# Patient Record
Sex: MALE | Race: WHITE | Employment: OTHER | URBAN - METROPOLITAN AREA
[De-identification: names, ages, dates, MRNs, and addresses within clinical notes are randomized per-mention and may not be internally consistent; named-entity substitution may affect disease eponyms.]

---

## 2021-11-07 ENCOUNTER — APPOINTMENT (OUTPATIENT)
Dept: GENERAL RADIOLOGY | Age: 66
End: 2021-11-07
Attending: EMERGENCY MEDICINE
Payer: MEDICARE

## 2021-11-07 ENCOUNTER — HOSPITAL ENCOUNTER (EMERGENCY)
Age: 66
Discharge: HOME | End: 2021-11-07
Attending: EMERGENCY MEDICINE
Payer: MEDICARE

## 2021-11-07 VITALS
RESPIRATION RATE: 24 BRPM | WEIGHT: 214 LBS | DIASTOLIC BLOOD PRESSURE: 87 MMHG | OXYGEN SATURATION: 98 % | HEIGHT: 73 IN | BODY MASS INDEX: 28.36 KG/M2 | SYSTOLIC BLOOD PRESSURE: 161 MMHG | TEMPERATURE: 98.9 F | HEART RATE: 91 BPM

## 2021-11-07 DIAGNOSIS — S76.012A HIP STRAIN, LEFT, INITIAL ENCOUNTER: Primary | ICD-10-CM

## 2021-11-07 DIAGNOSIS — I95.1 ORTHOSTATIC SYNCOPE: ICD-10-CM

## 2021-11-07 LAB
ALBUMIN SERPL-MCNC: 3.1 G/DL (ref 3.2–4.6)
ALBUMIN/GLOB SERPL: 0.6 {RATIO} (ref 1.2–3.5)
ALP SERPL-CCNC: 105 U/L (ref 50–136)
ALT SERPL-CCNC: 21 U/L (ref 12–65)
ANION GAP SERPL CALC-SCNC: 6 MMOL/L (ref 7–16)
AST SERPL-CCNC: 50 U/L (ref 15–37)
ATRIAL RATE: 106 BPM
BILIRUB SERPL-MCNC: 1.1 MG/DL (ref 0.2–1.1)
BUN SERPL-MCNC: 16 MG/DL (ref 8–23)
CALCIUM SERPL-MCNC: 9 MG/DL (ref 8.3–10.4)
CALCULATED P AXIS, ECG09: -110 DEGREES
CALCULATED R AXIS, ECG10: 86 DEGREES
CALCULATED T AXIS, ECG11: 72 DEGREES
CHLORIDE SERPL-SCNC: 110 MMOL/L (ref 98–107)
CO2 SERPL-SCNC: 21 MMOL/L (ref 21–32)
CREAT SERPL-MCNC: 1.01 MG/DL (ref 0.8–1.5)
DIAGNOSIS, 93000: NORMAL
ERYTHROCYTE [DISTWIDTH] IN BLOOD BY AUTOMATED COUNT: 15.4 % (ref 11.9–14.6)
GLOBULIN SER CALC-MCNC: 5 G/DL (ref 2.3–3.5)
GLUCOSE SERPL-MCNC: 92 MG/DL (ref 65–100)
HCT VFR BLD AUTO: 44.7 % (ref 41.1–50.3)
HGB BLD-MCNC: 13.6 G/DL (ref 13.6–17.2)
MCH RBC QN AUTO: 26.3 PG (ref 26.1–32.9)
MCHC RBC AUTO-ENTMCNC: 30.4 G/DL (ref 31.4–35)
MCV RBC AUTO: 86.3 FL (ref 79.6–97.8)
NRBC # BLD: 0 K/UL (ref 0–0.2)
P-R INTERVAL, ECG05: 160 MS
PLATELET # BLD AUTO: 191 K/UL (ref 150–450)
PMV BLD AUTO: 10.4 FL (ref 9.4–12.3)
POTASSIUM SERPL-SCNC: 5.3 MMOL/L (ref 3.5–5.1)
PROT SERPL-MCNC: 8.1 G/DL (ref 6.3–8.2)
Q-T INTERVAL, ECG07: 342 MS
QRS DURATION, ECG06: 94 MS
QTC CALCULATION (BEZET), ECG08: 454 MS
RBC # BLD AUTO: 5.18 M/UL (ref 4.23–5.6)
SODIUM SERPL-SCNC: 137 MMOL/L (ref 138–145)
VENTRICULAR RATE, ECG03: 106 BPM
WBC # BLD AUTO: 6.8 K/UL (ref 4.3–11.1)

## 2021-11-07 PROCEDURE — 99285 EMERGENCY DEPT VISIT HI MDM: CPT

## 2021-11-07 PROCEDURE — 80053 COMPREHEN METABOLIC PANEL: CPT

## 2021-11-07 PROCEDURE — 73502 X-RAY EXAM HIP UNI 2-3 VIEWS: CPT

## 2021-11-07 PROCEDURE — 74011250636 HC RX REV CODE- 250/636: Performed by: EMERGENCY MEDICINE

## 2021-11-07 PROCEDURE — 73552 X-RAY EXAM OF FEMUR 2/>: CPT

## 2021-11-07 PROCEDURE — 93005 ELECTROCARDIOGRAM TRACING: CPT | Performed by: EMERGENCY MEDICINE

## 2021-11-07 PROCEDURE — 85027 COMPLETE CBC AUTOMATED: CPT

## 2021-11-07 PROCEDURE — 96374 THER/PROPH/DIAG INJ IV PUSH: CPT

## 2021-11-07 RX ORDER — HYDROMORPHONE HYDROCHLORIDE 1 MG/ML
1 INJECTION, SOLUTION INTRAMUSCULAR; INTRAVENOUS; SUBCUTANEOUS
Status: DISCONTINUED | OUTPATIENT
Start: 2021-11-07 | End: 2021-11-07 | Stop reason: HOSPADM

## 2021-11-07 RX ORDER — ONDANSETRON 2 MG/ML
4 INJECTION INTRAMUSCULAR; INTRAVENOUS
Status: DISCONTINUED | OUTPATIENT
Start: 2021-11-07 | End: 2021-11-07 | Stop reason: HOSPADM

## 2021-11-07 RX ADMIN — HYDROMORPHONE HYDROCHLORIDE 1 MG: 1 INJECTION, SOLUTION INTRAMUSCULAR; INTRAVENOUS; SUBCUTANEOUS at 13:07

## 2021-11-07 NOTE — ED PROVIDER NOTES
The history is provided by the patient. Hip Injury   This is a new problem. The current episode started less than 1 hour ago. The problem occurs constantly. The problem has been gradually improving. Pain location: left hip. The pain is moderate. Associated symptoms include numbness ( chronic leg numbness) and back pain (chronic and unchanged). Pertinent negatives include no tingling. Treatments tried: 100 mcg of fentanyl given by EMS prior to arrival. The treatment provided moderate relief. There has been a history of trauma. Syncope   This is a new problem. The current episode started less than 1 hour ago. Episode frequency: 1. The problem has been resolved. Length of episode of loss of consciousness: unknown duration. The problem is associated with standing up. Associated symptoms include palpitations and back pain (chronic and unchanged). Pertinent negatives include no chest pain, no confusion, no fever, no nausea, no vomiting, no congestion, no headaches, no focal weakness and no weakness. Associated symptoms comments: Left hip pain from the fall as a result. He has tried nothing for the symptoms. Past medical history comments: Atrial fibrillation on Eliquis, bovine valve replacement via TAVR, coronary artery disease with stenting, chronic back pain with nerve damage. Sean Loco No past medical history on file. No past surgical history on file. No family history on file.     Social History     Socioeconomic History    Marital status: Not on file     Spouse name: Not on file    Number of children: Not on file    Years of education: Not on file    Highest education level: Not on file   Occupational History    Not on file   Tobacco Use    Smoking status: Not on file    Smokeless tobacco: Not on file   Substance and Sexual Activity    Alcohol use: Not on file    Drug use: Not on file    Sexual activity: Not on file   Other Topics Concern    Not on file   Social History Narrative    Not on file Social Determinants of Health     Financial Resource Strain:     Difficulty of Paying Living Expenses: Not on file   Food Insecurity:     Worried About Running Out of Food in the Last Year: Not on file    Codey of Food in the Last Year: Not on file   Transportation Needs:     Lack of Transportation (Medical): Not on file    Lack of Transportation (Non-Medical): Not on file   Physical Activity:     Days of Exercise per Week: Not on file    Minutes of Exercise per Session: Not on file   Stress:     Feeling of Stress : Not on file   Social Connections:     Frequency of Communication with Friends and Family: Not on file    Frequency of Social Gatherings with Friends and Family: Not on file    Attends [employer] Services: Not on file    Active Member of [address] [place] [address] [employer] or Organizations: Not on file    Attends Club or Organization Meetings: Not on file    Marital Status: Not on file   Intimate Partner Violence:     Fear of Current or Ex-Partner: Not on file    Emotionally Abused: Not on file    Physically Abused: Not on file    Sexually Abused: Not on file   Housing Stability:     Unable to Pay for Housing in the Last Year: Not on file    Number of [place] in the Last Year: Not on file    Unstable Housing in the Last Year: Not on file         ALLERGIES: Tegretol [carbamazepine]    Review of Systems   Constitutional: Negative for fever. HENT: Negative for congestion and rhinorrhea. Respiratory: Negative for cough and shortness of breath. Cardiovascular: Positive for palpitations and syncope. Negative for chest pain and leg swelling. Gastrointestinal: Negative for nausea and vomiting. Musculoskeletal: Positive for back pain (chronic and unchanged). Neurological: Positive for numbness ( chronic leg numbness). Negative for tingling, focal weakness, speech difficulty, weakness and headaches. Psychiatric/Behavioral: Negative for confusion.    All other systems reviewed and are negative. Vitals:    11/07/21 1147   BP: (!) 154/86   Pulse: (!) 121   Resp: 22   SpO2: 97%   Weight: 97.1 kg (214 lb)   Height: 6' 1\" (1.854 m)            Physical Exam  Vitals and nursing note reviewed. Constitutional:       Appearance: He is well-developed. HENT:      Head: Normocephalic and atraumatic. Right Ear: External ear normal.      Left Ear: External ear normal.      Nose: Nose normal.      Mouth/Throat:      Mouth: Mucous membranes are moist.      Pharynx: No oropharyngeal exudate. Eyes:      Conjunctiva/sclera: Conjunctivae normal.      Pupils: Pupils are equal, round, and reactive to light. Neck:      Comments: Cervical spine nontender, no neck pain with range of motion  Cardiovascular:      Rate and Rhythm: Normal rate and regular rhythm. Heart sounds: Normal heart sounds. Pulmonary:      Effort: Pulmonary effort is normal.      Breath sounds: Normal breath sounds. Abdominal:      General: Bowel sounds are normal. There is no distension. Palpations: Abdomen is soft. Tenderness: There is no abdominal tenderness. There is no guarding or rebound. Musculoskeletal:         General: Tenderness ( Left lateral hip and proximal femur) present. No swelling or deformity. Cervical back: Neck supple. No tenderness. Right lower leg: No edema. Left lower leg: No edema. Comments: Pelvis stable and nontender, no thoracic or lumbar midline tenderness   Lymphadenopathy:      Cervical: No cervical adenopathy. Skin:     General: Skin is warm and dry. Neurological:      Mental Status: He is alert and oriented to person, place, and time. Cranial Nerves: No cranial nerve deficit. Sensory: Sensory deficit ( Chronic bilateral symmetric and unchanged from baseline per patient) present. Motor: No weakness.           MDM  Number of Diagnoses or Management Options  Diagnosis management comments: Likely lost consciousness from standing up too quickly from orthostatic hypotension. Will not check orthostatics given concern for left hip fracture from the fall was a result of this episode. On the monitor patient appears to be in A. fib with RVR but provides a history of same. I will review records to see if he is chronically in A. fib. As needed pain medication and nausea medication ordered. Patient comfortable at this time with his 100 mcg of fentanyl. X-ray of the pelvis left hip and femur ordered. No chest pain or trouble breathing to suggest MI PE or aortic dissection.        Amount and/or Complexity of Data Reviewed  Clinical lab tests: ordered and reviewed (Results for orders placed or performed during the hospital encounter of 11/07/21  -CBC W/O DIFF:        Result                      Value             Ref Range           WBC                         6.8               4.3 - 11.1 K*       RBC                         5.18              4.23 - 5.6 M*       HGB                         13.6              13.6 - 17.2 *       HCT                         44.7              41.1 - 50.3 %       MCV                         86.3              79.6 - 97.8 *       MCH                         26.3              26.1 - 32.9 *       MCHC                        30.4 (L)          31.4 - 35.0 *       RDW                         15.4 (H)          11.9 - 14.6 %       PLATELET                    191               150 - 450 K/*       MPV                         10.4              9.4 - 12.3 FL       ABSOLUTE NRBC               0.00              0.0 - 0.2 K/*  -METABOLIC PANEL, COMPREHENSIVE:        Result                      Value             Ref Range           Sodium                      137 (L)           138 - 145 mm*       Potassium                   5.3 (H)           3.5 - 5.1 mm*       Chloride                    110 (H)           98 - 107 mmo*       CO2                         21                21 - 32 mmol*       Anion gap                   6 (L)             7 - 16 mmol/L Glucose                     92                65 - 100 mg/*       BUN                         16                8 - 23 MG/DL        Creatinine                  1.01              0.8 - 1.5 MG*       GFR est AA                  >60               >60 ml/min/1*       GFR est non-AA              >60               >60 ml/min/1*       Calcium                     9.0               8.3 - 10.4 M*       Bilirubin, total            1.1               0.2 - 1.1 MG*       ALT (SGPT)                  21                12 - 65 U/L         AST (SGOT)                  50 (H)            15 - 37 U/L         Alk.  phosphatase            105               50 - 136 U/L        Protein, total              8.1               6.3 - 8.2 g/*       Albumin                     3.1 (L)           3.2 - 4.6 g/*       Globulin                    5.0 (H)           2.3 - 3.5 g/*       A-G Ratio                   0.6 (L)           1.2 - 3.5      -EKG, 12 LEAD, INITIAL:        Result                      Value             Ref Range           Ventricular Rate            106               BPM                 Atrial Rate                 106               BPM                 P-R Interval                160               ms                  QRS Duration                94                ms                  Q-T Interval                342               ms                  QTC Calculation (Bezet)     454               ms                  Calculated P Axis           -110              degrees             Calculated R Axis           86                degrees             Calculated T Axis           72                degrees             Diagnosis                                                     !! AGE AND GENDER SPECIFIC ECG ANALYSIS !!   Unusual P axis, possible ectopic atrial tachycardia with Premature    supraventricular complexes with occasional Premature   ventricular complexes   Nonspecific ST abnormality   Abnormal ECG   No previous ECGs available     )  Tests in the radiology section of CPT®: ordered and reviewed (XR HIP LT W OR WO PELV 2-3 VWS    Result Date: 11/7/2021  Pelvis and left hip radiographs, 3 views Left femur radiographs, 2 views INDICATION: Hip pain, injury COMPARISON: None. FINDINGS: Left total hip arthroplasty with femoral component appearing incompletely seated within the acetabular cup but otherwise normally aligned. No periprosthetic fracture or paralleling lucency. Left total hip arthroplasty. Femoral component appears incompletely seated within the acetabular cup. No fracture present. Osteoarthritic changes of the knee joint. XR FEMUR LT 2 V    Result Date: 11/7/2021  Pelvis and left hip radiographs, 3 views Left femur radiographs, 2 views INDICATION: Hip pain, injury COMPARISON: None. FINDINGS: Left total hip arthroplasty with femoral component appearing incompletely seated within the acetabular cup but otherwise normally aligned. No periprosthetic fracture or paralleling lucency. Left total hip arthroplasty. Femoral component appears incompletely seated within the acetabular cup. No fracture present. Osteoarthritic changes of the knee joint.    )  Tests in the medicine section of CPT®: ordered and reviewed  Discuss the patient with other providers: yes (Ortho reviewed the case and the images and thinks outpatient follow-up is appropriate.   They are not convinced there is an abnormality.)  Independent visualization of images, tracings, or specimens: yes (EKG interpretation in absence of cardiology  EKG shows atrial flutter rate of 106, premature atrial complexes, PVC, no acute ST-T changes  Abnormal EKG  Interpreted by ED physician Dr. Gucci Manuel)    Risk of Complications, Morbidity, and/or Mortality  Presenting problems: high  Diagnostic procedures: low  Management options: low    Patient Progress  Patient progress: stable         Procedures

## 2021-11-07 NOTE — ED NOTES
I have reviewed discharge instructions with the patient. The patient verbalized understanding. Patient left ED via Discharge Method: wheelchair to Home with niece. Opportunity for questions and clarification provided. Patient given 0 scripts. To continue your aftercare when you leave the hospital, you may receive an automated call from our care team to check in on how you are doing. This is a free service and part of our promise to provide the best care and service to meet your aftercare needs.  If you have questions, or wish to unsubscribe from this service please call [phone]. Thank you for Choosing our [facility] Emergency Department.

## 2021-11-07 NOTE — DISCHARGE INSTRUCTIONS
Increase fluids and advance diet as tolerated. Go slowly and be cautious when going from a seated to a standing position. Touch toe weightbearing left side. Tylenol for pain. Ice to the sore area for 15 to 20 minutes every 4 hours while awake for 3 to 5 days and change to heat for a few days. Follow-up with [facility] when called with appointment time. Call them Tuesday if you have not heard from them. Return if any new, worsening or concerning symptoms.

## 2021-11-07 NOTE — ED TRIAGE NOTES
Patient arrives via EMS from home. Reported to EMS he was getting up with his cane, suddenly felt dizzy, and fell to the floor from standing. Patient states he does not remember the fall. Reports pain to left mid thigh after the fall. 100mcg of Fentanyl IM given en route. VS: /99, HR 90, RR 20, . Takes Eliquis.

## 2021-11-09 ENCOUNTER — APPOINTMENT (OUTPATIENT)
Dept: GENERAL RADIOLOGY | Age: 66
End: 2021-11-09
Attending: EMERGENCY MEDICINE
Payer: MEDICARE

## 2021-11-09 ENCOUNTER — HOSPITAL ENCOUNTER (EMERGENCY)
Age: 66
Discharge: HOME | End: 2021-11-09
Attending: EMERGENCY MEDICINE
Payer: MEDICARE

## 2021-11-09 VITALS
OXYGEN SATURATION: 97 % | SYSTOLIC BLOOD PRESSURE: 124 MMHG | BODY MASS INDEX: 28.36 KG/M2 | RESPIRATION RATE: 18 BRPM | HEART RATE: 84 BPM | TEMPERATURE: 97.8 F | HEIGHT: 73 IN | DIASTOLIC BLOOD PRESSURE: 70 MMHG | WEIGHT: 214 LBS

## 2021-11-09 DIAGNOSIS — I48.11 LONGSTANDING PERSISTENT ATRIAL FIBRILLATION (HCC): ICD-10-CM

## 2021-11-09 DIAGNOSIS — S72.102A CLOSED FRACTURE OF TROCHANTER OF LEFT FEMUR, INITIAL ENCOUNTER (HCC): Primary | ICD-10-CM

## 2021-11-09 LAB
ALBUMIN SERPL-MCNC: 3.5 G/DL (ref 3.2–4.6)
ALBUMIN/GLOB SERPL: 0.7 {RATIO} (ref 1.2–3.5)
ALP SERPL-CCNC: 111 U/L (ref 50–136)
ALT SERPL-CCNC: 21 U/L (ref 12–65)
ANION GAP SERPL CALC-SCNC: 7 MMOL/L (ref 7–16)
AST SERPL-CCNC: 19 U/L (ref 15–37)
ATRIAL RATE: 227 BPM
BASOPHILS # BLD: 0.1 K/UL (ref 0–0.2)
BASOPHILS NFR BLD: 1 % (ref 0–2)
BILIRUB SERPL-MCNC: 1.1 MG/DL (ref 0.2–1.1)
BUN SERPL-MCNC: 18 MG/DL (ref 8–23)
CALCIUM SERPL-MCNC: 9.5 MG/DL (ref 8.3–10.4)
CALCULATED P AXIS, ECG09: 88 DEGREES
CALCULATED R AXIS, ECG10: 85 DEGREES
CALCULATED T AXIS, ECG11: 69 DEGREES
CHLORIDE SERPL-SCNC: 104 MMOL/L (ref 98–107)
CO2 SERPL-SCNC: 25 MMOL/L (ref 21–32)
CREAT SERPL-MCNC: 1.12 MG/DL (ref 0.8–1.5)
DIAGNOSIS, 93000: NORMAL
DIFFERENTIAL METHOD BLD: ABNORMAL
EOSINOPHIL # BLD: 0.1 K/UL (ref 0–0.8)
EOSINOPHIL NFR BLD: 1 % (ref 0.5–7.8)
ERYTHROCYTE [DISTWIDTH] IN BLOOD BY AUTOMATED COUNT: 15.3 % (ref 11.9–14.6)
GLOBULIN SER CALC-MCNC: 4.9 G/DL (ref 2.3–3.5)
GLUCOSE SERPL-MCNC: 105 MG/DL (ref 65–100)
HCT VFR BLD AUTO: 39.2 % (ref 41.1–50.3)
HGB BLD-MCNC: 12.7 G/DL (ref 13.6–17.2)
IMM GRANULOCYTES # BLD AUTO: 0 K/UL (ref 0–0.5)
IMM GRANULOCYTES NFR BLD AUTO: 0 % (ref 0–5)
LYMPHOCYTES # BLD: 1.6 K/UL (ref 0.5–4.6)
LYMPHOCYTES NFR BLD: 17 % (ref 13–44)
MAGNESIUM SERPL-MCNC: 2.1 MG/DL (ref 1.8–2.4)
MCH RBC QN AUTO: 26.2 PG (ref 26.1–32.9)
MCHC RBC AUTO-ENTMCNC: 32.4 G/DL (ref 31.4–35)
MCV RBC AUTO: 80.8 FL (ref 79.6–97.8)
MONOCYTES # BLD: 1 K/UL (ref 0.1–1.3)
MONOCYTES NFR BLD: 11 % (ref 4–12)
NEUTS SEG # BLD: 6.8 K/UL (ref 1.7–8.2)
NEUTS SEG NFR BLD: 71 % (ref 43–78)
NRBC # BLD: 0 K/UL (ref 0–0.2)
PLATELET # BLD AUTO: 228 K/UL (ref 150–450)
PMV BLD AUTO: 9.5 FL (ref 9.4–12.3)
POTASSIUM SERPL-SCNC: 3.7 MMOL/L (ref 3.5–5.1)
PROT SERPL-MCNC: 8.4 G/DL (ref 6.3–8.2)
Q-T INTERVAL, ECG07: 284 MS
QRS DURATION, ECG06: 88 MS
QTC CALCULATION (BEZET), ECG08: 451 MS
RBC # BLD AUTO: 4.85 M/UL (ref 4.23–5.6)
SODIUM SERPL-SCNC: 136 MMOL/L (ref 136–145)
VENTRICULAR RATE, ECG03: 152 BPM
WBC # BLD AUTO: 9.6 K/UL (ref 4.3–11.1)

## 2021-11-09 PROCEDURE — 83735 ASSAY OF MAGNESIUM: CPT

## 2021-11-09 PROCEDURE — 85025 COMPLETE CBC W/AUTO DIFF WBC: CPT

## 2021-11-09 PROCEDURE — 80053 COMPREHEN METABOLIC PANEL: CPT

## 2021-11-09 PROCEDURE — 71045 X-RAY EXAM CHEST 1 VIEW: CPT

## 2021-11-09 PROCEDURE — 99284 EMERGENCY DEPT VISIT MOD MDM: CPT

## 2021-11-09 PROCEDURE — 73501 X-RAY EXAM HIP UNI 1 VIEW: CPT

## 2021-11-09 PROCEDURE — 96372 THER/PROPH/DIAG INJ SC/IM: CPT

## 2021-11-09 PROCEDURE — 74011250637 HC RX REV CODE- 250/637: Performed by: EMERGENCY MEDICINE

## 2021-11-09 PROCEDURE — 93005 ELECTROCARDIOGRAM TRACING: CPT | Performed by: EMERGENCY MEDICINE

## 2021-11-09 PROCEDURE — 74011250636 HC RX REV CODE- 250/636: Performed by: EMERGENCY MEDICINE

## 2021-11-09 PROCEDURE — 73502 X-RAY EXAM HIP UNI 2-3 VIEWS: CPT

## 2021-11-09 RX ORDER — POTASSIUM CHLORIDE 20 MEQ/1
20 TABLET, EXTENDED RELEASE ORAL 2 TIMES DAILY
Status: ON HOLD | COMMUNITY
End: 2022-04-09 | Stop reason: SDUPTHER

## 2021-11-09 RX ORDER — MORPHINE SULFATE 10 MG/ML
10 INJECTION, SOLUTION INTRAMUSCULAR; INTRAVENOUS
Status: COMPLETED | OUTPATIENT
Start: 2021-11-09 | End: 2021-11-09

## 2021-11-09 RX ORDER — BACLOFEN 10 MG/1
10 TABLET ORAL 3 TIMES DAILY
COMMUNITY
End: 2022-05-19

## 2021-11-09 RX ORDER — FUROSEMIDE 40 MG/1
40 TABLET ORAL 2 TIMES DAILY
COMMUNITY
End: 2021-12-19

## 2021-11-09 RX ORDER — LISINOPRIL 10 MG/1
20 TABLET ORAL DAILY
COMMUNITY
End: 2021-12-07

## 2021-11-09 RX ORDER — GUAIFENESIN 100 MG/5ML
81 LIQUID (ML) ORAL DAILY
COMMUNITY

## 2021-11-09 RX ORDER — ONDANSETRON 4 MG/1
4 TABLET, ORALLY DISINTEGRATING ORAL
Status: COMPLETED | OUTPATIENT
Start: 2021-11-09 | End: 2021-11-09

## 2021-11-09 RX ORDER — IPRATROPIUM BROMIDE AND ALBUTEROL SULFATE 2.5; .5 MG/3ML; MG/3ML
3 SOLUTION RESPIRATORY (INHALATION)
COMMUNITY
End: 2022-05-05 | Stop reason: SDUPTHER

## 2021-11-09 RX ORDER — MORPHINE SULFATE 15 MG/1
15 TABLET ORAL
Qty: 19 TABLET | Refills: 0 | Status: SHIPPED | OUTPATIENT
Start: 2021-11-09 | End: 2021-11-14

## 2021-11-09 RX ORDER — DILTIAZEM HYDROCHLORIDE 30 MG/1
60 TABLET, FILM COATED ORAL
Status: COMPLETED | OUTPATIENT
Start: 2021-11-09 | End: 2021-11-09

## 2021-11-09 RX ORDER — ALBUTEROL SULFATE 0.83 MG/ML
2.5 SOLUTION RESPIRATORY (INHALATION)
COMMUNITY

## 2021-11-09 RX ORDER — FLUTICASONE PROPIONATE AND SALMETEROL 250; 50 UG/1; UG/1
1 POWDER RESPIRATORY (INHALATION) EVERY 12 HOURS
COMMUNITY
End: 2022-01-03

## 2021-11-09 RX ORDER — ATORVASTATIN CALCIUM 40 MG/1
40 TABLET, FILM COATED ORAL DAILY
COMMUNITY

## 2021-11-09 RX ORDER — DILTIAZEM HYDROCHLORIDE 120 MG/1
120 CAPSULE, EXTENDED RELEASE ORAL DAILY
COMMUNITY
End: 2021-12-07

## 2021-11-09 RX ORDER — ALBUTEROL SULFATE 90 UG/1
2 AEROSOL, METERED RESPIRATORY (INHALATION)
COMMUNITY
End: 2022-05-05 | Stop reason: SDUPTHER

## 2021-11-09 RX ADMIN — DILTIAZEM HYDROCHLORIDE 60 MG: 30 TABLET, FILM COATED ORAL at 10:26

## 2021-11-09 RX ADMIN — MORPHINE SULFATE 10 MG: 10 INJECTION INTRAVENOUS at 08:48

## 2021-11-09 RX ADMIN — ONDANSETRON 4 MG: 4 TABLET, ORALLY DISINTEGRATING ORAL at 08:48

## 2021-11-09 NOTE — ED PROVIDER NOTES
80-year-old male returns to the ER due to persistent right hip pain. Patient was seen several days ago. At that time his work-up revealed no acute changes on his x-ray and only chronic pain and neurologic changes. Patient's pain was improved with an initial dose of fentanyl and he declined further pain medication and was going to attempt to manage his pain with the neurostimulator and other treatments that he has been chronically using. Unfortunately his pain numbers progress over last 24 hours. Patient has a history of chronic A. fib and this has been poorly controlled for some time. Patient states that due to his pain his heart rate was close to 200 last night. On arrival today his heart rate is in the 150s and 160s    Patient is followed primarily through the [place] system. Patient states that he had his first appointment with the local [place] clinic here in [place] yesterday. That time they said that he had had several medication changes including a switch of his anticoagulants. Patient is scheduled to see [place] cardiology December 10. The history is provided by the patient. Hip Pain   This is a new problem. The current episode started more than 2 days ago. The problem occurs constantly. The problem has not changed since onset. The pain is present in the right hip and right upper leg. The quality of the pain is described as aching. The pain is moderate. Associated symptoms include numbness, limited range of motion, tingling and back pain. Pertinent negatives include no neck pain. The symptoms are aggravated by contact and movement. Treatments tried: Nerve stimulator. There has been a history of trauma (Patient had a syncopal episode and fall several days ago. Was seen in the ER after the episode). No past medical history on file. No past surgical history on file. No family history on file.     Social History     Socioeconomic History    Marital status: LEGALLY      Spouse name: Not on file    Number of children: Not on file    Years of education: Not on file    Highest education level: Not on file   Occupational History    Not on file   Tobacco Use    Smoking status: Not on file    Smokeless tobacco: Not on file   Substance and Sexual Activity    Alcohol use: Not on file    Drug use: Not on file    Sexual activity: Not on file   Other Topics Concern    Not on file   Social History Narrative    Not on file     Social Determinants of Health     Financial Resource Strain:     Difficulty of Paying Living Expenses: Not on file   Food Insecurity:     Worried About Running Out of Food in the Last Year: Not on file    Codey of Food in the Last Year: Not on file   Transportation Needs:     Lack of Transportation (Medical): Not on file    Lack of Transportation (Non-Medical): Not on file   Physical Activity:     Days of Exercise per Week: Not on file    Minutes of Exercise per Session: Not on file   Stress:     Feeling of Stress : Not on file   Social Connections:     Frequency of Communication with Friends and Family: Not on file    Frequency of Social Gatherings with Friends and Family: Not on file    Attends [employer] Services: Not on file    Active Member of [address] [place] [address] [email or web address] or Organizations: Not on file    Attends Club or Organization Meetings: Not on file    Marital Status: Not on file   Intimate Partner Violence:     Fear of Current or Ex-Partner: Not on file    Emotionally Abused: Not on file    Physically Abused: Not on file    Sexually Abused: Not on file   Housing Stability:     Unable to Pay for Housing in the Last Year: Not on file    Number of [place] in the Last Year: Not on file    Unstable Housing in the Last Year: Not on file         ALLERGIES: Tegretol [carbamazepine]    Review of Systems   Constitutional: Negative for activity change, chills, diaphoresis and fever. HENT: Negative for dental problem, hearing loss, nosebleeds, rhinorrhea and sore throat. Eyes: Negative for pain, discharge, redness and visual disturbance. Respiratory: Negative for cough, chest tightness and shortness of breath. Cardiovascular: Negative for chest pain, palpitations and leg swelling. Gastrointestinal: Negative for abdominal pain, constipation, diarrhea, nausea and vomiting. Endocrine: Negative for cold intolerance, heat intolerance, polydipsia and polyuria. Genitourinary: Negative for dysuria and flank pain. Musculoskeletal: Positive for arthralgias, back pain, gait problem and myalgias. Negative for joint swelling and neck pain. Skin: Negative for pallor and rash. Allergic/Immunologic: Negative for environmental allergies and food allergies. Neurological: Positive for tingling, weakness and numbness. Negative for dizziness, tremors, light-headedness and headaches. Hematological: Negative for adenopathy. Does not bruise/bleed easily. Psychiatric/Behavioral: Negative for confusion and dysphoric mood. The patient is not nervous/anxious and is not hyperactive. All other systems reviewed and are negative. Vitals:    11/09/21 0742   BP: (!) 135/91   Pulse: (!) 158   Resp: 18   Temp: 97.8 °F (36.6 °C)   SpO2: 97%   Weight: 97.1 kg (214 lb)   Height: 6' 1\" (1.854 m)            Physical Exam  Vitals and nursing note reviewed. Constitutional:       General: He is in acute distress. Appearance: Normal appearance. He is well-developed and normal weight. HENT:      Head: Normocephalic and atraumatic. Right Ear: External ear normal.      Left Ear: External ear normal.      Mouth/Throat:      Mouth: Mucous membranes are moist.      Pharynx: Oropharynx is clear. No oropharyngeal exudate. Eyes:      General: No scleral icterus. Extraocular Movements: Extraocular movements intact. Conjunctiva/sclera: Conjunctivae normal.      Pupils: Pupils are equal, round, and reactive to light. Neck:      Thyroid: No thyromegaly. Vascular: No JVD. Cardiovascular:      Rate and Rhythm: Tachycardia present. Rhythm irregular. Pulses: Normal pulses. Heart sounds: Normal heart sounds. No murmur heard. No friction rub. No gallop. Pulmonary:      Effort: Pulmonary effort is normal. No respiratory distress. Breath sounds: Normal breath sounds. No wheezing. Abdominal:      General: Bowel sounds are normal. There is no distension. Palpations: Abdomen is soft. Tenderness: There is no abdominal tenderness. Musculoskeletal:         General: No deformity. Cervical back: Normal range of motion and neck supple. Right hip: Tenderness present. No deformity or crepitus. Decreased range of motion. Left hip: Normal. No tenderness. Skin:     General: Skin is warm and dry. Capillary Refill: Capillary refill takes less than 2 seconds. Findings: No rash. Neurological:      Mental Status: He is alert and oriented to person, place, and time. GCS: GCS eye subscore is 4. GCS verbal subscore is 5. GCS motor subscore is 6. Cranial Nerves: No cranial nerve deficit. Sensory: Sensory deficit present. Motor: Weakness present. No abnormal muscle tone. Coordination: Coordination normal.      Comments: Decreased sensation noted in both lower extremities. Patient reports this is a chronic issue and unchanged from his baseline    Weakness in the right lower extremity. Unchanged from baseline   Psychiatric:         Mood and Affect: Mood and affect normal.         Speech: Speech normal.         Behavior: Behavior normal.         Thought Content:  Thought content normal.         Cognition and Memory: Cognition and memory normal.         Judgment: Judgment normal.          MDM  Number of Diagnoses or Management Options  Closed fracture of trochanter of left femur, initial encounter [facility]): new and requires workup  Longstanding persistent atrial fibrillation ([facility] [address]): established and worsening  Diagnosis management comments: 69-year-old male here with persistent pain in his right hip after a fall 2 days ago    Has a history of chronic A. fib. He reports that it has been difficult to control and his [address] [facility] doctors made changes to his medications yesterday including changes in to his anticoagulation therapy. Patient denies any overt chest pain or shortness of breath    Will attempt to medicate that hip pain. Monitor heart rate once pain is under better control    1:06 PM  Patient's pain and heart rate both controlled with medication. I did give the patient 1 extra dose of p.o. Cardizem     Reviewed radiographic findings with on-call orthopedist, Susy Stewart. Patient is cleared for discharge  He will return to [facility]  We have confirmed that the patient can have a short course of pain medication there without rule infringement       Amount and/or Complexity of Data Reviewed  Clinical lab tests: ordered and reviewed  Tests in the radiology section of CPT®: ordered and reviewed  Tests in the medicine section of CPT®: ordered and reviewed  Decide to obtain previous medical records or to obtain history from someone other than the patient: yes  Review and summarize past medical records: yes  Discuss the patient with other providers: yes  Independent visualization of images, tracings, or specimens: yes    Risk of Complications, Morbidity, and/or Mortality  Presenting problems: moderate  Diagnostic procedures: moderate  Management options: moderate  General comments: Elements of this note have been dictated via voice recognition software. Text and phrases may be limited by the accuracy of the software. The chart has been reviewed, but errors may still be present.       Patient Progress  Patient progress: improved         EKG    Date/Time: 11/9/2021 8:35 AM  Performed by: Corazon Lin MD  Authorized by: Corazon Lin MD     ECG reviewed by ED Physician in the absence of a cardiologist: yes    Previous ECG: Previous ECG:  Compared to current    Comparison ECG info:  Heart rate elevated at 152 bpm    Similarity:  Changes noted  Interpretation:     Interpretation: abnormal    Rate:     ECG rate:  152    ECG rate assessment: tachycardic    Rhythm:     Rhythm: atrial fibrillation    Ectopy:     Ectopy: none    Conduction:     Conduction: normal    ST segments:     ST segments:  Normal  T waves:     T waves: normal    Comments:      JOHANA. fib with RVR  No acute ischemic changes  Compared to November 7, 2021

## 2021-11-09 NOTE — DISCHARGE INSTRUCTIONS
Weightbearing as tolerated  Avoid significant flexing and bending of the hip joint  Call the orthopedic doctors for follow-up visit  Take medications as directed    Do not drink alcohol or drive while taking the prescription pain medications  Call your doctor/follow up doctor to set up appointment for recheck visit    Return to ER for any worsening symptoms or new problems which may arise

## 2021-11-09 NOTE — ED NOTES
I have reviewed discharge instructions with the patient. The patient verbalized understanding. Patient left ED via Discharge Method: wheelchair to Home with self. Opportunity for questions and clarification provided. Patient given 1 scripts. To continue your aftercare when you leave the hospital, you may receive an automated call from our care team to check in on how you are doing. This is a free service and part of our promise to provide the best care and service to meet your aftercare needs.  If you have questions, or wish to unsubscribe from this service please call [phone]. Thank you for Choosing our [facility] Emergency Department.

## 2021-11-09 NOTE — ED TRIAGE NOTES
Patient arrived into triage in personal wheel chair. Wearing mask. Patient reports he was recently seen in [facility] ED for left hip and left thigh pain. Returns back to ED stating pain has not improved and wants to be reevaluated.

## 2021-11-15 ENCOUNTER — HOSPITAL ENCOUNTER (EMERGENCY)
Age: 66
Discharge: HOME | End: 2021-11-15
Attending: EMERGENCY MEDICINE
Payer: MEDICARE

## 2021-11-15 ENCOUNTER — APPOINTMENT (OUTPATIENT)
Dept: GENERAL RADIOLOGY | Age: 66
End: 2021-11-15
Attending: EMERGENCY MEDICINE
Payer: MEDICARE

## 2021-11-15 VITALS
BODY MASS INDEX: 28.36 KG/M2 | RESPIRATION RATE: 18 BRPM | WEIGHT: 214 LBS | HEIGHT: 73 IN | OXYGEN SATURATION: 98 % | TEMPERATURE: 98.3 F | SYSTOLIC BLOOD PRESSURE: 128 MMHG | DIASTOLIC BLOOD PRESSURE: 78 MMHG | HEART RATE: 99 BPM

## 2021-11-15 DIAGNOSIS — M25.552 LEFT HIP PAIN: Primary | ICD-10-CM

## 2021-11-15 PROCEDURE — 74011250637 HC RX REV CODE- 250/637: Performed by: EMERGENCY MEDICINE

## 2021-11-15 PROCEDURE — 99283 EMERGENCY DEPT VISIT LOW MDM: CPT

## 2021-11-15 RX ORDER — MORPHINE SULFATE 15 MG/1
15 TABLET ORAL
Qty: 6 TABLET | Refills: 0 | Status: SHIPPED | OUTPATIENT
Start: 2021-11-15 | End: 2021-11-18

## 2021-11-15 RX ORDER — OXYCODONE AND ACETAMINOPHEN 10; 325 MG/1; MG/1
1 TABLET ORAL ONCE
Status: COMPLETED | OUTPATIENT
Start: 2021-11-15 | End: 2021-11-15

## 2021-11-15 RX ADMIN — OXYCODONE HYDROCHLORIDE AND ACETAMINOPHEN 1 TABLET: 10; 325 TABLET ORAL at 03:37

## 2021-11-15 NOTE — ED TRIAGE NOTES
Arrives with face mask in place. Arrives via personal wheelchair with reports left leg pain. Seen here 11/9, left femur fracture. Reports prescribed pain meds however staying at [facility] and they will not administer medications.

## 2021-11-15 NOTE — DISCHARGE INSTRUCTIONS
Use the medications that were prescribed to you. We will have case management reach out to you the [facility] at which you are staying today to determine what went wrong this weekend.

## 2021-11-15 NOTE — ED NOTES
I have reviewed discharge instructions with the patient. The patient verbalized understanding. Patient left ED via Discharge Method: wheelchair to Home with self. Opportunity for questions and clarification provided. Patient given 1 scripts. To continue your aftercare when you leave the hospital, you may receive an automated call from our care team to check in on how you are doing. This is a free service and part of our promise to provide the best care and service to meet your aftercare needs.  If you have questions, or wish to unsubscribe from this service please call [phone]. Thank you for Choosing our [address] [place] [address] Emergency Department.

## 2021-11-15 NOTE — ED PROVIDER NOTES
79-year-old male presenting for persistent left hip pain. Has a history of hip arthroplasty presented on the seventh and the ninth of this month with persistent left hip pain. X-rays performed both times but only the second visit revealed that he had a nondisplaced intertrochanteric fracture. Discharged with pain control. Tells me he is staying at a [facility] and they lock up the patient's pain medications and will only provide them in a scheduled manner but often times he cannot find the person that is in charge of that so he misses multiple doses. He is here really just for pain control. The history is provided by the patient. Leg Pain   This is a recurrent problem. No past medical history on file. No past surgical history on file. No family history on file. Social History     Socioeconomic History    Marital status: LEGALLY      Spouse name: Not on file    Number of children: Not on file    Years of education: Not on file    Highest education level: Not on file   Occupational History    Not on file   Tobacco Use    Smoking status: Not on file    Smokeless tobacco: Not on file   Substance and Sexual Activity    Alcohol use: Not on file    Drug use: Not on file    Sexual activity: Not on file   Other Topics Concern    Not on file   Social History Narrative    Not on file     Social Determinants of Health     Financial Resource Strain:     Difficulty of Paying Living Expenses: Not on file   Food Insecurity:     Worried About Running Out of Food in the Last Year: Not on file    Codey of Food in the Last Year: Not on file   Transportation Needs:     Lack of Transportation (Medical): Not on file    Lack of Transportation (Non-Medical):  Not on file   Physical Activity:     Days of Exercise per Week: Not on file    Minutes of Exercise per Session: Not on file   Stress:     Feeling of Stress : Not on file   Social Connections:     Frequency of Communication with Friends and Family: Not on file    Frequency of Social Gatherings with Friends and Family: Not on file    Attends Restorationism Services: Not on file    Active Member of Clubs or Organizations: Not on file    Attends Club or Organization Meetings: Not on file    Marital Status: Not on file   Intimate Partner Violence:     Fear of Current or Ex-Partner: Not on file    Emotionally Abused: Not on file    Physically Abused: Not on file    Sexually Abused: Not on file   Housing Stability:     Unable to Pay for Housing in the Last Year: Not on file    Number of [place] in the Last Year: Not on file    Unstable Housing in the Last Year: Not on file         ALLERGIES: Tegretol [carbamazepine] and Ativan [lorazepam]    Review of Systems   Musculoskeletal: Positive for arthralgias and gait problem. All other systems reviewed and are negative. Vitals:    11/15/21 0238   BP: 131/74   Pulse: (!) 122   Resp: 20   Temp: 98.4 °F (36.9 °C)   SpO2: 97%   Weight: 97.1 kg (214 lb)   Height: 6' 1\" (1.854 m)            Physical Exam  Vitals and nursing note reviewed. Constitutional:       Appearance: He is well-developed. HENT:      Head: Normocephalic and atraumatic. Eyes:      Conjunctiva/sclera: Conjunctivae normal.      Pupils: Pupils are equal, round, and reactive to light. Cardiovascular:      Rate and Rhythm: Tachycardia present. Rhythm irregular. Heart sounds: Normal heart sounds. Pulmonary:      Effort: Pulmonary effort is normal.      Breath sounds: Normal breath sounds. Abdominal:      General: Bowel sounds are normal.      Palpations: Abdomen is soft. Musculoskeletal:         General: Tenderness present. No deformity. Normal range of motion. Cervical back: Normal range of motion and neck supple. Skin:     General: Skin is warm and dry. Neurological:      Mental Status: He is alert and oriented to person, place, and time.       Cranial Nerves: No cranial nerve deficit. Psychiatric:         Behavior: Behavior normal.          MDM  Number of Diagnoses or Management Options  Left hip pain  Diagnosis management comments: 78-year-old male presenting for persistent left hip pain. Reviewed the patient's record and the x-rays which shows a stable nondisplaced intertrochanteric hip fracture. Looking at the prior note I do not see where the issue was discussed with orthopedics so I just to verify that there was nothing to be done. This is just a pain control issue. Provided the patient's medications here in the department and we will have case management reach out to [facility] today to determine what the miscommunication was because it seems that it was a weekend issue. The patient but his pain medications on Friday nothing Saturday and Sunday but now that Monday is here he likely will get a resumption of pain medications. Amount and/or Complexity of Data Reviewed  Decide to obtain previous medical records or to obtain history from someone other than the patient: yes  Review and summarize past medical records: yes    Risk of Complications, Morbidity, and/or Mortality  Presenting problems: moderate  Diagnostic procedures: moderate  Management options: moderate  General comments: I personally reviewed the patient's vital signs, laboratory tests, and/or radiological findings. I discussed these findings with the patient and their significance. I answered all questions and gave the patient clear return precautions.   The patient was discharged from the emergency department in stable condition        Patient Progress  Patient progress: improved         Procedures

## 2021-11-29 ENCOUNTER — HOSPITAL ENCOUNTER (EMERGENCY)
Age: 66
Discharge: HOME | End: 2021-11-29
Attending: EMERGENCY MEDICINE
Payer: MEDICARE

## 2021-11-29 ENCOUNTER — APPOINTMENT (OUTPATIENT)
Dept: GENERAL RADIOLOGY | Age: 66
End: 2021-11-29
Attending: EMERGENCY MEDICINE
Payer: MEDICARE

## 2021-11-29 VITALS
BODY MASS INDEX: 28.36 KG/M2 | WEIGHT: 214 LBS | SYSTOLIC BLOOD PRESSURE: 122 MMHG | HEIGHT: 73 IN | TEMPERATURE: 98.2 F | RESPIRATION RATE: 26 BRPM | OXYGEN SATURATION: 97 % | DIASTOLIC BLOOD PRESSURE: 58 MMHG | HEART RATE: 65 BPM

## 2021-11-29 DIAGNOSIS — J44.1 COPD EXACERBATION (HCC): Primary | ICD-10-CM

## 2021-11-29 LAB
ALBUMIN SERPL-MCNC: 3.6 G/DL (ref 3.2–4.6)
ALBUMIN/GLOB SERPL: 0.8 {RATIO} (ref 1.2–3.5)
ALP SERPL-CCNC: 132 U/L (ref 50–136)
ALT SERPL-CCNC: 28 U/L (ref 12–65)
ANION GAP SERPL CALC-SCNC: 7 MMOL/L (ref 7–16)
AST SERPL-CCNC: 22 U/L (ref 15–37)
ATRIAL RATE: 182 BPM
BASOPHILS # BLD: 0 K/UL (ref 0–0.2)
BASOPHILS NFR BLD: 0 % (ref 0–2)
BILIRUB SERPL-MCNC: 0.4 MG/DL (ref 0.2–1.1)
BNP SERPL-MCNC: 2503 PG/ML (ref 5–125)
BUN SERPL-MCNC: 22 MG/DL (ref 8–23)
CALCIUM SERPL-MCNC: 9.2 MG/DL (ref 8.3–10.4)
CALCULATED R AXIS, ECG10: 86 DEGREES
CALCULATED T AXIS, ECG11: 72 DEGREES
CHLORIDE SERPL-SCNC: 107 MMOL/L (ref 98–107)
CO2 SERPL-SCNC: 25 MMOL/L (ref 21–32)
CREAT SERPL-MCNC: 1.23 MG/DL (ref 0.8–1.5)
DIAGNOSIS, 93000: NORMAL
DIFFERENTIAL METHOD BLD: ABNORMAL
EOSINOPHIL # BLD: 0.2 K/UL (ref 0–0.8)
EOSINOPHIL NFR BLD: 2 % (ref 0.5–7.8)
ERYTHROCYTE [DISTWIDTH] IN BLOOD BY AUTOMATED COUNT: 15.2 % (ref 11.9–14.6)
GLOBULIN SER CALC-MCNC: 4.3 G/DL (ref 2.3–3.5)
GLUCOSE SERPL-MCNC: 90 MG/DL (ref 65–100)
HCT VFR BLD AUTO: 40.1 % (ref 41.1–50.3)
HGB BLD-MCNC: 12.6 G/DL (ref 13.6–17.2)
IMM GRANULOCYTES # BLD AUTO: 0 K/UL (ref 0–0.5)
IMM GRANULOCYTES NFR BLD AUTO: 0 % (ref 0–5)
LYMPHOCYTES # BLD: 2.9 K/UL (ref 0.5–4.6)
LYMPHOCYTES NFR BLD: 35 % (ref 13–44)
MAGNESIUM SERPL-MCNC: 2.1 MG/DL (ref 1.8–2.4)
MCH RBC QN AUTO: 25.3 PG (ref 26.1–32.9)
MCHC RBC AUTO-ENTMCNC: 31.4 G/DL (ref 31.4–35)
MCV RBC AUTO: 80.5 FL (ref 79.6–97.8)
MONOCYTES # BLD: 0.5 K/UL (ref 0.1–1.3)
MONOCYTES NFR BLD: 6 % (ref 4–12)
NEUTS SEG # BLD: 4.6 K/UL (ref 1.7–8.2)
NEUTS SEG NFR BLD: 56 % (ref 43–78)
NRBC # BLD: 0 K/UL (ref 0–0.2)
PLATELET # BLD AUTO: 218 K/UL (ref 150–450)
PMV BLD AUTO: 9.3 FL (ref 9.4–12.3)
POTASSIUM SERPL-SCNC: 4 MMOL/L (ref 3.5–5.1)
PROT SERPL-MCNC: 7.9 G/DL (ref 6.3–8.2)
Q-T INTERVAL, ECG07: 404 MS
QRS DURATION, ECG06: 88 MS
QTC CALCULATION (BEZET), ECG08: 480 MS
RBC # BLD AUTO: 4.98 M/UL (ref 4.23–5.6)
SODIUM SERPL-SCNC: 139 MMOL/L (ref 136–145)
TROPONIN-HIGH SENSITIVITY: 7.5 PG/ML (ref 0–14)
TROPONIN-HIGH SENSITIVITY: 9.7 PG/ML (ref 0–14)
VENTRICULAR RATE, ECG03: 85 BPM
WBC # BLD AUTO: 8.2 K/UL (ref 4.3–11.1)

## 2021-11-29 PROCEDURE — 74011000250 HC RX REV CODE- 250: Performed by: EMERGENCY MEDICINE

## 2021-11-29 PROCEDURE — 71045 X-RAY EXAM CHEST 1 VIEW: CPT

## 2021-11-29 PROCEDURE — 93005 ELECTROCARDIOGRAM TRACING: CPT | Performed by: EMERGENCY MEDICINE

## 2021-11-29 PROCEDURE — 85025 COMPLETE CBC W/AUTO DIFF WBC: CPT

## 2021-11-29 PROCEDURE — 96374 THER/PROPH/DIAG INJ IV PUSH: CPT

## 2021-11-29 PROCEDURE — 83880 ASSAY OF NATRIURETIC PEPTIDE: CPT

## 2021-11-29 PROCEDURE — 74011250636 HC RX REV CODE- 250/636: Performed by: EMERGENCY MEDICINE

## 2021-11-29 PROCEDURE — 83735 ASSAY OF MAGNESIUM: CPT

## 2021-11-29 PROCEDURE — 94640 AIRWAY INHALATION TREATMENT: CPT

## 2021-11-29 PROCEDURE — 84484 ASSAY OF TROPONIN QUANT: CPT

## 2021-11-29 PROCEDURE — 80053 COMPREHEN METABOLIC PANEL: CPT

## 2021-11-29 PROCEDURE — 74011636637 HC RX REV CODE- 636/637: Performed by: EMERGENCY MEDICINE

## 2021-11-29 PROCEDURE — 94664 DEMO&/EVAL PT USE INHALER: CPT

## 2021-11-29 PROCEDURE — 99285 EMERGENCY DEPT VISIT HI MDM: CPT

## 2021-11-29 PROCEDURE — 96375 TX/PRO/DX INJ NEW DRUG ADDON: CPT

## 2021-11-29 RX ORDER — PREDNISONE 20 MG/1
40 TABLET ORAL DAILY
Qty: 8 TABLET | Refills: 0 | Status: SHIPPED | OUTPATIENT
Start: 2021-11-29 | End: 2021-12-07

## 2021-11-29 RX ORDER — SODIUM CHLORIDE 0.9 % (FLUSH) 0.9 %
5-10 SYRINGE (ML) INJECTION EVERY 8 HOURS
Status: DISCONTINUED | OUTPATIENT
Start: 2021-11-29 | End: 2021-11-29 | Stop reason: HOSPADM

## 2021-11-29 RX ORDER — ALBUTEROL SULFATE 0.83 MG/ML
5 SOLUTION RESPIRATORY (INHALATION)
Status: COMPLETED | OUTPATIENT
Start: 2021-11-29 | End: 2021-11-29

## 2021-11-29 RX ORDER — SODIUM CHLORIDE 0.9 % (FLUSH) 0.9 %
5-10 SYRINGE (ML) INJECTION AS NEEDED
Status: DISCONTINUED | OUTPATIENT
Start: 2021-11-29 | End: 2021-11-29 | Stop reason: HOSPADM

## 2021-11-29 RX ORDER — FUROSEMIDE 10 MG/ML
60 INJECTION INTRAMUSCULAR; INTRAVENOUS
Status: COMPLETED | OUTPATIENT
Start: 2021-11-29 | End: 2021-11-29

## 2021-11-29 RX ORDER — IPRATROPIUM BROMIDE AND ALBUTEROL SULFATE 2.5; .5 MG/3ML; MG/3ML
3 SOLUTION RESPIRATORY (INHALATION)
Status: DISCONTINUED | OUTPATIENT
Start: 2021-11-29 | End: 2021-11-29

## 2021-11-29 RX ORDER — IPRATROPIUM BROMIDE AND ALBUTEROL SULFATE 2.5; .5 MG/3ML; MG/3ML
3 SOLUTION RESPIRATORY (INHALATION)
Status: COMPLETED | OUTPATIENT
Start: 2021-11-29 | End: 2021-11-29

## 2021-11-29 RX ORDER — MORPHINE SULFATE 4 MG/ML
4 INJECTION INTRAVENOUS
Status: COMPLETED | OUTPATIENT
Start: 2021-11-29 | End: 2021-11-29

## 2021-11-29 RX ORDER — ONDANSETRON 2 MG/ML
4 INJECTION INTRAMUSCULAR; INTRAVENOUS
Status: COMPLETED | OUTPATIENT
Start: 2021-11-29 | End: 2021-11-29

## 2021-11-29 RX ADMIN — PREDNISONE 60 MG: 10 TABLET ORAL at 04:32

## 2021-11-29 RX ADMIN — IPRATROPIUM BROMIDE AND ALBUTEROL SULFATE 3 ML: .5; 3 SOLUTION RESPIRATORY (INHALATION) at 01:20

## 2021-11-29 RX ADMIN — MORPHINE SULFATE 4 MG: 4 INJECTION INTRAVENOUS at 01:10

## 2021-11-29 RX ADMIN — FUROSEMIDE 60 MG: 100 INJECTION, SOLUTION INTRAMUSCULAR; INTRAVENOUS at 01:11

## 2021-11-29 RX ADMIN — ONDANSETRON 4 MG: 2 INJECTION INTRAMUSCULAR; INTRAVENOUS at 01:09

## 2021-11-29 RX ADMIN — ALBUTEROL SULFATE 5 MG: 2.5 SOLUTION RESPIRATORY (INHALATION) at 03:44

## 2021-11-29 NOTE — ED TRIAGE NOTES
EMS called to [facility]. Woke with SOB/resp distress. Timmy Shuck while trying to get into wheelchair to reach his home nebulizer. Hx of L femur fracture- awaiting clearance for surgery. 100% on RA. Denies LOC, head and neck pain. Does have increase pain in L Hip with spasms. HR 80, 140/80, duo neb given during transport with some improvement. Able to speak in short sentences.

## 2021-11-29 NOTE — ED NOTES
I have reviewed discharge instructions with the patient. The patient verbalized understanding. Patient left ED via Discharge Method: wheelchair to [facility] with [facility] transport. Opportunity for questions and clarification provided. Patient given 1 scripts. To continue your aftercare when you leave the hospital, you may receive an automated call from our care team to check in on how you are doing. This is a free service and part of our promise to provide the best care and service to meet your aftercare needs.  If you have questions, or wish to unsubscribe from this service please call [phone]. Thank you for Choosing our [facility] Emergency Department.

## 2021-11-29 NOTE — ED PROVIDER NOTES
Patient is a 63-year-old male with a history of CHF, paroxysmal atrial fibrillation and COPD who presents with shortness of breath. He states he woke up tonight from sleep feeling short of breath. When he went sleep earlier he felt fine. He tried to get into his wheelchair to use his nebulizer but fell. He has a problem with his left hip and is currently awaiting surgery and is supposed to not bear any weight. EMS was called, was given a DuoNeb during transport with some improvement. He states he still has some significant dyspnea though he does feel slightly better. His legs were getting swollen, states he typically takes Lasix 40 mg twice a day but took an extra dose last night for his swelling. He has had some mild intermittent chest discomfort lasting only for a few seconds. He has a history of paroxysmal atrial fibrillation, states that it commonly feels this way. No past medical history on file. No past surgical history on file. No family history on file. Social History     Socioeconomic History    Marital status: LEGALLY      Spouse name: Not on file    Number of children: Not on file    Years of education: Not on file    Highest education level: Not on file   Occupational History    Not on file   Tobacco Use    Smoking status: Not on file    Smokeless tobacco: Not on file   Substance and Sexual Activity    Alcohol use: Not on file    Drug use: Not on file    Sexual activity: Not on file   Other Topics Concern    Not on file   Social History Narrative    Not on file     Social Determinants of Health     Financial Resource Strain:     Difficulty of Paying Living Expenses: Not on file   Food Insecurity:     Worried About Running Out of Food in the Last Year: Not on file    Codey of Food in the Last Year: Not on file   Transportation Needs:     Lack of Transportation (Medical): Not on file    Lack of Transportation (Non-Medical):  Not on file   Physical Activity:     Days of Exercise per Week: Not on file    Minutes of Exercise per Session: Not on file   Stress:     Feeling of Stress : Not on file   Social Connections:     Frequency of Communication with Friends and Family: Not on file    Frequency of Social Gatherings with Friends and Family: Not on file    Attends Restoration Services: Not on file    Active Member of [address] [place] [address] or Organizations: Not on file    Attends Club or Organization Meetings: Not on file    Marital Status: Not on file   Intimate Partner Violence:     Fear of Current or Ex-Partner: Not on file    Emotionally Abused: Not on file    Physically Abused: Not on file    Sexually Abused: Not on file   Housing Stability:     Unable to Pay for Housing in the Last Year: Not on file    Number of [place] in the Last Year: Not on file    Unstable Housing in the Last Year: Not on file         ALLERGIES: Tegretol [carbamazepine] and Ativan [lorazepam]    Review of Systems   Constitutional: Negative for chills and fever. Gastrointestinal: Negative for nausea and vomiting. All other systems reviewed and are negative. Vitals:    11/29/21 0047   Pulse: 80   Resp: 26   Temp: 98.2 °F (36.8 °C)   SpO2: 98%   Weight: 97.1 kg (214 lb)   Height: 6' 1\" (1.854 m)            Physical Exam  Vitals and nursing note reviewed. Constitutional:       General: He is in acute distress. Appearance: He is well-developed and normal weight. HENT:      Head: Normocephalic and atraumatic. Eyes:      Conjunctiva/sclera: Conjunctivae normal.      Pupils: Pupils are equal, round, and reactive to light. Cardiovascular:      Rate and Rhythm: Normal rate and regular rhythm. Pulmonary:      Effort: Pulmonary effort is normal. No respiratory distress. Breath sounds: Wheezing present. Abdominal:      General: There is no distension. Palpations: Abdomen is soft. Tenderness: There is no abdominal tenderness.    Musculoskeletal: General: Normal range of motion. Cervical back: Normal range of motion and neck supple. Right lower leg: Edema present. Left lower leg: Edema present. Comments: Trace bilateral lower extremity edema   Skin:     General: Skin is warm and dry. Neurological:      Mental Status: He is alert and oriented to person, place, and time. MDM  Number of Diagnoses or Management Options  COPD exacerbation ([address]): new and does not require workup  Diagnosis management comments: 3:37 AM patient appears more comfortable, states his breathing has improved.   Still has a moderate amount of wheezing posteriorly, will repeat his albuterol neb       Amount and/or Complexity of Data Reviewed  Clinical lab tests: ordered and reviewed  Tests in the radiology section of CPT®: ordered and reviewed  Tests in the medicine section of CPT®: ordered and reviewed    Risk of Complications, Morbidity, and/or Mortality  Presenting problems: moderate  Diagnostic procedures: moderate  Management options: moderate    Patient Progress  Patient progress: improved         Procedures

## 2021-11-29 NOTE — DISCHARGE INSTRUCTIONS
Follow up with your doctor, call the office to make an appointment. Return to the ER if your symptoms worsen.

## 2021-12-03 ENCOUNTER — HOSPITAL ENCOUNTER (INPATIENT)
Age: 66
LOS: 4 days | Discharge: HOME HEALTH | DRG: 871 | End: 2021-12-07
Attending: EMERGENCY MEDICINE | Admitting: FAMILY MEDICINE
Payer: MEDICARE

## 2021-12-03 ENCOUNTER — APPOINTMENT (OUTPATIENT)
Dept: GENERAL RADIOLOGY | Age: 66
DRG: 871 | End: 2021-12-03
Attending: EMERGENCY MEDICINE
Payer: MEDICARE

## 2021-12-03 DIAGNOSIS — J44.0 COPD WITH ACUTE LOWER RESPIRATORY INFECTION (HCC): ICD-10-CM

## 2021-12-03 DIAGNOSIS — Z95.2 HISTORY OF TRANSCATHETER AORTIC VALVE REPLACEMENT (TAVR): ICD-10-CM

## 2021-12-03 DIAGNOSIS — I50.22 HEART FAILURE WITH MID-RANGE EJECTION FRACTION (HCC): ICD-10-CM

## 2021-12-03 DIAGNOSIS — J84.10 PULMONARY FIBROSIS (HCC): Chronic | ICD-10-CM

## 2021-12-03 DIAGNOSIS — I48.91 ATRIAL FIBRILLATION WITH RVR (HCC): ICD-10-CM

## 2021-12-03 DIAGNOSIS — I48.91 ATRIAL FIBRILLATION WITH RAPID VENTRICULAR RESPONSE (HCC): ICD-10-CM

## 2021-12-03 DIAGNOSIS — R65.21: ICD-10-CM

## 2021-12-03 DIAGNOSIS — Z87.09 HISTORY OF PULMONARY FIBROSIS: ICD-10-CM

## 2021-12-03 DIAGNOSIS — I48.91 ATRIAL FIBRILLATION, UNSPECIFIED TYPE (HCC): ICD-10-CM

## 2021-12-03 DIAGNOSIS — Z86.79 HISTORY OF CONGESTIVE HEART FAILURE: ICD-10-CM

## 2021-12-03 DIAGNOSIS — A40.3: ICD-10-CM

## 2021-12-03 DIAGNOSIS — J18.9 PNEUMONIA OF LEFT LUNG DUE TO INFECTIOUS ORGANISM, UNSPECIFIED PART OF LUNG: Primary | ICD-10-CM

## 2021-12-03 DIAGNOSIS — I25.10 CAD IN NATIVE ARTERY: ICD-10-CM

## 2021-12-03 DIAGNOSIS — J41.0 SIMPLE CHRONIC BRONCHITIS (HCC): ICD-10-CM

## 2021-12-03 PROBLEM — J44.9 COPD (CHRONIC OBSTRUCTIVE PULMONARY DISEASE) (HCC): Status: ACTIVE | Noted: 2021-12-03

## 2021-12-03 PROBLEM — S89.90XA LEG INJURY: Status: ACTIVE | Noted: 2021-12-03

## 2021-12-03 PROBLEM — R06.02 SHORTNESS OF BREATH AT REST: Status: ACTIVE | Noted: 2021-12-03

## 2021-12-03 PROBLEM — A41.9 SEPSIS (HCC): Status: ACTIVE | Noted: 2021-12-03

## 2021-12-03 LAB
ALBUMIN SERPL-MCNC: 2.5 G/DL (ref 3.2–4.6)
ALBUMIN/GLOB SERPL: 0.5 {RATIO} (ref 1.2–3.5)
ALP SERPL-CCNC: 122 U/L (ref 50–136)
ALT SERPL-CCNC: 31 U/L (ref 12–65)
ANION GAP SERPL CALC-SCNC: 9 MMOL/L (ref 7–16)
AST SERPL-CCNC: 46 U/L (ref 15–37)
B PERT DNA SPEC QL NAA+PROBE: NOT DETECTED
BASOPHILS # BLD: 0 K/UL (ref 0–0.2)
BASOPHILS NFR BLD: 0 % (ref 0–2)
BILIRUB SERPL-MCNC: 0.9 MG/DL (ref 0.2–1.1)
BNP SERPL-MCNC: 3938 PG/ML (ref 5–125)
BORDETELLA PARAPERTUSSIS PCR, BORPAR: NOT DETECTED
BUN SERPL-MCNC: 25 MG/DL (ref 8–23)
C PNEUM DNA SPEC QL NAA+PROBE: NOT DETECTED
CALCIUM SERPL-MCNC: 8.5 MG/DL (ref 8.3–10.4)
CHLORIDE SERPL-SCNC: 105 MMOL/L (ref 98–107)
CO2 SERPL-SCNC: 21 MMOL/L (ref 21–32)
CREAT SERPL-MCNC: 1.19 MG/DL (ref 0.8–1.5)
DIFFERENTIAL METHOD BLD: ABNORMAL
EOSINOPHIL # BLD: 0 K/UL (ref 0–0.8)
EOSINOPHIL NFR BLD: 0 % (ref 0.5–7.8)
ERYTHROCYTE [DISTWIDTH] IN BLOOD BY AUTOMATED COUNT: 15.9 % (ref 11.9–14.6)
FLUAV SUBTYP SPEC NAA+PROBE: NOT DETECTED
FLUBV RNA SPEC QL NAA+PROBE: NOT DETECTED
GLOBULIN SER CALC-MCNC: 4.6 G/DL (ref 2.3–3.5)
GLUCOSE SERPL-MCNC: 116 MG/DL (ref 65–100)
HADV DNA SPEC QL NAA+PROBE: NOT DETECTED
HCOV 229E RNA SPEC QL NAA+PROBE: NOT DETECTED
HCOV HKU1 RNA SPEC QL NAA+PROBE: NOT DETECTED
HCOV NL63 RNA SPEC QL NAA+PROBE: NOT DETECTED
HCOV OC43 RNA SPEC QL NAA+PROBE: NOT DETECTED
HCT VFR BLD AUTO: 34.5 % (ref 41.1–50.3)
HGB BLD-MCNC: 11.2 G/DL (ref 13.6–17.2)
HMPV RNA SPEC QL NAA+PROBE: NOT DETECTED
HPIV1 RNA SPEC QL NAA+PROBE: NOT DETECTED
HPIV2 RNA SPEC QL NAA+PROBE: NOT DETECTED
HPIV3 RNA SPEC QL NAA+PROBE: NOT DETECTED
HPIV4 RNA SPEC QL NAA+PROBE: NOT DETECTED
IMM GRANULOCYTES # BLD AUTO: 0.1 K/UL (ref 0–0.5)
IMM GRANULOCYTES NFR BLD AUTO: 1 % (ref 0–5)
LACTATE SERPL-SCNC: 1.3 MMOL/L (ref 0.4–2)
LYMPHOCYTES # BLD: 1.1 K/UL (ref 0.5–4.6)
LYMPHOCYTES NFR BLD: 8 % (ref 13–44)
M PNEUMO DNA SPEC QL NAA+PROBE: NOT DETECTED
MCH RBC QN AUTO: 26.6 PG (ref 26.1–32.9)
MCHC RBC AUTO-ENTMCNC: 32.5 G/DL (ref 31.4–35)
MCV RBC AUTO: 81.9 FL (ref 79.6–97.8)
MONOCYTES # BLD: 1 K/UL (ref 0.1–1.3)
MONOCYTES NFR BLD: 7 % (ref 4–12)
NEUTS SEG # BLD: 12.5 K/UL (ref 1.7–8.2)
NEUTS SEG NFR BLD: 84 % (ref 43–78)
NRBC # BLD: 0 K/UL (ref 0–0.2)
PLATELET # BLD AUTO: 172 K/UL (ref 150–450)
PMV BLD AUTO: 9.7 FL (ref 9.4–12.3)
POTASSIUM SERPL-SCNC: 4.8 MMOL/L (ref 3.5–5.1)
PROCALCITONIN SERPL-MCNC: 0.18 NG/ML (ref 0–0.49)
PROT SERPL-MCNC: 7.1 G/DL (ref 6.3–8.2)
RBC # BLD AUTO: 4.21 M/UL (ref 4.23–5.6)
RSV RNA SPEC QL NAA+PROBE: NOT DETECTED
RV+EV RNA SPEC QL NAA+PROBE: NOT DETECTED
SARS-COV-2 PCR, COVPCR: NOT DETECTED
SODIUM SERPL-SCNC: 135 MMOL/L (ref 138–145)
WBC # BLD AUTO: 14.8 K/UL (ref 4.3–11.1)

## 2021-12-03 PROCEDURE — 96367 TX/PROPH/DG ADDL SEQ IV INF: CPT

## 2021-12-03 PROCEDURE — 74011000258 HC RX REV CODE- 258: Performed by: FAMILY MEDICINE

## 2021-12-03 PROCEDURE — 74011000258 HC RX REV CODE- 258: Performed by: EMERGENCY MEDICINE

## 2021-12-03 PROCEDURE — 83880 ASSAY OF NATRIURETIC PEPTIDE: CPT

## 2021-12-03 PROCEDURE — 93005 ELECTROCARDIOGRAM TRACING: CPT | Performed by: EMERGENCY MEDICINE

## 2021-12-03 PROCEDURE — 96366 THER/PROPH/DIAG IV INF ADDON: CPT

## 2021-12-03 PROCEDURE — 74011000250 HC RX REV CODE- 250: Performed by: FAMILY MEDICINE

## 2021-12-03 PROCEDURE — 74011250637 HC RX REV CODE- 250/637: Performed by: EMERGENCY MEDICINE

## 2021-12-03 PROCEDURE — 74011250636 HC RX REV CODE- 250/636: Performed by: EMERGENCY MEDICINE

## 2021-12-03 PROCEDURE — 74011000250 HC RX REV CODE- 250: Performed by: EMERGENCY MEDICINE

## 2021-12-03 PROCEDURE — 65270000029 HC RM PRIVATE

## 2021-12-03 PROCEDURE — 85025 COMPLETE CBC W/AUTO DIFF WBC: CPT

## 2021-12-03 PROCEDURE — 84145 PROCALCITONIN (PCT): CPT

## 2021-12-03 PROCEDURE — 87040 BLOOD CULTURE FOR BACTERIA: CPT

## 2021-12-03 PROCEDURE — 96376 TX/PRO/DX INJ SAME DRUG ADON: CPT

## 2021-12-03 PROCEDURE — 96365 THER/PROPH/DIAG IV INF INIT: CPT

## 2021-12-03 PROCEDURE — 0202U NFCT DS 22 TRGT SARS-COV-2: CPT

## 2021-12-03 PROCEDURE — 99285 EMERGENCY DEPT VISIT HI MDM: CPT

## 2021-12-03 PROCEDURE — 80053 COMPREHEN METABOLIC PANEL: CPT

## 2021-12-03 PROCEDURE — 74011250636 HC RX REV CODE- 250/636: Performed by: FAMILY MEDICINE

## 2021-12-03 PROCEDURE — 83605 ASSAY OF LACTIC ACID: CPT

## 2021-12-03 PROCEDURE — 71045 X-RAY EXAM CHEST 1 VIEW: CPT

## 2021-12-03 RX ORDER — NOREPINEPHRINE BITARTRATE/D5W 4MG/250ML
.5-3 PLASTIC BAG, INJECTION (ML) INTRAVENOUS
Status: DISCONTINUED | OUTPATIENT
Start: 2021-12-03 | End: 2021-12-04

## 2021-12-03 RX ORDER — VANCOMYCIN 2 GRAM/500 ML IN 0.9 % SODIUM CHLORIDE INTRAVENOUS
2000 ONCE
Status: COMPLETED | OUTPATIENT
Start: 2021-12-03 | End: 2021-12-04

## 2021-12-03 RX ORDER — DILTIAZEM HYDROCHLORIDE 5 MG/ML
10 INJECTION INTRAVENOUS ONCE
Status: COMPLETED | OUTPATIENT
Start: 2021-12-03 | End: 2021-12-03

## 2021-12-03 RX ORDER — SODIUM CHLORIDE 9 MG/ML
75 INJECTION, SOLUTION INTRAVENOUS CONTINUOUS
Status: DISCONTINUED | OUTPATIENT
Start: 2021-12-03 | End: 2021-12-05

## 2021-12-03 RX ORDER — EPINEPHRINE 1 MG/ML
0.5 INJECTION, SOLUTION, CONCENTRATE INTRAVENOUS ONCE
Status: COMPLETED | OUTPATIENT
Start: 2021-12-03 | End: 2021-12-03

## 2021-12-03 RX ORDER — ACETAMINOPHEN 500 MG
1000 TABLET ORAL
Status: COMPLETED | OUTPATIENT
Start: 2021-12-03 | End: 2021-12-03

## 2021-12-03 RX ADMIN — EPINEPHRINE 0.5 MG: 1 INJECTION, SOLUTION, CONCENTRATE INTRAVENOUS at 23:45

## 2021-12-03 RX ADMIN — SODIUM CHLORIDE 5 MG/HR: 900 INJECTION, SOLUTION INTRAVENOUS at 16:46

## 2021-12-03 RX ADMIN — SODIUM CHLORIDE 250 ML/HR: 900 INJECTION, SOLUTION INTRAVENOUS at 18:09

## 2021-12-03 RX ADMIN — CEFEPIME HYDROCHLORIDE 2 G: 2 INJECTION, POWDER, FOR SOLUTION INTRAVENOUS at 18:14

## 2021-12-03 RX ADMIN — ACETAMINOPHEN 1000 MG: 500 TABLET ORAL at 16:53

## 2021-12-03 RX ADMIN — SODIUM CHLORIDE 500 ML: 900 INJECTION, SOLUTION INTRAVENOUS at 19:13

## 2021-12-03 RX ADMIN — DILTIAZEM HYDROCHLORIDE 10 MG: 5 INJECTION INTRAVENOUS at 16:45

## 2021-12-03 RX ADMIN — VANCOMYCIN HYDROCHLORIDE 2000 MG: 10 INJECTION, POWDER, LYOPHILIZED, FOR SOLUTION INTRAVENOUS at 21:59

## 2021-12-03 NOTE — Clinical Note
Status[de-identified] INPATIENT [101]   Type of Bed: Remote Telemetry [29]   Cardiac Monitoring Required?: Yes   Inpatient Hospitalization Certified Necessary for the Following Reasons: 3.  Patient receiving treatment that can only be provided in an inpatient setting (further clarification in H&P documentation)   Admitting Diagnosis: Atrial fibrillation with rapid ventricular response [facility]) [[id number]]   Admitting Diagnosis: CAP (community acquired pneumonia) [[id number]]   Admitting Physician: Daisy Mckeon   Attending Physician: Daisy Mckeon   Estimated Length of Stay: 3-4 Midnights   Discharge Plan[de-identified] Home with Office Follow-up

## 2021-12-03 NOTE — ED TRIAGE NOTES
Patient arrived via [facility]. Patient was found on [facility]> cardiologist said that he needed to come in because his heart rate was 160.

## 2021-12-03 NOTE — ED PROVIDER NOTES
Presents with complaint of chest pain and shortness of breath. He was sent from [facility], Dr. Karma Laughlin. Patient states he has been in A. fib with RVR for several days intermittently. He reports chest pain and shortness of breath associated with it. He states these are the symptoms he gets with A. fib. He does not feel like his shortness of breath is what is causing his A. fib he feels like his A. fib is causing his shortness of breath. He did not know that he had a fever. He is brought in by EMS after he took a bus to "[employer]. EMS picked him up there. He has been on steroids from recent visit. The history is provided by the patient. Chest Pain (Angina)   This is a new problem. The current episode started more than 2 days ago. The problem has been gradually worsening. The problem occurs daily. The pain is associated with normal activity. The pain is present in the substernal region. The pain is moderate. Associated symptoms include irregular heartbeat, palpitations and shortness of breath. Pertinent negatives include no fever, no nausea and no vomiting. He has tried nothing for the symptoms. No past medical history on file. No past surgical history on file. No family history on file.     Social History     Socioeconomic History    Marital status: LEGALLY      Spouse name: Not on file    Number of children: Not on file    Years of education: Not on file    Highest education level: Not on file   Occupational History    Not on file   Tobacco Use    Smoking status: Current Every Day Smoker    Smokeless tobacco: Never Used   Substance and Sexual Activity    Alcohol use: Not on file    Drug use: Not on file    Sexual activity: Not on file   Other Topics Concern    Not on file   Social History Narrative    Not on file     Social Determinants of Health     Financial Resource Strain:     Difficulty of Paying Living Expenses: Not on file   Food Insecurity:     Worried About [facility] in the Last Year: Not on file    Codey of Food in the Last Year: Not on file   Transportation Needs:     Lack of Transportation (Medical): Not on file    Lack of Transportation (Non-Medical): Not on file   Physical Activity:     Days of Exercise per Week: Not on file    Minutes of Exercise per Session: Not on file   Stress:     Feeling of Stress : Not on file   Social Connections:     Frequency of Communication with Friends and Family: Not on file    Frequency of Social Gatherings with Friends and Family: Not on file    Attends Oriental orthodox Services: Not on file    Active Member of [address] [place] [address] or Organizations: Not on file    Attends Club or Organization Meetings: Not on file    Marital Status: Not on file   Intimate Partner Violence:     Fear of Current or Ex-Partner: Not on file    Emotionally Abused: Not on file    Physically Abused: Not on file    Sexually Abused: Not on file   Housing Stability:     Unable to Pay for Housing in the Last Year: Not on file    Number of [place] in the Last Year: Not on file    Unstable Housing in the Last Year: Not on file         ALLERGIES: Tegretol [carbamazepine] and Ativan [lorazepam]    Review of Systems   Constitutional: Positive for chills. Negative for fever. Respiratory: Positive for shortness of breath. Cardiovascular: Positive for chest pain and palpitations. Gastrointestinal: Negative for nausea and vomiting. All other systems reviewed and are negative. Vitals:    12/03/21 1636 12/03/21 1654 12/03/21 1711   BP: 112/74 99/82    Pulse: (!) 164 (!) 128    Resp: 23 28    Temp: (!) 101.7 °F (38.7 °C)     SpO2: 93% 93% 93%   Weight: 97.1 kg (214 lb)     Height: 6' 1\" (1.854 m)              Physical Exam  Vitals and nursing note reviewed. Constitutional:       General: He is in acute distress. Appearance: Normal appearance. He is well-developed. He is ill-appearing. He is not diaphoretic.    HENT: Head: Normocephalic and atraumatic. Eyes:      General:         Right eye: No discharge. Left eye: No discharge. Conjunctiva/sclera: Conjunctivae normal.   Cardiovascular:      Rate and Rhythm: Tachycardia present. Rhythm irregular. Pulmonary:      Effort: Respiratory distress present. Breath sounds: Rales (pulm fibrosis) present. Abdominal:      General: There is no distension. Palpations: Abdomen is soft. Tenderness: There is no abdominal tenderness. Musculoskeletal:         General: Normal range of motion. Cervical back: Normal range of motion and neck supple. Right lower leg: No edema. Left lower leg: No edema. Skin:     General: Skin is warm and dry. Capillary Refill: Capillary refill takes less than 2 seconds. Neurological:      General: No focal deficit present. Mental Status: He is alert and oriented to person, place, and time. Cranial Nerves: No cranial nerve deficit. Psychiatric:         Mood and Affect: Mood normal.         Behavior: Behavior normal.          MDM  Number of Diagnoses or Management Options  Atrial fibrillation with RVR (HCC)  Pneumonia of left lung due to infectious organism, unspecified part of lung  Diagnosis management comments: Patient with focal pneumonia on chest x-ray. He has had treatment for COPD with steroids recently he is negative for Covid. He is not significantly wheezing but has severe fibrosis and was treated with broad-spectrum antibiotics for that reason. His A. fib with RVR improved with Cardizem drip. This did drop his blood pressure somewhat and he was given fluids. He has a history of CHF so fluids were gentle. 7:56 PM  Rate controlled but still in afib. Given 2nd bolus due to hypotension and cardizem turned off. D/w hospitalist.  Pt still doing well. Became diaphoretic after tylenol but doing better. His cardiologist documented CHF but no echo available in chart.   Hospitalist called at 1905.          Amount and/or Complexity of Data Reviewed  Clinical lab tests: ordered and reviewed  Tests in the radiology section of CPT®: ordered and reviewed  Review and summarize past medical records: yes  Discuss the patient with other providers: yes  Independent visualization of images, tracings, or specimens: yes (============================================  ED EKG Interpretation  EKG was interpreted in the absence of a cardiologist.    EKG rhythm: atrial fibrillation  Rate: 160  ST Segments: Nonspecific ST segments - NO STEMI      Kathi Gilliland MD; 12/3/2021 @7:56 PM================  )    Risk of Complications, Morbidity, and/or Mortality  Presenting problems: high  Diagnostic procedures: minimal  Management options: high    Patient Progress  Patient progress: improved         Critical Care  Performed by: Brandy Isbell MD  Authorized by: Brandy Isbell MD     Critical care provider statement:     Critical care time (minutes):  45    Critical care time was exclusive of:  Separately billable procedures and treating other patients    Critical care was necessary to treat or prevent imminent or life-threatening deterioration of the following conditions:  Cardiac failure    Critical care was time spent personally by me on the following activities:  Review of old charts, re-evaluation of patient's condition, examination of patient, evaluation of patient's response to treatment, discussions with consultants, ordering and review of laboratory studies, ordering and review of radiographic studies and ordering and performing treatments and interventions    I assumed direction of critical care for this patient from another provider in my specialty: no

## 2021-12-04 ENCOUNTER — APPOINTMENT (OUTPATIENT)
Dept: GENERAL RADIOLOGY | Age: 66
DRG: 871 | End: 2021-12-04
Payer: MEDICARE

## 2021-12-04 ENCOUNTER — APPOINTMENT (OUTPATIENT)
Dept: NON INVASIVE DIAGNOSTICS | Age: 66
DRG: 871 | End: 2021-12-04
Attending: PHYSICIAN ASSISTANT
Payer: MEDICARE

## 2021-12-04 PROBLEM — A40.3 SEPTIC SHOCK WITH ACUTE ORGAN DYSFUNCTION DUE TO PNEUMOCOCCUS (HCC): Status: ACTIVE | Noted: 2021-12-03

## 2021-12-04 PROBLEM — J44.0 COPD WITH ACUTE LOWER RESPIRATORY INFECTION (HCC): Status: ACTIVE | Noted: 2021-12-04

## 2021-12-04 PROBLEM — S89.90XA LEG INJURY: Chronic | Status: ACTIVE | Noted: 2021-12-03

## 2021-12-04 PROBLEM — J44.9 COPD (CHRONIC OBSTRUCTIVE PULMONARY DISEASE) (HCC): Chronic | Status: ACTIVE | Noted: 2021-12-03

## 2021-12-04 PROBLEM — J84.10 PULMONARY FIBROSIS (HCC): Chronic | Status: ACTIVE | Noted: 2021-12-03

## 2021-12-04 PROBLEM — R65.21 SEPTIC SHOCK WITH ACUTE ORGAN DYSFUNCTION DUE TO PNEUMOCOCCUS (HCC): Status: ACTIVE | Noted: 2021-12-03

## 2021-12-04 PROBLEM — Z86.79 HISTORY OF CONGESTIVE HEART FAILURE: Chronic | Status: ACTIVE | Noted: 2021-12-03

## 2021-12-04 LAB
ANION GAP SERPL CALC-SCNC: 4 MMOL/L (ref 7–16)
ATRIAL RATE: 224 BPM
ATRIAL RATE: 231 BPM
BUN SERPL-MCNC: 28 MG/DL (ref 8–23)
CALCIUM SERPL-MCNC: 8.7 MG/DL (ref 8.3–10.4)
CALCULATED R AXIS, ECG10: 75 DEGREES
CALCULATED R AXIS, ECG10: 76 DEGREES
CALCULATED T AXIS, ECG11: 30 DEGREES
CALCULATED T AXIS, ECG11: 74 DEGREES
CHLORIDE SERPL-SCNC: 109 MMOL/L (ref 98–107)
CO2 SERPL-SCNC: 25 MMOL/L (ref 21–32)
CREAT SERPL-MCNC: 1.26 MG/DL (ref 0.8–1.5)
DIAGNOSIS, 93000: NORMAL
DIAGNOSIS, 93000: NORMAL
ECHO AO ASC DIAM: 3.45 CM
ECHO AO ROOT DIAM: 2.31 CM
ECHO AV AREA PEAK VELOCITY: 1.1 CM2
ECHO AV AREA VTI: 1.15 CM2
ECHO AV AREA/BSA PEAK VELOCITY: 0.5 CM2/M2
ECHO AV AREA/BSA VTI: 0.5 CM2/M2
ECHO AV MEAN GRADIENT: 18.8 MMHG
ECHO AV PEAK GRADIENT: 36.1 MMHG
ECHO AV PEAK VELOCITY: 274.35 CM/S
ECHO AV VTI: 57.5 CM
ECHO LA MAJOR AXIS: 4.88 CM
ECHO LA MINOR AXIS: 2.21 CM
ECHO LV E' LATERAL VELOCITY: 5.89 CM/S
ECHO LV E' SEPTAL VELOCITY: 5.73 CM/S
ECHO LV EDV A2C: 155.02 ML
ECHO LV EDV A4C: 100.65 ML
ECHO LV EDV BP: 127.98 ML (ref 67–155)
ECHO LV EDV INDEX A4C: 45.5 ML/M2
ECHO LV EDV INDEX BP: 57.9 ML/M2
ECHO LV EDV NDEX A2C: 70.1 ML/M2
ECHO LV EJECTION FRACTION A2C: 43 PERCENT
ECHO LV EJECTION FRACTION A4C: 41 PERCENT
ECHO LV EJECTION FRACTION BIPLANE: 42.5 PERCENT (ref 55–100)
ECHO LV ESV A2C: 88.74 ML
ECHO LV ESV A4C: 59.49 ML
ECHO LV ESV BP: 73.56 ML (ref 22–58)
ECHO LV ESV INDEX A2C: 40.2 ML/M2
ECHO LV ESV INDEX A4C: 26.9 ML/M2
ECHO LV ESV INDEX BP: 33.3 ML/M2
ECHO LV INTERNAL DIMENSION DIASTOLIC: 5.4 CM (ref 4.2–5.9)
ECHO LV INTERNAL DIMENSION SYSTOLIC: 3.82 CM
ECHO LV IVSD: 0.96 CM (ref 0.6–1)
ECHO LV MASS 2D: 210.9 G (ref 88–224)
ECHO LV MASS INDEX 2D: 95.4 G/M2 (ref 49–115)
ECHO LV POSTERIOR WALL DIASTOLIC: 1.07 CM (ref 0.6–1)
ECHO LVOT DIAM: 1.94 CM
ECHO LVOT PEAK GRADIENT: 4.24 MMHG
ECHO LVOT PEAK VELOCITY: 103 CM/S
ECHO LVOT SV: 58.3 ML
ECHO LVOT VTI: 19.82 CM
ECHO MV AREA PHT: 1.7 CM2
ECHO MV AREA VTI: 0.7 CM2
ECHO MV E DECELERATION TIME (DT): 402.13 MS
ECHO MV MAX VELOCITY: 289 CM/S
ECHO MV MEAN GRADIENT: 14.8 MMHG
ECHO MV PEAK GRADIENT: 33.4 MMHG
ECHO MV PRESSURE HALF TIME (PHT): 127 MS
ECHO MV VTI: 79.9 CM
ECHO RV INTERNAL DIMENSION: 3.25 CM
ECHO RV TAPSE: 1.92 CM (ref 1.5–2)
ECHO TV REGURGITANT MAX VELOCITY: 241.62 CM/S
ECHO TV REGURGITANT PEAK GRADIENT: 23.35 MMHG
ERYTHROCYTE [DISTWIDTH] IN BLOOD BY AUTOMATED COUNT: 16 % (ref 11.9–14.6)
GLUCOSE SERPL-MCNC: 137 MG/DL (ref 65–100)
HCT VFR BLD AUTO: 33 % (ref 41.1–50.3)
HGB BLD-MCNC: 10.5 G/DL (ref 13.6–17.2)
LVOT MG: 2.4 MMHG
MCH RBC QN AUTO: 26.6 PG (ref 26.1–32.9)
MCHC RBC AUTO-ENTMCNC: 31.8 G/DL (ref 31.4–35)
MCV RBC AUTO: 83.5 FL (ref 79.6–97.8)
NRBC # BLD: 0 K/UL (ref 0–0.2)
PLATELET # BLD AUTO: 165 K/UL (ref 150–450)
PMV BLD AUTO: 10 FL (ref 9.4–12.3)
POTASSIUM SERPL-SCNC: 4.2 MMOL/L (ref 3.5–5.1)
Q-T INTERVAL, ECG07: 226 MS
Q-T INTERVAL, ECG07: 370 MS
QRS DURATION, ECG06: 111 MS
QRS DURATION, ECG06: 93 MS
QTC CALCULATION (BEZET), ECG08: 369 MS
QTC CALCULATION (BEZET), ECG08: 419 MS
RBC # BLD AUTO: 3.95 M/UL (ref 4.23–5.6)
SODIUM SERPL-SCNC: 138 MMOL/L (ref 136–145)
VENTRICULAR RATE, ECG03: 160 BPM
VENTRICULAR RATE, ECG03: 77 BPM
WBC # BLD AUTO: 17.7 K/UL (ref 4.3–11.1)

## 2021-12-04 PROCEDURE — 71045 X-RAY EXAM CHEST 1 VIEW: CPT

## 2021-12-04 PROCEDURE — 99223 1ST HOSP IP/OBS HIGH 75: CPT | Performed by: INTERNAL MEDICINE

## 2021-12-04 PROCEDURE — 74011250637 HC RX REV CODE- 250/637: Performed by: FAMILY MEDICINE

## 2021-12-04 PROCEDURE — 87186 SC STD MICRODIL/AGAR DIL: CPT

## 2021-12-04 PROCEDURE — 87086 URINE CULTURE/COLONY COUNT: CPT

## 2021-12-04 PROCEDURE — 2709999900 HC NON-CHARGEABLE SUPPLY

## 2021-12-04 PROCEDURE — 94640 AIRWAY INHALATION TREATMENT: CPT

## 2021-12-04 PROCEDURE — 74011000250 HC RX REV CODE- 250: Performed by: INTERNAL MEDICINE

## 2021-12-04 PROCEDURE — 75810000455 HC PLCMT CENT VENOUS CATH LVL 2 5182

## 2021-12-04 PROCEDURE — 77010033678 HC OXYGEN DAILY

## 2021-12-04 PROCEDURE — 74011250636 HC RX REV CODE- 250/636: Performed by: INTERNAL MEDICINE

## 2021-12-04 PROCEDURE — 74011250636 HC RX REV CODE- 250/636: Performed by: EMERGENCY MEDICINE

## 2021-12-04 PROCEDURE — 87088 URINE BACTERIA CULTURE: CPT

## 2021-12-04 PROCEDURE — 94760 N-INVAS EAR/PLS OXIMETRY 1: CPT

## 2021-12-04 PROCEDURE — 65660000000 HC RM CCU STEPDOWN

## 2021-12-04 PROCEDURE — 80048 BASIC METABOLIC PNL TOTAL CA: CPT

## 2021-12-04 PROCEDURE — 86580 TB INTRADERMAL TEST: CPT | Performed by: INTERNAL MEDICINE

## 2021-12-04 PROCEDURE — 74011000258 HC RX REV CODE- 258: Performed by: FAMILY MEDICINE

## 2021-12-04 PROCEDURE — 74011250636 HC RX REV CODE- 250/636

## 2021-12-04 PROCEDURE — 74011000250 HC RX REV CODE- 250: Performed by: FAMILY MEDICINE

## 2021-12-04 PROCEDURE — 85027 COMPLETE CBC AUTOMATED: CPT

## 2021-12-04 PROCEDURE — 74011250636 HC RX REV CODE- 250/636: Performed by: FAMILY MEDICINE

## 2021-12-04 PROCEDURE — 74011250637 HC RX REV CODE- 250/637: Performed by: INTERNAL MEDICINE

## 2021-12-04 PROCEDURE — 02HV33Z INSERTION OF INFUSION DEVICE INTO SUPERIOR VENA CAVA, PERCUTANEOUS APPROACH: ICD-10-PCS

## 2021-12-04 PROCEDURE — 74011000258 HC RX REV CODE- 258: Performed by: INTERNAL MEDICINE

## 2021-12-04 PROCEDURE — 93306 TTE W/DOPPLER COMPLETE: CPT

## 2021-12-04 RX ORDER — BUDESONIDE AND FORMOTEROL FUMARATE DIHYDRATE 160; 4.5 UG/1; UG/1
2 AEROSOL RESPIRATORY (INHALATION)
Status: DISCONTINUED | OUTPATIENT
Start: 2021-12-04 | End: 2021-12-07 | Stop reason: HOSPADM

## 2021-12-04 RX ORDER — DOXYCYCLINE 100 MG/1
100 CAPSULE ORAL EVERY 12 HOURS
Status: DISCONTINUED | OUTPATIENT
Start: 2021-12-04 | End: 2021-12-07 | Stop reason: HOSPADM

## 2021-12-04 RX ORDER — GUAIFENESIN 100 MG/5ML
81 LIQUID (ML) ORAL DAILY
Status: DISCONTINUED | OUTPATIENT
Start: 2021-12-04 | End: 2021-12-07 | Stop reason: HOSPADM

## 2021-12-04 RX ORDER — DABIGATRAN ETEXILATE 75 MG/1
75 CAPSULE ORAL EVERY 12 HOURS
Status: ON HOLD | COMMUNITY
End: 2021-12-07 | Stop reason: SDUPTHER

## 2021-12-04 RX ORDER — PHENTOLAMINE MESYLATE 5 MG/1
5 INJECTION INTRAMUSCULAR; INTRAVENOUS ONCE
Status: COMPLETED | OUTPATIENT
Start: 2021-12-04 | End: 2021-12-04

## 2021-12-04 RX ORDER — BACLOFEN 10 MG/1
10 TABLET ORAL 3 TIMES DAILY
Status: DISCONTINUED | OUTPATIENT
Start: 2021-12-04 | End: 2021-12-04

## 2021-12-04 RX ORDER — DABIGATRAN ETEXILATE 75 MG/1
75 CAPSULE ORAL EVERY 12 HOURS
Status: DISCONTINUED | OUTPATIENT
Start: 2021-12-04 | End: 2021-12-04

## 2021-12-04 RX ORDER — ACETAMINOPHEN 325 MG/1
650 TABLET ORAL
Status: DISCONTINUED | OUTPATIENT
Start: 2021-12-04 | End: 2021-12-07 | Stop reason: HOSPADM

## 2021-12-04 RX ORDER — BACLOFEN 10 MG/1
10 TABLET ORAL
Status: DISCONTINUED | OUTPATIENT
Start: 2021-12-04 | End: 2021-12-07 | Stop reason: HOSPADM

## 2021-12-04 RX ORDER — FUROSEMIDE 40 MG/1
40 TABLET ORAL 2 TIMES DAILY
Status: DISCONTINUED | OUTPATIENT
Start: 2021-12-04 | End: 2021-12-07 | Stop reason: HOSPADM

## 2021-12-04 RX ORDER — ONDANSETRON 8 MG/1
4 TABLET, ORALLY DISINTEGRATING ORAL
Status: DISCONTINUED | OUTPATIENT
Start: 2021-12-04 | End: 2021-12-07 | Stop reason: HOSPADM

## 2021-12-04 RX ORDER — MORPHINE SULFATE 2 MG/ML
2 INJECTION, SOLUTION INTRAMUSCULAR; INTRAVENOUS
Status: DISCONTINUED | OUTPATIENT
Start: 2021-12-04 | End: 2021-12-05

## 2021-12-04 RX ORDER — ONDANSETRON 2 MG/ML
4 INJECTION INTRAMUSCULAR; INTRAVENOUS
Status: DISCONTINUED | OUTPATIENT
Start: 2021-12-04 | End: 2021-12-07 | Stop reason: HOSPADM

## 2021-12-04 RX ORDER — POLYETHYLENE GLYCOL 3350 17 G/17G
17 POWDER, FOR SOLUTION ORAL DAILY PRN
Status: DISCONTINUED | OUTPATIENT
Start: 2021-12-04 | End: 2021-12-07 | Stop reason: HOSPADM

## 2021-12-04 RX ORDER — IPRATROPIUM BROMIDE AND ALBUTEROL SULFATE 2.5; .5 MG/3ML; MG/3ML
3 SOLUTION RESPIRATORY (INHALATION)
Status: DISCONTINUED | OUTPATIENT
Start: 2021-12-04 | End: 2021-12-07 | Stop reason: HOSPADM

## 2021-12-04 RX ORDER — LISINOPRIL 20 MG/1
20 TABLET ORAL DAILY
Status: DISCONTINUED | OUTPATIENT
Start: 2021-12-04 | End: 2021-12-05

## 2021-12-04 RX ORDER — NOREPINEPHRINE BITARTRATE/D5W 4MG/250ML
.5-3 PLASTIC BAG, INJECTION (ML) INTRAVENOUS
Status: DISCONTINUED | OUTPATIENT
Start: 2021-12-04 | End: 2021-12-05

## 2021-12-04 RX ORDER — ALBUTEROL SULFATE 0.83 MG/ML
2.5 SOLUTION RESPIRATORY (INHALATION)
Status: DISCONTINUED | OUTPATIENT
Start: 2021-12-04 | End: 2021-12-07 | Stop reason: HOSPADM

## 2021-12-04 RX ORDER — ACETAMINOPHEN 650 MG/1
650 SUPPOSITORY RECTAL
Status: DISCONTINUED | OUTPATIENT
Start: 2021-12-04 | End: 2021-12-07 | Stop reason: HOSPADM

## 2021-12-04 RX ORDER — DABIGATRAN ETEXILATE 150 MG/1
150 CAPSULE ORAL EVERY 12 HOURS
Status: DISCONTINUED | OUTPATIENT
Start: 2021-12-04 | End: 2021-12-07 | Stop reason: HOSPADM

## 2021-12-04 RX ORDER — ATORVASTATIN CALCIUM 40 MG/1
40 TABLET, FILM COATED ORAL
Status: DISCONTINUED | OUTPATIENT
Start: 2021-12-04 | End: 2021-12-07 | Stop reason: HOSPADM

## 2021-12-04 RX ORDER — SODIUM CHLORIDE 0.9 % (FLUSH) 0.9 %
5-40 SYRINGE (ML) INJECTION AS NEEDED
Status: DISCONTINUED | OUTPATIENT
Start: 2021-12-04 | End: 2021-12-07 | Stop reason: HOSPADM

## 2021-12-04 RX ORDER — POTASSIUM CHLORIDE 750 MG/1
20 TABLET, EXTENDED RELEASE ORAL 2 TIMES DAILY
Status: DISCONTINUED | OUTPATIENT
Start: 2021-12-04 | End: 2021-12-07 | Stop reason: HOSPADM

## 2021-12-04 RX ORDER — DILTIAZEM HYDROCHLORIDE 120 MG/1
120 CAPSULE, COATED, EXTENDED RELEASE ORAL DAILY
Status: DISCONTINUED | OUTPATIENT
Start: 2021-12-04 | End: 2021-12-05

## 2021-12-04 RX ORDER — SODIUM CHLORIDE 0.9 % (FLUSH) 0.9 %
5-40 SYRINGE (ML) INJECTION EVERY 8 HOURS
Status: DISCONTINUED | OUTPATIENT
Start: 2021-12-04 | End: 2021-12-07 | Stop reason: HOSPADM

## 2021-12-04 RX ADMIN — IPRATROPIUM BROMIDE AND ALBUTEROL SULFATE 3 ML: .5; 3 SOLUTION RESPIRATORY (INHALATION) at 08:43

## 2021-12-04 RX ADMIN — PHENTOLAMINE MESYLATE 5 MG: 5 INJECTION, POWDER, FOR SOLUTION INTRAMUSCULAR; INTRAVENOUS at 00:35

## 2021-12-04 RX ADMIN — DOXYCYCLINE HYCLATE 100 MG: 100 CAPSULE ORAL at 14:35

## 2021-12-04 RX ADMIN — IPRATROPIUM BROMIDE AND ALBUTEROL SULFATE 3 ML: .5; 3 SOLUTION RESPIRATORY (INHALATION) at 12:56

## 2021-12-04 RX ADMIN — BACLOFEN 10 MG: 10 TABLET ORAL at 08:31

## 2021-12-04 RX ADMIN — METHYLPREDNISOLONE SODIUM SUCCINATE 40 MG: 40 INJECTION, POWDER, FOR SOLUTION INTRAMUSCULAR; INTRAVENOUS at 22:47

## 2021-12-04 RX ADMIN — IPRATROPIUM BROMIDE AND ALBUTEROL SULFATE 3 ML: .5; 3 SOLUTION RESPIRATORY (INHALATION) at 04:00

## 2021-12-04 RX ADMIN — SODIUM CHLORIDE 75 ML/HR: 900 INJECTION, SOLUTION INTRAVENOUS at 21:33

## 2021-12-04 RX ADMIN — ASPIRIN 81 MG: 81 TABLET, CHEWABLE ORAL at 08:31

## 2021-12-04 RX ADMIN — CEFTRIAXONE 1 G: 1 INJECTION, POWDER, FOR SOLUTION INTRAMUSCULAR; INTRAVENOUS at 14:35

## 2021-12-04 RX ADMIN — MORPHINE SULFATE 2 MG: 2 INJECTION, SOLUTION INTRAMUSCULAR; INTRAVENOUS at 07:07

## 2021-12-04 RX ADMIN — BUDESONIDE AND FORMOTEROL FUMARATE DIHYDRATE 2 PUFF: 160; 4.5 AEROSOL RESPIRATORY (INHALATION) at 09:45

## 2021-12-04 RX ADMIN — ATORVASTATIN CALCIUM 40 MG: 40 TABLET, FILM COATED ORAL at 22:47

## 2021-12-04 RX ADMIN — TUBERCULIN PURIFIED PROTEIN DERIVATIVE 5 UNITS: 5 INJECTION, SOLUTION INTRADERMAL at 14:34

## 2021-12-04 RX ADMIN — SODIUM CHLORIDE 250 ML/HR: 900 INJECTION, SOLUTION INTRAVENOUS at 06:24

## 2021-12-04 RX ADMIN — MORPHINE SULFATE 2 MG: 2 INJECTION, SOLUTION INTRAMUSCULAR; INTRAVENOUS at 20:44

## 2021-12-04 RX ADMIN — Medication 10 ML: at 22:47

## 2021-12-04 RX ADMIN — POTASSIUM CHLORIDE 20 MEQ: 10 TABLET, EXTENDED RELEASE ORAL at 09:37

## 2021-12-04 RX ADMIN — IPRATROPIUM BROMIDE AND ALBUTEROL SULFATE 3 ML: .5; 3 SOLUTION RESPIRATORY (INHALATION) at 20:15

## 2021-12-04 RX ADMIN — IPRATROPIUM BROMIDE AND ALBUTEROL SULFATE 3 ML: .5; 3 SOLUTION RESPIRATORY (INHALATION) at 15:10

## 2021-12-04 RX ADMIN — BUDESONIDE AND FORMOTEROL FUMARATE DIHYDRATE 2 PUFF: 160; 4.5 AEROSOL RESPIRATORY (INHALATION) at 20:15

## 2021-12-04 RX ADMIN — METHYLPREDNISOLONE SODIUM SUCCINATE 40 MG: 40 INJECTION, POWDER, FOR SOLUTION INTRAMUSCULAR; INTRAVENOUS at 14:35

## 2021-12-04 RX ADMIN — MORPHINE SULFATE 2 MG: 2 INJECTION, SOLUTION INTRAMUSCULAR; INTRAVENOUS at 15:12

## 2021-12-04 RX ADMIN — NOREPINEPHRINE BITARTRATE 4 MCG/MIN: 1 INJECTION, SOLUTION, CONCENTRATE INTRAVENOUS at 00:00

## 2021-12-04 RX ADMIN — MORPHINE SULFATE 2 MG: 2 INJECTION, SOLUTION INTRAMUSCULAR; INTRAVENOUS at 10:35

## 2021-12-04 RX ADMIN — Medication 10 ML: at 14:35

## 2021-12-04 RX ADMIN — DABIGATRAN ETEXILATE MESYLATE 150 MG: 150 CAPSULE ORAL at 20:44

## 2021-12-04 RX ADMIN — NOREPINEPHRINE BITARTRATE 6 MCG/MIN: 1 INJECTION, SOLUTION, CONCENTRATE INTRAVENOUS at 05:00

## 2021-12-04 RX ADMIN — SODIUM CHLORIDE 250 ML/HR: 900 INJECTION, SOLUTION INTRAVENOUS at 03:23

## 2021-12-04 RX ADMIN — DABIGATRAN ETEXILATE MESYLATE 75 MG: 75 CAPSULE ORAL at 08:31

## 2021-12-04 RX ADMIN — Medication 10 ML: at 20:46

## 2021-12-04 RX ADMIN — SODIUM CHLORIDE 250 ML/HR: 900 INJECTION, SOLUTION INTRAVENOUS at 10:57

## 2021-12-04 RX ADMIN — DOXYCYCLINE HYCLATE 100 MG: 100 CAPSULE ORAL at 20:44

## 2021-12-04 RX ADMIN — Medication 10 ML: at 05:29

## 2021-12-04 NOTE — PROGRESS NOTES
Patient arrived to room 476 1626 in ICU. Patient alert and orient. Levophed drip stopped. Heart rate is 65, and blood pressure is stable. Skin has no wounds. MD notified about patient's 6 out of 10 pain.  Morphine 2 mg every hour ordered per Ousmane Olmedo MD.

## 2021-12-04 NOTE — PROCEDURES
Procedure:   US GUIDED CENTRAL LINE PLACEMENT    Indication:   Hypotension requiring vasopressors summary:  Informed consent was obtained. A time-out was performed. Landmarks were identified and the area was prepped and draped in usual aseptic technique. The right subclavian vein was entered and the CVC kit guide wire was then inserted and its position within the right subclavian vein. Using Seldinger technique, a 8.5 [place] quad lumen catheter was then placed in the right subclavian vein, after dilation of entry port without difficulty. There was no significant bleeding during the procedure and good flush and drawback was noted. The CVC was then affixed with supplied 2.0 silk sutures via the proximal and distal limiters, with the insertion point affixed at 16 cm. A biostatic disc was applied to the entry port followed by a transparent dressing. A chest x-ray is ordered to confirm proper placement. There were no immediate complications.     Juanjo Monsivais MD

## 2021-12-04 NOTE — ROUTINE PROCESS
Bedside and Verbal shift change report given to [employer] [place], RN (oncoming nurse) by self (offgoing nurse). Report included the following information SBAR, Kardex, ED Summary, Intake/Output, MAR, Recent Results and Cardiac Rhythm Afib.

## 2021-12-04 NOTE — PROGRESS NOTES
Spoke with Lakeshia Leyva MD.   Patient went to sleep, and his blood pressure dropped. His heart rate dropped to 38 and bounced back up into the 40's. Levophed is going at 8. MD wants to continue levophed and fluids.

## 2021-12-04 NOTE — H&P
[facility] Initial Cardiac Evaluation      Date of  Admission: 12/3/2021  4:33 PM     Primary Care Physician: Lesvia Chavira MD  Primary Cardiologist: Dr Verne Cooks  Referring Physician: Dr Fadumo Coleman Physician: Dr Carole Mayorga    CC/Reason for evaluation: bradycardia, hypotension     HPI:  Jennifer Law is a 77 y.o. male with PMH of paroxysmal atrial fibrillation on Pradaxa, hx afib ablation and DCCV x 2, aortic stenosis s/p bovine aortic valve replacement 7/2018, CAD s/p PCI 2014 and 2015, ischemic cardiomyopathy, systolic heart failure, HTN, dyslipidemia, seizure disorder, COPD, pulmonary fibrosis and current smoker who presented to [facility] ED via EMS with tachycardia. Patient was initially seen at [facility] by Dr Verne Cooks as new patient with complaint of SOB. Patient was found to be in atrial fibrillation with rate >150 . Patient was directed to ED for further evaluation. Patient refused EMS transport due to having motorized wheelchair with him. Therefore, patient took BUS to [facility] and was on [facility] when wheelchair stopped working. Patient called EMS and was then transferred to [facility] ED. Upon evaluation in ED, /74, heart rate 164, and febrile with temp 101.7. Labs showed WBC 6.4, H/H 11.2/34.5, Ptl 172, Na 135, K 4.8, BN/Cr 25/1.19, lactic acid 1.3, procalcitonin 0.18, pBNP 3938. Respiratory panel was negative. CXR showed new mid to lower left lung infiltrate concerning for pneumonia. Blood cultures were drawn. EKG showed atrial fibrillation with rate 160. Patient was given IV cardizem bolus and started on gtt. Heart rate improved. Patient became hypotensive with cardizem gtt therefore this was discontinued in ED. Patient required levophed for brief amount of time while in ED. Patient was admitted by Hospitalist to ICU for atrial fibrillation with RVR, hypotension, and pneumonia. Overnight patient was noted to be bradycardiac.  Cardiology was consulted for bradycardia and hypotension. Patient reports intermittent palpitations with elevated heart rate over the last several days. States episodes wouldn't last long. SOB has been progressively getting worse over the last several days as well. Had episode of chest discomfort yesterday while heart rate was elevated. Describes chest pain as substernal without radiation and pressure-like. Currently is chest pain free.       Past Medical History:   Diagnosis Date    Aortic stenosis     Aortic valve replacement 7/2018    CAD (coronary artery disease)     PCI in 2014, 2015    COPD (chronic obstructive pulmonary disease) (HCC)     Dyslipidemia     HTN (hypertension)     Ischemic cardiomyopathy     Paroxysmal atrial fibrillation (HCC)     Pulmonary fibrosis (HCC)     Seizure disorder (HCC)     Systolic heart failure (HCC)       Past Surgical History:   Procedure Laterality Date    HX AFIB ABLATION      HX AORTIC VALVE REPLACEMENT  07/2018    HX HEART CATHETERIZATION      PCI 2014, 2015       Allergies   Allergen Reactions    Tegretol [Carbamazepine] Anaphylaxis    Ativan [Lorazepam] Other (comments)     Violent behavior        Social History     Socioeconomic History    Marital status: LEGALLY      Spouse name: Not on file    Number of children: Not on file    Years of education: Not on file    Highest education level: Not on file   Occupational History    Not on file   Tobacco Use    Smoking status: Current Every Day Smoker     Types: Cigarettes    Smokeless tobacco: Never Used   Substance and Sexual Activity    Alcohol use: Not Currently    Drug use: Not on file    Sexual activity: Not on file   Other Topics Concern    Not on file   Social History Narrative    Not on file     Social Determinants of Health     Financial Resource Strain:     Difficulty of Paying Living Expenses: Not on file   Food Insecurity:     Worried About Running Out of Food in the Last Year: Not on file    [address] in the Last Year: Not on file   Transportation Needs:     Lack of Transportation (Medical): Not on file    Lack of Transportation (Non-Medical):  Not on file   Physical Activity:     Days of Exercise per Week: Not on file    Minutes of Exercise per Session: Not on file   Stress:     Feeling of Stress : Not on file   Social Connections:     Frequency of Communication with Friends and Family: Not on file    Frequency of Social Gatherings with Friends and Family: Not on file    Attends Spiritism Services: Not on file    Active Member of Clubs or Organizations: Not on file    Attends Club or Organization Meetings: Not on file    Marital Status: Not on file   Intimate Partner Violence:     Fear of Current or Ex-Partner: Not on file    Emotionally Abused: Not on file    Physically Abused: Not on file    Sexually Abused: Not on file   Housing Stability:     Unable to Pay for Housing in the Last Year: Not on file    Number of Places Lived in the Last Year: Not on file    Unstable Housing in the Last Year: Not on file     Family History   Problem Relation Age of Onset    Heart Disease Mother         Current Facility-Administered Medications   Medication Dose Route Frequency    [Held by provider] dilTIAZem ER (CARDIZEM CD) capsule 120 mg  120 mg Oral DAILY    [Held by provider] lisinopriL (PRINIVIL, ZESTRIL) tablet 20 mg  20 mg Oral DAILY    potassium chloride (KLOR-CON M10) tablet 20 mEq  20 mEq Oral BID    budesonide-formoteroL (SYMBICORT) 160-4.5 mcg/actuation HFA inhaler 2 Puff  2 Puff Inhalation BID RT    baclofen (LIORESAL) tablet 10 mg  10 mg Oral TID    aspirin chewable tablet 81 mg  81 mg Oral DAILY    atorvastatin (LIPITOR) tablet 40 mg  40 mg Oral QHS    albuterol (PROVENTIL VENTOLIN) nebulizer solution 2.5 mg  2.5 mg Nebulization Q4H PRN    albuterol-ipratropium (DUO-NEB) 2.5 MG-0.5 MG/3 ML  3 mL Nebulization Q4H RT    dabigatran etexilate (PRADAXA) capsule 75 mg  75 mg Oral Q12H    sodium chloride (NS) flush 5-40 mL  5-40 mL IntraVENous Q8H    sodium chloride (NS) flush 5-40 mL  5-40 mL IntraVENous PRN    acetaminophen (TYLENOL) tablet 650 mg  650 mg Oral Q6H PRN    Or    acetaminophen (TYLENOL) suppository 650 mg  650 mg Rectal Q6H PRN    polyethylene glycol (MIRALAX) packet 17 g  17 g Oral DAILY PRN    ondansetron (ZOFRAN ODT) tablet 4 mg  4 mg Oral Q8H PRN    Or    ondansetron (ZOFRAN) injection 4 mg  4 mg IntraVENous Q6H PRN    [Held by provider] furosemide (LASIX) tablet 40 mg  40 mg Oral BID    morphine injection 2 mg  2 mg IntraVENous Q1H PRN    NOREPINephrine (LEVOPHED) 4 mg in 5% dextrose 250 mL infusion  0.5-30 mcg/min IntraVENous TITRATE    0.9% sodium chloride infusion  250 mL/hr IntraVENous CONTINUOUS     Review of Symptoms:    General: No weight changes, weakness, fever or chills  Skin: no rashes, lumps, or other skin changes  HEENT: no headache, dizziness, lightheadedness, vision changes, hearing changes, tinnitus, vertigo, sinus pressure/pain, bleeding gums, sore throat, or hoarseness  Neck: no swollen glands, goiter, pain or stiffness  Respiratory: Positive for dyspnea.  No cough, sputum, hemoptysis, wheezing  Cardiovascular: + as per HPI  Gastrointestinal: no GERD, constipation, diarrhea, liver problems, or h/o GI bleed  Urinary: no frequency, urgency , hematuria, burning/pain with urination, recent flank pain, polyuria, nocturia, or difficulty urinating  Peripheral Vascular: no claudication, leg cramps, prior DVTs, swelling of calves, legs, or feet, color change, or swelling with redness or tenderness  Musculoskeletal: no muscle or joint pain/stiffness, joint swelling, erythema of joints, or back pain  Psychiatric: no depression or excessive stress  Neurological: no sensory or motor loss, seizures, syncope, tremors, numbness, no dementia  Hematologic: no anemia, easy bruising or bleeding  Endocrine: No thyroid problems, heat or cold intolerance, excessive sweating, polyuria, polydipsia, diabetes.        Subjective:     Physical Exam:    Vitals:    12/04/21 0815 12/04/21 0830 12/04/21 0843 12/04/21 0845   BP: (!) 120/59 116/69  114/60   Pulse: 66 73  61   Resp: 21 30  12   Temp:       SpO2: 97% 98% 99% 99%   Weight:       Height:           General: Well Developed, Well Nourished, No Acute Distress  HEENT: pupils equal and round, no abnormalities noted  Neck: supple, no JVD, no carotid bruits  Heart: IRR without murmurs   Lungs: Expiratory wheezing, rales in bases  Abd: soft, nontender, nondistended, with good bowel sounds  Ext: warm, no edema, calves supple/nontender, pulses 2+ bilaterally  Skin: warm and dry  Psychiatric: Normal mood and affect  Neurologic: Alert and oriented X 3    Cardiographics    Telemetry: AFIB  ECG: atrial fibrillation, rate 160  Echocardiogram: ordered     Labs:   Recent Results (from the past 24 hour(s))   EKG, 12 LEAD, INITIAL    Collection Time: 12/03/21  4:36 PM   Result Value Ref Range    Ventricular Rate 160 BPM    Atrial Rate 224 BPM    QRS Duration 93 ms    Q-T Interval 226 ms    QTC Calculation (Bezet) 369 ms    Calculated R Axis 76 degrees    Calculated T Axis 30 degrees    Diagnosis       Atrial fibrillation with rapid V-rate  RSR' in V1 or V2, probably normal variant  Repolarization abnormality, prob rate related    Confirmed by ZANE WADE (), LOWELL LANGSTON ([id number]) on 12/4/2021 7:58:16 AM     CBC WITH AUTOMATED DIFF    Collection Time: 12/03/21  4:48 PM   Result Value Ref Range    WBC 14.8 (H) 4.3 - 11.1 K/uL    RBC 4.21 (L) 4.23 - 5.6 M/uL    HGB 11.2 (L) 13.6 - 17.2 g/dL    HCT 34.5 (L) 41.1 - 50.3 %    MCV 81.9 79.6 - 97.8 FL    MCH 26.6 26.1 - 32.9 PG    MCHC 32.5 31.4 - 35.0 g/dL    RDW 15.9 (H) 11.9 - 14.6 %    PLATELET 965 230 - 982 K/uL    MPV 9.7 9.4 - 12.3 FL    ABSOLUTE NRBC 0.00 0.0 - 0.2 K/uL    DF AUTOMATED      NEUTROPHILS 84 (H) 43 - 78 %    LYMPHOCYTES 8 (L) 13 - 44 %    MONOCYTES 7 4.0 - 12.0 %    EOSINOPHILS 0 (L) 0.5 - 7.8 % BASOPHILS 0 0.0 - 2.0 %    IMMATURE GRANULOCYTES 1 0.0 - 5.0 %    ABS. NEUTROPHILS 12.5 (H) 1.7 - 8.2 K/UL    ABS. LYMPHOCYTES 1.1 0.5 - 4.6 K/UL    ABS. MONOCYTES 1.0 0.1 - 1.3 K/UL    ABS. EOSINOPHILS 0.0 0.0 - 0.8 K/UL    ABS. BASOPHILS 0.0 0.0 - 0.2 K/UL    ABS. IMM. GRANS. 0.1 0.0 - 0.5 K/UL   CULTURE, BLOOD    Collection Time: 12/03/21  4:51 PM    Specimen: Blood   Result Value Ref Range    Special Requests: RIGHT  HAND        Culture result: NO GROWTH AFTER 13 HOURS     CULTURE, BLOOD    Collection Time: 12/03/21  4:51 PM    Specimen: Blood   Result Value Ref Range    Special Requests: LEFT  ARM        Culture result: NO GROWTH AFTER 13 HOURS     LACTIC ACID    Collection Time: 12/03/21  4:51 PM   Result Value Ref Range    Lactic acid 1.3 0.4 - 2.0 MMOL/L   METABOLIC PANEL, COMPREHENSIVE    Collection Time: 12/03/21  4:51 PM   Result Value Ref Range    Sodium 135 (L) 138 - 145 mmol/L    Potassium 4.8 3.5 - 5.1 mmol/L    Chloride 105 98 - 107 mmol/L    CO2 21 21 - 32 mmol/L    Anion gap 9 7 - 16 mmol/L    Glucose 116 (H) 65 - 100 mg/dL    BUN 25 (H) 8 - 23 MG/DL    Creatinine 1.19 0.8 - 1.5 MG/DL    GFR est AA >60 >60 ml/min/1.73m2    GFR est non-AA >60 >60 ml/min/1.73m2    Calcium 8.5 8.3 - 10.4 MG/DL    Bilirubin, total 0.9 0.2 - 1.1 MG/DL    ALT (SGPT) 31 12 - 65 U/L    AST (SGOT) 46 (H) 15 - 37 U/L    Alk.  phosphatase 122 50 - 136 U/L    Protein, total 7.1 6.3 - 8.2 g/dL    Albumin 2.5 (L) 3.2 - 4.6 g/dL    Globulin 4.6 (H) 2.3 - 3.5 g/dL    A-G Ratio 0.5 (L) 1.2 - 3.5     PROCALCITONIN    Collection Time: 12/03/21  4:51 PM   Result Value Ref Range    Procalcitonin 0.18 0.00 - 0.49 ng/mL   RESPIRATORY VIRUS PANEL W/COVID-19, PCR    Collection Time: 12/03/21  4:51 PM    Specimen: Nasopharyngeal   Result Value Ref Range    Adenovirus NOT DETECTED NOTDET      Coronavirus 229E NOT DETECTED NOTDET      Coronavirus HKU1 NOT DETECTED NOTDET      Coronavirus CVNL63 NOT DETECTED NOTDET      Coronavirus OC43 NOT DETECTED NOTDET      SARS-CoV-2, PCR NOT DETECTED NOTDET      Metapneumovirus NOT DETECTED NOTDET      Rhinovirus and Enterovirus NOT DETECTED NOTDET      Influenza A NOT DETECTED NOTDET      Influenza B NOT DETECTED NOTDET      Parainfluenza 1 NOT DETECTED NOTDET      Parainfluenza 2 NOT DETECTED NOTDET      Parainfluenza 3 NOT DETECTED NOTDET      Parainfluenza virus 4 NOT DETECTED NOTDET      RSV by PCR NOT DETECTED NOTDET      B. parapertussis, PCR NOT DETECTED NOTDET      Bordetella pertussis - PCR NOT DETECTED NOTDET      Chlamydophila pneumoniae DNA, QL, PCR NOT DETECTED NOTDET      Mycoplasma pneumoniae DNA, QL, PCR NOT DETECTED NOTDET     NT-PRO BNP    Collection Time: 12/03/21  4:51 PM   Result Value Ref Range    NT pro-BNP 3,938 (H) 5 - 125 PG/ML   EKG, 12 LEAD, SUBSEQUENT    Collection Time: 12/03/21  7:14 PM   Result Value Ref Range    Ventricular Rate 77 BPM    Atrial Rate 231 BPM    QRS Duration 111 ms    Q-T Interval 370 ms    QTC Calculation (Bezet) 419 ms    Calculated R Axis 75 degrees    Calculated T Axis 74 degrees    Diagnosis       Atrial fibrillation  RSR' in V1 or V2, right VCD or RVH    Confirmed by Patel Gtz MD (), LOWELL LANGSTON ([id number]) on 12/4/2021 8:04:69 AM     METABOLIC PANEL, BASIC    Collection Time: 12/04/21  2:56 AM   Result Value Ref Range    Sodium 138 136 - 145 mmol/L    Potassium 4.2 3.5 - 5.1 mmol/L    Chloride 109 (H) 98 - 107 mmol/L    CO2 25 21 - 32 mmol/L    Anion gap 4 (L) 7 - 16 mmol/L    Glucose 137 (H) 65 - 100 mg/dL    BUN 28 (H) 8 - 23 MG/DL    Creatinine 1.26 0.8 - 1.5 MG/DL    GFR est AA >60 >60 ml/min/1.73m2    GFR est non-AA >60 >60 ml/min/1.73m2    Calcium 8.7 8.3 - 10.4 MG/DL   CBC W/O DIFF    Collection Time: 12/04/21  2:56 AM   Result Value Ref Range    WBC 17.7 (H) 4.3 - 11.1 K/uL    RBC 3.95 (L) 4.23 - 5.6 M/uL    HGB 10.5 (L) 13.6 - 17.2 g/dL    HCT 33.0 (L) 41.1 - 50.3 %    MCV 83.5 79.6 - 97.8 FL    MCH 26.6 26.1 - 32.9 PG    MCHC 31.8 31.4 - 35.0 g/dL    RDW 16.0 (H) 11.9 - 14.6 %    PLATELET 441 337 - 211 K/uL    MPV 10.0 9.4 - 12.3 FL    ABSOLUTE NRBC 0.00 0.0 - 0.2 K/uL     Pt has been seen and examined by Dr. Theron Hough. He agrees with the following assessment and plan. Assessment/Plan:      Principal Problem:    Atrial fibrillation with rapid ventricular response (HCC) (12/3/2021)  - has history of DCCV x 2 and afib ablation within last one year per patient  - likely secondary to underlying illness  - briefly on cardizem gtt in ED but discontinued prior to admission secondary to hypotension and improved heart rate  - continued Pradaxa for CVA protection  - PTA Cardizem CD on hold secondary to hypotension, will add back when able to tolerate   - currently heart rate controlled   - continue to monitor on telemetry     Active Problems:    Hypotension    Sepsis ([address] (12/3/2021)  - briefly on Levophed  - Blood cultures pending   - per primary       CAP (community acquired pneumonia) (12/3/2021)  - on 2L NC  - CXR showed new mid to lower left lung infiltrate concerning for pneumonia  - Antibiotics per primary       CAD hx PCI  - currently chest pain free  - continue ASA, statin      Hx Cardiomyopathy/Systolic heart failure  - Lisinopril held secondary to hypotension  - Lasix held secondary to hypotension  - ECHO ordered       COPD (chronic obstructive pulmonary disease) ([address] (12/3/2021)  - per primary       Pulmonary fibrosis ([facility] [address] (12/3/2021)   - per primary       Leg injury (12/3/2021)  - per primary       Thank you for requesting cardiac evaluation and allowing us to participate in the care of this patient. We will continue to follow along with you.       Kary Rosales PA-C  Supervising Physician: Dr Theron Hough

## 2021-12-04 NOTE — PROGRESS NOTES
TRANSFER - IN REPORT:    Verbal report received from self on Dominga Ballesteros being received from [address] for routine progression of care. Report consisted of patients Situation, Background, Assessment and Recommendations(SBAR). Information from the following report(s) SBAR, Kardex, ED Summary, Intake/Output, MAR, Recent Results and Cardiac Rhythm Afib was reviewed. Opportunity for questions and clarification was provided. Assessment completed upon patients arrival to unit and care assumed. Patient received to room 316. Patient connected to monitor and assessment completed. Plan of care reviewed. Patient oriented to room and call light. Patient aware to use call light to communicate any chest pain or needs. Admission skin assessment completed with second RN and reveals the following:   Sacrum is c/d/i. Allevyn is on. Pt with spot on RUE from infiltrate in the ER. Not red but marked in sharpie. Some scars on chest from skin CA removals. Some on abd pt says as well. Heels are intact. Legs look great, no edema.

## 2021-12-04 NOTE — ED NOTES
Warm compressed applied to R arm per MD Rodriguez order around the affected site after mesylate was given.

## 2021-12-04 NOTE — ED NOTES
Cardizem stopped per MDs order after MD was notified pts BP is still low. Pt still getting fluid bolus currently. Repeat 12 lead given to MD Kellee Corona.

## 2021-12-04 NOTE — ED NOTES
Spoke with MD Sean Oreilly regarding pts low BP. MD to giive fluid bolus . Pt is AAOx4, denied dyspnea, no diaphoresis noted.

## 2021-12-04 NOTE — ED NOTES
MD [facility] contacted in regards to the pt being almost maxed on levo and pt being 70s as well as HR coming down to the 40s. PT is is AAOx4, states no complaints.

## 2021-12-04 NOTE — PROGRESS NOTES
Reviewed notes for spiritual concerns    Patient is resting peacefully    Noted that patient recently moved here from [facility] will follow up when patient is awake    Did not see any family

## 2021-12-04 NOTE — PROGRESS NOTES
Hospitalist Progress Note   Admit Date:  12/3/2021  4:33 PM   Name:  Missy Ordaz   Age:  77 y.o. Sex:  male  :  1955   MRN:  [id number]   Room:  [facility]/01    Presenting Complaint: Chest Pain (Angina) (a fib rvr)    Reason(s) for Admission: Atrial fibrillation with rapid ventricular response (HCC) [I48.91]  CAP (community acquired pneumonia) [J18.9]  Sepsis [facility]) [A41.9]     Hospital Course & Interval History:   Missy Ordaz is a 77 y.o. male with medical history of afib on pradaxa, COPD, CHF who presented to ED with afib with RVR. Patient was sent to the ER by Cardiology after he presented to their office today with HR in the 160s. He was found to be SOB as well. Upon ER workup, patient remained in RVR. Patient is also febrile with a leukocytosis, meeting sepsis criteria. CXR shows evidence of a new mid-lower L lung infiltrate. Patient was started on cardizem drip in ER. Became hypotensive and had to be started on levophed and admitted to ICU. Subjective (21):  Pt still moderately SOB, constant, worse with exertion. No CP. Fevers improved. Assessment & Plan:     * Atrial fibrillation with rapid ventricular response   - currently rate controlled  - increase pradaxa to 150mg BID     COPD exacerbation from bacterial PNA  - start rocephin and doxy  -wheezing bilat, start solumedrol  -Currently O2 Flow Rate (L/min): 2 l/min.   Wean as tolerated  -Cont duoneb  -cont home symbicort  -Got vanc/cefepime in ER     Pulmonary fibrosis   - takes prednisone 20mg BID at home.       History of congestive heart failure  -pt reports he has had CHF confirmed 3x separate occasions  -usually takes 40mg lasix daily; did self-adjust and took 40mg BID for 5 days prior to admit  -checking echocardiogram    Leg injury  - Pt injured his L leg several weeks ago, has been seen at outpatient Orthopedic Surgery and reportedly has a \"hairline femur fracture\" in the same leg as his prior hip replacement  - PRN pain meds      Dispo/Discharge Planning:      PT/OT/CM consulted.   PPD ordered    Diet:  ADULT DIET Regular; Low Fat/Low Chol/High Fiber/ARIC  DVT PPx: pradaxa  Code status: Full Code    Hospital Problems as of 12/4/2021 Never Reviewed          Codes Class Noted - Resolved POA    * (Principal) COPD with acute lower respiratory infection ([facility].) ICD-10-CM: J44.0  ICD-9-CM: 073, 519.8  12/4/2021 - Present Yes        History of congestive heart failure (Chronic) ICD-10-CM: Z86.79  ICD-9-CM: V12.59  12/3/2021 - Present Yes        CAP (community acquired pneumonia) ICD-10-CM: J18.9  ICD-9-CM: 486  12/3/2021 - Present Yes        Atrial fibrillation with rapid ventricular response ([facility] ICD-10-CM: I48.91  ICD-9-CM: 427.31  12/3/2021 - Present Yes        COPD (chronic obstructive pulmonary disease) ([facility] (Chronic) ICD-10-CM: J44.9  ICD-9-CM: 604  12/3/2021 - Present Yes        Pulmonary fibrosis ([facility] (Chronic) ICD-10-CM: J84.10  ICD-9-CM: 181  12/3/2021 - Present Yes        Leg injury (Chronic) ICD-10-CM: R51.82QD  ICD-9-CM: 959.7  12/3/2021 - Present Yes        Septic shock with acute organ dysfunction due to pneumococcus [facility]) ICD-10-CM: A40.3, R65.21  ICD-9-CM: 038.2, 785.52, 995.92  12/3/2021 - Present Yes              Objective:     Patient Vitals for the past 24 hrs:   Temp Pulse Resp BP SpO2   12/04/21 1130 -- (!) 58 19 108/60 98 %   12/04/21 1115 -- 60 28 (!) 100/54 99 %   12/04/21 1100 98.5 °F (36.9 °C) 64 23 100/69 93 %   12/04/21 1045 -- 69 24 103/64 94 %   12/04/21 1030 -- 75 30 (!) 111/58 97 %   12/04/21 1015 -- 66 18 (!) 107/59 98 %   12/04/21 1000 -- (!) 56 21 93/66 97 %   12/04/21 0945 -- 84 27 (!) 94/56 99 %   12/04/21 0930 -- 67 16 110/64 95 %   12/04/21 0915 -- 64 21 113/70 97 %   12/04/21 0900 -- 75 (!) 34 98/64 96 %   12/04/21 0845 -- 61 12 114/60 99 %   12/04/21 0843 -- -- -- -- 99 %   12/04/21 0830 -- 73 30 116/69 98 %   12/04/21 0815 -- 66 21 (!) 120/59 97 %   12/04/21 0800 -- 71 23 101/74 96 % 12/04/21 0745 -- 68 21 127/72 99 %   12/04/21 0730 -- 70 21 104/74 93 %   12/04/21 0715 -- 64 19 110/71 97 %   12/04/21 0700 98.6 °F (37 °C) 62 (!) 34 113/62 98 %   12/04/21 0600 -- 65 22 131/64 98 %   12/04/21 0545 -- 65 24 121/80 98 %   12/04/21 0530 -- (!) 51 26 (!) 149/78 96 %   12/04/21 0515 -- (!) 59 20 (!) 153/71 98 %   12/04/21 0500 -- 63 20 136/73 96 %   12/04/21 0450 -- (!) 59 (!) 43 132/63 93 %   12/04/21 0445 -- 70 30 113/63 95 %   12/04/21 0430 -- 62 21 -- 95 %   12/04/21 0415 -- (!) 56 21 118/60 95 %   12/04/21 0400 -- 63 19 123/73 98 %   12/04/21 0345 -- (!) 57 29 (!) 108/57 95 %   12/04/21 0330 -- 72 (!) 32 128/70 96 %   12/04/21 0315 -- 72 19 117/85 93 %   12/04/21 0300 -- 69 22 (!) 153/100 95 %   12/04/21 0250 -- (!) 55 24 111/69 93 %   12/04/21 0245 -- 63 22 (!) 75/40 94 %   12/04/21 0230 -- (!) 52 19 (!) 69/39 92 %   12/04/21 0215 -- (!) 58 19 103/62 97 %   12/04/21 0200 -- 66 29 (!) 115/56 97 %   12/04/21 0145 -- 65 21 122/76 98 %   12/04/21 0144 97.9 °F (36.6 °C) 65 -- 122/76 97 %   12/04/21 0100 -- 74 -- (!) 90/56 97 %   12/04/21 0050 -- 62 -- (!) 88/61 --   12/04/21 0036 -- (!) 57 -- 106/65 97 %   12/04/21 0033 -- 62 -- (!) 99/59 --   12/04/21 0030 -- 62 -- (!) 106/58 97 %   12/04/21 0028 -- 62 -- (!) 96/52 --   12/04/21 0024 -- (!) 54 -- (!) 100/59 98 %   12/04/21 0021 -- (!) 58 -- 98/60 96 %   12/04/21 0018 -- 61 -- 98/72 --   12/04/21 0012 -- -- -- 100/68 --   12/04/21 0009 -- -- -- 105/69 --   12/04/21 0000 -- 65 -- -- --   12/03/21 2345 -- (!) 52 -- (!) 77/57 --   12/03/21 2335 -- (!) 39 21 (!) 78/52 96 %   12/03/21 2330 -- (!) 49 21 (!) 78/53 97 %   12/03/21 2328 -- (!) 50 21 (!) 74/55 95 %   12/03/21 2321 -- (!) 54 19 (!) 84/56 --   12/03/21 2313 -- (!) 51 18 (!) 73/65 98 %   12/03/21 2308 -- (!) 51 21 (!) 78/64 98 %   12/03/21 2303 -- (!) 40 19 (!) 70/42 97 %   12/03/21 2258 -- (!) 49 18 (!) 72/44 97 %   12/03/21 2253 -- (!) 54 19 (!) 69/44 98 %   12/03/21 2249 -- 68 25 (!) 86/74 -- 12/03/21 2248 -- (!) 49 25 (!) 77/53 --   12/03/21 2240 -- (!) 44 17 (!) 91/51 97 %   12/03/21 2238 -- (!) 57 23 (!) 88/53 (!) 87 %   12/03/21 2228 -- (!) 56 -- (!) 77/55 --   12/03/21 2225 -- (!) 55 22 (!) 92/57 99 %   12/03/21 2223 -- 61 20 (!) 76/58 96 %   12/03/21 2218 -- (!) 53 -- (!) 79/49 97 %   12/03/21 2213 -- (!) 55 -- (!) 86/50 92 %   12/03/21 2208 -- (!) 44 20 (!) 76/52 96 %   12/03/21 2203 -- (!) 53 16 (!) 75/59 97 %   12/03/21 2158 -- (!) 57 21 (!) 81/52 91 %   12/03/21 2153 -- (!) 44 22 (!) 84/53 94 %   12/03/21 2150 -- 63 25 (!) 75/62 96 %   12/03/21 2148 -- 62 28 (!) 76/64 96 %   12/03/21 2145 -- (!) 52 25 (!) 80/56 93 %   12/03/21 2143 -- (!) 57 23 (!) 81/60 92 %   12/03/21 2141 -- 60 23 (!) 92/52 93 %   12/03/21 2136 -- 61 26 (!) 78/66 97 %   12/03/21 2133 -- 62 24 (!) 77/54 92 %   12/03/21 2131 -- 62 16 (!) 86/50 95 %   12/03/21 2126 -- 64 20 (!) 78/62 97 %   12/03/21 2121 -- 62 19 (!) 87/66 92 %   12/03/21 2120 -- -- -- -- 94 %   12/03/21 2118 -- -- -- (!) 84/54 --   12/03/21 2116 -- (!) 49 28 -- 96 %   12/03/21 2115 -- -- -- (!) 80/56 --   12/03/21 2113 -- -- -- (!) 76/57 --   12/03/21 2108 -- -- -- 90/61 --   12/03/21 2107 -- 70 25 (!) 88/54 95 %   12/03/21 2102 -- 63 20 (!) 82/54 95 %   12/03/21 2057 -- 68 22 91/67 95 %   12/03/21 2047 -- 62 23 (!) 89/55 94 %   12/03/21 2033 -- 65 12 99/88 95 %   12/03/21 2030 -- 65 23 (!) 81/61 93 %   12/03/21 2025 -- 63 19 95/61 95 %   12/03/21 2023 -- (!) 54 17 92/75 --   12/03/21 2015 -- 64 24 (!) 85/66 93 %   12/03/21 2010 -- 70 23 94/62 94 %   12/03/21 2005 -- 69 24 110/66 94 %   12/03/21 2003 -- 67 17 93/67 91 %   12/03/21 2000 -- 69 25 90/74 94 %   12/1955 -- 61 29 (!) 88/60 93 %   12/03/21 1950 -- 69 24 (!) 89/55 95 %   12/03/21 1948 98.7 °F (37.1 °C) 63 23 (!) 84/56 --   12/03/21 1945 -- 69 18 (!) 84/53 96 %   12/03/21 1943 -- 65 14 (!) 87/48 94 %   12/03/21 1940 -- 60 17 (!) 83/57 95 %   12/03/21 1938 -- 67 17 (!) 82/59 91 %   12/03/21 1935 -- 66 25 (!) 76/59 91 %   12/03/21 1933 -- 68 17 (!) 79/60 91 %   12/03/21 1923 -- 74 28 (!) 70/50 --   12/03/21 1711 -- -- -- -- 93 %   12/03/21 1654 -- (!) 128 28 99/82 93 %   12/03/21 1636 (!) 101.7 °F (38.7 °C) (!) 164 23 112/74 93 %     Oxygen Therapy  O2 Sat (%): 98 % (12/04/21 1130)  Pulse via Oximetry: 60 beats per minute (12/04/21 1130)  O2 Device: Nasal cannula (12/04/21 1100)  Skin Assessment: Clean, dry, & intact (12/04/21 1100)  O2 Flow Rate (L/min): 2 l/min (12/04/21 0843)    Estimated body mass index is 28.23 kg/m² as calculated from the following:    Height as of this encounter: 6' 1\" (1.854 m). Weight as of this encounter: 97.1 kg (214 lb). Intake/Output Summary (Last 24 hours) at 12/4/2021 1208  Last data filed at 12/4/2021 3663  Gross per 24 hour   Intake 650 ml   Output 1100 ml   Net -450 ml         Physical Exam:   Blood pressure 108/60, pulse (!) 58, temperature 98.5 °F (36.9 °C), resp. rate 19, height 6' 1\" (1.854 m), weight 97.1 kg (214 lb), SpO2 98 %. General:    Well nourished. No overt distress  Head:  Normocephalic, atraumatic  Eyes:  Sclerae appear normal.  Pupils equally round. ENT:  Nares appear normal, no drainage. Moist oral mucosa  Neck:  No restricted ROM. Trachea midline   CV:   RRR. No m/r/g. No jugular venous distension. Lungs:   Wheezing bilaterally. Mildly labored  Abdomen:    nondistended. Extremities: No cyanosis or clubbing. No edema  Skin:     No rashes and normal coloration. Warm and dry. Neuro:  CN II-XII grossly intact. Sensation intact. A&Ox3  Psych:  Normal mood and affect.       I have reviewed ordered lab tests and independently visualized imaging below:    Recent Labs:  Recent Results (from the past 48 hour(s))   EKG, 12 LEAD, INITIAL    Collection Time: 12/03/21  4:36 PM   Result Value Ref Range    Ventricular Rate 160 BPM    Atrial Rate 224 BPM    QRS Duration 93 ms    Q-T Interval 226 ms    QTC Calculation (Bezet) 369 ms    Calculated R Axis 76 degrees    Calculated T Axis 30 degrees    Diagnosis       Atrial fibrillation with rapid V-rate  RSR' in V1 or V2, probably normal variant  Repolarization abnormality, prob rate related    Confirmed by [facility]  MD ()LOWELL ([id number]) on 12/4/2021 7:58:16 AM     CBC WITH AUTOMATED DIFF    Collection Time: 12/03/21  4:48 PM   Result Value Ref Range    WBC 14.8 (H) 4.3 - 11.1 K/uL    RBC 4.21 (L) 4.23 - 5.6 M/uL    HGB 11.2 (L) 13.6 - 17.2 g/dL    HCT 34.5 (L) 41.1 - 50.3 %    MCV 81.9 79.6 - 97.8 FL    MCH 26.6 26.1 - 32.9 PG    MCHC 32.5 31.4 - 35.0 g/dL    RDW 15.9 (H) 11.9 - 14.6 %    PLATELET 424 766 - 706 K/uL    MPV 9.7 9.4 - 12.3 FL    ABSOLUTE NRBC 0.00 0.0 - 0.2 K/uL    DF AUTOMATED      NEUTROPHILS 84 (H) 43 - 78 %    LYMPHOCYTES 8 (L) 13 - 44 %    MONOCYTES 7 4.0 - 12.0 %    EOSINOPHILS 0 (L) 0.5 - 7.8 %    BASOPHILS 0 0.0 - 2.0 %    IMMATURE GRANULOCYTES 1 0.0 - 5.0 %    ABS. NEUTROPHILS 12.5 (H) 1.7 - 8.2 K/UL    ABS. LYMPHOCYTES 1.1 0.5 - 4.6 K/UL    ABS. MONOCYTES 1.0 0.1 - 1.3 K/UL    ABS. EOSINOPHILS 0.0 0.0 - 0.8 K/UL    ABS. BASOPHILS 0.0 0.0 - 0.2 K/UL    ABS. IMM.  GRANS. 0.1 0.0 - 0.5 K/UL   CULTURE, BLOOD    Collection Time: 12/03/21  4:51 PM    Specimen: Blood   Result Value Ref Range    Special Requests: RIGHT  HAND        Culture result: NO GROWTH AFTER 13 HOURS     CULTURE, BLOOD    Collection Time: 12/03/21  4:51 PM    Specimen: Blood   Result Value Ref Range    Special Requests: LEFT  ARM        Culture result: NO GROWTH AFTER 13 HOURS     LACTIC ACID    Collection Time: 12/03/21  4:51 PM   Result Value Ref Range    Lactic acid 1.3 0.4 - 2.0 MMOL/L   METABOLIC PANEL, COMPREHENSIVE    Collection Time: 12/03/21  4:51 PM   Result Value Ref Range    Sodium 135 (L) 138 - 145 mmol/L    Potassium 4.8 3.5 - 5.1 mmol/L    Chloride 105 98 - 107 mmol/L    CO2 21 21 - 32 mmol/L    Anion gap 9 7 - 16 mmol/L    Glucose 116 (H) 65 - 100 mg/dL    BUN 25 (H) 8 - 23 MG/DL    Creatinine 1.19 0.8 - 1.5 MG/DL    GFR est AA >60 >60 ml/min/1.73m2    GFR est non-AA >60 >60 ml/min/1.73m2    Calcium 8.5 8.3 - 10.4 MG/DL    Bilirubin, total 0.9 0.2 - 1.1 MG/DL    ALT (SGPT) 31 12 - 65 U/L    AST (SGOT) 46 (H) 15 - 37 U/L    Alk.  phosphatase 122 50 - 136 U/L    Protein, total 7.1 6.3 - 8.2 g/dL    Albumin 2.5 (L) 3.2 - 4.6 g/dL    Globulin 4.6 (H) 2.3 - 3.5 g/dL    A-G Ratio 0.5 (L) 1.2 - 3.5     PROCALCITONIN    Collection Time: 12/03/21  4:51 PM   Result Value Ref Range    Procalcitonin 0.18 0.00 - 0.49 ng/mL   RESPIRATORY VIRUS PANEL W/COVID-19, PCR    Collection Time: 12/03/21  4:51 PM    Specimen: Nasopharyngeal   Result Value Ref Range    Adenovirus NOT DETECTED NOTDET      Coronavirus 229E NOT DETECTED NOTDET      Coronavirus HKU1 NOT DETECTED NOTDET      Coronavirus CVNL63 NOT DETECTED NOTDET      Coronavirus OC43 NOT DETECTED NOTDET      SARS-CoV-2, PCR NOT DETECTED NOTDET      Metapneumovirus NOT DETECTED NOTDET      Rhinovirus and Enterovirus NOT DETECTED NOTDET      Influenza A NOT DETECTED NOTDET      Influenza B NOT DETECTED NOTDET      Parainfluenza 1 NOT DETECTED NOTDET      Parainfluenza 2 NOT DETECTED NOTDET      Parainfluenza 3 NOT DETECTED NOTDET      Parainfluenza virus 4 NOT DETECTED NOTDET      RSV by PCR NOT DETECTED NOTDET      B. parapertussis, PCR NOT DETECTED NOTDET      Bordetella pertussis - PCR NOT DETECTED NOTDET      Chlamydophila pneumoniae DNA, QL, PCR NOT DETECTED NOTDET      Mycoplasma pneumoniae DNA, QL, PCR NOT DETECTED NOTDET     NT-PRO BNP    Collection Time: 12/03/21  4:51 PM   Result Value Ref Range    NT pro-BNP 3,938 (H) 5 - 125 PG/ML   EKG, 12 LEAD, SUBSEQUENT    Collection Time: 12/03/21  7:14 PM   Result Value Ref Range    Ventricular Rate 77 BPM    Atrial Rate 231 BPM    QRS Duration 111 ms    Q-T Interval 370 ms    QTC Calculation (Bezet) 419 ms    Calculated R Axis 75 degrees    Calculated T Axis 74 degrees    Diagnosis       Atrial fibrillation  RSR' in V1 or V2, right VCD or RVH    Confirmed by Sangita Stoner MD (), Glynn Borwn ([id number]) on 12/4/2021 8:09:75 AM     METABOLIC PANEL, BASIC    Collection Time: 12/04/21  2:56 AM   Result Value Ref Range    Sodium 138 136 - 145 mmol/L    Potassium 4.2 3.5 - 5.1 mmol/L    Chloride 109 (H) 98 - 107 mmol/L    CO2 25 21 - 32 mmol/L    Anion gap 4 (L) 7 - 16 mmol/L    Glucose 137 (H) 65 - 100 mg/dL    BUN 28 (H) 8 - 23 MG/DL    Creatinine 1.26 0.8 - 1.5 MG/DL    GFR est AA >60 >60 ml/min/1.73m2    GFR est non-AA >60 >60 ml/min/1.73m2    Calcium 8.7 8.3 - 10.4 MG/DL   CBC W/O DIFF    Collection Time: 12/04/21  2:56 AM   Result Value Ref Range    WBC 17.7 (H) 4.3 - 11.1 K/uL    RBC 3.95 (L) 4.23 - 5.6 M/uL    HGB 10.5 (L) 13.6 - 17.2 g/dL    HCT 33.0 (L) 41.1 - 50.3 %    MCV 83.5 79.6 - 97.8 FL    MCH 26.6 26.1 - 32.9 PG    MCHC 31.8 31.4 - 35.0 g/dL    RDW 16.0 (H) 11.9 - 14.6 %    PLATELET 820 726 - 820 K/uL    MPV 10.0 9.4 - 12.3 FL    ABSOLUTE NRBC 0.00 0.0 - 0.2 K/uL       All Micro Results     Procedure Component Value Units Date/Time    BLOOD CULTURE [[id number]] Collected: 12/03/21 1651    Order Status: Completed Specimen: Blood Updated: 12/04/21 0636     Special Requests: --        RIGHT  HAND       Culture result: NO GROWTH AFTER 13 HOURS       BLOOD CULTURE [[id number]] Collected: 12/03/21 1651    Order Status: Completed Specimen: Blood Updated: 12/04/21 0636     Special Requests: --        LEFT  ARM       Culture result: NO GROWTH AFTER 13 HOURS       CULTURE, URINE [[id number]] Collected: 12/04/21 0302    Order Status: Completed Specimen: Urine from Clean catch Updated: 12/04/21 0321    RESPIRATORY VIRUS PANEL W/COVID-19, PCR [[id number]] Collected: 12/03/21 1651    Order Status: Completed Specimen: Nasopharyngeal Updated: 12/03/21 1857     Adenovirus NOT DETECTED        Coronavirus 229E NOT DETECTED        Coronavirus HKU1 NOT DETECTED        Coronavirus CVNL63 NOT DETECTED        Coronavirus OC43 NOT DETECTED        SARS-CoV-2, PCR NOT DETECTED        Metapneumovirus NOT DETECTED        Rhinovirus and Enterovirus NOT DETECTED        Influenza A NOT DETECTED        Influenza B NOT DETECTED        Parainfluenza 1 NOT DETECTED        Parainfluenza 2 NOT DETECTED        Parainfluenza 3 NOT DETECTED        Parainfluenza virus 4 NOT DETECTED        RSV by PCR NOT DETECTED        B. parapertussis, PCR NOT DETECTED        Bordetella pertussis - PCR NOT DETECTED        Chlamydophila pneumoniae DNA, QL, PCR NOT DETECTED        Mycoplasma pneumoniae DNA, QL, PCR NOT DETECTED             Other Studies:  XR CHEST PORT    Result Date: 12/4/2021  Portable chest xray  COMPARISON: December 3, 2021. CLINICAL HISTORY: Post central line placement. FINDINGS: There is a right IJ line, with the tip in the area of SVC. Diffuse groundglass densities in the left lung, similar to prior exam. New groundglass densities in the right upper lobe. No evidence of pneumothorax. No large pleural effusion. Heart appears mildly enlarged. Mediastinal contour is within normal limits. Prosthetic aortic valve is noted. 1. Right IJ line tip is in the area of SVC. No evidence of pneumothorax. 2. Airspace densities in the left lung, similar to prior exam and new airspace densities in the right upper lobe. Findings suspicious for progression of pulmonary edema. XR CHEST PORT    Result Date: 12/3/2021  Chest X-ray INDICATION: Meets SIRS criteria. COMPARISON: Chest x-ray 11/29/2021. A portable AP view of the chest was obtained. FINDINGS: Interstitial lung changes are noted in the mid to lower left lung. Right lung is grossly clear. No pneumothorax or pleural effusions. Transcatheter aortic valve replacement is noted. Spinal stimulator leads noted in the mid to lower thoracic spine region. Lower cervical fusion plate is partially imaged. The heart size is normal.  The bony thorax is intact. New mid to lower left lung infiltrate concerning for pneumonia.        Current Meds:  Current Facility-Administered Medications   Medication Dose Route Frequency    [Held by provider] dilTIAZem ER (CARDIZEM CD) capsule 120 mg  120 mg Oral DAILY    [Held by provider] lisinopriL (PRINIVIL, ZESTRIL) tablet 20 mg  20 mg Oral DAILY    [Held by provider] potassium chloride (KLOR-CON M10) tablet 20 mEq  20 mEq Oral BID    budesonide-formoteroL (SYMBICORT) 160-4.5 mcg/actuation HFA inhaler 2 Puff  2 Puff Inhalation BID RT    aspirin chewable tablet 81 mg  81 mg Oral DAILY    atorvastatin (LIPITOR) tablet 40 mg  40 mg Oral QHS    albuterol (PROVENTIL VENTOLIN) nebulizer solution 2.5 mg  2.5 mg Nebulization Q4H PRN    albuterol-ipratropium (DUO-NEB) 2.5 MG-0.5 MG/3 ML  3 mL Nebulization Q4H RT    dabigatran etexilate (PRADAXA) capsule 75 mg  75 mg Oral Q12H    sodium chloride (NS) flush 5-40 mL  5-40 mL IntraVENous Q8H    sodium chloride (NS) flush 5-40 mL  5-40 mL IntraVENous PRN    acetaminophen (TYLENOL) tablet 650 mg  650 mg Oral Q6H PRN    Or    acetaminophen (TYLENOL) suppository 650 mg  650 mg Rectal Q6H PRN    polyethylene glycol (MIRALAX) packet 17 g  17 g Oral DAILY PRN    ondansetron (ZOFRAN ODT) tablet 4 mg  4 mg Oral Q8H PRN    Or    ondansetron (ZOFRAN) injection 4 mg  4 mg IntraVENous Q6H PRN    [Held by provider] furosemide (LASIX) tablet 40 mg  40 mg Oral BID    morphine injection 2 mg  2 mg IntraVENous Q1H PRN    NOREPINephrine (LEVOPHED) 4 mg in 5% dextrose 250 mL infusion  0.5-30 mcg/min IntraVENous TITRATE    baclofen (LIORESAL) tablet 10 mg  10 mg Oral TID PRN    0.9% sodium chloride infusion  75 mL/hr IntraVENous CONTINUOUS       Signed:  Lorie Gomez MD    Part of this note may have been written by using a voice dictation software. The note has been proof read but may still contain some grammatical/other typographical errors.

## 2021-12-04 NOTE — ASSESSMENT & PLAN NOTE
- Has been compliant with home meds  - Likely due to pneumonia  - Was briefly on cardizem drip in ER, however this caused his pressures to drop into the 70s, HR is currently in the 60s  - Continue to hold cardizem drip, admit to tele  - PRN lopressor if RVR returns  - Continue home medications

## 2021-12-04 NOTE — ASSESSMENT & PLAN NOTE
- Received a 1L bolus in ER, no more IVFs at this time  - Continue home meds  - Holding lasix currently as pt's BP is low due to cardizem drip

## 2021-12-04 NOTE — ASSESSMENT & PLAN NOTE
- Received vanc and cefepime in ER  - As patient has not recently been hospitalized, will start azithromycin and rocephin for CAP coverage  - Follow blood cultures

## 2021-12-04 NOTE — ED NOTES
TRANSFER - OUT REPORT:    Verbal report given to [facility] RN(name) on [employer]  being transferred to ICU(unit) for routine progression of care       Report consisted of patients Situation, Background, Assessment and   Recommendations(SBAR). Information from the following report(s) SBAR was reviewed with the receiving nurse. Lines:   Peripheral IV 12/03/21 Anterior; Right Hand (Active)       Peripheral IV 12/03/21 Anterior; Right Forearm (Active)       Peripheral IV 12/03/21 Left Antecubital (Active)        Opportunity for questions and clarification was provided.       Patient transported with:   Registered Nurse

## 2021-12-04 NOTE — ASSESSMENT & PLAN NOTE
- Pt injured his L leg several weeks ago, has been seen at outpatient Orthopedic Surgery and reportedly has a \"hairline femur fracture\" in the same leg as his prior hip replacement  - Pain on movement  - PRN pain meds

## 2021-12-04 NOTE — ED NOTES
Spoke with MD, she states she will put an order for one dose of push dose epi and then start pt on another BP. MD asked to come and assess pt, MD stated she will asap.

## 2021-12-04 NOTE — ED NOTES
MD Rodriguez at bedside at this time to attempt a central line. MD Webber at bedside at this time as well.

## 2021-12-04 NOTE — H&P
Hospitalist History and Physical   Admit Date:  12/3/2021  4:33 PM   Name:  Johan Hdz   Age:  77 y.o. Sex:  male  :  1955   MRN:  [id number]     Presenting Complaint: Rapid HR  Reason(s) for Admission: Atrial fibrillation with rapid ventricular response ([facility] [I48.91]  CAP (community acquired pneumonia) [J18.9]     History of Present Illness:   Johan Hdz is a 77 y.o. male with medical history of afib on pradaxa, COPD, CHF who presented to ED with afib with RVR. Patient was sent to the ER by Cardiology after he presented to their office today with HR in the 160s. He was found to be SOB as well. Upon ER workup, patient remained in RVR. Patient is also febrile with a leukocytosis. CXR shows evidence of a new mid-lower L lung infiltrate. Patient was started on cardizem drip in ER, but this has caused the patient to become hypotensive. He is currently receiving a fluid bolus during my beside evaluation, and cardizem drip has been stopped. HR is in the 60s, still irregular. Hospitalist to admit. Review of Systems:  10 systems reviewed and negative except as noted in HPI.   Assessment & Plan:   * Atrial fibrillation with rapid ventricular response (HCC)  - Has been compliant with home meds  - Likely due to pneumonia  - Was briefly on cardizem drip in ER, however this caused his pressures to drop into the 70s, HR is currently in the 60s  - Continue to hold cardizem drip, admit to tele  - PRN lopressor if RVR returns  - Continue home medications    CAP (community acquired pneumonia)  - Received vanc and cefepime in ER  - As patient has not recently been hospitalized, will start azithromycin and rocephin for CAP coverage  - Follow blood cultures    Leg injury  - Pt injured his L leg several weeks ago, has been seen at outpatient Orthopedic Surgery and reportedly has a \"hairline femur fracture\" in the same leg as his prior hip replacement  - Pain on movement  - PRN pain meds    Pulmonary fibrosis ([facility] [address]  - Home meds  - Lungs sound c/w this reported diagnosis    COPD (chronic obstructive pulmonary disease) ([facility] [address]  - Continue home meds    History of congestive heart failure  - Received a 1L bolus in ER, no more IVFs at this time  - Continue home meds  - Holding lasix currently as pt's BP is low due to cardizem drip       Disposition: inpatient    Diet: Regular, low salt  VTE ppx: Pradaxa  Code status: FULL      Past medical history reviewed. Past surgical history reviewed. Allergies   Allergen Reactions    Tegretol [Carbamazepine] Anaphylaxis    Ativan [Lorazepam] Other (comments)     Violent behavior        Social History     Tobacco Use    Smoking status: Current Every Day Smoker    Smokeless tobacco: Never Used   Substance Use Topics    Alcohol use: Not on file         Family history reviewed and noncontributory to patient's acute condition; no relevant family history unless otherwise noted above. There is no immunization history on file for this patient.   PTA Medications:  Current Outpatient Medications   Medication Instructions    albuterol (PROVENTIL HFA, VENTOLIN HFA, PROAIR HFA) 90 mcg/actuation inhaler 2 Puffs, Inhalation, EVERY 4 HOURS AS NEEDED    albuterol (PROVENTIL VENTOLIN) 2.5 mg, Nebulization, EVERY 4 HOURS AS NEEDED    albuterol-ipratropium (DUO-NEB) 2.5 mg-0.5 mg/3 ml nebu 3 mL, Nebulization, EVERY 4 HOURS AS NEEDED    aspirin 81 mg, Oral, DAILY    atorvastatin (LIPITOR) 40 mg, Oral, DAILY    baclofen (LIORESAL) 10 mg, Oral, 3 TIMES DAILY    dabigatran etexilate mesylate (PRADAXA PO) 1 Tablet, Oral, 2 TIMES DAILY    dilTIAZem ER (TIAZAC) 120 mg, Oral, DAILY    fluticasone propion-salmeteroL (ADVAIR/WIXELA) 250-50 mcg/dose diskus inhaler 1 Puff, Inhalation, EVERY 12 HOURS    furosemide (LASIX) 40 mg, Oral, 2 TIMES DAILY    lisinopriL (PRINIVIL, ZESTRIL) 20 mg, Oral, DAILY    oxyCODONE-acetaminophen (PERCOCET 10)  mg per tablet 1 Tablet, Oral, EVERY 4 HOURS AS NEEDED    potassium chloride (K-DUR, KLOR-CON) 20 mEq tablet 20 mEq, Oral, 2 TIMES DAILY    predniSONE (DELTASONE) 40 mg, Oral, DAILY       Objective:     Patient Vitals for the past 24 hrs:   Temp Pulse Resp BP SpO2   12/03/21 2118 -- -- -- (!) 84/54 --   12/03/21 2115 -- -- -- (!) 80/56 --   12/03/21 2113 -- -- -- (!) 76/57 --   12/03/21 2108 -- -- -- 90/61 --   12/03/21 2107 -- 70 25 (!) 88/54 95 %   12/03/21 2102 -- 63 20 (!) 82/54 95 %   12/03/21 2057 -- 68 22 91/67 95 %   12/03/21 2047 -- 62 23 (!) 89/55 94 %   12/03/21 2033 -- 65 12 99/88 95 %   12/03/21 2030 -- 65 23 (!) 81/61 93 %   12/03/21 2025 -- 63 19 95/61 95 %   12/03/21 2023 -- (!) 54 17 92/75 --   12/03/21 2015 -- 64 24 (!) 85/66 93 %   12/03/21 2010 -- 70 23 94/62 94 %   12/03/21 2005 -- 69 24 110/66 94 %   12/03/21 2003 -- 67 17 93/67 91 %   12/03/21 2000 -- 69 25 90/74 94 %   12/1955 -- 61 29 (!) 88/60 93 %   12/03/21 1950 -- 69 24 (!) 89/55 95 %   12/03/21 1948 98.7 °F (37.1 °C) 63 23 (!) 84/56 --   12/03/21 1945 -- 69 18 (!) 84/53 96 %   12/03/21 1943 -- 65 14 (!) 87/48 94 %   12/03/21 1940 -- 60 17 (!) 83/57 95 %   12/03/21 1938 -- 67 17 (!) 82/59 91 %   12/03/21 1935 -- 66 25 (!) 76/59 91 %   12/03/21 1933 -- 68 17 (!) 79/60 91 %   12/03/21 1923 -- 74 28 (!) 70/50 --   12/03/21 1711 -- -- -- -- 93 %   12/03/21 1654 -- (!) 128 28 99/82 93 %   12/03/21 1636 (!) 101.7 °F (38.7 °C) (!) 164 23 112/74 93 %     Oxygen Therapy  O2 Sat (%): 95 % (12/03/21 2107)  Pulse via Oximetry: 65 beats per minute (12/03/21 2107)  O2 Device: None (Room air) (12/03/21 1711)    Estimated body mass index is 28.23 kg/m² as calculated from the following:    Height as of this encounter: 6' 1\" (1.854 m). Weight as of this encounter: 97.1 kg (214 lb).     Intake/Output Summary (Last 24 hours) at 12/3/2021 2128  Last data filed at 12/3/2021 2048  Gross per 24 hour   Intake 650 ml   Output --   Net 650 ml         Physical Exam:  General:    Well nourished. No overt distress  Head:  Normocephalic, atraumatic  Eyes:  Sclerae appear normal.  Pupils equally round. HENT:  Nares appear normal, no drainage. Moist mucous membranes  Neck:  No restricted ROM. Trachea midline  CV:   Irregular. S1/S2 auscultated  Lungs:   Coarse, raspy breath sounds bilaterally, worst in LLL. No wheezes. Abdomen: Bowel sounds present. Soft, nontender, nondistended. Extremities: Warm and dry. No cyanosis or clubbing. No edema. Skin:     No rashes. Normal turgor. Normal coloration  Neuro:  Cranial nerves II-XII grossly intact. Sensation intact  Psych:  Normal mood and affect. Alert and oriented x3    Data Ordered and Personally Reviewed:    Last 24hr Labs:  Recent Results (from the past 24 hour(s))   EKG, 12 LEAD, INITIAL    Collection Time: 12/03/21  4:36 PM   Result Value Ref Range    Ventricular Rate 160 BPM    Atrial Rate 224 BPM    QRS Duration 93 ms    Q-T Interval 226 ms    QTC Calculation (Bezet) 369 ms    Calculated R Axis 76 degrees    Calculated T Axis 30 degrees    Diagnosis       Atrial fibrillation with rapid V-rate  RSR' in V1 or V2, probably normal variant  Repolarization abnormality, prob rate related     CBC WITH AUTOMATED DIFF    Collection Time: 12/03/21  4:48 PM   Result Value Ref Range    WBC 14.8 (H) 4.3 - 11.1 K/uL    RBC 4.21 (L) 4.23 - 5.6 M/uL    HGB 11.2 (L) 13.6 - 17.2 g/dL    HCT 34.5 (L) 41.1 - 50.3 %    MCV 81.9 79.6 - 97.8 FL    MCH 26.6 26.1 - 32.9 PG    MCHC 32.5 31.4 - 35.0 g/dL    RDW 15.9 (H) 11.9 - 14.6 %    PLATELET 859 282 - 421 K/uL    MPV 9.7 9.4 - 12.3 FL    ABSOLUTE NRBC 0.00 0.0 - 0.2 K/uL    DF AUTOMATED      NEUTROPHILS 84 (H) 43 - 78 %    LYMPHOCYTES 8 (L) 13 - 44 %    MONOCYTES 7 4.0 - 12.0 %    EOSINOPHILS 0 (L) 0.5 - 7.8 %    BASOPHILS 0 0.0 - 2.0 %    IMMATURE GRANULOCYTES 1 0.0 - 5.0 %    ABS. NEUTROPHILS 12.5 (H) 1.7 - 8.2 K/UL    ABS. LYMPHOCYTES 1.1 0.5 - 4.6 K/UL    ABS.  MONOCYTES 1.0 0.1 - 1.3 K/UL ABS. EOSINOPHILS 0.0 0.0 - 0.8 K/UL    ABS. BASOPHILS 0.0 0.0 - 0.2 K/UL    ABS. IMM. GRANS. 0.1 0.0 - 0.5 K/UL   CULTURE, BLOOD    Collection Time: 12/03/21  4:51 PM    Specimen: Blood   Result Value Ref Range    Special Requests: RIGHT  HAND        Culture result: PENDING    CULTURE, BLOOD    Collection Time: 12/03/21  4:51 PM    Specimen: Blood   Result Value Ref Range    Special Requests: LEFT  ARM        Culture result: PENDING    LACTIC ACID    Collection Time: 12/03/21  4:51 PM   Result Value Ref Range    Lactic acid 1.3 0.4 - 2.0 MMOL/L   METABOLIC PANEL, COMPREHENSIVE    Collection Time: 12/03/21  4:51 PM   Result Value Ref Range    Sodium 135 (L) 138 - 145 mmol/L    Potassium 4.8 3.5 - 5.1 mmol/L    Chloride 105 98 - 107 mmol/L    CO2 21 21 - 32 mmol/L    Anion gap 9 7 - 16 mmol/L    Glucose 116 (H) 65 - 100 mg/dL    BUN 25 (H) 8 - 23 MG/DL    Creatinine 1.19 0.8 - 1.5 MG/DL    GFR est AA >60 >60 ml/min/1.73m2    GFR est non-AA >60 >60 ml/min/1.73m2    Calcium 8.5 8.3 - 10.4 MG/DL    Bilirubin, total 0.9 0.2 - 1.1 MG/DL    ALT (SGPT) 31 12 - 65 U/L    AST (SGOT) 46 (H) 15 - 37 U/L    Alk.  phosphatase 122 50 - 136 U/L    Protein, total 7.1 6.3 - 8.2 g/dL    Albumin 2.5 (L) 3.2 - 4.6 g/dL    Globulin 4.6 (H) 2.3 - 3.5 g/dL    A-G Ratio 0.5 (L) 1.2 - 3.5     PROCALCITONIN    Collection Time: 12/03/21  4:51 PM   Result Value Ref Range    Procalcitonin 0.18 0.00 - 0.49 ng/mL   RESPIRATORY VIRUS PANEL W/COVID-19, PCR    Collection Time: 12/03/21  4:51 PM    Specimen: Nasopharyngeal   Result Value Ref Range    Adenovirus NOT DETECTED NOTDET      Coronavirus 229E NOT DETECTED NOTDET      Coronavirus HKU1 NOT DETECTED NOTDET      Coronavirus CVNL63 NOT DETECTED NOTDET      Coronavirus OC43 NOT DETECTED NOTDET      SARS-CoV-2, PCR NOT DETECTED NOTDET      Metapneumovirus NOT DETECTED NOTDET      Rhinovirus and Enterovirus NOT DETECTED NOTDET      Influenza A NOT DETECTED NOTDET      Influenza B NOT DETECTED NOTDET Parainfluenza 1 NOT DETECTED NOTDET      Parainfluenza 2 NOT DETECTED NOTDET      Parainfluenza 3 NOT DETECTED NOTDET      Parainfluenza virus 4 NOT DETECTED NOTDET      RSV by PCR NOT DETECTED NOTDET      B. parapertussis, PCR NOT DETECTED NOTDET      Bordetella pertussis - PCR NOT DETECTED NOTDET      Chlamydophila pneumoniae DNA, QL, PCR NOT DETECTED NOTDET      Mycoplasma pneumoniae DNA, QL, PCR NOT DETECTED NOTDET     NT-PRO BNP    Collection Time: 12/03/21  4:51 PM   Result Value Ref Range    NT pro-BNP 3,938 (H) 5 - 125 PG/ML   EKG, 12 LEAD, SUBSEQUENT    Collection Time: 12/03/21  7:14 PM   Result Value Ref Range    Ventricular Rate 77 BPM    Atrial Rate 231 BPM    QRS Duration 111 ms    Q-T Interval 370 ms    QTC Calculation (Bezet) 419 ms    Calculated R Axis 75 degrees    Calculated T Axis 74 degrees    Diagnosis       Atrial fibrillation  RSR' in V1 or V2, right VCD or RVH         All Micro Results     Procedure Component Value Units Date/Time    RESPIRATORY VIRUS PANEL W/COVID-19, PCR [[id number]] Collected: 12/03/21 1651    Order Status: Completed Specimen: Nasopharyngeal Updated: 12/03/21 1857     Adenovirus NOT DETECTED        Coronavirus 229E NOT DETECTED        Coronavirus HKU1 NOT DETECTED        Coronavirus CVNL63 NOT DETECTED        Coronavirus OC43 NOT DETECTED        SARS-CoV-2, PCR NOT DETECTED        Metapneumovirus NOT DETECTED        Rhinovirus and Enterovirus NOT DETECTED        Influenza A NOT DETECTED        Influenza B NOT DETECTED        Parainfluenza 1 NOT DETECTED        Parainfluenza 2 NOT DETECTED        Parainfluenza 3 NOT DETECTED        Parainfluenza virus 4 NOT DETECTED        RSV by PCR NOT DETECTED        B. parapertussis, PCR NOT DETECTED        Bordetella pertussis - PCR NOT DETECTED        Chlamydophila pneumoniae DNA, QL, PCR NOT DETECTED        Mycoplasma pneumoniae DNA, QL, PCR NOT DETECTED       BLOOD CULTURE [[id number]] Collected: 12/03/21 1651    Order Status: Completed Specimen: Blood Updated: 12/03/21 1703     Special Requests: --        LEFT  ARM       Culture result: PENDING    BLOOD CULTURE [[id number]] Collected: 12/03/21 1651    Order Status: Completed Specimen: Blood Updated: 12/03/21 1703     Special Requests: --        RIGHT  HAND       Culture result: PENDING    CULTURE, URINE [[id number]]     Order Status: Sent Specimen: Urine from Clean catch           Other Studies:  XR CHEST PORT    Result Date: 12/3/2021  Chest X-ray INDICATION: Meets SIRS criteria. COMPARISON: Chest x-ray 11/29/2021. A portable AP view of the chest was obtained. FINDINGS: Interstitial lung changes are noted in the mid to lower left lung. Right lung is grossly clear. No pneumothorax or pleural effusions. Transcatheter aortic valve replacement is noted. Spinal stimulator leads noted in the mid to lower thoracic spine region. Lower cervical fusion plate is partially imaged. The heart size is normal.  The bony thorax is intact. New mid to lower left lung infiltrate concerning for pneumonia.          Medications Administered     0.9% sodium chloride infusion     Admin Date  12/03/2021 Action  New Bag Dose  250 mL/hr Rate  250 mL/hr Route  IntraVENous Administered By  Celestino Palomares RN          acetaminophen (TYLENOL) tablet 1,000 mg     Admin Date  12/03/2021 Action  Given Dose  1,000 mg Route  Oral Administered By  Celestino Palomares RN          cefepime (MAXIPIME) 2 g in 0.9% sodium chloride (MBP/ADV) 100 mL MBP     Admin Date  12/03/2021 Action  New Bag Dose  2 g Rate  200 mL/hr Route  IntraVENous Administered By  Celestino Palomares RN          dilTIAZem (CARDIZEM) 100 mg in 0.9% sodium chloride (MBP/ADV) 100 mL infusion     Admin Date  12/03/2021 Action  New Bag Dose  5 mg/hr Rate  5 mL/hr Route  IntraVENous Administered By  Celestino Palomares RN           Admin Date  12/03/2021 Action  Rate Change Dose  10 mg/hr Rate  10 mL/hr Route  IntraVENous Administered By  Burgess Buchanan Claudean Germany, RN           Admin Date  12/03/2021 Action  Rate Change Dose  5 mg/hr Rate  5 mL/hr Route  IntraVENous Administered By  Summer Roth RN          dilTIAZem (CARDIZEM) injection 10 mg     Admin Date  12/03/2021 Action  Given Dose  10 mg Route  IntraVENous Administered By  Summer Roth RN                  Signed:  Elly Hidalgo MD

## 2021-12-04 NOTE — ED NOTES
PT c/o R arm pain at IV site that was not there before. Swelling noted that was not present before. MD aware. Discontinued infusion on R arm. Infusion restarted at the [facility] IV. IV removed. Levophed decreased to 4mcg/min per MD Hanson order. Vancomycin held at this time since theres only one IV working.

## 2021-12-04 NOTE — ED NOTES
BP now 07/37 from 71 systolic initially. Spoke with MD Court Montes V about pts bp and current condition. MD stated to give it a few mins to see if the BP increased with the fluid and the cardizem discontinued. MD stated she was coming to see the pt in the next 30 mins.

## 2021-12-04 NOTE — PROGRESS NOTES
Went to give patient's morphine 2mg IV, plunger fell out of syringe on its own, morphine spilled onto patients arm. It was only witnessed by the patient and myself. Explained the situation to pharmacy [facility]) and also Saravanan Donaldson RN who wasted in the pyxis with me. I pulled another morphine dose and this was successfully given.

## 2021-12-05 LAB
ANION GAP SERPL CALC-SCNC: 5 MMOL/L (ref 7–16)
BUN SERPL-MCNC: 15 MG/DL (ref 8–23)
CALCIUM SERPL-MCNC: 8.6 MG/DL (ref 8.3–10.4)
CHLORIDE SERPL-SCNC: 109 MMOL/L (ref 98–107)
CO2 SERPL-SCNC: 24 MMOL/L (ref 21–32)
CREAT SERPL-MCNC: 0.82 MG/DL (ref 0.8–1.5)
ERYTHROCYTE [DISTWIDTH] IN BLOOD BY AUTOMATED COUNT: 15.9 % (ref 11.9–14.6)
GLUCOSE SERPL-MCNC: 148 MG/DL (ref 65–100)
HCT VFR BLD AUTO: 30.3 % (ref 41.1–50.3)
HGB BLD-MCNC: 9.6 G/DL (ref 13.6–17.2)
MCH RBC QN AUTO: 26.4 PG (ref 26.1–32.9)
MCHC RBC AUTO-ENTMCNC: 31.7 G/DL (ref 31.4–35)
MCV RBC AUTO: 83.5 FL (ref 79.6–97.8)
MM INDURATION POC: 0 MM (ref 0–5)
NRBC # BLD: 0 K/UL (ref 0–0.2)
PLATELET # BLD AUTO: 147 K/UL (ref 150–450)
PMV BLD AUTO: 9.8 FL (ref 9.4–12.3)
POTASSIUM SERPL-SCNC: 4.2 MMOL/L (ref 3.5–5.1)
PPD POC: NEGATIVE NEGATIVE
RBC # BLD AUTO: 3.63 M/UL (ref 4.23–5.6)
SODIUM SERPL-SCNC: 138 MMOL/L (ref 138–145)
WBC # BLD AUTO: 6.8 K/UL (ref 4.3–11.1)

## 2021-12-05 PROCEDURE — 74011250636 HC RX REV CODE- 250/636: Performed by: FAMILY MEDICINE

## 2021-12-05 PROCEDURE — 74011250637 HC RX REV CODE- 250/637: Performed by: FAMILY MEDICINE

## 2021-12-05 PROCEDURE — 74011000250 HC RX REV CODE- 250: Performed by: FAMILY MEDICINE

## 2021-12-05 PROCEDURE — 2709999900 HC NON-CHARGEABLE SUPPLY

## 2021-12-05 PROCEDURE — 99232 SBSQ HOSP IP/OBS MODERATE 35: CPT | Performed by: INTERNAL MEDICINE

## 2021-12-05 PROCEDURE — 94760 N-INVAS EAR/PLS OXIMETRY 1: CPT

## 2021-12-05 PROCEDURE — 74011250637 HC RX REV CODE- 250/637: Performed by: INTERNAL MEDICINE

## 2021-12-05 PROCEDURE — 80048 BASIC METABOLIC PNL TOTAL CA: CPT

## 2021-12-05 PROCEDURE — 65660000000 HC RM CCU STEPDOWN

## 2021-12-05 PROCEDURE — 97530 THERAPEUTIC ACTIVITIES: CPT

## 2021-12-05 PROCEDURE — 97162 PT EVAL MOD COMPLEX 30 MIN: CPT

## 2021-12-05 PROCEDURE — 74011250636 HC RX REV CODE- 250/636: Performed by: INTERNAL MEDICINE

## 2021-12-05 PROCEDURE — 77010033678 HC OXYGEN DAILY

## 2021-12-05 PROCEDURE — 36592 COLLECT BLOOD FROM PICC: CPT

## 2021-12-05 PROCEDURE — 94640 AIRWAY INHALATION TREATMENT: CPT

## 2021-12-05 PROCEDURE — 85027 COMPLETE CBC AUTOMATED: CPT

## 2021-12-05 PROCEDURE — 74011000258 HC RX REV CODE- 258: Performed by: INTERNAL MEDICINE

## 2021-12-05 RX ORDER — HYDROCODONE BITARTRATE AND ACETAMINOPHEN 5; 325 MG/1; MG/1
1 TABLET ORAL
Status: DISCONTINUED | OUTPATIENT
Start: 2021-12-05 | End: 2021-12-07 | Stop reason: HOSPADM

## 2021-12-05 RX ORDER — METOPROLOL SUCCINATE 100 MG/1
100 TABLET, EXTENDED RELEASE ORAL EVERY 12 HOURS
Status: DISCONTINUED | OUTPATIENT
Start: 2021-12-05 | End: 2021-12-07 | Stop reason: HOSPADM

## 2021-12-05 RX ORDER — LISINOPRIL 5 MG/1
5 TABLET ORAL DAILY
Status: DISCONTINUED | OUTPATIENT
Start: 2021-12-05 | End: 2021-12-07 | Stop reason: HOSPADM

## 2021-12-05 RX ORDER — DILTIAZEM HYDROCHLORIDE 5 MG/ML
10 INJECTION INTRAVENOUS
Status: DISCONTINUED | OUTPATIENT
Start: 2021-12-05 | End: 2021-12-07 | Stop reason: HOSPADM

## 2021-12-05 RX ADMIN — ATORVASTATIN CALCIUM 40 MG: 40 TABLET, FILM COATED ORAL at 21:19

## 2021-12-05 RX ADMIN — CEFTRIAXONE 1 G: 1 INJECTION, POWDER, FOR SOLUTION INTRAMUSCULAR; INTRAVENOUS at 12:48

## 2021-12-05 RX ADMIN — BUDESONIDE AND FORMOTEROL FUMARATE DIHYDRATE 2 PUFF: 160; 4.5 AEROSOL RESPIRATORY (INHALATION) at 20:22

## 2021-12-05 RX ADMIN — DABIGATRAN ETEXILATE MESYLATE 150 MG: 150 CAPSULE ORAL at 21:19

## 2021-12-05 RX ADMIN — ASPIRIN 81 MG: 81 TABLET, CHEWABLE ORAL at 08:08

## 2021-12-05 RX ADMIN — METHYLPREDNISOLONE SODIUM SUCCINATE 40 MG: 40 INJECTION, POWDER, FOR SOLUTION INTRAMUSCULAR; INTRAVENOUS at 21:19

## 2021-12-05 RX ADMIN — METHYLPREDNISOLONE SODIUM SUCCINATE 40 MG: 40 INJECTION, POWDER, FOR SOLUTION INTRAMUSCULAR; INTRAVENOUS at 13:30

## 2021-12-05 RX ADMIN — HYDROCODONE BITARTRATE AND ACETAMINOPHEN 1 TABLET: 5; 325 TABLET ORAL at 21:19

## 2021-12-05 RX ADMIN — Medication 10 ML: at 21:21

## 2021-12-05 RX ADMIN — IPRATROPIUM BROMIDE AND ALBUTEROL SULFATE 3 ML: .5; 3 SOLUTION RESPIRATORY (INHALATION) at 13:18

## 2021-12-05 RX ADMIN — MORPHINE SULFATE 2 MG: 2 INJECTION, SOLUTION INTRAMUSCULAR; INTRAVENOUS at 00:20

## 2021-12-05 RX ADMIN — DABIGATRAN ETEXILATE MESYLATE 150 MG: 150 CAPSULE ORAL at 08:51

## 2021-12-05 RX ADMIN — Medication 10 ML: at 05:42

## 2021-12-05 RX ADMIN — BACLOFEN 10 MG: 10 TABLET ORAL at 15:28

## 2021-12-05 RX ADMIN — METHYLPREDNISOLONE SODIUM SUCCINATE 40 MG: 40 INJECTION, POWDER, FOR SOLUTION INTRAMUSCULAR; INTRAVENOUS at 05:38

## 2021-12-05 RX ADMIN — DOXYCYCLINE HYCLATE 100 MG: 100 CAPSULE ORAL at 21:19

## 2021-12-05 RX ADMIN — IPRATROPIUM BROMIDE AND ALBUTEROL SULFATE 3 ML: .5; 3 SOLUTION RESPIRATORY (INHALATION) at 00:51

## 2021-12-05 RX ADMIN — FUROSEMIDE 40 MG: 40 TABLET ORAL at 17:01

## 2021-12-05 RX ADMIN — BUDESONIDE AND FORMOTEROL FUMARATE DIHYDRATE 2 PUFF: 160; 4.5 AEROSOL RESPIRATORY (INHALATION) at 09:13

## 2021-12-05 RX ADMIN — IPRATROPIUM BROMIDE AND ALBUTEROL SULFATE 3 ML: .5; 3 SOLUTION RESPIRATORY (INHALATION) at 16:00

## 2021-12-05 RX ADMIN — HYDROCODONE BITARTRATE AND ACETAMINOPHEN 1 TABLET: 5; 325 TABLET ORAL at 15:28

## 2021-12-05 RX ADMIN — METOPROLOL SUCCINATE 100 MG: 100 TABLET, EXTENDED RELEASE ORAL at 09:31

## 2021-12-05 RX ADMIN — IPRATROPIUM BROMIDE AND ALBUTEROL SULFATE 3 ML: .5; 3 SOLUTION RESPIRATORY (INHALATION) at 20:22

## 2021-12-05 RX ADMIN — Medication 10 ML: at 13:04

## 2021-12-05 RX ADMIN — LISINOPRIL 5 MG: 5 TABLET ORAL at 10:03

## 2021-12-05 RX ADMIN — IPRATROPIUM BROMIDE AND ALBUTEROL SULFATE 3 ML: .5; 3 SOLUTION RESPIRATORY (INHALATION) at 23:38

## 2021-12-05 RX ADMIN — IPRATROPIUM BROMIDE AND ALBUTEROL SULFATE 3 ML: .5; 3 SOLUTION RESPIRATORY (INHALATION) at 09:13

## 2021-12-05 RX ADMIN — METOPROLOL SUCCINATE 100 MG: 100 TABLET, EXTENDED RELEASE ORAL at 21:19

## 2021-12-05 RX ADMIN — IPRATROPIUM BROMIDE AND ALBUTEROL SULFATE 3 ML: .5; 3 SOLUTION RESPIRATORY (INHALATION) at 03:45

## 2021-12-05 RX ADMIN — DOXYCYCLINE HYCLATE 100 MG: 100 CAPSULE ORAL at 08:08

## 2021-12-05 RX ADMIN — MORPHINE SULFATE 2 MG: 2 INJECTION, SOLUTION INTRAMUSCULAR; INTRAVENOUS at 08:08

## 2021-12-05 RX ADMIN — POTASSIUM CHLORIDE 20 MEQ: 10 TABLET, EXTENDED RELEASE ORAL at 17:01

## 2021-12-05 NOTE — PROGRESS NOTES
Hospitalist Progress Note   Admit Date:  12/3/2021  4:33 PM   Name:  Migdalia Sehn   Age:  77 y.o. Sex:  male  :  1955   MRN:  [id number]   Room:  316/01    Presenting Complaint: Chest Pain (Angina) (a fib rvr)    Reason(s) for Admission: Atrial fibrillation with rapid ventricular response (HCC) [I48.91]  CAP (community acquired pneumonia) [J18.9]  Sepsis [facility]) [A41.9]     Hospital Course & Interval History:   Migdalia Shen is a 77 y.o. male with medical history of afib on pradaxa, COPD, CHF who presented to ED with afib with RVR. Patient was sent to the ER by Cardiology after he presented to their office today with HR in the 160s. He was found to be SOB as well. Upon ER workup, patient remained in RVR. Patient is also febrile with a leukocytosis, meeting sepsis criteria. CXR shows evidence of a new mid-lower L lung infiltrate. Patient was started on cardizem drip in ER. Became hypotensive and had to be started on levophed and admitted to ICU. Subjective (21):  Still wheezing but feels breathing improved. mildly SOB, worse with exertion. No CP. Fevers have not recurred. Assessment & Plan:     * Atrial fibrillation with rapid ventricular response   - HR elevated again, start metop 100mg BID  -add PRN cardizem IV  -on cardizem at home, held here  - pradaxa 150mg BID    COPD exacerbation from bacterial PNA  - cont solumedrol, still wheezing  - cont rocephin and doxy  -Currently O2 Flow Rate (L/min): 1 l/min. Wean as tolerated  -Cont duoneb  -cont home symbicort     Chronic congestive heart failure   -stopped IVF. Started lasix 40mg BID  -low dose lisinopril  -cont BB  -echo with EF 40-45%. Also borderline low RV function. Prior TAVR  -usually takes 40mg lasix daily; did self-adjust and took 40mg BID for 5 days prior to admit    Pulmonary fibrosis   - takes prednisone 20mg BID at home.     Leg injury  - Pt injured his L leg several weeks ago, has been seen at outpatient Orthopedic Surgery and reportedly has a \"hairline femur fracture\" in the same leg as his prior hip replacement  - PRN pain meds      Dispo/Discharge Planning:      PT/OT/CM consulted.   PPD ordered    Diet:  ADULT DIET Regular; Low Fat/Low Chol/High Fiber/ARIC  DVT PPx: pradaxa  Code status: Full Code    Hospital Problems as of 12/5/2021 Never Reviewed          Codes Class Noted - Resolved POA    * (Principal) COPD with acute lower respiratory infection ([facility] ICD-10-CM: J44.0  ICD-9-CM: 893, 519.8  12/4/2021 - Present Yes        History of congestive heart failure (Chronic) ICD-10-CM: Z86.79  ICD-9-CM: V12.59  12/3/2021 - Present Yes        CAP (community acquired pneumonia) ICD-10-CM: J18.9  ICD-9-CM: 177  12/3/2021 - Present Yes        Atrial fibrillation with rapid ventricular response ([facility] ICD-10-CM: I48.91  ICD-9-CM: 427.31  12/3/2021 - Present Yes        COPD (chronic obstructive pulmonary disease) ([facility] (Chronic) ICD-10-CM: J44.9  ICD-9-CM: 509  12/3/2021 - Present Yes        Pulmonary fibrosis ([facility] (Chronic) ICD-10-CM: J84.10  ICD-9-CM: 732  12/3/2021 - Present Yes        Leg injury (Chronic) ICD-10-CM: W04.89CB  ICD-9-CM: 959.7  12/3/2021 - Present Yes        Septic shock with acute organ dysfunction due to pneumococcus [facility]) ICD-10-CM: A40.3, R65.21  ICD-9-CM: 038.2, 785.52, 995.92  12/3/2021 - Present Yes              Objective:     Patient Vitals for the past 24 hrs:   Temp Pulse Resp BP SpO2   12/05/21 1053 -- -- -- -- 98 %   12/05/21 1003 -- (!) 106 -- 124/76 --   12/05/21 0931 -- (!) 131 -- 113/75 --   12/05/21 0913 -- -- -- -- 97 %   12/05/21 0852 97.2 °F (36.2 °C) 97 18 131/84 96 %   12/05/21 0456 97.7 °F (36.5 °C) 94 18 133/77 98 %   12/05/21 0346 -- -- -- -- 97 %   12/05/21 0052 -- -- -- -- 97 %   12/04/21 2329 97.9 °F (36.6 °C) 86 20 127/74 97 %   12/04/21 2015 -- -- -- -- 99 %   12/04/21 1945 98.2 °F (36.8 °C) 93 21 119/79 99 %   12/04/21 1806 -- -- -- (!) 137/94 --   12/04/21 1659 98.4 °F (36.9 °C) 93 22 (!) 137/94 100 %   12/04/21 1511 -- -- -- -- 97 %   12/04/21 1419 98.3 °F (36.8 °C) 76 18 111/76 96 %   12/04/21 1258 -- -- -- -- 97 %   12/04/21 1130 -- (!) 58 19 108/60 98 %   12/04/21 1115 -- 60 28 (!) 100/54 99 %     Oxygen Therapy  O2 Sat (%): 98 % (12/05/21 1053)  Pulse via Oximetry: 114 beats per minute (12/05/21 0913)  O2 Device: Nasal cannula (12/05/21 1053)  Skin Assessment: Clean, dry, & intact (12/05/21 0913)  O2 Flow Rate (L/min): 1 l/min (12/05/21 1053)    Estimated body mass index is 28.25 kg/m² as calculated from the following:    Height as of this encounter: 6' 1\" (1.854 m). Weight as of this encounter: 97.1 kg (214 lb 1.6 oz). Intake/Output Summary (Last 24 hours) at 12/5/2021 1112  Last data filed at 12/5/2021 0858  Gross per 24 hour   Intake 340 ml   Output 3475 ml   Net -3135 ml         Physical Exam:   Blood pressure 124/76, pulse (!) 106, temperature 97.2 °F (36.2 °C), resp. rate 18, height 6' 1\" (1.854 m), weight 97.1 kg (214 lb 1.6 oz), SpO2 98 %. General:    Well nourished. No overt distress  Head:  Normocephalic, atraumatic  Eyes:  Sclerae appear normal.  Pupils equally round. ENT:  Nares appear normal, no drainage. Moist oral mucosa  Neck:  No restricted ROM. Trachea midline   CV:   RRR. No m/r/g. No jugular venous distension. Lungs:   Wheezing bilaterally. Mildly labored  Abdomen:    nondistended. Extremities: No cyanosis or clubbing. No edema  Skin:     No rashes and normal coloration. Warm and dry. Neuro:  CN II-XII grossly intact. Sensation intact. A&Ox3  Psych:  Normal mood and affect.       I have reviewed ordered lab tests and independently visualized imaging below:    Recent Labs:  Recent Results (from the past 48 hour(s))   EKG, 12 LEAD, INITIAL    Collection Time: 12/03/21  4:36 PM   Result Value Ref Range    Ventricular Rate 160 BPM    Atrial Rate 224 BPM    QRS Duration 93 ms    Q-T Interval 226 ms    QTC Calculation (Bezet) 369 ms Calculated R Axis 76 degrees    Calculated T Axis 30 degrees    Diagnosis       Atrial fibrillation with rapid V-rate  RSR' in V1 or V2, probably normal variant  Repolarization abnormality, prob rate related    Confirmed by [facility]  MD ()LOWELL ([id number]) on 12/4/2021 7:58:16 AM     CBC WITH AUTOMATED DIFF    Collection Time: 12/03/21  4:48 PM   Result Value Ref Range    WBC 14.8 (H) 4.3 - 11.1 K/uL    RBC 4.21 (L) 4.23 - 5.6 M/uL    HGB 11.2 (L) 13.6 - 17.2 g/dL    HCT 34.5 (L) 41.1 - 50.3 %    MCV 81.9 79.6 - 97.8 FL    MCH 26.6 26.1 - 32.9 PG    MCHC 32.5 31.4 - 35.0 g/dL    RDW 15.9 (H) 11.9 - 14.6 %    PLATELET 752 778 - 285 K/uL    MPV 9.7 9.4 - 12.3 FL    ABSOLUTE NRBC 0.00 0.0 - 0.2 K/uL    DF AUTOMATED      NEUTROPHILS 84 (H) 43 - 78 %    LYMPHOCYTES 8 (L) 13 - 44 %    MONOCYTES 7 4.0 - 12.0 %    EOSINOPHILS 0 (L) 0.5 - 7.8 %    BASOPHILS 0 0.0 - 2.0 %    IMMATURE GRANULOCYTES 1 0.0 - 5.0 %    ABS. NEUTROPHILS 12.5 (H) 1.7 - 8.2 K/UL    ABS. LYMPHOCYTES 1.1 0.5 - 4.6 K/UL    ABS. MONOCYTES 1.0 0.1 - 1.3 K/UL    ABS. EOSINOPHILS 0.0 0.0 - 0.8 K/UL    ABS. BASOPHILS 0.0 0.0 - 0.2 K/UL    ABS. IMM.  GRANS. 0.1 0.0 - 0.5 K/UL   CULTURE, BLOOD    Collection Time: 12/03/21  4:51 PM    Specimen: Blood   Result Value Ref Range    Special Requests: RIGHT  HAND        Culture result: NO GROWTH 2 DAYS     CULTURE, BLOOD    Collection Time: 12/03/21  4:51 PM    Specimen: Blood   Result Value Ref Range    Special Requests: LEFT  ARM        Culture result: NO GROWTH 2 DAYS     LACTIC ACID    Collection Time: 12/03/21  4:51 PM   Result Value Ref Range    Lactic acid 1.3 0.4 - 2.0 MMOL/L   METABOLIC PANEL, COMPREHENSIVE    Collection Time: 12/03/21  4:51 PM   Result Value Ref Range    Sodium 135 (L) 138 - 145 mmol/L    Potassium 4.8 3.5 - 5.1 mmol/L    Chloride 105 98 - 107 mmol/L    CO2 21 21 - 32 mmol/L    Anion gap 9 7 - 16 mmol/L    Glucose 116 (H) 65 - 100 mg/dL    BUN 25 (H) 8 - 23 MG/DL    Creatinine 1.19 0.8 - 1.5 MG/DL    GFR est AA >60 >60 ml/min/1.73m2    GFR est non-AA >60 >60 ml/min/1.73m2    Calcium 8.5 8.3 - 10.4 MG/DL    Bilirubin, total 0.9 0.2 - 1.1 MG/DL    ALT (SGPT) 31 12 - 65 U/L    AST (SGOT) 46 (H) 15 - 37 U/L    Alk.  phosphatase 122 50 - 136 U/L    Protein, total 7.1 6.3 - 8.2 g/dL    Albumin 2.5 (L) 3.2 - 4.6 g/dL    Globulin 4.6 (H) 2.3 - 3.5 g/dL    A-G Ratio 0.5 (L) 1.2 - 3.5     PROCALCITONIN    Collection Time: 12/03/21  4:51 PM   Result Value Ref Range    Procalcitonin 0.18 0.00 - 0.49 ng/mL   RESPIRATORY VIRUS PANEL W/COVID-19, PCR    Collection Time: 12/03/21  4:51 PM    Specimen: Nasopharyngeal   Result Value Ref Range    Adenovirus NOT DETECTED NOTDET      Coronavirus 229E NOT DETECTED NOTDET      Coronavirus HKU1 NOT DETECTED NOTDET      Coronavirus CVNL63 NOT DETECTED NOTDET      Coronavirus OC43 NOT DETECTED NOTDET      SARS-CoV-2, PCR NOT DETECTED NOTDET      Metapneumovirus NOT DETECTED NOTDET      Rhinovirus and Enterovirus NOT DETECTED NOTDET      Influenza A NOT DETECTED NOTDET      Influenza B NOT DETECTED NOTDET      Parainfluenza 1 NOT DETECTED NOTDET      Parainfluenza 2 NOT DETECTED NOTDET      Parainfluenza 3 NOT DETECTED NOTDET      Parainfluenza virus 4 NOT DETECTED NOTDET      RSV by PCR NOT DETECTED NOTDET      B. parapertussis, PCR NOT DETECTED NOTDET      Bordetella pertussis - PCR NOT DETECTED NOTDET      Chlamydophila pneumoniae DNA, QL, PCR NOT DETECTED NOTDET      Mycoplasma pneumoniae DNA, QL, PCR NOT DETECTED NOTDET     NT-PRO BNP    Collection Time: 12/03/21  4:51 PM   Result Value Ref Range    NT pro-BNP 3,938 (H) 5 - 125 PG/ML   EKG, 12 LEAD, SUBSEQUENT    Collection Time: 12/03/21  7:14 PM   Result Value Ref Range    Ventricular Rate 77 BPM    Atrial Rate 231 BPM    QRS Duration 111 ms    Q-T Interval 370 ms    QTC Calculation (Bezet) 419 ms    Calculated R Axis 75 degrees    Calculated T Axis 74 degrees    Diagnosis       Atrial fibrillation  RSR' in V1 or V2, right VCD or RVH    Confirmed by [facility]  MD ()Eleni ([id number]) on 12/4/2021 6:31:37 AM     METABOLIC PANEL, BASIC    Collection Time: 12/04/21  2:56 AM   Result Value Ref Range    Sodium 138 136 - 145 mmol/L    Potassium 4.2 3.5 - 5.1 mmol/L    Chloride 109 (H) 98 - 107 mmol/L    CO2 25 21 - 32 mmol/L    Anion gap 4 (L) 7 - 16 mmol/L    Glucose 137 (H) 65 - 100 mg/dL    BUN 28 (H) 8 - 23 MG/DL    Creatinine 1.26 0.8 - 1.5 MG/DL    GFR est AA >60 >60 ml/min/1.73m2    GFR est non-AA >60 >60 ml/min/1.73m2    Calcium 8.7 8.3 - 10.4 MG/DL   CBC W/O DIFF    Collection Time: 12/04/21  2:56 AM   Result Value Ref Range    WBC 17.7 (H) 4.3 - 11.1 K/uL    RBC 3.95 (L) 4.23 - 5.6 M/uL    HGB 10.5 (L) 13.6 - 17.2 g/dL    HCT 33.0 (L) 41.1 - 50.3 %    MCV 83.5 79.6 - 97.8 FL    MCH 26.6 26.1 - 32.9 PG    MCHC 31.8 31.4 - 35.0 g/dL    RDW 16.0 (H) 11.9 - 14.6 %    PLATELET 962 116 - 889 K/uL    MPV 10.0 9.4 - 12.3 FL    ABSOLUTE NRBC 0.00 0.0 - 0.2 K/uL   CULTURE, URINE    Collection Time: 12/04/21  3:02 AM    Specimen: Clean catch; Urine    URINE   Result Value Ref Range    Special Requests: NO SPECIAL REQUESTS      Culture result: <10,000 COLONIES/mL NORMAL SKIN CHANDU ISOLATED     ECHO ADULT COMPLETE    Collection Time: 12/04/21  4:35 PM   Result Value Ref Range    IVSd 0.96 0.6 - 1.0 cm    LVIDd 5.40 4.2 - 5.9 cm    LVIDs 3.82 cm    LVOT d 1.94 cm    LVPWd 1.07 (A) 0.6 - 1.0 cm    BP EF 42.5 (A) 55 - 100 percent    LV Ejection Fraction MOD 2C 43 percent    LV Ejection Fraction MOD 4C 41 percent    LV ED Vol A2C 155.02 mL    LV ED Vol A4C 100.65 mL    LV ED Vol .98 67 - 155 mL    LV ES Vol A2C 88.74 mL    LV ES Vol A4C 59.49 mL    LV ES Vol BP 73.56 (A) 22 - 58 mL    LVOT Peak Gradient 4.24 mmHg    Left Ventricular Outflow Tract Mean Gradient 2.40 mmHg    LVOT SV 58.3 mL    LVOT Peak Velocity 103.00 cm/s    LVOT VTI 19.82 cm    RVIDd 3.25 cm    Left Atrium Major Axis 4.88 cm    Aortic Valve Area by Continuity of Peak Velocity 1.10 cm2    Aortic Valve Area by Continuity of VTI 1.15 cm2    AoV PG 36.1 mmHg    Aortic Valve Systolic Mean Gradient 14.2 mmHg    Aortic Valve Systolic Peak Velocity 254.26 cm/s    AoV VTI 57.50 cm    LV E' Lateral Velocity 5.89 cm/s    LV E' Septal Velocity 5.73 cm/s    MVA VTI 0.70 cm2    Mitral Valve E Wave Deceleration Time 402.13 ms    MV Peak Gradient 33.4 mmHg    MV Mean Gradient 14.8 mmHg    Mitral Valve Pressure Half-time 127.0 ms    Mitral Valve Max Velocity 289.00 cm/s    Mitral Valve Annulus Velocity Time Integral 79.90 cm    MVA (PHT) 1.7 cm2    Tapse 1.92 1.5 - 2.0 cm    Triscuspid Valve Regurgitation Peak Gradient 23.35 mmHg    TR Max Velocity 241.62 cm/s    AO ASC D 3.45 cm    Ao Root D 2.31 cm    LV Mass .9 88 - 224 g    LV Mass AL Index 95.4 49 - 115 g/m2    LVES Vol Index BP 33.3 mL/m2    LVED Vol Index BP 57.9 mL/m2    Left Atrium Minor Axis 2.21 cm    LVED Vol Index A4C 45.5 mL/m2    LVED Vol Index A2C 70.1 mL/m2    LVES Vol Index A4C 26.9 mL/m2    LVES Vol Index A2C 40.2 mL/m2    KOJO/BSA Pk [place] 0.5 cm2/m2    KOJO/BSA VTI 0.5 SP3/B9   METABOLIC PANEL, BASIC    Collection Time: 12/05/21  4:21 AM   Result Value Ref Range    Sodium 138 138 - 145 mmol/L    Potassium 4.2 3.5 - 5.1 mmol/L    Chloride 109 (H) 98 - 107 mmol/L    CO2 24 21 - 32 mmol/L    Anion gap 5 (L) 7 - 16 mmol/L    Glucose 148 (H) 65 - 100 mg/dL    BUN 15 8 - 23 MG/DL    Creatinine 0.82 0.8 - 1.5 MG/DL    GFR est AA >60 >60 ml/min/1.73m2    GFR est non-AA >60 >60 ml/min/1.73m2    Calcium 8.6 8.3 - 10.4 MG/DL   CBC W/O DIFF    Collection Time: 12/05/21  4:21 AM   Result Value Ref Range    WBC 6.8 4.3 - 11.1 K/uL    RBC 3.63 (L) 4.23 - 5.6 M/uL    HGB 9.6 (L) 13.6 - 17.2 g/dL    HCT 30.3 (L) 41.1 - 50.3 %    MCV 83.5 79.6 - 97.8 FL    MCH 26.4 26.1 - 32.9 PG    MCHC 31.7 31.4 - 35.0 g/dL    RDW 15.9 (H) 11.9 - 14.6 %    PLATELET 052 (L) 320 - 450 K/uL    MPV 9.8 9.4 - 12.3 FL    ABSOLUTE NRBC 0.00 0.0 - 0.2 K/uL       All Micro Results     Procedure Component Value Units Date/Time    CULTURE, URINE [[id number]] Collected: 12/04/21 0302    Order Status: Completed Specimen: Urine from Clean catch Updated: 12/05/21 0955     Special Requests: NO SPECIAL REQUESTS        Culture result:       <10,000 COLONIES/mL NORMAL SKIN CHANDU ISOLATED          BLOOD CULTURE [[id number]] Collected: 12/03/21 1651    Order Status: Completed Specimen: Blood Updated: 12/05/21 0718     Special Requests: --        RIGHT  HAND       Culture result: NO GROWTH 2 DAYS       BLOOD CULTURE [[id number]] Collected: 12/03/21 1651    Order Status: Completed Specimen: Blood Updated: 12/05/21 0718     Special Requests: --        LEFT  ARM       Culture result: NO GROWTH 2 DAYS       RESPIRATORY VIRUS PANEL W/COVID-19, PCR [[id number]] Collected: 12/03/21 1651    Order Status: Completed Specimen: Nasopharyngeal Updated: 12/03/21 1857     Adenovirus NOT DETECTED        Coronavirus 229E NOT DETECTED        Coronavirus HKU1 NOT DETECTED        Coronavirus CVNL63 NOT DETECTED        Coronavirus OC43 NOT DETECTED        SARS-CoV-2, PCR NOT DETECTED        Metapneumovirus NOT DETECTED        Rhinovirus and Enterovirus NOT DETECTED        Influenza A NOT DETECTED        Influenza B NOT DETECTED        Parainfluenza 1 NOT DETECTED        Parainfluenza 2 NOT DETECTED        Parainfluenza 3 NOT DETECTED        Parainfluenza virus 4 NOT DETECTED        RSV by PCR NOT DETECTED        B. parapertussis, PCR NOT DETECTED        Bordetella pertussis - PCR NOT DETECTED        Chlamydophila pneumoniae DNA, QL, PCR NOT DETECTED        Mycoplasma pneumoniae DNA, QL, PCR NOT DETECTED             Other Studies:  ECHO ADULT COMPLETE    Result Date: 12/4/2021  · LV: Estimated LVEF is 40 - 45%. Normal wall thickness. Mildly dilated left ventricle. Globally reduced systolic function. · LA: Severely dilated left atrium. · RV: Borderline low systolic function. · RA: Severely dilated right atrium.  · AV: Prosthetic aortic valve. 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 Prosthesis is normal. · MV: Mitral valve thickening. Mitral valve leaflet calcification. Mild mitral valve stenosis is present. Mild to moderate mitral valve regurgitation is present. · TV: Mild to moderate tricuspid valve regurgitation is present. Right Ventricular Arterial Pressure (RVSP) is 24 mmHg.         Current Meds:  Current Facility-Administered Medications   Medication Dose Route Frequency    HYDROcodone-acetaminophen (NORCO) 5-325 mg per tablet 1 Tablet  1 Tablet Oral Q4H PRN    metoprolol succinate (TOPROL-XL) tablet 100 mg  100 mg Oral Q12H    lisinopriL (PRINIVIL, ZESTRIL) tablet 5 mg  5 mg Oral DAILY    potassium chloride (KLOR-CON M10) tablet 20 mEq  20 mEq Oral BID    budesonide-formoteroL (SYMBICORT) 160-4.5 mcg/actuation HFA inhaler 2 Puff  2 Puff Inhalation BID RT    aspirin chewable tablet 81 mg  81 mg Oral DAILY    atorvastatin (LIPITOR) tablet 40 mg  40 mg Oral QHS    albuterol (PROVENTIL VENTOLIN) nebulizer solution 2.5 mg  2.5 mg Nebulization Q4H PRN    albuterol-ipratropium (DUO-NEB) 2.5 MG-0.5 MG/3 ML  3 mL Nebulization Q4H RT    sodium chloride (NS) flush 5-40 mL  5-40 mL IntraVENous Q8H    sodium chloride (NS) flush 5-40 mL  5-40 mL IntraVENous PRN    acetaminophen (TYLENOL) tablet 650 mg  650 mg Oral Q6H PRN    Or    acetaminophen (TYLENOL) suppository 650 mg  650 mg Rectal Q6H PRN    polyethylene glycol (MIRALAX) packet 17 g  17 g Oral DAILY PRN    ondansetron (ZOFRAN ODT) tablet 4 mg  4 mg Oral Q8H PRN    Or    ondansetron (ZOFRAN) injection 4 mg  4 mg IntraVENous Q6H PRN    furosemide (LASIX) tablet 40 mg  40 mg Oral BID    baclofen (LIORESAL) tablet 10 mg  10 mg Oral TID PRN    methylPREDNISolone (PF) (SOLU-MEDROL) injection 40 mg  40 mg IntraVENous Q8H    cefTRIAXone (ROCEPHIN) 1 g in 0.9% sodium chloride (MBP/ADV) 50 mL MBP  1 g IntraVENous Q24H    doxycycline (VIBRAMYCIN) capsule 100 mg  100 mg Oral Q12H    dabigatran etexilate (PRADAXA) capsule 150 mg  150 mg Oral Q12H    tuberculin injection 5 Units  5 Units IntraDERMal ONCE       Signed:  Keke Liang MD    Part of this note may have been written by using a voice dictation software. The note has been proof read but may still contain some grammatical/other typographical errors.

## 2021-12-05 NOTE — ROUTINE PROCESS
Bedside and Verbal shift change report given to [place], RN (oncoming nurse) by self (offgoing nurse). Report included the following information SBAR, Kardex, Intake/Output, MAR, Recent Results and Cardiac Rhythm Afib.

## 2021-12-05 NOTE — PROGRESS NOTES
[facility] CARDIOLOGY PROGRESS NOTE           12/5/2021 9:15 AM    Admit Date: 12/3/2021      Subjective:   Patient still complains of dyspnea with wheezing. Monitor demonstrates persistent atrial fibrillation with rapid ventricular response. ROS:  Cardiovascular:  As noted above    Objective:      Vitals:    12/05/21 0052 12/05/21 0346 12/05/21 0456 12/05/21 0852   BP:   133/77 131/84   Pulse:   94 97   Resp:   18 18   Temp:   97.7 °F (36.5 °C) 97.2 °F (36.2 °C)   SpO2: 97% 97% 98% 96%   Weight:   214 lb 1.6 oz (97.1 kg)    Height:           Physical Exam:  General-No Acute Distress  Neck- supple, no JVD  CV- irregular rhythm with tachycardia  Lung-wheeze bilaterally  Abd- soft, nontender, nondistended  Ext- no edema bilaterally. Skin- warm and dry    Data Review:   Recent Labs     12/05/21  0421 12/04/21  0256    138   K 4.2 4.2   BUN 15 28*   CREA 0.82 1.26   * 137*   WBC 6.8 17.7*   HGB 9.6* 10.5*   HCT 30.3* 33.0*   * 165       Assessment/Plan:     Principal Problem:    COPD with acute lower respiratory infection ([facility] (12/4/2021)  Patient with marked tachycardia during respiratory treatments. Wheezing persists. Active Problems:    History of congestive heart failure (12/3/2021)  LVEF 40-45%. Will convert diltiazem to metoprolol succinate, reduce lisinopril to 5 mg daily, resume Lasix 40 mg twice daily and potassium 20 mEq twice daily. CAP (community acquired pneumonia) (12/3/2021)  Managed per primary team      Atrial fibrillation with rapid ventricular response ([facility] (12/3/2021)  We will plan rate control and anticoagulation at this time. Will convert diltiazem to metoprolol succinate to address underlying heart failure with reduced ejection fraction. Pradaxa was increased to 150 mg twice daily for appropriate dosing for this patient.       COPD (chronic obstructive pulmonary disease) ([facility] (12/3/2021)  Managed per primary team      Pulmonary fibrosis [facility]) (12/3/2021)  Patient with underlying severe pulmonary fibrosis per his history.   May benefit from pulmonary consultation and referral.      Leg injury (12/3/2021)  Managed per primary team      Septic shock with acute organ dysfunction due to pneumococcus [facility]) (12/3/2021)  Managed per primary team          Ar Mckoy MD  12/5/2021 9:15 AM

## 2021-12-05 NOTE — PROGRESS NOTES
Reviewed notes for concerns      Per notes:      NO SEKOU [address] [facility] Debbie        Will continue to assess how to best serve this family

## 2021-12-05 NOTE — PROGRESS NOTES
ACUTE PHYSICAL THERAPY GOALS:  (Developed with and agreed upon by patient and/or caregiver. )  LTG:  (1.)Mr. Hope will transfer from bed to chair and chair to bed with MODIFIED INDEPENDENCE using the least restrictive device within 4-7 treatment day(s). (2.)Mr. Hope will ambulate with MODIFIED INDEPENDENCE for 15 feet with the least restrictive device within 4-7 treatment day(s). (3.)Mr. Hope will be able to perform static/dynamic standing balance activities for 5 minutes with SUPERVISION to improve their ability to perform transfers and ambulation within 4-7 treatment day(s). ________________________________________________________________________________________________      PHYSICAL THERAPY ASSESSMENT: Initial Assessment, Daily Note and AM PT Treatment Day # 1      Palma Bustos is a 77 y.o. male   PRIMARY DIAGNOSIS: COPD with acute lower respiratory infection ([facility] [address]       Reason for Referral:    ICD-10: Treatment Diagnosis: Pain in left hip (M25.552)  Difficulty in walking, Not elsewhere classified (R26.2)  Other abnormalities of gait and mobility (R26.89)  History of falling (Z91.81)  INPATIENT: [place] [employer] [place] [employer] / [place] [employer] [place] / Product Type: Managed Care Medicare /     ASSESSMENT:     REHAB RECOMMENDATIONS:   Recommendation to date pending progress:  [address] [place] [address]  Equipment:    None     PRIOR LEVEL OF FUNCTION:  (Prior to Hospitalization) INITIAL/CURRENT LEVEL OF FUNCTION:  (Most Recently Demonstrated)   Bed Mobility:   Modified Independent  Sit to Stand:   Modified Independent  Transfers:   Modified Independent  Gait/Mobility:   Modified Independent Bed Mobility:   Minimal Assistance  Sit to Stand:   Contact Guard Assistance  Transfers:   Contact Guard Assistance  Gait/Mobility:   Contact Guard Assistance     ASSESSMENT:  Mr. Eusebia Bains agreeable to session today.  He was able to perform bed mobility with only some assist at his LEs to get them over the edge of the bed - states he normally uses leg loops to get his legs over with modified independence. He was able to perform a stand step transfer to a bedside chair using the RW and hopping forward on R LE to avoid L WBing (due to hairline fracture at L femur) with only S-CGA for safety. He did report feeling slight shortness of breath after transfer. At baseline, pt uses forearm crutches to ambulate short distances (mainly does this to keep his legs from getting weaker) and a powered wheelchair for longer distances (patient has 35 year history of lyme disease that he states caused him to have significant weakness in B LEs, as well as a L hip replacement from a prior fall). He does have a caregiver that comes in every day and helps with activities around his residence and helps him with hygiene. Pt is very active and does modified activities such as parasailing, installing wood floors, and woodworking. He is close to his functional baseline, but would benefit from 1-2 additional sessions of physical therapy to maximize his safety and independence with functional mobility prior to discharge. SUBJECTIVE:   Mr. Jimbo Lua states, Zaina Issa I'd like to get to the chair\".     SOCIAL HISTORY/LIVING ENVIRONMENT:   Home Environment: Shelter  One/Two Story Residence: One story  Living Alone: No  Support Systems: Caregiver/Home Care Staff (caregiver comes for 8 hours a day)  OBJECTIVE:     PAIN: VITAL SIGNS: LINES/DRAINS:   Pre Treatment: Pain Screen  Pain Scale 1: Numeric (0 - 10)  Pain Intensity 1:  (no pain verbalized)  Post Treatment: no pain verbalized Vital Signs  O2 Sat (%): 98 %  O2 Device: Nasal cannula  O2 Flow Rate (L/min): 1 l/min    O2 Device: Nasal cannula     GROSS EVALUATION:  LE Within Functional Limits Abnormal/ Functional Abnormal/ Non-Functional (see comments) Not Tested Comments:   AROM [] [] [x] [] Pt with minimal to no movement of L LE, minimal movement of R LE PROM [] [x] [] []    Strength [] [] [x] [] Significantly decreased in B LEs   Balance [] [x] [] [] Good in static/dynamic sitting; fair in static/dynamic standing (with RW)   Posture [] [x] [] [] Kyphotic posture, forward head, rounded shoulders   Sensation [] [] [] [x]    Coordination [] [] [] [x]    Tone [] [] [] [x]    Edema [] [] [] [x]    Activity Tolerance [] [x] [] [] Pt getting short of breath after transfer    [] [] [] []      COGNITION/  PERCEPTION: Intact Impaired   (see comments) Comments:   Orientation [x] []    Vision [x] []    Hearing [x] []    Command Following [x] []    Safety Awareness [x] []     [] []      MOBILITY: I Mod I S SBA CGA Min Mod Max Total  NT x2 Comments:   Bed Mobility    Rolling [] [] [] [] [x] [x] [] [] [] [] []    Supine to Sit [] [] [] [] [] [x] [] [] [] [] [] Required assist at LEs   Scooting [] [] [] [] [x] [] [] [] [] [] []    Sit to Supine [] [] [] [] [] [] [] [] [] [x] []    Transfers    Sit to Stand [] [] [x] [] [x] [] [] [] [] [] [] Standing on R LE only   Bed to Chair [] [] [x] [] [x] [] [] [] [] [] []    Stand to Sit [] [] [x] [] [x] [] [] [] [] [] []    I=Independent, Mod I=Modified Independent, S=Supervision, SBA=Standby Assistance, CGA=Contact Guard Assistance,   Min=Minimal Assistance, Mod=Moderate Assistance, Max=Maximal Assistance, Total=Total Assistance, NT=Not Tested  GAIT: I Mod I S SBA CGA Min Mod Max Total  NT Comments:   Level of Assistance [] [] [x] [] [x] [] [] [] [] [] Cues for safety   Distance 5 feet    DME Rolling Walker    Gait Quality Pt able to hop forward on R LE, NWBing L LE; slight forward trunk lean    I=Independent, Mod I=Modified Independent, S=Supervision, SBA=Standby Assistance, CGA=Contact Guard Assistance,   Min=Minimal Assistance, Mod=Moderate Assistance, Max=Maximal Assistance, Total=Total Assistance, NT=Not Tested    [employer] AM-PAC [facility] Inpatient Short Form       How much difficulty does the patient currently have. .. Unable A Lot A Little None   1. Turning over in bed (including adjusting bedclothes, sheets and blankets)? [] 1   [] 2   [x] 3   [] 4   2. Sitting down on and standing up from a chair with arms ( e.g., wheelchair, bedside commode, etc.)   [] 1   [] 2   [x] 3   [] 4   3. Moving from lying on back to sitting on the side of the bed? [] 1   [] 2   [x] 3   [] 4   How much help from another person does the patient currently need. .. Total A Lot A Little None   4. Moving to and from a bed to a chair (including a wheelchair)? [] 1   [] 2   [x] 3   [] 4   5. Need to walk in hospital room? [] 1   [] 2   [x] 3   [] 4   6. Climbing 3-5 steps with a railing? [x] 1   [] 2   [] 3   [] 4   © 2007, Trustees of [address] [place] [id number], under license to [employer]. All rights reserved     Score:  Initial: 16 Most Recent: X (Date: -- )    Interpretation of Tool:  Represents activities that are increasingly more difficult (i.e. Bed mobility, Transfers, Gait). PLAN:   FREQUENCY/DURATION: PT Plan of Care: 3 times/week for duration of hospital stay or until stated goals are met, which ever comes first.    PROBLEM LIST:   (Skilled intervention is medically necessary to address:)  1. Decreased ADL/Functional Activities  2. Decreased Activity Tolerance  3. Decreased AROM/PROM  4. Decreased Balance  5. Decreased Gait Ability  6. Decreased Strength  7. Decreased Transfer Abilities  8. Increased Pain   INTERVENTIONS PLANNED:   (Benefits and precautions of physical therapy have been discussed with the patient.)  1. Therapeutic Activity  2. Therapeutic Exercise/HEP  3. Neuromuscular Re-education  4. Gait Training  5. Education     TREATMENT:     EVALUATION: Moderate Complexity : (Untimed Charge)    TREATMENT:   ($$ Therapeutic Activity: 8-22 mins    )  Therapeutic Activity (10 Minutes):  Therapeutic activity included Rolling, Supine to Sit, Scooting, Transfer Training, Ambulation on level ground, Sitting balance  and Standing balance to improve functional Mobility, Strength and Activity tolerance.     AFTER TREATMENT POSITION/PRECAUTIONS:  Chair, Needs within reach and RN notified    INTERDISCIPLINARY COLLABORATION:  RN/PCT and PT/PTA    TOTAL TREATMENT DURATION:  PT Patient Time In/Time Out  Time In: 1010  Time Out: 300 Navin Wesley DPT

## 2021-12-05 NOTE — PROGRESS NOTES
Problem: Pressure Injury - Risk of  Goal: *Prevention of pressure injury  Description: Document Milton Scale and appropriate interventions in the flowsheet. Outcome: Progressing Towards Goal  Note: Pressure Injury Interventions: Activity Interventions: PT/OT evaluation    Mobility Interventions: HOB 30 degrees or less, Pressure redistribution bed/mattress (bed type)         Friction and Shear Interventions: HOB 30 degrees or less, Foam dressings/transparent film/skin sealants                Problem: Patient Education: Go to Patient Education Activity  Goal: Patient/Family Education  Outcome: Progressing Towards Goal     Problem: Falls - Risk of  Goal: *Absence of Falls  Description: Document Phuc Fall Risk and appropriate interventions in the flowsheet.   Outcome: Progressing Towards Goal  Note: Fall Risk Interventions:  Mobility Interventions: Communicate number of staff needed for ambulation/transfer, Patient to call before getting OOB         Medication Interventions: Patient to call before getting OOB, Teach patient to arise slowly    Elimination Interventions: Call light in reach, Patient to call for help with toileting needs    History of Falls Interventions: Bed/chair exit alarm, Door open when patient unattended, Evaluate medications/consider consulting pharmacy, Investigate reason for fall, Room close to nurse's station

## 2021-12-05 NOTE — PROGRESS NOTES
Bedside and Verbal shift change report given to self (oncoming nurse) by Maikel Trimble (offgoing nurse). Report included the following information SBAR, Kardex, ED Summary, Intake/Output, MAR and Recent Results.

## 2021-12-05 NOTE — ROUTINE PROCESS
Bedside and Verbal shift change report given to self (oncoming nurse) by Gerald Gustafson RN (offgoing nurse). Report included the following information SBAR, Kardex, Intake/Output, MAR and Recent Results.

## 2021-12-06 LAB
ANION GAP SERPL CALC-SCNC: 7 MMOL/L (ref 7–16)
BUN SERPL-MCNC: 22 MG/DL (ref 8–23)
CALCIUM SERPL-MCNC: 8.9 MG/DL (ref 8.3–10.4)
CHLORIDE SERPL-SCNC: 104 MMOL/L (ref 98–107)
CO2 SERPL-SCNC: 27 MMOL/L (ref 21–32)
CREAT SERPL-MCNC: 1.12 MG/DL (ref 0.8–1.5)
ERYTHROCYTE [DISTWIDTH] IN BLOOD BY AUTOMATED COUNT: 15.9 % (ref 11.9–14.6)
GLUCOSE SERPL-MCNC: 209 MG/DL (ref 65–100)
HCT VFR BLD AUTO: 30.5 % (ref 41.1–50.3)
HGB BLD-MCNC: 9.6 G/DL (ref 13.6–17.2)
MCH RBC QN AUTO: 25.7 PG (ref 26.1–32.9)
MCHC RBC AUTO-ENTMCNC: 31.5 G/DL (ref 31.4–35)
MCV RBC AUTO: 81.8 FL (ref 79.6–97.8)
MM INDURATION POC: 0 MM (ref 0–5)
NRBC # BLD: 0 K/UL (ref 0–0.2)
PLATELET # BLD AUTO: 176 K/UL (ref 150–450)
PMV BLD AUTO: 9.8 FL (ref 9.4–12.3)
POTASSIUM SERPL-SCNC: 4 MMOL/L (ref 3.5–5.1)
PPD POC: NEGATIVE NEGATIVE
RBC # BLD AUTO: 3.73 M/UL (ref 4.23–5.6)
SODIUM SERPL-SCNC: 138 MMOL/L (ref 136–145)
WBC # BLD AUTO: 11.7 K/UL (ref 4.3–11.1)

## 2021-12-06 PROCEDURE — 80048 BASIC METABOLIC PNL TOTAL CA: CPT

## 2021-12-06 PROCEDURE — 97535 SELF CARE MNGMENT TRAINING: CPT

## 2021-12-06 PROCEDURE — 74011250636 HC RX REV CODE- 250/636: Performed by: INTERNAL MEDICINE

## 2021-12-06 PROCEDURE — 74011250637 HC RX REV CODE- 250/637: Performed by: INTERNAL MEDICINE

## 2021-12-06 PROCEDURE — 36592 COLLECT BLOOD FROM PICC: CPT

## 2021-12-06 PROCEDURE — 94760 N-INVAS EAR/PLS OXIMETRY 1: CPT

## 2021-12-06 PROCEDURE — 74011000250 HC RX REV CODE- 250: Performed by: FAMILY MEDICINE

## 2021-12-06 PROCEDURE — 77010033678 HC OXYGEN DAILY

## 2021-12-06 PROCEDURE — 85027 COMPLETE CBC AUTOMATED: CPT

## 2021-12-06 PROCEDURE — 97165 OT EVAL LOW COMPLEX 30 MIN: CPT

## 2021-12-06 PROCEDURE — 99233 SBSQ HOSP IP/OBS HIGH 50: CPT | Performed by: INTERNAL MEDICINE

## 2021-12-06 PROCEDURE — 65660000000 HC RM CCU STEPDOWN

## 2021-12-06 PROCEDURE — 2709999900 HC NON-CHARGEABLE SUPPLY

## 2021-12-06 PROCEDURE — 74011250637 HC RX REV CODE- 250/637: Performed by: FAMILY MEDICINE

## 2021-12-06 PROCEDURE — 74011000258 HC RX REV CODE- 258: Performed by: INTERNAL MEDICINE

## 2021-12-06 PROCEDURE — 97530 THERAPEUTIC ACTIVITIES: CPT

## 2021-12-06 PROCEDURE — 94640 AIRWAY INHALATION TREATMENT: CPT

## 2021-12-06 RX ORDER — PREDNISONE 20 MG/1
40 TABLET ORAL
Status: DISCONTINUED | OUTPATIENT
Start: 2021-12-07 | End: 2021-12-07 | Stop reason: HOSPADM

## 2021-12-06 RX ADMIN — METHYLPREDNISOLONE SODIUM SUCCINATE 40 MG: 40 INJECTION, POWDER, FOR SOLUTION INTRAMUSCULAR; INTRAVENOUS at 06:20

## 2021-12-06 RX ADMIN — IPRATROPIUM BROMIDE AND ALBUTEROL SULFATE 3 ML: .5; 3 SOLUTION RESPIRATORY (INHALATION) at 15:23

## 2021-12-06 RX ADMIN — BUDESONIDE AND FORMOTEROL FUMARATE DIHYDRATE 2 PUFF: 160; 4.5 AEROSOL RESPIRATORY (INHALATION) at 20:35

## 2021-12-06 RX ADMIN — IPRATROPIUM BROMIDE AND ALBUTEROL SULFATE 3 ML: .5; 3 SOLUTION RESPIRATORY (INHALATION) at 03:42

## 2021-12-06 RX ADMIN — METHYLPREDNISOLONE SODIUM SUCCINATE 40 MG: 40 INJECTION, POWDER, FOR SOLUTION INTRAMUSCULAR; INTRAVENOUS at 22:13

## 2021-12-06 RX ADMIN — LISINOPRIL 5 MG: 5 TABLET ORAL at 08:41

## 2021-12-06 RX ADMIN — IPRATROPIUM BROMIDE AND ALBUTEROL SULFATE 3 ML: .5; 3 SOLUTION RESPIRATORY (INHALATION) at 23:41

## 2021-12-06 RX ADMIN — HYDROCODONE BITARTRATE AND ACETAMINOPHEN 1 TABLET: 5; 325 TABLET ORAL at 08:47

## 2021-12-06 RX ADMIN — DOXYCYCLINE HYCLATE 100 MG: 100 CAPSULE ORAL at 22:13

## 2021-12-06 RX ADMIN — HYDROCODONE BITARTRATE AND ACETAMINOPHEN 1 TABLET: 5; 325 TABLET ORAL at 15:07

## 2021-12-06 RX ADMIN — POTASSIUM CHLORIDE 20 MEQ: 10 TABLET, EXTENDED RELEASE ORAL at 08:42

## 2021-12-06 RX ADMIN — METOPROLOL SUCCINATE 100 MG: 100 TABLET, EXTENDED RELEASE ORAL at 08:42

## 2021-12-06 RX ADMIN — IPRATROPIUM BROMIDE AND ALBUTEROL SULFATE 3 ML: .5; 3 SOLUTION RESPIRATORY (INHALATION) at 20:35

## 2021-12-06 RX ADMIN — POTASSIUM CHLORIDE 20 MEQ: 10 TABLET, EXTENDED RELEASE ORAL at 17:38

## 2021-12-06 RX ADMIN — CEFTRIAXONE 1 G: 1 INJECTION, POWDER, FOR SOLUTION INTRAMUSCULAR; INTRAVENOUS at 13:04

## 2021-12-06 RX ADMIN — FUROSEMIDE 40 MG: 40 TABLET ORAL at 17:38

## 2021-12-06 RX ADMIN — Medication 10 ML: at 06:20

## 2021-12-06 RX ADMIN — DABIGATRAN ETEXILATE MESYLATE 150 MG: 150 CAPSULE ORAL at 08:41

## 2021-12-06 RX ADMIN — FUROSEMIDE 40 MG: 40 TABLET ORAL at 08:41

## 2021-12-06 RX ADMIN — METOPROLOL SUCCINATE 100 MG: 100 TABLET, EXTENDED RELEASE ORAL at 22:13

## 2021-12-06 RX ADMIN — IPRATROPIUM BROMIDE AND ALBUTEROL SULFATE 3 ML: .5; 3 SOLUTION RESPIRATORY (INHALATION) at 08:11

## 2021-12-06 RX ADMIN — Medication 5 ML: at 13:04

## 2021-12-06 RX ADMIN — ASPIRIN 81 MG: 81 TABLET, CHEWABLE ORAL at 08:42

## 2021-12-06 RX ADMIN — BUDESONIDE AND FORMOTEROL FUMARATE DIHYDRATE 2 PUFF: 160; 4.5 AEROSOL RESPIRATORY (INHALATION) at 08:11

## 2021-12-06 RX ADMIN — HYDROCODONE BITARTRATE AND ACETAMINOPHEN 1 TABLET: 5; 325 TABLET ORAL at 04:19

## 2021-12-06 RX ADMIN — DABIGATRAN ETEXILATE MESYLATE 150 MG: 150 CAPSULE ORAL at 22:13

## 2021-12-06 RX ADMIN — ATORVASTATIN CALCIUM 40 MG: 40 TABLET, FILM COATED ORAL at 22:13

## 2021-12-06 RX ADMIN — DOXYCYCLINE HYCLATE 100 MG: 100 CAPSULE ORAL at 08:41

## 2021-12-06 RX ADMIN — METHYLPREDNISOLONE SODIUM SUCCINATE 40 MG: 40 INJECTION, POWDER, FOR SOLUTION INTRAMUSCULAR; INTRAVENOUS at 13:40

## 2021-12-06 NOTE — PROGRESS NOTES
ACUTE OT GOALS:  (Developed with and agreed upon by patient and/or caregiver.)  1. Patient will complete lower body bathing and dressing with MOD I and adaptive equipment as needed. 2. Patient will complete toileting with MOD I.   3. Patient will tolerate 30 minutes of OT treatment with 1-2 rest breaks to increase activity tolerance for ADLs. 4. Patient will complete functional transfers with MOD I and adaptive equipment as needed. 5. Patient will complete functional mobility for household distances with MOD I and adaptive equipment as needed. 6. Patient will tolerate 30 minutes BUE therapeutic exercises to increase functional use during ADLs. Timeframe: 7 visits         OCCUPATIONAL THERAPY ASSESSMENT: Initial Assessment and Daily Note OT Treatment Day # 1    Johan Hdz is a 77 y.o. male   PRIMARY DIAGNOSIS: COPD with acute lower respiratory infection ([facility] [address]  Atrial fibrillation with rapid ventricular response (HCC) [I48.91]  CAP (community acquired pneumonia) [J18.9]  Sepsis ([facility] [address] [A41.9]       Reason for Referral:   ICD-10: Treatment Diagnosis: Generalized Muscle Weakness (M62.81)  Other lack of cordination (R27.8)  Difficulty in walking, Not elsewhere classified (R26.2)  History of falling (Z91.81)  INPATIENT: Payor: [facility] MEDICARE / Plan: PAT [place] / Product Type: Managed Care Medicare /   ASSESSMENT:     REHAB RECOMMENDATIONS:   Recommendation to date pending progress:  [address] [place] [address]  Equipment:    Rolling Walker     PRIOR LEVEL OF FUNCTION:  (Prior to Hospitalization)  INITIAL/CURRENT LEVEL OF FUNCTION:  (Based on today's evaluation)   Bathing:   Modified Independent  Dressing:   Modified Independent  Feeding/Grooming:   Modified Independent  Toileting:   Modified Independent  Functional Mobility:   Modified Independent wrist crutches for short distance; power chair for long distance mobility.  Bathing:   Minimal Assistance  Dressing:   Minimal Assistance  Feeding/Grooming:   Set Up  Toileting:   Minimal Assistance  Functional Mobility:   Minimal Assistance x RW     ASSESSMENT:  Mr. Sanam Crane presents with deficits in overall strength, activity tolerance, ADL performance and functional mobility. Admitted for COPD exacerbation and a-fib. Recent L femur hairline fracture s/p fall (awaiting OP consult for treatment options prior to hospitalization). Just recently moved to [place] and has been staying in a motel; supportive family here in [place]. BUE AROM and strength (4/5) are generally decreased but WFL. Min A  for functional bed mobility/transfers; good EOB sitting balance. Pt participated in total body bathing tasks while seated EOB with min A. Patient instructed on use of NWB in LLE; pt able to complete sit <> stand with CGA; good static standing balance with use of RW. Pt demonstrates good use of BUE with pt able to hop on RLE to chair. At this time, Radhames Gloria is functioning below baseline for ADLs and functional mobility. Pt would benefit from skilled OT services to address OT goals and and plan of care. .     SUBJECTIVE:   Mr. Sanam Crane states, \"I'm scared of falling. \"    SOCIAL HISTORY/LIVING ENVIRONMENT: Lives in a motel; recently moved to [place] (sister lives here); has wrist crutches and power chair for mobility; MOD I for ADLs; hx of falls.    Home Environment: Shelter  One/Two Story Residence: One story  Living Alone: No  Support Systems: Other Family Member(s), Friend/Neighbor, /, Caregiver/Home Care Staff    OBJECTIVE:     PAIN: VITAL SIGNS: LINES/DRAINS:   Pre Treatment: Pain Screen  Pain Scale 1: Numeric (0 - 10)  Pain Intensity 1: 0  Post Treatment: 0   none  O2 Device: None (Room air)     GROSS EVALUATION:  BUEs Within Functional Limits Abnormal/ Functional Abnormal/ Non-Functional (see comments) Not Tested Comments:   AROM [] [x] [] []    PROM [] [] [] [] Strength [x] [x] [] [] 5/5 BUE    Balance [] [x] [] [] Good EOB sitting; fair (+) standing   Posture [] [] [] []    Sensation [x] [] [] []    Coordination [x] [] [] []    Tone [x] [] [] []    Edema [x] [] [] []    Activity Tolerance [] [x] [] []     [] [] [] []      COGNITION/  PERCEPTION: Intact Impaired   (see comments) Comments:   Orientation [x] []    Vision [x] []    Hearing [x] []    Judgment/ Insight [x] []    Attention [x] []    Memory [x] []    Command Following [x] []    Emotional Regulation [x] []     [] []      ACTIVITIES OF DAILY LIVING: I Mod I S SBA CGA Min Mod Max Total NT Comments   BASIC ADLs:              Bathing/ Showering [] [] [] [] [] [x] [] [] [] []    Toileting [] [] [] [] [] [] [] [] [] [x]    Dressing [] [] [] [] [] [] [] [] [] [x]    Feeding [] [] [] [] [] [] [] [] [] [x]    Grooming [] [] [x] [] [] [] [] [] [] []    Personal Device Care [] [] [] [] [] [] [] [] [] [x]    Functional Mobility [] [] [] [] [x] [] [] [] [] [] X RW   I=Independent, Mod I=Modified Independent, S=Supervision, SBA=Standby Assistance, CGA=Contact Guard Assistance,   Min=Minimal Assistance, Mod=Moderate Assistance, Max=Maximal Assistance, Total=Total Assistance, NT=Not Tested    MOBILITY: I Mod I S SBA CGA Min Mod Max Total  NT x2 Comments:   Supine to sit [] [] [] [] [] [x] [] [] [] [] []    Sit to supine [] [] [] [] [] [] [] [] [] [x] []    Sit to stand [] [] [] [] [] [x] [] [] [] [] []    Bed to chair [] [] [] [] [x] [] [] [] [] [] [] X RW   I=Independent, Mod I=Modified Independent, S=Supervision, SBA=Standby Assistance, CGA=Contact Guard Assistance,   Min=Minimal Assistance, Mod=Moderate Assistance, Max=Maximal Assistance, Total=Total Assistance, NT=Not Tested    [facility] [address] [address] AM-PAC 6 Clicks   Daily Activity Inpatient Short Form        How much help from another person does the patient currently need. .. Total A Lot A Little None   1. Putting on and taking off regular lower body clothing?    [] 1   [] 2   [x] 3   [] 4   2. Bathing (including washing, rinsing, drying)? [] 1   [] 2   [x] 3   [] 4   3. Toileting, which includes using toilet, bedpan or urinal?   [] 1   [] 2   [x] 3   [] 4   4. Putting on and taking off regular upper body clothing? [] 1   [] 2   [x] 3   [] 4   5. Taking care of personal grooming such as brushing teeth? [] 1   [] 2   [x] 3   [] 4   6. Eating meals? [] 1   [] 2   [x] 3   [] 4   © 2007, Trustees of [address] [place] [address] [address] [address], under license to [employer]. All rights reserved     Score:  Initial: 18 Most Recent: X (Date: -- )   Interpretation of Tool:  Represents activities that are increasingly more difficult (i.e. Bed mobility, Transfers, Gait). PLAN:   FREQUENCY/DURATION: OT Plan of Care: 3 times/week for duration of hospital stay or until stated goals are met, whichever comes first.    PROBLEM LIST:   (Skilled intervention is medically necessary to address:)  1. Decreased ADL/Functional Activities  2. Decreased Activity Tolerance  3. Decreased AROM/PROM  4. Decreased Balance  5. Decreased Coordination  6. Decreased Gait Ability  7. Decreased Strength  8. Decreased Transfer Abilities   INTERVENTIONS PLANNED:   (Benefits and precautions of occupational therapy have been discussed with the patient.)  1. Self Care Training  2. Therapeutic Activity  3. Therapeutic Exercise/HEP  4. Neuromuscular Re-education  5. Education     TREATMENT:     EVALUATION: Low Complexity : (Untimed Charge)    TREATMENT:   ($$ Self Care/Home Management: 8-22 mins$$ Therapeutic Activity: 8-22 mins   )  Therapeutic Activity (10 Minutes): Therapeutic activity included Rolling, Sit to Supine, Scooting, Transfer Training, Ambulation on level ground, Sitting balance  and Standing balance to improve functional Mobility, Strength and Activity tolerance.   Self Care (13 Minutes): Self care including Upper Body Bathing, Lower Body Bathing, Upper Body Dressing and Grooming to increase independence and decrease level of assistance required.     TREATMENT GRID:  N/A    AFTER TREATMENT POSITION/PRECAUTIONS:  Chair, Needs within reach and RN notified    INTERDISCIPLINARY COLLABORATION:  RN/PCT, PT/PTA and OT/CHAPA    TOTAL TREATMENT DURATION:  OT Patient Time In/Time Out  Time In: 8520  Time Out: 9005 Ben Cornell Smith Honalo, OT

## 2021-12-06 NOTE — PROGRESS NOTES
Pt presented to the ED with AFib RVR. He was sent to the ED by Cardiology after he presented to their office with a HR in the 160s; was found to be SOB as well. Pt is also febrile with a leukocytosis. CXR showed evidence of a new mid-lower L lung infiltrate. Has a PMHx of AFib, on pradaxa, COPD and CHF. Pt currently lives at [facility] on [address] [facility]. He has lived there for about one week; stayed at the [address] [place] [address] prior to that for 30 days. Pt was living with his wife but due to his complicated health issues, she \"got tired of dealing with it,\" and basically put him out. He is working with the [place] who are helping him with placement at [facility] for subsidized housing. The paperwork has been submitted and is waiting for approval. His sister and niece are supportive and help as much as they can. Has a caregiver that helps with his ADLs. Uses a motorized WC to get around and will use public transportation as much as he can. Pt stated that he is working with the [place] on getting his WC fixed; stated that it was making \"some noise. \" He reported that he maintains as much independence as he can. Pt also has a manual WC and arm braces. PT/OT were consulted and recommended HH. [facility] is contracted through the [place], a referral was sent for a RN/PT/OT; will make them aware of the [facility] address. PCP is with the [address] [place] [address],[address] verified and able to afford her meds. Care Management Interventions  PCP Verified by CM: Yes [facility])  Mode of Transport at Discharge:  Other (see comment) (Sister)  Transition of Care Consult (CM Consult): Home Health, Discharge Planning  Discharge Durable Medical Equipment: No  Physical Therapy Consult: Yes  Occupational Therapy Consult: Yes  Speech Therapy Consult: No  Support Systems: Other Family Member(s), Friend/Neighbor, /, Caregiver/Home Care Staff  Confirm Follow Up Transport: Family  The Plan for Transition of Care is Related to the Following Treatment Goals : Return home and back to his baseline  Discharge Location  Discharge Placement: Home with home health

## 2021-12-06 NOTE — PROGRESS NOTES
Bedside and Verbal shift change report given to self (oncoming nurse) by Maikel rTimble (offgoing nurse). Report included the following information SBAR, Kardex, Intake/Output, MAR and Recent Results.

## 2021-12-06 NOTE — PROGRESS NOTES
Hospitalist Progress Note   Admit Date:  12/3/2021  4:33 PM   Name:  Jennifer Law   Age:  77 y.o. Sex:  male  :  1955   MRN:  [id number]   Room:  [facility]/    Presenting Complaint: Chest Pain (Angina) (a fib rvr)    Reason(s) for Admission: Atrial fibrillation with rapid ventricular response (HCC) [I48.91]  CAP (community acquired pneumonia) [J18.9]  Sepsis [facility]) [A41.9]     Hospital Course & Interval History:   Jennifer Law is a 77 y.o. male with medical history of afib on pradaxa, COPD, CHF who presented to ED with afib with RVR. Patient was sent to the ER by Cardiology after he presented to their office today with HR in the 160s. He was found to be SOB as well. Upon ER workup, patient remained in RVR. Patient is also febrile with a leukocytosis, meeting sepsis criteria. CXR shows evidence of a new mid-lower L lung infiltrate. Patient was started on cardizem drip in ER. Became hypotensive and had to be started on levophed and admitted to ICU. Subjective (21): Says SOB improved. Now off oxygen (chart says 4L, incorrect). afib HR controlled and BP acceptable. No fevers    Assessment & Plan:     * Atrial fibrillation with rapid ventricular response   - cont metop 100mg BID  -PRN cardizem IV  -on cardizem at home, held here  - pradaxa 150mg BID    COPD exacerbation from bacterial PNA  - try switching solumedrol to prednisone tonight to see if he remains stable and can be discharged on it tomorrow  - cont rocephin and doxy  -Currently O2 Flow Rate (L/min): 4 l/min. Wean as tolerated  -Cont duoneb  -cont home symbicort     Chronic congestive heart failure   -cont lasix, lisinopril, BB  -echo with EF 40-45%. Also borderline low RV function. Prior TAVR  -usually takes 40mg lasix daily; did self-adjust and took 40mg BID for 5 days prior to admit    Pulmonary fibrosis   - takes prednisone 20mg BID at home.     Leg injury  - Pt injured his L leg several weeks ago, has been seen at outpatient Orthopedic Surgery and reportedly has a \"hairline femur fracture\" in the same leg as his prior hip replacement  - PRN pain meds      Dispo/Discharge Planning:      PT/OT/CM consulted.   PPD ordered    Diet:  ADULT DIET Regular; Low Fat/Low Chol/High Fiber/ARIC  DVT PPx: pradaxa  Code status: Full Code    Hospital Problems as of 12/6/2021 Never Reviewed          Codes Class Noted - Resolved POA    * (Principal) COPD with acute lower respiratory infection ([facility] ICD-10-CM: J44.0  ICD-9-CM: 657, 519.8  12/4/2021 - Present Yes        History of congestive heart failure (Chronic) ICD-10-CM: Z86.79  ICD-9-CM: V12.59  12/3/2021 - Present Yes        CAP (community acquired pneumonia) ICD-10-CM: J18.9  ICD-9-CM: 257  12/3/2021 - Present Yes        Atrial fibrillation with rapid ventricular response ([facility] ICD-10-CM: I48.91  ICD-9-CM: 427.31  12/3/2021 - Present Yes        COPD (chronic obstructive pulmonary disease) ([facility] (Chronic) ICD-10-CM: J44.9  ICD-9-CM: 219  12/3/2021 - Present Yes        Pulmonary fibrosis ([facility] (Chronic) ICD-10-CM: J84.10  ICD-9-CM: 695  12/3/2021 - Present Yes        Leg injury (Chronic) ICD-10-CM: K12.72TW  ICD-9-CM: 959.7  12/3/2021 - Present Yes        Septic shock with acute organ dysfunction due to pneumococcus [facility]) ICD-10-CM: A40.3, R65.21  ICD-9-CM: 038.2, 785.52, 995.92  12/3/2021 - Present Yes              Objective:     Patient Vitals for the past 24 hrs:   Temp Pulse Resp BP SpO2   12/06/21 1225 97.8 °F (36.6 °C) 78 18 122/88 96 %   12/06/21 0837 98.1 °F (36.7 °C) 74 18 138/68 98 %   12/06/21 0811 -- -- -- -- 96 %   12/06/21 0433 97.8 °F (36.6 °C) 74 18 (!) 116/55 97 %   12/06/21 0342 -- -- -- -- 97 %   12/06/21 0203 97.6 °F (36.4 °C) (!) 114 18 (!) 153/98 92 %   12/06/21 0202 98 °F (36.7 °C) 82 16 (!) 140/73 97 %   12/05/21 2338 -- -- -- -- 98 %   12/05/21 2117 98.2 °F (36.8 °C) 87 18 130/76 98 %   12/05/21 2022 -- -- -- -- 97 %   12/05/21 1712 -- -- -- -- 96 %   12/05/21 1705 96.8 °F (36 °C) 90 18 (!) 140/79 97 %     Oxygen Therapy  O2 Sat (%): 96 % (12/06/21 1225)  Pulse via Oximetry: 84 beats per minute (12/06/21 0811)  O2 Device: None (Room air) (12/06/21 0837)  Skin Assessment: Clean, dry, & intact (12/06/21 0342)  O2 Flow Rate (L/min): 4 l/min (12/06/21 0811)    Estimated body mass index is 28.23 kg/m² as calculated from the following:    Height as of this encounter: 6' 1\" (1.854 m). Weight as of this encounter: 97.1 kg (214 lb). Intake/Output Summary (Last 24 hours) at 12/6/2021 1346  Last data filed at 12/6/2021 1140  Gross per 24 hour   Intake 817 ml   Output 2400 ml   Net -1583 ml         Physical Exam:   Blood pressure 122/88, pulse 78, temperature 97.8 °F (36.6 °C), resp. rate 18, height 6' 1\" (1.854 m), weight 97.1 kg (214 lb), SpO2 96 %. General:    Well nourished. No overt distress  Head:  Normocephalic, atraumatic  Eyes:  Sclerae appear normal.  Pupils equally round. ENT:  Nares appear normal, no drainage. Moist oral mucosa  Neck:  No restricted ROM. Trachea midline   CV:   RRR. No m/r/g. No jugular venous distension. Lungs:   Wheezing bilaterally. Mildly labored  Abdomen:    nondistended. Extremities: No cyanosis or clubbing. No edema  Skin:     No rashes and normal coloration. Warm and dry. Neuro:  CN II-XII grossly intact. Sensation intact. A&Ox3  Psych:  Normal mood and affect.       I have reviewed ordered lab tests and independently visualized imaging below:    Recent Labs:  Recent Results (from the past 48 hour(s))   ECHO ADULT COMPLETE    Collection Time: 12/04/21  4:35 PM   Result Value Ref Range    IVSd 0.96 0.6 - 1.0 cm    LVIDd 5.40 4.2 - 5.9 cm    LVIDs 3.82 cm    LVOT d 1.94 cm    LVPWd 1.07 (A) 0.6 - 1.0 cm    BP EF 42.5 (A) 55 - 100 percent    LV Ejection Fraction MOD 2C 43 percent    LV Ejection Fraction MOD 4C 41 percent    LV ED Vol A2C 155.02 mL    LV ED Vol A4C 100.65 mL    LV ED Vol .98 67 - 155 mL    LV ES Vol A2C 88.74 mL    LV ES Vol A4C 59.49 mL    LV ES Vol BP 73.56 (A) 22 - 58 mL    LVOT Peak Gradient 4.24 mmHg    Left Ventricular Outflow Tract Mean Gradient 2.40 mmHg    LVOT SV 58.3 mL    LVOT Peak Velocity 103.00 cm/s    LVOT VTI 19.82 cm    RVIDd 3.25 cm    Left Atrium Major Axis 4.88 cm    Aortic Valve Area by Continuity of Peak Velocity 1.10 cm2    Aortic Valve Area by Continuity of VTI 1.15 cm2    AoV PG 36.1 mmHg    Aortic Valve Systolic Mean Gradient 74.9 mmHg    Aortic Valve Systolic Peak Velocity 661.43 cm/s    AoV VTI 57.50 cm    LV E' Lateral Velocity 5.89 cm/s    LV E' Septal Velocity 5.73 cm/s    MVA VTI 0.70 cm2    Mitral Valve E Wave Deceleration Time 402.13 ms    MV Peak Gradient 33.4 mmHg    MV Mean Gradient 14.8 mmHg    Mitral Valve Pressure Half-time 127.0 ms    Mitral Valve Max Velocity 289.00 cm/s    Mitral Valve Annulus Velocity Time Integral 79.90 cm    MVA (PHT) 1.7 cm2    Tapse 1.92 1.5 - 2.0 cm    Triscuspid Valve Regurgitation Peak Gradient 23.35 mmHg    TR Max Velocity 241.62 cm/s    AO ASC D 3.45 cm    Ao Root D 2.31 cm    LV Mass .9 88 - 224 g    LV Mass AL Index 95.4 49 - 115 g/m2    LVES Vol Index BP 33.3 mL/m2    LVED Vol Index BP 57.9 mL/m2    Left Atrium Minor Axis 2.21 cm    LVED Vol Index A4C 45.5 mL/m2    LVED Vol Index A2C 70.1 mL/m2    LVES Vol Index A4C 26.9 mL/m2    LVES Vol Index A2C 40.2 mL/m2    KOJO/BSA [place] 0.5 cm2/m2    KOJO/BSA VTI 0.5 LU4/D4   METABOLIC PANEL, BASIC    Collection Time: 12/05/21  4:21 AM   Result Value Ref Range    Sodium 138 138 - 145 mmol/L    Potassium 4.2 3.5 - 5.1 mmol/L    Chloride 109 (H) 98 - 107 mmol/L    CO2 24 21 - 32 mmol/L    Anion gap 5 (L) 7 - 16 mmol/L    Glucose 148 (H) 65 - 100 mg/dL    BUN 15 8 - 23 MG/DL    Creatinine 0.82 0.8 - 1.5 MG/DL    GFR est AA >60 >60 ml/min/1.73m2    GFR est non-AA >60 >60 ml/min/1.73m2    Calcium 8.6 8.3 - 10.4 MG/DL   CBC W/O DIFF    Collection Time: 12/05/21  4:21 AM   Result Value Ref Range    WBC 6.8 4.3 - 11.1 K/uL    RBC 3.63 (L) 4.23 - 5.6 M/uL    HGB 9.6 (L) 13.6 - 17.2 g/dL    HCT 30.3 (L) 41.1 - 50.3 %    MCV 83.5 79.6 - 97.8 FL    MCH 26.4 26.1 - 32.9 PG    MCHC 31.7 31.4 - 35.0 g/dL    RDW 15.9 (H) 11.9 - 14.6 %    PLATELET 163 (L) 148 - 450 K/uL    MPV 9.8 9.4 - 12.3 FL    ABSOLUTE NRBC 0.00 0.0 - 0.2 K/uL   PLEASE READ & DOCUMENT PPD TEST IN 24 HRS    Collection Time: 12/05/21  2:34 PM   Result Value Ref Range    PPD Negative Negative    mm Induration 0 0 - 5 mm   METABOLIC PANEL, BASIC    Collection Time: 12/06/21  2:15 AM   Result Value Ref Range    Sodium 138 136 - 145 mmol/L    Potassium 4.0 3.5 - 5.1 mmol/L    Chloride 104 98 - 107 mmol/L    CO2 27 21 - 32 mmol/L    Anion gap 7 7 - 16 mmol/L    Glucose 209 (H) 65 - 100 mg/dL    BUN 22 8 - 23 MG/DL    Creatinine 1.12 0.8 - 1.5 MG/DL    GFR est AA >60 >60 ml/min/1.73m2    GFR est non-AA >60 >60 ml/min/1.73m2    Calcium 8.9 8.3 - 10.4 MG/DL   CBC W/O DIFF    Collection Time: 12/06/21  2:15 AM   Result Value Ref Range    WBC 11.7 (H) 4.3 - 11.1 K/uL    RBC 3.73 (L) 4.23 - 5.6 M/uL    HGB 9.6 (L) 13.6 - 17.2 g/dL    HCT 30.5 (L) 41.1 - 50.3 %    MCV 81.8 79.6 - 97.8 FL    MCH 25.7 (L) 26.1 - 32.9 PG    MCHC 31.5 31.4 - 35.0 g/dL    RDW 15.9 (H) 11.9 - 14.6 %    PLATELET 811 164 - 852 K/uL    MPV 9.8 9.4 - 12.3 FL    ABSOLUTE NRBC 0.00 0.0 - 0.2 K/uL       All Micro Results     Procedure Component Value Units Date/Time    CULTURE, URINE [[id number]]  (Abnormal) Collected: 12/04/21 0302    Order Status: Completed Specimen: Urine from Clean catch Updated: 12/06/21 0740     Special Requests: NO SPECIAL REQUESTS        Culture result:       [id number] COLONIES/mL PRESUMPTIVE ENTEROCOCCUS SPECIES IDENTIFICATION AND SUSCEPTIBILITY TO FOLLOW          BLOOD CULTURE [[id number]] Collected: 12/03/21 1651    Order Status: Completed Specimen: Blood Updated: 12/06/21 0653     Special Requests: --        LEFT  ARM       Culture result: NO GROWTH 3 DAYS       BLOOD CULTURE [[id number]] Collected: 12/03/21 1651    Order Status: Completed Specimen: Blood Updated: 12/06/21 0653     Special Requests: --        RIGHT  HAND       Culture result: NO GROWTH 3 DAYS       RESPIRATORY VIRUS PANEL W/COVID-19, PCR [[id number]] Collected: 12/03/21 1651    Order Status: Completed Specimen: Nasopharyngeal Updated: 12/03/21 1857     Adenovirus NOT DETECTED        Coronavirus 229E NOT DETECTED        Coronavirus HKU1 NOT DETECTED        Coronavirus CVNL63 NOT DETECTED        Coronavirus OC43 NOT DETECTED        SARS-CoV-2, PCR NOT DETECTED        Metapneumovirus NOT DETECTED        Rhinovirus and Enterovirus NOT DETECTED        Influenza A NOT DETECTED        Influenza B NOT DETECTED        Parainfluenza 1 NOT DETECTED        Parainfluenza 2 NOT DETECTED        Parainfluenza 3 NOT DETECTED        Parainfluenza virus 4 NOT DETECTED        RSV by PCR NOT DETECTED        B. parapertussis, PCR NOT DETECTED        Bordetella pertussis - PCR NOT DETECTED        Chlamydophila pneumoniae DNA, QL, PCR NOT DETECTED        Mycoplasma pneumoniae DNA, QL, PCR NOT DETECTED             Other Studies:  No results found.     Current Meds:  Current Facility-Administered Medications   Medication Dose Route Frequency    HYDROcodone-acetaminophen (NORCO) 5-325 mg per tablet 1 Tablet  1 Tablet Oral Q4H PRN    metoprolol succinate (TOPROL-XL) tablet 100 mg  100 mg Oral Q12H    lisinopriL (PRINIVIL, ZESTRIL) tablet 5 mg  5 mg Oral DAILY    dilTIAZem (CARDIZEM) injection 10 mg  10 mg IntraVENous Q6H PRN    potassium chloride (KLOR-CON M10) tablet 20 mEq  20 mEq Oral BID    budesonide-formoteroL (SYMBICORT) 160-4.5 mcg/actuation HFA inhaler 2 Puff  2 Puff Inhalation BID RT    aspirin chewable tablet 81 mg  81 mg Oral DAILY    atorvastatin (LIPITOR) tablet 40 mg  40 mg Oral QHS    albuterol (PROVENTIL VENTOLIN) nebulizer solution 2.5 mg  2.5 mg Nebulization Q4H PRN    albuterol-ipratropium (DUO-NEB) 2.5 MG-0.5 MG/3 ML  3 mL Nebulization Q4H RT    sodium chloride (NS) flush 5-40 mL  5-40 mL IntraVENous Q8H    sodium chloride (NS) flush 5-40 mL  5-40 mL IntraVENous PRN    acetaminophen (TYLENOL) tablet 650 mg  650 mg Oral Q6H PRN    Or    acetaminophen (TYLENOL) suppository 650 mg  650 mg Rectal Q6H PRN    polyethylene glycol (MIRALAX) packet 17 g  17 g Oral DAILY PRN    ondansetron (ZOFRAN ODT) tablet 4 mg  4 mg Oral Q8H PRN    Or    ondansetron (ZOFRAN) injection 4 mg  4 mg IntraVENous Q6H PRN    furosemide (LASIX) tablet 40 mg  40 mg Oral BID    baclofen (LIORESAL) tablet 10 mg  10 mg Oral TID PRN    methylPREDNISolone (PF) (SOLU-MEDROL) injection 40 mg  40 mg IntraVENous Q8H    cefTRIAXone (ROCEPHIN) 1 g in 0.9% sodium chloride (MBP/ADV) 50 mL MBP  1 g IntraVENous Q24H    doxycycline (VIBRAMYCIN) capsule 100 mg  100 mg Oral Q12H    dabigatran etexilate (PRADAXA) capsule 150 mg  150 mg Oral Q12H       Signed:  Karena Sauer MD    Part of this note may have been written by using a voice dictation software. The note has been proof read but may still contain some grammatical/other typographical errors.

## 2021-12-06 NOTE — PROGRESS NOTES
[facility] [[email or web address]] CARDIOLOGY PROGRESS NOTE           12/6/2021 11:29 AM    Admit Date: 12/3/2021      Subjective: The patient brings documentation of a prior PCI in 2016 in [facility]. He reports having a history of atrial fibrillation ablation. The patient also brings documentation of a 26 mm TAVR from July 2018. He reports that his breathing is much improved as is his heart rate. ROS:  Constitutional:   Negative unexplained weight loss. Eyes:   Negative for unexplained blindness. ENT:   Negative for unexplained hearing loss. Respiratory:   Negative for unexplained hemoptysis. Cardiovascular:   Negative except as noted in HPI. Gastrointestinal:   Negative for unexplained vomiting. Genitourinary:   Negative for unexplained hematuria. Integumentary:   Negative for unexplained rash. Hematologic/Lymphatic:   Negative for unexplained excessive bleeding. Musculoskeletal:  Negative for unexplained joint pain. Neurological:   Negative for stroke. Behavioral/Psych:   Negative for suicidal ideations. Endocrine:   Negative for uncontrolled diabetic symptoms including polyuria, polydipsia. Objective:      Vitals:    12/06/21 0342 12/06/21 0433 12/06/21 0811 12/06/21 0837   BP:  (!) 116/55  138/68   Pulse:  74  74   Resp:  18  18   Temp:  97.8 °F (36.6 °C)  98.1 °F (36.7 °C)   SpO2: 97% 97% 96% 98%   Weight:  214 lb (97.1 kg)     Height:           Physical Exam:  General-No Acute Distress  Neck- supple, no JVD  CV-irregularly irregular no RG  Lung- clear bilaterally  Abd- soft, nontender, nondistended  Ext- no edema bilaterally. Skin- warm and dry    Data Review:   Recent Labs     12/06/21  0215 12/05/21  0421    138   K 4.0 4.2   BUN 22 15   CREA 1.12 0.82   * 148*   WBC 11.7* 6.8   HGB 9.6* 9.6*   HCT 30.5* 30.3*    147*       Assessment/Plan:     1. Atrial fibrillation, unspecified type  2. History of TAVR  3.  CAD in native artery  4.   History of pulmonary fibrosis  5. Heart failure with midrange ejection fraction  6. Community-acquired pneumonia    The patient is in atrial fibrillation but is rate controlled and symptomatically improved. The patient has persistent versus longstanding persistent atrial fibrillation. He reports undergoing a prior ablation procedure. Medical records will need to be obtained from [facility]. He is on oral anticoagulation as well as p.o. Lasix. He is currently clinically euvolemic. Consider pulmonary medicine consultation in the setting of COPD and history of pulmonary fibrosis. Hospitalist documentation reviewed, and the patient is currently on antibiotics for pneumonia.     Willian Rodarte MD

## 2021-12-07 VITALS
BODY MASS INDEX: 25.17 KG/M2 | WEIGHT: 189.9 LBS | RESPIRATION RATE: 18 BRPM | HEART RATE: 63 BPM | TEMPERATURE: 97.9 F | OXYGEN SATURATION: 96 % | SYSTOLIC BLOOD PRESSURE: 126 MMHG | HEIGHT: 73 IN | DIASTOLIC BLOOD PRESSURE: 85 MMHG

## 2021-12-07 LAB
ANION GAP SERPL CALC-SCNC: 6 MMOL/L (ref 7–16)
BACTERIA SPEC CULT: ABNORMAL
BUN SERPL-MCNC: 24 MG/DL (ref 8–23)
CALCIUM SERPL-MCNC: 8.8 MG/DL (ref 8.3–10.4)
CHLORIDE SERPL-SCNC: 102 MMOL/L (ref 98–107)
CO2 SERPL-SCNC: 31 MMOL/L (ref 21–32)
CREAT SERPL-MCNC: 0.96 MG/DL (ref 0.8–1.5)
ERYTHROCYTE [DISTWIDTH] IN BLOOD BY AUTOMATED COUNT: 15.7 % (ref 11.9–14.6)
GLUCOSE SERPL-MCNC: 104 MG/DL (ref 65–100)
HCT VFR BLD AUTO: 33.4 % (ref 41.1–50.3)
HGB BLD-MCNC: 10.4 G/DL (ref 13.6–17.2)
MCH RBC QN AUTO: 25.3 PG (ref 26.1–32.9)
MCHC RBC AUTO-ENTMCNC: 31.1 G/DL (ref 31.4–35)
MCV RBC AUTO: 81.3 FL (ref 79.6–97.8)
NRBC # BLD: 0 K/UL (ref 0–0.2)
PLATELET # BLD AUTO: 209 K/UL (ref 150–450)
PMV BLD AUTO: 9.7 FL (ref 9.4–12.3)
POTASSIUM SERPL-SCNC: 3.8 MMOL/L (ref 3.5–5.1)
RBC # BLD AUTO: 4.11 M/UL (ref 4.23–5.6)
SERVICE CMNT-IMP: ABNORMAL
SODIUM SERPL-SCNC: 139 MMOL/L (ref 138–145)
WBC # BLD AUTO: 12.7 K/UL (ref 4.3–11.1)

## 2021-12-07 PROCEDURE — 94760 N-INVAS EAR/PLS OXIMETRY 1: CPT

## 2021-12-07 PROCEDURE — 99232 SBSQ HOSP IP/OBS MODERATE 35: CPT | Performed by: INTERNAL MEDICINE

## 2021-12-07 PROCEDURE — 74011636637 HC RX REV CODE- 636/637: Performed by: INTERNAL MEDICINE

## 2021-12-07 PROCEDURE — 74011000250 HC RX REV CODE- 250: Performed by: FAMILY MEDICINE

## 2021-12-07 PROCEDURE — 74011250637 HC RX REV CODE- 250/637: Performed by: INTERNAL MEDICINE

## 2021-12-07 PROCEDURE — 80048 BASIC METABOLIC PNL TOTAL CA: CPT

## 2021-12-07 PROCEDURE — 94640 AIRWAY INHALATION TREATMENT: CPT

## 2021-12-07 PROCEDURE — 74011250637 HC RX REV CODE- 250/637: Performed by: FAMILY MEDICINE

## 2021-12-07 PROCEDURE — 85027 COMPLETE CBC AUTOMATED: CPT

## 2021-12-07 PROCEDURE — 2709999900 HC NON-CHARGEABLE SUPPLY

## 2021-12-07 RX ORDER — AMOXICILLIN AND CLAVULANATE POTASSIUM 875; 125 MG/1; MG/1
1 TABLET, FILM COATED ORAL EVERY 12 HOURS
Status: DISCONTINUED | OUTPATIENT
Start: 2021-12-07 | End: 2021-12-07 | Stop reason: HOSPADM

## 2021-12-07 RX ORDER — PREDNISONE 20 MG/1
TABLET ORAL
Qty: 10 TABLET | Refills: 0 | OUTPATIENT
Start: 2021-12-07 | End: 2021-12-19

## 2021-12-07 RX ORDER — AMOXICILLIN AND CLAVULANATE POTASSIUM 875; 125 MG/1; MG/1
1 TABLET, FILM COATED ORAL EVERY 12 HOURS
Qty: 10 TABLET | Refills: 0 | Status: SHIPPED | OUTPATIENT
Start: 2021-12-07 | End: 2021-12-12

## 2021-12-07 RX ORDER — AMOXICILLIN AND CLAVULANATE POTASSIUM 875; 125 MG/1; MG/1
1 TABLET, FILM COATED ORAL EVERY 12 HOURS
Status: DISCONTINUED | OUTPATIENT
Start: 2021-12-07 | End: 2021-12-07

## 2021-12-07 RX ORDER — LISINOPRIL 5 MG/1
5 TABLET ORAL DAILY
Qty: 30 TABLET | Refills: 1 | Status: SHIPPED | OUTPATIENT
Start: 2021-12-07 | End: 2022-04-09

## 2021-12-07 RX ORDER — DABIGATRAN ETEXILATE 150 MG/1
150 CAPSULE ORAL EVERY 12 HOURS
Qty: 60 CAPSULE | Refills: 1 | Status: SHIPPED | OUTPATIENT
Start: 2021-12-07

## 2021-12-07 RX ORDER — METOPROLOL SUCCINATE 100 MG/1
100 TABLET, EXTENDED RELEASE ORAL EVERY 12 HOURS
Qty: 60 TABLET | Refills: 1 | Status: SHIPPED | OUTPATIENT
Start: 2021-12-07

## 2021-12-07 RX ADMIN — FUROSEMIDE 40 MG: 40 TABLET ORAL at 09:08

## 2021-12-07 RX ADMIN — DABIGATRAN ETEXILATE MESYLATE 150 MG: 150 CAPSULE ORAL at 09:08

## 2021-12-07 RX ADMIN — ASPIRIN 81 MG: 81 TABLET, CHEWABLE ORAL at 09:08

## 2021-12-07 RX ADMIN — AMOXICILLIN AND CLAVULANATE POTASSIUM 1 TABLET: 875; 125 TABLET, FILM COATED ORAL at 09:13

## 2021-12-07 RX ADMIN — POTASSIUM CHLORIDE 20 MEQ: 10 TABLET, EXTENDED RELEASE ORAL at 09:07

## 2021-12-07 RX ADMIN — DOXYCYCLINE HYCLATE 100 MG: 100 CAPSULE ORAL at 09:08

## 2021-12-07 RX ADMIN — HYDROCODONE BITARTRATE AND ACETAMINOPHEN 1 TABLET: 5; 325 TABLET ORAL at 00:56

## 2021-12-07 RX ADMIN — BUDESONIDE AND FORMOTEROL FUMARATE DIHYDRATE 2 PUFF: 160; 4.5 AEROSOL RESPIRATORY (INHALATION) at 07:59

## 2021-12-07 RX ADMIN — METOPROLOL SUCCINATE 100 MG: 100 TABLET, EXTENDED RELEASE ORAL at 09:08

## 2021-12-07 RX ADMIN — Medication 10 ML: at 06:38

## 2021-12-07 RX ADMIN — HYDROCODONE BITARTRATE AND ACETAMINOPHEN 1 TABLET: 5; 325 TABLET ORAL at 07:19

## 2021-12-07 RX ADMIN — LISINOPRIL 5 MG: 5 TABLET ORAL at 09:08

## 2021-12-07 RX ADMIN — Medication 10 ML: at 06:39

## 2021-12-07 RX ADMIN — PREDNISONE 40 MG: 20 TABLET ORAL at 09:07

## 2021-12-07 RX ADMIN — IPRATROPIUM BROMIDE AND ALBUTEROL SULFATE 3 ML: .5; 3 SOLUTION RESPIRATORY (INHALATION) at 07:59

## 2021-12-07 NOTE — DISCHARGE SUMMARY
Hospitalist Discharge Summary   Admit Date:  12/3/2021  4:33 PM   DC Note date: 2021  Name:  Chidi Naylor   Age:  77 y.o. Sex:  male  :  1955   MRN:  [id number]   Room:  [facility]  PCP:  Lesvia Chavira MD    Presenting Complaint: Chest Pain (Angina) (a fib rvr)    Initial Admission Diagnosis: Atrial fibrillation with rapid ventricular response (HCC) [I48.91]  CAP (community acquired pneumonia) [J18.9]  Sepsis ([facility] [A41.9]     Problem List for this Hospitalization:  Hospital Problems as of 2021 Never Reviewed          Codes Class Noted - Resolved POA    * (Principal) COPD with acute lower respiratory infection ([facility] ICD-10-CM: J44.0  ICD-9-CM: 930, 519.8  2021 - Present Yes        History of congestive heart failure (Chronic) ICD-10-CM: Z86.79  ICD-9-CM: V12.59  12/3/2021 - Present Yes        CAP (community acquired pneumonia) ICD-10-CM: J18.9  ICD-9-CM: 486  12/3/2021 - Present Yes        Atrial fibrillation with rapid ventricular response ([facility] ICD-10-CM: I48.91  ICD-9-CM: 427.31  12/3/2021 - Present Yes        COPD (chronic obstructive pulmonary disease) ([facility] (Chronic) ICD-10-CM: J44.9  ICD-9-CM: 583  12/3/2021 - Present Yes        Pulmonary fibrosis ([facility] (Chronic) ICD-10-CM: J84.10  ICD-9-CM: 388  12/3/2021 - Present Yes        Leg injury (Chronic) ICD-10-CM: O78.44FF  ICD-9-CM: 959.7  12/3/2021 - Present Yes        Septic shock with acute organ dysfunction due to pneumococcus [facility]) ICD-10-CM: A40.3, R65.21  ICD-9-CM: 038.2, 785.52, 995.92  12/3/2021 - Present Yes            Did Patient have Sepsis (YES OR NO): yes    Admission HPI from 12/3/2021:  \" Chidi Naylor is a 77 y.o. male with medical history of afib on pradaxa, COPD, CHF who presented to ED with afib with RVR. Patient was sent to the ER by Cardiology after he presented to their office today with HR in the 160s. He was found to be SOB as well. Upon ER workup, patient remained in RVR.   Patient is also febrile with a leukocytosis. CXR shows evidence of a new mid-lower L lung infiltrate. Patient was started on cardizem drip in ER, but this has caused the patient to become hypotensive. He is currently receiving a fluid bolus during my beside evaluation, and cardizem drip has been stopped. HR is in the 60s, still irregular. Hospitalist to admit. \"    Hospital Course:  Admitted with septic shock, afib RVR, COPD exacerbation from bacterial PNA. started on levophed and admitted to ICU. Cardiology followed. eliquis changed to pradaxa. Diltiazem changed to metoprolol. Lisinopril dose was reduced. CHF remained stable on home lasix. He has been on RA since yesterday morning and OK for discharge today with follow up. Disposition: Home Health Care Svc  Diet: ADULT DIET Regular; Low Fat/Low Chol/High Fiber/ARIC  Code Status: Full Code    Follow Up Orders: Follow-up Appointments   Procedures    FOLLOW UP VISIT Appointment in: One Week PCP     PCP     Standing Status:   Standing     Number of Occurrences:   1     Order Specific Question:   Appointment in     Answer: One Week       Follow-up Information     Follow up With Specialties Details Why [address] [facility] [address]  [phone]    Other, MD Lesvia    Patient can only remember the practice name and not the physician            Follow up labs/diagnostics (ultimately defer to outpatient provider):  CBC, BMP    Time spent in patient discharge and coordination 34 minutes. Plan was discussed with pt. All questions answered. Patient was stable at time of discharge. Instructions given to call a physician or return if any concerns. Discharge Info:   Current Discharge Medication List      START taking these medications    Details   metoprolol succinate (TOPROL-XL) 100 mg tablet Take 1 Tablet by mouth every twelve (12) hours.   Qty: 60 Tablet, Refills: 1  Start date: 12/7/2021 amoxicillin-clavulanate (AUGMENTIN) 875-125 mg per tablet Take 1 Tablet by mouth every twelve (12) hours for 10 doses. Qty: 10 Tablet, Refills: 0  Start date: 12/7/2021, End date: 12/12/2021         CONTINUE these medications which have CHANGED    Details   dabigatran etexilate (Pradaxa) 150 mg capsule Take 1 Capsule by mouth every twelve (12) hours. Qty: 60 Capsule, Refills: 1  Start date: 12/7/2021      lisinopriL (PRINIVIL, ZESTRIL) 5 mg tablet Take 1 Tablet by mouth daily. Qty: 30 Tablet, Refills: 1  Start date: 12/7/2021      predniSONE (DELTASONE) 20 mg tablet Take 40mg qday x3d, then 20mg qday x3d, then 10mg x2d, then stop. Qty: 10 Tablet, Refills: 0  Start date: 12/7/2021         CONTINUE these medications which have NOT CHANGED    Details   furosemide (LASIX) 40 mg tablet Take 40 mg by mouth two (2) times a day. potassium chloride (K-DUR, KLOR-CON) 20 mEq tablet Take 20 mEq by mouth two (2) times a day. baclofen (LIORESAL) 10 mg tablet Take 10 mg by mouth three (3) times daily. aspirin 81 mg chewable tablet Take 81 mg by mouth daily. atorvastatin (LIPITOR) 40 mg tablet Take 40 mg by mouth daily. albuterol (PROVENTIL VENTOLIN) 2.5 mg /3 mL (0.083 %) nebu 2.5 mg by Nebulization route every four (4) hours as needed for Shortness of Breath. albuterol (PROVENTIL HFA, VENTOLIN HFA, PROAIR HFA) 90 mcg/actuation inhaler Take 2 Puffs by inhalation every four (4) hours as needed for Wheezing or Shortness of Breath. fluticasone propion-salmeteroL (ADVAIR/WIXELA) 250-50 mcg/dose diskus inhaler Take 1 Puff by inhalation every twelve (12) hours. albuterol-ipratropium (DUO-NEB) 2.5 mg-0.5 mg/3 ml nebu 3 mL by Nebulization route every four (4) hours as needed for Shortness of Breath.          STOP taking these medications       oxyCODONE-acetaminophen (PERCOCET 10)  mg per tablet Comments:   Reason for Stopping:         dilTIAZem ER (TIAZAC) 120 mg capsule Comments: Reason for Stopping:         apixaban (ELIQUIS) 5 mg tablet Comments:   Reason for Stopping:               Procedures done this admission:  * No surgery found *    Consults this admission:  IP CONSULT TO CARDIOLOGY    Echocardiogram/EKG results:  Results from Hospital Encounter encounter on 12/03/21    ECHO ADULT COMPLETE    Interpretation Summary  · LV: Estimated LVEF is 40 - 45%. Normal wall thickness. Mildly dilated left ventricle. Globally reduced systolic function. · LA: Severely dilated left atrium. · RV: Borderline low systolic function. · RA: Severely dilated right atrium. · AV: Prosthetic aortic valve. 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 Prosthesis is normal.  · MV: Mitral valve thickening. Mitral valve leaflet calcification. Mild mitral valve stenosis is present. Mild to moderate mitral valve regurgitation is present. · TV: Mild to moderate tricuspid valve regurgitation is present. Right Ventricular Arterial Pressure (RVSP) is 24 mmHg.        EKG Results     Procedure 720 Value Units Date/Time    EKG 12 LEAD SUBSEQUENT [[id number]] Collected: 12/03/21 1914    Order Status: Completed Updated: 12/04/21 0759     Ventricular Rate 77 BPM      Atrial Rate 231 BPM      QRS Duration 111 ms      Q-T Interval 370 ms      QTC Calculation (Bezet) 419 ms      Calculated R Axis 75 degrees      Calculated T Axis 74 degrees      Diagnosis --     Atrial fibrillation  RSR' in V1 or V2, right VCD or RVH    Confirmed by Mona Vazquez MD (), Katty Mar ([id number]) on 12/4/2021 7:59:20 AM      EKG, 12 LEAD, INITIAL [[id number]] Collected: 12/03/21 1636    Order Status: Completed Updated: 12/04/21 0758     Ventricular Rate 160 BPM      Atrial Rate 224 BPM      QRS Duration 93 ms      Q-T Interval 226 ms      QTC Calculation (Bezet) 369 ms      Calculated R Axis 76 degrees      Calculated T Axis 30 degrees      Diagnosis --     Atrial fibrillation with rapid V-rate  RSR' in V1 or V2, probably normal variant  Repolarization abnormality, prob rate related    Confirmed by Guille Rich MD (), Eleni Carmichael ([id number]) on 12/4/2021 7:58:16 AM            Diagnostic Imaging/Tests:   XR CHEST PORT    Result Date: 12/4/2021  Portable chest xray  COMPARISON: December 3, 2021. CLINICAL HISTORY: Post central line placement. FINDINGS: There is a right IJ line, with the tip in the area of SVC. Diffuse groundglass densities in the left lung, similar to prior exam. New groundglass densities in the right upper lobe. No evidence of pneumothorax. No large pleural effusion. Heart appears mildly enlarged. Mediastinal contour is within normal limits. Prosthetic aortic valve is noted. 1. Right IJ line tip is in the area of SVC. No evidence of pneumothorax. 2. Airspace densities in the left lung, similar to prior exam and new airspace densities in the right upper lobe. Findings suspicious for progression of pulmonary edema. XR CHEST PORT    Result Date: 12/3/2021  Chest X-ray INDICATION: Meets SIRS criteria. COMPARISON: Chest x-ray 11/29/2021. A portable AP view of the chest was obtained. FINDINGS: Interstitial lung changes are noted in the mid to lower left lung. Right lung is grossly clear. No pneumothorax or pleural effusions. Transcatheter aortic valve replacement is noted. Spinal stimulator leads noted in the mid to lower thoracic spine region. Lower cervical fusion plate is partially imaged. The heart size is normal.  The bony thorax is intact. New mid to lower left lung infiltrate concerning for pneumonia. ECHO ADULT COMPLETE    Result Date: 12/4/2021  · LV: Estimated LVEF is 40 - 45%. Normal wall thickness. Mildly dilated left ventricle. Globally reduced systolic function. · LA: Severely dilated left atrium. · RV: Borderline low systolic function. · RA: Severely dilated right atrium. · AV: Prosthetic aortic valve. 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  Prosthesis is normal. · MV: Mitral valve thickening. Mitral valve leaflet calcification. Mild mitral valve stenosis is present. Mild to moderate mitral valve regurgitation is present. · TV: Mild to moderate tricuspid valve regurgitation is present. Right Ventricular Arterial Pressure (RVSP) is 24 mmHg.         All Micro Results     Procedure Component Value Units Date/Time    BLOOD CULTURE [[id number]] Collected: 12/03/21 1651    Order Status: Completed Specimen: Blood Updated: 12/07/21 0751     Special Requests: --        LEFT  ARM       Culture result: NO GROWTH 4 DAYS       BLOOD CULTURE [[id number]] Collected: 12/03/21 1651    Order Status: Completed Specimen: Blood Updated: 12/07/21 0751     Special Requests: --        RIGHT  HAND       Culture result: NO GROWTH 4 DAYS       CULTURE, URINE [[id number]]  (Abnormal)  (Susceptibility) Collected: 12/04/21 0302    Order Status: Completed Specimen: Urine from Clean catch Updated: 12/07/21 0702     Special Requests: NO SPECIAL REQUESTS        Culture result:       [id number] COLONIES/mL ENTEROCOCCUS FAECALIS GROUP D          RESPIRATORY VIRUS PANEL W/COVID-19, PCR [[id number]] Collected: 12/03/21 1651    Order Status: Completed Specimen: Nasopharyngeal Updated: 12/03/21 1857     Adenovirus NOT DETECTED        Coronavirus 229E NOT DETECTED        Coronavirus HKU1 NOT DETECTED        Coronavirus CVNL63 NOT DETECTED        Coronavirus OC43 NOT DETECTED        SARS-CoV-2, PCR NOT DETECTED        Metapneumovirus NOT DETECTED        Rhinovirus and Enterovirus NOT DETECTED        Influenza A NOT DETECTED        Influenza B NOT DETECTED        Parainfluenza 1 NOT DETECTED        Parainfluenza 2 NOT DETECTED        Parainfluenza 3 NOT DETECTED        Parainfluenza virus 4 NOT DETECTED        RSV by PCR NOT DETECTED        B. parapertussis, PCR NOT DETECTED        Bordetella pertussis - PCR NOT DETECTED        Chlamydophila pneumoniae DNA, QL, PCR NOT DETECTED        Mycoplasma pneumoniae DNA, QL, PCR NOT DETECTED Labs: Results:       BMP, Mg, Phos Recent Labs     12/07/21  0516 12/06/21 0215 12/05/21 0421    138 138   K 3.8 4.0 4.2    104 109*   CO2 31 27 24   AGAP 6* 7 5*   BUN 24* 22 15   CREA 0.96 1.12 0.82   CA 8.8 8.9 8.6   * 209* 148*      CBC Recent Labs     12/07/21  0516 12/06/21 0215 12/05/21 0421   WBC 12.7* 11.7* 6.8   RBC 4.11* 3.73* 3.63*   HGB 10.4* 9.6* 9.6*   HCT 33.4* 30.5* 30.3*    176 147*      LFT No results for input(s): ALT, TBIL, AP, TP, ALB, GLOB, AGRAT in the last 72 hours.     No lab exists for component: SGOT, GPT   Cardiac Testing No results found for: BNPP, BNP, CPK, RCK1, RCK2, RCK3, RCK4, CKMB, CKNDX, CKND1, TROPT, TROIQ   Coagulation Tests No results found for: PTP, INR, APTT, INREXT   A1c No results found for: HBA1C, [id number]   Lipid Panel No results found for: CHOL, CHOLPOCT, CHOLX, CHLST, CHOLV, 393417, HDL, HDLP, LDL, LDLC, DLDLP, [id number], VLDLC, VLDL, TGLX, TRIGL, TRIGP, TGLPOCT, CHHD, CHHDX   Thyroid Panel No results found for: TSH, T4, FT4, TT3, T3U, TSHEXT     Most Recent UA No results found for: COLOR, APPRN, REFSG, JULIA, PROTU, GLUCU, KETU, BILU, BLDU, UROU, PASCUAL, LEUKU, WBCU, RBCU, UEPI, BACTU, CASTS, UCRY, MUCUS, UCOM       All Labs from Last 24 Hrs:  Recent Results (from the past 24 hour(s))   PLEASE READ & DOCUMENT PPD TEST IN 48 HRS    Collection Time: 12/06/21  2:34 PM   Result Value Ref Range    PPD Negative Negative    mm Induration 0 0 - 5 mm   METABOLIC PANEL, BASIC    Collection Time: 12/07/21  5:16 AM   Result Value Ref Range    Sodium 139 138 - 145 mmol/L    Potassium 3.8 3.5 - 5.1 mmol/L    Chloride 102 98 - 107 mmol/L    CO2 31 21 - 32 mmol/L    Anion gap 6 (L) 7 - 16 mmol/L    Glucose 104 (H) 65 - 100 mg/dL    BUN 24 (H) 8 - 23 MG/DL    Creatinine 0.96 0.8 - 1.5 MG/DL    GFR est AA >60 >60 ml/min/1.73m2    GFR est non-AA >60 >60 ml/min/1.73m2    Calcium 8.8 8.3 - 10.4 MG/DL   CBC W/O DIFF    Collection Time: 12/07/21  5:16 AM   Result Value Ref Range    WBC 12.7 (H) 4.3 - 11.1 K/uL    RBC 4.11 (L) 4.23 - 5.6 M/uL    HGB 10.4 (L) 13.6 - 17.2 g/dL    HCT 33.4 (L) 41.1 - 50.3 %    MCV 81.3 79.6 - 97.8 FL    MCH 25.3 (L) 26.1 - 32.9 PG    MCHC 31.1 (L) 31.4 - 35.0 g/dL    RDW 15.7 (H) 11.9 - 14.6 %    PLATELET 362 358 - 418 K/uL    MPV 9.7 9.4 - 12.3 FL    ABSOLUTE NRBC 0.00 0.0 - 0.2 K/uL       Current Med List in Hospital:   Current Facility-Administered Medications   Medication Dose Route Frequency    amoxicillin-clavulanate (AUGMENTIN) 875-125 mg per tablet 1 Tablet  1 Tablet Oral Q12H    predniSONE (DELTASONE) tablet 40 mg  40 mg Oral DAILY WITH BREAKFAST    HYDROcodone-acetaminophen (NORCO) 5-325 mg per tablet 1 Tablet  1 Tablet Oral Q4H PRN    metoprolol succinate (TOPROL-XL) tablet 100 mg  100 mg Oral Q12H    lisinopriL (PRINIVIL, ZESTRIL) tablet 5 mg  5 mg Oral DAILY    dilTIAZem (CARDIZEM) injection 10 mg  10 mg IntraVENous Q6H PRN    potassium chloride (KLOR-CON M10) tablet 20 mEq  20 mEq Oral BID    budesonide-formoteroL (SYMBICORT) 160-4.5 mcg/actuation HFA inhaler 2 Puff  2 Puff Inhalation BID RT    aspirin chewable tablet 81 mg  81 mg Oral DAILY    atorvastatin (LIPITOR) tablet 40 mg  40 mg Oral QHS    albuterol (PROVENTIL VENTOLIN) nebulizer solution 2.5 mg  2.5 mg Nebulization Q4H PRN    albuterol-ipratropium (DUO-NEB) 2.5 MG-0.5 MG/3 ML  3 mL Nebulization Q4H RT    sodium chloride (NS) flush 5-40 mL  5-40 mL IntraVENous Q8H    sodium chloride (NS) flush 5-40 mL  5-40 mL IntraVENous PRN    acetaminophen (TYLENOL) tablet 650 mg  650 mg Oral Q6H PRN    Or    acetaminophen (TYLENOL) suppository 650 mg  650 mg Rectal Q6H PRN    polyethylene glycol (MIRALAX) packet 17 g  17 g Oral DAILY PRN    ondansetron (ZOFRAN ODT) tablet 4 mg  4 mg Oral Q8H PRN    Or    ondansetron (ZOFRAN) injection 4 mg  4 mg IntraVENous Q6H PRN    furosemide (LASIX) tablet 40 mg  40 mg Oral BID    baclofen (LIORESAL) tablet 10 mg  10 mg Oral TID PRN    doxycycline (VIBRAMYCIN) capsule 100 mg  100 mg Oral Q12H    dabigatran etexilate (PRADAXA) capsule 150 mg  150 mg Oral Q12H       Allergies   Allergen Reactions    Tegretol [Carbamazepine] Anaphylaxis    Ativan [Lorazepam] Other (comments)     Violent behavior       Immunization History   Administered Date(s) Administered    TB Skin Test (PPD) Intradermal 12/04/2021       Recent Vital Data:  Patient Vitals for the past 24 hrs:   Temp Pulse Resp BP SpO2   12/07/21 0810 97.9 °F (36.6 °C) 63 18 126/85 96 %   12/07/21 0759 -- -- -- -- 98 %   12/07/21 0432 97.9 °F (36.6 °C) 77 16 134/72 96 %   12/07/21 0126 96.8 °F (36 °C) 80 16 125/81 92 %   12/06/21 2342 -- -- -- -- 98 %   12/06/21 2035 -- -- -- -- 96 %   12/06/21 2028 98.4 °F (36.9 °C) 80 18 129/75 96 %   12/06/21 2000 -- 80 -- -- --   12/06/21 1634 97.9 °F (36.6 °C) 72 18 115/77 95 %   12/06/21 1523 -- -- -- -- 96 %   12/06/21 1225 97.8 °F (36.6 °C) 78 18 122/88 96 %     Oxygen Therapy  O2 Sat (%): 96 % (12/07/21 0810)  Pulse via Oximetry: 65 beats per minute (12/07/21 0759)  O2 Device: None (Room air) (12/07/21 0759)  Skin Assessment: Clean, dry, & intact (12/06/21 0342)  O2 Flow Rate (L/min): 4 l/min (12/06/21 0811)    Estimated body mass index is 25.05 kg/m² as calculated from the following:    Height as of this encounter: 6' 1\" (1.854 m). Weight as of this encounter: 86.1 kg (189 lb 14.4 oz). Intake/Output Summary (Last 24 hours) at 12/7/2021 0910  Last data filed at 12/7/2021 0526  Gross per 24 hour   Intake 380 ml   Output 4875 ml   Net -4495 ml         Physical Exam:  General:    Well nourished. No overt distress  Head:  Normocephalic, atraumatic  Eyes:  Sclerae appear normal.  Pupils equally round. HENT:  Nares appear normal, no drainage. Moist mucous membranes  Neck:  No restricted ROM. Trachea midline  CV:   irregular. No m/r/g. No JVD  Lungs:   CTAB. No wheezing, rhonchi, or rales. Even, unlabored  Abdomen:   nondistended. Extremities: Warm and dry. No cyanosis or clubbing. No edema. Skin:     No rashes. Normal coloration  Neuro:  CN II-XII grossly intact. Psych:  Normal mood and affect. Signed:  Maritza Chowdhury MD    Part of this note may have been written by using a voice dictation software. The note has been proof read but may still contain some grammatical/other typographical errors.

## 2021-12-07 NOTE — PROGRESS NOTES
[facility] CARDIOLOGY PROGRESS NOTE           12/7/2021 9:08 AM    Admit Date: 12/3/2021      Subjective:     Improved symptoms. Plans for discharge today. Adequate rate control of atrial fibrillation. States has been in atrial fibrillation for over a couple years. TAVR/PCI from 2017 per pt    ROS:  Cardiovascular:  As noted above    Objective:      Vitals:    12/07/21 0126 12/07/21 0432 12/07/21 0759 12/07/21 0810   BP: 125/81 134/72  126/85   Pulse: 80 77  63   Resp: 16 16  18   Temp: 96.8 °F (36 °C) 97.9 °F (36.6 °C)  97.9 °F (36.6 °C)   SpO2: 92% 96% 98% 96%   Weight:  189 lb 14.4 oz (86.1 kg)     Height:           Physical Exam:  General-No Acute Distress  Neck- supple, no JVD  CV- irregular rate and rhythm no MRG  Lung- clear bilaterally  Abd- soft, nontender, nondistended  Ext- no edema bilaterally. Skin- warm and dry    Data Review:   Recent Labs     12/07/21  0516 12/06/21  0215    138   K 3.8 4.0   BUN 24* 22   CREA 0.96 1.12   * 209*   WBC 12.7* 11.7*   HGB 10.4* 9.6*   HCT 33.4* 30.5*    176       Assessment/Plan:     Principal Problem:    COPD with acute lower respiratory infection ([address] (12/4/2021)  -improved; per primary    Active Problems:    History of congestive heart failure (12/3/2021)  -Exam euvolemic. Left ventricular ejection fraction mildly impaired at around 80-58%; uncertain duration. Patient states previously moderately impaired.  -Consideration for tachycardic induced versus CAD;  also possible component of prior AS. -Currently with adequate rates on Toprol- mg daily. Plan Holter as an outpatient to assess rate control. Discussed long-term consideration for AV zane ablation/device therapy. Continue Pradaxa. CAP (community acquired pneumonia) (12/3/2021)  -Improved. Per primary      Atrial fibrillation with rapid ventricular response ([address] (12/3/2021)  -Improved rate control.   See above with consideration for Holter as an outpatient. COPD (chronic obstructive pulmonary disease) ([facility] (12/3/2021)      Pulmonary fibrosis ([facility] (12/3/2021)  -Per history. Leg injury (12/3/2021)      Septic shock with acute organ dysfunction due to pneumococcus ([facility] (12/3/2021)  -Improved. Likely contributed to elevated rates on presentation.       Aster Grider MD  12/7/2021 9:08 AM

## 2021-12-07 NOTE — PROGRESS NOTES
Discharge instructions were reviewed with patient. An opportunity was given for questions. All medications were reviewed, and information was given on the new medications. Patient verbalized understanding, and has no questions at this time. Central venous catheter removed, petroleum gauze and tegaderm dressing applied. Pressure held for 10 minutes. Telemetry removed. Patient left with personal belongings. Patient set up [place] to  power chair. Power chair to be stored on unit until pickup. Per patient, [facility] to  [facility] today or tomorrow at the latest.     Patient's sister to transport home.

## 2021-12-07 NOTE — PROGRESS NOTES
Pt is discharging home in stable condition. CM sent referral to [facility]; contracted with the [place]. He reported that his sister will transport him home. No other discharge needs were identified. Tx goals have been met. Care Management Interventions  PCP Verified by CM: Yes [facility])  Mode of Transport at Discharge:  Other (see comment) (Family)  Transition of Care Consult (CM Consult): [facility] Planning, [address] [facility]  Discharge Durable Medical Equipment: No  Physical Therapy Consult: Yes  Occupational Therapy Consult: Yes  Speech Therapy Consult: No  Support Systems: Other Family Member(s), /, Caregiver/Home Care Staff  Confirm Follow Up Transport: Family  The Plan for Transition of Care is Related to the Following Treatment Goals : Return home and back to his baseline  Discharge Location  Discharge Placement: Home with home health

## 2021-12-08 LAB
BACTERIA SPEC CULT: NORMAL
BACTERIA SPEC CULT: NORMAL
SERVICE CMNT-IMP: NORMAL
SERVICE CMNT-IMP: NORMAL

## 2021-12-19 ENCOUNTER — HOSPITAL ENCOUNTER (EMERGENCY)
Age: 66
Discharge: HOME | End: 2021-12-19
Attending: STUDENT IN AN ORGANIZED HEALTH CARE EDUCATION/TRAINING PROGRAM
Payer: MEDICARE

## 2021-12-19 ENCOUNTER — APPOINTMENT (OUTPATIENT)
Dept: GENERAL RADIOLOGY | Age: 66
End: 2021-12-19
Attending: STUDENT IN AN ORGANIZED HEALTH CARE EDUCATION/TRAINING PROGRAM
Payer: MEDICARE

## 2021-12-19 VITALS
RESPIRATION RATE: 20 BRPM | SYSTOLIC BLOOD PRESSURE: 117 MMHG | HEART RATE: 84 BPM | OXYGEN SATURATION: 97 % | BODY MASS INDEX: 25.05 KG/M2 | TEMPERATURE: 98.5 F | WEIGHT: 189 LBS | DIASTOLIC BLOOD PRESSURE: 79 MMHG | HEIGHT: 73 IN

## 2021-12-19 DIAGNOSIS — R07.9 CHEST PAIN, UNSPECIFIED TYPE: ICD-10-CM

## 2021-12-19 DIAGNOSIS — J20.9 ACUTE BRONCHITIS, UNSPECIFIED ORGANISM: Primary | ICD-10-CM

## 2021-12-19 LAB
ALBUMIN SERPL-MCNC: 3.4 G/DL (ref 3.2–4.6)
ALBUMIN/GLOB SERPL: 0.7 {RATIO} (ref 1.2–3.5)
ALP SERPL-CCNC: 113 U/L (ref 50–136)
ALT SERPL-CCNC: 53 U/L (ref 12–65)
ANION GAP SERPL CALC-SCNC: 4 MMOL/L (ref 7–16)
AST SERPL-CCNC: 44 U/L (ref 15–37)
BASOPHILS # BLD: 0.1 K/UL (ref 0–0.2)
BASOPHILS NFR BLD: 1 % (ref 0–2)
BILIRUB SERPL-MCNC: 0.6 MG/DL (ref 0.2–1.1)
BNP SERPL-MCNC: 2743 PG/ML (ref 5–125)
BUN SERPL-MCNC: 26 MG/DL (ref 8–23)
CALCIUM SERPL-MCNC: 9.1 MG/DL (ref 8.3–10.4)
CHLORIDE SERPL-SCNC: 105 MMOL/L (ref 98–107)
CO2 SERPL-SCNC: 26 MMOL/L (ref 21–32)
CREAT SERPL-MCNC: 1.14 MG/DL (ref 0.8–1.5)
DIFFERENTIAL METHOD BLD: ABNORMAL
EOSINOPHIL # BLD: 0.2 K/UL (ref 0–0.8)
EOSINOPHIL NFR BLD: 1 % (ref 0.5–7.8)
ERYTHROCYTE [DISTWIDTH] IN BLOOD BY AUTOMATED COUNT: 17.9 % (ref 11.9–14.6)
GLOBULIN SER CALC-MCNC: 4.6 G/DL (ref 2.3–3.5)
GLUCOSE SERPL-MCNC: 66 MG/DL (ref 65–100)
HCT VFR BLD AUTO: 41.5 % (ref 41.1–50.3)
HGB BLD-MCNC: 12.7 G/DL (ref 13.6–17.2)
IMM GRANULOCYTES # BLD AUTO: 0.1 K/UL (ref 0–0.5)
IMM GRANULOCYTES NFR BLD AUTO: 1 % (ref 0–5)
LIPASE SERPL-CCNC: 278 U/L (ref 73–393)
LYMPHOCYTES # BLD: 2.7 K/UL (ref 0.5–4.6)
LYMPHOCYTES NFR BLD: 21 % (ref 13–44)
MAGNESIUM SERPL-MCNC: 2.3 MG/DL (ref 1.8–2.4)
MCH RBC QN AUTO: 26.5 PG (ref 26.1–32.9)
MCHC RBC AUTO-ENTMCNC: 30.6 G/DL (ref 31.4–35)
MCV RBC AUTO: 86.6 FL (ref 79.6–97.8)
MONOCYTES # BLD: 1.1 K/UL (ref 0.1–1.3)
MONOCYTES NFR BLD: 8 % (ref 4–12)
NEUTS SEG # BLD: 8.9 K/UL (ref 1.7–8.2)
NEUTS SEG NFR BLD: 68 % (ref 43–78)
NRBC # BLD: 0 K/UL (ref 0–0.2)
PLATELET # BLD AUTO: 251 K/UL (ref 150–450)
PMV BLD AUTO: 10.1 FL (ref 9.4–12.3)
POTASSIUM SERPL-SCNC: 5 MMOL/L (ref 3.5–5.1)
PROT SERPL-MCNC: 8 G/DL (ref 6.3–8.2)
RBC # BLD AUTO: 4.79 M/UL (ref 4.23–5.6)
SODIUM SERPL-SCNC: 135 MMOL/L (ref 138–145)
TROPONIN-HIGH SENSITIVITY: 10.9 PG/ML (ref 0–14)
TROPONIN-HIGH SENSITIVITY: 13 PG/ML (ref 0–14)
WBC # BLD AUTO: 13 K/UL (ref 4.3–11.1)

## 2021-12-19 PROCEDURE — 83880 ASSAY OF NATRIURETIC PEPTIDE: CPT

## 2021-12-19 PROCEDURE — 93005 ELECTROCARDIOGRAM TRACING: CPT | Performed by: STUDENT IN AN ORGANIZED HEALTH CARE EDUCATION/TRAINING PROGRAM

## 2021-12-19 PROCEDURE — 74011250636 HC RX REV CODE- 250/636: Performed by: STUDENT IN AN ORGANIZED HEALTH CARE EDUCATION/TRAINING PROGRAM

## 2021-12-19 PROCEDURE — 94664 DEMO&/EVAL PT USE INHALER: CPT

## 2021-12-19 PROCEDURE — 71045 X-RAY EXAM CHEST 1 VIEW: CPT

## 2021-12-19 PROCEDURE — 96374 THER/PROPH/DIAG INJ IV PUSH: CPT

## 2021-12-19 PROCEDURE — 84484 ASSAY OF TROPONIN QUANT: CPT

## 2021-12-19 PROCEDURE — 99285 EMERGENCY DEPT VISIT HI MDM: CPT

## 2021-12-19 PROCEDURE — 83690 ASSAY OF LIPASE: CPT

## 2021-12-19 PROCEDURE — 83735 ASSAY OF MAGNESIUM: CPT

## 2021-12-19 PROCEDURE — 85025 COMPLETE CBC W/AUTO DIFF WBC: CPT

## 2021-12-19 PROCEDURE — 94640 AIRWAY INHALATION TREATMENT: CPT

## 2021-12-19 PROCEDURE — 74011000250 HC RX REV CODE- 250: Performed by: STUDENT IN AN ORGANIZED HEALTH CARE EDUCATION/TRAINING PROGRAM

## 2021-12-19 PROCEDURE — 80053 COMPREHEN METABOLIC PANEL: CPT

## 2021-12-19 PROCEDURE — 96375 TX/PRO/DX INJ NEW DRUG ADDON: CPT

## 2021-12-19 RX ORDER — SODIUM CHLORIDE 0.9 % (FLUSH) 0.9 %
5-10 SYRINGE (ML) INJECTION EVERY 8 HOURS
Status: DISCONTINUED | OUTPATIENT
Start: 2021-12-19 | End: 2021-12-20 | Stop reason: HOSPADM

## 2021-12-19 RX ORDER — IPRATROPIUM BROMIDE AND ALBUTEROL SULFATE 2.5; .5 MG/3ML; MG/3ML
3 SOLUTION RESPIRATORY (INHALATION)
Status: COMPLETED | OUTPATIENT
Start: 2021-12-19 | End: 2021-12-19

## 2021-12-19 RX ORDER — FUROSEMIDE 40 MG/1
40 TABLET ORAL DAILY
Qty: 5 TABLET | Refills: 0 | Status: SHIPPED | OUTPATIENT
Start: 2021-12-19 | End: 2021-12-24

## 2021-12-19 RX ORDER — SODIUM CHLORIDE 0.9 % (FLUSH) 0.9 %
5-10 SYRINGE (ML) INJECTION AS NEEDED
Status: DISCONTINUED | OUTPATIENT
Start: 2021-12-19 | End: 2021-12-20 | Stop reason: HOSPADM

## 2021-12-19 RX ORDER — GUAIFENESIN AND DEXTROMETHORPHAN HYDROBROMIDE 1200; 60 MG/1; MG/1
1 TABLET, EXTENDED RELEASE ORAL EVERY 12 HOURS
Qty: 20 TABLET | Refills: 0 | Status: SHIPPED | OUTPATIENT
Start: 2021-12-19 | End: 2021-12-24

## 2021-12-19 RX ORDER — FUROSEMIDE 10 MG/ML
40 INJECTION INTRAMUSCULAR; INTRAVENOUS
Status: COMPLETED | OUTPATIENT
Start: 2021-12-19 | End: 2021-12-19

## 2021-12-19 RX ORDER — PREDNISONE 20 MG/1
40 TABLET ORAL DAILY
Qty: 8 TABLET | Refills: 0 | Status: SHIPPED | OUTPATIENT
Start: 2021-12-19 | End: 2021-12-23

## 2021-12-19 RX ORDER — ALBUTEROL SULFATE 90 UG/1
2 AEROSOL, METERED RESPIRATORY (INHALATION)
Qty: 6.7 G | Refills: 0 | Status: SHIPPED | OUTPATIENT
Start: 2021-12-19 | End: 2022-04-18 | Stop reason: SDUPTHER

## 2021-12-19 RX ADMIN — METHYLPREDNISOLONE SODIUM SUCCINATE 125 MG: 125 INJECTION, POWDER, FOR SOLUTION INTRAMUSCULAR; INTRAVENOUS at 18:14

## 2021-12-19 RX ADMIN — FUROSEMIDE 40 MG: 10 INJECTION, SOLUTION INTRAVENOUS at 19:18

## 2021-12-19 RX ADMIN — IPRATROPIUM BROMIDE AND ALBUTEROL SULFATE 3 ML: .5; 3 SOLUTION RESPIRATORY (INHALATION) at 18:52

## 2021-12-19 NOTE — ED PROVIDER NOTES
80-year-old male patient presenting via EMS with reports of sudden onset chest pain approximate 30 minutes prior to arrival.  This occurred after he was wheeling back to his room in his wheelchair and attempted to transfer to his bed. He reports substernal chest pain with radiation to his left neck. Is been constant though is improved after taking aspirin. He denies nitroglycerin administration. He reports some shortness of breath and wheezing. Reports cough ongoing after a recent hospital admission for pneumonia. Cough is nonproductive. He also endorses history of COPD and emphysema. Patient's history includes a previous MI with similar symptoms. He reports 2 stents and a bovine aortic valve. He is currently anticoagulated with Pradaxa and takes this regularly.            Past Medical History:   Diagnosis Date    Aortic stenosis     Aortic valve replacement 7/2018    CAD (coronary artery disease)     PCI in 2014, 2015    COPD (chronic obstructive pulmonary disease) (HCC)     Dyslipidemia     HTN (hypertension)     Ischemic cardiomyopathy     Paroxysmal atrial fibrillation (HCC)     Pulmonary fibrosis (HCC)     Seizure disorder (HCC)     Systolic heart failure (HCC)        Past Surgical History:   Procedure Laterality Date    HX AFIB ABLATION      HX AORTIC VALVE REPLACEMENT  07/2018    HX HEART CATHETERIZATION      PCI 2014, 2015         Family History:   Problem Relation Age of Onset    Heart Disease Mother        Social History     Socioeconomic History    Marital status: LEGALLY      Spouse name: Not on file    Number of children: Not on file    Years of education: Not on file    Highest education level: Not on file   Occupational History    Not on file   Tobacco Use    Smoking status: Current Every Day Smoker     Types: Cigarettes    Smokeless tobacco: Never Used   Substance and Sexual Activity    Alcohol use: Not Currently    Drug use: Not on file    Sexual activity: Not on file   Other Topics Concern    Not on file   Social History Narrative    Not on file     Social Determinants of Health     Financial Resource Strain:     Difficulty of Paying Living Expenses: Not on file   Food Insecurity:     Worried About [address] in the Last Year: Not on file    Codey of Food in the Last Year: Not on file   Transportation Needs:     Lack of Transportation (Medical): Not on file    Lack of Transportation (Non-Medical): Not on file   Physical Activity:     Days of Exercise per Week: Not on file    Minutes of Exercise per Session: Not on file   Stress:     Feeling of Stress : Not on file   Social Connections:     Frequency of Communication with Friends and Family: Not on file    Frequency of Social Gatherings with Friends and Family: Not on file    Attends [employer] Services: Not on file    Active Member of [address] [place] [address] or Organizations: Not on file    Attends Club or Organization Meetings: Not on file    Marital Status: Not on file   Intimate Partner Violence:     Fear of Current or Ex-Partner: Not on file    Emotionally Abused: Not on file    Physically Abused: Not on file    Sexually Abused: Not on file   Housing Stability:     Unable to Pay for Housing in the Last Year: Not on file    Number of [place] in the Last Year: Not on file    Unstable Housing in the Last Year: Not on file         ALLERGIES: Tegretol [carbamazepine], Ativan [lorazepam], and Nitroglycerin    Review of Systems   Constitutional: Negative for chills, diaphoresis and fever. HENT: Negative for congestion, sneezing and sore throat. Eyes: Negative for visual disturbance. Respiratory: Positive for cough, chest tightness and shortness of breath. Negative for wheezing. Cardiovascular: Positive for chest pain. Negative for leg swelling. Gastrointestinal: Negative for abdominal pain, blood in stool, diarrhea, nausea and vomiting. Endocrine: Negative for polyuria.    Genitourinary: Negative for difficulty urinating, dysuria, flank pain, hematuria and urgency. Musculoskeletal: Negative for back pain, myalgias, neck pain and neck stiffness. Skin: Negative for color change and rash. Neurological: Negative for dizziness, syncope, speech difficulty, weakness, light-headedness, numbness and headaches. Psychiatric/Behavioral: Negative for behavioral problems. All other systems reviewed and are negative. Vitals:    12/19/21 1700   BP: 132/85   Pulse: 86   Resp: 21   Temp: 98.5 °F (36.9 °C)   SpO2: 97%   Weight: 85.7 kg (189 lb)   Height: 6' 1\" (1.854 m)            Physical Exam  Vitals and nursing note reviewed. Constitutional:       General: He is not in acute distress. Appearance: He is well-developed. He is not diaphoretic. Comments: Alert and oriented to person place and time. No acute distress, speaks in clear, fluid sentences. HENT:      Head: Normocephalic and atraumatic. Right Ear: External ear normal.      Left Ear: External ear normal.      Nose: Nose normal.   Eyes:      Pupils: Pupils are equal, round, and reactive to light. Cardiovascular:      Rate and Rhythm: Normal rate and regular rhythm. Heart sounds: Normal heart sounds. No murmur heard. No friction rub. No gallop. Pulmonary:      Effort: Pulmonary effort is normal. No respiratory distress. Breath sounds: Normal breath sounds. No stridor. No decreased breath sounds, wheezing, rhonchi or rales. Chest:      Chest wall: No tenderness. Abdominal:      General: There is no distension. Palpations: Abdomen is soft. There is no mass. Tenderness: There is no abdominal tenderness. There is no guarding or rebound. Hernia: No hernia is present. Musculoskeletal:         General: No tenderness or deformity. Normal range of motion. Cervical back: Normal range of motion. Skin:     General: Skin is warm and dry.    Neurological:      Mental Status: He is alert and oriented to person, place, and time. Cranial Nerves: No cranial nerve deficit. MDM  Number of Diagnoses or Management Options  Acute bronchitis, unspecified organism: new and requires workup  Chest pain, unspecified type: new and requires workup  Diagnosis management comments: Troponin within normal limits, repeat is normal as well. Low suspicion for pulmonary emboli as patient is currently anticoagulated. His EKG shows A. fib consistent with previous tracing, no evidence of acute ischemic change. He is audibly wheezing bilaterally on my exam, improved with DuoNeb, Solu-Medrol, now resting comfortably and reports resolution of pain. We will place patient on a course of steroids, provide albuterol inhaler and Mucinex. In addition I will increase his furosemide from 20 mg once a day to 40 mg daily for the next 5 days with instruction to arrange follow-up through his primary care provider and cardiology specialist.    Voice dictation software was used during the making of this note. This software is not perfect and grammatical and other typographical errors may be present. This note has been proofread, but may still contain errors. 601 Doctor Eddie Padron [facility]; 12/19/2021 @8:52 PM   ===================================================================         Amount and/or Complexity of Data Reviewed  Clinical lab tests: ordered and reviewed  Tests in the radiology section of CPT®: reviewed and ordered  Tests in the medicine section of CPT®: ordered and reviewed  Independent visualization of images, tracings, or specimens: yes    Risk of Complications, Morbidity, and/or Mortality  Presenting problems: moderate  Diagnostic procedures: low  Management options: moderate    Patient Progress  Patient progress: stable    ED Course as of 12/19/21 2051   Sun Dec 19, 2021   1726 EKG interpretation: A. fib, rate of 79 beats a minute, normal axis. No ischemic change.  [BR]   1851 Has not received his breathing treatment, RN notified. [BR]   1851 Patient reassessed, continues to wheeze, breathing treatment pending. Order placed for 1 dose of Lasix, patient currently on 20 mg a day, reports history of heart failure. States pain in his chest is resolved, sleeping soundly. [BR]   1852 Low suspicion for pulmonary embolism as patient is currently anticoagulated with Pradaxa.  [BR]      ED Course User Index  [BR] Alaina Dolan, DO       Procedures

## 2021-12-19 NOTE — ED TRIAGE NOTES
Pt arrives via [facility] with a CC of Chest pain x 2 hours, pt reports he feels pain sensations similar to a confirmed MI from 4 years ago. Pt hx: 2 stents, one bovine valve replacement. Recently changed blood thinner from eliquis to prodaxa. Pt took 4 baby aspirin, refused nitro due to hx of hypotension with nitro. 98% sat en route, EMS reports 140/80 trending down en route    Bgl 113.

## 2021-12-20 LAB
ATRIAL RATE: 231 BPM
CALCULATED R AXIS, ECG10: 84 DEGREES
CALCULATED T AXIS, ECG11: 85 DEGREES
DIAGNOSIS, 93000: NORMAL
Q-T INTERVAL, ECG07: 393 MS
QRS DURATION, ECG06: 97 MS
QTC CALCULATION (BEZET), ECG08: 451 MS
VENTRICULAR RATE, ECG03: 79 BPM

## 2021-12-20 NOTE — DISCHARGE INSTRUCTIONS
Take the Medication prescribed as directed. We will increase your dose of Lasix from 20 mg once a day to 40 mg once a day for the next 5 days. Arrange follow-up with your primary care provider and cardiology specialist as discussed. Return for worsening symptoms, concerns or questions.

## 2021-12-20 NOTE — ED NOTES
I have reviewed discharge instructions with the patient. The patient verbalized understanding. Patient left ED via Discharge Method: wheelchair to Home with family member  Opportunity for questions and clarification provided. Patient given 4 scripts. To continue your aftercare when you leave the hospital, you may receive an automated call from our care team to check in on how you are doing. This is a free service and part of our promise to provide the best care and service to meet your aftercare needs.  If you have questions, or wish to unsubscribe from this service please call [phone]. Thank you for Choosing our [address] [place] [address] Emergency Department.

## 2022-01-03 ENCOUNTER — HOSPITAL ENCOUNTER (OUTPATIENT)
Dept: LAB | Age: 67
Discharge: HOME | End: 2022-01-03

## 2022-01-03 DIAGNOSIS — R07.9 CHEST PAIN, UNSPECIFIED TYPE: ICD-10-CM

## 2022-01-03 PROBLEM — I50.22 HEART FAILURE WITH MID-RANGE EJECTION FRACTION (HCC): Status: ACTIVE | Noted: 2022-01-03

## 2022-01-03 PROBLEM — I25.10 CAD IN NATIVE ARTERY: Status: ACTIVE | Noted: 2022-01-03

## 2022-01-03 PROBLEM — I10 HYPERTENSION: Status: ACTIVE | Noted: 2022-01-03

## 2022-01-03 PROBLEM — Z95.2 HISTORY OF TRANSCATHETER AORTIC VALVE REPLACEMENT (TAVR): Status: ACTIVE | Noted: 2022-01-03

## 2022-01-04 ENCOUNTER — HOSPITAL ENCOUNTER (OUTPATIENT)
Dept: LAB | Age: 67
Discharge: HOME | End: 2022-01-04
Payer: OTHER GOVERNMENT

## 2022-01-04 DIAGNOSIS — I10 HYPERTENSION, UNSPECIFIED TYPE: ICD-10-CM

## 2022-01-04 DIAGNOSIS — Z01.810 PRE-OPERATIVE CARDIOVASCULAR EXAMINATION: ICD-10-CM

## 2022-01-04 DIAGNOSIS — D64.9 ANEMIA, UNSPECIFIED TYPE: ICD-10-CM

## 2022-01-04 DIAGNOSIS — R07.9 CHEST PAIN, UNSPECIFIED TYPE: ICD-10-CM

## 2022-01-04 LAB
ANION GAP SERPL CALC-SCNC: 9 MMOL/L (ref 7–16)
BASOPHILS # BLD: 0 K/UL (ref 0–0.2)
BASOPHILS NFR BLD: 0 % (ref 0–2)
BUN SERPL-MCNC: 15 MG/DL (ref 8–23)
CALCIUM SERPL-MCNC: 8.8 MG/DL (ref 8.3–10.4)
CHLORIDE SERPL-SCNC: 106 MMOL/L (ref 98–107)
CO2 SERPL-SCNC: 23 MMOL/L (ref 21–32)
CREAT SERPL-MCNC: 0.9 MG/DL (ref 0.8–1.5)
DIFFERENTIAL METHOD BLD: ABNORMAL
EOSINOPHIL # BLD: 0.1 K/UL (ref 0–0.8)
EOSINOPHIL NFR BLD: 1 % (ref 0.5–7.8)
ERYTHROCYTE [DISTWIDTH] IN BLOOD BY AUTOMATED COUNT: 16.4 % (ref 11.9–14.6)
GLUCOSE SERPL-MCNC: 101 MG/DL (ref 65–100)
HCT VFR BLD AUTO: 38.9 %
HGB BLD-MCNC: 12.4 G/DL (ref 13.6–17.2)
IMM GRANULOCYTES # BLD AUTO: 0.1 K/UL (ref 0–0.5)
IMM GRANULOCYTES NFR BLD AUTO: 1 % (ref 0–5)
INR PPP: 0.9
IRON SATN MFR SERPL: 19 %
IRON SERPL-MCNC: 84 UG/DL (ref 35–150)
LYMPHOCYTES # BLD: 2.3 K/UL (ref 0.5–4.6)
LYMPHOCYTES NFR BLD: 17 % (ref 13–44)
MCH RBC QN AUTO: 26.3 PG (ref 26.1–32.9)
MCHC RBC AUTO-ENTMCNC: 31.9 G/DL (ref 31.4–35)
MCV RBC AUTO: 82.4 FL (ref 79.6–97.8)
MONOCYTES # BLD: 1.2 K/UL (ref 0.1–1.3)
MONOCYTES NFR BLD: 9 % (ref 4–12)
NEUTS SEG # BLD: 9.8 K/UL (ref 1.7–8.2)
NEUTS SEG NFR BLD: 72 % (ref 43–78)
NRBC # BLD: 0 K/UL (ref 0–0.2)
PLATELET # BLD AUTO: 218 K/UL (ref 150–450)
PMV BLD AUTO: 9.7 FL (ref 9.4–12.3)
POTASSIUM SERPL-SCNC: 5.1 MMOL/L (ref 3.5–5.1)
PROTHROMBIN TIME: 11.3 SEC (ref 12.6–14.5)
RBC # BLD AUTO: 4.72 M/UL (ref 4.23–5.6)
SODIUM SERPL-SCNC: 138 MMOL/L (ref 136–145)
TIBC SERPL-MCNC: 433 UG/DL (ref 250–450)
WBC # BLD AUTO: 13.6 K/UL (ref 4.3–11.1)

## 2022-01-04 PROCEDURE — 85025 COMPLETE CBC W/AUTO DIFF WBC: CPT

## 2022-01-04 PROCEDURE — 36416 COLLJ CAPILLARY BLOOD SPEC: CPT

## 2022-01-04 PROCEDURE — 80048 BASIC METABOLIC PNL TOTAL CA: CPT

## 2022-01-04 PROCEDURE — 85610 PROTHROMBIN TIME: CPT

## 2022-01-04 PROCEDURE — 83540 ASSAY OF IRON: CPT

## 2022-01-05 ENCOUNTER — HOSPITAL ENCOUNTER (OUTPATIENT)
Age: 67
Setting detail: OUTPATIENT SURGERY
Discharge: HOME | End: 2022-01-05
Attending: INTERNAL MEDICINE | Admitting: INTERNAL MEDICINE
Payer: MEDICARE

## 2022-01-05 VITALS
DIASTOLIC BLOOD PRESSURE: 81 MMHG | SYSTOLIC BLOOD PRESSURE: 126 MMHG | OXYGEN SATURATION: 95 % | TEMPERATURE: 98.6 F | HEART RATE: 81 BPM

## 2022-01-05 DIAGNOSIS — I25.10 CAD IN NATIVE ARTERY: ICD-10-CM

## 2022-01-05 DIAGNOSIS — R07.9 CHEST PAIN, UNSPECIFIED TYPE: ICD-10-CM

## 2022-01-05 PROCEDURE — 74011000636 HC RX REV CODE- 636: Performed by: INTERNAL MEDICINE

## 2022-01-05 PROCEDURE — C1769 GUIDE WIRE: HCPCS | Performed by: INTERNAL MEDICINE

## 2022-01-05 PROCEDURE — 93458 L HRT ARTERY/VENTRICLE ANGIO: CPT | Performed by: INTERNAL MEDICINE

## 2022-01-05 PROCEDURE — 74011250636 HC RX REV CODE- 250/636: Performed by: INTERNAL MEDICINE

## 2022-01-05 PROCEDURE — C1894 INTRO/SHEATH, NON-LASER: HCPCS | Performed by: INTERNAL MEDICINE

## 2022-01-05 PROCEDURE — 77030022513 HC GD NDL ACUSIT CIVC -B: Performed by: INTERNAL MEDICINE

## 2022-01-05 PROCEDURE — 77030004558 HC CATH ANGI DX SUPR TORQ CARD -A: Performed by: INTERNAL MEDICINE

## 2022-01-05 PROCEDURE — 77030016699 HC CATH ANGI DX INFN1 CARD -A: Performed by: INTERNAL MEDICINE

## 2022-01-05 PROCEDURE — 99152 MOD SED SAME PHYS/QHP 5/>YRS: CPT | Performed by: INTERNAL MEDICINE

## 2022-01-05 PROCEDURE — 74011000250 HC RX REV CODE- 250: Performed by: INTERNAL MEDICINE

## 2022-01-05 PROCEDURE — 75710 ARTERY X-RAYS ARM/LEG: CPT | Performed by: INTERNAL MEDICINE

## 2022-01-05 RX ORDER — FENTANYL CITRATE 50 UG/ML
INJECTION, SOLUTION INTRAMUSCULAR; INTRAVENOUS AS NEEDED
Status: DISCONTINUED | OUTPATIENT
Start: 2022-01-05 | End: 2022-01-05 | Stop reason: HOSPADM

## 2022-01-05 RX ORDER — LIDOCAINE HYDROCHLORIDE 10 MG/ML
INJECTION INFILTRATION; PERINEURAL AS NEEDED
Status: DISCONTINUED | OUTPATIENT
Start: 2022-01-05 | End: 2022-01-05 | Stop reason: HOSPADM

## 2022-01-05 RX ORDER — MIDAZOLAM HYDROCHLORIDE 1 MG/ML
INJECTION, SOLUTION INTRAMUSCULAR; INTRAVENOUS AS NEEDED
Status: DISCONTINUED | OUTPATIENT
Start: 2022-01-05 | End: 2022-01-05 | Stop reason: HOSPADM

## 2022-01-05 RX ORDER — HEPARIN SODIUM 200 [USP'U]/100ML
INJECTION, SOLUTION INTRAVENOUS
Status: COMPLETED | OUTPATIENT
Start: 2022-01-05 | End: 2022-01-05

## 2022-01-05 NOTE — PROGRESS NOTES
6FR  arterial sheath removed from RIGHT groin using sterile technique. Manual pressure held over site for 20 minutes. Hemostasis achieved. Groin site without bleeding and without hematoma. Sterile gauze placed over site and secured with tegaderm. 5lb sandbag placed over site. Patient instructed to keep leg straight and still and head on pillow.  Patient verbalizes understanding

## 2022-01-05 NOTE — Clinical Note
Single view of the obtained using power injection. Total volume = 30 mL. Rate = 10 mL/sec. Pressure = 500 PSI. Rate of rise = 0.5 sec.  LV Gram.

## 2022-01-05 NOTE — PROGRESS NOTES
TRANSFER - OUT REPORT:    [facility] with Dr. Margie Beaver  Right Femoral Access  No intervention    Right femoral 6 Fr sheath intact, dressed with tegaderm   No bleeding or hematoma, site soft    4 mg versed  75 mcg Fentanyl     Verbal report given to Igor Ortiz on [facility]  being transferred to [address] [place] [address] for routine progression of care       Report consisted of patients Situation, Background, Assessment and   Recommendations(SBAR). Information from the following report(s) Procedure Summary and MAR was reviewed with the receiving nurse. Opportunity for questions and clarification was provided.       Patient transported with:   Registered Nurse

## 2022-01-05 NOTE — DISCHARGE INSTRUCTIONS
HEART CATHETERIZATION/ANGIOGRAPHY DISCHARGE INSTRUCTIONS    1. Check puncture site frequently for swelling or bleeding. If there is any bleeding, apply pressure over the area with a clean towel or washcloth and call 911. Notify your doctor for any redness, swelling, drainage, or oozing from the puncture site. Notify your doctor for any fever or chills. 2. If the extremity becomes cold, numb, or painful call Dr. Yessenia Wiggins at [phone].  3. Activity should be limited for the next 48 hours. No heavy lifting, pushing, pulling  or strenuous activity for 48 hours. No heavy lifting (anything over 10 pounds) for 3 days. 4. You may resume your usual diet. Drink more fluids than usual.  5. Have a responsible person drive you home and stay with you for at least 24 hours after your heart catheterization/angiography. 6. You may remove bandage from your Right Groin in 24 hours. You may shower in 24 hours. No tub baths, hot tubs, or swimming for 1 week. Do not place any lotions, creams, powders, or ointments over puncture site for 1 week. You may place a clean band-aid over the puncture site each day for 5 days. Change daily. Sedation for a Medical Procedure: Care Instructions     You were given a sedative medication during your visit. While many of the effects will have worn   off before you leave; you may continue to feel some effects for several hours. Common side effects from sedation include:  · Feeling sleepy. (Your doctors and nurses will make sure you are not too sleepy to go home.)  · Nausea and vomiting. This usually does not last long. · Feeling tired. How can you care for yourself at home? Activity    · Don't do anything for 24 hours that requires attention to detail. It takes time for the medicine effects to completely wear off. · Do not make important legal decisions for 24 hours. · Do not sign any legal documents for 24 hours.      · Do not drink alcohol today     · For your safety, you should not drive or operate heavy machinery for the remainder of the day     · Rest when you feel tired. Getting enough sleep will help you recover. Diet    · You can eat your normal diet, unless your doctor gives you other instructions. If your stomach is upset, try clear liquids and bland, low-fat foods like plain toast or rice. · Drink plenty of fluids (unless your doctor tells you not to). · Don't drink alcohol for 24 hours. Medicines    · Be safe with medicines. Read and follow all instructions on the label. · If the doctor gave you a prescription medicine for pain, take it as prescribed. · If you are not taking a prescription pain medicine, ask your doctor if you can take an over-the-counter medicine. · If you think your pain medicine is making you sick to your stomach:  · Take your medicine after meals (unless your doctor has told you not to). · Ask your doctor for a different pain medicine. I have read the above instructions and have had the opportunity to ask questions.       Patient: ________________________   Date: _____________    Witness: _______________________   Date: _____________

## 2022-01-05 NOTE — PROGRESS NOTES
Patient up to bedside, vital signs and site stable. Patient ambulated to bathroom without difficulty. Patient voided without difficulty. Vascular site stable. Discharge instructions and home medications reviewed with patient. Time allowed for questions and answers. Site stable after ambulation. Peripheral IV sites dc'd without difficulty with tips intact. 1926 Patient discharged to home with family.

## 2022-01-05 NOTE — PROGRESS NOTES
TRANSFER - IN REPORT:    Verbal report received from [facility] [address] RN(name) on Pavel Bateman  being received from cath lab(unit) for routine progression of care      Report consisted of patients Situation, Background, Assessment and   Recommendations(SBAR). Information from the following report(s) Procedure Summary was reviewed with the receiving nurse. Opportunity for questions and clarification was provided. Assessment completed upon patients arrival to unit and care assumed.

## 2022-01-05 NOTE — PROGRESS NOTES
Report received from Alek. Bobbi. Procedural findings communicated. Intra procedural  medication administration reviewed. Progression of care discussed.      Patient received into [id number] post sheath removal.     Access site without bleeding or swelling yes    Dressing dry and intact yes    Patient instructed to limit movement to right upper extremity    Routine post procedural vital signs and site assessment initiated yes

## 2022-01-11 ENCOUNTER — APPOINTMENT (OUTPATIENT)
Dept: GENERAL RADIOLOGY | Age: 67
End: 2022-01-11
Attending: EMERGENCY MEDICINE
Payer: MEDICARE

## 2022-01-11 ENCOUNTER — HOSPITAL ENCOUNTER (EMERGENCY)
Age: 67
Discharge: HOME | End: 2022-01-11
Attending: EMERGENCY MEDICINE
Payer: MEDICARE

## 2022-01-11 VITALS
RESPIRATION RATE: 16 BRPM | OXYGEN SATURATION: 97 % | BODY MASS INDEX: 28.1 KG/M2 | HEART RATE: 88 BPM | WEIGHT: 212 LBS | DIASTOLIC BLOOD PRESSURE: 108 MMHG | SYSTOLIC BLOOD PRESSURE: 162 MMHG | TEMPERATURE: 99 F | HEIGHT: 73 IN

## 2022-01-11 DIAGNOSIS — U07.1 COVID-19 VIRUS INFECTION: Primary | ICD-10-CM

## 2022-01-11 DIAGNOSIS — R11.2 NAUSEA AND VOMITING, INTRACTABILITY OF VOMITING NOT SPECIFIED, UNSPECIFIED VOMITING TYPE: ICD-10-CM

## 2022-01-11 LAB
ALBUMIN SERPL-MCNC: 2.9 G/DL (ref 3.2–4.6)
ALBUMIN/GLOB SERPL: 0.7 {RATIO} (ref 1.2–3.5)
ALP SERPL-CCNC: 99 U/L (ref 50–136)
ALT SERPL-CCNC: 27 U/L (ref 12–65)
ANION GAP SERPL CALC-SCNC: 9 MMOL/L (ref 7–16)
AST SERPL-CCNC: 43 U/L (ref 15–37)
BASOPHILS # BLD: 0.1 K/UL (ref 0–0.2)
BASOPHILS NFR BLD: 1 % (ref 0–2)
BILIRUB SERPL-MCNC: 0.5 MG/DL (ref 0.2–1.1)
BUN SERPL-MCNC: 12 MG/DL (ref 8–23)
CALCIUM SERPL-MCNC: 8.9 MG/DL (ref 8.3–10.4)
CHLORIDE SERPL-SCNC: 106 MMOL/L (ref 98–107)
CO2 SERPL-SCNC: 23 MMOL/L (ref 21–32)
COVID-19 RAPID TEST, COVR: DETECTED
CREAT SERPL-MCNC: 0.9 MG/DL (ref 0.8–1.5)
CRP SERPL-MCNC: 0.9 MG/DL (ref 0–0.9)
DIFFERENTIAL METHOD BLD: ABNORMAL
EOSINOPHIL # BLD: 0.1 K/UL (ref 0–0.8)
EOSINOPHIL NFR BLD: 1 % (ref 0.5–7.8)
ERYTHROCYTE [DISTWIDTH] IN BLOOD BY AUTOMATED COUNT: 17.1 % (ref 11.9–14.6)
GLOBULIN SER CALC-MCNC: 4.3 G/DL (ref 2.3–3.5)
GLUCOSE SERPL-MCNC: 95 MG/DL (ref 65–100)
HCT VFR BLD AUTO: 39.6 % (ref 41.1–50.3)
HGB BLD-MCNC: 12.6 G/DL (ref 13.6–17.2)
IMM GRANULOCYTES # BLD AUTO: 0 K/UL (ref 0–0.5)
IMM GRANULOCYTES NFR BLD AUTO: 1 % (ref 0–5)
LYMPHOCYTES # BLD: 1.6 K/UL (ref 0.5–4.6)
LYMPHOCYTES NFR BLD: 21 % (ref 13–44)
MCH RBC QN AUTO: 27 PG (ref 26.1–32.9)
MCHC RBC AUTO-ENTMCNC: 31.8 G/DL (ref 31.4–35)
MCV RBC AUTO: 85 FL (ref 79.6–97.8)
MONOCYTES # BLD: 1.1 K/UL (ref 0.1–1.3)
MONOCYTES NFR BLD: 14 % (ref 4–12)
NEUTS SEG # BLD: 4.9 K/UL (ref 1.7–8.2)
NEUTS SEG NFR BLD: 63 % (ref 43–78)
NRBC # BLD: 0 K/UL (ref 0–0.2)
PLATELET # BLD AUTO: 221 K/UL (ref 150–450)
PMV BLD AUTO: 10.7 FL (ref 9.4–12.3)
POTASSIUM SERPL-SCNC: 4.9 MMOL/L (ref 3.5–5.1)
PROCALCITONIN SERPL-MCNC: <0.05 NG/ML (ref 0–0.49)
PROT SERPL-MCNC: 7.2 G/DL (ref 6.3–8.2)
RBC # BLD AUTO: 4.66 M/UL (ref 4.23–5.6)
SODIUM SERPL-SCNC: 138 MMOL/L (ref 138–145)
SOURCE, COVRS: ABNORMAL
WBC # BLD AUTO: 7.8 K/UL (ref 4.3–11.1)

## 2022-01-11 PROCEDURE — 99282 EMERGENCY DEPT VISIT SF MDM: CPT

## 2022-01-11 PROCEDURE — 87635 SARS-COV-2 COVID-19 AMP PRB: CPT

## 2022-01-11 PROCEDURE — 86140 C-REACTIVE PROTEIN: CPT

## 2022-01-11 PROCEDURE — 80053 COMPREHEN METABOLIC PANEL: CPT

## 2022-01-11 PROCEDURE — 71046 X-RAY EXAM CHEST 2 VIEWS: CPT

## 2022-01-11 PROCEDURE — 85025 COMPLETE CBC W/AUTO DIFF WBC: CPT

## 2022-01-11 PROCEDURE — 84145 PROCALCITONIN (PCT): CPT

## 2022-01-11 RX ORDER — ONDANSETRON 8 MG/1
8 TABLET, ORALLY DISINTEGRATING ORAL
Qty: 12 TABLET | Refills: 0 | Status: SHIPPED | OUTPATIENT
Start: 2022-01-11 | End: 2022-05-19

## 2022-01-11 NOTE — ED PROVIDER NOTES
Patient ill with nausea vomiting, headache, cough and muscle aches since Saturday. He had gone to a gathering in the days prior after which many of those people tested positive for COVID. He is concerned about COVID. He is fully vaccinated but not boosted with the Dashbook vaccine. Past Medical History:   Diagnosis Date    Aortic stenosis     Aortic valve replacement 7/2018    CAD (coronary artery disease)     PCI in 2014, 2015    COPD (chronic obstructive pulmonary disease) (HCC)     Dyslipidemia     Heart failure (HCC)     HTN (hypertension)     Ischemic cardiomyopathy     Paroxysmal atrial fibrillation (HCC)     Pulmonary fibrosis (HCC)     Seizure disorder (HCC)     Systolic heart failure (HCC)        Past Surgical History:   Procedure Laterality Date    HX AFIB ABLATION      HX AORTIC VALVE REPLACEMENT  07/2018    HX HEART CATHETERIZATION      PCI 2014, 2015         Family History:   Problem Relation Age of Onset    Heart Disease Mother     Heart Disease Sister        Social History     Socioeconomic History    Marital status: LEGALLY      Spouse name: Not on file    Number of children: Not on file    Years of education: Not on file    Highest education level: Not on file   Occupational History    Not on file   Tobacco Use    Smoking status: Current Every Day Smoker     Types: Cigarettes    Smokeless tobacco: Never Used   Substance and Sexual Activity    Alcohol use: Not Currently    Drug use: Not on file    Sexual activity: Not on file   Other Topics Concern    Not on file   Social History Narrative    Not on file     Social Determinants of Health     Financial Resource Strain:     Difficulty of Paying Living Expenses: Not on file   Food Insecurity:     Worried About Running Out of Food in the Last Year: Not on file    Codey of Food in the Last Year: Not on file   Transportation Needs:     Lack of Transportation (Medical):  Not on file    Lack of Transportation (Non-Medical): Not on file   Physical Activity:     Days of Exercise per Week: Not on file    Minutes of Exercise per Session: Not on file   Stress:     Feeling of Stress : Not on file   Social Connections:     Frequency of Communication with Friends and Family: Not on file    Frequency of Social Gatherings with Friends and Family: Not on file    Attends Mandaeism Services: Not on file    Active Member of [address] [place] [address] or Organizations: Not on file    Attends Club or Organization Meetings: Not on file    Marital Status: Not on file   Intimate Partner Violence:     Fear of Current or Ex-Partner: Not on file    Emotionally Abused: Not on file    Physically Abused: Not on file    Sexually Abused: Not on file   Housing Stability:     Unable to Pay for Housing in the Last Year: Not on file    Number of [place] in the Last Year: Not on file    Unstable Housing in the Last Year: Not on file         ALLERGIES: Tegretol [carbamazepine], Ativan [lorazepam], and Nitroglycerin    Review of Systems   Constitutional: Positive for fatigue and fever. Negative for chills. HENT: Negative for congestion and rhinorrhea. Respiratory: Positive for cough. Negative for shortness of breath. Cardiovascular: Negative for chest pain. Gastrointestinal: Positive for nausea and vomiting. Negative for abdominal pain and diarrhea. All other systems reviewed and are negative. Vitals:    01/11/22 1726   BP: (!) 162/108   Pulse: 88   Resp: 16   Temp: 99 °F (37.2 °C)   SpO2: 97%   Weight: 96.2 kg (212 lb)   Height: 6' 1\" (1.854 m)            Physical Exam  Vitals and nursing note reviewed. Constitutional:       General: He is not in acute distress. Appearance: He is not ill-appearing or toxic-appearing. HENT:      Head: Normocephalic. Cardiovascular:      Rate and Rhythm: Normal rate and regular rhythm. Heart sounds: Normal heart sounds.    Pulmonary:      Effort: Pulmonary effort is normal. Breath sounds: Normal breath sounds. Abdominal:      General: There is no distension. Palpations: Abdomen is soft. Tenderness: There is no abdominal tenderness. Musculoskeletal:         General: Normal range of motion. Skin:     General: Skin is warm and dry. Neurological:      Mental Status: He is alert and oriented to person, place, and time. MDM  Number of Diagnoses or Management Options  Diagnosis management comments: Patient offered but declined IV fluids. He states he can rehydrate orally if provided nausea medication for home. We are out of monoclonal antibodies and cannot treat him with them at this time. Patient was made aware of this. No pneumonia clinically or radiographically.        Amount and/or Complexity of Data Reviewed  Clinical lab tests: ordered and reviewed (Results for orders placed or performed during the hospital encounter of 01/11/22  -COVID-19 RAPID TEST:        Result                      Value             Ref Range           Specimen source             NASAL                                 COVID-19 rapid test         Detected (AA)     NOTD           -CBC WITH AUTOMATED DIFF:        Result                      Value             Ref Range           WBC                         7.8               4.3 - 11.1 K*       RBC                         4.66              4.23 - 5.6 M*       HGB                         12.6 (L)          13.6 - 17.2 *       HCT                         39.6 (L)          41.1 - 50.3 %       MCV                         85.0              79.6 - 97.8 *       MCH                         27.0              26.1 - 32.9 *       MCHC                        31.8              31.4 - 35.0 *       RDW                         17.1 (H)          11.9 - 14.6 %       PLATELET                    221               150 - 450 K/*       MPV                         10.7              9.4 - 12.3 FL       ABSOLUTE NRBC               0.00              0.0 - 0.2 K/*       DF AUTOMATED                             NEUTROPHILS                 63                43 - 78 %           LYMPHOCYTES                 21                13 - 44 %           MONOCYTES                   14 (H)            4.0 - 12.0 %        EOSINOPHILS                 1                 0.5 - 7.8 %         BASOPHILS                   1                 0.0 - 2.0 %         IMMATURE GRANULOCYTES       1                 0.0 - 5.0 %         ABS. NEUTROPHILS            4.9               1.7 - 8.2 K/*       ABS. LYMPHOCYTES            1.6               0.5 - 4.6 K/*       ABS. MONOCYTES              1.1               0.1 - 1.3 K/*       ABS. EOSINOPHILS            0.1               0.0 - 0.8 K/*       ABS. BASOPHILS              0.1               0.0 - 0.2 K/*       ABS. IMM. GRANS.            0.0               0.0 - 0.5 K/*  -METABOLIC PANEL, COMPREHENSIVE:        Result                      Value             Ref Range           Sodium                      138               138 - 145 mm*       Potassium                   4.9               3.5 - 5.1 mm*       Chloride                    106               98 - 107 mmo*       CO2                         23                21 - 32 mmol*       Anion gap                   9                 7 - 16 mmol/L       Glucose                     95                65 - 100 mg/*       BUN                         12                8 - 23 MG/DL        Creatinine                  0.90              0.8 - 1.5 MG*       GFR est AA                  >60               >60 ml/min/1*       GFR est non-AA              >60               >60 ml/min/1*       Calcium                     8.9               8.3 - 10.4 M*       Bilirubin, total            0.5               0.2 - 1.1 MG*       ALT (SGPT)                  27                12 - 65 U/L         AST (SGOT)                  43 (H)            15 - 37 U/L         Alk.  phosphatase            99                50 - 136 U/L Protein, total              7.2               6.3 - 8.2 g/*       Albumin                     2.9 (L)           3.2 - 4.6 g/*       Globulin                    4.3 (H)           2.3 - 3.5 g/*       A-G Ratio                   0.7 (L)           1.2 - 3.5      -C REACTIVE PROTEIN, QT:        Result                      Value             Ref Range           C-Reactive protein          0.9               0.0 - 0.9 mg*  -PROCALCITONIN:        Result                      Value             Ref Range           Procalcitonin               <0.05             0.00 - 0.49 *  )  Tests in the radiology section of CPT®: ordered and reviewed (Chest x-ray shows some increased interstitial markings but no consolidation. These appear chronic when compared with previous EKG chest x-ray done 4 weeks ago.   Interpreted by ED physician Dr. Delgado Hughes)  Independent visualization of images, tracings, or specimens: yes (Chest x-ray reviewed and evaluated by ED physician Dr. Aaron Eisenberg see test in the radiology section)    Risk of Complications, Morbidity, and/or Mortality  Presenting problems: low  Diagnostic procedures: low  Management options: low    Patient Progress  Patient progress: stable         Procedures

## 2022-01-11 NOTE — ED TRIAGE NOTES
Pt arrives via personalized motorized scooter to triage. Pt states concerned for covid. Pt states has been vomiting at home for 3 days and has been unable to hold down medications. Also states he feels short of breath with a cough and body aches. States he has been vaccinated for covid with pfizer vaccine, x 2 doses. NAD. Masked.

## 2022-01-12 NOTE — DISCHARGE INSTRUCTIONS
Zofran every 8 hours as needed for nausea and vomiting. Increase fluids and advance diet as tolerated. 1 swallow of water, 9 red Gatorade G2 or 9 red Pedialyte every 5 minutes to prevent dehydration. If you vomit take a 20-minute break then start again. If fluids go well for 6 to 8 hours and you can advance to chicken broth clear liquids Jell-O and advance as tolerated -avoid milk and diarrhea if there is any diarrhea. Return for severe dehydration with dry mouth, no urine output for 12 hours and inability to keep fluids down. Return for severe trouble breathing or oxygen saturations less than 92% at rest. Isolate and quarantine at home for 5 days from symptom onset and until symptoms are resolved and then wear a mask for 5 days when in public.

## 2022-01-31 PROBLEM — I73.9 PAD (PERIPHERAL ARTERY DISEASE) (HCC): Status: ACTIVE | Noted: 2022-01-31

## 2022-01-31 PROBLEM — E78.5 HYPERLIPIDEMIA: Status: ACTIVE | Noted: 2022-01-31

## 2022-01-31 PROBLEM — Z86.79 HISTORY OF CONGESTIVE HEART FAILURE: Chronic | Status: RESOLVED | Noted: 2021-12-03 | Resolved: 2022-01-31

## 2022-01-31 PROBLEM — D50.9 IRON DEFICIENCY ANEMIA: Status: ACTIVE | Noted: 2022-01-31

## 2022-01-31 PROBLEM — I48.91 ATRIAL FIBRILLATION WITH RAPID VENTRICULAR RESPONSE (HCC): Status: RESOLVED | Noted: 2021-12-03 | Resolved: 2022-01-31

## 2022-01-31 PROBLEM — I48.11 LONGSTANDING PERSISTENT ATRIAL FIBRILLATION (HCC): Status: ACTIVE | Noted: 2022-01-31

## 2022-01-31 PROBLEM — I48.91 ATRIAL FIBRILLATION WITH RVR (HCC): Status: RESOLVED | Noted: 2021-12-03 | Resolved: 2022-01-31

## 2022-02-01 PROBLEM — A69.23: Status: ACTIVE | Noted: 2022-02-01

## 2022-02-08 ENCOUNTER — TRANSCRIBE ORDER (OUTPATIENT)
Dept: SCHEDULING | Age: 67
End: 2022-02-08

## 2022-02-08 DIAGNOSIS — M62.81 MUSCLE WEAKNESS (GENERALIZED): ICD-10-CM

## 2022-02-08 DIAGNOSIS — R26.89 OTHER ABNORMALITIES OF GAIT AND MOBILITY: Primary | ICD-10-CM

## 2022-02-14 ENCOUNTER — HOSPITAL ENCOUNTER (OUTPATIENT)
Dept: LAB | Age: 67
Discharge: HOME | End: 2022-02-14
Payer: MEDICARE

## 2022-02-14 DIAGNOSIS — I25.10 CAD IN NATIVE ARTERY: ICD-10-CM

## 2022-02-14 DIAGNOSIS — I48.11 LONGSTANDING PERSISTENT ATRIAL FIBRILLATION (HCC): ICD-10-CM

## 2022-02-14 DIAGNOSIS — I50.22 HEART FAILURE WITH MID-RANGE EJECTION FRACTION (HCC): ICD-10-CM

## 2022-02-14 LAB
ANION GAP SERPL CALC-SCNC: 4 MMOL/L (ref 7–16)
BASOPHILS # BLD: 0.1 K/UL (ref 0–0.2)
BASOPHILS NFR BLD: 1 % (ref 0–2)
BUN SERPL-MCNC: 18 MG/DL (ref 8–23)
CALCIUM SERPL-MCNC: 9.4 MG/DL (ref 8.3–10.4)
CHLORIDE SERPL-SCNC: 104 MMOL/L (ref 98–107)
CO2 SERPL-SCNC: 26 MMOL/L (ref 21–32)
CREAT SERPL-MCNC: 1 MG/DL (ref 0.8–1.5)
DIFFERENTIAL METHOD BLD: ABNORMAL
EOSINOPHIL # BLD: 0.2 K/UL (ref 0–0.8)
EOSINOPHIL NFR BLD: 2 % (ref 0.5–7.8)
ERYTHROCYTE [DISTWIDTH] IN BLOOD BY AUTOMATED COUNT: 15.9 % (ref 11.9–14.6)
GLUCOSE SERPL-MCNC: 103 MG/DL (ref 65–100)
HCT VFR BLD AUTO: 38.5 % (ref 41.1–50.3)
HGB BLD-MCNC: 12.2 G/DL (ref 13.6–17.2)
IMM GRANULOCYTES # BLD AUTO: 0.1 K/UL (ref 0–0.5)
IMM GRANULOCYTES NFR BLD AUTO: 1 % (ref 0–5)
INR PPP: 1
LYMPHOCYTES # BLD: 2 K/UL (ref 0.5–4.6)
LYMPHOCYTES NFR BLD: 20 % (ref 13–44)
MCH RBC QN AUTO: 26.6 PG (ref 26.1–32.9)
MCHC RBC AUTO-ENTMCNC: 31.7 G/DL (ref 31.4–35)
MCV RBC AUTO: 83.9 FL (ref 79.6–97.8)
MONOCYTES # BLD: 1.3 K/UL (ref 0.1–1.3)
MONOCYTES NFR BLD: 13 % (ref 4–12)
NEUTS SEG # BLD: 6.4 K/UL (ref 1.7–8.2)
NEUTS SEG NFR BLD: 64 % (ref 43–78)
NRBC # BLD: 0 K/UL (ref 0–0.2)
PLATELET # BLD AUTO: 250 K/UL (ref 150–450)
PMV BLD AUTO: 9.6 FL (ref 9.4–12.3)
POTASSIUM SERPL-SCNC: 4.3 MMOL/L (ref 3.5–5.1)
PROTHROMBIN TIME: 13.4 SEC (ref 12.6–14.5)
RBC # BLD AUTO: 4.59 M/UL (ref 4.23–5.6)
SODIUM SERPL-SCNC: 134 MMOL/L (ref 138–145)
WBC # BLD AUTO: 10 K/UL (ref 4.3–11.1)

## 2022-02-14 PROCEDURE — 36415 COLL VENOUS BLD VENIPUNCTURE: CPT

## 2022-02-14 PROCEDURE — 85025 COMPLETE CBC W/AUTO DIFF WBC: CPT

## 2022-02-14 PROCEDURE — 80048 BASIC METABOLIC PNL TOTAL CA: CPT

## 2022-02-14 PROCEDURE — 85610 PROTHROMBIN TIME: CPT

## 2022-02-15 ENCOUNTER — ANESTHESIA EVENT (OUTPATIENT)
Dept: CARDIAC CATH/INVASIVE PROCEDURES | Age: 67
End: 2022-02-15
Payer: MEDICARE

## 2022-02-15 ENCOUNTER — HOSPITAL ENCOUNTER (OUTPATIENT)
Dept: CT IMAGING | Age: 67
Discharge: HOME | End: 2022-02-15
Attending: INTERNAL MEDICINE
Payer: OTHER GOVERNMENT

## 2022-02-15 DIAGNOSIS — M62.81 MUSCLE WEAKNESS (GENERALIZED): ICD-10-CM

## 2022-02-15 DIAGNOSIS — R26.89 OTHER ABNORMALITIES OF GAIT AND MOBILITY: ICD-10-CM

## 2022-02-15 PROCEDURE — 72131 CT LUMBAR SPINE W/O DYE: CPT

## 2022-02-15 RX ORDER — SODIUM CHLORIDE 0.9 % (FLUSH) 0.9 %
5-40 SYRINGE (ML) INJECTION AS NEEDED
Status: CANCELLED | OUTPATIENT
Start: 2022-02-15

## 2022-02-15 RX ORDER — ACETAMINOPHEN 325 MG/1
650 TABLET ORAL ONCE
Status: CANCELLED | OUTPATIENT
Start: 2022-02-15 | End: 2022-02-15

## 2022-02-15 RX ORDER — SODIUM CHLORIDE 0.9 % (FLUSH) 0.9 %
5-40 SYRINGE (ML) INJECTION EVERY 8 HOURS
Status: CANCELLED | OUTPATIENT
Start: 2022-02-15

## 2022-02-15 RX ORDER — LIDOCAINE HYDROCHLORIDE 10 MG/ML
0.1 INJECTION INFILTRATION; PERINEURAL AS NEEDED
Status: CANCELLED | OUTPATIENT
Start: 2022-02-15

## 2022-02-15 RX ORDER — SODIUM CHLORIDE, SODIUM LACTATE, POTASSIUM CHLORIDE, CALCIUM CHLORIDE 600; 310; 30; 20 MG/100ML; MG/100ML; MG/100ML; MG/100ML
100 INJECTION, SOLUTION INTRAVENOUS CONTINUOUS
Status: CANCELLED | OUTPATIENT
Start: 2022-02-15 | End: 2022-02-16

## 2022-02-15 NOTE — PROGRESS NOTES
Patient pre-assessment complete for BIV Pacemaker & AV Node ablation with Dr Carole Williamson scheduled for 22 at 12:30pm, arrival time 10:30am. Patient verified using . Patient instructed to bring a list of all home medications on the day of procedure. NPO status reinforced. Patient instructed to HOLD pradaxa (last dose 22) & HOLD lasix, KDUR & lisinopril in am. Instructed they can take all other medications excluding vitamins & supplements. Patient verbalizes understanding of all instructions & denies any questions at this time.

## 2022-02-16 ENCOUNTER — ANESTHESIA (OUTPATIENT)
Dept: CARDIAC CATH/INVASIVE PROCEDURES | Age: 67
End: 2022-02-16
Payer: MEDICARE

## 2022-02-16 ENCOUNTER — HOSPITAL ENCOUNTER (OUTPATIENT)
Age: 67
Setting detail: OBSERVATION
Discharge: HOME | End: 2022-02-17
Attending: INTERNAL MEDICINE | Admitting: INTERNAL MEDICINE
Payer: MEDICARE

## 2022-02-16 ENCOUNTER — APPOINTMENT (OUTPATIENT)
Dept: GENERAL RADIOLOGY | Age: 67
End: 2022-02-16
Attending: INTERNAL MEDICINE
Payer: MEDICARE

## 2022-02-16 DIAGNOSIS — I25.10 CAD IN NATIVE ARTERY: ICD-10-CM

## 2022-02-16 DIAGNOSIS — I48.19 PERSISTENT ATRIAL FIBRILLATION (HCC): ICD-10-CM

## 2022-02-16 DIAGNOSIS — Z95.0 BIVENTRICULAR CARDIAC PACEMAKER IN SITU: Primary | ICD-10-CM

## 2022-02-16 DIAGNOSIS — R07.9 CHEST PAIN, UNSPECIFIED TYPE: ICD-10-CM

## 2022-02-16 DIAGNOSIS — I48.19 ATRIAL FIBRILLATION, PERSISTENT (HCC): ICD-10-CM

## 2022-02-16 LAB
ATRIAL RATE: 100 BPM
ATRIAL RATE: 66 BPM
CALCULATED R AXIS, ECG10: -42 DEGREES
CALCULATED R AXIS, ECG10: 76 DEGREES
CALCULATED T AXIS, ECG11: 75 DEGREES
CALCULATED T AXIS, ECG11: 79 DEGREES
DIAGNOSIS, 93000: NORMAL
DIAGNOSIS, 93000: NORMAL
Q-T INTERVAL, ECG07: 422 MS
Q-T INTERVAL, ECG07: 470 MS
QRS DURATION, ECG06: 188 MS
QRS DURATION, ECG06: 96 MS
QTC CALCULATION (BEZET), ECG08: 445 MS
QTC CALCULATION (BEZET), ECG08: 636 MS
VENTRICULAR RATE, ECG03: 110 BPM
VENTRICULAR RATE, ECG03: 67 BPM

## 2022-02-16 PROCEDURE — 77030018547 HC SUT ETHBND1 J&J -B: Performed by: INTERNAL MEDICINE

## 2022-02-16 PROCEDURE — 33225 L VENTRIC PACING LEAD ADD-ON: CPT | Performed by: INTERNAL MEDICINE

## 2022-02-16 PROCEDURE — 77030013687 HC GD NDL BARD -B: Performed by: INTERNAL MEDICINE

## 2022-02-16 PROCEDURE — 77030027107 HC PTCH EXT REF CARTO3 J&J -F: Performed by: INTERNAL MEDICINE

## 2022-02-16 PROCEDURE — 77030041279 HC DRSG PRMSL AG MDII -B: Performed by: INTERNAL MEDICINE

## 2022-02-16 PROCEDURE — 33208 INSRT HEART PM ATRIAL & VENT: CPT | Performed by: INTERNAL MEDICINE

## 2022-02-16 PROCEDURE — C1900 LEAD, CORONARY VENOUS: HCPCS | Performed by: INTERNAL MEDICINE

## 2022-02-16 PROCEDURE — 77030003629 HC NDL PERC VASC COOK -A: Performed by: INTERNAL MEDICINE

## 2022-02-16 PROCEDURE — 76210000006 HC OR PH I REC 0.5 TO 1 HR: Performed by: INTERNAL MEDICINE

## 2022-02-16 PROCEDURE — C1894 INTRO/SHEATH, NON-LASER: HCPCS | Performed by: INTERNAL MEDICINE

## 2022-02-16 PROCEDURE — 93005 ELECTROCARDIOGRAM TRACING: CPT | Performed by: INTERNAL MEDICINE

## 2022-02-16 PROCEDURE — 93650 ICAR CATH ABLTJ AV NODE FUNC: CPT | Performed by: INTERNAL MEDICINE

## 2022-02-16 PROCEDURE — C1732 CATH, EP, DIAG/ABL, 3D/VECT: HCPCS | Performed by: INTERNAL MEDICINE

## 2022-02-16 PROCEDURE — C1751 CATH, INF, PER/CENT/MIDLINE: HCPCS | Performed by: INTERNAL MEDICINE

## 2022-02-16 PROCEDURE — 74011250636 HC RX REV CODE- 250/636: Performed by: INTERNAL MEDICINE

## 2022-02-16 PROCEDURE — 77030022704 HC SUT VLOC COVD -B: Performed by: INTERNAL MEDICINE

## 2022-02-16 PROCEDURE — 74011250636 HC RX REV CODE- 250/636: Performed by: NURSE ANESTHETIST, CERTIFIED REGISTERED

## 2022-02-16 PROCEDURE — G0378 HOSPITAL OBSERVATION PER HR: HCPCS

## 2022-02-16 PROCEDURE — 77030010507 HC ADH SKN DERMBND J&J -B: Performed by: INTERNAL MEDICINE

## 2022-02-16 PROCEDURE — 74011000272 HC RX REV CODE- 272: Performed by: INTERNAL MEDICINE

## 2022-02-16 PROCEDURE — 74011250637 HC RX REV CODE- 250/637: Performed by: INTERNAL MEDICINE

## 2022-02-16 PROCEDURE — 77030013504 HC DEV ELECSURG MEDT -F: Performed by: INTERNAL MEDICINE

## 2022-02-16 PROCEDURE — 76060000036 HC ANESTHESIA 2.5 TO 3 HR: Performed by: INTERNAL MEDICINE

## 2022-02-16 PROCEDURE — 74011000250 HC RX REV CODE- 250: Performed by: INTERNAL MEDICINE

## 2022-02-16 PROCEDURE — C1887 CATHETER, GUIDING: HCPCS | Performed by: INTERNAL MEDICINE

## 2022-02-16 PROCEDURE — A4565 SLINGS: HCPCS | Performed by: INTERNAL MEDICINE

## 2022-02-16 PROCEDURE — 71045 X-RAY EXAM CHEST 1 VIEW: CPT

## 2022-02-16 PROCEDURE — C1769 GUIDE WIRE: HCPCS | Performed by: INTERNAL MEDICINE

## 2022-02-16 PROCEDURE — 74011000250 HC RX REV CODE- 250: Performed by: ANESTHESIOLOGY

## 2022-02-16 PROCEDURE — 74011000250 HC RX REV CODE- 250: Performed by: NURSE ANESTHETIST, CERTIFIED REGISTERED

## 2022-02-16 PROCEDURE — C2621 PMKR, OTHER THAN SING/DUAL: HCPCS | Performed by: INTERNAL MEDICINE

## 2022-02-16 PROCEDURE — C1898 LEAD, PMKR, OTHER THAN TRANS: HCPCS | Performed by: INTERNAL MEDICINE

## 2022-02-16 PROCEDURE — C1760 CLOSURE DEV, VASC: HCPCS | Performed by: INTERNAL MEDICINE

## 2022-02-16 PROCEDURE — C1892 INTRO/SHEATH,FIXED,PEEL-AWAY: HCPCS | Performed by: INTERNAL MEDICINE

## 2022-02-16 PROCEDURE — 94761 N-INVAS EAR/PLS OXIMETRY MLT: CPT

## 2022-02-16 PROCEDURE — 33207 INSERT HEART PM VENTRICULAR: CPT | Performed by: INTERNAL MEDICINE

## 2022-02-16 PROCEDURE — 74011000636 HC RX REV CODE- 636: Performed by: INTERNAL MEDICINE

## 2022-02-16 PROCEDURE — 94664 DEMO&/EVAL PT USE INHALER: CPT

## 2022-02-16 PROCEDURE — 77030035291 HC TBNG PMP SMARTABLATE J&J -B: Performed by: INTERNAL MEDICINE

## 2022-02-16 PROCEDURE — 77030033067 HC SUT PDO STRATFX SPIR J&J -B: Performed by: INTERNAL MEDICINE

## 2022-02-16 PROCEDURE — 94640 AIRWAY INHALATION TREATMENT: CPT

## 2022-02-16 PROCEDURE — 77030010509 HC AIRWY LMA MSK TELE -A: Performed by: ANESTHESIOLOGY

## 2022-02-16 DEVICE — PACE/SENSE LEAD
Type: IMPLANTABLE DEVICE | Status: FUNCTIONAL
Brand: ACUITY™ X4 STRAIGHT

## 2022-02-16 DEVICE — PACE/SENSE LEAD
Type: IMPLANTABLE DEVICE | Status: FUNCTIONAL
Brand: INGEVITY™+

## 2022-02-16 DEVICE — CARDIAC RESYNCHRONIZATION THERAPY PACEMAKER
Type: IMPLANTABLE DEVICE | Status: FUNCTIONAL
Brand: VISIONIST™ X4 CRT-P

## 2022-02-16 RX ORDER — DOCUSATE SODIUM 100 MG/1
100 CAPSULE, LIQUID FILLED ORAL
Status: DISCONTINUED | OUTPATIENT
Start: 2022-02-16 | End: 2022-02-17 | Stop reason: HOSPADM

## 2022-02-16 RX ORDER — ATORVASTATIN CALCIUM 40 MG/1
40 TABLET, FILM COATED ORAL DAILY
Status: DISCONTINUED | OUTPATIENT
Start: 2022-02-17 | End: 2022-02-17 | Stop reason: HOSPADM

## 2022-02-16 RX ORDER — SODIUM CHLORIDE, SODIUM LACTATE, POTASSIUM CHLORIDE, CALCIUM CHLORIDE 600; 310; 30; 20 MG/100ML; MG/100ML; MG/100ML; MG/100ML
INJECTION, SOLUTION INTRAVENOUS
Status: DISCONTINUED | OUTPATIENT
Start: 2022-02-16 | End: 2022-02-16 | Stop reason: HOSPADM

## 2022-02-16 RX ORDER — METOPROLOL SUCCINATE 25 MG/1
50 TABLET, EXTENDED RELEASE ORAL EVERY 12 HOURS
Status: DISCONTINUED | OUTPATIENT
Start: 2022-02-16 | End: 2022-02-17 | Stop reason: HOSPADM

## 2022-02-16 RX ORDER — HYDROCODONE BITARTRATE AND ACETAMINOPHEN 5; 325 MG/1; MG/1
1 TABLET ORAL
Status: DISCONTINUED | OUTPATIENT
Start: 2022-02-16 | End: 2022-02-17 | Stop reason: HOSPADM

## 2022-02-16 RX ORDER — BACLOFEN 10 MG/1
10 TABLET ORAL 3 TIMES DAILY
Status: DISCONTINUED | OUTPATIENT
Start: 2022-02-16 | End: 2022-02-17 | Stop reason: HOSPADM

## 2022-02-16 RX ORDER — GUAIFENESIN 100 MG/5ML
81 LIQUID (ML) ORAL DAILY
Status: DISCONTINUED | OUTPATIENT
Start: 2022-02-17 | End: 2022-02-17 | Stop reason: HOSPADM

## 2022-02-16 RX ORDER — CEFAZOLIN SODIUM/WATER 2 G/20 ML
2 SYRINGE (ML) INTRAVENOUS EVERY 8 HOURS
Status: COMPLETED | OUTPATIENT
Start: 2022-02-16 | End: 2022-02-17

## 2022-02-16 RX ORDER — SODIUM CHLORIDE 0.9 % (FLUSH) 0.9 %
5-40 SYRINGE (ML) INJECTION EVERY 8 HOURS
Status: DISCONTINUED | OUTPATIENT
Start: 2022-02-16 | End: 2022-02-17 | Stop reason: HOSPADM

## 2022-02-16 RX ORDER — IPRATROPIUM BROMIDE AND ALBUTEROL SULFATE 2.5; .5 MG/3ML; MG/3ML
3 SOLUTION RESPIRATORY (INHALATION)
Status: DISCONTINUED | OUTPATIENT
Start: 2022-02-16 | End: 2022-02-17 | Stop reason: HOSPADM

## 2022-02-16 RX ORDER — LIDOCAINE HYDROCHLORIDE 10 MG/ML
INJECTION INFILTRATION; PERINEURAL AS NEEDED
Status: DISCONTINUED | OUTPATIENT
Start: 2022-02-16 | End: 2022-02-16 | Stop reason: HOSPADM

## 2022-02-16 RX ORDER — HYDROMORPHONE HYDROCHLORIDE 2 MG/ML
0.5 INJECTION, SOLUTION INTRAMUSCULAR; INTRAVENOUS; SUBCUTANEOUS
Status: DISPENSED | OUTPATIENT
Start: 2022-02-16 | End: 2022-02-16

## 2022-02-16 RX ORDER — LIDOCAINE HYDROCHLORIDE AND EPINEPHRINE 10; 10 MG/ML; UG/ML
INJECTION, SOLUTION INFILTRATION; PERINEURAL AS NEEDED
Status: DISCONTINUED | OUTPATIENT
Start: 2022-02-16 | End: 2022-02-16 | Stop reason: HOSPADM

## 2022-02-16 RX ORDER — FENTANYL CITRATE 50 UG/ML
INJECTION, SOLUTION INTRAMUSCULAR; INTRAVENOUS AS NEEDED
Status: DISCONTINUED | OUTPATIENT
Start: 2022-02-16 | End: 2022-02-16 | Stop reason: HOSPADM

## 2022-02-16 RX ORDER — PROPOFOL 10 MG/ML
INJECTION, EMULSION INTRAVENOUS AS NEEDED
Status: DISCONTINUED | OUTPATIENT
Start: 2022-02-16 | End: 2022-02-16 | Stop reason: HOSPADM

## 2022-02-16 RX ORDER — CEFAZOLIN SODIUM/WATER 2 G/20 ML
2 SYRINGE (ML) INTRAVENOUS
Status: COMPLETED | OUTPATIENT
Start: 2022-02-16 | End: 2022-02-16

## 2022-02-16 RX ORDER — NALOXONE HYDROCHLORIDE 0.4 MG/ML
0.2 INJECTION, SOLUTION INTRAMUSCULAR; INTRAVENOUS; SUBCUTANEOUS AS NEEDED
Status: DISCONTINUED | OUTPATIENT
Start: 2022-02-16 | End: 2022-02-17 | Stop reason: HOSPADM

## 2022-02-16 RX ORDER — ALBUTEROL SULFATE 0.83 MG/ML
2.5 SOLUTION RESPIRATORY (INHALATION)
Status: DISCONTINUED | OUTPATIENT
Start: 2022-02-16 | End: 2022-02-17 | Stop reason: HOSPADM

## 2022-02-16 RX ORDER — SODIUM CHLORIDE 0.9 % (FLUSH) 0.9 %
5-40 SYRINGE (ML) INJECTION AS NEEDED
Status: DISCONTINUED | OUTPATIENT
Start: 2022-02-16 | End: 2022-02-17 | Stop reason: HOSPADM

## 2022-02-16 RX ORDER — ACETAMINOPHEN 325 MG/1
650 TABLET ORAL
Status: DISCONTINUED | OUTPATIENT
Start: 2022-02-16 | End: 2022-02-17 | Stop reason: HOSPADM

## 2022-02-16 RX ORDER — ONDANSETRON 2 MG/ML
INJECTION INTRAMUSCULAR; INTRAVENOUS AS NEEDED
Status: DISCONTINUED | OUTPATIENT
Start: 2022-02-16 | End: 2022-02-16 | Stop reason: HOSPADM

## 2022-02-16 RX ORDER — SODIUM CHLORIDE, SODIUM LACTATE, POTASSIUM CHLORIDE, CALCIUM CHLORIDE 600; 310; 30; 20 MG/100ML; MG/100ML; MG/100ML; MG/100ML
75 INJECTION, SOLUTION INTRAVENOUS CONTINUOUS
Status: DISCONTINUED | OUTPATIENT
Start: 2022-02-16 | End: 2022-02-17 | Stop reason: HOSPADM

## 2022-02-16 RX ORDER — FUROSEMIDE 40 MG/1
40 TABLET ORAL 2 TIMES DAILY
Status: DISCONTINUED | OUTPATIENT
Start: 2022-02-16 | End: 2022-02-17 | Stop reason: HOSPADM

## 2022-02-16 RX ORDER — LISINOPRIL 5 MG/1
5 TABLET ORAL DAILY
Status: DISCONTINUED | OUTPATIENT
Start: 2022-02-17 | End: 2022-02-17 | Stop reason: HOSPADM

## 2022-02-16 RX ORDER — BUDESONIDE AND FORMOTEROL FUMARATE DIHYDRATE 80; 4.5 UG/1; UG/1
2 AEROSOL RESPIRATORY (INHALATION)
Status: DISCONTINUED | OUTPATIENT
Start: 2022-02-16 | End: 2022-02-17 | Stop reason: HOSPADM

## 2022-02-16 RX ORDER — LIDOCAINE HYDROCHLORIDE 20 MG/ML
INJECTION, SOLUTION EPIDURAL; INFILTRATION; INTRACAUDAL; PERINEURAL AS NEEDED
Status: DISCONTINUED | OUTPATIENT
Start: 2022-02-16 | End: 2022-02-16 | Stop reason: HOSPADM

## 2022-02-16 RX ORDER — OXYCODONE HYDROCHLORIDE 5 MG/1
5 TABLET ORAL
Status: DISCONTINUED | OUTPATIENT
Start: 2022-02-16 | End: 2022-02-17 | Stop reason: HOSPADM

## 2022-02-16 RX ORDER — ONDANSETRON 2 MG/ML
4 INJECTION INTRAMUSCULAR; INTRAVENOUS
Status: DISCONTINUED | OUTPATIENT
Start: 2022-02-16 | End: 2022-02-17 | Stop reason: HOSPADM

## 2022-02-16 RX ADMIN — HYDROCODONE BITARTRATE AND ACETAMINOPHEN 1 TABLET: 5; 325 TABLET ORAL at 16:54

## 2022-02-16 RX ADMIN — PROPOFOL 40 MG: 10 INJECTION, EMULSION INTRAVENOUS at 11:42

## 2022-02-16 RX ADMIN — FENTANYL CITRATE 50 MCG: 50 INJECTION INTRAMUSCULAR; INTRAVENOUS at 12:33

## 2022-02-16 RX ADMIN — METOPROLOL SUCCINATE 50 MG: 25 TABLET, EXTENDED RELEASE ORAL at 21:07

## 2022-02-16 RX ADMIN — CEFAZOLIN SODIUM 2 G: 100 INJECTION, POWDER, LYOPHILIZED, FOR SOLUTION INTRAVENOUS at 21:08

## 2022-02-16 RX ADMIN — PROPOFOL 100 MCG/KG/MIN: 10 INJECTION, EMULSION INTRAVENOUS at 11:43

## 2022-02-16 RX ADMIN — Medication 2 G: at 12:10

## 2022-02-16 RX ADMIN — PROPOFOL 30 MG: 10 INJECTION, EMULSION INTRAVENOUS at 11:45

## 2022-02-16 RX ADMIN — PROPOFOL 50 MG: 10 INJECTION, EMULSION INTRAVENOUS at 11:57

## 2022-02-16 RX ADMIN — BACLOFEN 10 MG: 10 TABLET ORAL at 21:07

## 2022-02-16 RX ADMIN — PROPOFOL 40 MG: 10 INJECTION, EMULSION INTRAVENOUS at 11:50

## 2022-02-16 RX ADMIN — BUDESONIDE AND FORMOTEROL FUMARATE DIHYDRATE 2 PUFF: 80; 4.5 AEROSOL RESPIRATORY (INHALATION) at 21:39

## 2022-02-16 RX ADMIN — PROPOFOL 30 MG: 10 INJECTION, EMULSION INTRAVENOUS at 11:47

## 2022-02-16 RX ADMIN — SODIUM CHLORIDE, PRESERVATIVE FREE 10 ML: 5 INJECTION INTRAVENOUS at 21:06

## 2022-02-16 RX ADMIN — LIDOCAINE HYDROCHLORIDE 40 MG: 20 INJECTION, SOLUTION EPIDURAL; INFILTRATION; INTRACAUDAL; PERINEURAL at 11:42

## 2022-02-16 RX ADMIN — ONDANSETRON 4 MG: 2 INJECTION INTRAMUSCULAR; INTRAVENOUS at 12:04

## 2022-02-16 RX ADMIN — SODIUM CHLORIDE, SODIUM LACTATE, POTASSIUM CHLORIDE, AND CALCIUM CHLORIDE: 600; 310; 30; 20 INJECTION, SOLUTION INTRAVENOUS at 11:31

## 2022-02-16 RX ADMIN — FUROSEMIDE 40 MG: 40 TABLET ORAL at 16:54

## 2022-02-16 RX ADMIN — HYDROCODONE BITARTRATE AND ACETAMINOPHEN 1 TABLET: 5; 325 TABLET ORAL at 21:07

## 2022-02-16 NOTE — ANESTHESIA POSTPROCEDURE EVALUATION
Procedure(s):  INSERT PPM BIV MULTI  ABLATION AV NODE.    general    Anesthesia Post Evaluation      Multimodal analgesia: multimodal analgesia used between 6 hours prior to anesthesia start to PACU discharge  Patient location during evaluation: PACU  Patient participation: complete - patient participated  Level of consciousness: awake and alert  Pain management: adequate  Airway patency: patent  Anesthetic complications: no  Cardiovascular status: acceptable  Respiratory status: acceptable  Hydration status: acceptable  Post anesthesia nausea and vomiting:  none  Final Post Anesthesia Temperature Assessment:  Normothermia (36.0-37.5 degrees C)      INITIAL Post-op Vital signs:   Vitals Value Taken Time   /82 02/16/22 1507   Temp 36.7 °C (98 °F) 02/16/22 1457   Pulse 90 02/16/22 1510   Resp 14 02/16/22 1457   SpO2 99 % 02/16/22 1510   Vitals shown include unvalidated device data.

## 2022-02-16 NOTE — Clinical Note
Left axillary vein. Accessed successfully. Micropuncture needle used. Using ultrasound guidance.  Number of attempts =  1.

## 2022-02-16 NOTE — ANESTHESIA PREPROCEDURE EVALUATION
Relevant Problems   No relevant active problems       Anesthetic History   No history of anesthetic complications     Pertinent negatives: No increased risk of difficult airway       Review of Systems / Medical History  Patient summary reviewed and pertinent labs reviewed    Pulmonary    COPD: moderate               Neuro/Psych              Cardiovascular    Hypertension  Valvular problems/murmurs: tricuspid insufficiency and mitral stenosis    CHF: NYHA Classification II, dyspnea on exertion  Dysrhythmias : atrial fibrillation  CAD      Comments: TAVR    GI/Hepatic/Renal  Within defined limits              Endo/Other        Arthritis     Other Findings              Physical Exam    Airway  Mallampati: II  TM Distance: 4 - 6 cm  Neck ROM: normal range of motion        Cardiovascular    Rhythm: regular  Rate: normal         Dental  No notable dental hx       Pulmonary  Breath sounds clear to auscultation      :bilateral         Abdominal         Other Findings            Anesthetic Plan    ASA: 3  Anesthesia type: general          Induction: Intravenous  Anesthetic plan and risks discussed with: Patient and Sibling

## 2022-02-16 NOTE — Clinical Note
Right groin and left chest clipped prepped with ChloraPrep and draped. Wet prep solution applied at: 1145. Wet prep solution dried at: 1150. Wet prep elapsed drying time: 5 mins.

## 2022-02-16 NOTE — ROUTINE PROCESS
TRANSFER - IN REPORT:    Verbal report received from [place], RN on [facility] [employer] [facility] being received from [address] [place] [address] for routine progression of care      Report consisted of patients Situation, Background, Assessment and Recommendations(SBAR). Information from the following report(s) SBAR, Kardex, Procedure Summary, Intake/Output, MAR, Med Rec Status and Cardiac Rhythm Paced was reviewed with the receiving nurse. Opportunity for questions and clarification was provided. Assessment completed upon patients arrival to unit and care assumed.

## 2022-02-16 NOTE — ROUTINE PROCESS
Bedside and Verbal shift change report given to self (oncoming nurse) by Renee Sahu RN (offgoing nurse). Report included the following information SBAR, Kardex, Intake/Output, MAR and Recent Results.

## 2022-02-16 NOTE — Clinical Note
Sheath #all: Dressed using 4 X 4, tape and transparent dressing. Site: clean, dry, & intact, no bleeding and no hematoma.

## 2022-02-16 NOTE — PROGRESS NOTES
Physical Exam  Skin:            Comments: Admission skin assessment completed with second RN and reveals the following: all skin intact except for surgical incisions noted. Sacrum and heels visualized intact.

## 2022-02-16 NOTE — PROGRESS NOTES
Patient received to [address] [place] [address] room # 9  Ambulatory from [facility]. Patient scheduled for PPM/AV node today with Dr Edwin Lainez. Procedure reviewed & questions answered, voiced good understanding consent obtained & placed on chart. All medications and medical history reviewed. Will prep patient per orders. Patient & family updated on plan of care. The patient has a fraility score of 6-MODERATELY FRAIL, based on patient A&Ox3, patient wheelchair bound and requires assistance with ADLs.

## 2022-02-16 NOTE — PROCEDURES
: Catalina Desir. Darline Mccarthy MD     REFERRING: Efrain Alvarez MD     Pre-Procedure Diagnosis  1. Persistent atrial fibrillation. 2. HFrEF  3. SSS  4. NYHA class III symptoms. Procedure Performed  1. Biventricular pacemaker implant   2. Pocket revision. 3. AV node ablation     Anesthesia: MAC      Estimated Blood Loss: Less than 10 mL          Specimens: * No specimens in log *      Complications: None     Contrast: 46 ml     Fluoroscopy Time: 7.2 minutes       The procedure, indications, risks, benefits, and alternatives were discussed with the patient and family members, who desired to proceed after questions were answered and informed consent was documented. Procedure: After informed consent was obtained, the patient was brought to the Electrophysiology Laboratory in a fasting state and was prepped and draped in sterile fashion. Prophylactic antibiotic was administered 10 minutes prior to skin incision: refer to anesthesia procedural documentation for details. MAC was administered by the anesthesia team with continuous oxygen saturation measurement and blood pressure measurement. Local anesthetic (sensorcaine) was delivered to the left pectoral region. Access x 1 (Micropuncture kit) was obtained percutaneously in the left axillary vein under ultrasound guidance with place a short J-tipped wire. An incision was made in the deltopectoral groove and a pocket was fashioned with use of blunt dissection and Bovie electrocautery ([employer], [employer]). An additional access was obtained in the left axillary vein under ultrasound guidance using a modified Seldinger technique with placement of a long wire down into the RA and advanced below the diaphragm followed by placement of a peel-away sheath over the guidewire. A 6 Fr safe sheath was placed over the short J wire and a pace/sense lead was advanced into the RV apex.  The helix was exposed under fluoroscopy; acutely, the lead demonstrated stable pacing characteristics. A [place] sheath was advanced over a J wire and dilator. The dilator was removed and the braided core was advanced into the RA and then RV. The CS was cannulated using the [id number] system. An occlusive coronary sinus venogram was then performed and demonstrated a mid posterolateral target vein. An Amplatz wire was advanced into the distal CS for added support. The left ventricular lead was then advanced with into the posterolateral branch over a Whisper wire. Adequate thresholds were obtained with an adequate margin between phrenic stim and loss of capture. The delivery sheaths were then slit or peeled with fluoroscopy demonstrating stable position. The lead was then sutured to the underlying pectoralis fascia using 0-Ethibond sutures around the lead collar. The leads were tested via the pacing system analyzer (PSA) demonstrating stable sensing, impedance and pacing thresholds. The lead pins were then cleaned with antibiotic soaked gauze, dried gently, and attached to a new biventricular pacemaker generator. Pins were directly observed to pass the tip electrode, and the ring hex wrench screws were secured, and leads tug tested. The device and leads were gently positioned within the pocket after the pocket was irrigated copiously with a saline antimicrobial solution and Gail powder was placed in the pocket to promote hemostasis. The wound was closed with multiple layers of 3-0 Stratafix suture followed by skin closure with 4-0 V-Loc and a retention suture was placed. Fluoroscopic images were interpreted by me, in multiple projections. Final device position was confirmed by stored fluoroscopic image from device pocket to lead tips in AP projection. AV node ablation: After informed consent was obtained, the patient was brought to the Electrophysiology Laboratory in a fasting state and was prepped and draped in sterile fashion.  The pacemaker was reprogrammed to VVI 40 bpm. Local anesthetic was delivered to the region over the right femoral vein. Access x 1 was obtained under ultrasound guidance using a modified Seldinger technique with placement of a short 8 F sheath into the right femoral vein. A 3.5 mm STSF ablation catheter ([employer]-Red Kara[employer]) was then advanced in the right atrium and a 3D electroanatomic map focusing on the [address] [place] was made. The catheter was then advanced to the region of the AV junction and with delivery of RF there the appearance of accelerated junctional beats followed by complete heart block. A 20 minute waiting period was then observed and there was no return of AV conduction. The patient tolerated the procedure well and there were no complications. He was allowed to awake from anesthesia and transferred to the recovery area in stable condition. The device was reprogrammed to VVIR 90 bpm.    Device and Lead Information  Pulse Generator Model #  Serial # Location Implant/Explant   [id number] [facility] [id number] Left Pectoral Implant [phone]     Lead Model Number  Serial Number Lead position Implant/Explant   RV [id number] [id number] RV [place] Implant 02.16.2022   LV 4671 [facility] [id number] LV Mid Lateral Implant 02.16.2022   RA Port Plugged 02.16.2022    Lead Sensitivity and Threshold  Lead R or P sensitivity (mv) Threshold (V) Threshold PW (msec) Impedance (ohms) Final output Voltage (V) Final PW (msec)   RV 17.1 0.5 0.4 773 3.5 0.4   LV 8.7 1.8 0.4 624 3.0 0.4   LV Configurarion: LV Ring 3 > Can    Bradycardia Settings  Dwayne Mode LRL URL Pace AVD (ms) Sense AVD (ms) Rate Response Mode Switching Mode SW Rate   VVIR 90 120 -- -- Yes -- --       IMPRESSION: Successful implantation of a [employer] biventricular permanent pacemaker. Successful AV node ablation. PLAN:  -Overnight observation.  -Routine CIED instructions.  -Device clinic follow up in 1-2 weeks.  -Routine cardiac care per Dr. Abdirahman Jackman. Shaista Parker.  Xavi Levin MD, MS  Clinical Cardiac Electrophysiology  [facility]

## 2022-02-16 NOTE — Clinical Note
Sheath #1: Sheath: inserted. Sheath inserted/placed in the left femoral  vein. Upon evaluation of the common femoral artery stick using fluoroscopy, the access site puncture was within the safe zone.  For iv access

## 2022-02-16 NOTE — PERIOP NOTES
TRANSFER - OUT REPORT:    Verbal report given to Elias Varner on [facility]  being transferred to [facility] for routine progression of care       Report consisted of patients Situation, Background, Assessment and   Recommendations(SBAR). Information from the following report(s) SBAR, ED Summary, OR Summary, Procedure Summary, Intake/Output, MAR and Cardiac Rhythm biventpace was reviewed with the receiving nurse. Lines:   Peripheral IV 02/16/22 Left Hand (Active)   Site Assessment Clean, dry, & intact 02/16/22 1457   Phlebitis Assessment 0 02/16/22 1457   Infiltration Assessment 0 02/16/22 1457   Dressing Status Clean, dry, & intact 02/16/22 1457   Dressing Type Transparent 02/16/22 1457   Hub Color/Line Status Patent 02/16/22 1457        Opportunity for questions and clarification was provided. Patient transported with:   Monitor  O2 @ 4 liters  Registered Nurse    VTE prophylaxis orders have not been written for Kavita Hope. Patient and family given floor number and nurses name. Family updated re: pt status after security code verified.

## 2022-02-17 VITALS
DIASTOLIC BLOOD PRESSURE: 80 MMHG | BODY MASS INDEX: 29.95 KG/M2 | WEIGHT: 226 LBS | HEIGHT: 73 IN | OXYGEN SATURATION: 94 % | HEART RATE: 81 BPM | RESPIRATION RATE: 20 BRPM | TEMPERATURE: 98.2 F | SYSTOLIC BLOOD PRESSURE: 112 MMHG

## 2022-02-17 PROCEDURE — 99226 PR SBSQ OBSERVATION CARE/DAY 35 MINUTES: CPT | Performed by: INTERNAL MEDICINE

## 2022-02-17 PROCEDURE — 94760 N-INVAS EAR/PLS OXIMETRY 1: CPT

## 2022-02-17 PROCEDURE — G0378 HOSPITAL OBSERVATION PER HR: HCPCS

## 2022-02-17 PROCEDURE — 74011250636 HC RX REV CODE- 250/636: Performed by: INTERNAL MEDICINE

## 2022-02-17 PROCEDURE — 74011000250 HC RX REV CODE- 250: Performed by: INTERNAL MEDICINE

## 2022-02-17 PROCEDURE — 74011000250 HC RX REV CODE- 250: Performed by: ANESTHESIOLOGY

## 2022-02-17 PROCEDURE — 74011250637 HC RX REV CODE- 250/637: Performed by: INTERNAL MEDICINE

## 2022-02-17 PROCEDURE — 94640 AIRWAY INHALATION TREATMENT: CPT

## 2022-02-17 RX ORDER — HYDROCODONE BITARTRATE AND ACETAMINOPHEN 5; 325 MG/1; MG/1
1 TABLET ORAL
Qty: 12 TABLET | Refills: 0 | Status: SHIPPED | OUTPATIENT
Start: 2022-02-17 | End: 2022-02-20

## 2022-02-17 RX ADMIN — LISINOPRIL 5 MG: 5 TABLET ORAL at 08:36

## 2022-02-17 RX ADMIN — ATORVASTATIN CALCIUM 40 MG: 40 TABLET, FILM COATED ORAL at 08:36

## 2022-02-17 RX ADMIN — CEFAZOLIN SODIUM 2 G: 100 INJECTION, POWDER, LYOPHILIZED, FOR SOLUTION INTRAVENOUS at 05:06

## 2022-02-17 RX ADMIN — BACLOFEN 10 MG: 10 TABLET ORAL at 08:36

## 2022-02-17 RX ADMIN — SODIUM CHLORIDE, PRESERVATIVE FREE 10 ML: 5 INJECTION INTRAVENOUS at 05:06

## 2022-02-17 RX ADMIN — FUROSEMIDE 40 MG: 40 TABLET ORAL at 08:36

## 2022-02-17 RX ADMIN — ASPIRIN 81 MG: 81 TABLET, CHEWABLE ORAL at 08:36

## 2022-02-17 RX ADMIN — BUDESONIDE AND FORMOTEROL FUMARATE DIHYDRATE 2 PUFF: 80; 4.5 AEROSOL RESPIRATORY (INHALATION) at 09:10

## 2022-02-17 RX ADMIN — METOPROLOL SUCCINATE 50 MG: 25 TABLET, EXTENDED RELEASE ORAL at 08:36

## 2022-02-17 NOTE — ROUTINE PROCESS
Bedside and Verbal shift change report given to [facility], RN (oncoming nurse) by self (offgoing nurse). Report included the following information SBAR, Kardex, Intake/Output, MAR and Recent Results.

## 2022-02-17 NOTE — MED STUDENT NOTES
[facility] CARDIOLOGY PROGRESS NOTE           2/17/2022 7:15 AM    Admit Date: 2/16/2022      Subjective:   Kim Bunch 77 y.o. M with a PMHx significant for longstanding persistent atrial fibrillation, severe AS s/p TAVR, CAD status post PCI, HFmrEF, hyperlipidemia, hypertension, COPD, and pulmonary fibrosis. He had an echocardiogram on December 4 that was noted to demonstrate an EF of 40 to 45% and normal pAV. Patient was seen and examined this morning. He is s/p BiV PM placement yesterday with ablation for persistent afib with RVR refractive to rate medication. . Pt tolerated procedure well and reports feeling well overall today. Pt reports that he has had a cough with sputum production overnight that he correlates to his COPD hx. Denies fevers, chills, n/v/d/c or SOB. Denies any cognizant episodes of AFIB and reports stable cardiac findings. Of note, pt has a spinal cord stimulator implanted for chronic back pain that he would like to turn on with supervision to ensure adequate functioning of his pacemaker. The patient otherwise denies chest pain, dyspnea, presyncope, syncope or lateralizing symptoms. ROS:  Review of Systems   Constitutional: Negative for chills, fever and malaise/fatigue. HENT: Negative. Eyes: Negative. Respiratory: Positive for cough and sputum production. Negative for shortness of breath. Cardiovascular: Negative for chest pain, palpitations and leg swelling. Gastrointestinal: Negative for abdominal pain, constipation, diarrhea, nausea and vomiting. Genitourinary: Negative. Musculoskeletal: Negative. Neurological: Negative.           Objective:      Vitals:    02/16/22 2153 02/17/22 0041 02/17/22 0408 02/17/22 0502   BP:  119/80  (!) 127/96   Pulse:  92 90 93   Resp: 16 20  18   Temp:  98.1 °F (36.7 °C)  97.9 °F (36.6 °C)   SpO2:  92%  96%   Weight:    226 lb (102.5 kg)   Height:           Physical Exam:  General-No Acute Distress  Neck- supple, no JVD  CV- regular rate and rhythm no MRG  Lung- clear bilaterally  Abd- soft, nontender, nondistended  Ext- no edema bilaterally. Skin- warm and dry    Data Review:   Recent Labs     02/14/22  0905   *   K 4.3   BUN 18   CREA 1.00   *   WBC 10.0   HGB 12.2*   HCT 38.5*      INR 1.0     EKG Results       Procedure 720 Value Units Date/Time    EKG, 12 LEAD, INITIAL [[id number]] Collected: 02/16/22 1438    Order Status: Completed Updated: 02/16/22 1518     Ventricular Rate 110 BPM      Atrial Rate 100 BPM      QRS Duration 188 ms      Q-T Interval 470 ms      QTC Calculation (Bezet) 636 ms      Calculated R Axis -42 degrees      Calculated T Axis 79 degrees      Diagnosis --     Ventricular-paced rhythm with frequent Premature ventricular complexes  Biventricular pacemaker detected  Abnormal ECG  When compared with ECG of 16-FEB-2022 10:44,  Electronic ventricular pacemaker has replaced Atrial fibrillation  Vent. rate has increased BY  43 BPM  Confirmed by Beatrice Cabrales ([id number]) on 2/16/2022 3:17:54 PM      EKG, 12 LEAD, INITIAL [[id number]] Collected: 02/16/22 1044    Order Status: Completed Updated: 02/16/22 1059     Ventricular Rate 67 BPM      Atrial Rate 66 BPM      QRS Duration 96 ms      Q-T Interval 422 ms      QTC Calculation (Bezet) 445 ms      Calculated R Axis 76 degrees      Calculated T Axis 75 degrees      Diagnosis --     Atrial fibrillation  Abnormal ECG  When compared with ECG of 19-DEC-2021 16:59,  PREVIOUS ECG IS PRESENT  Confirmed by Beatrice Cabrales ([id number]) on 2/16/2022 10:58:56 AM            12/03/21    ECHO ADULT COMPLETE 12/04/2021 12/4/2021    Interpretation Summary  · LV: Estimated LVEF is 40 - 45%. Normal wall thickness. Mildly dilated left ventricle. Globally reduced systolic function. · LA: Severely dilated left atrium. · RV: Borderline low systolic function. · RA: Severely dilated right atrium. · AV: Prosthetic aortic valve.  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 Prosthesis is normal.  · MV: Mitral valve thickening. Mitral valve leaflet calcification. Mild mitral valve stenosis is present. Mild to moderate mitral valve regurgitation is present. · TV: Mild to moderate tricuspid valve regurgitation is present. Right Ventricular Arterial Pressure (RVSP) is 24 mmHg. Signed by: Vicente Marquez MD on 12/4/2021  7:38 PM    Previous Heart Catheterization: 1/2022  · Patent left circumflex and RCA stents. Mild residual nonobstructive coronary artery disease  · Mildly reduced LV systolic function  · Normal functioning TAVR prosthesis with less than 10 mm peak to peak gradient on pullback. · Stenosis of the distal external iliac and proximal right SFA.       Assessment/Plan:     Principal Problem:    Persistent atrial fibrillation  Biventricular pacemaker implant and AV node ablation performed on 2/16/22  EKG reveals ventricular-paced rhythm with freq PVC's    Active Problems:  Heart Failure with reduced Ejection Fraction  Last ECHO EF 40-45%  S/p TAVR  NYHA class III symptoms    Sick Sinus Syndrome    CAD in native artery    Hypertension, unspecified type            Nicholas Pérez  2/17/2022 7:15 AM

## 2022-02-17 NOTE — DISCHARGE INSTRUCTIONS
DEVICE IMPLANT FOLLOWUP INSTRUCTIONS  You will be discharged with an occlusive dressing over the incision site. This dressing will be removed at the 10 -14-day postop site check with the [address]. Your new device will be checked at that visit and all your device teaching will be given. Keep the incision site dry until your follow up. Use a hand-held shower or bath. Do not submerge or soak in pools or tubs for 6 weeks. If you are discharged with a prescription for antibiotics, please take the full prescription until it is gone. After your site check, you may shower and get the incision site wet. You may let soap and water run on the incision and pat dry. Please do not apply creams, lotions or powders on or near the incision. Mild bruising and swelling can be normal after implant and will resolve in a few weeks. Call the office at [phone] if you have any of the following:  -Signs of infection, such as fever over 100 F, drainage from the incision, redness, significant swelling, or warmth at the incision site.  -Significant pain around the site that gets worse. Mild discomfort can be normal.   -Bleeding from the incision site  -Swelling in the arm on the side of the incision site  -Chest pain or shortness of breath     Your device has the capability of transmitting device information from home to our office using a home monitoring system or phone fede. The information will transmit through a cellular connection outside of your home. Your device data is reviewed during working hours to ensure proper device function. You will be given your equipment and instruction upon your hospital discharge.                                                                       -You will be given a sling to wear upon discharge with instructions when it should be worn.    -Do not lift the affected arm above the shoulder level, on the side the device was put in, for 4 weeks.   -Do not lift or push more than 5 to 10 pounds for 4 weeks on the affected side. Walking is fine and encouraged.   -Driving is restricted for 5-7 days. Long travel is not recommended until after your site check.    -Do not do any vigorous upper body activities, such as golfing, bowling tennis or mowing for about 6 weeks. -If you work, you may return to work. However, with limitations on lifting for 4 weeks.   -Please avoid any dental work or invasive procedures for 6 weeks, if possible, after implant. Please avoid strong magnetic fields, large power plant transformers and arc welders. Please stay 10 feet away of electromagnetic fields such as working over a large running engine, stereo speakers and large stereo systems. Home appliances are safe. You may operate any electrical or battery-operated device in your home. Modern Devices are seldom affected by normally operating home appliances, such as microwave ovens. Flying is safe and airport metal detectors will not affect your device, although your device may occasionally set off the metal detectors. If this happens, show the  your identification card. Security wanding over the device is contraindicated. Cellular Phones should be used with the hand opposite to the side where the device was implanted. The phone should not be carried in the pocket on the side of the device. CDL licenses are not renewed if you have a defibrillator implanted. Radiation Therapy should be avoided on or near the device. Should you require surgery in the future; some electrosurgical devices can interfere with the device function. You should discuss this with your surgeon prior to any operation. If you are ordered an MRI, Please contact the clinic prior to ensure you have an MRI safe device. You must wait 6 weeks after implant before you may have an MRI. Tens units are contraindicated. [address] [facility] [phone]

## 2022-02-17 NOTE — ADT AUTH CERT NOTES
These are the procedure codes     2/16/22 to 2/17/22 obs      Procedures:  46413 (CPT®) - MO ICAR CATHETER ABLATION ATRIOVENTR NODE FUNCTION  38501 (CPT®) - MO INTRA-VENTRIC&/ATRIAL MAPG TACHYCARD W/CATH MA  83480 (CPT®) - MO INTRACARDIAC ELECTROPHYSIOLOGIC 3D MAPPING  09579 (CPT®) - MO INS NEW/RPLCMT PRM PM W/TRANSV ELTRD ATRIAL&VENT  89051 (CPT®) - MO INSJ ELTRD CAR VIRGILIO SYS TM INSJ DFB/PM PLS GEN    INSERT PPM BIV MULTI    ABLATION AV NODE

## 2022-02-17 NOTE — PROGRESS NOTES
Problem: Falls - Risk of  Goal: *Absence of Falls  Description: Document Renae Litter Fall Risk and appropriate interventions in the flowsheet. 2/16/2022 2206 by Mohit Lozano RN  Outcome: Progressing Towards Goal  Note: Fall Risk Interventions:            Medication Interventions: Assess postural VS orthostatic hypotension,Bed/chair exit alarm,Patient to call before getting OOB    Elimination Interventions: Bed/chair exit alarm,Call light in reach,Patient to call for help with toileting needs,Stay With Me (per policy),Toileting schedule/hourly rounds,Urinal in reach    History of Falls Interventions: Bed/chair exit alarm,Investigate reason for fall         Problem: Pressure Injury - Risk of  Goal: *Prevention of pressure injury  Description: Document Milton Scale and appropriate interventions in the flowsheet.   Outcome: Progressing Towards Goal  Note: Pressure Injury Interventions:  Sensory Interventions: Assess changes in LOC,Keep linens dry and wrinkle-free,Minimize linen layers,Pressure redistribution bed/mattress (bed type)         Activity Interventions: Pressure redistribution bed/mattress(bed type),Increase time out of bed    Mobility Interventions: Pressure redistribution bed/mattress (bed type)

## 2022-02-17 NOTE — PROGRESS NOTES
Pt admitted for scheduled PPI/ablation for management of persistent afib. Pt with discharge order this AM.  CM met with pt per Cardiology consult for discharge needs assistance. Pt is active in the [place] system in [place]. Pt recently moved from [facility] to [facility]. Pt voices frustration in getting DME (hospital bed, shower chair, slide board, electric WC) from [place] that was set up in the [facility] system. Pt is WC bound. Lives alone in an apartment with an elevator. Pt is partially dependent for ADLs. Pt states aide service through the [place] starts on 2/21/22. Pt obtains his medications and DME through the [place] system. Pt transports via bus. Pt is current with [facility] for RN, PT, and OT. CM [facility] Odell [facility] with [facility] DME needs still outstanding and LVOM. CM contacted [facility] and the intake verified pt is current. CM sent DEVAN to [facility]. Pt to discharge with his sister (present in pt's room). DCP: Home with [facility] (RN, PT, OT). Addendum:  [id number] Aguilar, returned EYAL's call stating pt needs a evaluation prior to receiving DME. CM informed Mikayla Osorio that pt is discharging and going to [place] this day for appointment with PT/OT for that evaluation. [facility]- CM asked to meet with pt for additional [facility] information. Pt requests this CM to call Deanne Hammans (Ext. [phone]) at [place]. CM LVOM.

## 2022-02-17 NOTE — DISCHARGE SUMMARY
[facility] Cardiology Discharge Summary     Patient ID:  Salima Poe  [id number]  52 y.o.  1955    Admit date: 2/16/2022    Discharge date:  2/17/2022    Admitting Physician: Cruz Pena MD     Discharge Physician: Dr. Allan Corcoran    Admission Diagnoses: Persistent atrial fibrillation [facility]) [I48.19]  Atrial fibrillation, persistent ([address] [I48.19]    Discharge Diagnoses:    Diagnosis    Atrial fibrillation, persistent [facility])       Cardiology Procedures this admission:  biventricular PM, CXR, AV node ablation  Consults: None    Hospital Course: Patient was seen at the office of [facility] Cardiology by Dr. Xiang Covington for management of uncontrolled AFIB and was subsequently scheduled for an AM Admission PM implantation at [facility] on 2/16/22. Patient was taken to the EP lab and underwent successful implantation of [facility] biventricular PM followed by AV node ablation by Dr. Xiang Covington. Patient tolerated the procedure well and was taken to the telemetry floor for recovery. Follow up chest xray showed no pneumothorax. The following morning patient was up feeling well without any complaints of chest pain, shortness of breath, or palpitations. Patient's left subclavian cath site was clean, dry and intact without hematoma. Patient's labs were WNL. Device interrogation showed normal function. Patient was seen and examined by Dr. Allan Corcoran and determined stable and ready for discharge. Patient was instructed on the importance of medication compliance and outpatient follow up. Patient has been scheduled for follow-up with [facility] on March 2nd at 2:30 pm in [facility]. DISPOSITION: Patient has been instructed to keep affected arm below shoulder level for the next 4 weeks or until cleared by doctor. The arm sling should be worn while sleeping. The dressing will be removed at follow-up. The incision site must be kept clean and dry. The patient may shower in a few days. Lotions, powders, or creams should be avoided as these can increase the risk of infection. The affected arm should not be used for any pushing, pulling, or lifting until cleared by doctor. Driving is prohibited until cleared by doctor in a follow up appointment. Any signs of infection including increased redness, suspicious drainage, or unexplained fever should be reported to the doctor immediately by calling [phone]. Discharge Exam:  Patient has been seen by Dr. Hammond Devoid: see his progress note for exam details. Visit Vitals  /80   Pulse 81   Temp 98.2 °F (36.8 °C)   Resp 20   Ht 6' 1\" (1.854 m)   Wt 102.5 kg (226 lb)   SpO2 92%   BMI 29.82 kg/m²         Recent Results (from the past 24 hour(s))   EKG, 12 LEAD, INITIAL    Collection Time: 02/16/22 10:44 AM   Result Value Ref Range    Ventricular Rate 67 BPM    Atrial Rate 66 BPM    QRS Duration 96 ms    Q-T Interval 422 ms    QTC Calculation (Bezet) 445 ms    Calculated R Axis 76 degrees    Calculated T Axis 75 degrees    Diagnosis       Atrial fibrillation  Abnormal ECG  When compared with ECG of 19-DEC-2021 16:59,  PREVIOUS ECG IS PRESENT  Confirmed by Cornell Bender ([id number]) on 2/16/2022 10:58:56 AM     EKG, 12 LEAD, INITIAL    Collection Time: 02/16/22  2:38 PM   Result Value Ref Range    Ventricular Rate 110 BPM    Atrial Rate 100 BPM    QRS Duration 188 ms    Q-T Interval 470 ms    QTC Calculation (Bezet) 636 ms    Calculated R Axis -42 degrees    Calculated T Axis 79 degrees    Diagnosis       Ventricular-paced rhythm with frequent Premature ventricular complexes  Biventricular pacemaker detected  Abnormal ECG  When compared with ECG of 16-FEB-2022 10:44,  Electronic ventricular pacemaker has replaced Atrial fibrillation  Vent.  rate has increased BY  43 BPM  Confirmed by Cornell Bender ([id number]) on 2/16/2022 3:17:54 PM       Patient Instructions:   Current Discharge Medication List      START taking these medications    Details HYDROcodone-acetaminophen (NORCO) 5-325 mg per tablet Take 1 Tablet by mouth every four (4) hours as needed for Pain for up to 3 days. Max Daily Amount: 6 Tablets. Qty: 12 Tablet, Refills: 0  Start date: 2/17/2022, End date: 2/20/2022    Associated Diagnoses: Biventricular cardiac pacemaker in situ         CONTINUE these medications which have NOT CHANGED    Details   fluticasone propion-salmeteroL (Advair Diskus) 250-50 mcg/dose diskus inhaler Take 1 Puff by inhalation every twelve (12) hours. furosemide (Lasix) 40 mg tablet Take 40 mg by mouth two (2) times a day. dabigatran etexilate (Pradaxa) 150 mg capsule Take 1 Capsule by mouth every twelve (12) hours. Qty: 60 Capsule, Refills: 1      lisinopriL (PRINIVIL, ZESTRIL) 5 mg tablet Take 1 Tablet by mouth daily. Qty: 30 Tablet, Refills: 1      metoprolol succinate (TOPROL-XL) 100 mg tablet Take 1 Tablet by mouth every twelve (12) hours. Qty: 60 Tablet, Refills: 1      potassium chloride (K-DUR, KLOR-CON) 20 mEq tablet Take 20 mEq by mouth two (2) times a day. baclofen (LIORESAL) 10 mg tablet Take 10 mg by mouth three (3) times daily. aspirin 81 mg chewable tablet Take 81 mg by mouth daily. atorvastatin (LIPITOR) 40 mg tablet Take 40 mg by mouth daily. ondansetron (ZOFRAN ODT) 8 mg disintegrating tablet Take 1 Tablet by mouth every eight (8) hours as needed for Nausea. Qty: 12 Tablet, Refills: 0      !! albuterol (Ventolin HFA) 90 mcg/actuation inhaler Take 2 Puffs by inhalation every four (4) hours as needed for Wheezing. Qty: 6.7 g, Refills: 0      albuterol (PROVENTIL VENTOLIN) 2.5 mg /3 mL (0.083 %) nebu 2.5 mg by Nebulization route every four (4) hours as needed for Shortness of Breath. !! albuterol (PROVENTIL HFA, VENTOLIN HFA, PROAIR HFA) 90 mcg/actuation inhaler Take 2 Puffs by inhalation every four (4) hours as needed for Wheezing or Shortness of Breath.       albuterol-ipratropium (DUO-NEB) 2.5 mg-0.5 mg/3 ml nebu 3 mL by Nebulization route every four (4) hours as needed for Shortness of Breath. !! - Potential duplicate medications found. Please discuss with provider.

## 2022-03-17 ENCOUNTER — APPOINTMENT (OUTPATIENT)
Dept: CT IMAGING | Age: 67
End: 2022-03-17
Attending: EMERGENCY MEDICINE
Payer: MEDICARE

## 2022-03-17 ENCOUNTER — APPOINTMENT (OUTPATIENT)
Dept: GENERAL RADIOLOGY | Age: 67
End: 2022-03-17
Attending: EMERGENCY MEDICINE
Payer: MEDICARE

## 2022-03-17 ENCOUNTER — HOSPITAL ENCOUNTER (EMERGENCY)
Age: 67
Discharge: HOME | End: 2022-03-17
Attending: EMERGENCY MEDICINE
Payer: MEDICARE

## 2022-03-17 VITALS
OXYGEN SATURATION: 98 % | RESPIRATION RATE: 19 BRPM | BODY MASS INDEX: 28.63 KG/M2 | WEIGHT: 216 LBS | HEART RATE: 74 BPM | DIASTOLIC BLOOD PRESSURE: 82 MMHG | HEIGHT: 73 IN | SYSTOLIC BLOOD PRESSURE: 134 MMHG | TEMPERATURE: 97.7 F

## 2022-03-17 DIAGNOSIS — R60.0 BILATERAL LEG EDEMA: ICD-10-CM
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
## 2022-03-17 LAB
ALBUMIN SERPL-MCNC: 3.1 G/DL (ref 3.2–4.6)
ALBUMIN/GLOB SERPL: 0.9 {RATIO} (ref 1.2–3.5)
ALP SERPL-CCNC: 93 U/L (ref 50–136)
ALT SERPL-CCNC: 19 U/L (ref 12–65)
ANION GAP SERPL CALC-SCNC: 3 MMOL/L (ref 7–16)
AST SERPL-CCNC: 18 U/L (ref 15–37)
BASOPHILS # BLD: 0.1 K/UL (ref 0–0.2)
BASOPHILS NFR BLD: 1 % (ref 0–2)
BILIRUB SERPL-MCNC: 0.4 MG/DL (ref 0.2–1.1)
BNP SERPL-MCNC: 2492 PG/ML (ref 5–125)
BUN SERPL-MCNC: 24 MG/DL (ref 8–23)
CALCIUM SERPL-MCNC: 8.8 MG/DL (ref 8.3–10.4)
CHLORIDE SERPL-SCNC: 111 MMOL/L (ref 98–107)
CO2 SERPL-SCNC: 26 MMOL/L (ref 21–32)
CREAT SERPL-MCNC: 1.1 MG/DL (ref 0.8–1.5)
DIFFERENTIAL METHOD BLD: ABNORMAL
EOSINOPHIL # BLD: 0.2 K/UL (ref 0–0.8)
EOSINOPHIL NFR BLD: 2 % (ref 0.5–7.8)
ERYTHROCYTE [DISTWIDTH] IN BLOOD BY AUTOMATED COUNT: 15.2 % (ref 11.9–14.6)
GLOBULIN SER CALC-MCNC: 3.5 G/DL (ref 2.3–3.5)
GLUCOSE SERPL-MCNC: 91 MG/DL (ref 65–100)
HCT VFR BLD AUTO: 38.4 % (ref 41.1–50.3)
HGB BLD-MCNC: 11.6 G/DL (ref 13.6–17.2)
IMM GRANULOCYTES # BLD AUTO: 0 K/UL (ref 0–0.5)
IMM GRANULOCYTES NFR BLD AUTO: 0 % (ref 0–5)
LYMPHOCYTES # BLD: 1.8 K/UL (ref 0.5–4.6)
LYMPHOCYTES NFR BLD: 23 % (ref 13–44)
MAGNESIUM SERPL-MCNC: 2.2 MG/DL (ref 1.8–2.4)
MCH RBC QN AUTO: 25.8 PG (ref 26.1–32.9)
MCHC RBC AUTO-ENTMCNC: 30.2 G/DL (ref 31.4–35)
MCV RBC AUTO: 85.5 FL (ref 79.6–97.8)
MONOCYTES # BLD: 0.9 K/UL (ref 0.1–1.3)
MONOCYTES NFR BLD: 11 % (ref 4–12)
NEUTS SEG # BLD: 5 K/UL (ref 1.7–8.2)
NEUTS SEG NFR BLD: 63 % (ref 43–78)
NRBC # BLD: 0 K/UL (ref 0–0.2)
PLATELET # BLD AUTO: 227 K/UL (ref 150–450)
PMV BLD AUTO: 11.1 FL (ref 9.4–12.3)
POTASSIUM SERPL-SCNC: 4.1 MMOL/L (ref 3.5–5.1)
PROT SERPL-MCNC: 6.6 G/DL (ref 6.3–8.2)
RBC # BLD AUTO: 4.49 M/UL (ref 4.23–5.6)
SODIUM SERPL-SCNC: 140 MMOL/L (ref 136–145)
WBC # BLD AUTO: 7.9 K/UL (ref 4.3–11.1)

## 2022-03-17 PROCEDURE — 99285 EMERGENCY DEPT VISIT HI MDM: CPT

## 2022-03-17 PROCEDURE — 85025 COMPLETE CBC W/AUTO DIFF WBC: CPT

## 2022-03-17 PROCEDURE — 72125 CT NECK SPINE W/O DYE: CPT

## 2022-03-17 PROCEDURE — 83880 ASSAY OF NATRIURETIC PEPTIDE: CPT

## 2022-03-17 PROCEDURE — 83735 ASSAY OF MAGNESIUM: CPT

## 2022-03-17 PROCEDURE — 80053 COMPREHEN METABOLIC PANEL: CPT

## 2022-03-17 PROCEDURE — 70450 CT HEAD/BRAIN W/O DYE: CPT

## 2022-03-17 PROCEDURE — 94762 N-INVAS EAR/PLS OXIMTRY CONT: CPT

## 2022-03-17 PROCEDURE — 93005 ELECTROCARDIOGRAM TRACING: CPT

## 2022-03-17 PROCEDURE — 71045 X-RAY EXAM CHEST 1 VIEW: CPT

## 2022-03-17 RX ORDER — SODIUM CHLORIDE 0.9 % (FLUSH) 0.9 %
5-10 SYRINGE (ML) INJECTION AS NEEDED
Status: DISCONTINUED | OUTPATIENT
Start: 2022-03-17 | End: 2022-03-18 | Stop reason: HOSPADM

## 2022-03-17 RX ORDER — SODIUM CHLORIDE 0.9 % (FLUSH) 0.9 %
5-10 SYRINGE (ML) INJECTION EVERY 8 HOURS
Status: DISCONTINUED | OUTPATIENT
Start: 2022-03-17 | End: 2022-03-18 | Stop reason: HOSPADM

## 2022-03-17 RX ORDER — POTASSIUM CHLORIDE 14.9 MG/ML
10 INJECTION INTRAVENOUS
Status: DISCONTINUED | OUTPATIENT
Start: 2022-03-17 | End: 2022-03-17

## 2022-03-17 NOTE — ED TRIAGE NOTES
Patient presents from home with complaints of shortness of breath with increased bilateral lower extremity swelling despite increase in lasix recently. Patient in addition with syncopal episode in the bathroom yesterday in which she struck his head. Patient positive blood thinners. Patient denies head/neck pain. Patient states that he spoke with his home health nurse and was instructed to come the ED for evaluation of shortness of breath and d/t syncopal episode.

## 2022-03-18 PROBLEM — I48.19 ATRIAL FIBRILLATION, PERSISTENT (HCC): Status: ACTIVE | Noted: 2022-02-16

## 2022-03-18 PROBLEM — E78.5 HYPERLIPIDEMIA: Status: ACTIVE | Noted: 2022-01-31

## 2022-03-18 PROBLEM — S89.90XA LEG INJURY: Status: ACTIVE | Noted: 2021-12-03

## 2022-03-18 LAB
ATRIAL RATE: 241 BPM
CALCULATED R AXIS, ECG10: -44 DEGREES
CALCULATED T AXIS, ECG11: 87 DEGREES
DIAGNOSIS, 93000: NORMAL
Q-T INTERVAL, ECG07: 472 MS
QRS DURATION, ECG06: 192 MS
QTC CALCULATION (BEZET), ECG08: 509 MS
VENTRICULAR RATE, ECG03: 70 BPM

## 2022-03-18 NOTE — ED NOTES
I have reviewed discharge instructions with the patient. The patient verbalized understanding. Patient left ED via Discharge Method: wheelchair to Home. Sherryle Moder Opportunity for questions and clarification provided. Patient given 0 scripts. To continue your aftercare when you leave the hospital, you may receive an automated call from our care team to check in on how you are doing. This is a free service and part of our promise to provide the best care and service to meet your aftercare needs.  If you have questions, or wish to unsubscribe from this service please call [phone]. Thank you for Choosing our [facility] Emergency Department.

## 2022-03-18 NOTE — DISCHARGE INSTRUCTIONS
Return with any fevers, confusion, worsening symptoms, or additional concerns. Consider wearing compression stockings to help with your leg swelling. Follow-up with your primary care doctor for reevaluation as needed.

## 2022-03-18 NOTE — ED PROVIDER NOTES
70-year-old gentleman presents with concerns about some increased leg swelling as well as falling in the bathroom yesterday. He said he hit his head on the tub when he did that. He said that when his home health nurse came out today she convinced him to come to the ER to get evaluated. He says he has a very mild headache but otherwise feels fine. He denies any specific weakness or numbness. He says he has no neck pain. With respect to his swelling he says that he has been taking his Lasix pretty regularly. He is a very limited mobility and so he uses a powered wheelchair. He does not regularly use compression stockings. No other associated symptoms. Elements of this note were created using speech recognition software. As such, errors of speech recognition may be present.            Past Medical History:   Diagnosis Date    Aortic stenosis     Aortic valve replacement 7/2018    CAD (coronary artery disease)     PCI in 2014, 2015    COPD (chronic obstructive pulmonary disease) (HCC)     Dyslipidemia     Heart failure (HCC)     HTN (hypertension)     Ischemic cardiomyopathy     Paroxysmal atrial fibrillation (HCC)     Pulmonary fibrosis (HCC)     Seizure disorder (HCC)     Systolic heart failure (HCC)        Past Surgical History:   Procedure Laterality Date    HX AFIB ABLATION      HX AORTIC VALVE REPLACEMENT  07/2018    HX HEART CATHETERIZATION      PCI 2014, 2015         Family History:   Problem Relation Age of Onset    Heart Disease Mother     Heart Disease Sister        Social History     Socioeconomic History    Marital status: LEGALLY      Spouse name: Not on file    Number of children: Not on file    Years of education: Not on file    Highest education level: Not on file   Occupational History    Not on file   Tobacco Use    Smoking status: Current Every Day Smoker     Types: Cigarettes    Smokeless tobacco: Never Used   Substance and Sexual Activity    Alcohol use: Not Currently    Drug use: Not on file    Sexual activity: Not on file   Other Topics Concern    Not on file   Social History Narrative    Not on file     Social Determinants of Health     Financial Resource Strain:     Difficulty of Paying Living Expenses: Not on file   Food Insecurity:     Worried About Running Out of Food in the Last Year: Not on file    Codey of Food in the Last Year: Not on file   Transportation Needs:     Lack of Transportation (Medical): Not on file    Lack of Transportation (Non-Medical): Not on file   Physical Activity:     Days of Exercise per Week: Not on file    Minutes of Exercise per Session: Not on file   Stress:     Feeling of Stress : Not on file   Social Connections:     Frequency of Communication with Friends and Family: Not on file    Frequency of Social Gatherings with Friends and Family: Not on file    Attends Oriental orthodox Services: Not on file    Active Member of [address] [place] [address] or Organizations: Not on file    Attends Club or Organization Meetings: Not on file    Marital Status: Not on file   Intimate Partner Violence:     Fear of Current or Ex-Partner: Not on file    Emotionally Abused: Not on file    Physically Abused: Not on file    Sexually Abused: Not on file   Housing Stability:     Unable to Pay for Housing in the Last Year: Not on file    Number of [place] in the Last Year: Not on file    Unstable Housing in the Last Year: Not on file         ALLERGIES: Tegretol [carbamazepine], Ativan [lorazepam], and Nitroglycerin    Review of Systems   Constitutional: Negative for chills and fever. HENT: Negative for congestion and rhinorrhea. Respiratory: Negative for cough and shortness of breath. Cardiovascular: Positive for leg swelling. Negative for chest pain. Gastrointestinal: Negative for diarrhea, nausea and vomiting. Skin: Negative for color change and wound. Neurological: Positive for headaches.  Negative for dizziness, weakness and numbness. Very mild headache   Psychiatric/Behavioral: Negative for confusion. Vitals:    03/17/22 2110 03/17/22 2125 03/17/22 2140 03/17/22 2155   BP: 127/86 118/67 114/60 125/60   Pulse: 70 72 70 71   Resp: 16 16 20 24   Temp:       SpO2: 95% 97% 97% 94%   Weight:       Height:                Physical Exam  Vitals and nursing note reviewed. Constitutional:       Appearance: Normal appearance. HENT:      Head: Normocephalic and atraumatic. Cardiovascular:      Rate and Rhythm: Normal rate. Pulmonary:      Effort: Pulmonary effort is normal.      Breath sounds: Normal breath sounds. Abdominal:      General: Bowel sounds are normal.      Palpations: Abdomen is soft. Musculoskeletal:      Right lower leg: Edema present. Left lower leg: Edema present. Comments: 2+ edema in both lower extremities   Neurological:      General: No focal deficit present. Mental Status: He is alert and oriented to person, place, and time. Psychiatric:         Thought Content: Thought content normal.          [facility]  ED Course as of 03/17/22 2314   Thu Mar 17, 2022   2313 Patient's blood work is at his baseline. His imaging is unremarkable. He feels comfortable going home.   I will discharge him home. [AC]      ED Course User Index  [AC] Honey Mckeon MD       Procedures

## 2022-03-19 PROBLEM — R07.9 CHEST PAIN: Status: ACTIVE | Noted: 2022-01-03

## 2022-03-19 PROBLEM — I25.10 CAD IN NATIVE ARTERY: Status: ACTIVE | Noted: 2022-01-03

## 2022-03-19 PROBLEM — J18.9 CAP (COMMUNITY ACQUIRED PNEUMONIA): Status: ACTIVE | Noted: 2021-12-03

## 2022-03-19 PROBLEM — I10 HYPERTENSION: Status: ACTIVE | Noted: 2022-01-03

## 2022-03-19 PROBLEM — R06.02 SHORTNESS OF BREATH AT REST: Status: ACTIVE | Noted: 2021-12-03

## 2022-03-19 PROBLEM — D50.9 IRON DEFICIENCY ANEMIA: Status: ACTIVE | Noted: 2022-01-31

## 2022-03-19 PROBLEM — J84.10 PULMONARY FIBROSIS (HCC): Status: ACTIVE | Noted: 2021-12-03

## 2022-03-19 PROBLEM — I48.11 LONGSTANDING PERSISTENT ATRIAL FIBRILLATION (HCC): Status: ACTIVE | Noted: 2022-01-31

## 2022-03-19 PROBLEM — I73.9 PAD (PERIPHERAL ARTERY DISEASE) (HCC): Status: ACTIVE | Noted: 2022-01-31

## 2022-03-19 PROBLEM — A69.23: Status: ACTIVE | Noted: 2022-02-01

## 2022-03-20 PROBLEM — A40.3 SEPTIC SHOCK WITH ACUTE ORGAN DYSFUNCTION DUE TO PNEUMOCOCCUS (HCC): Status: ACTIVE | Noted: 2021-12-03

## 2022-03-20 PROBLEM — Z95.2 HISTORY OF TRANSCATHETER AORTIC VALVE REPLACEMENT (TAVR): Status: ACTIVE | Noted: 2022-01-03

## 2022-03-20 PROBLEM — R65.21 SEPTIC SHOCK WITH ACUTE ORGAN DYSFUNCTION DUE TO PNEUMOCOCCUS (HCC): Status: ACTIVE | Noted: 2021-12-03

## 2022-03-20 PROBLEM — J44.0 COPD WITH ACUTE LOWER RESPIRATORY INFECTION (HCC): Status: ACTIVE | Noted: 2021-12-04

## 2022-03-20 PROBLEM — J44.9 COPD (CHRONIC OBSTRUCTIVE PULMONARY DISEASE) (HCC): Status: ACTIVE | Noted: 2021-12-03

## 2022-03-20 PROBLEM — I50.22 HEART FAILURE WITH MID-RANGE EJECTION FRACTION (HCC): Status: ACTIVE | Noted: 2022-01-03

## 2022-03-21 ENCOUNTER — APPOINTMENT (OUTPATIENT)
Dept: CT IMAGING | Age: 67
End: 2022-03-21
Attending: EMERGENCY MEDICINE
Payer: OTHER GOVERNMENT

## 2022-03-21 ENCOUNTER — HOSPITAL ENCOUNTER (EMERGENCY)
Age: 67
Discharge: HOME | End: 2022-03-21
Attending: EMERGENCY MEDICINE
Payer: OTHER GOVERNMENT

## 2022-03-21 ENCOUNTER — APPOINTMENT (OUTPATIENT)
Dept: GENERAL RADIOLOGY | Age: 67
End: 2022-03-21
Attending: EMERGENCY MEDICINE
Payer: OTHER GOVERNMENT

## 2022-03-21 VITALS
OXYGEN SATURATION: 96 % | DIASTOLIC BLOOD PRESSURE: 86 MMHG | HEART RATE: 70 BPM | RESPIRATION RATE: 18 BRPM | TEMPERATURE: 97.9 F | SYSTOLIC BLOOD PRESSURE: 134 MMHG
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
## 2022-03-21 LAB
ALBUMIN SERPL-MCNC: 3.2 G/DL (ref 3.2–4.6)
ALBUMIN/GLOB SERPL: 0.8 {RATIO} (ref 1.2–3.5)
ALP SERPL-CCNC: 100 U/L (ref 50–136)
ALT SERPL-CCNC: 20 U/L (ref 12–65)
ANION GAP SERPL CALC-SCNC: 7 MMOL/L (ref 7–16)
AST SERPL-CCNC: 13 U/L (ref 15–37)
BASOPHILS # BLD: 0 K/UL (ref 0–0.2)
BASOPHILS NFR BLD: 1 % (ref 0–2)
BILIRUB SERPL-MCNC: 0.7 MG/DL (ref 0.2–1.1)
BNP SERPL-MCNC: 2113 PG/ML (ref 5–125)
BUN SERPL-MCNC: 17 MG/DL (ref 8–23)
CALCIUM SERPL-MCNC: 8.9 MG/DL (ref 8.3–10.4)
CHLORIDE SERPL-SCNC: 104 MMOL/L (ref 98–107)
CO2 SERPL-SCNC: 27 MMOL/L (ref 21–32)
CREAT SERPL-MCNC: 1 MG/DL (ref 0.8–1.5)
DIFFERENTIAL METHOD BLD: ABNORMAL
EOSINOPHIL # BLD: 0.2 K/UL (ref 0–0.8)
EOSINOPHIL NFR BLD: 3 % (ref 0.5–7.8)
ERYTHROCYTE [DISTWIDTH] IN BLOOD BY AUTOMATED COUNT: 14.9 % (ref 11.9–14.6)
GLOBULIN SER CALC-MCNC: 3.8 G/DL (ref 2.3–3.5)
GLUCOSE SERPL-MCNC: 102 MG/DL (ref 65–100)
HCT VFR BLD AUTO: 39.3 % (ref 41.1–50.3)
HGB BLD-MCNC: 12.1 G/DL (ref 13.6–17.2)
IMM GRANULOCYTES # BLD AUTO: 0 K/UL (ref 0–0.5)
IMM GRANULOCYTES NFR BLD AUTO: 0 % (ref 0–5)
LYMPHOCYTES # BLD: 2.1 K/UL (ref 0.5–4.6)
LYMPHOCYTES NFR BLD: 25 % (ref 13–44)
MCH RBC QN AUTO: 26.3 PG (ref 26.1–32.9)
MCHC RBC AUTO-ENTMCNC: 30.8 G/DL (ref 31.4–35)
MCV RBC AUTO: 85.4 FL (ref 79.6–97.8)
MONOCYTES # BLD: 1 K/UL (ref 0.1–1.3)
MONOCYTES NFR BLD: 12 % (ref 4–12)
NEUTS SEG # BLD: 5 K/UL (ref 1.7–8.2)
NEUTS SEG NFR BLD: 59 % (ref 43–78)
NRBC # BLD: 0 K/UL (ref 0–0.2)
PLATELET # BLD AUTO: 193 K/UL (ref 150–450)
PMV BLD AUTO: 10 FL (ref 9.4–12.3)
POTASSIUM SERPL-SCNC: 3.9 MMOL/L (ref 3.5–5.1)
PROT SERPL-MCNC: 7 G/DL (ref 6.3–8.2)
RBC # BLD AUTO: 4.6 M/UL (ref 4.23–5.6)
SODIUM SERPL-SCNC: 138 MMOL/L (ref 138–145)
WBC # BLD AUTO: 8.4 K/UL (ref 4.3–11.1)

## 2022-03-21 PROCEDURE — 99284 EMERGENCY DEPT VISIT MOD MDM: CPT

## 2022-03-21 PROCEDURE — 80053 COMPREHEN METABOLIC PANEL: CPT

## 2022-03-21 PROCEDURE — 73030 X-RAY EXAM OF SHOULDER: CPT

## 2022-03-21 PROCEDURE — 83880 ASSAY OF NATRIURETIC PEPTIDE: CPT

## 2022-03-21 PROCEDURE — 85025 COMPLETE CBC W/AUTO DIFF WBC: CPT

## 2022-03-21 PROCEDURE — 72125 CT NECK SPINE W/O DYE: CPT

## 2022-03-21 PROCEDURE — 71046 X-RAY EXAM CHEST 2 VIEWS: CPT

## 2022-03-21 NOTE — DISCHARGE INSTRUCTIONS
Trial of nicotine lozenges  Use your inhalers and nebulizer regularly  Weigh yourself daily  Decrease Lasix to 40 mg in morning

## 2022-03-21 NOTE — ED TRIAGE NOTES
Pt arrives via EMS with complaints of hx of recent falls. Pt typically  gets around in a WC. Pt states today he was transferring  from bed to [facility], and  fell while getting into chair. Denies LOC. Pt states he did hit the back of his head. Pt complains of lower back pain and neck pain. Pt had recent ER visit for head injury and  SOB Hx of CHF, pt takes lasix, states dose is not helping. Pt has recent SOB.  Pt states blood thinners    Vitals with EMS  /100    97 3L  No tempt  HR 70

## 2022-03-21 NOTE — ED PROVIDER NOTES
Patient states he woke up on the floor this morning. Called EMS for transport. He is wheelchair bound and thinks he fell transferring from bed to chair. Complains of neck pain and right shoulder pain. Has low back pain that is chronic and unchanged. States he has had more SOB recently so his Lasix was increased from 80 mg to 120 mg per day on March 9. He feels he is not urinating as much as he expected with the increased dose. He has weakness and numbness in his lower extremities chronically. Has been a smoker - now down to 4 cigarettes per day. Has an inhaler and a nebulizer. Duoneb, Advair, Ventolin. No cough or fever.             Past Medical History:   Diagnosis Date    Aortic stenosis     Aortic valve replacement 7/2018    CAD (coronary artery disease)     PCI in 2014, 2015    COPD (chronic obstructive pulmonary disease) (HCC)     Dyslipidemia     Heart failure (HCC)     HTN (hypertension)     Ischemic cardiomyopathy     Paroxysmal atrial fibrillation (HCC)     Pulmonary fibrosis (HCC)     Seizure disorder (HCC)     Systolic heart failure (HCC)        Past Surgical History:   Procedure Laterality Date    HX AFIB ABLATION      HX AORTIC VALVE REPLACEMENT  07/2018    HX HEART CATHETERIZATION      PCI 2014, 2015         Family History:   Problem Relation Age of Onset    Heart Disease Mother     Heart Disease Sister        Social History     Socioeconomic History    Marital status: LEGALLY      Spouse name: Not on file    Number of children: Not on file    Years of education: Not on file    Highest education level: Not on file   Occupational History    Not on file   Tobacco Use    Smoking status: Current Every Day Smoker     Types: Cigarettes    Smokeless tobacco: Never Used   Substance and Sexual Activity    Alcohol use: Not Currently    Drug use: Not on file    Sexual activity: Not on file   Other Topics Concern    Not on file   Social History Narrative    Not on file     Social Determinants of Health     Financial Resource Strain:     Difficulty of Paying Living Expenses: Not on file   Food Insecurity:     Worried About Running Out of Food in the Last Year: Not on file    Codey of Food in the Last Year: Not on file   Transportation Needs:     Lack of Transportation (Medical): Not on file    Lack of Transportation (Non-Medical): Not on file   Physical Activity:     Days of Exercise per Week: Not on file    Minutes of Exercise per Session: Not on file   Stress:     Feeling of Stress : Not on file   Social Connections:     Frequency of Communication with Friends and Family: Not on file    Frequency of Social Gatherings with Friends and Family: Not on file    Attends Restorationism Services: Not on file    Active Member of [address] [place] [address] [employer] or Organizations: Not on file    Attends Club or Organization Meetings: Not on file    Marital Status: Not on file   Intimate Partner Violence:     Fear of Current or Ex-Partner: Not on file    Emotionally Abused: Not on file    Physically Abused: Not on file    Sexually Abused: Not on file   Housing Stability:     Unable to Pay for Housing in the Last Year: Not on file    Number of [place] in the Last Year: Not on file    Unstable Housing in the Last Year: Not on file         ALLERGIES: Tegretol [carbamazepine], Ativan [lorazepam], and Nitroglycerin    Review of Systems   Constitutional: Positive for fatigue. HENT: Negative. Respiratory: Positive for shortness of breath. Cardiovascular: Positive for leg swelling. Negative for chest pain and palpitations. Gastrointestinal: Negative. Genitourinary: Negative. Musculoskeletal: Positive for back pain, gait problem and neck pain. Skin: Negative. Neurological: Positive for weakness, light-headedness and numbness. Negative for speech difficulty.        Vitals:    03/21/22 0952   BP: (!) 166/104   Pulse: 73   Resp: 20   Temp: 97.9 °F (36.6 °C)   SpO2: 100%            Physical Exam  Vitals and nursing note reviewed. Constitutional:       Appearance: Normal appearance. He is well-developed. HENT:      Head: Normocephalic and atraumatic. Right Ear: Tympanic membrane and external ear normal.      Left Ear: External ear normal. There is impacted cerumen. Ears:      Comments: Left TM unable to visualize secondary to cerumen     Nose: Nose normal.      Mouth/Throat:      Mouth: Mucous membranes are moist.   Eyes:      General: No scleral icterus. Conjunctiva/sclera: Conjunctivae normal.      Pupils: Pupils are equal, round, and reactive to light. Neck:      Vascular: No JVD. Comments: In rigid C spine collar  Cardiovascular:      Rate and Rhythm: Normal rate and regular rhythm. Heart sounds: Normal heart sounds. Comments: Paced on monitor  Pulmonary:      Effort: Pulmonary effort is normal.      Breath sounds: Wheezing (few scattered expiratory wheezes) present. No rales. Abdominal:      General: Bowel sounds are normal.      Palpations: Abdomen is soft. There is no mass. Tenderness: There is no abdominal tenderness. Musculoskeletal:      Cervical back: Tenderness present. Comments: Tenderness right shoulder and increased pain with ROM. No deformity. Proximal humerus tender. Elbow and wrist not tender. Good ROM of wrist and elbow without pain. LUE without tenderness. Good ROM. No pain with ROM of hips. ROM of lower extremities limited chronically. Skin:     General: Skin is warm and dry. Neurological:      Mental Status: He is alert and oriented to person, place, and time. Sensory: Sensory deficit present. Motor: Weakness present. Comments: Decreased sensation lower extremities chronically.    Psychiatric:         Behavior: Behavior normal.          MDM  Number of Diagnoses or Management Options  Fall, initial encounter  Diagnosis management comments: Fall  Neck pain and right shoulder pain  CT spine - no acute fracture, chronic degenerative and post op changes  Right shoulder - no fracture - I viewed images  EKG paced - I read   Labs reviewed  Right radial stick by me to obtain blood for analysis. CXR interstitial changes - no consolidation - I viewed images  Discussed results and instructions with patient  Decreased Lasix to 40 mg in am  Weigh daily to monitor fluid status  Use inhalers, don't smoke, try nicotine lozenges  Follow up with [address] [place] [address] and Dr. Davion Olvera  Return with new or worse symptoms.        Amount and/or Complexity of Data Reviewed  Clinical lab tests: ordered  Tests in the radiology section of CPT®: ordered and reviewed  Decide to obtain previous medical records or to obtain history from someone other than the patient: yes  Review and summarize past medical records: yes  Independent visualization of images, tracings, or specimens: yes    Patient Progress  Patient progress: stable         Procedures

## 2022-03-21 NOTE — ED NOTES
I have reviewed discharge instructions with the patient. The patient verbalized understanding. Patient left ED via Discharge Method: stretcher to Home with [facility] for questions and clarification provided. Patient given 0 scripts. To continue your aftercare when you leave the hospital, you may receive an automated call from our care team to check in on how you are doing. This is a free service and part of our promise to provide the best care and service to meet your aftercare needs.  If you have questions, or wish to unsubscribe from this service please call [phone]. Thank you for Choosing our [facility] Department.

## 2022-03-28 ENCOUNTER — HOSPITAL ENCOUNTER (OUTPATIENT)
Dept: LAB | Age: 67
Discharge: HOME | End: 2022-03-28
Payer: MEDICARE

## 2022-03-28 DIAGNOSIS — I10 HYPERTENSION, UNSPECIFIED TYPE: ICD-10-CM

## 2022-03-28 DIAGNOSIS — I48.91 ATRIAL FIBRILLATION WITH RVR (HCC): ICD-10-CM

## 2022-03-28 DIAGNOSIS — I48.11 LONGSTANDING PERSISTENT ATRIAL FIBRILLATION (HCC): ICD-10-CM

## 2022-03-28 DIAGNOSIS — Z79.899 LONG-TERM USE OF HIGH-RISK MEDICATION: ICD-10-CM

## 2022-03-28 DIAGNOSIS — Z86.79 HISTORY OF CONGESTIVE HEART FAILURE: ICD-10-CM

## 2022-03-28 DIAGNOSIS — R06.02 SHORTNESS OF BREATH AT REST: ICD-10-CM

## 2022-03-28 LAB
ANION GAP SERPL CALC-SCNC: 3 MMOL/L (ref 7–16)
BNP SERPL-MCNC: 1546 PG/ML (ref 5–125)
BUN SERPL-MCNC: 24 MG/DL (ref 8–23)
CALCIUM SERPL-MCNC: 9.2 MG/DL (ref 8.3–10.4)
CHLORIDE SERPL-SCNC: 107 MMOL/L (ref 98–107)
CO2 SERPL-SCNC: 30 MMOL/L (ref 21–32)
CREAT SERPL-MCNC: 1.2 MG/DL (ref 0.8–1.5)
GLUCOSE SERPL-MCNC: 102 MG/DL (ref 65–100)
MAGNESIUM SERPL-MCNC: 2.3 MG/DL (ref 1.8–2.4)
POTASSIUM SERPL-SCNC: 4.5 MMOL/L (ref 3.5–5.1)
SODIUM SERPL-SCNC: 140 MMOL/L (ref 138–145)

## 2022-03-28 PROCEDURE — 83880 ASSAY OF NATRIURETIC PEPTIDE: CPT

## 2022-03-28 PROCEDURE — 36415 COLL VENOUS BLD VENIPUNCTURE: CPT

## 2022-03-28 PROCEDURE — 80048 BASIC METABOLIC PNL TOTAL CA: CPT

## 2022-03-28 PROCEDURE — 83735 ASSAY OF MAGNESIUM: CPT

## 2022-04-05 ENCOUNTER — APPOINTMENT (OUTPATIENT)
Dept: GENERAL RADIOLOGY | Age: 67
End: 2022-04-05
Attending: EMERGENCY MEDICINE
Payer: OTHER GOVERNMENT

## 2022-04-05 ENCOUNTER — HOSPITAL ENCOUNTER (EMERGENCY)
Age: 67
Discharge: HOME | End: 2022-04-05
Attending: EMERGENCY MEDICINE
Payer: OTHER GOVERNMENT

## 2022-04-05 VITALS
RESPIRATION RATE: 13 BRPM | WEIGHT: 223 LBS | TEMPERATURE: 98.1 F | DIASTOLIC BLOOD PRESSURE: 84 MMHG | BODY MASS INDEX: 29.55 KG/M2 | HEART RATE: 70 BPM | HEIGHT: 73 IN | SYSTOLIC BLOOD PRESSURE: 132 MMHG | OXYGEN SATURATION: 96 %

## 2022-04-05 DIAGNOSIS — I50.9 ACUTE ON CHRONIC CONGESTIVE HEART FAILURE, UNSPECIFIED HEART FAILURE TYPE (HCC): Primary | ICD-10-CM

## 2022-04-05 LAB
ALBUMIN SERPL-MCNC: 3.1 G/DL (ref 3.2–4.6)
ALBUMIN/GLOB SERPL: 0.9 {RATIO} (ref 1.2–3.5)
ALP SERPL-CCNC: 97 U/L (ref 50–136)
ALT SERPL-CCNC: 22 U/L (ref 12–65)
ANION GAP SERPL CALC-SCNC: 4 MMOL/L (ref 7–16)
AST SERPL-CCNC: 19 U/L (ref 15–37)
BASOPHILS # BLD: 0.1 K/UL (ref 0–0.2)
BASOPHILS NFR BLD: 1 % (ref 0–2)
BILIRUB SERPL-MCNC: 0.4 MG/DL (ref 0.2–1.1)
BNP SERPL-MCNC: 3598 PG/ML (ref 5–125)
BUN SERPL-MCNC: 17 MG/DL (ref 8–23)
CALCIUM SERPL-MCNC: 8.7 MG/DL (ref 8.3–10.4)
CHLORIDE SERPL-SCNC: 107 MMOL/L (ref 98–107)
CO2 SERPL-SCNC: 27 MMOL/L (ref 21–32)
CREAT SERPL-MCNC: 1.1 MG/DL (ref 0.8–1.5)
DIFFERENTIAL METHOD BLD: ABNORMAL
EOSINOPHIL # BLD: 0.2 K/UL (ref 0–0.8)
EOSINOPHIL NFR BLD: 2 % (ref 0.5–7.8)
ERYTHROCYTE [DISTWIDTH] IN BLOOD BY AUTOMATED COUNT: 15.3 % (ref 11.9–14.6)
GLOBULIN SER CALC-MCNC: 3.5 G/DL (ref 2.3–3.5)
GLUCOSE SERPL-MCNC: 82 MG/DL (ref 65–100)
HCT VFR BLD AUTO: 35.6 % (ref 41.1–50.3)
HGB BLD-MCNC: 11.1 G/DL (ref 13.6–17.2)
IMM GRANULOCYTES # BLD AUTO: 0 K/UL (ref 0–0.5)
IMM GRANULOCYTES NFR BLD AUTO: 0 % (ref 0–5)
LYMPHOCYTES # BLD: 1.9 K/UL (ref 0.5–4.6)
LYMPHOCYTES NFR BLD: 21 % (ref 13–44)
MAGNESIUM SERPL-MCNC: 2.3 MG/DL (ref 1.8–2.4)
MCH RBC QN AUTO: 26.1 PG (ref 26.1–32.9)
MCHC RBC AUTO-ENTMCNC: 31.2 G/DL (ref 31.4–35)
MCV RBC AUTO: 83.8 FL (ref 79.6–97.8)
MONOCYTES # BLD: 1.3 K/UL (ref 0.1–1.3)
MONOCYTES NFR BLD: 15 % (ref 4–12)
NEUTS SEG # BLD: 5.6 K/UL (ref 1.7–8.2)
NEUTS SEG NFR BLD: 61 % (ref 43–78)
NRBC # BLD: 0 K/UL (ref 0–0.2)
PLATELET # BLD AUTO: 184 K/UL (ref 150–450)
PMV BLD AUTO: 9.5 FL (ref 9.4–12.3)
POTASSIUM SERPL-SCNC: 4.3 MMOL/L (ref 3.5–5.1)
PROT SERPL-MCNC: 6.6 G/DL (ref 6.3–8.2)
RBC # BLD AUTO: 4.25 M/UL (ref 4.23–5.6)
SODIUM SERPL-SCNC: 138 MMOL/L (ref 138–145)
TROPONIN-HIGH SENSITIVITY: 16.9 PG/ML (ref 0–14)
WBC # BLD AUTO: 9.1 K/UL (ref 4.3–11.1)

## 2022-04-05 PROCEDURE — 71045 X-RAY EXAM CHEST 1 VIEW: CPT

## 2022-04-05 PROCEDURE — 83880 ASSAY OF NATRIURETIC PEPTIDE: CPT

## 2022-04-05 PROCEDURE — 83735 ASSAY OF MAGNESIUM: CPT

## 2022-04-05 PROCEDURE — 80053 COMPREHEN METABOLIC PANEL: CPT

## 2022-04-05 PROCEDURE — 93005 ELECTROCARDIOGRAM TRACING: CPT | Performed by: EMERGENCY MEDICINE

## 2022-04-05 PROCEDURE — 85025 COMPLETE CBC W/AUTO DIFF WBC: CPT

## 2022-04-05 PROCEDURE — 84484 ASSAY OF TROPONIN QUANT: CPT

## 2022-04-05 PROCEDURE — 99285 EMERGENCY DEPT VISIT HI MDM: CPT

## 2022-04-05 PROCEDURE — 94762 N-INVAS EAR/PLS OXIMTRY CONT: CPT

## 2022-04-05 RX ORDER — FUROSEMIDE 10 MG/ML
80 INJECTION INTRAMUSCULAR; INTRAVENOUS
Status: DISCONTINUED | OUTPATIENT
Start: 2022-04-05 | End: 2022-04-06 | Stop reason: HOSPADM

## 2022-04-05 RX ORDER — MORPHINE SULFATE 4 MG/ML
4 INJECTION INTRAVENOUS
Status: DISCONTINUED | OUTPATIENT
Start: 2022-04-05 | End: 2022-04-06 | Stop reason: HOSPADM

## 2022-04-05 RX ORDER — ONDANSETRON 2 MG/ML
4 INJECTION INTRAMUSCULAR; INTRAVENOUS
Status: DISCONTINUED | OUTPATIENT
Start: 2022-04-05 | End: 2022-04-06 | Stop reason: HOSPADM

## 2022-04-05 RX ORDER — SODIUM CHLORIDE 0.9 % (FLUSH) 0.9 %
5-10 SYRINGE (ML) INJECTION AS NEEDED
Status: DISCONTINUED | OUTPATIENT
Start: 2022-04-05 | End: 2022-04-06 | Stop reason: HOSPADM

## 2022-04-05 RX ORDER — SODIUM CHLORIDE 0.9 % (FLUSH) 0.9 %
5-10 SYRINGE (ML) INJECTION EVERY 8 HOURS
Status: DISCONTINUED | OUTPATIENT
Start: 2022-04-05 | End: 2022-04-06 | Stop reason: HOSPADM

## 2022-04-05 NOTE — ED TRIAGE NOTES
Pt arrives from home via [facility]. Called to home for sob that started last night. Feels like he can't get a good breath. States it hurts to breath and feels like his stomach is \"inflated\". HX of CHF - noticed increased edema in BLE and in abdomin. EMS noted wheezing and gave 5mg albuterol. Pt states his lasix was increased by MD and he took 120mg today.   Pt has a PM.  Denies CP

## 2022-04-05 NOTE — ED PROVIDER NOTES
Per nurses notes: \"Pt arrives from home via [facility]. Called to home for sob that started last night. Feels like he can't get a good breath. States it hurts to breath and feels like his stomach is \"inflated\". HX of CHF - noticed increased edema in BLE and in abdomin. EMS noted wheezing and gave 5mg albuterol. Pt states his lasix was increased by MD and he took 120mg today. Pt has a PM.  Denies CP\"    The history is provided by the patient and the EMS personnel. Shortness of Breath  This is a new problem. The average episode lasts 4 days. The problem occurs continuously. The current episode started more than 2 days ago. The problem has been gradually worsening. Associated symptoms include cough, sputum production, wheezing, orthopnea and leg swelling. Pertinent negatives include no fever, no headaches, no coryza, no rhinorrhea, no sore throat, no swollen glands, no ear pain, no neck pain, no hemoptysis, no PND, no chest pain, no syncope, no vomiting, no abdominal pain, no rash, no leg pain and no claudication. It is unknown what precipitated the problem. Treatments tried: Patient increased his Lasix from 80 to 120 mg over the last 2 days, received 5 mg albuterol treatment via EMS in route. The treatment provided mild relief. He has had prior hospitalizations. He has had prior ED visits. He has had no prior ICU admissions. Associated medical issues include COPD, pneumonia, chronic lung disease (Fibrotic lung disease), CAD, heart failure and past MI. Associated medical issues do not include asthma, PE, DVT or recent surgery.         Past Medical History:   Diagnosis Date    Aortic stenosis     Aortic valve replacement 7/2018    CAD (coronary artery disease)     PCI in 2014, 2015    COPD (chronic obstructive [facility] disease) [facility]     Dyslipidemia     Heart failure (HCC)     HTN (hypertension)     Ischemic cardiomyopathy     Paroxysmal atrial fibrillation (HCC)     Pulmonary fibrosis (HCC)     Seizure disorder ([facility]     Syst[facility] heart failure ([facility]        Past Surgical History:   Procedure Laterality Date    HX AFIB ABLATION      HX AORTIC VALVE REPLACEMENT  07/2018    HX HEART CATHETERIZATION      PCI 2014, 2015         Family History:   Problem Relation Age of Onset    Heart Disease Mother     Heart Disease Sister        Social History     Socioeconomic History    Marital status: LEGALLY      Spouse name: Not on file    Number of children: Not on file    Years of education: Not on file    Highest education level: Not on file   Occupational History    Not on file   Tobacco Use    Smoking status: Current Every Day Smoker     Types: Cigarettes    Smokeless tobacco: Never Used   Substance and Sexual Activity    Alcohol use: Not Currently    Drug use: Not on file    Sexual activity: Not on file   Other Topics Concern    Not on file   Social History Narrative    Not on file     Social Determinants of Health     Financial Resource Strain:     Difficulty of Paying Living Expenses: Not on file   Food Insecurity:     Worried About Running Out of Food in the Last Year: Not on file    Codey of Food in the Last Year: Not on file   Transportation Needs:     Lack of Transportation (Medical): Not on file    Lack of Transportation (Non-Medical):  Not on file   Physical Activity:     Days of Exercise per Week: Not on file    Minutes of Exercise per Session: Not on file   Stress:     Feeling of Stress : Not on file   Social Connections:     Frequency of Communication with Friends and Family: Not on file    Frequency of Social Gatherings with Friends and Family: Not on file    Attends Sikh Services: Not on file    Active Member of Clubs or Organizations: Not on file    Attends Club or Organization Meetings: Not on file    Marital Status: Not on file   Intimate Partner Violence:     Fear of Current or Ex-Partner: Not on file    Emotionally Abused: Not on file    Physically Abused: Not on file    Sexually Abused: Not on file   Housing Stability:     Unable to Pay for Housing in the Last Year: Not on file    Number of Places Lived in the Last Year: Not on file    Unstable Housing in the Last Year: Not on file         ALLERGIES: Tegretol [carbamazepine], Ativan [lorazepam], and Nitroglycerin    Review of Systems   Constitutional: Positive for fatigue. Negative for chills and fever. HENT: Negative for congestion, ear pain, rhinorrhea and sore throat. Respiratory: Positive for cough, sputum production, shortness of breath and wheezing. Negative for hemoptysis. Cardiovascular: Positive for orthopnea and leg swelling. Negative for chest pain, claudication, syncope and PND. Gastrointestinal: Positive for abdominal distention. Negative for abdominal pain, anal bleeding, blood in stool, constipation, diarrhea, nausea and vomiting. Genitourinary: Negative for dysuria and frequency. Musculoskeletal: Negative for neck pain. Skin: Negative for rash. Neurological: Negative for headaches. All other systems reviewed and are negative. Vitals:    04/05/22 1513 04/05/22 1526 04/05/22 1556   BP: (!) 143/114 (!) 143/91 (!) 141/76   Pulse: 70 70 70   Resp: 25 23    Temp: 98.1 °F (36.7 °C)     SpO2: 93% 94% 97%   Weight: 101.2 kg (223 lb)     Height: 6' 1\" (1.854 m)              Physical Exam  Vitals and nursing note reviewed. Constitutional:       General: He is in acute distress. Appearance: He is well-developed. HENT:      Head: Normocephalic and atraumatic. Right Ear: External ear normal.      Left Ear: External ear normal.   Eyes:      Extraocular Movements: Extraocular movements intact. Conjunctiva/sclera: Conjunctivae normal.      Pupils: Pupils are equal, round, and reactive to light. Cardiovascular:      Rate and Rhythm: Normal rate and regular rhythm. Heart sounds: Normal heart sounds. No murmur heard.       Pulmonary:      Effort: Pulmonary effort is normal. Tachypnea present. No accessory muscle usage. Breath sounds: Examination of the right-lower field reveals rales. Examination of the left-lower field reveals rales. Wheezing (Mild diffuse) and rales present. No rhonchi. Abdominal:      General: Abdomen is protuberant. Bowel sounds are normal. There is no distension. Palpations: Abdomen is soft. There is no shifting dullness, hepatomegaly, splenomegaly, mass or pulsatile mass. Tenderness: There is no abdominal tenderness. There is no right CVA tenderness or left CVA tenderness. Hernia: No hernia is present. Musculoskeletal:         General: Normal range of motion. Cervical back: Normal range of motion and neck supple. Right lower leg: No tenderness. Edema present. Left lower leg: No tenderness. Edema present. Skin:     General: Skin is warm and dry. Capillary Refill: Capillary refill takes less than 2 seconds. Neurological:      General: No focal deficit present. Mental Status: He is alert and oriented to person, place, and time. Psychiatric:         Mood and Affect: Mood normal.         Behavior: Behavior normal.          MDM  Number of Diagnoses or Management Options  Acute on chronic congestive heart failure, unspecified heart failure type [facility]): new and requires workup     Amount and/or Complexity of Data Reviewed  Clinical lab tests: ordered and reviewed  Tests in the radiology section of CPT®: ordered and reviewed  Tests in the medicine section of CPT®: ordered and reviewed  Review and summarize past medical records: yes    Risk of Complications, Morbidity, and/or Mortality  Presenting problems: high  Diagnostic procedures: moderate  Management options: moderate    Patient Progress  Patient progress: stable    ED Course as of 04/05/22 2004 Tue Apr 05, 2022 1933 Patient had back stimulator shut off by EMS due to the monitors. Now complaining of lumbar back pain and requests something for pain. Will give for morphine and 4 of Zofran. [BB]      ED Course User Index  [BB] Aminata Hernandez MD       Procedures  The patient was observed in the ED. cardiology was consulted, but prior to their evaluation the patient requested to be discharged to home and he will talk to his own cardiologist in the morning. Patient has all the capability of monitoring his home O2 sats as well as taking an extra dose of Lasix tonight. He refused his Lasix here in the emergency department as well as the morphine and Zofran for his back pain as he could restart his stimulator when he went home. Results Reviewed:      Recent Results (from the past 24 hour(s))   CBC WITH AUTOMATED DIFF    Collection Time: 04/05/22  3:43 PM   Result Value Ref Range    WBC 9.1 4.3 - 11.1 K/uL    RBC 4.25 4.23 - 5.6 M/uL    HGB 11.1 (L) 13.6 - 17.2 g/dL    HCT 35.6 (L) 41.1 - 50.3 %    MCV 83.8 79.6 - 97.8 FL    MCH 26.1 26.1 - 32.9 PG    MCHC 31.2 (L) 31.4 - 35.0 g/dL    RDW 15.3 (H) 11.9 - 14.6 %    PLATELET 974 400 - 322 K/uL    MPV 9.5 9.4 - 12.3 FL    ABSOLUTE NRBC 0.00 0.0 - 0.2 K/uL    DF AUTOMATED      NEUTROPHILS 61 43 - 78 %    LYMPHOCYTES 21 13 - 44 %    MONOCYTES 15 (H) 4.0 - 12.0 %    EOSINOPHILS 2 0.5 - 7.8 %    BASOPHILS 1 0.0 - 2.0 %    IMMATURE GRANULOCYTES 0 0.0 - 5.0 %    ABS. NEUTROPHILS 5.6 1.7 - 8.2 K/UL    ABS. LYMPHOCYTES 1.9 0.5 - 4.6 K/UL    ABS. MONOCYTES 1.3 0.1 - 1.3 K/UL    ABS. EOSINOPHILS 0.2 0.0 - 0.8 K/UL    ABS. BASOPHILS 0.1 0.0 - 0.2 K/UL    ABS. IMM.  GRANS. 0.0 0.0 - 0.5 K/UL   METABOLIC PANEL, COMPREHENSIVE    Collection Time: 04/05/22  3:43 PM   Result Value Ref Range    Sodium 138 138 - 145 mmol/L    Potassium 4.3 3.5 - 5.1 mmol/L    Chloride 107 98 - 107 mmol/L    CO2 27 21 - 32 mmol/L    Anion gap 4 (L) 7 - 16 mmol/L    Glucose 82 65 - 100 mg/dL    BUN 17 8 - 23 MG/DL    Creatinine 1.10 0.8 - 1.5 MG/DL    GFR est AA >60 >60 ml/min/1.73m2    GFR est non-AA >60 >60 ml/min/1.73m2    Calcium 8.7 8.3 - 10.4 MG/DL    Bilirubin, total 0.4 0.2 - 1.1 MG/DL    ALT (SGPT) 22 12 - 65 U/L    AST (SGOT) 19 15 - 37 U/L    Alk.  phosphatase 97 50 - 136 U/L    Protein, total 6.6 6.3 - 8.2 g/dL    Albumin 3.1 (L) 3.2 - 4.6 g/dL    Globulin 3.5 2.3 - 3.5 g/dL    A-G Ratio 0.9 (L) 1.2 - 3.5     MAGNESIUM    Collection Time: 04/05/22  3:43 PM   Result Value Ref Range    Magnesium 2.3 1.8 - 2.4 mg/dL   TROPONIN-HIGH SENSITIVITY    Collection Time: 04/05/22  3:43 PM   Result Value Ref Range    Troponin-High Sensitivity 16.9 (H) 0 - 14 pg/mL   NT-PRO BNP    Collection Time: 04/05/22  3:43 PM   Result Value Ref Range    NT pro-BNP 3,598 (H) 5 - 125 PG/ML

## 2022-04-06 NOTE — ED NOTES
Pt sts he wants to follow up with his cardiologist tomorrow and wants to go home. Pt refusing meds at this time. Pt updated on POC and sts he would rather see his cardiologist tomorrow. Pt resp even and unlabored at rest. Pt sts his stimulator for his back at home will help with his pain. Pt henrietta coy MD made aware.

## 2022-04-06 NOTE — ED NOTES
I have reviewed discharge instructions with the patient. The patient verbalized understanding. Patient left ED via Discharge Method: wheelchair to Home with (insert name of family/friend, self, transport(niece). Opportunity for questions and clarification provided. Patient given 0 scripts. To continue your aftercare when you leave the hospital, you may receive an automated call from our care team to check in on how you are doing. This is a free service and part of our promise to provide the best care and service to meet your aftercare needs.  If you have questions, or wish to unsubscribe from this service please call [phone]. Thank you for Choosing our [address] [place] [address] Emergency Department.

## 2022-04-07 ENCOUNTER — HOSPITAL ENCOUNTER (OUTPATIENT)
Age: 67
Setting detail: OBSERVATION
Discharge: HOME | End: 2022-04-09
Attending: STUDENT IN AN ORGANIZED HEALTH CARE EDUCATION/TRAINING PROGRAM | Admitting: INTERNAL MEDICINE
Payer: OTHER GOVERNMENT

## 2022-04-07 ENCOUNTER — APPOINTMENT (OUTPATIENT)
Dept: GENERAL RADIOLOGY | Age: 67
End: 2022-04-07
Attending: EMERGENCY MEDICINE
Payer: OTHER GOVERNMENT

## 2022-04-07 DIAGNOSIS — I50.43 ACUTE ON CHRONIC HEART FAILURE WITH REDUCED EJECTION FRACTION AND DIASTOLIC DYSFUNCTION (HCC): ICD-10-CM

## 2022-04-07 DIAGNOSIS — J41.0 SIMPLE CHRONIC BRONCHITIS (HCC): Chronic | ICD-10-CM

## 2022-04-07 DIAGNOSIS — I25.10 CAD IN NATIVE ARTERY: ICD-10-CM

## 2022-04-07 DIAGNOSIS — Z95.0 BIVENTRICULAR CARDIAC PACEMAKER IN SITU: ICD-10-CM

## 2022-04-07 DIAGNOSIS — I50.23 ACUTE ON CHRONIC HFREF (HEART FAILURE WITH REDUCED EJECTION FRACTION) (HCC): ICD-10-CM

## 2022-04-07 DIAGNOSIS — I48.19 ATRIAL FIBRILLATION, PERSISTENT (HCC): ICD-10-CM

## 2022-04-07 DIAGNOSIS — J84.10 PULMONARY FIBROSIS (HCC): Chronic | ICD-10-CM

## 2022-04-07 DIAGNOSIS — I10 PRIMARY HYPERTENSION: ICD-10-CM

## 2022-04-07 DIAGNOSIS — Z95.2 HISTORY OF TRANSCATHETER AORTIC VALVE REPLACEMENT (TAVR): ICD-10-CM

## 2022-04-07 LAB
ALBUMIN SERPL-MCNC: 3.4 G/DL (ref 3.2–4.6)
ALBUMIN/GLOB SERPL: 0.8 {RATIO} (ref 1.2–3.5)
ALP SERPL-CCNC: 104 U/L (ref 50–136)
ALT SERPL-CCNC: 26 U/L (ref 12–65)
ANION GAP SERPL CALC-SCNC: 6 MMOL/L (ref 7–16)
AST SERPL-CCNC: 22 U/L (ref 15–37)
BASOPHILS # BLD: 0.1 K/UL (ref 0–0.2)
BASOPHILS NFR BLD: 1 % (ref 0–2)
BILIRUB SERPL-MCNC: 0.5 MG/DL (ref 0.2–1.1)
BNP SERPL-MCNC: 1526 PG/ML (ref 5–125)
BUN SERPL-MCNC: 29 MG/DL (ref 8–23)
CALCIUM SERPL-MCNC: 9.2 MG/DL (ref 8.3–10.4)
CHLORIDE SERPL-SCNC: 105 MMOL/L (ref 98–107)
CO2 SERPL-SCNC: 28 MMOL/L (ref 21–32)
CREAT SERPL-MCNC: 1.4 MG/DL (ref 0.8–1.5)
DIFFERENTIAL METHOD BLD: ABNORMAL
EOSINOPHIL # BLD: 0.2 K/UL (ref 0–0.8)
EOSINOPHIL NFR BLD: 3 % (ref 0.5–7.8)
ERYTHROCYTE [DISTWIDTH] IN BLOOD BY AUTOMATED COUNT: 14.9 % (ref 11.9–14.6)
GLOBULIN SER CALC-MCNC: 4.1 G/DL (ref 2.3–3.5)
GLUCOSE SERPL-MCNC: 92 MG/DL (ref 65–100)
HCT VFR BLD AUTO: 38.7 % (ref 41.1–50.3)
HGB BLD-MCNC: 12.1 G/DL (ref 13.6–17.2)
IMM GRANULOCYTES # BLD AUTO: 0 K/UL (ref 0–0.5)
IMM GRANULOCYTES NFR BLD AUTO: 0 % (ref 0–5)
LYMPHOCYTES # BLD: 2.3 K/UL (ref 0.5–4.6)
LYMPHOCYTES NFR BLD: 27 % (ref 13–44)
MAGNESIUM SERPL-MCNC: 2.2 MG/DL (ref 1.8–2.4)
MCH RBC QN AUTO: 25.6 PG (ref 26.1–32.9)
MCHC RBC AUTO-ENTMCNC: 31.3 G/DL (ref 31.4–35)
MCV RBC AUTO: 82 FL (ref 79.6–97.8)
MONOCYTES # BLD: 1.2 K/UL (ref 0.1–1.3)
MONOCYTES NFR BLD: 14 % (ref 4–12)
NEUTS SEG # BLD: 4.7 K/UL (ref 1.7–8.2)
NEUTS SEG NFR BLD: 55 % (ref 43–78)
NRBC # BLD: 0 K/UL (ref 0–0.2)
PLATELET # BLD AUTO: 212 K/UL (ref 150–450)
PMV BLD AUTO: 9.4 FL (ref 9.4–12.3)
POTASSIUM SERPL-SCNC: 4.1 MMOL/L (ref 3.5–5.1)
PROT SERPL-MCNC: 7.5 G/DL (ref 6.3–8.2)
RBC # BLD AUTO: 4.72 M/UL (ref 4.23–5.6)
SODIUM SERPL-SCNC: 139 MMOL/L (ref 136–145)
TROPONIN-HIGH SENSITIVITY: 10.7 PG/ML (ref 0–14)
TROPONIN-HIGH SENSITIVITY: 11.6 PG/ML (ref 0–14)
WBC # BLD AUTO: 8.5 K/UL (ref 4.3–11.1)

## 2022-04-07 PROCEDURE — 74011250636 HC RX REV CODE- 250/636: Performed by: STUDENT IN AN ORGANIZED HEALTH CARE EDUCATION/TRAINING PROGRAM

## 2022-04-07 PROCEDURE — 96374 THER/PROPH/DIAG INJ IV PUSH: CPT

## 2022-04-07 PROCEDURE — 74011000250 HC RX REV CODE- 250: Performed by: NURSE PRACTITIONER

## 2022-04-07 PROCEDURE — G0378 HOSPITAL OBSERVATION PER HR: HCPCS

## 2022-04-07 PROCEDURE — 71045 X-RAY EXAM CHEST 1 VIEW: CPT

## 2022-04-07 PROCEDURE — 96375 TX/PRO/DX INJ NEW DRUG ADDON: CPT

## 2022-04-07 PROCEDURE — 83735 ASSAY OF MAGNESIUM: CPT

## 2022-04-07 PROCEDURE — 85025 COMPLETE CBC W/AUTO DIFF WBC: CPT

## 2022-04-07 PROCEDURE — 84484 ASSAY OF TROPONIN QUANT: CPT

## 2022-04-07 PROCEDURE — 83880 ASSAY OF NATRIURETIC PEPTIDE: CPT

## 2022-04-07 PROCEDURE — 99285 EMERGENCY DEPT VISIT HI MDM: CPT

## 2022-04-07 PROCEDURE — 99223 1ST HOSP IP/OBS HIGH 75: CPT | Performed by: INTERNAL MEDICINE

## 2022-04-07 PROCEDURE — 93005 ELECTROCARDIOGRAM TRACING: CPT | Performed by: STUDENT IN AN ORGANIZED HEALTH CARE EDUCATION/TRAINING PROGRAM

## 2022-04-07 PROCEDURE — 80053 COMPREHEN METABOLIC PANEL: CPT

## 2022-04-07 RX ORDER — MORPHINE SULFATE 4 MG/ML
4 INJECTION INTRAVENOUS
Status: COMPLETED | OUTPATIENT
Start: 2022-04-07 | End: 2022-04-07

## 2022-04-07 RX ORDER — ONDANSETRON 2 MG/ML
4 INJECTION INTRAMUSCULAR; INTRAVENOUS
Status: COMPLETED | OUTPATIENT
Start: 2022-04-07 | End: 2022-04-07

## 2022-04-07 RX ORDER — SODIUM CHLORIDE 0.9 % (FLUSH) 0.9 %
5-10 SYRINGE (ML) INJECTION AS NEEDED
Status: DISCONTINUED | OUTPATIENT
Start: 2022-04-07 | End: 2022-04-08

## 2022-04-07 RX ORDER — SODIUM CHLORIDE 0.9 % (FLUSH) 0.9 %
5-10 SYRINGE (ML) INJECTION EVERY 8 HOURS
Status: DISCONTINUED | OUTPATIENT
Start: 2022-04-07 | End: 2022-04-08

## 2022-04-07 RX ADMIN — ONDANSETRON 4 MG: 2 INJECTION INTRAMUSCULAR; INTRAVENOUS at 21:53

## 2022-04-07 RX ADMIN — MORPHINE SULFATE 4 MG: 4 INJECTION INTRAVENOUS at 21:53

## 2022-04-07 RX ADMIN — BUMETANIDE 1 MG/HR: 0.25 INJECTION, SOLUTION INTRAMUSCULAR; INTRAVENOUS at 23:37

## 2022-04-07 NOTE — ED TRIAGE NOTES
\"I was here and they wanted to keep me and I wanted to go home but I wanted to go home.   My dr let me monitor at home but I am twice as swollen\"

## 2022-04-07 NOTE — ED TRIAGE NOTES
[employer] he is having shortness of breath and is having fluid overload.   His lasix is at 120 now and still fluid overload\"

## 2022-04-08 ENCOUNTER — APPOINTMENT (OUTPATIENT)
Dept: NON INVASIVE DIAGNOSTICS | Age: 67
End: 2022-04-08
Attending: NURSE PRACTITIONER
Payer: OTHER GOVERNMENT

## 2022-04-08 LAB
ANION GAP SERPL CALC-SCNC: 5 MMOL/L (ref 7–16)
ATRIAL RATE: 74 BPM
BUN SERPL-MCNC: 27 MG/DL (ref 8–23)
CALCIUM SERPL-MCNC: 9 MG/DL (ref 8.3–10.4)
CALCULATED P AXIS, ECG09: 0 DEGREES
CALCULATED R AXIS, ECG10: -173 DEGREES
CALCULATED T AXIS, ECG11: 78 DEGREES
CHLORIDE SERPL-SCNC: 103 MMOL/L (ref 98–107)
CO2 SERPL-SCNC: 30 MMOL/L (ref 21–32)
CREAT SERPL-MCNC: 1.5 MG/DL (ref 0.8–1.5)
CREAT UR-MCNC: <13 MG/DL
DIAGNOSIS, 93000: NORMAL
ECHO LA DIAMETER INDEX: 2.27 CM/M2
ECHO LA DIAMETER: 5.2 CM
ECHO LV EDV A2C: 149 ML
ECHO LV EDV A4C: 149 ML
ECHO LV EDV INDEX A4C: 65 ML/M2
ECHO LV EDV NDEX A2C: 65 ML/M2
ECHO LV EJECTION FRACTION A2C: 53 %
ECHO LV EJECTION FRACTION A4C: 45 %
ECHO LV EJECTION FRACTION BIPLANE: 47 % (ref 55–100)
ECHO LV ESV A2C: 71 ML
ECHO LV ESV A4C: 82 ML
ECHO LV ESV INDEX A2C: 31 ML/M2
ECHO LV ESV INDEX A4C: 36 ML/M2
ECHO LV FRACTIONAL SHORTENING: 18 % (ref 28–44)
ECHO LV INTERNAL DIMENSION DIASTOLE INDEX: 2.45 CM/M2
ECHO LV INTERNAL DIMENSION DIASTOLIC: 5.6 CM (ref 4.2–5.9)
ECHO LV INTERNAL DIMENSION SYSTOLIC INDEX: 2.01 CM/M2
ECHO LV INTERNAL DIMENSION SYSTOLIC: 4.6 CM
ECHO LV IVSD: 0.9 CM (ref 0.6–1)
ECHO LV MASS 2D: 191.6 G (ref 88–224)
ECHO LV MASS INDEX 2D: 83.7 G/M2 (ref 49–115)
ECHO LV POSTERIOR WALL DIASTOLIC: 0.9 CM (ref 0.6–1)
ECHO LV RELATIVE WALL THICKNESS RATIO: 0.32
ECHO RV INTERNAL DIMENSION: 2.6 CM
GLUCOSE SERPL-MCNC: 102 MG/DL (ref 65–100)
MAGNESIUM SERPL-MCNC: 2 MG/DL (ref 1.8–2.4)
P-R INTERVAL, ECG05: 168 MS
POTASSIUM SERPL-SCNC: 3.7 MMOL/L (ref 3.5–5.1)
PROT UR-MCNC: <5 MG/DL
PROT/CREAT UR-RTO: NORMAL
Q-T INTERVAL, ECG07: 501 MS
QRS DURATION, ECG06: 195 MS
QTC CALCULATION (BEZET), ECG08: 541 MS
SODIUM SERPL-SCNC: 138 MMOL/L (ref 138–145)
SP GR UR REFRACTOMETRY: 1.01 (ref 1–1.02)
T4 FREE SERPL-MCNC: 1.1 NG/DL (ref 0.78–1.46)
TSH SERPL DL<=0.005 MIU/L-ACNC: 3.38 UIU/ML (ref 0.36–3.74)
VENTRICULAR RATE, ECG03: 70 BPM

## 2022-04-08 PROCEDURE — 74011250637 HC RX REV CODE- 250/637: Performed by: INTERNAL MEDICINE

## 2022-04-08 PROCEDURE — 83735 ASSAY OF MAGNESIUM: CPT

## 2022-04-08 PROCEDURE — C8923 2D TTE W OR W/O FOL W/CON,CO: HCPCS

## 2022-04-08 PROCEDURE — 94640 AIRWAY INHALATION TREATMENT: CPT

## 2022-04-08 PROCEDURE — 94664 DEMO&/EVAL PT USE INHALER: CPT

## 2022-04-08 PROCEDURE — 96365 THER/PROPH/DIAG IV INF INIT: CPT

## 2022-04-08 PROCEDURE — 74011250636 HC RX REV CODE- 250/636: Performed by: INTERNAL MEDICINE

## 2022-04-08 PROCEDURE — 81002 URINALYSIS NONAUTO W/O SCOPE: CPT

## 2022-04-08 PROCEDURE — 74011000250 HC RX REV CODE- 250: Performed by: INTERNAL MEDICINE

## 2022-04-08 PROCEDURE — 84439 ASSAY OF FREE THYROXINE: CPT

## 2022-04-08 PROCEDURE — 96366 THER/PROPH/DIAG IV INF ADDON: CPT

## 2022-04-08 PROCEDURE — 96375 TX/PRO/DX INJ NEW DRUG ADDON: CPT

## 2022-04-08 PROCEDURE — 84156 ASSAY OF PROTEIN URINE: CPT

## 2022-04-08 PROCEDURE — 36415 COLL VENOUS BLD VENIPUNCTURE: CPT

## 2022-04-08 PROCEDURE — G0378 HOSPITAL OBSERVATION PER HR: HCPCS

## 2022-04-08 PROCEDURE — 84443 ASSAY THYROID STIM HORMONE: CPT

## 2022-04-08 PROCEDURE — 83521 IG LIGHT CHAINS FREE EACH: CPT

## 2022-04-08 PROCEDURE — 80048 BASIC METABOLIC PNL TOTAL CA: CPT

## 2022-04-08 PROCEDURE — 74011000250 HC RX REV CODE- 250: Performed by: NURSE PRACTITIONER

## 2022-04-08 PROCEDURE — 74011000250 HC RX REV CODE- 250: Performed by: STUDENT IN AN ORGANIZED HEALTH CARE EDUCATION/TRAINING PROGRAM

## 2022-04-08 PROCEDURE — 2709999900 HC NON-CHARGEABLE SUPPLY

## 2022-04-08 PROCEDURE — 99225 PR SBSQ OBSERVATION CARE/DAY 25 MINUTES: CPT | Performed by: INTERNAL MEDICINE

## 2022-04-08 PROCEDURE — 74011250637 HC RX REV CODE- 250/637: Performed by: NURSE PRACTITIONER

## 2022-04-08 PROCEDURE — 94760 N-INVAS EAR/PLS OXIMETRY 1: CPT

## 2022-04-08 PROCEDURE — 82784 ASSAY IGA/IGD/IGG/IGM EACH: CPT

## 2022-04-08 RX ORDER — IPRATROPIUM BROMIDE AND ALBUTEROL SULFATE 2.5; .5 MG/3ML; MG/3ML
3 SOLUTION RESPIRATORY (INHALATION)
Status: DISCONTINUED | OUTPATIENT
Start: 2022-04-08 | End: 2022-04-08 | Stop reason: SDUPTHER

## 2022-04-08 RX ORDER — MORPHINE SULFATE 2 MG/ML
2 INJECTION, SOLUTION INTRAMUSCULAR; INTRAVENOUS
Status: DISCONTINUED | OUTPATIENT
Start: 2022-04-08 | End: 2022-04-09 | Stop reason: HOSPADM

## 2022-04-08 RX ORDER — ATORVASTATIN CALCIUM 40 MG/1
40 TABLET, FILM COATED ORAL DAILY
Status: DISCONTINUED | OUTPATIENT
Start: 2022-04-08 | End: 2022-04-09 | Stop reason: HOSPADM

## 2022-04-08 RX ORDER — METOPROLOL SUCCINATE 100 MG/1
100 TABLET, EXTENDED RELEASE ORAL EVERY 12 HOURS
Status: DISCONTINUED | OUTPATIENT
Start: 2022-04-08 | End: 2022-04-09 | Stop reason: HOSPADM

## 2022-04-08 RX ORDER — SPIRONOLACTONE 25 MG/1
25 TABLET ORAL DAILY
Status: DISCONTINUED | OUTPATIENT
Start: 2022-04-08 | End: 2022-04-09 | Stop reason: HOSPADM

## 2022-04-08 RX ORDER — FUROSEMIDE 10 MG/ML
40 INJECTION INTRAMUSCULAR; INTRAVENOUS 2 TIMES DAILY
Status: DISCONTINUED | OUTPATIENT
Start: 2022-04-08 | End: 2022-04-09

## 2022-04-08 RX ORDER — GUAIFENESIN 100 MG/5ML
81 LIQUID (ML) ORAL DAILY
Status: DISCONTINUED | OUTPATIENT
Start: 2022-04-08 | End: 2022-04-09 | Stop reason: HOSPADM

## 2022-04-08 RX ORDER — IPRATROPIUM BROMIDE AND ALBUTEROL SULFATE 2.5; .5 MG/3ML; MG/3ML
3 SOLUTION RESPIRATORY (INHALATION)
Status: DISCONTINUED | OUTPATIENT
Start: 2022-04-08 | End: 2022-04-09 | Stop reason: HOSPADM

## 2022-04-08 RX ORDER — LISINOPRIL 5 MG/1
5 TABLET ORAL DAILY
Status: DISCONTINUED | OUTPATIENT
Start: 2022-04-08 | End: 2022-04-09 | Stop reason: HOSPADM

## 2022-04-08 RX ORDER — DABIGATRAN ETEXILATE 150 MG/1
150 CAPSULE ORAL EVERY 12 HOURS
Status: DISCONTINUED | OUTPATIENT
Start: 2022-04-08 | End: 2022-04-09 | Stop reason: HOSPADM

## 2022-04-08 RX ORDER — HYDROCODONE BITARTRATE AND ACETAMINOPHEN 7.5; 325 MG/1; MG/1
1 TABLET ORAL
Status: DISCONTINUED | OUTPATIENT
Start: 2022-04-08 | End: 2022-04-09 | Stop reason: HOSPADM

## 2022-04-08 RX ADMIN — BUMETANIDE 1 MG/HR: 0.25 INJECTION, SOLUTION INTRAMUSCULAR; INTRAVENOUS at 04:47

## 2022-04-08 RX ADMIN — HYDROCODONE BITARTRATE AND ACETAMINOPHEN 1 TABLET: 7.5; 325 TABLET ORAL at 05:42

## 2022-04-08 RX ADMIN — METOPROLOL SUCCINATE 100 MG: 100 TABLET, EXTENDED RELEASE ORAL at 08:41

## 2022-04-08 RX ADMIN — ATORVASTATIN CALCIUM 40 MG: 40 TABLET, FILM COATED ORAL at 08:41

## 2022-04-08 RX ADMIN — LISINOPRIL 5 MG: 5 TABLET ORAL at 08:41

## 2022-04-08 RX ADMIN — Medication 1 AMPULE: at 21:22

## 2022-04-08 RX ADMIN — MOMETASONE FUROATE AND FORMOTEROL FUMARATE DIHYDRATE 2 PUFF: 200; 5 AEROSOL RESPIRATORY (INHALATION) at 08:41

## 2022-04-08 RX ADMIN — ASPIRIN 81 MG: 81 TABLET, CHEWABLE ORAL at 08:41

## 2022-04-08 RX ADMIN — PERFLUTREN 3 ML: 6.52 INJECTION, SUSPENSION INTRAVENOUS at 09:45

## 2022-04-08 RX ADMIN — SPIRONOLACTONE 25 MG: 25 TABLET ORAL at 11:23

## 2022-04-08 RX ADMIN — MOMETASONE FUROATE AND FORMOTEROL FUMARATE DIHYDRATE 2 PUFF: 200; 5 AEROSOL RESPIRATORY (INHALATION) at 20:08

## 2022-04-08 RX ADMIN — DABIGATRAN ETEXILATE MESYLATE 150 MG: 150 CAPSULE ORAL at 09:04

## 2022-04-08 RX ADMIN — DABIGATRAN ETEXILATE MESYLATE 150 MG: 150 CAPSULE ORAL at 21:16

## 2022-04-08 RX ADMIN — HYDROCODONE BITARTRATE AND ACETAMINOPHEN 1 TABLET: 7.5; 325 TABLET ORAL at 21:22

## 2022-04-08 RX ADMIN — Medication 1 AMPULE: at 08:40

## 2022-04-08 RX ADMIN — Medication 1 AMPULE: at 00:57

## 2022-04-08 RX ADMIN — SODIUM CHLORIDE, PRESERVATIVE FREE 5 ML: 5 INJECTION INTRAVENOUS at 00:57

## 2022-04-08 RX ADMIN — FUROSEMIDE 40 MG: 10 INJECTION, SOLUTION INTRAMUSCULAR; INTRAVENOUS at 11:23

## 2022-04-08 NOTE — PROGRESS NOTES
Problem: Falls - Risk of  Goal: *Absence of Falls  Description: Document Khadijah Wilks Fall Risk and appropriate interventions in the flowsheet.   Outcome: Progressing Towards Goal  Note: Fall Risk Interventions:  Mobility Interventions: Bed/chair exit alarm,Patient to call before getting OOB         Medication Interventions: Patient to call before getting OOB         History of Falls Interventions: Bed/chair exit alarm,Investigate reason for fall         Problem: Patient Education: Go to Patient Education Activity  Goal: Patient/Family Education  Outcome: Progressing Towards Goal

## 2022-04-08 NOTE — ROUTINE PROCESS
CHF teaching held  Until ECHO, but quick introduction; emphasis to report worsening dyspnea, Daily WT gain, edema.: 20min    Cardiac diet FR  Scale @ BS  ECHO    needs planners

## 2022-04-08 NOTE — PROGRESS NOTES
Admission skin assessment completed with second RN and reveals the following:   No skin breakdown on sacrum or heels. 04/08/22 0011   Skin Integumentary   Skin Integumentary (WDL) X    Pressure  Injury Documentation No Pressure Injury Noted-Pressure Ulcer Prevention Initiated   Skin Color Appropriate for ethnicity   Skin Condition/Temp Dry; Warm   Skin Integrity Intact   Turgor Non-tenting   Hair Growth Sparce   Nails X   Varicosities Absent   Exceptions to WDL Thick; Discolored

## 2022-04-08 NOTE — ROUTINE PROCESS
IP consult to Cardiac Rehab. Pt EF=47% on Echo completed 4/8/22. EF must be 35% or less to qualify for Cardiac Rehab with diagnosis of chronic heart failure. We will contact the patient if a qualifying referral is received.      Thank you,  KALLI LazaroN, RN  Cardiac Rehab Nurse Liaison

## 2022-04-08 NOTE — PROGRESS NOTES
Pt presented to the ED via EMS c/o SOB and LE edema to the thigh and abdominal distention. Has a PMHx of CAD, paroxysmal A. Fib and DCCV x 2, BSC, BIV, PPM and AVN ablation, aortic stenosis s/p TAVR, CAD s/p PCI, ischemic, cardiomyopathy, systolic heart failure, HTN, dyslipidemia, seizuredisorder, COPD, pulmonary fibrosis and a current smoker. Pt is active in the [place] system in [place], he recently moved from [facility] to [facility]. Pt is WC bound. Pt lives alone in an apartment with an elevator. He is partially independent with his ADLs. He has an aide with [facility] ([phone]). His aide is there 4hrs/day 5 days/wk. Although she is helpful, he reported that he needs two aides; one on the morning to get him ready and one in the afternoon to help put him to bed. CM attempted to contact [address] [place] [address], however, since it was set up through the [place], the rep directed this writer to contact them. CM attempted to contacted the pts PAC Lemuel VILLATOROadeline Rebecca [phone]), but had to leave a  for a return call. Pt obtains his medications and DMEs through the [place] system. Pt has a hospital bed, shower chair, slide board and electric WC. Pt is current with [facility] with RN/PT/OT. PT/OT have been consulted. A DEVAN will be sent to [facility] at discharge. Pt transports via bus. Pts PCP is through the [place]. Will await therapies recommendations and attempt to call [facility]ts [facility] later today if time permits. [id number] added [place] aide to pts DEVAN and asked that she come in the morning. May may d/c tomorrow. DEVAN sent to [facility]. Care Management Interventions  PCP Verified by CM: Yes ([facility])  Mode of Transport at Discharge:  Other (see comment) (Family)  Transition of Care Consult (CM Consult): Discharge Planning,Home Hospice ([facility] [address])  [facility]: No  Reason Outside [place]: Patient already serviced by other home care/hospice agency  Physical Therapy Consult: Yes  Occupational Therapy Consult: Yes  Speech Therapy Consult: No  Support Systems: Other Family Member(s),Caregiver/Home Care Staff,Friend/Neighbor,Scientologist/[place],/  Confirm Follow Up Transport: Family  The Plan for Transition of Care is Related to the Following Treatment Goals : Return home and back to his baseline  The Patient and/or Patient Representative was Provided with a Choice of Provider and Agrees with the Discharge Plan?: Yes  Name of the Patient Representative Who was Provided with a Choice of Provider and Agrees with the Discharge Plan: Patient  Freedom of Choice List was Provided with Basic Dialogue that Supports the Patient's Individualized Plan of Care/Goals, Treatment Preferences and Shares the Quality Data Associated with the Providers?: Yes   Resource Information Provided?: No  Discharge Location  Patient Expects to be Discharged to[de-identified] Home

## 2022-04-08 NOTE — ROUTINE PROCESS
TRANSFER - IN REPORT:    Verbal report received from YONIS Cowart on [address] being received from ER for routine progression of care      Report consisted of patients Situation, Background, Assessment and Recommendations(SBAR). Information from the following report(s) SBAR, ED Summary and MAR was reviewed with the receiving nurse. Opportunity for questions and clarification was provided. Assessment completed upon patients arrival to unit and care assumed.

## 2022-04-08 NOTE — PROGRESS NOTES
[facility] CARDIOLOGY PROGRESS NOTE           4/8/2022 10:31 AM    Admit Date: 4/7/2022      Subjective:   Patient reports shortness of breath has improved, still with lower extremity edema. Abdominal fullness resolved. No chest pain, chest pressure, irregular heartbeats, palpitations, nausea, vomiting, abdominal pain at this time. No other complaints. ROS:  Cardiovascular:  As noted above    Objective:      Vitals:    04/08/22 0454 04/08/22 0547 04/08/22 0632 04/08/22 0803   BP: 117/77 130/89 123/75 109/62   Pulse: 75 70 70 70   Resp:    18   Temp:    97.2 °F (36.2 °C)   SpO2:    96%   Weight:       Height:           Physical Exam:  General-No Acute Distress, awake, alert  Neck- supple, no JVD  CV- regular rate and rhythm no MRG  Lung-mild Rales at the bases bilaterally, nonlabored, no wheezes  Abd- soft, nontender, nondistended  Ext-leg edema bilaterally. Skin- warm and dry    Data Review:   Recent Labs     04/08/22  0730 04/07/22  1948 04/05/22  1543 04/05/22  1543    139   < > 138   K 3.7 4.1   < > 4.3   MG 2.0 2.2   < > 2.3   BUN 27* 29*   < > 17   CREA 1.50 1.40   < > 1.10   * 92   < > 82   WBC  --  8.5  --  9.1   HGB  --  12.1*  --  11.1*   HCT  --  38.7*  --  35.6*   PLT  --  212  --  184    < > = values in this interval not displayed. Assessment/Plan:     Principal Problem:    Acute on chronic heart failure with reduced ejection fraction and diastolic dysfunction ([facility] (4/7/2022)  -Good urine output overnight, net -3.3 L.  -Continue IV diuresis today.  -Closely monitor renal function electrolytes, I's and O's.  -Continue Toprol-XL, lisinopril 5. Add Aldactone. Change Bumex drip to Lasix given good urine output overnight. Active Problems:    COPD (chronic obstructive pulmonary disease) ([facility] (12/3/2021)    Pulmonary fibrosis ([facility] (12/3/2021)  -Likely contributes to dyspnea, no severe wheezing on examination today. On room air.       History of transcatheter aortic valve replacement (TAVR) (1/3/2022)  -Gradient stable on recent interrogation of valve with cath      Hypertension (1/3/2022)  -Controlled at this time      Hyperlipidemia (1/31/2022)  -Continue atorvastatin 40 given history of CAD      Atrial fibrillation, persistent ([address] (2/16/2022)  -S/p ablation, pacemaker. On Pradaxa for anticoagulation, Toprol-XL. Plan for diuresis today and up titration of CHF therapy. Likely discharge home tomorrow if patient continues to improve.      Quyen Saab DO  4/8/2022 10:31 AM

## 2022-04-08 NOTE — H&P
[facility] Cardiology History & Physical      Date of  Admission: 4/7/2022  5:03 PM     [facility]  Primary Cardiologist:  Dr. Paul Rascon  Admitting Physician:  Dr. Waqar Milton    CC: Dyspnea    HPI:  Agustin Bangura is a 77 y.o. male with PMH of CAD ([address] [facility] 12/2021 patent stents to LAD (20% ISR), patent stent to LCirx (20% ISR), patent stent to RCA (30% ISR), paroxysmal atrial fibrillation on Pradaxa, hx afib ablation and DCCV x 2, BSC BIV PPM and AVN ablation, aortic stenosis s/p TAVR 7/2018, CAD s/p PCI 2014 and 2015, ischemic cardiomyopathy, systolic heart failure, HTN, dyslipidemia, seizure disorder, COPD, pulmonary fibrosis and current smoker who presented to [facility] ED via EMS with c/o shortness of breath and edema. Patient reports worsening symptoms despite increased PO lasix. He also has LE edema to the thigh and abdominal distention. Labs in the ED showed WBC 8.5, H&H 12.1/38.7, plt 212, , K 4.1, BUN 29, creatinine 1.40, mag 2.2, albumin 3.4, and pBNP 1526. CXR without acute process. On exam patient has audible wheezing, abdomen is very distended and +4 LE edema is present.        Past Medical History:   Diagnosis Date    Aortic stenosis     Aortic valve replacement 7/2018    CAD (coronary artery disease)     PCI in 2014, 2015    COPD (chronic obstructive [facility] disease) [facility]     Dyslipidemia     Heart failure (HCC)     HTN (hypertension)     Ischemic cardiomyopathy     Paroxysmal atrial fibrillation (HCC)     Pulmonary fibrosis (HCC)     Seizure disorder ([facility]     Systolic heart failure ([facility]       Past Surgical History:   Procedure Laterality Date    HX AFIB ABLATION      HX AORTIC VALVE REPLACEMENT  07/2018    HX HEART CATHETERIZATION      PCI 2014, 2015       Allergies   Allergen Reactions    Tegretol [Carbamazepine] Anaphylaxis    Ativan [Lorazepam] Other (comments)     Violent behavior      Nitroglycerin Other (comments)     hypotension      Social History Socioeconomic History    Marital status: LEGALLY      Spouse name: Not on file    Number of children: Not on file    Years of education: Not on file    Highest education level: Not on file   Occupational History    Not on file   Tobacco Use    Smoking status: Current Every Day Smoker     Types: Cigarettes    Smokeless tobacco: Never Used   Substance and Sexual Activity    Alcohol use: Not Currently    Drug use: Not on file    Sexual activity: Not on file   Other Topics Concern    Not on file   Social History Narrative    Not on file     Social Determinants of Health     Financial Resource Strain:     Difficulty of Paying Living Expenses: Not on file   Food Insecurity:     Worried About Running Out of Food in the Last Year: Not on file    Codey of Food in the Last Year: Not on file   Transportation Needs:     Lack of Transportation (Medical): Not on file    Lack of Transportation (Non-Medical):  Not on file   Physical Activity:     Days of Exercise per Week: Not on file    Minutes of Exercise per Session: Not on file   Stress:     Feeling of Stress : Not on file   Social Connections:     Frequency of Communication with Friends and Family: Not on file    Frequency of Social Gatherings with Friends and Family: Not on file    Attends Mandaeism Services: Not on file    Active Member of [address] [place] [address] [employer] or Organizations: Not on file    Attends Club or Organization Meetings: Not on file    Marital Status: Not on file   Intimate Partner Violence:     Fear of Current or Ex-Partner: Not on file    Emotionally Abused: Not on file    Physically Abused: Not on file    Sexually Abused: Not on file   Housing Stability:     Unable to Pay for Housing in the Last Year: Not on file    Number of [place] in the Last Year: Not on file    Unstable Housing in the Last Year: Not on file     Family History   Problem Relation Age of Onset    Heart Disease Mother     Heart Disease Sister Current Facility-Administered Medications   Medication Dose Route Frequency    sodium chloride (NS) flush 5-10 mL  5-10 mL IntraVENous Q8H    sodium chloride (NS) flush 5-10 mL  5-10 mL IntraVENous PRN    morphine injection 4 mg  4 mg IntraVENous NOW    ondansetron (ZOFRAN) injection 4 mg  4 mg IntraVENous NOW     Current Outpatient Medications   Medication Sig    fluticasone propion-salmeteroL (Advair Diskus) 250-50 mcg/dose diskus inhaler Take 1 Puff by inhalation every twelve (12) hours.  ondansetron (ZOFRAN ODT) 8 mg disintegrating tablet Take 1 Tablet by mouth every eight (8) hours as needed for Nausea.  furosemide (Lasix) 40 mg tablet Take 40 mg by mouth two (2) times a day.  albuterol (Ventolin HFA) 90 mcg/actuation inhaler Take 2 Puffs by inhalation every four (4) hours as needed for Wheezing.  dabigatran etexilate (Pradaxa) 150 mg capsule Take 1 Capsule by mouth every twelve (12) hours.  lisinopriL (PRINIVIL, ZESTRIL) 5 mg tablet Take 1 Tablet by mouth daily.  metoprolol succinate (TOPROL-XL) 100 mg tablet Take 1 Tablet by mouth every twelve (12) hours.  potassium chloride (K-DUR, KLOR-CON) 20 mEq tablet Take 20 mEq by mouth two (2) times a day.  baclofen (LIORESAL) 10 mg tablet Take 10 mg by mouth three (3) times daily.  aspirin 81 mg chewable tablet Take 81 mg by mouth daily.  atorvastatin (LIPITOR) 40 mg tablet Take 40 mg by mouth daily.  albuterol (PROVENTIL VENTOLIN) 2.5 mg /3 mL (0.083 %) nebu 2.5 mg by Nebulization route every four (4) hours as needed for Shortness of Breath.  albuterol (PROVENTIL HFA, VENTOLIN HFA, PROAIR HFA) 90 mcg/actuation inhaler Take 2 Puffs by inhalation every four (4) hours as needed for Wheezing or Shortness of Breath.  albuterol-ipratropium (DUO-NEB) 2.5 mg-0.5 mg/3 ml nebu 3 mL by Nebulization route every four (4) hours as needed for Shortness of Breath.        Review of Systems    Review of Systems   Constitutional: Positive for weight gain. HENT: Negative. Eyes: Negative. Cardiovascular: Positive for dyspnea on exertion, leg swelling and orthopnea. Respiratory: Positive for shortness of breath and wheezing. Endocrine: Negative. Hematologic/Lymphatic: Negative. Skin: Negative. Musculoskeletal: Negative. Gastrointestinal: Positive for bloating. Genitourinary: Negative. Neurological: Negative. Psychiatric/Behavioral: Negative. Allergic/Immunologic: Negative. Subjective:     Visit Vitals  BP (!) 138/99   Pulse 70   Temp 97.8 °F (36.6 °C)   Resp 22   Ht 6' 1\" (1.854 m)   Wt 99.8 kg (220 lb)   SpO2 95%   BMI 29.03 kg/m²     Physical Exam  Eyes:      Pupils: Pupils are equal, round, and reactive to light. Cardiovascular:      Rate and Rhythm: Normal rate and regular rhythm. Pulmonary:      Effort: Pulmonary effort is normal.      Breath sounds: Wheezing present. Abdominal:      General: Bowel sounds are normal. There is distension. Musculoskeletal:      Right lower le+ Pitting Edema present. Left lower le+ Pitting Edema present. Skin:     General: Skin is warm. Neurological:      General: No focal deficit present. Mental Status: He is alert and oriented to person, place, and time. Psychiatric:         Mood and Affect: Mood normal.         Behavior: Behavior normal.         Thought Content:  Thought content normal.         Judgment: Judgment normal.         Cardiographics    ECG: paced   Echocardiogram: pending    Labs:   Recent Results (from the past 24 hour(s))   CBC WITH AUTOMATED DIFF    Collection Time: 22  7:48 PM   Result Value Ref Range    WBC 8.5 4.3 - 11.1 K/uL    RBC 4.72 4.23 - 5.6 M/uL    HGB 12.1 (L) 13.6 - 17.2 g/dL    HCT 38.7 (L) 41.1 - 50.3 %    MCV 82.0 79.6 - 97.8 FL    MCH 25.6 (L) 26.1 - 32.9 PG    MCHC 31.3 (L) 31.4 - 35.0 g/dL    RDW 14.9 (H) 11.9 - 14.6 %    PLATELET 197 157 - 329 K/uL    MPV 9.4 9.4 - 12.3 FL    ABSOLUTE NRBC 0.00 0.0 - 0.2 K/uL    DF AUTOMATED      NEUTROPHILS 55 43 - 78 %    LYMPHOCYTES 27 13 - 44 %    MONOCYTES 14 (H) 4.0 - 12.0 %    EOSINOPHILS 3 0.5 - 7.8 %    BASOPHILS 1 0.0 - 2.0 %    IMMATURE GRANULOCYTES 0 0.0 - 5.0 %    ABS. NEUTROPHILS 4.7 1.7 - 8.2 K/UL    ABS. LYMPHOCYTES 2.3 0.5 - 4.6 K/UL    ABS. MONOCYTES 1.2 0.1 - 1.3 K/UL    ABS. EOSINOPHILS 0.2 0.0 - 0.8 K/UL    ABS. BASOPHILS 0.1 0.0 - 0.2 K/UL    ABS. IMM. GRANS. 0.0 0.0 - 0.5 K/UL   METABOLIC PANEL, COMPREHENSIVE    Collection Time: 04/07/22  7:48 PM   Result Value Ref Range    Sodium 139 136 - 145 mmol/L    Potassium 4.1 3.5 - 5.1 mmol/L    Chloride 105 98 - 107 mmol/L    CO2 28 21 - 32 mmol/L    Anion gap 6 (L) 7 - 16 mmol/L    Glucose 92 65 - 100 mg/dL    BUN 29 (H) 8 - 23 MG/DL    Creatinine 1.40 0.8 - 1.5 MG/DL    GFR est AA >60 >60 ml/min/1.73m2    GFR est non-AA 54 (L) >60 ml/min/1.73m2    Calcium 9.2 8.3 - 10.4 MG/DL    Bilirubin, total 0.5 0.2 - 1.1 MG/DL    ALT (SGPT) 26 12 - 65 U/L    AST (SGOT) 22 15 - 37 U/L    Alk.  phosphatase 104 50 - 136 U/L    Protein, total 7.5 6.3 - 8.2 g/dL    Albumin 3.4 3.2 - 4.6 g/dL    Globulin 4.1 (H) 2.3 - 3.5 g/dL    A-G Ratio 0.8 (L) 1.2 - 3.5     MAGNESIUM    Collection Time: 04/07/22  7:48 PM   Result Value Ref Range    Magnesium 2.2 1.8 - 2.4 mg/dL   NT-PRO BNP    Collection Time: 04/07/22  7:48 PM   Result Value Ref Range    NT pro-BNP 1,526 (H) 5 - 125 PG/ML   TROPONIN-HIGH SENSITIVITY    Collection Time: 04/07/22  7:48 PM   Result Value Ref Range    Troponin-High Sensitivity 11.6 0 - 14 pg/mL       Patient has been seen and examined by Dr. Loreta Ramesh and he agrees with the following assessment and plan:     Assessment/Plan:       Principal Problem:    Acute on chronic heart failure with reduced ejection fraction and diastolic dysfunction  -- admit for diuresis  -- start Bumex drip @ 1 mg/hr  -- continue BB and ACE  -- add aldactone  -- repeat limited Echo   -- check urine studies  -- repeat labs in AM  -- strict I/O and daily    Active Problems:    COPD (chronic obstructive pulmonary disease)   -- home inhalers      Pulmonary fibrosis   -- [facility]      History of transcatheter aortic valve replacement   -- in [address] in 2018      Hypertension  -- continue home meds       Hyperlipidemia   -- continue statin       Atrial fibrillation, persistent   -- continue Pradaxa      Loi Devine NP  4/7/2022 9:51 PM    I have personally seen and examined patient and agree with above assessment. I agree and confirm with findings with additional details/exceptions as listed below:    History of coronary disease [coronary angiogram from 12/2021 with patent stents to the LAD/left circumflex/RCA with mild in-stent restenosis]. Last echocardiogram with mild left ventricular dysfunction with ejection fraction 40-45% from 12/4/2021. Also history of aortic stenosis status post TAVR from 7/2018. Most of the patient's prior care was in [place]. Admission from 12/2021 felt to be exacerbated in the setting of atrial fibrillation with RVR. Patient underwent evaluation with the EP status post AV zane ablation/biventricular device on 2/16/2022. Reports initially feeling well but subsequently, over the last 4 to 6 weeks, has been having increased lower extremity edema/abdominal distention and weight gain. Reports initially being on 40 mg of Lasix daily which was subsequently increased to 40 mg twice daily and then 80 mg daily with some improved response but persistent worsening in symptoms. Compliant with medications. Physical exam  Cardiovascular regular rate rhythm; low-grade holosystolic murmur heard at the sternal border/apex. Lungs clear to auscultation, extremities with 2+ pitting edema, abdomen distended with normal bowel sounds. Plan    Volume overload  -Appears predominantly right-sided and appears worsened since ablation/biventricular device.   Pacing EKG reviewed with last noted QRS duration of 195 ms (previously ~96msec prior to device/AVN ablation). Will need to discuss with EP regarding ways to optimize BiV pacemaker parameters to improve ventricular synchrony as likely contributing to decompensation.  -Plan IV diuresis. May need to consider abdominal ultrasound. Also check TFTs and urine protein/creatinine ratio although less likely. Cardiomyopathy  -Left ventricular ejection fraction mildly impaired at around 33-33%; uncertain duration. Patient states previously moderately impaired in [place] but no records.  -Consideration for tachycardic induced versus CAD;  also possible component of prior AS. -Current worsening possibly related to worsening dyssynchrony. Obtain echocardiogram.  -Continue Toprol-XL/lisinopril. Add on Aldactone. Address further therapy pending hospital course. Pulmonary fibrosis  -Per patient history. Appears mostly stable. Atrial fibrillation  -Persistent status post AV zane ablation. Has biventricular device.   See above    Further recommendations pending clinical course         Fred Hoyt MD  4/7/2022  10:41 PM

## 2022-04-08 NOTE — ED PROVIDER NOTES
59-year-old male patient of Dr. Donah Kehr presenting to this ER with reports of ongoing shortness of breath and worsening edema. Patient was seen on Monday of this week for the symptoms. He was given Lasix in this department and offered admission. Patient chose to leave with outpatient follow-up. He increase his Lasix dose to 120 mg daily. He has been consistently taking this dose of medication without effect. He reports ongoing shortness of breath, worsened with ambulation or exertion. He has noted ongoing swelling in his lower extremities extending into the abdomen as well. He denies significant chest pain pressure tightness and reports no diaphoresis nausea or vomiting.            Past Medical History:   Diagnosis Date    Aortic stenosis     Aortic valve replacement 7/2018    CAD (coronary artery disease)     PCI in 2014, 2015    COPD (chronic obstructive pulmonary disease) (HCC)     Dyslipidemia     Heart failure (HCC)     HTN (hypertension)     Ischemic cardiomyopathy     Paroxysmal atrial fibrillation (HCC)     Pulmonary fibrosis (HCC)     Seizure disorder (HCC)     Systolic heart failure (HCC)        Past Surgical History:   Procedure Laterality Date    HX AFIB ABLATION      HX AORTIC VALVE REPLACEMENT  07/2018    HX HEART CATHETERIZATION      PCI 2014, 2015         Family History:   Problem Relation Age of Onset    Heart Disease Mother     Heart Disease Sister        Social History     Socioeconomic History    Marital status: LEGALLY      Spouse name: Not on file    Number of children: Not on file    Years of education: Not on file    Highest education level: Not on file   Occupational History    Not on file   Tobacco Use    Smoking status: Current Every Day Smoker     Types: Cigarettes    Smokeless tobacco: Never Used   Substance and Sexual Activity    Alcohol use: Not Currently    Drug use: Not on file    Sexual activity: Not on file   Other Topics Concern    Not on file   Social History Narrative    Not on file     Social Determinants of Health     Financial Resource Strain:     Difficulty of Paying Living Expenses: Not on file   Food Insecurity:     Worried About Running Out of Food in the Last Year: Not on file    Codey of Food in the Last Year: Not on file   Transportation Needs:     Lack of Transportation (Medical): Not on file    Lack of Transportation (Non-Medical): Not on file   Physical Activity:     Days of Exercise per Week: Not on file    Minutes of Exercise per Session: Not on file   Stress:     Feeling of Stress : Not on file   Social Connections:     Frequency of Communication with Friends and Family: Not on file    Frequency of Social Gatherings with Friends and Family: Not on file    Attends Cheondoism Services: Not on file    Active Member of [address] [place] [address] [employer] or Organizations: Not on file    Attends Club or Organization Meetings: Not on file    Marital Status: Not on file   Intimate Partner Violence:     Fear of Current or Ex-Partner: Not on file    Emotionally Abused: Not on file    Physically Abused: Not on file    Sexually Abused: Not on file   Housing Stability:     Unable to Pay for Housing in the Last Year: Not on file    Number of [place] in the Last Year: Not on file    Unstable Housing in the Last Year: Not on file         ALLERGIES: Tegretol [carbamazepine], Ativan [lorazepam], and Nitroglycerin    Review of Systems   Constitutional: Negative for chills, diaphoresis and fever. HENT: Negative for congestion, sneezing and sore throat. Eyes: Negative for visual disturbance. Respiratory: Positive for shortness of breath. Negative for cough, chest tightness and wheezing. Cardiovascular: Positive for leg swelling. Negative for chest pain. Gastrointestinal: Negative for abdominal pain, blood in stool, diarrhea, nausea and vomiting. Endocrine: Negative for polyuria.    Genitourinary: Negative for difficulty urinating, dysuria, flank pain, hematuria and urgency. Musculoskeletal: Negative for back pain, myalgias, neck pain and neck stiffness. Skin: Negative for color change and rash. Neurological: Negative for dizziness, syncope, speech difficulty, weakness, light-headedness, numbness and headaches. Psychiatric/Behavioral: Negative for behavioral problems. All other systems reviewed and are negative. Vitals:    04/07/22 1711 04/07/22 1715 04/07/22 1730 04/07/22 1745   BP: (!) 137/93 (!) 144/93 (!) 134/115 (!) 138/99   Pulse: 70 70 70 70   Resp: 20 23 17 22   Temp: 97.8 °F (36.6 °C)      SpO2:   95% 95%   Weight:  99.8 kg (220 lb)     Height:  6' 1\" (1.854 m)              Physical Exam  Vitals and nursing note reviewed. Constitutional:       General: He is not in acute distress. Appearance: He is well-developed. He is not diaphoretic. Comments: Alert and oriented to person place and time. No acute distress, speaks in clear, fluid sentences. HENT:      Head: Normocephalic and atraumatic. Right Ear: External ear normal.      Left Ear: External ear normal.      Nose: Nose normal.   Eyes:      Pupils: Pupils are equal, round, and reactive to light. Cardiovascular:      Rate and Rhythm: Normal rate and regular rhythm. Heart sounds: Normal heart sounds. No murmur heard. No friction rub. No gallop. Pulmonary:      Effort: Pulmonary effort is normal. No respiratory distress. Breath sounds: No stridor. Decreased breath sounds and rales present. No wheezing or rhonchi. Comments: Diminished, faint crackles bilaterally in the lower lung fields  Chest:      Chest wall: No tenderness. Abdominal:      General: There is no distension. Palpations: Abdomen is soft. There is no mass. Tenderness: There is no abdominal tenderness. There is no guarding or rebound. Hernia: No hernia is present. Musculoskeletal:         General: No tenderness or deformity. Normal range of motion. Cervical back: Normal range of motion. Comments: Trace lower extremity edema bilaterally   Skin:     General: Skin is warm and dry. Neurological:      Mental Status: He is alert and oriented to person, place, and time. Cranial Nerves: No cranial nerve deficit. MDM  Number of Diagnoses or Management Options  Diagnosis management comments: BNP is elevated, chest x-ray clear, patient had displays slight shortness of breath with movement and speaking. History of heart failure, unsuccessful in treating these symptoms at home with increased Lasix dose. Spoke to on-call cardiologist who agrees to evaluate for admission. Voice dictation software was used during the making of this note. This software is not perfect and grammatical and other typographical errors may be present. This note has been proofread, but may still contain errors. [address] [facility]; 4/7/2022 @11:37 PM   ===================================================================         Amount and/or Complexity of Data Reviewed  Clinical lab tests: ordered and reviewed  Tests in the radiology section of CPT®: ordered and reviewed  Tests in the medicine section of CPT®: ordered and reviewed  Discuss the patient with other providers: yes  Independent visualization of images, tracings, or specimens: yes    Risk of Complications, Morbidity, and/or Mortality  Presenting problems: moderate  Diagnostic procedures: low  Management options: moderate      ED Course as of 04/07/22 2335   Thu Apr 07, 2022 2001 Difficulty establishing peripheral IV, RN asked that I assess for ultrasound-guided placement.   Success[facility] [BR]      ED Course User Index  [BR] Micheline Castro DO       Procedures

## 2022-04-09 VITALS
WEIGHT: 228.8 LBS | HEIGHT: 73 IN | RESPIRATION RATE: 16 BRPM | OXYGEN SATURATION: 96 % | TEMPERATURE: 97.5 F | BODY MASS INDEX: 30.32 KG/M2 | HEART RATE: 70 BPM | DIASTOLIC BLOOD PRESSURE: 78 MMHG | SYSTOLIC BLOOD PRESSURE: 97 MMHG

## 2022-04-09 LAB
ANION GAP SERPL CALC-SCNC: 8 MMOL/L (ref 7–16)
ANION GAP SERPL CALC-SCNC: 9 MMOL/L (ref 7–16)
BUN SERPL-MCNC: 44 MG/DL (ref 8–23)
BUN SERPL-MCNC: 45 MG/DL (ref 8–23)
CALCIUM SERPL-MCNC: 8.6 MG/DL (ref 8.3–10.4)
CALCIUM SERPL-MCNC: 9 MG/DL (ref 8.3–10.4)
CHLORIDE SERPL-SCNC: 100 MMOL/L (ref 98–107)
CHLORIDE SERPL-SCNC: 100 MMOL/L (ref 98–107)
CO2 SERPL-SCNC: 28 MMOL/L (ref 21–32)
CO2 SERPL-SCNC: 28 MMOL/L (ref 21–32)
CREAT SERPL-MCNC: 1.9 MG/DL (ref 0.8–1.5)
CREAT SERPL-MCNC: 2.2 MG/DL (ref 0.8–1.5)
GLUCOSE SERPL-MCNC: 104 MG/DL (ref 65–100)
GLUCOSE SERPL-MCNC: 97 MG/DL (ref 65–100)
POTASSIUM SERPL-SCNC: 3.8 MMOL/L (ref 3.5–5.1)
POTASSIUM SERPL-SCNC: 4 MMOL/L (ref 3.5–5.1)
SODIUM SERPL-SCNC: 136 MMOL/L (ref 138–145)
SODIUM SERPL-SCNC: 137 MMOL/L (ref 136–145)

## 2022-04-09 PROCEDURE — 99217 PR OBSERVATION CARE DISCHARGE MANAGEMENT: CPT | Performed by: INTERNAL MEDICINE

## 2022-04-09 PROCEDURE — 74011250637 HC RX REV CODE- 250/637: Performed by: INTERNAL MEDICINE

## 2022-04-09 PROCEDURE — G0378 HOSPITAL OBSERVATION PER HR: HCPCS

## 2022-04-09 PROCEDURE — 80048 BASIC METABOLIC PNL TOTAL CA: CPT

## 2022-04-09 PROCEDURE — 94640 AIRWAY INHALATION TREATMENT: CPT

## 2022-04-09 PROCEDURE — 74011250637 HC RX REV CODE- 250/637: Performed by: NURSE PRACTITIONER

## 2022-04-09 PROCEDURE — 97530 THERAPEUTIC ACTIVITIES: CPT

## 2022-04-09 PROCEDURE — 36415 COLL VENOUS BLD VENIPUNCTURE: CPT

## 2022-04-09 PROCEDURE — 74011250636 HC RX REV CODE- 250/636: Performed by: INTERNAL MEDICINE

## 2022-04-09 PROCEDURE — 97161 PT EVAL LOW COMPLEX 20 MIN: CPT

## 2022-04-09 PROCEDURE — 94760 N-INVAS EAR/PLS OXIMETRY 1: CPT

## 2022-04-09 RX ORDER — POTASSIUM CHLORIDE 20 MEQ/1
20 TABLET, EXTENDED RELEASE ORAL AS NEEDED
Qty: 30 TABLET | Refills: 0 | Status: SHIPPED | OUTPATIENT
Start: 2022-04-09

## 2022-04-09 RX ORDER — FUROSEMIDE 40 MG/1
40 TABLET ORAL
Qty: 30 TABLET | Refills: 1 | Status: SHIPPED | OUTPATIENT
Start: 2022-04-09

## 2022-04-09 RX ADMIN — METOPROLOL SUCCINATE 100 MG: 100 TABLET, EXTENDED RELEASE ORAL at 08:56

## 2022-04-09 RX ADMIN — SODIUM CHLORIDE 500 ML: 9 INJECTION, SOLUTION INTRAVENOUS at 07:29

## 2022-04-09 RX ADMIN — ASPIRIN 81 MG: 81 TABLET, CHEWABLE ORAL at 08:56

## 2022-04-09 RX ADMIN — ATORVASTATIN CALCIUM 40 MG: 40 TABLET, FILM COATED ORAL at 08:56

## 2022-04-09 RX ADMIN — Medication 1 AMPULE: at 08:56

## 2022-04-09 RX ADMIN — DABIGATRAN ETEXILATE MESYLATE 150 MG: 150 CAPSULE ORAL at 08:56

## 2022-04-09 RX ADMIN — MOMETASONE FUROATE AND FORMOTEROL FUMARATE DIHYDRATE 2 PUFF: 200; 5 AEROSOL RESPIRATORY (INHALATION) at 07:36

## 2022-04-09 NOTE — PROGRESS NOTES
Problem: Falls - Risk of  Goal: *Absence of Falls  Description: Document Marciana Distance Fall Risk and appropriate interventions in the flowsheet. Outcome: Progressing Towards Goal  Note: Fall Risk Interventions:  Mobility Interventions: Communicate number of staff needed for ambulation/transfer,Patient to call before getting OOB         Medication Interventions: Patient to call before getting OOB,Teach patient to arise slowly         History of Falls Interventions:  Investigate reason for fall         Problem: Patient Education: Go to Patient Education Activity  Goal: Patient/Family Education  Outcome: Progressing Towards Goal     Problem: Fluid Volume - Risk of, Imbalanced  Goal: *Balanced intake and output  Outcome: Progressing Towards Goal     Problem: Patient Education: Go to Patient Education Activity  Goal: Patient/Family Education  Outcome: Progressing Towards Goal

## 2022-04-09 NOTE — PROGRESS NOTES
[facility] CARDIOLOGY PROGRESS NOTE           4/9/2022 9:24 AM    Admit Date: 4/7/2022    Subjective:   Patient reports he is feeling better today. No chest pain, shortness of breath, irregular heartbeats, abdominal pain, abdominal swelling, nausea, vomiting, leg swelling. Mild dizziness overnight, no falls or injuries. No other complaints at this time. ROS:  Cardiovascular:  As noted above    Objective:      Vitals:    04/09/22 0005 04/09/22 0429 04/09/22 0736 04/09/22 0740   BP: 94/62 96/70  98/74   Pulse: 70 70  70   Resp: 18 18  17   Temp: 97.3 °F (36.3 °C) 97.4 °F (36.3 °C)  97 °F (36.1 °C)   SpO2: 93% 95% 96% 96%   Weight:  228 lb 12.8 oz (103.8 kg)     Height:         Physical Exam:  General-No Acute Distress, awake, alert  Neck- supple, no JVD  CV- regular rate and rhythm no MRG  Lung- clear bilaterally without wheezes, rales, rhonchi  Abd- soft, nontender, nondistended  Ext-trace edema bilaterally in the legs. Skin- warm and dry    Data Review:   Recent Labs     04/09/22  0440 04/08/22  0730 04/07/22  1948 04/07/22 1948    138   < > 139   K 4.0 3.7   < > 4.1   MG  --  2.0  --  2.2   BUN 45* 27*   < > 29*   CREA 2.20* 1.50   < > 1.40   GLU 97 102*   < > 92   WBC  --   --   --  8.5   HGB  --   --   --  12.1*   HCT  --   --   --  38.7*   PLT  --   --   --  212    < > = values in this interval not displayed. Assessment/Plan:     Principal Problem:    Acute on chronic heart failure with reduced ejection fraction and diastolic dysfunction ([address] (4/7/2022)  -LVEF 47%, minimally reduced. -Volume status improved with diuresis, ASIF likely due to diuresis. Holding ACE and Lasix, rehydrate this morning.  -Plan for repeat BMP at 1030, if kidney function improving plan for discharge and close follow-up. If worsening will hydrate and plan for discharge tomorrow.     Active Problems:    COPD (chronic obstructive pulmonary disease) ([facility] (12/3/2021)    Pulmonary fibrosis ([facility] (12/3/2021)  -Stable, contributes to dyspnea. Not requiring supplemental oxygen at this time. History of transcatheter aortic valve replacement (TAVR) (1/3/2022)  -BP and heart rate controlled  -Continue metoprolol. Hypertension (1/3/2022)  -BP controlled at this time, hold ACE inhibitor      Hyperlipidemia (1/31/2022)  -Continue atorvastatin 40. Atrial fibrillation, persistent ([address] (2/16/2022)  -S/p AV zane ablation, pacemaker implantation. Continue Pradaxa for anticoagulation.       Sara Lira DO  4/9/2022 9:24 AM

## 2022-04-09 NOTE — ROUTINE PROCESS
Bedside and Verbal shift change report given to Summer Brady RN (oncoming nurse) by self (offgoing nurse). Report included the following information SBAR, Kardex, Intake/Output, MAR and Recent Results.

## 2022-04-09 NOTE — PROGRESS NOTES
Discharge instructions reviewed with patient. Prescriptions given for lasix, klor con and med info sheets provided for all new medications. Opportunity for questions provided. patient voiced understanding of all discharge instructions.    IV's removed and tele box returned to monitor room

## 2022-04-09 NOTE — DISCHARGE INSTRUCTIONS
Patient Education        Heart Failure: Care Instructions  Your Care Instructions     Heart failure occurs when your heart does not pump as much blood as the body needs. Failure does not mean that the heart has stopped pumping but rather that it is not pumping as well as it should. Over time, this causes fluid buildup in your lungs and other parts of your body. Fluid buildup can cause shortness of breath, fatigue, swollen ankles, and other problems. By taking medicines regularly, reducing sodium (salt) in your diet, checking your weight every day, and making lifestyle changes, you can feel better and live longer. Follow-up care is a key part of your treatment and safety. Be sure to make and go to all appointments, and call your doctor if you are having problems. It's also a good idea to know your test results and keep a list of the medicines you take. How can you care for yourself at home? Medicines    · Be safe with medicines. Take your medicines exactly as prescribed. Call your doctor if you think you are having a problem with your medicine.     · Do not take any vitamins, over-the-counter medicine, or herbal products without talking to your doctor first. [place] not take ibuprofen (Advil or Motrin) and naproxen (Aleve) without talking to your doctor first. They could make your heart failure worse.     · You may take some of the following medicine. ? Angiotensin-converting enzyme inhibitors (ACEIs) or angiotensin II receptor blockers (ARBs) reduce the heart's workload, lower blood pressure, and reduce swelling. Taking an ACEI or ARB may lower your chance of needing to be hospitalized. ? Beta-blockers can slow heart rate, decrease blood pressure, and improve your condition. Taking a beta-blocker may lower your chance of needing to be hospitalized. ? Diuretics, also called water pills, reduce swelling. You will get more details on the specific medicines your doctor prescribes.   Diet    · Your doctor may suggest that you limit sodium. Your doctor can tell you how much sodium is right for you. An example is less than 3,000 mg a day. This includes all the salt you eat in cooking or in packaged foods. People get most of their sodium from processed foods. Fast food and restaurant meals also tend to be very high in sodium.     · Ask your doctor how much liquid you can drink each day. You may have to limit liquids. Weight    · Weigh yourself without clothing at the same time each day. Record your weight. Call your doctor if you have a sudden weight gain, such as more than 2 to 3 pounds in a day or 5 pounds in a week. (Your doctor may suggest a different range of weight gain.) A sudden weight gain may mean that your heart failure is getting worse. Activity level    · Start light exercise (if your doctor says it is okay). Even if you can only do a small amount, exercise will help you get stronger, have more energy, and manage your weight and your stress. Walking is an easy way to get exercise. Start out by walking a little more than you did before. Bit by bit, increase the amount you walk.     · When you exercise, watch for signs that your heart is working too hard. You are pushing yourself too hard if you cannot talk while you are exercising. If you become short of breath or dizzy or have chest pain, stop, sit down, and rest.     · If you feel \"wiped out\" the day after you exercise, walk slower or for a shorter distance until you can work up to a better pace.     · Get enough rest at night. Sleeping with 1 or 2 pillows under your upper body and head may help you breathe easier. Lifestyle changes    · Do not smoke. Smoking can make a heart condition worse. If you need help quitting, talk to your doctor about stop-smoking programs and medicines. These can increase your chances of quitting for good.  Quitting smoking may be the most important step you can take to protect your heart.     · Limit alcohol to 2 drinks a day for men and 1 drink a day for women. Too much alcohol can cause health problems.     · Avoid getting sick from colds and the flu. Get a pneumococcal vaccine shot. If you have had one before, ask your doctor whether you need another dose. Get a flu shot each year. If you must be around people with colds or the flu, wash your hands often. When should you call for help? Call 911  if you have symptoms of sudden heart failure such as:    · You have severe trouble breathing.     · You cough up pink, foamy mucus.     · You have a new irregular or rapid heartbeat. Call your doctor now or seek immediate medical care if:    · You have new or increased shortness of breath.     · You are dizzy or lightheaded, or you feel like you may faint.     · You have sudden weight gain, such as more than 2 to 3 pounds in a day or 5 pounds in a week. (Your doctor may suggest a different range of weight gain.)     · You have increased swelling in your legs, ankles, or feet.     · You are suddenly so tired or weak that you cannot do your usual activities. Watch closely for changes in your health, and be sure to contact your doctor if you develop new symptoms. Where can you learn more? Go to http://www.gray.com/  Enter U945 in the search box to learn more about \"Heart Failure: Care Instructions. \"  Current as of: January 10, 2022               Content Version: 13.2  © 2006-2022 [employer]. Care instructions adapted under license by [employer] (which disclaims liability or warranty for this information). If you have questions about a medical condition or this instruction, always ask your healthcare professional. [place] [address] any warranty or liability for your use of this information. Patient Education   Furosemide (By mouth)   Furosemide (qnim-UR-ec-mide)  Treats fluid retention (edema) and high blood pressure. This medicine is a diuretic (water pill).    Brand Name(s): Active-Medicated Specimen Collection Kit, Diuscreen Multi-Drug Medicated Test Kit, Lasix, RX-Specimen Collection Kit, Specimen Collection Kit   There may be other brand names for this medicine. When This Medicine Should Not Be Used: This medicine is not right for everyone. Do not use it if you had an allergic reaction to furosemide. How to Use This Medicine:   Liquid, Tablet  · Take your medicine as directed. Your dose may need to be changed several times to find what works best for you. · You may take this medicine with food if it upsets your stomach. · Oral liquid: Measure the oral liquid medicine with a marked measuring spoon, oral syringe, or medicine cup. · Tablet: Swallow the tablet whole. Do not crush, break, or chew it. · Missed dose: Take a dose as soon as you remember. If it is almost time for your next dose, wait until then and take a regular dose. Do not take extra medicine to make up for a missed dose. · Store the medicine in a closed container at room temperature, away from heat, moisture, and direct light. Drugs and Foods to Avoid:   Ask your doctor or pharmacist before using any other medicine, including over-the-counter medicines, vitamins, and herbal products. · Some medicines can affect how furosemide works. Tell your doctor if you are also using any of the following:  ¨ Cisplatin, cyclosporine, digoxin, ethacrynic acid, licorice, lithium, methotrexate, or phenytoin  ¨ Adrenocorticotropic hormone (ACTH)  ¨ Laxative  ¨ Medicine to treat an infection  ¨ NSAID pain or arthritis medicine (including aspirin, diclofenac, ibuprofen, indomethacin, naproxen)  ¨ Other blood pressure medicines  ¨ Steroid medicine (including dexamethasone, hydrocortisone, methylprednisolone, prednisolone, prednisone)  ¨ Thyroid medicine  · If you also take sucralfate, allow at least 2 hours between the time you take furosemide and the time you take sucralfate.   · Alcohol, narcotic pain medicine, or sleeping pills may cause you to feel more lightheaded, dizzy, or faint when used with this medicine. Warnings While Using This Medicine:   · Tell your doctor if you are pregnant or breastfeeding, or if you have kidney disease, liver disease (including cirrhosis), diabetes, gout, low blood pressure, lupus, an enlarged prostate, trouble urinating, or an allergy to sulfa drugs. Tell your doctor if you are on a low-salt diet. · This medicine may cause the following problems:   ¨ Low levels of minerals in your blood, such as potassium and sodium  ¨ Blood sugar level changes  ¨ Hearing problems  · Make sure any doctor or dentist who treats you knows that you are using this medicine. · This medicine could lower your blood pressure too much, especially when you first use it or if you are dehydrated. Stand or sit up slowly if you feel lightheaded or dizzy. · This medicine may make your skin more sensitive to sunlight. Wear sunscreen. Do not use sunlamps or tanning beds. · Your doctor will do lab tests at regular visits to check on the effects of this medicine. Keep all appointments. · Keep all medicine out of the reach of children. Never share your medicine with anyone.   Possible Side Effects While Using This Medicine:   Call your doctor right away if you notice any of these side effects:  · Allergic reaction: Itching or hives, swelling in your face or hands, swelling or tingling in your mouth or throat, chest tightness, trouble breathing  · Blistering, peeling, red skin rash  · Confusion, weakness, muscle twitching  · Dry mouth, increased thirst, muscle cramps, uneven heartbeat  · Sudden and severe stomach pain, nausea, vomiting, fever, lightheadedness  · Hearing loss, ringing in the ears  · Lightheadedness, dizziness, fainting  · Severe diarrhea  · Unusual bleeding or bruising  · Yellow skin or eyes  If you notice these less serious side effects, talk with your doctor:   · Loss of appetite, stomach cramps  If you notice other side effects that you think are caused by this medicine, tell your doctor. Call your doctor for medical advice about side effects. You may report side effects to FDA at 6-402-JAS-1649  © 2017 [facility] Information is for End User's use only and may not be sold, redistributed or otherwise used for commercial purposes. The above information is an  only. It is not intended as medical advice for individual conditions or treatments. Talk to your doctor, nurse or pharmacist before following any medical regimen to see if it is safe and effective for you. Patient Education      Potassium Supplement (Klor-Con, Klor-Con 10, K-Tab, K-Vescent) - (By mouth)   Why this medicine is used:   Treats a low potassium level. Contact a nurse or doctor right away if you have:  · Uneven heartbeat, trouble breathing  · Confusion, weakness, numbness in your hands, feet, or lips  · Stomach pain, nausea, vomiting, diarrhea, bloody or black, tarry stools   © 2017 [address] is for End User's use only and may not be sold, redistributed or otherwise used for commercial purposes.

## 2022-04-09 NOTE — PROGRESS NOTES
Pt is for discharge home today transported via niece. Referral called/faxed to [facility] to resume [facility] for follow up home care as ordered. No additional CM orders received or supportive care needs expressed at this time. Care Management Interventions  [facility]  Mode of Transport at Discharge:  Other (see comment) (Family)  Transition of Care Consult (CM Consult): Discharge Planning,Home Hospice ([facility])  [facility] HSPTL: No  Reason Outside [place]: Patient already serviced by other home care/hospice agency  Physical Therapy Consult: Yes  Occupational Therapy Consult: Yes  Speech Therapy Consult: No  Support Systems: Other Family Member(s),Caregiver/Home Care Staff,Friend/Neighbor,[place],/  Confirm Follow Up Transport: Family  The Plan for Transition of Care is Related to the Following Treatment Goals : Return home and back to his baseline  The Patient and/or Patient Representative was Provided with a Choice of Provider and Agrees with the Discharge Plan?: Yes  Name of the Patient Representative Who was Provided with a Choice of Provider and Agrees with the Discharge Plan: Patient  Freedom of Choice List was Provided with Basic Dialogue that Supports the Patient's Individualized Plan of Care/Goals, Treatment Preferences and Shares the Quality Data Associated with the Providers?: Yes  Dana Resource Information Provided?: No  Discharge Location  Patient Expects to be Discharged to[de-identified] Home with home health

## 2022-04-09 NOTE — PROGRESS NOTES
ACUTE PHYSICAL THERAPY GOALS:  (Developed with and agreed upon by patient and/or caregiver. )  LTG:  (1.)Mr. Hope will move from supine to sit and sit to supine , scoot up and down and roll side to side in bed with MODIFIED INDEPENDENCE within 7 treatment day(s). (2.)Mr. Hope will transfer from bed to chair and chair to bed with MODIFIED INDEPENDENCE using the least restrictive device within 7 treatment day(s). (3.)Mr. Hope will propel manual wheelchair for 100 ft with MODIFIED INDEPENDENCE for 200 feet with the least restrictive device within 7 treatment day(s). (4.)Mr. Hope will participate in therapeutic activity/exercises x 25 minutes for increased strength within 7 treatment days.     ________________________________________________________________________________________________          PHYSICAL THERAPY ASSESSMENT: Initial Assessment and AM PT Treatment Day # 1      Kavita Hope is a 77 y.o. male   PRIMARY DIAGNOSIS: Acute on chronic heart failure with reduced ejection fraction and diastolic dysfunction (HCC)  Acute on chronic HFrEF ([facility] with reduced [facility] fraction) [facility] [I50.23]       Reason for Referral:    ICD-10: Treatment Diagnosis: Generalized Muscle Weakness (M62.81)  Difficulty in walking, Not elsewhere classified (R26.2)  OBSERVATION: Payor: [employer] / Plan: Christian Castellanos CCN / Product Type: Federal Funded Programs /     ASSESSMENT:     REHAB RECOMMENDATIONS:   Recommendation to date pending progress:  [address] [place] [address]  Equipment:    None     PRIOR LEVEL OF FUNCTION:  (Prior to Hospitalization) INITIAL/CURRENT LEVEL OF FUNCTION:  (Most Recently Demonstrated)   Bed Mobility:   Modified Independent  Sit to Stand:   Modified Independent  Transfers:   Modified Independent  Gait/Mobility:   Unable to perform Bed Mobility:   Minimal Assistance  Sit to Stand:   Contact Guard Assistance  Transfers:  [employer] Assistance  Gait/Mobility:   Unable to perform     ASSESSMENT:  Mr. Mindy Suero presents to PT with decreased AROM and strength in B LEs. Pt reported he mainly uses manual w/c or scooter for mobility and has transfer board. Pt performed bed mobility today with Sai and good sitting balance. He was able to perform stand pivot with RW as well today with fair standing balance and CGA-Sai. Pt really did not take any steps but purely pivoted during transfer. Mr. Mindy Suero could benefit from skilled PT as he is currently functioning below his baseline.         SUBJECTIVE:   Mr. Mindy Suero states, \"I've got all the equiptment\"    SOCIAL HISTORY/LIVING ENVIRONMENT: Lives at [facility] alone and has aid 5x/week with [facility] PT as well; uses manual w/c or scooter for mobility  Home Environment: Apartment  One/Two Story Residence: One story  Living Alone: Yes  Support Systems: Other Family Member(s),Caregiver/Home Care Staff,Friend/Neighbor,[place],/  OBJECTIVE:     PAIN: VITAL SIGNS: LINES/DRAINS:   Pre Treatment: Pain Screen  Pain Scale 1: Numeric (0 - 10)  Pain Intensity 1: 6  Post Treatment: No change reported   IV  O2 Device: None (Room air)     GROSS EVALUATION:  B LE Within Functional Limits Abnormal/ Functional Abnormal/ Non-Functional (see comments) Not Tested Comments:   AROM [] [x] [] []    PROM [] [] [] []    Strength [] [] [x] []    Balance [] [x] [] [] Fair standing   Posture [] [] [] []    Sensation [] [] [] []    Coordination [] [x] [] []    Tone [] [] [] []    Edema [] [] [] []    Activity Tolerance [] [] [] []     [] [] [] []      COGNITION/  PERCEPTION: Intact Impaired   (see comments) Comments:   Orientation [x] []    Vision [] []    Hearing [x] []    Command Following [x] []    Safety Awareness [x] []     [] []      MOBILITY: I Mod I S SBA CGA Min Mod Max Total  NT x2 Comments:   Bed Mobility    Rolling [] [] [] [] [] [x] [] [] [] [] []    Supine to Sit [] [] [] [] [] [x] [] [] [] [] []    Scooting [] [] [] [x] [] [] [] [] [] [] []    Sit to Supine [] [] [] [] [] [] [] [] [] [] []    Transfers    Sit to Stand [] [] [] [] [x] [x] [] [] [] [] []    Bed to Chair [] [] [] [] [x] [x] [] [] [] [] []    Stand to Sit [] [] [] [] [x] [x] [] [] [] [] []    I=Independent, Mod I=Modified Independent, S=Supervision, SBA=Standby Assistance, CGA=Contact Guard Assistance,   Min=Minimal Assistance, Mod=Moderate Assistance, Max=Maximal Assistance, Total=Total Assistance, NT=Not Tested  GAIT: I Mod I S SBA CGA Min Mod Max Total  NT x2 Comments:   Level of Assistance [] [] [] [] [] [] [] [] [] [x] []    Distance NT    DME N/A    Gait Quality NT    Weightbearing Status N/A     I=Independent, Mod I=Modified Independent, S=Supervision, SBA=Standby Assistance, CGA=Contact Guard Assistance,   Min=Minimal Assistance, Mod=Moderate Assistance, Max=Maximal Assistance, Total=Total Assistance, NT=Not Tested    [address] [facility] [address] [address] AM-[facility] Form       How much difficulty does the patient currently have. .. Unable A Lot A Little None   1. Turning over in bed (including adjusting bedclothes, sheets and blankets)? [] 1   [] 2   [] 3   [x] 4   2. Sitting down on and standing up from a chair with arms ( e.g., wheelchair, bedside commode, etc.)   [] 1   [] 2   [x] 3   [] 4   3. Moving from lying on back to sitting on the side of the bed? [] 1   [] 2   [x] 3   [] 4   How much help from another person does the patient currently need. .. Total A Lot A Little None   4. Moving to and from a bed to a chair (including a wheelchair)? [] 1   [] 2   [x] 3   [] 4   5. Need to walk in hospital room? [x] 1   [] 2   [] 3   [] 4   6. Climbing 3-5 steps with a railing? [x] 1   [] 2   [] 3   [] 4   © 2007, Trustees of [address] [place] [address] [address] [id number], under license to [employer].  All rights reserved     Score:  Initial: 15 Most Recent: X (Date: -- )    Interpretation of Tool:  Represents activities that are increasingly more difficult (i.e. Bed mobility, Transfers, Gait). PLAN:   FREQUENCY/DURATION: PT Plan of Care: 3 times/week for duration of hospital stay or until stated goals are met, whichever comes first.    PROBLEM LIST:   (Skilled intervention is medically necessary to address:)  1. Decreased ADL/Functional Activities  2. Decreased Activity Tolerance  3. Decreased AROM/PROM  4. Decreased Balance  5. Decreased Coordination  6. Decreased Gait Ability  7. Decreased Strength  8. Decreased Transfer Abilities   INTERVENTIONS PLANNED:   (Benefits and precautions of physical therapy have been discussed with the patient.)  1. Therapeutic Activity  2. Therapeutic Exercise/HEP  3. Neuromuscular Re-education  4. Gait Training  5. Manual Therapy  6. Education     TREATMENT:     EVALUATION: Low Complexity : (Untimed Charge)    TREATMENT:   (     )  Therapeutic Activity (15 Minutes): Therapeutic activity included Rolling, Supine to Sit, Transfer Training, Sitting balance  and Standing balance to improve functional Mobility, Strength and Activity tolerance.     TREATMENT GRID:  N/A    AFTER TREATMENT POSITION/PRECAUTIONS:  Chair, Needs within reach and RN notified    INTERDISCIPLINARY COLLABORATION:  RN/PCT and PT/PTA    TOTAL TREATMENT DURATION:  PT Patient Time In/Time Out  Time In: 0645  Time Out: [id number] Research Shan PT, DPT

## 2022-04-09 NOTE — DISCHARGE SUMMARY
[facility] Discharge Summary     Patient ID:  Layo Postal  [id number]  77 y.o.  1955    Admit date: 4/7/2022    Discharge date and time:  4/9/2022    Admitting Physician: Aniket Boogie MD     Discharge Physician: Dr. Jenn Crews    Admission Diagnoses: Acute on chronic HFrEF (heart failure with reduced ejection fraction) ([address] [I50.23]    Discharge Diagnoses:    Diagnosis    Acute on chronic heart failure with reduced ejection fraction and diastolic dysfunction ([address]    Atrial fibrillation, persistent (HCC)    Longstanding persistent atrial fibrillation ([facility]    Hyperlipidemia    ASIF    CAD in native artery    Hypertension    COPD with acute lower respiratory infection ([facility] [address] [facility]    Shortness of breath at rest     Cardiology Procedures this admission:  EchoCardiogram  Consults: None    Hospital Course: Patient is a 77 y.o. male with PMH of CAD ([facility] 12/2021 patent stents to LAD (20% ISR), patent stent to LCirx (20% ISR), patent stent to RCA (30% ISR), paroxysmal atrial fibrillation on Pradaxa, hx afib ablation and DCCV x 2, BSC BIV PPM and AVN ablation, aortic stenosis s/p TAVR 7/2018, CAD s/p PCI 2014 and 2015, ischemic cardiomyopathy, systolic heart failure, HTN, dyslipidemia, seizure disorder, COPD, pulmonary fibrosis and current smoker who presented to [facility] ED via EMS with c/o shortness of breath and edema. Labs in the ED showed WBC 8.5, H&H 12.1/38.7, plt 212, , K 4.1, BUN 29, creatinine 1.40, mag 2.2, albumin 3.4, and pBNP 1526. CXR without acute process. On exam patient has audible wheezing, abdomen is very distended and +4 LE edema is present. Patient was admitted for volume overload and acute on chronic systolic heart failure. Pt was started on IV diuresis with Bumex drip. Pt had an echocardiogram showing:   Left Ventricle Left ventricle size is normal. Normal wall thickness. Mild global hypokinesis present. Septal motion consistent with pacemaker activation.  Mildly reduced left ventricular systolic function. EF by 2D [employer]psons Biplane is 47%. Indeterminate diastolic function. Right Ventricle Right ventricle size is normal. Lead present in the right ventricle. Normal systolic function. Mitral Valve Mildly thickened leaflet. Aorta Normal sized sinus of Valsalva. IVC/Hepatic Veins IVC diameter is less than or equal to 21 mm and decreases greater than 50% during inspiration; therefore the estimated right atrial pressure is normal (~3 mmHg). Pericardium No pericardial effusion. Pt had good response to IV diuresis and was able to be changed to PO Lasix on 4-9. Creatinine bumped up from 1.5 to 2.20 with diuresis and addition of spironolactone. Diuresis was held. Pt had good urine out put with net negative ~4L. On the afternoon of discharge, Pt was up feeling well without any complaints of CP, SOB, palpitations or LE swelling. Pt's labs were stable with creatinine of 1.9. Pt was seen and examined by Dr. Daniele Potter and determined stable and ready for discharge. Pt was instructed on the importance of weighing self daily. If Pt gains more than 2 lbs overnight or 5 lbs in one week, Pt is instructed to call [facility] at [phone]. For maximized medical therapy of congestive heart failure, patient will continue use of BB. ACE-I will be held on discharge secondary to ASIF. Will re assess with BMP in one week. The patient will been scheduled for 7 day transitional care follow up with [facility] -- Dr. Claire Morrow. Pt was given lab slip for a BMP/Mag in one week. DISPOSITION: The patient is being discharged home in stable condition on a low saturated fat, low cholesterol and low salt diet. Pt is instructed to follow a fluid restricted diet with no more than 2 liters per day. Pt is instructed to advance activities as tolerated limited to fatigue or shortness of breath.  Pt is instructed to call office or return to ER for immediate evaluation of any shortness of breath or chest pain. Discharge Exam:   Visit Vitals  BP 97/78 (BP 1 Location: Left upper arm, BP Patient Position: Sitting)   Pulse 70   Temp 97.5 °F (36.4 °C)   Resp 16   Ht 6' 1\" (1.854 m)   Wt 103.8 kg (228 lb 12.8 oz)   SpO2 96%   BMI 30.19 kg/m²   Pt has been seen by Leopold Cowden: see his progress note for exam details. Recent Results (from the past 24 hour(s))   METABOLIC PANEL, BASIC    Collection Time: 04/09/22  4:40 AM   Result Value Ref Range    Sodium 137 136 - 145 mmol/L    Potassium 4.0 3.5 - 5.1 mmol/L    Chloride 100 98 - 107 mmol/L    CO2 28 21 - 32 mmol/L    Anion gap 9 7 - 16 mmol/L    Glucose 97 65 - 100 mg/dL    BUN 45 (H) 8 - 23 MG/DL    Creatinine 2.20 (H) 0.8 - 1.5 MG/DL    GFR est AA 39 (L) >60 ml/min/1.73m2    GFR est non-AA 32 (L) >60 ml/min/1.73m2    Calcium 9.0 8.3 - 16.6 MG/DL   METABOLIC PANEL, BASIC    Collection Time: 04/09/22  1:01 PM   Result Value Ref Range    Sodium 136 (L) 138 - 145 mmol/L    Potassium 3.8 3.5 - 5.1 mmol/L    Chloride 100 98 - 107 mmol/L    CO2 28 21 - 32 mmol/L    Anion gap 8 7 - 16 mmol/L    Glucose 104 (H) 65 - 100 mg/dL    BUN 44 (H) 8 - 23 MG/DL    Creatinine 1.90 (H) 0.8 - 1.5 MG/DL    GFR est AA 46 (L) >60 ml/min/1.73m2    GFR est non-AA 38 (L) >60 ml/min/1.73m2    Calcium 8.6 8.3 - 10.4 MG/DL     Patient Instructions:   Discharge Medication List as of 4/9/2022  2:33 PM      CONTINUE these medications which have CHANGED    Details   furosemide (Lasix) 40 mg tablet Take 1 Tablet by mouth daily as needed for PRN Reason (Other) (If Pt gains more than 2 lbs overnight or 5 lbs in one week,)., Normal, Disp-30 Tablet, R-1      potassium chloride (K-DUR, KLOR-CON M20) 20 mEq tablet Take 1 Tablet by mouth as needed for PRN Reason (Other) (when taking Lasix). , Normal, Disp-30 Tablet, R-0         CONTINUE these medications which have NOT CHANGED    Details   fluticasone propion-salmeteroL (Advair Diskus) 250-50 mcg/dose diskus inhaler Take 1 Puff by inhalation every twelve (12) hours. , Historical Med      ondansetron (ZOFRAN ODT) 8 mg disintegrating tablet Take 1 Tablet by mouth every eight (8) hours as needed for Nausea. , Print, Disp-12 Tablet, R-0      !! albuterol (Ventolin HFA) 90 mcg/actuation inhaler Take 2 Puffs by inhalation every four (4) hours as needed for Wheezing., Print, Disp-6.7 g, R-0      dabigatran etexilate (Pradaxa) 150 mg capsule Take 1 Capsule by mouth every twelve (12) hours. , Normal, Disp-60 Capsule, R-1      metoprolol succinate (TOPROL-XL) 100 mg tablet Take 1 Tablet by mouth every twelve (12) hours. , Normal, Disp-60 Tablet, R-1      baclofen (LIORESAL) 10 mg tablet Take 10 mg by mouth three (3) times daily. , Historical Med      aspirin 81 mg chewable tablet Take 81 mg by mouth daily. , Historical Med      atorvastatin (LIPITOR) 40 mg tablet Take 40 mg by mouth daily. , Historical Med      albuterol (PROVENTIL VENTOLIN) 2.5 mg /3 mL (0.083 %) nebu 2.5 mg by Nebulization route every four (4) hours as needed for Shortness of Breath., Historical Med      !! albuterol (PROVENTIL HFA, VENTOLIN HFA, PROAIR HFA) 90 mcg/actuation inhaler Take 2 Puffs by inhalation every four (4) hours as needed for Wheezing or Shortness of Breath., Historical Med      albuterol-ipratropium (DUO-NEB) 2.5 mg-0.5 mg/3 ml nebu 3 mL by Nebulization route every four (4) hours as needed for Shortness of Breath., Historical Med       !! - Potential duplicate medications found. Please discuss with provider.       STOP taking these medications       lisinopriL (PRINIVIL, ZESTRIL) 5 mg tablet Comments:   Reason for Stopping:                 Signed:  ISMAEL Palm  4/9/2022  8:53 AM

## 2022-04-11 PROBLEM — I50.43 ACUTE ON CHRONIC HEART FAILURE WITH REDUCED EJECTION FRACTION AND DIASTOLIC DYSFUNCTION (HCC): Status: ACTIVE | Noted: 2022-04-07

## 2022-04-11 PROBLEM — I50.23 ACUTE ON CHRONIC HFREF (HEART FAILURE WITH REDUCED EJECTION FRACTION) (HCC): Status: ACTIVE | Noted: 2022-04-07

## 2022-04-11 LAB
ALBUMIN SERPL ELPH-MCNC: 3.6 G/DL (ref 2.9–4.4)
ALBUMIN/GLOB SERPL: 1 {RATIO} (ref 0.7–1.7)
ALPHA1 GLOB SERPL ELPH-MCNC: 0.3 G/DL (ref 0–0.4)
ALPHA2 GLOB SERPL ELPH-MCNC: 1 G/DL (ref 0.4–1)
B-GLOBULIN SERPL ELPH-MCNC: 1.3 G/DL (ref 0.7–1.3)
GAMMA GLOB SERPL ELPH-MCNC: 1 G/DL (ref 0.4–1.8)
GLOBULIN SER-MCNC: 3.7 G/DL (ref 2.2–3.9)
IGA SERPL-MCNC: 279 MG/DL (ref 61–437)
IGG SERPL-MCNC: 1097 MG/DL (ref 603–1613)
IGM SERPL-MCNC: 90 MG/DL (ref 20–172)
INTERPRETATION SERPL IEP-IMP: NORMAL
KAPPA LC FREE SER-MCNC: 31.6 MG/L (ref 3.3–19.4)
KAPPA LC FREE/LAMBDA FREE SER: 1.3 {RATIO} (ref 0.26–1.65)
LAMBDA LC FREE SERPL-MCNC: 24.4 MG/L (ref 5.7–26.3)
M PROTEIN SERPL ELPH-MCNC: NORMAL G/DL
PROT SERPL-MCNC: 7.3 G/DL (ref 6–8.5)

## 2022-04-15 ENCOUNTER — TRANSCRIBE ORDER (OUTPATIENT)
Dept: REGISTRATION | Age: 67
End: 2022-04-15

## 2022-04-15 ENCOUNTER — HOSPITAL ENCOUNTER (OUTPATIENT)
Dept: LAB | Age: 67
Discharge: HOME | End: 2022-04-15
Payer: MEDICARE

## 2022-04-15 DIAGNOSIS — N17.9 AKI (ACUTE KIDNEY INJURY) (HCC): ICD-10-CM

## 2022-04-15 DIAGNOSIS — I50.9 CHF (CONGESTIVE HEART FAILURE) (HCC): Primary | ICD-10-CM

## 2022-04-15 DIAGNOSIS — I50.9 CHF (CONGESTIVE HEART FAILURE) (HCC): ICD-10-CM

## 2022-04-15 LAB
ANION GAP SERPL CALC-SCNC: 8 MMOL/L (ref 7–16)
BUN SERPL-MCNC: 19 MG/DL (ref 8–23)
CALCIUM SERPL-MCNC: 8.9 MG/DL (ref 8.3–10.4)
CHLORIDE SERPL-SCNC: 108 MMOL/L (ref 98–107)
CO2 SERPL-SCNC: 23 MMOL/L (ref 21–32)
CREAT SERPL-MCNC: 1.1 MG/DL (ref 0.8–1.5)
GLUCOSE SERPL-MCNC: 86 MG/DL (ref 65–100)
POTASSIUM SERPL-SCNC: 4.2 MMOL/L (ref 3.5–5.1)
SODIUM SERPL-SCNC: 139 MMOL/L (ref 136–145)

## 2022-04-15 PROCEDURE — 80048 BASIC METABOLIC PNL TOTAL CA: CPT

## 2022-04-15 PROCEDURE — 36416 COLLJ CAPILLARY BLOOD SPEC: CPT

## 2022-04-18 PROBLEM — J98.4 CHRONIC LUNG DISEASE: Status: ACTIVE | Noted: 2021-12-03

## 2022-04-18 PROBLEM — Z95.0 PRESENCE OF CARDIAC RESYNCHRONIZATION THERAPY PACEMAKER (CRT-P): Status: ACTIVE | Noted: 2022-04-18

## 2022-04-18 PROBLEM — R06.09 CHRONIC DYSPNEA: Status: ACTIVE | Noted: 2022-04-18

## 2022-04-28 PROBLEM — Z76.89 ESTABLISHING CARE WITH NEW DOCTOR, ENCOUNTER FOR: Status: ACTIVE | Noted: 2022-04-28

## 2022-04-28 PROBLEM — Z87.81 HX OF FRACTURE OF FEMUR: Status: ACTIVE | Noted: 2022-04-28

## 2022-04-28 PROBLEM — M54.50 CHRONIC MIDLINE LOW BACK PAIN: Status: ACTIVE | Noted: 2022-04-28

## 2022-04-28 PROBLEM — R05.8 PRODUCTIVE COUGH: Status: ACTIVE | Noted: 2022-04-28

## 2022-04-28 PROBLEM — G89.29 CHRONIC MIDLINE LOW BACK PAIN: Status: ACTIVE | Noted: 2022-04-28

## 2022-05-06 ENCOUNTER — HOSPITAL ENCOUNTER (EMERGENCY)
Age: 67
Discharge: HOME | End: 2022-05-06
Attending: STUDENT IN AN ORGANIZED HEALTH CARE EDUCATION/TRAINING PROGRAM
Payer: MEDICARE

## 2022-05-06 ENCOUNTER — APPOINTMENT (OUTPATIENT)
Dept: CT IMAGING | Age: 67
End: 2022-05-06
Attending: STUDENT IN AN ORGANIZED HEALTH CARE EDUCATION/TRAINING PROGRAM
Payer: MEDICARE

## 2022-05-06 VITALS
SYSTOLIC BLOOD PRESSURE: 144 MMHG | HEIGHT: 73 IN | BODY MASS INDEX: 29.69 KG/M2 | HEART RATE: 70 BPM | WEIGHT: 224 LBS | TEMPERATURE: 98.4 F | DIASTOLIC BLOOD PRESSURE: 79 MMHG | OXYGEN SATURATION: 100 % | RESPIRATION RATE: 15 BRPM

## 2022-05-06 DIAGNOSIS — R56.9 SEIZURE-LIKE ACTIVITY (HCC): Primary | ICD-10-CM

## 2022-05-06 LAB
ALBUMIN SERPL-MCNC: 3.3 G/DL (ref 3.2–4.6)
ALBUMIN/GLOB SERPL: 0.8 {RATIO} (ref 1.2–3.5)
ALP SERPL-CCNC: 105 U/L (ref 50–136)
ALT SERPL-CCNC: 23 U/L (ref 12–65)
ANION GAP SERPL CALC-SCNC: 7 MMOL/L (ref 7–16)
AST SERPL-CCNC: 20 U/L (ref 15–37)
BASOPHILS # BLD: 0.1 K/UL (ref 0–0.2)
BASOPHILS NFR BLD: 1 % (ref 0–2)
BILIRUB SERPL-MCNC: 0.4 MG/DL (ref 0.2–1.1)
BUN SERPL-MCNC: 22 MG/DL (ref 8–23)
CALCIUM SERPL-MCNC: 8.9 MG/DL (ref 8.3–10.4)
CHLORIDE SERPL-SCNC: 107 MMOL/L (ref 98–107)
CO2 SERPL-SCNC: 25 MMOL/L (ref 21–32)
CREAT SERPL-MCNC: 1.4 MG/DL (ref 0.8–1.5)
DIFFERENTIAL METHOD BLD: ABNORMAL
EOSINOPHIL # BLD: 0.2 K/UL (ref 0–0.8)
EOSINOPHIL NFR BLD: 3 % (ref 0.5–7.8)
ERYTHROCYTE [DISTWIDTH] IN BLOOD BY AUTOMATED COUNT: 14.9 % (ref 11.9–14.6)
GLOBULIN SER CALC-MCNC: 4 G/DL (ref 2.3–3.5)
GLUCOSE SERPL-MCNC: 112 MG/DL (ref 65–100)
HCT VFR BLD AUTO: 40.4 % (ref 41.1–50.3)
HGB BLD-MCNC: 13 G/DL (ref 13.6–17.2)
IMM GRANULOCYTES # BLD AUTO: 0 K/UL (ref 0–0.5)
IMM GRANULOCYTES NFR BLD AUTO: 0 % (ref 0–5)
LYMPHOCYTES # BLD: 2 K/UL (ref 0.5–4.6)
LYMPHOCYTES NFR BLD: 25 % (ref 13–44)
MAGNESIUM SERPL-MCNC: 2.2 MG/DL (ref 1.8–2.4)
MCH RBC QN AUTO: 26.3 PG (ref 26.1–32.9)
MCHC RBC AUTO-ENTMCNC: 32.2 G/DL (ref 31.4–35)
MCV RBC AUTO: 81.6 FL (ref 79.6–97.8)
MONOCYTES # BLD: 1.1 K/UL (ref 0.1–1.3)
MONOCYTES NFR BLD: 14 % (ref 4–12)
NEUTS SEG # BLD: 4.4 K/UL (ref 1.7–8.2)
NEUTS SEG NFR BLD: 57 % (ref 43–78)
NRBC # BLD: 0 K/UL (ref 0–0.2)
PLATELET # BLD AUTO: 174 K/UL (ref 150–450)
PMV BLD AUTO: 9.6 FL (ref 9.4–12.3)
POTASSIUM SERPL-SCNC: 3.5 MMOL/L (ref 3.5–5.1)
PROT SERPL-MCNC: 7.3 G/DL (ref 6.3–8.2)
RBC # BLD AUTO: 4.95 M/UL (ref 4.23–5.6)
SODIUM SERPL-SCNC: 139 MMOL/L (ref 138–145)
WBC # BLD AUTO: 7.8 K/UL (ref 4.3–11.1)

## 2022-05-06 PROCEDURE — 85025 COMPLETE CBC W/AUTO DIFF WBC: CPT

## 2022-05-06 PROCEDURE — 80053 COMPREHEN METABOLIC PANEL: CPT

## 2022-05-06 PROCEDURE — 93005 ELECTROCARDIOGRAM TRACING: CPT | Performed by: STUDENT IN AN ORGANIZED HEALTH CARE EDUCATION/TRAINING PROGRAM

## 2022-05-06 PROCEDURE — 99284 EMERGENCY DEPT VISIT MOD MDM: CPT

## 2022-05-06 PROCEDURE — 83735 ASSAY OF MAGNESIUM: CPT

## 2022-05-06 PROCEDURE — 81003 URINALYSIS AUTO W/O SCOPE: CPT

## 2022-05-06 PROCEDURE — 70450 CT HEAD/BRAIN W/O DYE: CPT

## 2022-05-06 PROCEDURE — 72125 CT NECK SPINE W/O DYE: CPT

## 2022-05-06 RX ORDER — SODIUM CHLORIDE 0.9 % (FLUSH) 0.9 %
5-10 SYRINGE (ML) INJECTION EVERY 8 HOURS
Status: DISCONTINUED | OUTPATIENT
Start: 2022-05-06 | End: 2022-05-06 | Stop reason: HOSPADM

## 2022-05-06 RX ORDER — SODIUM CHLORIDE 0.9 % (FLUSH) 0.9 %
5-10 SYRINGE (ML) INJECTION AS NEEDED
Status: DISCONTINUED | OUTPATIENT
Start: 2022-05-06 | End: 2022-05-06 | Stop reason: HOSPADM

## 2022-05-06 NOTE — ED TRIAGE NOTES
Patient arrives to ED from home via EMS for report of seizure, reported by home caregiver on scene, states lasted approximately 2 minutes. EMS states patient has history of seizure, no medications on home list. EMS reports additional 10 second seizure en route. EMS reports with seizures, patients arms were flexed, gaze to right. Patient arrives drowsy, responding to pain, oxygen saturation currently 96.

## 2022-05-06 NOTE — ED NOTES
I have reviewed discharge instructions with the patient. The patient verbalized understanding. Patient left ED via Discharge Method: stretcher to Home with [facility]. Opportunity for questions and clarification provided. Patient given 0 scripts. To continue your aftercare when you leave the hospital, you may receive an automated call from our care team to check in on how you are doing. This is a free service and part of our promise to provide the best care and service to meet your aftercare needs.  If you have questions, or wish to unsubscribe from this service please call [phone]. Thank you for Choosing our [facility] Emergency Department.

## 2022-05-06 NOTE — ED PROVIDER NOTES
24-year-old male presents to the emergency department after having seizure-like activity at home. Apparently patient was in bed with his caregiver or home health nurse when initially complained of a headache followed by seizure-like activity. Reports it lasted approximate 2 minutes. And then resolved without any intervention. EMS reports a 10-second seizure-like episode where patient had upper extremities drawn up, eye deviation to the right and what appeared to be a generalized tonic-clonic seizure. Here in the ER, patient had similar episode witnessed by nurses, last approximate 10 seconds. I was evaluating the patient shortly after the seizure-like activity, patient had generalized tonic-clonic. Patient has eyes open and was awake was not postictal, did not bite his tongue or lose control of his bowel or bladder.      Patient will follow commands           Past Medical History:   Diagnosis Date    Aortic stenosis     Aortic valve replacement 7/2018    CAD (coronary artery disease)     PCI in 2014, 2015    COPD (chronic obstructive pulmonary disease) [facility]     Dyslipidemia     Heart failure (HCC)     HTN (hypertension)     Ischemic cardiomyopathy     Paroxysmal atrial fibrillation (HCC)     Pulmonary fibrosis (HCC)     Seizure disorder (HCC)     Systolic heart failure (HCC)        Past Surgical History:   Procedure Laterality Date    HX AFIB ABLATION      HX AORTIC VALVE REPLACEMENT  07/2018    HX HEART CATHETERIZATION      PCI 2014, 2015         Family History:   Problem Relation Age of Onset    Heart Disease Mother     Heart Disease Sister        Social History     Socioeconomic History    Marital status: LEGALLY      Spouse name: Not on file    Number of children: Not on file    Years of education: Not on file    Highest education level: Not on file   Occupational History    Not on file   Tobacco Use    Smoking status: Current Every Day Smoker     Packs/day: 1.50     Years: 50.00     Pack years: 75.00     Types: Cigarettes    Smokeless tobacco: Never Used    Tobacco comment: cutting back to 4 Cigarettes a day   Vaping Use    Vaping Use: Never used   Substance and Sexual Activity    Alcohol use: Not Currently    Drug use: Not on file    Sexual activity: Not on file   Other Topics Concern    Not on file   Social History Narrative    Not on file     Social Determinants of Health     Financial Resource Strain:     Difficulty of Paying Living Expenses: Not on file   Food Insecurity:     Worried About Running Out of Food in the Last Year: Not on file    Codey of Food in the Last Year: Not on file   Transportation Needs:     Lack of Transportation (Medical): Not on file    Lack of Transportation (Non-Medical): Not on file   Physical Activity:     Days of Exercise per Week: Not on file    Minutes of Exercise per Session: Not on file   Stress:     Feeling of Stress : Not on file   Social Connections:     Frequency of Communication with Friends and Family: Not on file    Frequency of Social Gatherings with Friends and Family: Not on file    Attends Jain Services: Not on file    Active Member of [address] [place] [address] or Organizations: Not on file    Attends Club or Organization Meetings: Not on file    Marital Status: Not on file   Intimate Partner Violence:     Fear of Current or Ex-Partner: Not on file    Emotionally Abused: Not on file    Physically Abused: Not on file    Sexually Abused: Not on file   Housing Stability:     Unable to Pay for Housing in the Last Year: Not on file    Number of [place] in the Last Year: Not on file    Unstable Housing in the Last Year: Not on file         ALLERGIES: Tegretol [carbamazepine], Ativan [lorazepam], and Nitroglycerin    Review of Systems   Constitutional: Negative for chills and fever. HENT: Negative for congestion and sore throat. Eyes: Negative for visual disturbance.    Respiratory: Negative for cough and shortness of breath. Cardiovascular: Negative for chest pain. Gastrointestinal: Negative for abdominal pain, diarrhea, nausea and vomiting. Endocrine: Negative for polyuria. Genitourinary: Negative for difficulty urinating and dysuria. Musculoskeletal: Negative for neck pain and neck stiffness. Skin: Negative for rash. Neurological: Positive for seizures. Negative for weakness and headaches. All other systems reviewed and are negative. Vitals:    05/06/22 1610 05/06/22 1631   BP: (!) 154/94 (!) 162/83   Pulse: 72 75   Resp: 18 15   Temp: 98.7 °F (37.1 °C)    SpO2: 98% 96%            Physical Exam  Vitals and nursing note reviewed. Constitutional:       General: He is not in acute distress. Appearance: Normal appearance. He is not ill-appearing. HENT:      Head: Normocephalic and atraumatic. Nose: Nose normal.      Mouth/Throat:      Mouth: Mucous membranes are moist.   Eyes:      Extraocular Movements: Extraocular movements intact. Pupils: Pupils are equal, round, and reactive to light. Cardiovascular:      Rate and Rhythm: Normal rate and regular rhythm. Heart sounds: Normal heart sounds. Pulmonary:      Effort: Pulmonary effort is normal.      Breath sounds: Normal breath sounds. No wheezing, rhonchi or rales. Abdominal:      General: Abdomen is flat. Palpations: Abdomen is soft. Tenderness: There is no abdominal tenderness. There is no guarding. Musculoskeletal:         General: Normal range of motion. Cervical back: Normal range of motion. Skin:     General: Skin is warm and dry. Neurological:      General: No focal deficit present. Mental Status: He is alert and oriented to person, place, and time. Cranial Nerves: No cranial nerve deficit. Sensory: No sensory deficit. Motor: No weakness.    Psychiatric:         Mood and Affect: Mood normal.          MDM  Number of Diagnoses or Management Options  Seizure-like activity ([facility] [address]  Diagnosis management comments: 68-year-old male presents the ER after alleged seizure-like activity at home. Patient reportedly had a seizure-like episode here in the ER, arrived to the room approximately 10 seconds after it began and it had completely resolved. Nurses report patient was shaking in all extremities with eye deviation to the right. Upon my arrival, patient would open his eyes and would track with his eyes and would blink but would not respond. Unclear if patient actually had a seizure once this is psychiatric in nature, will order a broad-based work-up including imaging of the brain. Patient does have a prior seizure history documented on EMR but does not appear to be on any antiepileptic medications. Laboratory normal white count, stable H&H, normal electrolytes and kidney function, Normal liver enzymes. Normal magnesium. CT of head and cervical spine revealed no emergent findings. Patient has had no return of his seizure-like activity although  I feel it unlikely to be an epileptic seizure since he readily came out of it after having a generalized tonic clonic movements. Patient with normal work-up will be discharged home. Advised to follow-up with family medicine within 1 week. Given strict return precautions. He voiced understanding agreement this plan. EKG interpretation shows paced rhythm, rate 70, , cures 195, QTc 536, prolonged QTC, right bundle much block, appears right axis, negative sgarbossa criteria. Procedure Note for Ultrasound Guided IV. IV access was not able to be obtained by nursing staff due to the patient having very difficult vein access. Skin was cleaned and disinfected prior to IV puncture. Ultrasound was used to find the vein which was compressible and does not have any ultrasound features of an artery. Under real-time ultrasound guidance peripheral access was obtained in the left upper extremity.   Blood return was present and IV flushed without difficulty with no clinical signs of infiltration. IV was taped into position and there were no immediate complications noted and patient tolerated the procedure well. Procedure was completed by myself the attending physician.            Amount and/or Complexity of Data Reviewed  Clinical lab tests: ordered and reviewed  Tests in the radiology section of CPT®: ordered and reviewed  Decide to obtain previous medical records or to obtain history from someone other than the patient: yes  Independent visualization of images, tracings, or specimens: yes    Risk of Complications, Morbidity, and/or Mortality  Presenting problems: moderate  Diagnostic procedures: moderate  Management options: moderate           Procedures

## 2022-05-06 NOTE — DISCHARGE INSTRUCTIONS
Continue to comply with your medications at home. Follow-up with your family physician within 1 week as needed. Return to the ER for worsening or worrisome symptoms.

## 2022-05-07 LAB
ATRIAL RATE: 113 BPM
CALCULATED P AXIS, ECG09: 0 DEGREES
CALCULATED R AXIS, ECG10: -95 DEGREES
CALCULATED T AXIS, ECG11: 83 DEGREES
DIAGNOSIS, 93000: NORMAL
P-R INTERVAL, ECG05: 146 MS
Q-T INTERVAL, ECG07: 496 MS
QRS DURATION, ECG06: 195 MS
QTC CALCULATION (BEZET), ECG08: 536 MS
VENTRICULAR RATE, ECG03: 70 BPM

## 2022-05-09 LAB
BILIRUB UR QL: NEGATIVE
GLUCOSE UR QL STRIP.AUTO: NEGATIVE MG/DL
KETONES UR-MCNC: NEGATIVE MG/DL
LEUKOCYTE ESTERASE UR QL STRIP: NEGATIVE
NITRITE UR QL: NEGATIVE
PH UR: 6.5 [PH] (ref 5–9)
PROT UR QL: NEGATIVE MG/DL
RBC # UR STRIP: ABNORMAL /UL
SP GR UR: 1.02 (ref 1–1.02)
UROBILINOGEN UR QL: 0.2 EU/DL (ref 0.2–1)

## 2022-05-17 ENCOUNTER — APPOINTMENT (OUTPATIENT)
Dept: GENERAL RADIOLOGY | Age: 67
End: 2022-05-17
Attending: EMERGENCY MEDICINE
Payer: MEDICARE

## 2022-05-17 ENCOUNTER — HOSPITAL ENCOUNTER (EMERGENCY)
Age: 67
Discharge: LEFT BEFORE DISCHARGE | End: 2022-05-17
Attending: EMERGENCY MEDICINE
Payer: MEDICARE

## 2022-05-17 VITALS
RESPIRATION RATE: 20 BRPM | OXYGEN SATURATION: 96 % | SYSTOLIC BLOOD PRESSURE: 137 MMHG | BODY MASS INDEX: 34.06 KG/M2 | DIASTOLIC BLOOD PRESSURE: 89 MMHG | TEMPERATURE: 97.8 F | HEIGHT: 73 IN | WEIGHT: 257 LBS | HEART RATE: 70 BPM

## 2022-05-17 DIAGNOSIS — Z53.21 PATIENT LEFT WITHOUT BEING SEEN: Primary | ICD-10-CM

## 2022-05-17 LAB
ALBUMIN SERPL-MCNC: 3.4 G/DL (ref 3.2–4.6)
ALBUMIN/GLOB SERPL: 0.8 {RATIO} (ref 1.2–3.5)
ALP SERPL-CCNC: 108 U/L (ref 50–136)
ALT SERPL-CCNC: 33 U/L (ref 12–65)
ANION GAP SERPL CALC-SCNC: 8 MMOL/L (ref 7–16)
AST SERPL-CCNC: 50 U/L (ref 15–37)
BASOPHILS # BLD: 0.1 K/UL (ref 0–0.2)
BASOPHILS NFR BLD: 1 % (ref 0–2)
BILIRUB SERPL-MCNC: 0.7 MG/DL (ref 0.2–1.1)
BNP SERPL-MCNC: 1117 PG/ML (ref 5–125)
BUN SERPL-MCNC: 23 MG/DL (ref 8–23)
CALCIUM SERPL-MCNC: 9.4 MG/DL (ref 8.3–10.4)
CHLORIDE SERPL-SCNC: 107 MMOL/L (ref 98–107)
CO2 SERPL-SCNC: 25 MMOL/L (ref 21–32)
CREAT SERPL-MCNC: 1.3 MG/DL (ref 0.8–1.5)
DIFFERENTIAL METHOD BLD: ABNORMAL
EOSINOPHIL # BLD: 0.2 K/UL (ref 0–0.8)
EOSINOPHIL NFR BLD: 3 % (ref 0.5–7.8)
ERYTHROCYTE [DISTWIDTH] IN BLOOD BY AUTOMATED COUNT: 15.7 % (ref 11.9–14.6)
GLOBULIN SER CALC-MCNC: 4.5 G/DL (ref 2.3–3.5)
GLUCOSE SERPL-MCNC: 94 MG/DL (ref 65–100)
HCT VFR BLD AUTO: 42.8 % (ref 41.1–50.3)
HGB BLD-MCNC: 13.8 G/DL (ref 13.6–17.2)
IMM GRANULOCYTES # BLD AUTO: 0 K/UL (ref 0–0.5)
IMM GRANULOCYTES NFR BLD AUTO: 0 % (ref 0–5)
LYMPHOCYTES # BLD: 1.6 K/UL (ref 0.5–4.6)
LYMPHOCYTES NFR BLD: 23 % (ref 13–44)
MAGNESIUM SERPL-MCNC: 2.3 MG/DL (ref 1.8–2.4)
MCH RBC QN AUTO: 26.3 PG (ref 26.1–32.9)
MCHC RBC AUTO-ENTMCNC: 32.2 G/DL (ref 31.4–35)
MCV RBC AUTO: 81.7 FL (ref 79.6–97.8)
MONOCYTES # BLD: 1 K/UL (ref 0.1–1.3)
MONOCYTES NFR BLD: 14 % (ref 4–12)
NEUTS SEG # BLD: 4.2 K/UL (ref 1.7–8.2)
NEUTS SEG NFR BLD: 59 % (ref 43–78)
NRBC # BLD: 0 K/UL (ref 0–0.2)
PLATELET # BLD AUTO: 125 K/UL (ref 150–450)
PMV BLD AUTO: 12 FL (ref 9.4–12.3)
POTASSIUM SERPL-SCNC: 4.3 MMOL/L (ref 3.5–5.1)
PROT SERPL-MCNC: 7.9 G/DL (ref 6.3–8.2)
RBC # BLD AUTO: 5.24 M/UL (ref 4.23–5.6)
SODIUM SERPL-SCNC: 140 MMOL/L (ref 136–145)
TROPONIN-HIGH SENSITIVITY: 17.1 PG/ML (ref 0–14)
WBC # BLD AUTO: 7.2 K/UL (ref 4.3–11.1)

## 2022-05-17 PROCEDURE — 85025 COMPLETE CBC W/AUTO DIFF WBC: CPT

## 2022-05-17 PROCEDURE — 84484 ASSAY OF TROPONIN QUANT: CPT

## 2022-05-17 PROCEDURE — 83735 ASSAY OF MAGNESIUM: CPT

## 2022-05-17 PROCEDURE — 83880 ASSAY OF NATRIURETIC PEPTIDE: CPT

## 2022-05-17 PROCEDURE — 93005 ELECTROCARDIOGRAM TRACING: CPT

## 2022-05-17 PROCEDURE — 80053 COMPREHEN METABOLIC PANEL: CPT

## 2022-05-17 PROCEDURE — 71045 X-RAY EXAM CHEST 1 VIEW: CPT

## 2022-05-17 PROCEDURE — 75810000275 HC EMERGENCY DEPT VISIT NO LEVEL OF CARE

## 2022-05-17 RX ORDER — SODIUM CHLORIDE 0.9 % (FLUSH) 0.9 %
5-10 SYRINGE (ML) INJECTION EVERY 8 HOURS
Status: DISCONTINUED | OUTPATIENT
Start: 2022-05-17 | End: 2022-05-18 | Stop reason: HOSPADM

## 2022-05-17 RX ORDER — SODIUM CHLORIDE 0.9 % (FLUSH) 0.9 %
5-10 SYRINGE (ML) INJECTION AS NEEDED
Status: DISCONTINUED | OUTPATIENT
Start: 2022-05-17 | End: 2022-05-18 | Stop reason: HOSPADM

## 2022-05-17 NOTE — ED TRIAGE NOTES
Pt arrives from home via POV with mask in place. Home health nurse called his primary r/t swelling in legs, hands and abdomen and was told to send pt to ED. Pt states he states his Lasix was increased to 160mg daily on 5/2 but has continued to have increasing edema. Denies cp, n/v/d.  C/O abdominal pain secondary to swelling and SOB. Pt states he has had a 30# weight gain in the last 2 weeks.

## 2022-05-18 LAB
ATRIAL RATE: 88 BPM
CALCULATED R AXIS, ECG10: -76 DEGREES
CALCULATED T AXIS, ECG11: 107 DEGREES
DIAGNOSIS, 93000: NORMAL
Q-T INTERVAL, ECG07: 474 MS
QRS DURATION, ECG06: 182 MS
QTC CALCULATION (BEZET), ECG08: 511 MS
VENTRICULAR RATE, ECG03: 70 BPM

## 2022-05-18 NOTE — ED PROVIDER NOTES
Patient left prior to any provider encounter. I am accessing and completing this note solely to discard the record from [address] [place] [address] [email or web address] system. It is required from the electronic medical record to assign a physician to these records and I was assigned this for this reason. I had no clinical encounter with this patient and did not otherwise examine the case. No physician billing should be completed.     Marie Webber MD 12:55 PM

## 2022-05-19 PROBLEM — N18.30 CHRONIC RENAL DISEASE, STAGE III (HCC): Status: ACTIVE | Noted: 2022-05-19

## 2022-05-23 ENCOUNTER — NURSE TRIAGE (OUTPATIENT)
Dept: OTHER | Facility: CLINIC | Age: 67
End: 2022-05-23
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
## 2022-05-24 ENCOUNTER — TELEPHONE (OUTPATIENT)
Dept: INTERNAL MEDICINE CLINIC | Facility: CLINIC | Age: 67
End: 2022-05-24

## 2022-05-24 NOTE — TELEPHONE ENCOUNTER
----- Message from Samanta Berger sent at 5/23/2022  5:20 PM EDT -----  Subject: Appointment Request    Reason for Call: Urgent Return from RN Triage    QUESTIONS  Type of Appointment? Established Patient  Reason for appointment request? No appointments available during search  Additional Information for Provider? Pt was advised by YOUNG Sumner from nurse   triage to go to ER. Pt refused and dispo is to be seen on 5/24. Has SOB   and a fever. Also, has lung issues. ---------------------------------------------------------------------------  --------------  Carlos SHEIKH  What is the best way for the office to contact you? OK to leave message on   voicemail  Preferred Call Back Phone Number?  [phone]  ---------------------------------------------------------------------------  --------------  SCRIPT ANSWERS

## 2022-05-25 ENCOUNTER — APPOINTMENT (OUTPATIENT)
Dept: GENERAL RADIOLOGY | Age: 67
End: 2022-05-25
Payer: COMMERCIAL

## 2022-05-25 ENCOUNTER — HOSPITAL ENCOUNTER (EMERGENCY)
Age: 67
Discharge: HOME | End: 2022-05-25
Attending: EMERGENCY MEDICINE | Admitting: EMERGENCY MEDICINE
Payer: COMMERCIAL

## 2022-05-25 VITALS
DIASTOLIC BLOOD PRESSURE: 89 MMHG | HEIGHT: 73 IN | BODY MASS INDEX: 33.37 KG/M2 | TEMPERATURE: 98.5 F | OXYGEN SATURATION: 97 % | HEART RATE: 71 BPM | RESPIRATION RATE: 21 BRPM | SYSTOLIC BLOOD PRESSURE: 111 MMHG | WEIGHT: 251.77 LBS

## 2022-05-25 DIAGNOSIS — J20.9 ACUTE BRONCHITIS, UNSPECIFIED ORGANISM: Primary | ICD-10-CM

## 2022-05-25 LAB
ALBUMIN SERPL-MCNC: 3 G/DL (ref 3.2–4.6)
ALBUMIN/GLOB SERPL: 0.7 {RATIO} (ref 1.2–3.5)
ALP SERPL-CCNC: 114 U/L (ref 50–136)
ALT SERPL-CCNC: 25 U/L (ref 12–65)
ANION GAP SERPL CALC-SCNC: 8 MMOL/L (ref 7–16)
AST SERPL-CCNC: 20 U/L (ref 15–37)
B PERT DNA SPEC QL NAA+PROBE: NOT DETECTED
BASOPHILS # BLD: 0.1 K/UL (ref 0–0.2)
BASOPHILS NFR BLD: 0 % (ref 0–2)
BILIRUB SERPL-MCNC: 1.7 MG/DL (ref 0.2–1.1)
BORDETELLA PARAPERTUSSIS BY PCR: NOT DETECTED
BUN SERPL-MCNC: 12 MG/DL (ref 8–23)
C PNEUM DNA SPEC QL NAA+PROBE: NOT DETECTED
CALCIUM SERPL-MCNC: 8.7 MG/DL (ref 8.3–10.4)
CHLORIDE SERPL-SCNC: 102 MMOL/L (ref 98–107)
CO2 SERPL-SCNC: 25 MMOL/L (ref 21–32)
CREAT SERPL-MCNC: 1.1 MG/DL (ref 0.8–1.5)
DIFFERENTIAL METHOD BLD: ABNORMAL
EKG ATRIAL RATE: 0 BPM
EKG DIAGNOSIS: ABNORMAL
EKG Q-T INTERVAL: 471 MS
EKG QRS DURATION: 197 MS
EKG QTC CALCULATION (BAZETT): 520 MS
EKG R AXIS: -83 DEGREES
EKG T AXIS: 85 DEGREES
EKG VENTRICULAR RATE: 73 BPM
EOSINOPHIL # BLD: 0.1 K/UL (ref 0–0.8)
EOSINOPHIL NFR BLD: 1 % (ref 0.5–7.8)
ERYTHROCYTE [DISTWIDTH] IN BLOOD BY AUTOMATED COUNT: 15.5 % (ref 11.9–14.6)
FLUAV SUBTYP SPEC NAA+PROBE: NOT DETECTED
FLUBV RNA SPEC QL NAA+PROBE: NOT DETECTED
GLOBULIN SER CALC-MCNC: 4.4 G/DL (ref 2.3–3.5)
GLUCOSE SERPL-MCNC: 112 MG/DL (ref 65–100)
HADV DNA SPEC QL NAA+PROBE: NOT DETECTED
HCOV 229E RNA SPEC QL NAA+PROBE: NOT DETECTED
HCOV HKU1 RNA SPEC QL NAA+PROBE: NOT DETECTED
HCOV NL63 RNA SPEC QL NAA+PROBE: NOT DETECTED
HCOV OC43 RNA SPEC QL NAA+PROBE: NOT DETECTED
HCT VFR BLD AUTO: 34.6 % (ref 41.1–50.3)
HGB BLD-MCNC: 11.4 G/DL (ref 13.6–17.2)
HMPV RNA SPEC QL NAA+PROBE: NOT DETECTED
HPIV1 RNA SPEC QL NAA+PROBE: NOT DETECTED
HPIV2 RNA SPEC QL NAA+PROBE: NOT DETECTED
HPIV3 RNA SPEC QL NAA+PROBE: NOT DETECTED
HPIV4 RNA SPEC QL NAA+PROBE: NOT DETECTED
IMM GRANULOCYTES # BLD AUTO: 0 K/UL (ref 0–0.5)
IMM GRANULOCYTES NFR BLD AUTO: 0 % (ref 0–5)
LACTATE SERPL-SCNC: 1.1 MMOL/L (ref 0.4–2)
LYMPHOCYTES # BLD: 1.4 K/UL (ref 0.5–4.6)
LYMPHOCYTES NFR BLD: 11 % (ref 13–44)
M PNEUMO DNA SPEC QL NAA+PROBE: NOT DETECTED
MCH RBC QN AUTO: 26.2 PG (ref 26.1–32.9)
MCHC RBC AUTO-ENTMCNC: 32.9 G/DL (ref 31.4–35)
MCV RBC AUTO: 79.5 FL (ref 79.6–97.8)
MONOCYTES # BLD: 1.5 K/UL (ref 0.1–1.3)
MONOCYTES NFR BLD: 12 % (ref 4–12)
NEUTS SEG # BLD: 9.4 K/UL (ref 1.7–8.2)
NEUTS SEG NFR BLD: 76 % (ref 43–78)
NRBC # BLD: 0 K/UL (ref 0–0.2)
NT PRO BNP: 2612 PG/ML (ref 5–125)
PLATELET # BLD AUTO: 208 K/UL (ref 150–450)
PMV BLD AUTO: 9.1 FL (ref 9.4–12.3)
POTASSIUM SERPL-SCNC: 3.1 MMOL/L (ref 3.5–5.1)
PROCALCITONIN SERPL-MCNC: 0.32 NG/ML (ref 0–0.49)
PROT SERPL-MCNC: 7.4 G/DL (ref 6.3–8.2)
RBC # BLD AUTO: 4.35 M/UL (ref 4.23–5.6)
RSV RNA SPEC QL NAA+PROBE: NOT DETECTED
RV+EV RNA SPEC QL NAA+PROBE: NOT DETECTED
SARS-COV-2 RNA RESP QL NAA+PROBE: NOT DETECTED
SODIUM SERPL-SCNC: 135 MMOL/L (ref 138–145)
WBC # BLD AUTO: 12.5 K/UL (ref 4.3–11.1)

## 2022-05-25 PROCEDURE — 0202U NFCT DS 22 TRGT SARS-COV-2: CPT

## 2022-05-25 PROCEDURE — 94640 AIRWAY INHALATION TREATMENT: CPT

## 2022-05-25 PROCEDURE — 6370000000 HC RX 637 (ALT 250 FOR IP): Performed by: EMERGENCY MEDICINE

## 2022-05-25 PROCEDURE — 83605 ASSAY OF LACTIC ACID: CPT

## 2022-05-25 PROCEDURE — 71045 X-RAY EXAM CHEST 1 VIEW: CPT

## 2022-05-25 PROCEDURE — 87040 BLOOD CULTURE FOR BACTERIA: CPT

## 2022-05-25 PROCEDURE — 84145 PROCALCITONIN (PCT): CPT

## 2022-05-25 PROCEDURE — 83880 ASSAY OF NATRIURETIC PEPTIDE: CPT

## 2022-05-25 PROCEDURE — 93005 ELECTROCARDIOGRAM TRACING: CPT | Performed by: EMERGENCY MEDICINE

## 2022-05-25 PROCEDURE — 80053 COMPREHEN METABOLIC PANEL: CPT

## 2022-05-25 PROCEDURE — 99285 EMERGENCY DEPT VISIT HI MDM: CPT

## 2022-05-25 PROCEDURE — 85025 COMPLETE CBC W/AUTO DIFF WBC: CPT

## 2022-05-25 RX ORDER — PREDNISONE 50 MG/1
50 TABLET ORAL DAILY
Qty: 5 TABLET | Refills: 0 | Status: SHIPPED | OUTPATIENT
Start: 2022-05-25 | End: 2022-05-30

## 2022-05-25 RX ORDER — PREDNISONE 50 MG/1
50 TABLET ORAL DAILY
Status: DISCONTINUED | OUTPATIENT
Start: 2022-05-25 | End: 2022-05-25 | Stop reason: HOSPADM

## 2022-05-25 RX ORDER — SODIUM CHLORIDE, SODIUM LACTATE, POTASSIUM CHLORIDE, AND CALCIUM CHLORIDE .6; .31; .03; .02 G/100ML; G/100ML; G/100ML; G/100ML
30 INJECTION, SOLUTION INTRAVENOUS ONCE
Status: DISCONTINUED | OUTPATIENT
Start: 2022-05-25 | End: 2022-05-25 | Stop reason: HOSPADM

## 2022-05-25 RX ORDER — IPRATROPIUM BROMIDE AND ALBUTEROL SULFATE 2.5; .5 MG/3ML; MG/3ML
1 SOLUTION RESPIRATORY (INHALATION)
Status: COMPLETED | OUTPATIENT
Start: 2022-05-25 | End: 2022-05-25

## 2022-05-25 RX ORDER — DOXYCYCLINE HYCLATE 100 MG
100 TABLET ORAL 2 TIMES DAILY
Qty: 20 TABLET | Refills: 0 | Status: SHIPPED | OUTPATIENT
Start: 2022-05-25 | End: 2022-06-04

## 2022-05-25 RX ADMIN — IPRATROPIUM BROMIDE AND ALBUTEROL SULFATE 1 AMPULE: .5; 3 SOLUTION RESPIRATORY (INHALATION) at 20:01

## 2022-05-25 RX ADMIN — PREDNISONE 50 MG: 50 TABLET ORAL at 20:22

## 2022-05-25 ASSESSMENT — ENCOUNTER SYMPTOMS
SHORTNESS OF BREATH: 1
DIARRHEA: 0
ABDOMINAL PAIN: 0
CHEST TIGHTNESS: 1
COUGH: 1
WHEEZING: 1
VOMITING: 0
FACIAL SWELLING: 0

## 2022-05-25 ASSESSMENT — PAIN - FUNCTIONAL ASSESSMENT: PAIN_FUNCTIONAL_ASSESSMENT: 0-10

## 2022-05-25 ASSESSMENT — PAIN DESCRIPTION - LOCATION: LOCATION: RIB CAGE

## 2022-05-25 ASSESSMENT — PAIN SCALES - GENERAL: PAINLEVEL_OUTOF10: 6

## 2022-05-25 NOTE — ED TRIAGE NOTES
Patient arrives to ED via EMS from home. Patient reports SOB and cough. Patient has wheezing. Duo neb x 1 given in route. Patient was 96% on RA in route but placed on 2L via nc for comfort. Patient reports bilateral extremity swelling. Patient has history of CHF. Patient reports he takes Lasix. Patient reports fevers at home.

## 2022-05-25 NOTE — ED PROVIDER NOTES
chest pain. Gastrointestinal: Negative for abdominal pain, diarrhea and vomiting. Musculoskeletal: Negative for joint swelling. Skin: Negative for rash. Neurological: Negative for speech difficulty. Psychiatric/Behavioral: Negative for confusion. All other systems reviewed and are negative. All other systems reviewed and are negative. Past Medical History:   Diagnosis Date    Aortic stenosis     Aortic valve replacement 7/2018    CAD (coronary artery disease)     PCI in 2014, 2015    COPD (chronic obstructive pulmonary disease) (HCC)     Dyslipidemia     Heart failure (HCC)     HTN (hypertension)     Ischemic cardiomyopathy     Paroxysmal atrial fibrillation (HCC)     Pulmonary fibrosis (HCC)     Seizure disorder (HCC)     Systolic heart failure ([address]         Past Surgical History:   Procedure Laterality Date    ABLATION OF DYSRHYTHMIC FOCUS      AORTIC VALVE REPLACEMENT  07/2018    CARDIAC CATHETERIZATION      PCI 2014, 2015        Family History   Problem Relation Age of Onset    Heart Disease Sister     Heart Disease Mother            Social Connections:     Frequency of Communication with Friends and Family: Not on file    Frequency of Social Gatherings with Friends and Family: Not on file    Attends Yazidism Services: Not on file    Active Member of Clubs or Organizations: Not on file    Attends Club or Organization Meetings: Not on file    Marital Status: Not on file        Allergies   Allergen Reactions    Carbamazepine Anaphylaxis    Lorazepam Other (See Comments)     Violent behavior    Nitroglycerin Other (See Comments)     hypotension        Vitals signs and nursing note reviewed.    Patient Vitals for the past 4 hrs:   Temp Pulse Resp BP SpO2   05/25/22 1850 -- 75 22 -- 98 %   05/25/22 1745 -- 74 30 133/79 97 %   05/25/22 1730 -- 75 24 124/83 99 %   05/25/22 1715 -- 71 15 137/74 97 %   05/25/22 1650 -- 70 28 (!) 141/79 98 %   05/25/22 1649 98.5 °F (36.9 °C) 70 26 (!) 141/79 97 %   05/25/22 1631 99.1 °F (37.3 °C) 73 30 (!) 140/111 98 %          Physical Exam  Vitals reviewed. Constitutional:       Appearance: Normal appearance. HENT:      Head: Normocephalic and atraumatic. Mouth/Throat:      Mouth: Mucous membranes are moist.   Eyes:      Pupils: Pupils are equal, round, and reactive to light. Cardiovascular:      Rate and Rhythm: Normal rate. Rhythm irregular. Pulmonary:      Effort: Pulmonary effort is normal. No respiratory distress. Breath sounds: Wheezing present. Abdominal:      General: Abdomen is flat. There is no distension. Musculoskeletal:         General: No deformity. Normal range of motion. Cervical back: Normal range of motion and neck supple. Right lower leg: Edema present. Left lower leg: Edema present. Skin:     General: Skin is warm and dry. Neurological:      General: No focal deficit present. Mental Status: He is alert. Psychiatric:         Mood and Affect: Mood normal.          MDM  Number of Diagnoses or Management Options  Acute bronchitis, unspecified organism  Diagnosis management comments: Voice dictation software was used during the making of this note. This software is not perfect and grammatical and other typographical errors may be present. This note has been proofread, but may still contain errors. Gurwinder Allen MD; 5/25/2022 @7:46 PM   ===================================================================  I wore appropriate PPE throughout this patient's ED visit. Gurwinder Allen MD, 7:46 PM    7:47 PM  Given neb and prednisone. Will prescribe doxycycline. Given strict return precautions. Advised to continue albuterol nebs at home every 4 hours for the next 3 days.            Amount and/or Complexity of Data Reviewed  Clinical lab tests: ordered and reviewed (Results for orders placed or performed during the hospital encounter of 05/25/22  -Respiratory Panel, Molecular, with COVID-19 (Restricted: peds pts or suitable admitted adults):   Specimen: Nasopharyngeal       Result                                            Value                         Ref Range                       Adenovirus by PCR                                 NOT DETECTED                                                  Coronavirus 229E by PCR                           NOT DETECTED                                                  Coronavirus HKU1 by PCR                           NOT DETECTED                                                  Coronavirus NL63 by PCR                           NOT DETECTED                                                  Coronavirus OC43 by PCR                           NOT DETECTED                                                  SARS-CoV-2, PCR                                   NOT DETECTED                                                  Human Metapneumovirus by PCR                      NOT DETECTED                                                  Rhinovirus Enterovirus PCR                        NOT DETECTED                                                  Influenza A by PCR                                NOT DETECTED                                                  INFLUENZA B PCR                                   NOT DETECTED                                                  Parainfluenza 1 PCR                               NOT DETECTED                                                  Parainfluenza 2 PCR                               NOT DETECTED                                                  Parainfluenza 3 PCR                               NOT DETECTED                                                  Parainfluenza 4 PCR                               NOT DETECTED                                                  Respiratory Syncytial Virus by PCR                NOT DETECTED                                                  Bordetella parapertussis by PCR                   NOT DETECTED Bordetella pertussis by PCR                       NOT DETECTED                                                  Chlamydophila Pneumonia PCR                       NOT DETECTED                                                  Mycoplasma pneumo by PCR                          NOT DETECTED                                             -Lactic Acid:        Result                                            Value                         Ref Range                       Lactic Acid, Plasma                               1.1                           0.4 - 2.0 MMOL/L           -CBC with Auto Differential:        Result                                            Value                         Ref Range                       WBC                                               12.5 (H)                      4.3 - 11.1 K/uL                 RBC                                               4.35                          4.23 - 5.6 M/uL                 Hemoglobin                                        11.4 (L)                      13.6 - 17.2 g/dL                Hematocrit                                        34.6 (L)                      41.1 - 50.3 %                   MCV                                               79.5 (L)                      79.6 - 97.8 FL                  MCH                                               26.2                          26.1 - 32.9 PG                  MCHC                                              32.9                          31.4 - 35.0 g/dL                RDW                                               15.5 (H)                      11.9 - 14.6 %                   Platelets                                         208                           150 - 450 K/uL                  MPV                                               9.1 (L)                       9.4 - 12.3 FL                   nRBC                                              0.00 0.0 - 0.2 K/uL                  Differential Type                                 AUTOMATED                                                     Seg Neutrophils                                   76                            43 - 78 %                       Lymphocytes                                       11 (L)                        13 - 44 %                       Monocytes                                         12                            4.0 - 12.0 %                    Eosinophils %                                     1                             0.5 - 7.8 %                     Basophils                                         0                             0.0 - 2.0 %                     Immature Granulocytes                             0                             0.0 - 5.0 %                     Segs Absolute                                     9.4 (H)                       1.7 - 8.2 K/UL                  Absolute Lymph #                                  1.4                           0.5 - 4.6 K/UL                  Absolute Mono #                                   1.5 (H)                       0.1 - 1.3 K/UL                  Absolute Eos #                                    0.1                           0.0 - 0.8 K/UL                  Basophils Absolute                                0.1                           0.0 - 0.2 K/UL                  Absolute Immature Granulocyte                     0.0                           0.0 - 0.5 K/UL             -CMP:        Result                                            Value                         Ref Range                       Sodium                                            135 (L)                       138 - 145 mmol/L                Potassium                                         3.1 (L)                       3.5 - 5.1 mmol/L                Chloride                                          102                           98 - 107 mmol/L CO2                                               25                            21 - 32 mmol/L                  Anion Gap                                         8                             7 - 16 mmol/L                   Glucose                                           112 (H)                       65 - 100 mg/dL                  BUN                                               12                            8 - 23 MG/DL                    CREATININE                                        1. 10                          0.8 - 1.5 MG/DL                 GFR                               >60                           >60 ml/min/1.73m2               GFR Non-                          >60                           >60 ml/min/1.73m2               Calcium                                           8.7                           8.3 - 10.4 MG/DL                Total Bilirubin                                   1.7 (H)                       0.2 - 1.1 MG/DL                 ALT                                               25                            12 - 65 U/L                     AST                                               20                            15 - 37 U/L                     Alk Phosphatase                                   114                           50 - 136 U/L                    Total Protein                                     7.4                           6.3 - 8.2 g/dL                  Albumin                                           3.0 (L)                       3.2 - 4.6 g/dL                  Globulin                                          4.4 (H)                       2.3 - 3.5 g/dL                  Albumin/Globulin Ratio                            0.7 (L)                       1.2 - 3.5                  -proBNP, N-TERMINAL:        Result                                            Value                         Ref Range                       NT Pro-BNP 2,612 (H)                     5 - 125 PG/ML              -EKG 12 Lead:        Result                                            Value                         Ref Range                       Ventricular Rate                                  73                            BPM                             Atrial Rate                                       0                             BPM                             QRS Duration                                      197                           ms                              Q-T Interval                                      471                           ms                              QTc Calculation (Bazett)                          520                           ms                              R Axis                                            -83                           degrees                         T Axis                                            85                            degrees                         Diagnosis                                         Atrial fibrillation                                      )  Tests in the radiology section of CPT®: ordered and reviewed (    XR CHEST PORTABLE    Result Date: 5/25/2022  Single View portable upright chest x-ray dated   25 May, 2022 Reference Exam: 17 May, 2022 Indication: Cough and short of breath for 4 days Findings: 2 images are presented, cardiac silhouette is felt to be upper normal in size with spinal stimulator leads and a left-sided electronic device again noted, lungs show minimal stranding but pulmonary vascularity appears normal.     Minimal strandiness in the lungs and may be some edema or infiltrate different from the reference study.    )  Tests in the medicine section of CPT®: reviewed and ordered        EKG 12 Lead    Date/Time: 5/25/2022 7:16 PM  Performed by: Iliana Bonilla MD  Authorized by: Iliana Bonilla MD     ECG reviewed by ED Physician in the absence of a cardiologist: yes    Interpretation:     Interpretation: abnormal    Rate:     ECG rate:  73    ECG rate assessment: normal    Rhythm:     Rhythm: atrial fibrillation    Conduction:     Conduction: abnormal      Abnormal conduction: non-specific intraventricular conduction delay    ST segments:     ST segments:  Non-specific  T waves:     T waves: non-specific          Labs Reviewed   CBC WITH AUTO DIFFERENTIAL - Abnormal; Notable for the following components:       Result Value    WBC 12.5 (*)     Hemoglobin 11.4 (*)     Hematocrit 34.6 (*)     MCV 79.5 (*)     RDW 15.5 (*)     MPV 9.1 (*)     Lymphocytes 11 (*)     Segs Absolute 9.4 (*)     Absolute Mono # 1.5 (*)     All other components within normal limits   COMPREHENSIVE METABOLIC PANEL - Abnormal; Notable for the following components:    Sodium 135 (*)     Potassium 3.1 (*)     Glucose 112 (*)     Total Bilirubin 1.7 (*)     Albumin 3.0 (*)     Globulin 4.4 (*)     Albumin/Globulin Ratio 0.7 (*)     All other components within normal limits   PROBNP, N-TERMINAL - Abnormal; Notable for the following components:    NT Pro-BNP 2,612 (*)     All other components within normal limits   CULTURE, BLOOD 1   RESPIRATORY PANEL, MOLECULAR, WITH COVID-19   CULTURE, BLOOD 1   LACTIC ACID   PROCALCITONIN        XR CHEST PORTABLE   Final Result   Minimal strandiness in the lungs and may be some edema or infiltrate   different from the reference study. Oblong Coma Scale  Eye Opening: Spontaneous  Best Verbal Response: Oriented  Best Motor Response: Obeys commands  Oblong Coma Scale Score: 15                     Voice dictation software was used during the making of this note. This software is not perfect and grammatical and other typographical errors may be present. This note has not been completely proofread for errors.        Jennifer Munoz MD  05/25/22 [id number]

## 2022-05-25 NOTE — PROGRESS NOTES
Pt arrived to ED13 at 1648 SOB and frequently coughing w ex. wheezes, but O2 sat remained >90%. Increased O2 flow to 4L and pt still c/o SOB but reports less labored breathing and less coughing. This RN has performed at least q15 min visual checks on pt.

## 2022-05-26 NOTE — ED NOTES
I have reviewed discharge instructions with the patient. The patient verbalized understanding. Patient left ED via Discharge Method: stretcher to Home with [facility] transport    Opportunity for questions and clarification provided. Patient given 2 scripts. SBAR given to [facility]    To continue your aftercare when you leave the hospital, you may receive an automated call from our care team to check in on how you are doing. This is a free service and part of our promise to provide the best care and service to meet your aftercare needs.  If you have questions, or wish to unsubscribe from this service please call [phone]. Thank you for Choosing our [facility] Emergency Department.           Norris Vuong RN  05/25/22 [id number]

## 2022-05-26 NOTE — ED NOTES
Pt to go home by [employer].       Katie Acosta, [facility]  05/25/22 2023

## 2022-05-30 LAB
BACTERIA SPEC CULT: NORMAL
SERVICE CMNT-IMP: NORMAL

## 2022-06-01 ENCOUNTER — PREP FOR PROCEDURE (OUTPATIENT)
Dept: ADMINISTRATIVE | Age: 67
End: 2022-06-01

## 2022-06-01 RX ORDER — SODIUM CHLORIDE 0.9 % (FLUSH) 0.9 %
5-40 SYRINGE (ML) INJECTION PRN
OUTPATIENT
Start: 2022-06-01

## 2022-06-01 RX ORDER — SODIUM CHLORIDE 9 MG/ML
INJECTION, SOLUTION INTRAVENOUS PRN
OUTPATIENT
Start: 2022-06-01

## 2022-06-01 RX ORDER — SODIUM CHLORIDE 0.9 % (FLUSH) 0.9 %
5-40 SYRINGE (ML) INJECTION EVERY 12 HOURS SCHEDULED
OUTPATIENT
Start: 2022-06-01

## 2022-06-02 ENCOUNTER — TELEPHONE (OUTPATIENT)
Dept: INTERNAL MEDICINE CLINIC | Facility: CLINIC | Age: 67
End: 2022-06-02

## 2022-06-02 NOTE — TELEPHONE ENCOUNTER
Patient had d/c'd and re-admitted due to patient getting new insurance. Is it okay for the start of care to be 6/7/22? Would you be able to fax of a face to face OV to

## 2022-06-02 NOTE — TELEPHONE ENCOUNTER
I have called Betsy Parra, I did leave a vm for her to return my call with more information on what she needed for the patient.

## 2022-06-06 ENCOUNTER — HOSPITAL ENCOUNTER (EMERGENCY)
Age: 67
Discharge: LEFT BEFORE DISCHARGE | End: 2022-06-06
Payer: MEDICARE

## 2022-06-06 ENCOUNTER — HOSPITAL ENCOUNTER (EMERGENCY)
Dept: GENERAL RADIOLOGY | Age: 67
Discharge: HOME | End: 2022-06-09
Payer: MEDICARE

## 2022-06-06 VITALS
RESPIRATION RATE: 22 BRPM | BODY MASS INDEX: 33.27 KG/M2 | SYSTOLIC BLOOD PRESSURE: 159 MMHG | HEART RATE: 70 BPM | WEIGHT: 251 LBS | DIASTOLIC BLOOD PRESSURE: 81 MMHG | HEIGHT: 73 IN | TEMPERATURE: 99.1 F | OXYGEN SATURATION: 96 %

## 2022-06-06 LAB
ALBUMIN SERPL-MCNC: 3.1 G/DL (ref 3.2–4.6)
ALBUMIN/GLOB SERPL: 0.7 {RATIO} (ref 1.2–3.5)
ALP SERPL-CCNC: 105 U/L (ref 50–136)
ALT SERPL-CCNC: 25 U/L (ref 12–65)
ANION GAP SERPL CALC-SCNC: 7 MMOL/L (ref 7–16)
AST SERPL-CCNC: 20 U/L (ref 15–37)
BASOPHILS # BLD: 0.1 K/UL (ref 0–0.2)
BASOPHILS NFR BLD: 0 % (ref 0–2)
BILIRUB SERPL-MCNC: 0.4 MG/DL (ref 0.2–1.1)
BUN SERPL-MCNC: 14 MG/DL (ref 8–23)
CALCIUM SERPL-MCNC: 9.3 MG/DL (ref 8.3–10.4)
CHLORIDE SERPL-SCNC: 107 MMOL/L (ref 98–107)
CO2 SERPL-SCNC: 24 MMOL/L (ref 21–32)
CREAT SERPL-MCNC: 1.1 MG/DL (ref 0.8–1.5)
DIFFERENTIAL METHOD BLD: ABNORMAL
EOSINOPHIL # BLD: 0.2 K/UL (ref 0–0.8)
EOSINOPHIL NFR BLD: 1 % (ref 0.5–7.8)
ERYTHROCYTE [DISTWIDTH] IN BLOOD BY AUTOMATED COUNT: 16.4 % (ref 11.9–14.6)
GLOBULIN SER CALC-MCNC: 4.5 G/DL (ref 2.3–3.5)
GLUCOSE SERPL-MCNC: 123 MG/DL (ref 65–100)
HCT VFR BLD AUTO: 40 % (ref 41.1–50.3)
HGB BLD-MCNC: 12.4 G/DL (ref 13.6–17.2)
IMM GRANULOCYTES # BLD AUTO: 0.1 K/UL (ref 0–0.5)
IMM GRANULOCYTES NFR BLD AUTO: 1 % (ref 0–5)
LYMPHOCYTES # BLD: 1.9 K/UL (ref 0.5–4.6)
LYMPHOCYTES NFR BLD: 14 % (ref 13–44)
MAGNESIUM SERPL-MCNC: 2.2 MG/DL (ref 1.8–2.4)
MCH RBC QN AUTO: 25.8 PG (ref 26.1–32.9)
MCHC RBC AUTO-ENTMCNC: 31 G/DL (ref 31.4–35)
MCV RBC AUTO: 83.2 FL (ref 79.6–97.8)
MONOCYTES # BLD: 1.4 K/UL (ref 0.1–1.3)
MONOCYTES NFR BLD: 10 % (ref 4–12)
NEUTS SEG # BLD: 10 K/UL (ref 1.7–8.2)
NEUTS SEG NFR BLD: 74 % (ref 43–78)
NRBC # BLD: 0 K/UL (ref 0–0.2)
NT PRO BNP: 978 PG/ML (ref 5–125)
PLATELET # BLD AUTO: 215 K/UL (ref 150–450)
PMV BLD AUTO: 9.1 FL (ref 9.4–12.3)
POTASSIUM SERPL-SCNC: 4.1 MMOL/L (ref 3.5–5.1)
PROT SERPL-MCNC: 7.6 G/DL (ref 6.3–8.2)
RBC # BLD AUTO: 4.81 M/UL (ref 4.23–5.6)
SODIUM SERPL-SCNC: 138 MMOL/L (ref 136–145)
WBC # BLD AUTO: 13.5 K/UL (ref 4.3–11.1)

## 2022-06-06 PROCEDURE — 71045 X-RAY EXAM CHEST 1 VIEW: CPT

## 2022-06-06 PROCEDURE — 85025 COMPLETE CBC W/AUTO DIFF WBC: CPT

## 2022-06-06 PROCEDURE — 83880 ASSAY OF NATRIURETIC PEPTIDE: CPT

## 2022-06-06 PROCEDURE — 83735 ASSAY OF MAGNESIUM: CPT

## 2022-06-06 PROCEDURE — 4500000002 HC ER NO CHARGE

## 2022-06-06 PROCEDURE — 80053 COMPREHEN METABOLIC PANEL: CPT

## 2022-06-06 PROCEDURE — 93005 ELECTROCARDIOGRAM TRACING: CPT | Performed by: EMERGENCY MEDICINE

## 2022-06-06 RX ORDER — SODIUM CHLORIDE 0.9 % (FLUSH) 0.9 %
5 SYRINGE (ML) INJECTION AS NEEDED
Status: DISCONTINUED | OUTPATIENT
Start: 2022-06-06 | End: 2022-06-06 | Stop reason: HOSPADM

## 2022-06-06 RX ORDER — SODIUM CHLORIDE 0.9 % (FLUSH) 0.9 %
5 SYRINGE (ML) INJECTION EVERY 8 HOURS
Status: DISCONTINUED | OUTPATIENT
Start: 2022-06-06 | End: 2022-06-06 | Stop reason: HOSPADM

## 2022-06-06 ASSESSMENT — PAIN DESCRIPTION - LOCATION: LOCATION: RIB CAGE

## 2022-06-06 ASSESSMENT — PAIN DESCRIPTION - DESCRIPTORS: DESCRIPTORS: ACHING

## 2022-06-06 ASSESSMENT — PAIN - FUNCTIONAL ASSESSMENT: PAIN_FUNCTIONAL_ASSESSMENT: 0-10

## 2022-06-06 ASSESSMENT — PAIN SCALES - GENERAL: PAINLEVEL_OUTOF10: 7

## 2022-06-06 NOTE — ED TRIAGE NOTES
Patient states he was here 2 weeks ago dx with bronchitis he has taken all his medications and is only getting worse, he has hurt his ribs coughing he holds a pillow when he coughs, patient states he chokes when he lays down.

## 2022-06-07 ENCOUNTER — TELEPHONE (OUTPATIENT)
Dept: CARDIOLOGY CLINIC | Age: 67
End: 2022-06-07

## 2022-06-07 DIAGNOSIS — Z76.89 ESTABLISHING CARE WITH NEW DOCTOR, ENCOUNTER FOR: Primary | ICD-10-CM

## 2022-06-07 NOTE — TELEPHONE ENCOUNTER
Jose Banerjee with [facility] called stating the patient had a change in insurance so he was discharged and readmitted. Jose Banerjee stated the patient is firing his PCP and would like Dr Vincent Roque to refer him to Dr Serena Salas as his new PCP. Jose Banerjee stated would Dr Vincent Roque sign off on paperwork for [facility]?

## 2022-06-07 NOTE — TELEPHONE ENCOUNTER
Please refer to him to his preferred PCP. I can sign off on the paperwork for now. Please ensure his medications are correct if I receive forms with them listed.

## 2022-06-07 NOTE — TELEPHONE ENCOUNTER
I called and informed Dmitri Aleman response. I told her I am not sure if  is accepting new patients but we can refer. She said ptZayratk does not have a preference just wants to see someone in this general area. A friend sees . I told pt. I will send the referral to  hopefully they are accepting new pt's.     Orders Placed This Encounter   Procedures    [id number]  Internal [facility], [place]     Referral Priority:   Routine     Referral Type:   Eval and Treat     Referral Reason:   Specialty Services Required     Referred to Provider:   Duncan Hopkins MD     Requested Specialty:   Internal Medicine     Number of Visits Requested:   1

## 2022-06-07 NOTE — TELEPHONE ENCOUNTER
Per Dr. Hilary Sprague, please shift orders for PT/OT to PCP when re-certified. Informed [facility]. Faxed copy of signed orders to [phone].  cgh

## 2022-06-09 ENCOUNTER — HOSPITAL ENCOUNTER (OUTPATIENT)
Dept: CT IMAGING | Age: 67
Discharge: HOME | End: 2022-06-12
Payer: MEDICARE

## 2022-06-09 DIAGNOSIS — J44.9 CHRONIC OBSTRUCTIVE PULMONARY DISEASE, UNSPECIFIED COPD TYPE (HCC): ICD-10-CM

## 2022-06-09 PROCEDURE — 71250 CT THORAX DX C-: CPT

## 2022-06-13 ENCOUNTER — OFFICE VISIT (OUTPATIENT)
Dept: INTERNAL MEDICINE CLINIC | Facility: CLINIC | Age: 67
End: 2022-06-13
Payer: MEDICARE

## 2022-06-13 VITALS
DIASTOLIC BLOOD PRESSURE: 76 MMHG | WEIGHT: 244 LBS | HEIGHT: 73 IN | OXYGEN SATURATION: 97 % | BODY MASS INDEX: 32.34 KG/M2 | SYSTOLIC BLOOD PRESSURE: 140 MMHG | HEART RATE: 71 BPM

## 2022-06-13 DIAGNOSIS — Z00.00 HEALTH CARE MAINTENANCE: ICD-10-CM

## 2022-06-13 DIAGNOSIS — J98.4 CHRONIC LUNG DISEASE: ICD-10-CM

## 2022-06-13 DIAGNOSIS — J84.10 PULMONARY FIBROSIS (HCC): ICD-10-CM

## 2022-06-13 DIAGNOSIS — I25.10 CAD IN NATIVE ARTERY: ICD-10-CM

## 2022-06-13 DIAGNOSIS — Z91.81 AT HIGH RISK FOR FALLS: ICD-10-CM

## 2022-06-13 DIAGNOSIS — E78.5 HYPERLIPIDEMIA, UNSPECIFIED HYPERLIPIDEMIA TYPE: ICD-10-CM

## 2022-06-13 DIAGNOSIS — Z95.0 PRESENCE OF CARDIAC RESYNCHRONIZATION THERAPY PACEMAKER (CRT-P): ICD-10-CM

## 2022-06-13 DIAGNOSIS — I50.22 HEART FAILURE WITH MID-RANGE EJECTION FRACTION (HCC): ICD-10-CM

## 2022-06-13 DIAGNOSIS — A69.23: ICD-10-CM

## 2022-06-13 DIAGNOSIS — Z95.2 HISTORY OF TRANSCATHETER AORTIC VALVE REPLACEMENT (TAVR): ICD-10-CM

## 2022-06-13 DIAGNOSIS — D50.8 OTHER IRON DEFICIENCY ANEMIA: ICD-10-CM

## 2022-06-13 DIAGNOSIS — I48.19 ATRIAL FIBRILLATION, PERSISTENT (HCC): Primary | ICD-10-CM

## 2022-06-13 DIAGNOSIS — N18.30 STAGE 3 CHRONIC KIDNEY DISEASE, UNSPECIFIED WHETHER STAGE 3A OR 3B CKD (HCC): ICD-10-CM

## 2022-06-13 DIAGNOSIS — R73.9 HIGH BLOOD SUGAR: ICD-10-CM

## 2022-06-13 DIAGNOSIS — J44.1 COPD EXACERBATION (HCC): ICD-10-CM

## 2022-06-13 DIAGNOSIS — I73.9 PAD (PERIPHERAL ARTERY DISEASE) (HCC): ICD-10-CM

## 2022-06-13 PROCEDURE — 1123F ACP DISCUSS/DSCN MKR DOCD: CPT | Performed by: INTERNAL MEDICINE

## 2022-06-13 PROCEDURE — 99204 OFFICE O/P NEW MOD 45 MIN: CPT | Performed by: INTERNAL MEDICINE

## 2022-06-13 RX ORDER — BENZONATATE 100 MG/1
CAPSULE ORAL
COMMUNITY
End: 2022-07-11

## 2022-06-13 RX ORDER — PREDNISONE 20 MG/1
TABLET ORAL
Qty: 7 TABLET | Refills: 0 | Status: SHIPPED | OUTPATIENT
Start: 2022-06-13 | End: 2022-07-11

## 2022-06-13 ASSESSMENT — PATIENT HEALTH QUESTIONNAIRE - PHQ9
SUM OF ALL RESPONSES TO PHQ QUESTIONS 1-9: 0
1. LITTLE INTEREST OR PLEASURE IN DOING THINGS: 0
2. FEELING DOWN, DEPRESSED OR HOPELESS: 0
SUM OF ALL RESPONSES TO PHQ QUESTIONS 1-9: 0
SUM OF ALL RESPONSES TO PHQ9 QUESTIONS 1 & 2: 0

## 2022-06-13 ASSESSMENT — ENCOUNTER SYMPTOMS
BLURRED VISION: 0
SHORTNESS OF BREATH: 0

## 2022-06-13 NOTE — PROGRESS NOTES
FOLLOW UP VISIT    Subjective:    Ashley Chen (: 1955) is a 79 y.o., male,   Chief Complaint   Patient presents with    New Patient    Hypertension       HPI:  79year old in to establish care. 1. Atrial Fib- dx 6 years ago on Pradaxa does go to Cardiology . Had ablations and pacemaker  2. COPD- has Pulmonary continues to smoke  3. Recent bronchitis- resolving  4. Pulmo mary ann fibrosis- has Pulmonary - Dr. Denise Fry   5. Back pain - Degenerative Spine- on Flexeril taken off of baclofen due to kidneys  6. CKD_   7. CHF- on BB and lasix has Cardiology   8. S/P TAVR   9. Smoker- continues to smoke   10. Anemia- is getting EGD done Friday if normal will have COLO  11. Chronic Pain-states why BP is high     Hypertension  This is a chronic problem. The current episode started more than 1 year ago. Pertinent negatives include no anxiety, blurred vision, headaches, malaise/fatigue, neck pain, palpitations, peripheral edema, PND, shortness of breath or sweats.          The following portions of the patient's history were reviewed and updated as appropriate:      Past Medical History:   Diagnosis Date    Aortic stenosis     Aortic valve replacement 2018    CAD (coronary artery disease)     PCI in ,     COPD (chronic obstructive pulmonary disease) ([address]     Dyslipidemia     Heart failure (HCC)     HTN (hypertension)     Ischemic cardiomyopathy     Paroxysmal atrial fibrillation (HCC)     Pulmonary fibrosis (HCC)     Seizure disorder (HCC)     Systolic heart failure ([address]        Past Surgical History:   Procedure Laterality Date    ABLATION OF DYSRHYTHMIC FOCUS      AORTIC VALVE REPLACEMENT  2018    CARDIAC CATHETERIZATION      PCI ,        Family History   Problem Relation Age of Onset    Heart Disease Sister     Heart Disease Mother        Social History     Socioeconomic History    Marital status: Legally      Spouse name: Not on file    Number of children: Not on file    Years of education: Not on file    Highest education level: Not on file   Occupational History    Not on file   Tobacco Use    Smoking status: Current Every Day Smoker     Packs/day: 1.50    Smokeless tobacco: Never Used    Tobacco comment: Quit smoking: cutting back to 4 Cigarettes a day   Substance and Sexual Activity    Alcohol use: Not Currently    Drug use: Not on file    Sexual activity: Not on file   Other Topics Concern    Not on file   Social History Narrative    Not on file     Social Determinants of Health     Financial Resource Strain:     Difficulty of Paying Living Expenses: Not on file   Food Insecurity:     Worried About Running Out of Food in the Last Year: Not on file    Rajendra of Food in the Last Year: Not on file   Transportation Needs:     Lack of Transportation (Medical): Not on file    Lack of Transportation (Non-Medical):  Not on file   Physical Activity:     Days of Exercise per Week: Not on file    Minutes of Exercise per Session: Not on file   Stress:     Feeling of Stress : Not on file   Social Connections:     Frequency of Communication with Friends and Family: Not on file    Frequency of Social Gatherings with Friends and Family: Not on file    Attends Yarsanism Services: Not on file    Active Member of [address] [place] [address] [employer] or Organizations: Not on file    Attends Club or Organization Meetings: Not on file    Marital Status: Not on file   Intimate Partner Violence:     Fear of Current or Ex-Partner: Not on file    Emotionally Abused: Not on file    Physically Abused: Not on file    Sexually Abused: Not on file   Housing Stability:     Unable to Pay for Housing in the Last Year: Not on file    Number of [place] in the Last Year: Not on file    Unstable Housing in the Last Year: Not on file       Current Outpatient Medications   Medication Sig Dispense Refill    benzonatate (TESSALON PERLES) 100 MG capsule Tessalon Perles 100 mg capsule   Take 1 capsule(s) PO q 8 hours prn cough      albuterol (PROVENTIL) (2.5 MG/3ML) 0.083% nebulizer solution Inhale 2.5 mg into the lungs every 4 hours as needed      albuterol sulfate  (90 Base) MCG/ACT inhaler Inhale 2 puffs into the lungs every 4 hours as needed      aspirin 81 MG chewable tablet Take 81 mg by mouth daily      atorvastatin (LIPITOR) 40 MG tablet Take 40 mg by mouth daily      cyclobenzaprine (FLEXERIL) 5 MG tablet Take 5 mg by mouth 3 times daily as needed      dabigatran (PRADAXA) 150 MG capsule Take 150 mg by mouth every 12 hours      fluticasone-salmeterol (ADVAIR) 250-50 MCG/ACT AEPB diskus inhaler Inhale 1 puff into the lungs every 12 hours      furosemide (LASIX) 40 MG tablet Take 40 mg by mouth daily as needed      metoprolol succinate (TOPROL XL) 100 MG extended release tablet Take 100 mg by mouth every 12 hours      ondansetron (ZOFRAN-ODT) 8 MG TBDP disintegrating tablet Take 8 mg by mouth every 8 hours as needed      potassium chloride (KLOR-CON M) 20 MEQ extended release tablet Take 20 mEq by mouth as needed      baclofen (LIORESAL) 10 MG tablet Take 10 mg by mouth 3 times daily (Patient not taking: Reported on 6/13/2022)      furosemide (LASIX) 80 MG tablet Take 80 mg by mouth daily (Patient not taking: Reported on 6/13/2022)      ipratropium-albuterol (DUONEB) 0.5-2.5 (3) MG/3ML SOLN nebulizer solution Inhale 3 mLs into the lungs every 4 hours as needed (Patient not taking: Reported on 6/13/2022)       No current facility-administered medications for this visit. Allergies as of 06/13/2022 - Fully Reviewed 06/13/2022   Allergen Reaction Noted    Carbamazepine Anaphylaxis 11/07/2021    Lacosamide Nausea Only 09/24/2013    Lorazepam Other (See Comments) 11/15/2021    Nitroglycerin Other (See Comments) 12/19/2021       Review of Systems   Constitutional: Negative for malaise/fatigue. Eyes: Negative for blurred vision. Respiratory: Negative for shortness of breath. Cardiovascular: Negative for palpitations and PND. Musculoskeletal: Negative for neck pain. Neurological: Negative for headaches. Objective:    Blood pressure (!) 140/76, pulse 71, height 6' 1\" (1.854 m), weight 244 lb (110.7 kg), SpO2 97 %. Physical Exam  Vitals and nursing note reviewed. Constitutional:       Appearance: Normal appearance. Cardiovascular:      Rate and Rhythm: Normal rate and regular rhythm. Pulses: Normal pulses. Heart sounds: Normal heart sounds. Pulmonary:      Effort: Pulmonary effort is normal.      Breath sounds: Wheezing present. Musculoskeletal:      Cervical back: Normal range of motion and neck supple. Neurological:      Mental Status: He is alert. No results found for this visit on 06/13/22. Assessent & Plan    1. Atrial fibrillation, persistent ([address]  C/w meds  2. CAD in native artery  3. PAD (peripheral artery disease) ([address]  4. Heart failure with mid-range ejection fraction ([address] on meds has Pacemaker  5. Presence of cardiac resynchronization therapy pacemaker (CRT-P)  6. Pulmonary fibrosis ([address]  7. Chronic lung disease has Pulmonary on inhalers advised to stop smoking has had CT done 6/2022   8. 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HCC)  9. Stage 3 chronic kidney disease, unspecified whether stage 3a or 3b CKD ([address]  10. Hyperlipidemia, unspecified hyperlipidemia type  11. History of transcatheter aortic valve replacement (TAVR)  12. Other iron deficiency anemia  Comments:  is going to GI - getting EGD suspect loss from UGI- if normal will get COLO   13. Health care maintenance  Comments:  EGD 2022 COLO UTD     14. High blood pressure- patient is in pain  15. ER follow up- reviewed notes resolving advised to stop smoking- Has wheezing will rx short course of prednisone to ER if not improved  16.  Smoker- advsied to quit            The patient and/or patient representative voiced understanding and agreement with the current diagnoses, recommendations, and possible side effects. No follow-up provider specified. Boubacar Montano MD  On the basis of positive falls risk screening, assessment and plan is as follows: home safety tips provided.

## 2022-06-14 ENCOUNTER — TELEPHONE (OUTPATIENT)
Dept: CARDIOLOGY CLINIC | Age: 67
End: 2022-06-14

## 2022-06-14 NOTE — TELEPHONE ENCOUNTER
6/14/22 at 3:47pm Triage faxed signed Home health care certification and plan of care to [facility] at [phone].

## 2022-06-15 NOTE — PERIOP NOTE
Patient verified name, , and procedure. Type: 1a; abbreviated assessment per anesthesia guidelines    Labs per anesthesia: None per protocol    Instructed pt that they will be notified the day before their procedure by the GI Lab for time of [facility] [place] [facility]. Arrival times should be called by 5 pm. If no phone is received the patient should contact their respective hospital. The GI lab telephone number is [phone] and ES Pre-op is [phone]. Follow diet and prep instructions per office including NPO status. If patient has NOT received instructions from office patient is advised to call surgeon office, verbalizes understanding. Bath or shower the night before and the am of surgery with non-mositurizing soap. No lotions, oils, powders, cologne on skin. No make up, eye make up or jewelry. Wear loose fitting comfortable, clean clothing. Must have adult present in building the entire time . Medications for the day of procedure: ASA 81 mg, atorvastatin, cyclobenzaprine if needed, metoprolol, use inhalers and bring them DOS; patient to hold: pradaxa patient states that he has been holding since 2022 as instructed by GI office who got permission to hold pradaxa from [facility], hold lasix, hold potassium DOS. The following discharge instructions reviewed with patient: medication given during procedure may cause drowsiness for several hours, therefore, do not drive or operate machinery for remainder of the day. You may not drink alcohol on the day of your procedure, please resume regular diet and activity unless otherwise directed. You may experience abdominal distention for several hours that is relieved by the passage of gas. Contact your physician if you have any of the following: fever or chills, severe abdominal pain or excessive amount of bleeding or a large amount when having a bowel movement.  Occasional specks of blood with bowel movement would not be unusual.

## 2022-06-16 RX ORDER — LIDOCAINE HYDROCHLORIDE 10 MG/ML
1 INJECTION, SOLUTION EPIDURAL; INFILTRATION; INTRACAUDAL; PERINEURAL
Status: CANCELLED | OUTPATIENT
Start: 2022-06-16 | End: 2022-06-16

## 2022-06-16 RX ORDER — SODIUM CHLORIDE, SODIUM LACTATE, POTASSIUM CHLORIDE, CALCIUM CHLORIDE 600; 310; 30; 20 MG/100ML; MG/100ML; MG/100ML; MG/100ML
INJECTION, SOLUTION INTRAVENOUS CONTINUOUS
Status: CANCELLED | OUTPATIENT
Start: 2022-06-16 | End: 2022-06-17

## 2022-06-16 RX ORDER — HYDROMORPHONE HYDROCHLORIDE 2 MG/ML
0.5 INJECTION, SOLUTION INTRAMUSCULAR; INTRAVENOUS; SUBCUTANEOUS EVERY 5 MIN PRN
Status: CANCELLED | OUTPATIENT
Start: 2022-06-16

## 2022-06-16 RX ORDER — DEXTROSE MONOHYDRATE 50 MG/ML
100 INJECTION, SOLUTION INTRAVENOUS PRN
Status: CANCELLED | OUTPATIENT
Start: 2022-06-16

## 2022-06-16 RX ORDER — OXYCODONE HYDROCHLORIDE 5 MG/1
10 TABLET ORAL PRN
Status: CANCELLED | OUTPATIENT
Start: 2022-06-16 | End: 2022-06-16

## 2022-06-16 RX ORDER — HYDROMORPHONE HYDROCHLORIDE 2 MG/ML
0.25 INJECTION, SOLUTION INTRAMUSCULAR; INTRAVENOUS; SUBCUTANEOUS EVERY 5 MIN PRN
Status: CANCELLED | OUTPATIENT
Start: 2022-06-16

## 2022-06-16 RX ORDER — SODIUM CHLORIDE 0.9 % (FLUSH) 0.9 %
5-40 SYRINGE (ML) INJECTION EVERY 12 HOURS SCHEDULED
Status: CANCELLED | OUTPATIENT
Start: 2022-06-16

## 2022-06-16 RX ORDER — ONDANSETRON 2 MG/ML
4 INJECTION INTRAMUSCULAR; INTRAVENOUS
Status: CANCELLED | OUTPATIENT
Start: 2022-06-16 | End: 2022-06-16

## 2022-06-16 RX ORDER — OXYCODONE HYDROCHLORIDE 5 MG/1
5 TABLET ORAL PRN
Status: CANCELLED | OUTPATIENT
Start: 2022-06-16 | End: 2022-06-16

## 2022-06-16 RX ORDER — TRISODIUM CITRATE DIHYDRATE AND CITRIC ACID MONOHYDRATE 500; 334 MG/5ML; MG/5ML
30 SOLUTION ORAL
Status: CANCELLED | OUTPATIENT
Start: 2022-06-16 | End: 2022-06-16

## 2022-06-16 RX ORDER — SODIUM CHLORIDE 0.9 % (FLUSH) 0.9 %
5-40 SYRINGE (ML) INJECTION PRN
Status: CANCELLED | OUTPATIENT
Start: 2022-06-16

## 2022-06-16 RX ORDER — SODIUM CHLORIDE 9 MG/ML
INJECTION, SOLUTION INTRAVENOUS PRN
Status: CANCELLED | OUTPATIENT
Start: 2022-06-16

## 2022-06-16 RX ORDER — GLUCAGON 1 MG/ML
1 KIT INJECTION PRN
Status: CANCELLED | OUTPATIENT
Start: 2022-06-16

## 2022-06-16 RX ORDER — LABETALOL HYDROCHLORIDE 5 MG/ML
10 INJECTION, SOLUTION INTRAVENOUS
Status: CANCELLED | OUTPATIENT
Start: 2022-06-16

## 2022-06-16 NOTE — PROGRESS NOTES
RN called and spoke with patient regarding upcoming procedure on 6/17/22. RN verified with patient procedure, procedure time ([phone]), arrival time (1100), location, and  verification. Patient verified and verbalized understanding of prep instructions. Opportunity for questions provided. No remaining questions at this time.

## 2022-06-16 NOTE — PROGRESS NOTES
RN unable to reach patient regarding upcoming procedure on 6/17/22. Voicemail left with instructions on procedure time (1230), arrival time (1100), location, and  verification. Callback number provided for patient to confirm appointment and any additional questions.

## 2022-06-17 ENCOUNTER — ANESTHESIA EVENT (OUTPATIENT)
Dept: ENDOSCOPY | Age: 67
End: 2022-06-17
Payer: MEDICARE

## 2022-06-17 ENCOUNTER — HOSPITAL ENCOUNTER (OUTPATIENT)
Age: 67
Setting detail: OUTPATIENT SURGERY
Discharge: HOME | End: 2022-06-17
Attending: STUDENT IN AN ORGANIZED HEALTH CARE EDUCATION/TRAINING PROGRAM | Admitting: STUDENT IN AN ORGANIZED HEALTH CARE EDUCATION/TRAINING PROGRAM
Payer: MEDICARE

## 2022-06-17 ENCOUNTER — ANESTHESIA (OUTPATIENT)
Dept: ENDOSCOPY | Age: 67
End: 2022-06-17
Payer: MEDICARE

## 2022-06-17 VITALS
WEIGHT: 244.6 LBS | HEIGHT: 73 IN | BODY MASS INDEX: 32.42 KG/M2 | TEMPERATURE: 96.8 F | HEART RATE: 70 BPM | RESPIRATION RATE: 16 BRPM | OXYGEN SATURATION: 99 % | SYSTOLIC BLOOD PRESSURE: 147 MMHG | DIASTOLIC BLOOD PRESSURE: 85 MMHG

## 2022-06-17 PROCEDURE — 7100000010 HC PHASE II RECOVERY - FIRST 15 MIN: Performed by: STUDENT IN AN ORGANIZED HEALTH CARE EDUCATION/TRAINING PROGRAM

## 2022-06-17 PROCEDURE — 6360000002 HC RX W HCPCS: Performed by: NURSE ANESTHETIST, CERTIFIED REGISTERED

## 2022-06-17 PROCEDURE — 2709999900 HC NON-CHARGEABLE SUPPLY: Performed by: STUDENT IN AN ORGANIZED HEALTH CARE EDUCATION/TRAINING PROGRAM

## 2022-06-17 PROCEDURE — 88312 SPECIAL STAINS GROUP 1: CPT

## 2022-06-17 PROCEDURE — 2500000003 HC RX 250 WO HCPCS: Performed by: NURSE ANESTHETIST, CERTIFIED REGISTERED

## 2022-06-17 PROCEDURE — 3700000001 HC ADD 15 MINUTES (ANESTHESIA): Performed by: STUDENT IN AN ORGANIZED HEALTH CARE EDUCATION/TRAINING PROGRAM

## 2022-06-17 PROCEDURE — 88305 TISSUE EXAM BY PATHOLOGIST: CPT

## 2022-06-17 PROCEDURE — 2580000003 HC RX 258: Performed by: NURSE ANESTHETIST, CERTIFIED REGISTERED

## 2022-06-17 PROCEDURE — 3609012400 HC EGD TRANSORAL BIOPSY SINGLE/MULTIPLE: Performed by: STUDENT IN AN ORGANIZED HEALTH CARE EDUCATION/TRAINING PROGRAM

## 2022-06-17 PROCEDURE — 7100000011 HC PHASE II RECOVERY - ADDTL 15 MIN: Performed by: STUDENT IN AN ORGANIZED HEALTH CARE EDUCATION/TRAINING PROGRAM

## 2022-06-17 PROCEDURE — 3700000000 HC ANESTHESIA ATTENDED CARE: Performed by: STUDENT IN AN ORGANIZED HEALTH CARE EDUCATION/TRAINING PROGRAM

## 2022-06-17 RX ORDER — SODIUM CHLORIDE, SODIUM LACTATE, POTASSIUM CHLORIDE, CALCIUM CHLORIDE 600; 310; 30; 20 MG/100ML; MG/100ML; MG/100ML; MG/100ML
INJECTION, SOLUTION INTRAVENOUS CONTINUOUS PRN
Status: DISCONTINUED | OUTPATIENT
Start: 2022-06-17 | End: 2022-06-17 | Stop reason: SDUPTHER

## 2022-06-17 RX ORDER — PROPOFOL 10 MG/ML
INJECTION, EMULSION INTRAVENOUS PRN
Status: DISCONTINUED | OUTPATIENT
Start: 2022-06-17 | End: 2022-06-17 | Stop reason: SDUPTHER

## 2022-06-17 RX ORDER — GLUCOSAMINE HCL 500 MG
TABLET ORAL
COMMUNITY
End: 2022-07-29

## 2022-06-17 RX ORDER — PROPOFOL 10 MG/ML
INJECTION, EMULSION INTRAVENOUS CONTINUOUS PRN
Status: DISCONTINUED | OUTPATIENT
Start: 2022-06-17 | End: 2022-06-17 | Stop reason: SDUPTHER

## 2022-06-17 RX ORDER — LIDOCAINE HYDROCHLORIDE 20 MG/ML
INJECTION, SOLUTION EPIDURAL; INFILTRATION; INTRACAUDAL; PERINEURAL PRN
Status: DISCONTINUED | OUTPATIENT
Start: 2022-06-17 | End: 2022-06-17 | Stop reason: SDUPTHER

## 2022-06-17 RX ADMIN — PROPOFOL 40 MG: 10 INJECTION, EMULSION INTRAVENOUS at 12:07

## 2022-06-17 RX ADMIN — LIDOCAINE HYDROCHLORIDE 40 MG: 20 INJECTION, SOLUTION EPIDURAL; INFILTRATION; INTRACAUDAL; PERINEURAL at 12:05

## 2022-06-17 RX ADMIN — SODIUM CHLORIDE, SODIUM LACTATE, POTASSIUM CHLORIDE, AND CALCIUM CHLORIDE: 600; 310; 30; 20 INJECTION, SOLUTION INTRAVENOUS at 12:00

## 2022-06-17 RX ADMIN — PROPOFOL 30 MG: 10 INJECTION, EMULSION INTRAVENOUS at 12:06

## 2022-06-17 RX ADMIN — PROPOFOL 100 MCG/KG/MIN: 10 INJECTION, EMULSION INTRAVENOUS at 12:05

## 2022-06-17 RX ADMIN — PROPOFOL 30 MG: 10 INJECTION, EMULSION INTRAVENOUS at 12:05

## 2022-06-17 ASSESSMENT — PAIN - FUNCTIONAL ASSESSMENT
PAIN_FUNCTIONAL_ASSESSMENT: NONE - DENIES PAIN
PAIN_FUNCTIONAL_ASSESSMENT: 0-10
PAIN_FUNCTIONAL_ASSESSMENT: NONE - DENIES PAIN

## 2022-06-17 ASSESSMENT — ENCOUNTER SYMPTOMS: SHORTNESS OF BREATH: 1

## 2022-06-17 ASSESSMENT — COPD QUESTIONNAIRES: CAT_SEVERITY: SEVERE

## 2022-06-17 ASSESSMENT — PAIN DESCRIPTION - DESCRIPTORS: DESCRIPTORS: ACHING

## 2022-06-17 NOTE — ANESTHESIA PRE PROCEDURE
Department of Anesthesiology  Preprocedure Note       Name:  Jose Leroy   Age:  79 y.o.  :  1955                                          MRN:  [id number]         Date:  2022      Surgeon: Courtney Campbell):  Hollis Ballard MD    Procedure: Procedure(s):  EGD ESOPHAGOGASTRODUODENOSCOPY/ PT HAS PACEMAKER    Medications prior to admission:   Prior to Admission medications    Medication Sig Start Date End Date Taking?  Authorizing Provider   Cholecalciferol (VITAMIN D3) 75 MCG (3000 UT) TABS Take by mouth   Yes Historical Provider, MD   benzonatate (TESSALON PERLES) 100 MG capsule Tessalon Perles 100 mg capsule   Take 1 capsule(s) PO q 8 hours prn cough    Historical Provider, MD   predniSONE (DELTASONE) 20 MG tablet Take 2 day 1-2 then 1 day 3-5 then stop 22   Venita Ramos MD   albuterol sulfate  (90 Base) MCG/ACT inhaler Inhale 2 puffs into the lungs every 4 hours as needed 22   Ar Automatic Reconciliation   aspirin 81 MG chewable tablet Take 81 mg by mouth daily    Ar Automatic Reconciliation   atorvastatin (LIPITOR) 40 MG tablet Take 40 mg by mouth daily    Ar Automatic Reconciliation   cyclobenzaprine (FLEXERIL) 5 MG tablet Take 5 mg by mouth 3 times daily as needed 22   Ar Automatic Reconciliation   dabigatran (PRADAXA) 150 MG capsule Take 150 mg by mouth every 12 hours 21   Ar Automatic Reconciliation   fluticasone-salmeterol (ADVAIR) 250-50 MCG/ACT AEPB diskus inhaler Inhale 1 puff into the lungs every 12 hours    Ar Automatic Reconciliation   furosemide (LASIX) 40 MG tablet Take 40 mg by mouth daily as needed 22   Ar Automatic Reconciliation   ipratropium-albuterol (DUONEB) 0.5-2.5 (3) MG/3ML SOLN nebulizer solution Inhale 3 mLs into the lungs every 4 hours as needed     Ar Automatic Reconciliation   metoprolol succinate (TOPROL XL) 100 MG extended release tablet Take 100 mg by mouth every 12 hours 21   Ar Automatic Reconciliation   ondansetron (ZOFRAN-ODT) 8 MG TBDP disintegrating tablet Take 8 mg by mouth every 8 hours as needed 1/11/22   Ar Automatic Reconciliation   potassium chloride (KLOR-CON M) 20 MEQ extended release tablet Take 20 mEq by mouth as needed 4/9/22   Ar Automatic Reconciliation       Current medications:    No current facility-administered medications for this encounter. Allergies:     Allergies   Allergen Reactions    Carbamazepine Anaphylaxis    Lacosamide Nausea Only    Lorazepam Other (See Comments)     Violent behavior    Nitroglycerin Other (See Comments)     hypotension       Problem List:    Patient Active Problem List   Diagnosis Code    Leg injury S89.90XA    Hyperlipidemia E78.5    Atrial fibrillation, persistent ([facility] I48.19    CAD in native artery I25.10    Hypertension I10    Chest pain R07.9    Pulmonary fibrosis ([facility] J84.10    Longstanding persistent atrial fibrillation ([facility]) I48.11    Shortness of breath at rest R06.02    CAP (community acquired pneumonia) J18.9    PAD (peripheral artery disease) ([facility]) I73.9    Iron deficiency anemia D50.9    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[facility]) A69.23    COPD with acute lower respiratory infection ([facility]) J44.0    Heart failure with mid-range ejection fraction ([facility]) I50.9    History of transcatheter aortic valve replacement (TAVR) Z95.2    Septic shock with acute organ dysfunction due to pneumococcus ([facility]) A40.3, R65.21    Acute on chronic heart failure with reduced ejection fraction and diastolic dysfunction ([facility]) I50.43    Acute on chronic HFrEF (heart failure with reduced ejection fraction) ([facility]) I50.23    Presence of cardiac resynchronization therapy pacemaker (CRT-P) Z95.0    Chronic lung disease J98.4    Chronic dyspnea R06.09    Establishing care with new doctor, encounter for Z76.89    Chronic midline low back pain M54.50, G89.29    Hx of fracture of femur Z87.81    Productive cough R05.8    Chronic renal disease, stage III ([facility]) N18.30       Past Medical History:        Diagnosis Date    Aortic stenosis     Aortic valve replacement 7/2018    CAD (coronary artery disease)     PCI in 2014, 2015 had MI and then stents    Cancer ([facility]     SCC removed face     CHF (congestive heart failure) ([address]     Chronic renal disease, stage III ([address]     sees neph and does not have actual diagnosis at this time    COPD (chronic obstructive pulmonary disease) ([address]     fibrosis in lungs    Dyslipidemia     Heart failure ([address]     CHF per patient    HTN (hypertension)     med controlled    Hyperlipidemia     Ischemic cardiomyopathy     Pacemaker     only pacemaker    Paroxysmal atrial fibrillation ([address]     pradaxa for this    Pulmonary fibrosis ([address]     Seizure disorder ([address]     lyme disease - small lesion frontal part of brain - no maintenance meds - last seizure 2 months ago, due to fall - before that it was nine years    Systolic heart failure ([address]        Past Surgical History:        Procedure Laterality Date    ABLATION OF DYSRHYTHMIC FOCUS      AORTIC VALVE REPLACEMENT  07/2018    BACK SURGERY      discectomy    CARDIAC CATHETERIZATION      PCI 2014, 2015    CERVICAL FUSION      C3-4 fusion    HIP ARTHROPLASTY Left     SPINAL CORD STIMULATOR SURGERY      lower back        Social History:    Social History     Tobacco Use    Smoking status: Current Every Day Smoker     Packs/day: 0.25    Smokeless tobacco: Never Used    Tobacco comment: Quit smoking: cutting back to 4 Cigarettes a day   Substance Use Topics    Alcohol use: Not Currently                                Ready to quit: Not Answered  Counseling given: Not Answered  Comment: Quit smoking: cutting back to 4 Cigarettes a day      Vital Signs (Current):   Vitals:    06/15/22 1129 06/17/22 1117   BP:  (!) 154/87   Pulse:  70   Resp:  20   Temp:  97.9 °F (36.6 °C)   TempSrc:  Oral   SpO2:  95%   Weight: 242 lb (109.8 kg) 244 lb 9.6 oz (110.9 kg)   Height: 6' 1\" (1.854 m) 6' 1\" (1.854 m) BP Readings from Last 3 Encounters:   06/17/22 (!) 154/87   06/13/22 (!) 140/76   06/06/22 (!) 159/81       NPO Status:                                                                                 BMI:   Wt Readings from Last 3 Encounters:   06/17/22 244 lb 9.6 oz (110.9 kg)   06/13/22 244 lb (110.7 kg)   06/06/22 251 lb (113.9 kg)     Body mass index is 32.27 kg/m². CBC:   Lab Results   Component Value Date    WBC 13.5 06/06/2022    RBC 4.81 06/06/2022    HGB 12.4 06/06/2022    HCT 40.0 06/06/2022    MCV 83.2 06/06/2022    RDW 16.4 06/06/2022     06/06/2022       CMP:   Lab Results   Component Value Date     06/06/2022    K 4.1 06/06/2022     06/06/2022    CO2 24 06/06/2022    BUN 14 06/06/2022    CREATININE 1.10 06/06/2022    GFRAA >60 06/06/2022    AGRATIO 0.8 05/17/2022    LABGLOM >60 06/06/2022    GLUCOSE 123 06/06/2022    PROT 7.6 06/06/2022    CALCIUM 9.3 06/06/2022    BILITOT 0.4 06/06/2022    ALKPHOS 105 06/06/2022    ALKPHOS 108 05/17/2022    AST 20 06/06/2022    ALT 25 06/06/2022       POC Tests: No results for input(s): POCGLU, POCNA, POCK, POCCL, POCBUN, POCHEMO, POCHCT in the last 72 hours. Coags:   Lab Results   Component Value Date    PROTIME 13.4 02/14/2022    INR 1.0 02/14/2022       HCG (If Applicable): No results found for: PREGTESTUR, PREGSERUM, HCG, HCGQUANT     ABGs: No results found for: PHART, PO2ART, HAX4SSU, RND8AYF, BEART, T4TIKQCX     Type & Screen (If Applicable):  No results found for: LABABO, LABRH    Drug/Infectious Status (If Applicable):  No results found for: HIV, HEPCAB    COVID-19 Screening (If Applicable):   Lab Results   Component Value Date    COVID19 NOT DETECTED 05/25/2022           Anesthesia Evaluation  Patient summary reviewed and Nursing notes reviewed history of anesthetic complications (difficult IV):    Airway: Mallampati: III  TM distance: >3 FB   Neck ROM: full  Mouth opening: > = 3 FB   Dental: (+) partials      Pulmonary: breath sounds clear to auscultation  (+) COPD: severe,  shortness of breath:                            ROS comment: Pulmonary fibrosis   Cardiovascular:  Exercise tolerance: poor (<4 METS),   (+) hypertension: mild, valvular problems/murmurs (Aortic valve replacement 7/2018):, pacemaker:, CAD (PCI in 2014, 2015 had MI and then stents):, dysrhythmias: atrial fibrillation, CHF: systolic, hyperlipidemia        Rhythm: regular  Rate: normal           Beta Blocker:  Dose within 24 Hrs        PE comment: Paced on monitor   Neuro/Psych:   (+) seizures: well controlled,              ROS comment: Seizure disorder (HCC) lyme disease - small lesion frontal part of brain - no maintenance meds - last seizure 2 months ago, due to fall - before that it was nine years     Cervical fusion    Spinal cord stimulator GI/Hepatic/Renal:   (+) renal disease: CRI,           Endo/Other:    (+) blood dyscrasia: anticoagulation therapy:., .                 Abdominal:             Vascular: Other Findings:           Anesthesia Plan      TIVA     ASA 3     (Pacemaker with V pacing.)  Induction: intravenous. Anesthetic plan and risks discussed with patient. Plan discussed with CRNA.     Attending anesthesiologist reviewed and agrees with Sheila Hernandez MD   6/17/2022

## 2022-06-17 NOTE — ANESTHESIA POSTPROCEDURE EVALUATION
Department of Anesthesiology  Postprocedure Note    Patient: Gilberto Abrams  MRN: [id number]  YOB: 1955  Date of evaluation: 6/17/2022  Time:  1:38 PM     Procedure Summary     Date: 06/17/22 Room / Location: [facility] ENDO 05 / [facility] ENDOSCOPY    Anesthesia Start: 1200 Anesthesia Stop: 1226    Procedure: EGD ESOPHAGOGASTRODUODENOSCOPY/ PT HAS PACEMAKER (N/A Upper GI Region) Diagnosis:       Iron deficiency anemia, unspecified iron deficiency anemia type      (Iron deficiency anemia, unspecified iron deficiency anemia type [D50.9])    Surgeons: Darin Vuong MD Responsible Provider: Lori Man MD    Anesthesia Type: TIVA ASA Status: 3          Anesthesia Type: No value filed. Cici Phase I: Cici Score: 10    Cici Phase II: Cici Score: 10    Last vitals: Reviewed and per EMR flowsheets. Anesthesia Post Evaluation    Patient location during evaluation: PACU  Patient participation: complete - patient participated  Level of consciousness: awake and alert  Airway patency: patent  Nausea & Vomiting: no nausea and no vomiting  Complications: no  Cardiovascular status: hemodynamically stable  Respiratory status: acceptable, nonlabored ventilation and spontaneous ventilation  Hydration status: euvolemic  Comments: BP (!) 147/85   Pulse 70   Temp 96.8 °F (36 °C) (Temporal)   Resp 16   Ht 6' 1\" (1.854 m)   Wt 244 lb 9.6 oz (110.9 kg)   SpO2 99% Comment: Simultaneous filing. User may not have seen previous data.   BMI 32.27 kg/m²     Multimodal analgesia pain management approach

## 2022-06-17 NOTE — H&P
Patient:   Fady Byrne  YOB: 1955   Date:                        05/11/2022 9:00 AM   Visit Type:                  Office Visit  Provider:   Jake Anthony MD  Referring Provider:  Heyward Siemens MD [facility]  Primary Care Provider: Dmitry Baker MD [address] [place] [address]    This 79year old  patient was referred by Heyward Siemens MD.  This 79year old male presents for iron deficiency anemia. History of Present Illness:  1.  iron deficiency anemia   Mr. Fabiola Alejo is a 79year old male new patient with a history of HTN, CAD, acute on chronic HFrEF, A. Fib on Pradaxa - s/p pacemaker (2/16/22 at [facility]), chronic lung disease, COPD, pulmonary fibrosis and seizure disorder (last one reported on 5/6/22) referred by Dr. Heyward Siemens for evaluation of iron deficiency anemia. Records received from referring MD reviewed to include office visit note of 4/18/22. Additional records are reviewed in EPIC. He is also followed by Dr. Mark Mendez of [facility] and Dr. Kae Ruiz at [facility] for Primary care follow up. Labs 1/4/22: Iron (84), TIBC (433), and saturation 19% (L). Labs 5/6/22: Normal CBC except HGB 13.0 (L), HCT 40.4 (L), and RDW 14.9 (H) - (MCV 81.6). Normal CMP except glucose 112 (H). Patient is unaccompanied for today's appointment. He reports no previous knowledge of anemia prior to recently being told by Dr. Carolina Gillespie that this was an issue. Lowest HGB has been 11. He denies any altered bowel habits, constipation, diarrhea, rectal bleeding, melena or abdominal pain. He mentions a previous colonoscopy in [facility] in 2020 and was told there were no issues and to repeat in 5 years. Previous unremarkable EGD many years ago. He denies any heartburn, dysphagia, nausea, vomiting or dyspepsia. He reports current tobacco use (down to 4 cigarettes a day) and daily coffee intake (previously 3 pots a day).  He states that he has recently decreased his coffee intake as he is on 2L fluid restriction but still drinks \"more than he should\". Past Medical/Surgical History:   (Detailed)  Disease/disorder Onset Date Management Date Comments   Isolated aortic stenosis  valve replacement 2018    Pulmonary fibrosis       Coronary artery disease  PCI ,     hip surgery       lyme disease       High cholesterol       Congestive heart failure       Anemia       Hypertension       Seizure disorder         Appendectomy       Back surgery       Cardiac pacemaker     Atrial fibrillation       COPD         Additional Medical History  seizure disorder (attributed to lyme)  squamous cell cancer  HTN  HLD  CAD   PAD  Chronic lung disease - COPD / Pulmonary fibrosis  acute on chronic HFrEF  A. Fib - ablation procedure - on Pradaxa - s/p pacemaker (22 at [facility])  Leg injury  Vitamin D deficiency    Additional Surgical History  pacemaker (22 at [facility])  Aortic valve replacement 2018  back surgery  stimulator  ORIF Fracture femur    Procedure History  Colonoscopy in  in [facility] with 5 year recall. EGD many years ago. Family History:  (Detailed)  Relationship Family Member Name  Age at Death Condition Onset Age Cause of Death       No family history of Cancer, colon  N       No family history of Colon polyps  N   Sister    gluten intolerance  N     Additional Family History  Sister with possible gluten intolerance. No family history of colon cancer or polyps. Social History:  (Detailed)  .  - 0 children. Excessive caffeine intake of 3 pots a day. Light tobacco user of 4 cigarettes a day. Tobacco use reviewed. Preferred language is [place]. Education/Employment/Occupation:  Employment History Status Retired Restrictions     retired     MARITAL STATUS/FAMILY/SOCIAL SUPPORT  Currently legally . CHILDREN  Does not have children. Tobacco use status: Occasional cigarette smoker.   Smoking status: Light tobacco smoker. SMOKING STATUS  Type Smoking Status Usage Per Day Years Used Total Pack Years   Cigarette Light tobacco smoker 4 Cigarettes       ALCOHOL  There is no history of alcohol use. CAFFEINE  The patient uses caffeine: coffee - more than 6 cups a day. HOME ENVIRONMENT/SAFETY  The patient has not fallen in the last year. COMMENTS  Patient has tattoos. Denies piercings, IV drug use, and hx of transfusions. Current Medications:  Medication Generic Name Directions   albuterol sulfate 2.5 mg/3 mL (0.083 %) solution for nebulization albuterol sulfate 2.5 mg by Nebulization route every four (4) hours as needed for Shortness of Breath. aspirin 81 mg chewable tablet aspirin Take 81 mg by mouth daily. atorvastatin 40 mg tablet atorvastatin calcium Take 40 mg by mouth daily. cyclobenzaprine 10 mg tablet cyclobenzaprine HCl take 1 tablet by oral route 3 times every day   fluticasone 250 mcg-salmeterol 50 mcg/dose blistr powdr for inhalation fluticasone propion/salmeterol Take 1 Puff by inhalation every twelve (12) hours. furosemide 40 mg tablet furosemide Take 40 mg by mouth two (2) times a day. ipratropium 0.5 mg-albuterol 3 mg (2.5 mg base)/3 mL nebulization soln ipratropium/albuterol sulfate 3 mL by Nebulization route every four (4) hours as needed for Shortness of Breath. lisinopril 5 mg tablet lisinopril Take 1 Tablet by mouth daily. metoprolol succinate  mg tablet,extended release 24 hr metoprolol succinate Take 1 Tablet by mouth every twelve (12) hours. ondansetron 8 mg disintegrating tablet ondansetron Take 1 Tablet by mouth every eight (8) hours as needed for Nausea. potassium chloride ER 20 mEq tablet,extended release(part/cryst) potassium chloride Take 20 mEq by mouth two (2) times a day. Pradaxa 150 mg capsule dabigatran etexilate mesylate Take 1 Capsule by mouth every twelve (12) hours.    Ventolin HFA 90 mcg/actuation aerosol inhaler albuterol sulfate Take 2 Puffs by inhalation every four (4) hours as needed for Wheezing. Allergies:  Ingredient Reaction (Severity) Medication Name Comment   CARBAMAZEPINE Anaphylaxis (severe)     LORAZEPAM Other (comments) (Unknown)     NITROGLYCERIN Other (comments) (Unknown)     Reviewed, no changes. Review of Systems:  System Neg/Pos Details   Constitutional Positive Fatigue. Respiratory Positive Dyspnea. Cardio Positive Irregular heartbeat/palpitations, Leg swelling. Endocrine Positive Cold intolerance, Heat intolerance. Neuro Positive Seizures. MS Positive Joint pain, Myalgia, Back pain. An 11-point review of systems was negative except as mentioned in the HPI and Past Medical History. Vital Signs:  BP mm/Hg Pulse Resp Pulse Ox Temp F Ht (Total in.) Weight (lbs.) Weight (oz.) BMI   134/78 88 18 97 97.80 73.00 239.60  31.61     Physical Exam:  CONST:  NAD. WD, Obese, WM, appears stated age. EYES: Conjunctiva pink. Sclerae non-icteric. NECK: Symmetrical with no obvious masses. No JVD. RESP:  Prolonged expiratory phase. CVS: RRR with no g /m/ r. No Carotid bruits. 2+ edema noted R>L. GI: Symmetrical, distended abdomen. BS WNL. No masses. Mild epigastric tenderness. No g/r. Liver/Spleen: No palpable hepatomegaly or splenomegaly. Hernia: No obvious hernias. LYMPH: No lymphadenopathy in the cervical, supraclavicular, inguinal and femoral areas. SKIN: No significant rashes, petechiae, bruises or spider angiomas. MSK: Wheelchair bound. NEURO: Oriented to person, place, and general circumstances. Recent and remote memory grossly intact. Able to give personal history. PSYCH: Mood and affect appropriate. Judgement is intact. Assessment/Plan:  # Detail Type Description    1. Assessment Iron deficiency anemia, unspecified iron deficiency anemia type (D50.9). Provider Plan Mild without overt blood losses. Suspicion for GI blood losses or iron malabsorption discussed.  We will schedule diagnostic EGD at the hospital after 4 weeks (due to recent seizure reported) with any MD - with small bowel biopsies. Pradaxa 72 hr hold from Dr. Russell Smith will be requested. The risks of the procedure were discussed with the patient including bleeding, perforation, missed pathology, and complications of anesthesia. Patient was offered the option to review an information video on the procedure. Instructions were provided and reviewed. ASA 3+. Request previous colonoscopy evaluation and consider repeat if EGD is negative and there is increased suspicion for lower GI source of blood loss. Plan Orders He is to schedule a follow-up visit to be determined after testing/procedure. 2. Assessment Acute on chronic congestive heart failure, unspecified heart failure type (I50.9). Provider Plan Followed by Dr. Russell Smith with mildly reduced ejection fraction. He reports some improvement on medications with recent increase in diuretics. He is also being evaluated by pulmonary with nocturnal oximetry planned. 3. Assessment Seizure disorder (G40.909). Provider Plan Evaluated in the ER on 5/6/22 after having seizure like activity at home and With similar episode in the ER they witnessed by nurses lasting approximately 10 seconds. Patient was not post ictal on MD evaluation and was discharged with outpatient follow up with PCP recommended. Plan Orders Further evaluations ordered today include EGD W/WO Cytology to be performed, Next Lab Date is on 06/17/2022.         4. Assessment Family history of digestive disorder (Z83.79). Provider Plan Sister with gluten intolerance. 5. Assessment Hx of long term use of blood thinners (Z92.29). Provider Plan On baby aspirin and Pradaxa. Fall Risk Plan:  The patient has not fallen in the last year.     Counseling / Educational Factors:  Items reviewed / discussed during today's visit: prior testing / procedures were reviewed; old records were reviewed; indications and risks of the procedure were discussed; prescription medication usage was discussed; symptomatic care was discussed; advised to continue current treatment; diet / fluid instructions were provided; instructed to reduce caffeine intake. Patient expressed understanding of instructions. The patient was checked out at 9:34 AM by Lazaro Rowan. I personally performed the services described in this documentation. Kathlean Lundborg (Scribe) assisted in my presence with documentation, which I have reviewed and validated. Provider:   Juan Manuel Mederos 05/15/2022 2:05 PM   Document generated by: Willis Martin. Brad Kauffman MD 05/15/2022     Providers:  Kaden Munguia MD, MD  [address] [place] , [address] [facility] MD   [facility]  [address] [place] [address],[address]  [address] Fax     ___________________________________________________________________________________________________________________________    Electronically signed by Anaid Kauffman MD on 05/15/2022 02:07 PM

## 2022-06-17 NOTE — PROCEDURES
ESOPHAGOGASTRODUODENOSCOPY    DATE of PROCEDURE:   6/17/2022    TIME H&P SIGNED: 1155  TIME CONSENT SIGNED: 1147  TIME PATIENT IN ROOM: 1200  PROCEDURE START TIME: 8906    PRE-OP DIAGNOSIS:  1. Iron deficiency anemia     POST-OP DIAGNOSIS:  1. Mild esophagitis  2. Hiatal hernia    MEDICATIONS ADMINISTERED:   MAC per anesthesia    INSTRUMENT:   [id number]    PROCEDURE:    After obtaining informed consent, the patient was placed in the left lateral position and sedated. The endoscope was advanced to the 3rd portion of the duodenum under direct vision without difficulty. The esophagus, stomach (including retroflexed views) and duodenum were evaluated. The patient was taken to the recovery area in stable condition. FINDINGS:  ESOPHAGUS: Mild esophagitis was noted in the distal esophagus at the level of the Z line. Otherwise normal proximal and mid esophagus. STOMACH: A 3 cm sliding-type hiatal hernia without Jose ulcers was noted with Z line located at 45 cm and diaphragmatic hiatus 48 cm from the incisors. The flap valve is considered Hill grade III on retroflexed view. Otherwise normal gastric mucosa throughout without evidence of ulcers, AVMs, or masses. Random gastric biopsies were obtained. DUODENUM: Normal bulb, second, and third portions with no ulcers or AVMs noted. Duodenal biopsies were obtained.      ESTIMATED BLOOD LOSS:  Minimal.    PLAN:  - F/u path, will treat H pylori if positive  - Begin Omeprazole 20 mg ACB and iron supplementation  - Avoid NSAIDs   - Antireflux measures  - Resume Pradaxa tomorrow  - ROV with Dr. Leonor Cruz in 6 weeks with repeat CBC and iron profile 1 week prior      Kristen Mullins MD   [facility] [place]

## 2022-06-21 ENCOUNTER — HOSPITAL ENCOUNTER (EMERGENCY)
Dept: GENERAL RADIOLOGY | Age: 67
Discharge: HOME | End: 2022-06-24
Payer: MEDICARE

## 2022-06-21 ENCOUNTER — TELEPHONE (OUTPATIENT)
Dept: CARDIOLOGY CLINIC | Age: 67
End: 2022-06-21

## 2022-06-21 ENCOUNTER — HOSPITAL ENCOUNTER (EMERGENCY)
Age: 67
Discharge: HOME | End: 2022-06-22
Attending: EMERGENCY MEDICINE
Payer: MEDICARE

## 2022-06-21 ENCOUNTER — TELEPHONE (OUTPATIENT)
Dept: INTERNAL MEDICINE CLINIC | Facility: CLINIC | Age: 67
End: 2022-06-21

## 2022-06-21 DIAGNOSIS — R60.0 FLUID RETENTION IN LEGS: Primary | ICD-10-CM

## 2022-06-21 DIAGNOSIS — J44.1 COPD EXACERBATION (HCC): ICD-10-CM

## 2022-06-21 LAB
ALBUMIN SERPL-MCNC: 3.1 G/DL (ref 3.2–4.6)
ALBUMIN/GLOB SERPL: 0.8 {RATIO} (ref 1.2–3.5)
ALP SERPL-CCNC: 118 U/L (ref 50–136)
ALT SERPL-CCNC: 25 U/L (ref 12–65)
ANION GAP SERPL CALC-SCNC: 9 MMOL/L (ref 7–16)
AST SERPL-CCNC: 23 U/L (ref 15–37)
BASOPHILS # BLD: 0 K/UL (ref 0–0.2)
BASOPHILS NFR BLD: 0 % (ref 0–2)
BILIRUB SERPL-MCNC: 0.7 MG/DL (ref 0.2–1.1)
BUN SERPL-MCNC: 20 MG/DL (ref 8–23)
CALCIUM SERPL-MCNC: 8.7 MG/DL (ref 8.3–10.4)
CHLORIDE SERPL-SCNC: 105 MMOL/L (ref 98–107)
CO2 SERPL-SCNC: 26 MMOL/L (ref 21–32)
CREAT SERPL-MCNC: 1.1 MG/DL (ref 0.8–1.5)
DIFFERENTIAL METHOD BLD: ABNORMAL
EKG ATRIAL RATE: 69 BPM
EKG DIAGNOSIS: NORMAL
EKG Q-T INTERVAL: 460 MS
EKG QRS DURATION: 172 MS
EKG QTC CALCULATION (BAZETT): 503 MS
EKG R AXIS: 257 DEGREES
EKG T AXIS: 79 DEGREES
EKG VENTRICULAR RATE: 72 BPM
EOSINOPHIL # BLD: 0.3 K/UL (ref 0–0.8)
EOSINOPHIL NFR BLD: 3 % (ref 0.5–7.8)
ERYTHROCYTE [DISTWIDTH] IN BLOOD BY AUTOMATED COUNT: 16.2 % (ref 11.9–14.6)
GLOBULIN SER CALC-MCNC: 3.9 G/DL (ref 2.3–3.5)
GLUCOSE SERPL-MCNC: 90 MG/DL (ref 65–100)
HCT VFR BLD AUTO: 41.8 % (ref 41.1–50.3)
HGB BLD-MCNC: 13.2 G/DL (ref 13.6–17.2)
IMM GRANULOCYTES # BLD AUTO: 0.1 K/UL (ref 0–0.5)
IMM GRANULOCYTES NFR BLD AUTO: 1 % (ref 0–5)
LYMPHOCYTES # BLD: 2 K/UL (ref 0.5–4.6)
LYMPHOCYTES NFR BLD: 22 % (ref 13–44)
MAGNESIUM SERPL-MCNC: 2.3 MG/DL (ref 1.8–2.4)
MCH RBC QN AUTO: 25.8 PG (ref 26.1–32.9)
MCHC RBC AUTO-ENTMCNC: 31.6 G/DL (ref 31.4–35)
MCV RBC AUTO: 81.8 FL (ref 79.6–97.8)
MONOCYTES # BLD: 1.1 K/UL (ref 0.1–1.3)
MONOCYTES NFR BLD: 12 % (ref 4–12)
NEUTS SEG # BLD: 5.8 K/UL (ref 1.7–8.2)
NEUTS SEG NFR BLD: 62 % (ref 43–78)
NRBC # BLD: 0 K/UL (ref 0–0.2)
NT PRO BNP: 413 PG/ML (ref 5–125)
PLATELET # BLD AUTO: 129 K/UL (ref 150–450)
PMV BLD AUTO: 10.2 FL (ref 9.4–12.3)
POTASSIUM SERPL-SCNC: 4.7 MMOL/L (ref 3.5–5.1)
PROT SERPL-MCNC: 7 G/DL (ref 6.3–8.2)
RBC # BLD AUTO: 5.11 M/UL (ref 4.23–5.6)
SODIUM SERPL-SCNC: 140 MMOL/L (ref 138–145)
TROPONIN I SERPL HS-MCNC: 13.9 PG/ML (ref 0–14)
WBC # BLD AUTO: 9.3 K/UL (ref 4.3–11.1)

## 2022-06-21 PROCEDURE — 36415 COLL VENOUS BLD VENIPUNCTURE: CPT

## 2022-06-21 PROCEDURE — 85025 COMPLETE CBC W/AUTO DIFF WBC: CPT

## 2022-06-21 PROCEDURE — 83880 ASSAY OF NATRIURETIC PEPTIDE: CPT

## 2022-06-21 PROCEDURE — 83735 ASSAY OF MAGNESIUM: CPT

## 2022-06-21 PROCEDURE — 80053 COMPREHEN METABOLIC PANEL: CPT

## 2022-06-21 PROCEDURE — 94664 DEMO&/EVAL PT USE INHALER: CPT

## 2022-06-21 PROCEDURE — 99285 EMERGENCY DEPT VISIT HI MDM: CPT

## 2022-06-21 PROCEDURE — 71045 X-RAY EXAM CHEST 1 VIEW: CPT

## 2022-06-21 PROCEDURE — 94640 AIRWAY INHALATION TREATMENT: CPT

## 2022-06-21 PROCEDURE — 6360000002 HC RX W HCPCS: Performed by: EMERGENCY MEDICINE

## 2022-06-21 PROCEDURE — 96374 THER/PROPH/DIAG INJ IV PUSH: CPT

## 2022-06-21 PROCEDURE — 6370000000 HC RX 637 (ALT 250 FOR IP): Performed by: EMERGENCY MEDICINE

## 2022-06-21 PROCEDURE — 84484 ASSAY OF TROPONIN QUANT: CPT

## 2022-06-21 RX ORDER — IPRATROPIUM BROMIDE AND ALBUTEROL SULFATE 2.5; .5 MG/3ML; MG/3ML
1 SOLUTION RESPIRATORY (INHALATION)
Status: COMPLETED | OUTPATIENT
Start: 2022-06-21 | End: 2022-06-21

## 2022-06-21 RX ORDER — FUROSEMIDE 10 MG/ML
80 INJECTION INTRAMUSCULAR; INTRAVENOUS
Status: COMPLETED | OUTPATIENT
Start: 2022-06-21 | End: 2022-06-21

## 2022-06-21 RX ADMIN — FUROSEMIDE 80 MG: 10 INJECTION, SOLUTION INTRAMUSCULAR; INTRAVENOUS at 22:25

## 2022-06-21 RX ADMIN — IPRATROPIUM BROMIDE AND ALBUTEROL SULFATE 1 AMPULE: .5; 3 SOLUTION RESPIRATORY (INHALATION) at 22:15

## 2022-06-21 ASSESSMENT — ENCOUNTER SYMPTOMS
WHEEZING: 1
SHORTNESS OF BREATH: 1

## 2022-06-21 NOTE — TELEPHONE ENCOUNTER
Hadassah Cabot from [facility] called stating the patient has swollen feet, legs and abdomen. He is also experiencing SOB. She states he was unable to weigh himself this morning and has self-restricted his fluid intake to a 1/2 liter or less a day. He is feeling the urge to urinate but nothing is coming out. No active chest pain. Please call patient and advise.

## 2022-06-21 NOTE — TELEPHONE ENCOUNTER
· Patient was doing well when [facility] saw him, last week. · Symptoms increasing x 4-5 days. · Today, very bad swelling in abdomen. Looks like he is pregnant. · 2+ pitting edema in feet and legs to knees. · Increased SOB with exertion and when talking. · Unable to sleep at night because of increased SOB and pain in rib cage due to pressure on fractured ribs from swollen abdomen. Has fractured ribs due to bad coughing in past few weeks. · Wheezing. · Nebulizer and inhalers only help for a little while. · Gained more than 5 lbs in 1 week. · BP-140/84. · P-75. · R-19. · T-97.7.  · O2-94%. · Has been taking lasix 40 mg qd for 1-2 weeks with no improvement. · Also, taking Toprol  mg BID and ASA 81 mg qd. · Not sure if patient is taking KCl. · Limiting fluids to 1/2 liter per day. · Urinating very little, even when he feels urge to urinate. · Patient does not want to go to ER. [facility]  nurse, Maricarmen Mcgowan, asks for Dr. Pierce Conception recommendations.

## 2022-06-21 NOTE — TELEPHONE ENCOUNTER
Spoke with Merline Necessary at [facility] and advised her to contact patients cardiologist.  She will contact them.

## 2022-06-21 NOTE — TELEPHONE ENCOUNTER
Pt is very very swollen, legs, feet, abdomen, wheezing in lungs, back hurts, drinking less than half a liter a day. Pt is short of breath, does not want to go to ER. Has been taking 40 mg of Lasix a day. He is not putting out a ton of urine, does feel like he has to go a lot but not a lot is put out.

## 2022-06-21 NOTE — TELEPHONE ENCOUNTER
Notified Will Holden that patient was advised to go to [facility] ER, after declining appointment, tomorrow. Will Holden verbalized understanding.

## 2022-06-21 NOTE — TELEPHONE ENCOUNTER
He also has a Cardiologist he can contact but seems like he may be having an exacerbation of his heart failure

## 2022-06-21 NOTE — TELEPHONE ENCOUNTER
Advised patient of Dr. Gutierrez Nan response. Patient verbalized understanding, but declined SA appointment with Dr. Kyleigh Patten, tomorrow, stating he would have to ride bus and could not get there by 9:00 am appointment time. While talking on phone, patient sounded so SOB that he had difficulty talking. Patient stated that his abdomen is so swollen that he cannot fasten pants. Patient stated swelling and SOB are worse than ever. Advised patient to go to [facility] ER, now. Patient verbalized understanding.

## 2022-06-21 NOTE — ED TRIAGE NOTES
Patient arrives in wheelchair to triage. Dropped off by EMS. Reports shortness of breath, abdominal swelling, leg swelling worsening over past week. Reports taking 40mg lasix daily. Reports Dr. Gilma Lai has attempted to adjust lasix but is unable due to HEATHER when taking high dosages. Placed on 3L for comfort with EMS.

## 2022-06-22 VITALS
HEART RATE: 75 BPM | SYSTOLIC BLOOD PRESSURE: 141 MMHG | RESPIRATION RATE: 20 BRPM | TEMPERATURE: 98.5 F | DIASTOLIC BLOOD PRESSURE: 83 MMHG | OXYGEN SATURATION: 96 % | WEIGHT: 243 LBS | BODY MASS INDEX: 32.06 KG/M2

## 2022-06-22 NOTE — ED PROVIDER NOTES
[facility] Emergency Department Provider Note                   PCP:                Rony Scott MD               Age: 79 y.o. Sex: male       ICD-10-CM    1. Fluid retention in legs  R60.0    2. COPD exacerbation (HCC)  J44.1        DISPOSITION         New Prescriptions    No medications on file       Orders Placed This Encounter   Procedures    XR CHEST PORTABLE    CBC with Auto Differential    Cardiac Monitor - ED Only    Continuous Pulse Oximetry    Nursing communication    EKG 12 Lead    Saline lock IV        Lani Yudi, DO 11:51 PM      MDM  Number of Diagnoses or Management Options  COPD exacerbation (HCC)  Fluid retention in legs  Diagnosis management comments: CHF, COPD, pneumonia, PE,    MI, coronary artery disease, unstable angina, coronary artery disease,    Atrial fibrillation, cardiac arrhythmia, PVC, medication induced palpitations, heart block,  electrolyte induced palpitations,    Aortic dissection, aortic aneurysm,    GERD, musculoskeletal pain, costochondritis, rib fracture, pleurisy,         Amount and/or Complexity of Data Reviewed  Clinical lab tests: ordered and reviewed  Tests in the radiology section of CPT®: ordered and reviewed  Tests in the medicine section of CPT®: reviewed and ordered  Review and summarize past medical records: yes  Independent visualization of images, tracings, or specimens: yes         Kenneth Armendariz is a 79 y.o. male who presents to the Emergency Department with chief complaint of    Chief Complaint   Patient presents with    Shortness of Breath      Patient is a 25-year-old male presenting to the emergency department today complaining of shortness of breath and 30 pound weight gain. The patient has a history of congestive heart failure but is on low-dose Lasix secondary to acute kidney injury with high-dose Lasix.   The patient states that his home care nurse came to the house today and noticed that he was having more shortness of breath and that he had significant edema in the legs and abdomen. He states that she called the cardiology office Dr. Miriam Ducwkorth and he recommended the patient come to the emergency department for IV meds and evaluation. Patient denies any fevers or chills. He has a history of pulmonary fibrosis and uses nebulizers at home. Last dose of albuterol was 1 hour prior to coming to the ER. All other systems reviewed and are negative. Review of Systems   Respiratory: Positive for shortness of breath and wheezing. Cardiovascular: Positive for leg swelling. All other systems reviewed and are negative.       Past Medical History:   Diagnosis Date    Aortic stenosis     Aortic valve replacement 7/2018    CAD (coronary artery disease)     PCI in 2014, 2015 had MI and then stents    Cancer ([address]     SCC removed face     CHF (congestive heart failure) ([address]     Chronic renal disease, stage III ([address]     sees neph and does not have actual diagnosis at this time    COPD (chronic obstructive pulmonary disease) ([address]     fibrosis in lungs    Dyslipidemia     Heart failure ([address]     CHF per patient    HTN (hypertension)     med controlled    Hyperlipidemia     Ischemic cardiomyopathy     Pacemaker     only pacemaker    Paroxysmal atrial fibrillation ([address]     pradaxa for this    Pulmonary fibrosis ([address]     Seizure disorder ([address]     lyme disease - small lesion frontal part of brain - no maintenance meds - last seizure 2 months ago, due to fall - before that it was nine years    Systolic heart failure ([address]         Past Surgical History:   Procedure Laterality Date    ABLATION OF DYSRHYTHMIC FOCUS      AORTIC VALVE REPLACEMENT  07/2018    BACK SURGERY      discectomy    CARDIAC CATHETERIZATION      PCI 2014, 2015    CERVICAL FUSION      C3-4 fusion    EGD  6/17/2022         HIP ARTHROPLASTY Left     SPINAL CORD STIMULATOR SURGERY      lower back     UPPER GASTROINTESTINAL ENDOSCOPY N/A 6/17/2022    EGD ESOPHAGOGASTRODUODENOSCOPY/ PT HAS PACEMAKER performed by Vi Khalil MD at [facility] ENDOSCOPY        Family History   Problem Relation Age of Onset    Heart Disease Sister     Heart Disease Mother            Social Connections:     Frequency of Communication with Friends and Family: Not on file    Frequency of Social Gatherings with Friends and Family: Not on file    Attends Buddhist Services: Not on file    Active Member of Clubs or Organizations: Not on file    Attends Club or Organization Meetings: Not on file    Marital Status: Not on file        Allergies   Allergen Reactions    Carbamazepine Anaphylaxis    Lacosamide Nausea Only    Lorazepam Other (See Comments)     Violent behavior    Nitroglycerin Other (See Comments)     hypotension        Vitals signs and nursing note reviewed. Patient Vitals for the past 4 hrs:   SpO2   06/21/22 2215 100 %   06/21/22 2212 97 %   06/21/22 2021 100 %          Physical Exam     GENERAL:The patient has Body mass index is 32.06 kg/m². Well-hydrated. VITAL SIGNS: Heart rate, blood pressure, respiratory rate reviewed as recorded in  nurse's notes  EYES: Pupils reactive. Extraocular motion intact. No conjunctival redness or drainage. NECK: Supple, no meningeal signs. Trachea midline. No masses or thyromegaly. LUNGS: Wheezing in the lung fields diffusely. No rhonchi or rales appreciated. no accessory muscle use. CHEST: No deformity  CARDIOVASCULAR: Regular rate and rhythm  ABDOMEN: Soft without tenderness. No palpable masses or organomegaly. No  peritoneal signs. No rigidity. EXTREMITIES: No clubbing or cyanosis. No joint swelling. Normal muscle tone. No  restricted range of motion appreciated. Pitting edema in the lower extremities bilaterally. NEUROLOGIC: Sensation is grossly intact. Cranial nerve exam reveals face is  symmetrical, tongue is midline speech is clear. SKIN: No rash or petechiae. Good skin turgor palpated.   PSYCHIATRIC: Alert and oriented. Appropriate behavior and judgment. EKG 12 Lead    Date/Time: 6/21/2022 11:51 PM  Performed by: Carolina Blancas DO  Authorized by: Licha Weber MD     ECG reviewed by ED Physician in the absence of a cardiologist: yes    Comments:      Ventricularly paced rhythm at a rate of 70 bpm with wide complex consistent with ventricular paced            Labs Reviewed   CBC WITH AUTO DIFFERENTIAL - Abnormal; Notable for the following components:       Result Value    Hemoglobin 13.2 (*)     MCH 25.8 (*)     RDW 16.2 (*)     Platelets 413 (*)     All other components within normal limits        XR CHEST PORTABLE   Final Result   No acute findings in the chest                            ED Course as of 06/21/22 2351 Tue Jun 21, 2022   2142 XR CHEST PORTABLE  IMPRESSION:  No acute findings in the chest [KH]   2342 Patient is already put out over a liter of urine since the Lasix was given to him. I talked to him about the findings of the blood work here in the emergency department. He does want to go home and he will follow-up with his family doctor and cardiologist outpatient [KH]      ED Course User Index  [KH] Carolina Blancas DO        Voice dictation software was used during the making of this note. This software is not perfect and grammatical and other typographical errors may be present. This note has not been completely proofread for errors.        Carolina Blancas DO  06/21/22 2351

## 2022-07-01 DIAGNOSIS — J84.10 PULMONARY FIBROSIS (HCC): ICD-10-CM

## 2022-07-01 DIAGNOSIS — I50.43 ACUTE ON CHRONIC HEART FAILURE WITH REDUCED EJECTION FRACTION AND DIASTOLIC DYSFUNCTION (HCC): Primary | ICD-10-CM

## 2022-07-01 DIAGNOSIS — J44.0 COPD WITH ACUTE LOWER RESPIRATORY INFECTION (HCC): Primary | ICD-10-CM

## 2022-07-01 DIAGNOSIS — J44.0 COPD WITH ACUTE LOWER RESPIRATORY INFECTION (HCC): ICD-10-CM

## 2022-07-11 ENCOUNTER — HOSPITAL ENCOUNTER (OUTPATIENT)
Age: 67
Setting detail: OBSERVATION
Discharge: HOME | End: 2022-07-12
Attending: EMERGENCY MEDICINE | Admitting: INTERNAL MEDICINE
Payer: MEDICARE

## 2022-07-11 ENCOUNTER — APPOINTMENT (OUTPATIENT)
Dept: GENERAL RADIOLOGY | Age: 67
End: 2022-07-11
Payer: MEDICARE

## 2022-07-11 DIAGNOSIS — R07.9 CHEST PAIN, UNSPECIFIED TYPE: Primary | ICD-10-CM

## 2022-07-11 DIAGNOSIS — R07.9 CHEST PAIN: ICD-10-CM

## 2022-07-11 PROBLEM — I10 HYPERTENSION: Status: ACTIVE | Noted: 2022-01-03

## 2022-07-11 PROBLEM — I48.19 ATRIAL FIBRILLATION, PERSISTENT (HCC): Status: ACTIVE | Noted: 2022-02-16

## 2022-07-11 PROBLEM — I25.10 CAD IN NATIVE ARTERY: Status: ACTIVE | Noted: 2022-01-03

## 2022-07-11 PROBLEM — N18.30 CHRONIC RENAL DISEASE, STAGE III (HCC): Status: ACTIVE | Noted: 2022-05-19

## 2022-07-11 PROBLEM — E78.5 HYPERLIPIDEMIA: Status: ACTIVE | Noted: 2022-01-31

## 2022-07-11 LAB
ALBUMIN SERPL-MCNC: 3 G/DL (ref 3.2–4.6)
ALBUMIN/GLOB SERPL: 0.7 {RATIO} (ref 1.2–3.5)
ALP SERPL-CCNC: 121 U/L (ref 50–136)
ALT SERPL-CCNC: 23 U/L (ref 12–65)
ANION GAP SERPL CALC-SCNC: 0 MMOL/L (ref 7–16)
AST SERPL-CCNC: 28 U/L (ref 15–37)
BILIRUB SERPL-MCNC: 0.5 MG/DL (ref 0.2–1.1)
BUN SERPL-MCNC: 14 MG/DL (ref 8–23)
CALCIUM SERPL-MCNC: 9 MG/DL (ref 8.3–10.4)
CHLORIDE SERPL-SCNC: 106 MMOL/L (ref 98–107)
CO2 SERPL-SCNC: 29 MMOL/L (ref 21–32)
CREAT SERPL-MCNC: 1.2 MG/DL (ref 0.8–1.5)
ERYTHROCYTE [DISTWIDTH] IN BLOOD BY AUTOMATED COUNT: 15.6 % (ref 11.9–14.6)
GLOBULIN SER CALC-MCNC: 4.2 G/DL (ref 2.3–3.5)
GLUCOSE SERPL-MCNC: 93 MG/DL (ref 65–100)
HCT VFR BLD AUTO: 44.2 % (ref 41.1–50.3)
HGB BLD-MCNC: 14 G/DL (ref 13.6–17.2)
MCH RBC QN AUTO: 25.9 PG (ref 26.1–32.9)
MCHC RBC AUTO-ENTMCNC: 31.7 G/DL (ref 31.4–35)
MCV RBC AUTO: 81.7 FL (ref 79.6–97.8)
NRBC # BLD: 0 K/UL (ref 0–0.2)
NT PRO BNP: 592 PG/ML (ref 5–125)
PLATELET # BLD AUTO: 251 K/UL (ref 150–450)
PMV BLD AUTO: 10.6 FL (ref 9.4–12.3)
POTASSIUM SERPL-SCNC: 4.3 MMOL/L (ref 3.5–5.1)
PROT SERPL-MCNC: 7.2 G/DL (ref 6.3–8.2)
RBC # BLD AUTO: 5.41 M/UL (ref 4.23–5.6)
SODIUM SERPL-SCNC: 135 MMOL/L (ref 136–145)
TROPONIN I SERPL HS-MCNC: 29 PG/ML (ref 0–14)
WBC # BLD AUTO: 8.1 K/UL (ref 4.3–11.1)

## 2022-07-11 PROCEDURE — 6370000000 HC RX 637 (ALT 250 FOR IP): Performed by: NURSE PRACTITIONER

## 2022-07-11 PROCEDURE — C1887 CATHETER, GUIDING: HCPCS | Performed by: INTERNAL MEDICINE

## 2022-07-11 PROCEDURE — 6360000004 HC RX CONTRAST MEDICATION: Performed by: INTERNAL MEDICINE

## 2022-07-11 PROCEDURE — 99152 MOD SED SAME PHYS/QHP 5/>YRS: CPT | Performed by: INTERNAL MEDICINE

## 2022-07-11 PROCEDURE — 94640 AIRWAY INHALATION TREATMENT: CPT

## 2022-07-11 PROCEDURE — 85027 COMPLETE CBC AUTOMATED: CPT

## 2022-07-11 PROCEDURE — 6360000002 HC RX W HCPCS

## 2022-07-11 PROCEDURE — 99220 PR INITIAL OBSERVATION CARE/DAY 70 MINUTES: CPT | Performed by: INTERNAL MEDICINE

## 2022-07-11 PROCEDURE — 6360000002 HC RX W HCPCS: Performed by: INTERNAL MEDICINE

## 2022-07-11 PROCEDURE — 2500000003 HC RX 250 WO HCPCS: Performed by: INTERNAL MEDICINE

## 2022-07-11 PROCEDURE — C1894 INTRO/SHEATH, NON-LASER: HCPCS | Performed by: INTERNAL MEDICINE

## 2022-07-11 PROCEDURE — 2709999900 HC NON-CHARGEABLE SUPPLY: Performed by: INTERNAL MEDICINE

## 2022-07-11 PROCEDURE — 83880 ASSAY OF NATRIURETIC PEPTIDE: CPT

## 2022-07-11 PROCEDURE — 2580000003 HC RX 258: Performed by: EMERGENCY MEDICINE

## 2022-07-11 PROCEDURE — 84484 ASSAY OF TROPONIN QUANT: CPT

## 2022-07-11 PROCEDURE — 94760 N-INVAS EAR/PLS OXIMETRY 1: CPT

## 2022-07-11 PROCEDURE — 74018 RADEX ABDOMEN 1 VIEW: CPT

## 2022-07-11 PROCEDURE — 93458 L HRT ARTERY/VENTRICLE ANGIO: CPT | Performed by: INTERNAL MEDICINE

## 2022-07-11 PROCEDURE — G0378 HOSPITAL OBSERVATION PER HR: HCPCS

## 2022-07-11 PROCEDURE — 80053 COMPREHEN METABOLIC PANEL: CPT

## 2022-07-11 PROCEDURE — 2700000000 HC OXYGEN THERAPY PER DAY

## 2022-07-11 PROCEDURE — C1769 GUIDE WIRE: HCPCS | Performed by: INTERNAL MEDICINE

## 2022-07-11 PROCEDURE — 6360000002 HC RX W HCPCS: Performed by: EMERGENCY MEDICINE

## 2022-07-11 PROCEDURE — 99285 EMERGENCY DEPT VISIT HI MDM: CPT

## 2022-07-11 PROCEDURE — 71045 X-RAY EXAM CHEST 1 VIEW: CPT

## 2022-07-11 PROCEDURE — A4216 STERILE WATER/SALINE, 10 ML: HCPCS | Performed by: EMERGENCY MEDICINE

## 2022-07-11 PROCEDURE — C9113 INJ PANTOPRAZOLE SODIUM, VIA: HCPCS | Performed by: EMERGENCY MEDICINE

## 2022-07-11 RX ORDER — IPRATROPIUM BROMIDE AND ALBUTEROL SULFATE 2.5; .5 MG/3ML; MG/3ML
3 SOLUTION RESPIRATORY (INHALATION) EVERY 4 HOURS PRN
Status: DISCONTINUED | OUTPATIENT
Start: 2022-07-11 | End: 2022-07-12 | Stop reason: HOSPADM

## 2022-07-11 RX ORDER — ASPIRIN 81 MG/1
81 TABLET, CHEWABLE ORAL DAILY
Status: DISCONTINUED | OUTPATIENT
Start: 2022-07-11 | End: 2022-07-12 | Stop reason: HOSPADM

## 2022-07-11 RX ORDER — SODIUM CHLORIDE 0.9 % (FLUSH) 0.9 %
5-40 SYRINGE (ML) INJECTION PRN
Status: DISCONTINUED | OUTPATIENT
Start: 2022-07-11 | End: 2022-07-12 | Stop reason: HOSPADM

## 2022-07-11 RX ORDER — ACETAMINOPHEN 650 MG/1
650 SUPPOSITORY RECTAL EVERY 6 HOURS PRN
Status: DISCONTINUED | OUTPATIENT
Start: 2022-07-11 | End: 2022-07-12 | Stop reason: HOSPADM

## 2022-07-11 RX ORDER — MORPHINE SULFATE 2 MG/ML
2 INJECTION, SOLUTION INTRAMUSCULAR; INTRAVENOUS EVERY 4 HOURS PRN
Status: DISCONTINUED | OUTPATIENT
Start: 2022-07-11 | End: 2022-07-12 | Stop reason: HOSPADM

## 2022-07-11 RX ORDER — CYCLOBENZAPRINE HCL 10 MG
5 TABLET ORAL 3 TIMES DAILY PRN
Status: DISCONTINUED | OUTPATIENT
Start: 2022-07-11 | End: 2022-07-12 | Stop reason: HOSPADM

## 2022-07-11 RX ORDER — METOPROLOL SUCCINATE 100 MG/1
100 TABLET, EXTENDED RELEASE ORAL EVERY 12 HOURS
Status: DISCONTINUED | OUTPATIENT
Start: 2022-07-11 | End: 2022-07-12 | Stop reason: HOSPADM

## 2022-07-11 RX ORDER — POLYETHYLENE GLYCOL 3350 17 G/17G
17 POWDER, FOR SOLUTION ORAL DAILY PRN
Status: DISCONTINUED | OUTPATIENT
Start: 2022-07-11 | End: 2022-07-12 | Stop reason: HOSPADM

## 2022-07-11 RX ORDER — ONDANSETRON 2 MG/ML
4 INJECTION INTRAMUSCULAR; INTRAVENOUS EVERY 6 HOURS PRN
Status: DISCONTINUED | OUTPATIENT
Start: 2022-07-11 | End: 2022-07-12 | Stop reason: HOSPADM

## 2022-07-11 RX ORDER — ATORVASTATIN CALCIUM 40 MG/1
40 TABLET, FILM COATED ORAL DAILY
Status: DISCONTINUED | OUTPATIENT
Start: 2022-07-11 | End: 2022-07-12 | Stop reason: HOSPADM

## 2022-07-11 RX ORDER — ONDANSETRON 4 MG/1
4 TABLET, ORALLY DISINTEGRATING ORAL EVERY 8 HOURS PRN
Status: DISCONTINUED | OUTPATIENT
Start: 2022-07-11 | End: 2022-07-12 | Stop reason: HOSPADM

## 2022-07-11 RX ORDER — LIDOCAINE HYDROCHLORIDE 10 MG/ML
INJECTION, SOLUTION INFILTRATION; PERINEURAL PRN
Status: DISCONTINUED | OUTPATIENT
Start: 2022-07-11 | End: 2022-07-11 | Stop reason: HOSPADM

## 2022-07-11 RX ORDER — HEPARIN SODIUM 200 [USP'U]/100ML
INJECTION, SOLUTION INTRAVENOUS CONTINUOUS PRN
Status: COMPLETED | OUTPATIENT
Start: 2022-07-11 | End: 2022-07-11

## 2022-07-11 RX ORDER — ACETAMINOPHEN 325 MG/1
650 TABLET ORAL EVERY 6 HOURS PRN
Status: DISCONTINUED | OUTPATIENT
Start: 2022-07-11 | End: 2022-07-12 | Stop reason: HOSPADM

## 2022-07-11 RX ORDER — NITROGLYCERIN 20 MG/100ML
INJECTION INTRAVENOUS PRN
Status: DISCONTINUED | OUTPATIENT
Start: 2022-07-11 | End: 2022-07-11 | Stop reason: HOSPADM

## 2022-07-11 RX ORDER — SODIUM CHLORIDE 9 MG/ML
INJECTION, SOLUTION INTRAVENOUS PRN
Status: DISCONTINUED | OUTPATIENT
Start: 2022-07-11 | End: 2022-07-12 | Stop reason: HOSPADM

## 2022-07-11 RX ADMIN — SODIUM CHLORIDE 40 MG: 9 INJECTION INTRAMUSCULAR; INTRAVENOUS; SUBCUTANEOUS at 17:37

## 2022-07-11 RX ADMIN — ATORVASTATIN CALCIUM 40 MG: 40 TABLET, FILM COATED ORAL at 20:13

## 2022-07-11 RX ADMIN — ASPIRIN 81 MG: 81 TABLET, CHEWABLE ORAL at 20:12

## 2022-07-11 RX ADMIN — MOMETASONE FUROATE AND FORMOTEROL FUMARATE DIHYDRATE 2 PUFF: 200; 5 AEROSOL RESPIRATORY (INHALATION) at 20:38

## 2022-07-11 RX ADMIN — METOPROLOL SUCCINATE 100 MG: 100 TABLET, EXTENDED RELEASE ORAL at 20:12

## 2022-07-11 ASSESSMENT — PAIN SCALES - GENERAL
PAINLEVEL_OUTOF10: 8
PAINLEVEL_OUTOF10: 0
PAINLEVEL_OUTOF10: 10
PAINLEVEL_OUTOF10: 4

## 2022-07-11 ASSESSMENT — ENCOUNTER SYMPTOMS
SHORTNESS OF BREATH: 1
ALLERGIC/IMMUNOLOGIC NEGATIVE: 1
GASTROINTESTINAL NEGATIVE: 1
EYES NEGATIVE: 1

## 2022-07-11 ASSESSMENT — PAIN - FUNCTIONAL ASSESSMENT: PAIN_FUNCTIONAL_ASSESSMENT: 0-10

## 2022-07-11 NOTE — PROGRESS NOTES
TRANSFER - IN REPORT:    Verbal report received from YOUNG Quiroga on [employer] being received from [facility] for routine progression of patient care      Report consisted of patients Situation, Background, Assessment and Recommendations(SBAR). Information from the following report(s) SBAR and Kardex was reviewed with the receiving nurse. Opportunity for questions and clarification was provided. Assessment completed upon patients arrival to unit and care assumed.

## 2022-07-11 NOTE — ED PROVIDER NOTES
[employer] Emergency Department Provider Note                   PCP:                Shaka Navarro MD               Age: 79 y.o. Sex: male     No diagnosis found. DISPOSITION         MDM  Number of Diagnoses or Management Options  Diagnosis management comments: Early in patient's ER stay cardiology is in our department seeing another patient that discussed this patient with Dr. Hang Calloway he is seen patient and decision for admission is instituted. Further review shows patient has had heart cath done within the last several months. He has had a CT of his chest on June of this year. No evidence of significant pathology was seen on this. Patient was given nitroglycerin by EMS in transit and had a 30 point drop in blood pressure. This is not reinstituted in our care. EKG is done though I do not see it having been scanned in at this point did not have acute injury pattern       Amount and/or Complexity of Data Reviewed  Clinical lab tests: ordered and reviewed  Tests in the radiology section of CPT®: ordered and reviewed    Risk of Complications, Morbidity, and/or Mortality  Presenting problems: high  Diagnostic procedures: high  Management options: high         Orders Placed This Encounter   Procedures    XR CHEST PORTABLE    CBC    Comprehensive Metabolic Panel    Troponin    proBNP, N-TERMINAL    Cardiac Monitor    Pulse Oximetry    EKG 12 Lead    Saline lock IV        Ashley Chen is a 79 y.o. male who presents to the Emergency Department with chief complaint of    Chief Complaint   Patient presents with    Chest Pain      Over an hour before coming into our department he developed chest pain. States it was central chest and somewhat sharp. He took 3 aspirin 81 mg at home with persistence of pain contacted EMS found him to be in a paced rhythm was given a single nitroglycerin with a 30 point blood pressure drop known coronary artery disease with PCI/stents.   Patient states he had some mild diaphoresis with the pain. He had radiation into both sides of his jaw. No ripping or tearing pain no history of known aortic aneurysm. He has had aortic valve surgery in the past.         The history is provided by the patient. Chest Pain  Pain location:  Substernal area  Pain quality: sharp    Associated symptoms: diaphoresis    Associated symptoms: no cough, no fever and no shortness of breath        All other systems reviewed and are negative. Review of Systems   Constitutional: Positive for diaphoresis. Negative for chills and fever. Respiratory: Negative for cough, shortness of breath and wheezing. Cardiovascular: Positive for chest pain. Gastrointestinal: Negative. Neurological: Negative. Psychiatric/Behavioral: Negative for confusion and decreased concentration. All other systems reviewed and are negative.       Past Medical History:   Diagnosis Date    Aortic stenosis     Aortic valve replacement 7/2018    CAD (coronary artery disease)     PCI in 2014, 2015 had MI and then stents    Cancer ([address]     SCC removed face     CHF (congestive heart failure) ([address]     Chronic renal disease, stage III ([address]     sees neph and does not have actual diagnosis at this time    COPD (chronic obstructive pulmonary disease) ([address]     fibrosis in lungs    Dyslipidemia     Heart failure ([address]     CHF per patient    HTN (hypertension)     med controlled    Hyperlipidemia     Ischemic cardiomyopathy     Pacemaker     only pacemaker    Paroxysmal atrial fibrillation ([address]     pradaxa for this    Pulmonary fibrosis ([address]     Seizure disorder ([address]     lyme disease - small lesion frontal part of brain - no maintenance meds - last seizure 2 months ago, due to fall - before that it was nine years    Systolic heart failure ([address]         Past Surgical History:   Procedure Laterality Date    ABLATION OF DYSRHYTHMIC FOCUS      AORTIC VALVE REPLACEMENT  07/2018    BACK SURGERY      discectomy    CARDIAC CATHETERIZATION      PCI 2014, 2015    CERVICAL FUSION      C3-4 fusion    EGD  6/17/2022         HIP ARTHROPLASTY Left     SPINAL CORD STIMULATOR SURGERY      lower back     UPPER GASTROINTESTINAL ENDOSCOPY N/A 6/17/2022    EGD ESOPHAGOGASTRODUODENOSCOPY/ PT HAS PACEMAKER performed by Milla Issa MD at [facility]OSCOPY        Family History   Problem Relation Age of Onset    Heart Disease Sister     Heart Disease Mother            Social Connections:     Frequency of Communication with Friends and Family: Not on file    Frequency of Social Gatherings with Friends and Family: Not on file    Attends Cheondoism Services: Not on file    Active Member of Clubs or Organizations: Not on file    Attends Club or Organization Meetings: Not on file    Marital Status: Not on file        Allergies   Allergen Reactions    Carbamazepine Anaphylaxis    Lacosamide Nausea Only    Lorazepam Other (See Comments)     Violent behavior    Nitroglycerin Other (See Comments)     hypotension        Vitals signs and nursing note reviewed. Patient Vitals for the past 4 hrs:   Temp Pulse Resp BP SpO2   07/11/22 1211 98.6 °F (37 °C) 80 20 (!) 146/82 100 %          Physical Exam  Constitutional:       Appearance: He is ill-appearing. Comments: Patient appears quite anxious  Clenched fist sign   HENT:      Head: Atraumatic. Right Ear: There is no impacted cerumen. Left Ear: There is no impacted cerumen. Mouth/Throat:      Mouth: Mucous membranes are moist.   Cardiovascular:      Rate and Rhythm: Normal rate and regular rhythm. Heart sounds: Murmur heard. Comments: Patient rhythm is paced  Pulmonary:      Effort: Pulmonary effort is normal. No respiratory distress. Breath sounds: Normal breath sounds. No wheezing. Abdominal:      Palpations: Abdomen is soft. Musculoskeletal:         General: No tenderness. Cervical back: Normal range of motion.    Skin:     Coloration: Skin

## 2022-07-11 NOTE — ED TRIAGE NOTES
Pt arrives via EMS from home with chest pain. Pt states pain radiates to jaw. Pt took 7 asa PT Ems arrival. Pt was given 1 nitro by EMS. Pt has a hx of MI. Pt has a hx of a pacemaker.      VS with EMS  /70  HR 70

## 2022-07-11 NOTE — Clinical Note
TRANSFER - OUT REPORT:     Verbal report given to: cpru rn. Report consisted of patient's Situation, Background, Assessment and   Recommendations(SBAR). Opportunity for questions and clarification was provided. Patient transported with a Registered Nurse and [address] [place] [address] / [facility]. Patient transported to: [facility].

## 2022-07-11 NOTE — PROGRESS NOTES
TRANSFER - OUT REPORT:    Verbal report given to Linda Greene RN on [employer]  being transferred to [facility] for routine progression of patient care       Report consisted of patient's Situation, Background, Assessment and   Recommendations(SBAR). Information from the following report(s) Nurse Handoff Report was reviewed with the receiving nurse.       [facility] with Dr Kwame Aaron  No interventions  R Radial  TR band at 12mL  Versed 2mg  Fentanyl 50mcg  Heparin 5000 units

## 2022-07-11 NOTE — PROGRESS NOTES
Terumo band completely deflated. 1800 Terumo band removed from right wrist using sterile technique. Sterile dressing applied. No signs and symptoms of bleeding, oozing or hematoma.

## 2022-07-11 NOTE — PROGRESS NOTES
TRANSFER - OUT REPORT:    Verbal report given to [facility] RN on [facility]  being transferred to [facility] for routine progression of care. Report consisted of patient's Situation, Background, Assessment and   Recommendations(SBAR). Information from the following report(s) Nurse Handoff Report was reviewed with the receiving nurse. Lines:   Peripheral IV 07/11/22 Right Antecubital (Active)   Site Assessment Clean, dry & intact 07/11/22 1850   Line Status Capped 07/11/22 1850   Phlebitis Assessment No symptoms 07/11/22 1850   Infiltration Assessment 0 07/11/22 1850        Opportunity for questions and clarification was provided.       Patient transported with:  Registered Nurse

## 2022-07-11 NOTE — Clinical Note
TRANSFER - IN REPORT:     Verbal report received from: er rn. Report consisted of patient's Situation, Background, Assessment and   Recommendations(SBAR). Opportunity for questions and clarification was provided. Assessment completed upon patient's arrival to unit and care assumed. Patient transported with a Registered Nurse.

## 2022-07-11 NOTE — H&P
[facility] Cardiology History & Physical      Date of  Admission: 7/11/2022 12:22 PM     Primary Care Physician:  Dr. Jaqueline Peck  Primary Cardiologist:  Dr. Salvador Almanza  Admitting Physician:  Dr. Kortney Richards    CC:  Chest pain     HPI:  Vishnu Ring is a 79 y.o. male with PMH of CAD ([facility] 12/2021 patent stents to LAD (20% ISR), patent stent to  LCirx (20% ISR), patent stent to RCA (30% ISR), paroxysmal atrial fibrillation on Pradaxa, hx afib ablation and DCCV x 2, BSC BIV PPM and AVN ablation, aortic stenosis s/p TAVR 7/2018, CAD s/p PCI 2014 and 2015, ischemic cardiomyopathy, systolic heart  failure, HTN, dyslipidemia, seizure disorder, COPD, pulmonary fibrosis and current smoker who presented to [facility] ED via EMS with c/o chest pain. The chest pain is midsternal with radiation to the jaws with associated diaphoresis and shortness of breath. He was treated with ASA 81 mg x 3 and SL nitro x 2. Last dose of Pradaxa was 7/10/2022. Labs are currently pending.       Past Medical History:   Diagnosis Date    Aortic stenosis     Aortic valve replacement 7/2018    CAD (coronary artery disease)     PCI in 2014, 2015 had MI and then stents    Cancer ([address]     SCC removed face     CHF (congestive heart failure) ([address]     Chronic renal disease, stage III ([address]     sees neph and does not have actual diagnosis at this time    COPD (chronic obstructive pulmonary disease) ([address]     fibrosis in lungs    Dyslipidemia     Heart failure ([address]     CHF per patient    HTN (hypertension)     med controlled    Hyperlipidemia     Ischemic cardiomyopathy     Pacemaker     only pacemaker    Paroxysmal atrial fibrillation (HCC)     pradaxa for this    Pulmonary fibrosis ([address]     Seizure disorder ([address]     lyme disease - small lesion frontal part of brain - no maintenance meds - last seizure 2 months ago, due to fall - before that it was nine years    Systolic heart failure [facility])       Past Surgical History:   Procedure Laterality Date    ABLATION OF DYSRHYTHMIC FOCUS      AORTIC VALVE REPLACEMENT  07/2018    BACK SURGERY      discectomy    CARDIAC CATHETERIZATION      PCI 2014, 2015    CERVICAL FUSION      C3-4 fusion    EGD  6/17/2022         HIP ARTHROPLASTY Left     SPINAL CORD STIMULATOR SURGERY      lower back     UPPER GASTROINTESTINAL ENDOSCOPY N/A 6/17/2022    EGD ESOPHAGOGASTRODUODENOSCOPY/ PT HAS PACEMAKER performed by Lida Booker MD at [facility] ENDOSCOPY       Allergies   Allergen Reactions    Carbamazepine Anaphylaxis    Lacosamide Nausea Only    Lorazepam Other (See Comments)     Violent behavior    Nitroglycerin Other (See Comments)     hypotension      Social History     Socioeconomic History    Marital status: Legally      Spouse name: Not on file    Number of children: Not on file    Years of education: Not on file    Highest education level: Not on file   Occupational History    Not on file   Tobacco Use    Smoking status: Current Every Day Smoker     Packs/day: 0.25    Smokeless tobacco: Never Used    Tobacco comment: Quit smoking: cutting back to 4 Cigarettes a day   Vaping Use    Vaping Use: Never used   Substance and Sexual Activity    Alcohol use: Not Currently    Drug use: Yes     Comment: cannabis used: two weeks ago as of 6/15/2022 edibles     Sexual activity: Not on file   Other Topics Concern    Not on file   Social History Narrative    Not on file     Social Determinants of Health     Financial Resource Strain:     Difficulty of Paying Living Expenses: Not on file   Food Insecurity:     Worried About Running Out of Food in the Last Year: Not on file    Rajendra of Food in the Last Year: Not on file   Transportation Needs:     Lack of Transportation (Medical): Not on file    Lack of Transportation (Non-Medical):  Not on file   Physical Activity:     Days of Exercise per Week: Not on file    Minutes of Exercise per Session: Not on file   Stress:     Feeling of Stress : Not on file   Social Connections:     Frequency of Communication with Friends and Family: Not on file    Frequency of Social Gatherings with Friends and Family: Not on file    Attends Confucianism Services: Not on file    Active Member of Clubs or Organizations: Not on file    Attends Club or Organization Meetings: Not on file    Marital Status: Not on file   Intimate Partner Violence:     Fear of Current or Ex-Partner: Not on file    Emotionally Abused: Not on file    Physically Abused: Not on file    Sexually Abused: Not on file   Housing Stability:     Unable to Pay for Housing in the Last Year: Not on file    Number of [place] in the Last Year: Not on file    Unstable Housing in the Last Year: Not on file     Family History   Problem Relation Age of Onset    Heart Disease Sister     Heart Disease Mother         No current facility-administered medications for this encounter.      Current Outpatient Medications   Medication Sig    Cholecalciferol (VITAMIN D3) 75 MCG (3000 UT) TABS Take by mouth    benzonatate (TESSALON PERLES) 100 MG capsule Tessalon Perles 100 mg capsule   Take 1 capsule(s) PO q 8 hours prn cough    predniSONE (DELTASONE) 20 MG tablet Take 2 day 1-2 then 1 day 3-5 then stop    albuterol sulfate  (90 Base) MCG/ACT inhaler Inhale 2 puffs into the lungs every 4 hours as needed    aspirin 81 MG chewable tablet Take 81 mg by mouth daily    atorvastatin (LIPITOR) 40 MG tablet Take 40 mg by mouth daily    cyclobenzaprine (FLEXERIL) 5 MG tablet Take 5 mg by mouth 3 times daily as needed    dabigatran (PRADAXA) 150 MG capsule Take 150 mg by mouth every 12 hours    fluticasone-salmeterol (ADVAIR) 250-50 MCG/ACT AEPB diskus inhaler Inhale 1 puff into the lungs every 12 hours    furosemide (LASIX) 40 MG tablet Take 40 mg by mouth daily as needed    ipratropium-albuterol (DUONEB) 0.5-2.5 (3) MG/3ML SOLN nebulizer solution Inhale 3 mLs into the lungs every 4 hours as needed     metoprolol succinate (TOPROL XL) 100 MG extended release tablet Take 100 mg by mouth every 12 hours    ondansetron (ZOFRAN-ODT) 8 MG TBDP disintegrating tablet Take 8 mg by mouth every 8 hours as needed    potassium chloride (KLOR-CON M) 20 MEQ extended release tablet Take 20 mEq by mouth as needed       Review of Systems    Review of Systems   Constitutional: Negative. HENT: Negative. Eyes: Negative. Cardiovascular: Positive for chest pain and leg swelling. Respiratory: Positive for shortness of breath. Endocrine: Negative. Hematologic/Lymphatic: Negative. Skin: Negative. Musculoskeletal: Negative. Gastrointestinal: Negative. Genitourinary: Negative. Neurological: Negative. Psychiatric/Behavioral: Negative. Allergic/Immunologic: Negative. Subjective:   BP (!) 146/82   Pulse 80   Temp 98.6 °F (37 °C) (Oral)   Resp 20   Wt 240 lb (108.9 kg)   SpO2 100%   BMI 31.66 kg/m²     Physical Exam  Constitutional:       Appearance: He is obese. He is ill-appearing. Eyes:      Pupils: Pupils are equal, round, and reactive to light. Cardiovascular:      Rate and Rhythm: Normal rate. Pulmonary:      Effort: Pulmonary effort is normal.      Breath sounds: Wheezing present. Abdominal:      General: Bowel sounds are normal.   Musculoskeletal:         General: Normal range of motion. Skin:     General: Skin is warm and dry. Neurological:      General: No focal deficit present. Mental Status: He is alert and oriented to person, place, and time. Psychiatric:         Mood and Affect: Mood normal.         Behavior: Behavior normal.         Thought Content: Thought content normal.         Judgment: Judgment normal.          Cardiographics       Echocardiogram:     Limited echocardiogram for LVEF and RVEF.   Left Ventricle: Left ventricle size is normal. Normal wall thickness. Mild global hypokinesis present. Septal motion consistent with pacemaker activation. Mildly reduced left ventricular systolic function. EF by 2D [employer]psons Biplane is 47%.   Right Ventricle: Right ventricle size is normal. Lead present in the right ventricle. Normal systolic function.   IVC/SVC: IVC diameter is less than or equal to 21 mm and decreases greater than 50% during inspiration; therefore the estimated right atrial pressure is normal (~3 mmHg).     Labs:   Recent Results (from the past 24 hour(s))   CBC    Collection Time: 07/11/22 12:24 PM   Result Value Ref Range    WBC 8.1 4.3 - 11.1 K/uL    RBC 5.41 4.23 - 5.6 M/uL    Hemoglobin 14.0 13.6 - 17.2 g/dL    Hematocrit 44.2 41.1 - 50.3 %    MCV 81.7 79.6 - 97.8 FL    MCH 25.9 (L) 26.1 - 32.9 PG    MCHC 31.7 31.4 - 35.0 g/dL    RDW 15.6 (H) 11.9 - 14.6 %    Platelets 773 403 - 090 K/uL    MPV 10.6 9.4 - 12.3 FL    nRBC 0.00 0.0 - 0.2 K/uL        Patient has been seen and examined by Dr. Samia Pope and he agrees with the following assessment and plan:     Assessment/Plan:       Principal Problem:    Chest pain  -- plan for [facility] today  -- continue ASA, BB and statin  -- No ACE due to HEATHER when taken in past     Active Problems:    Hyperlipidemia  -- on statin       Atrial fibrillation, persistent   -- on pradaxa, last dose 7/10/2022      CAD in native artery  -- see above      Hypertension  -- continue home meds, monitor and titrate as needed       Chronic renal disease, stage III ([address]  -- monitor, repeat in AM          [facility] RAUL Jordan, APRN - CNP  7/11/2022 1:01 PM

## 2022-07-11 NOTE — PROGRESS NOTES
Patient admitted under the cardiology service he had new onset chest pain this morning he has a history of coronary artery disease and a prior stent placement he also has a history of a TAVR and a biventricular pacemaker. He did appear visibly uncomfortable in the emergency room and was taken in an expedited fashion to the Cath Lab where he had minimal coronary artery disease and a patent prior stent. Normal left ventricular function was noted.   It appears as though he has a noncardiac source of his discomfort

## 2022-07-12 VITALS
DIASTOLIC BLOOD PRESSURE: 78 MMHG | SYSTOLIC BLOOD PRESSURE: 119 MMHG | TEMPERATURE: 98 F | RESPIRATION RATE: 18 BRPM | HEART RATE: 72 BPM | WEIGHT: 240 LBS | OXYGEN SATURATION: 96 % | BODY MASS INDEX: 31.66 KG/M2

## 2022-07-12 PROBLEM — R07.9 CHEST PAIN: Status: RESOLVED | Noted: 2022-01-03 | Resolved: 2022-07-12

## 2022-07-12 LAB
ANION GAP SERPL CALC-SCNC: 4 MMOL/L (ref 7–16)
BUN SERPL-MCNC: 15 MG/DL (ref 8–23)
CALCIUM SERPL-MCNC: 8.7 MG/DL (ref 8.3–10.4)
CHLORIDE SERPL-SCNC: 108 MMOL/L (ref 98–107)
CO2 SERPL-SCNC: 26 MMOL/L (ref 21–32)
CREAT SERPL-MCNC: 1.1 MG/DL (ref 0.8–1.5)
ERYTHROCYTE [DISTWIDTH] IN BLOOD BY AUTOMATED COUNT: 15.5 % (ref 11.9–14.6)
GLUCOSE SERPL-MCNC: 92 MG/DL (ref 65–100)
HCT VFR BLD AUTO: 39.1 % (ref 41.1–50.3)
HGB BLD-MCNC: 12.6 G/DL (ref 13.6–17.2)
LIPASE SERPL-CCNC: 126 U/L (ref 73–393)
MCH RBC QN AUTO: 25.4 PG (ref 26.1–32.9)
MCHC RBC AUTO-ENTMCNC: 32.2 G/DL (ref 31.4–35)
MCV RBC AUTO: 78.7 FL (ref 79.6–97.8)
NRBC # BLD: 0 K/UL (ref 0–0.2)
PLATELET # BLD AUTO: 183 K/UL (ref 150–450)
PMV BLD AUTO: 9.6 FL (ref 9.4–12.3)
POTASSIUM SERPL-SCNC: 4.5 MMOL/L (ref 3.5–5.1)
RBC # BLD AUTO: 4.97 M/UL (ref 4.23–5.6)
SODIUM SERPL-SCNC: 138 MMOL/L (ref 138–145)
WBC # BLD AUTO: 7 K/UL (ref 4.3–11.1)

## 2022-07-12 PROCEDURE — 36415 COLL VENOUS BLD VENIPUNCTURE: CPT

## 2022-07-12 PROCEDURE — 83690 ASSAY OF LIPASE: CPT

## 2022-07-12 PROCEDURE — 80048 BASIC METABOLIC PNL TOTAL CA: CPT

## 2022-07-12 PROCEDURE — 2700000000 HC OXYGEN THERAPY PER DAY

## 2022-07-12 PROCEDURE — 6370000000 HC RX 637 (ALT 250 FOR IP): Performed by: NURSE PRACTITIONER

## 2022-07-12 PROCEDURE — 85027 COMPLETE CBC AUTOMATED: CPT

## 2022-07-12 PROCEDURE — 94760 N-INVAS EAR/PLS OXIMETRY 1: CPT

## 2022-07-12 PROCEDURE — 99217 PR OBSERVATION CARE DISCHARGE MANAGEMENT: CPT | Performed by: INTERNAL MEDICINE

## 2022-07-12 PROCEDURE — G0378 HOSPITAL OBSERVATION PER HR: HCPCS

## 2022-07-12 PROCEDURE — 94640 AIRWAY INHALATION TREATMENT: CPT

## 2022-07-12 RX ORDER — OMEPRAZOLE 20 MG/1
20 CAPSULE, DELAYED RELEASE ORAL DAILY
Status: ON HOLD | COMMUNITY
End: 2022-07-12 | Stop reason: SDUPTHER

## 2022-07-12 RX ORDER — OMEPRAZOLE 20 MG/1
20 CAPSULE, DELAYED RELEASE ORAL 2 TIMES DAILY
Qty: 30 CAPSULE | Refills: 3 | COMMUNITY
Start: 2022-07-12

## 2022-07-12 RX ADMIN — ASPIRIN 81 MG: 81 TABLET, CHEWABLE ORAL at 09:08

## 2022-07-12 RX ADMIN — ATORVASTATIN CALCIUM 40 MG: 40 TABLET, FILM COATED ORAL at 09:08

## 2022-07-12 RX ADMIN — METOPROLOL SUCCINATE 100 MG: 100 TABLET, EXTENDED RELEASE ORAL at 06:22

## 2022-07-12 RX ADMIN — MOMETASONE FUROATE AND FORMOTEROL FUMARATE DIHYDRATE 2 PUFF: 200; 5 AEROSOL RESPIRATORY (INHALATION) at 08:29

## 2022-07-12 ASSESSMENT — PAIN SCALES - GENERAL
PAINLEVEL_OUTOF10: 0

## 2022-07-12 ASSESSMENT — ENCOUNTER SYMPTOMS
GASTROINTESTINAL NEGATIVE: 1
WHEEZING: 0
SHORTNESS OF BREATH: 0
COUGH: 0

## 2022-07-12 NOTE — CARE COORDINATION
Pt presented to the ED c/o chest pain with radiation to his jaws with associated SOB and diaphoresis. Pt underwent cardiac catheterization by Dr. Ave Biswas and was found to have mild nonobstructive CAD and patent stent. Pt tolerated the procedure well and is now discharging home in stable condition. No discharge needs were identified. Tx goals have been met. 1123-Pt requested ambo transport. CM arranged with [address]; p/u at: 12 noon. Pt made aware.       07/12/22 0859   Service Assessment   Patient Orientation Alert and Oriented   Cognition Alert   History Provided By Patient   Primary Caregiver Self   Support Systems Spouse/Significant Other;Children;Family Members;[employer];Friends/Neighbors   PCP Verified by CM Yes  Mauro Del Toro)   Prior Functional Level Independent in ADLs/IADLs   Current Functional Level Independent in ADLs/IADLs   Can patient return to prior living arrangement Yes   Ability to make needs known: Good   Family able to assist with home care needs: Yes   Financial Resources Medicare;Medicaid; ([place])   Social/Functional History   Type of Home Apartment   ADL Assistance Independent   Active  Yes   Mode of Transportation Car   Occupation Retired   Discharge Planning   Type of [address] Prior To Admission None   Potential Assistance Needed N/A   DME Ordered? No   Potential Assistance Purchasing Medications No   Type of [facility]   Patient expects to be discharged to: [facility] Discharge   [facility] Discharge None   [employer] Information Provided? Yes   Mode of Transport at Discharge Other (see comment)  (Family)   Confirm Follow Up Transport Self   Condition of Participation: Discharge Planning   The Patient and/or Patient Representative was provided with a Choice of Provider? Patient   The Patient and/Or Patient Representative agree with the Discharge Plan?  Yes   Freedom of Choice list was

## 2022-07-12 NOTE — DISCHARGE SUMMARY
[facility] Cardiology Discharge Summary     Patient ID:  Kay Gerardo  [id number]  64 y.o.  1955    Admit date: 7/11/2022    Discharge date:  07/12/2022    Admitting Physician: Minna Melo MD     Discharge Physician: Valentina Hernandez NP, APRN - CNP/Dr. Sexton    Admission Diagnoses: Chest pain [R07.9]    Discharge Diagnoses:    Diagnosis    Chronic renal disease, stage III [facility])    Establishing care with new doctor, encounter for    Chronic midline low back pain    Hx of fracture of femur    Presence of cardiac resynchronization therapy pacemaker (CRT-P)    Chronic dyspnea    [facility] fibrillation, persistent ([facility]    Hyperlipidemia    Longstanding persistent atrial fibrillation ([facility]    PAD (peripheral artery disease) ([facility]    Hypertension    [facility] failure with mid-range ejection fraction [facility]    History of transcatheter aortic valve replacement (TAVR)    Pulmonary fibrosis (HCC)    Shortness of breath at rest    Septic shock with acute organ dysfunction due to pneumococcus [facility])    Chronic lung disease       Cardiology Procedures this admission:  Diagnostic left heart catheterization  Consults: none    Hospital Course: Patient presented to the ER with c/o chest pain with radiation to jaws with associated shortness of breath and diaphoresis. Patient underwent cardiac catheterization by Dr. Catia Pulido. Patient was found to have mild nonobstructive CAD and patent stent. Patient tolerated the procedure well and was taken to the telemetry floor for recovery. The morning of discharge, patient was up feeling well without any complaints of chest pain or shortness of breath. Patient's right radial cath site was clean, dry and intact without hematoma or bruit. Patient's labs were WNL. Patient was seen and examined by Dr. Speedy Guardado and determined stable and ready for discharge. Patient instructed to increase Prilosec to twice a day.  Patient will follow up with PCP for work up of non cardiac chest pain. The patient will follow up with [facility]iology -- Dr. Mely Fang. Patient has been referred to cardiac rehab. DISPOSITION: The patient is being discharged home in stable condition on a low saturated fat, low cholesterol and low salt diet. The patient is instructed to advance activities as tolerated to the limit of fatigue or shortness of breath. The patient is instructed to avoid all heavy lifting, straining, stooping or squatting for 3-5 days. The patient is instructed to watch the cath site for bleeding/oozing; if seen, the patient is instructed to apply firm pressure with a clean cloth and call [facility] Cardiology at [phone]. The patient is instructed to watch for signs of infection which include: increasing area of redness, fever/hot to touch or purulent drainage at the catheterization site. The patient is instructed not to soak in a bathtub for 7-10 days, but is cleared to shower. The patient is instructed to call the office or return to the ER for immediate evaluation for any shortness of breath or chest pain not relieved by NTG. Discharge Exam: BP (!) 140/80   Pulse 74   Temp 97.5 °F (36.4 °C)   Resp 18   Wt 240 lb (108.9 kg)   SpO2 98%   BMI 31.66 kg/m²   Patient has been seen by Dr. Zimmer Link: see his progress note for exam details.     Recent Results (from the past 24 hour(s))   CBC    Collection Time: 07/11/22 12:24 PM   Result Value Ref Range    WBC 8.1 4.3 - 11.1 K/uL    RBC 5.41 4.23 - 5.6 M/uL    Hemoglobin 14.0 13.6 - 17.2 g/dL    Hematocrit 44.2 41.1 - 50.3 %    MCV 81.7 79.6 - 97.8 FL    MCH 25.9 (L) 26.1 - 32.9 PG    MCHC 31.7 31.4 - 35.0 g/dL    RDW 15.6 (H) 11.9 - 14.6 %    Platelets 122 352 - 475 K/uL    MPV 10.6 9.4 - 12.3 FL    nRBC 0.00 0.0 - 0.2 K/uL   Comprehensive Metabolic Panel    Collection Time: 07/11/22  2:24 PM   Result Value Ref Range    Sodium 135 (L) 136 - 145 mmol/L    Potassium 4.3 3.5 - 5.1 mmol/L    Chloride 106 98 - 107 mmol/L

## 2022-07-13 ENCOUNTER — OFFICE VISIT (OUTPATIENT)
Dept: NEUROSURGERY | Age: 67
End: 2022-07-13
Payer: MEDICARE

## 2022-07-13 VITALS
TEMPERATURE: 98.2 F | HEART RATE: 70 BPM | SYSTOLIC BLOOD PRESSURE: 138 MMHG | OXYGEN SATURATION: 97 % | DIASTOLIC BLOOD PRESSURE: 93 MMHG

## 2022-07-13 DIAGNOSIS — M54.2 NECK PAIN: Primary | ICD-10-CM

## 2022-07-13 DIAGNOSIS — M25.511 BILATERAL SHOULDER PAIN, UNSPECIFIED CHRONICITY: ICD-10-CM

## 2022-07-13 DIAGNOSIS — M25.512 BILATERAL SHOULDER PAIN, UNSPECIFIED CHRONICITY: ICD-10-CM

## 2022-07-13 PROCEDURE — 99203 OFFICE O/P NEW LOW 30 MIN: CPT | Performed by: NURSE PRACTITIONER

## 2022-07-13 PROCEDURE — 1123F ACP DISCUSS/DSCN MKR DOCD: CPT | Performed by: NURSE PRACTITIONER

## 2022-07-13 ASSESSMENT — PATIENT HEALTH QUESTIONNAIRE - PHQ9
SUM OF ALL RESPONSES TO PHQ9 QUESTIONS 1 & 2: 0
SUM OF ALL RESPONSES TO PHQ QUESTIONS 1-9: 0
2. FEELING DOWN, DEPRESSED OR HOPELESS: 0
SUM OF ALL RESPONSES TO PHQ QUESTIONS 1-9: 0
SUM OF ALL RESPONSES TO PHQ QUESTIONS 1-9: 0
2. FEELING DOWN, DEPRESSED OR HOPELESS: 0
SUM OF ALL RESPONSES TO PHQ QUESTIONS 1-9: 0
SUM OF ALL RESPONSES TO PHQ QUESTIONS 1-9: 0
1. LITTLE INTEREST OR PLEASURE IN DOING THINGS: 0
SUM OF ALL RESPONSES TO PHQ QUESTIONS 1-9: 0

## 2022-07-13 NOTE — PROGRESS NOTES
[facility] NOTE:   History of Present Illness:    CC: Neck and shoulder pain    Bard Oscar is a 79 y.o. right handed male who presents today for evaluation of his neck and shoulder pain. Patient has had a previous C4-5 ACDF, currently has a low back [employer] spinal cord stimulator, has a titanium hip and femur, has multiple artificial heart valves, is 100% pacemaker dependent, pulmonary fibrosis, and is taking Pradaxa. Patient states that over the past year he has had dramatic increase in amount of pain that he experiences in his neck down into each of his shoulders. Patient used to be in [employer] and regularly jumped out of planes. Patient states that he has been told he has poor bone density most likely related to excessive exposure to low concentrations of gravity as well as frequent high-impact landings. Patient states he has noticed that he has a hard time being up for long periods of time before he begins to have lots of excruciating neck and shoulder pain. Patient does have a lot of motion limitations in the left shoulder and a lot of pain in that region as well. Patient states he requires his shoulders to help with his ability to transfer and get around.     Past Medical History:   Diagnosis Date    Aortic stenosis     Aortic valve replacement 7/2018    CAD (coronary artery disease)     PCI in 2014, 2015 had MI and then stents    Cancer ([address]     SCC removed face     CHF (congestive heart failure) ([address]     Chronic renal disease, stage III ([address]     sees neph and does not have actual diagnosis at this time    COPD (chronic obstructive pulmonary disease) ([address]     fibrosis in lungs    Dyslipidemia     Heart failure ([address]     CHF per patient    HTN (hypertension)     med controlled    Hyperlipidemia     Ischemic cardiomyopathy     Pacemaker     only pacemaker    Paroxysmal atrial fibrillation ([address]     pradaxa for this    Pulmonary fibrosis ([facility]     Seizure disorder ([facility]     lyme disease - small lesion frontal part of brain - no maintenance meds - last seizure 2 months ago, due to fall - before that it was nine years    Systolic heart failure [facility])       Past Surgical History:   Procedure Laterality Date    ABLATION OF DYSRHYTHMIC FOCUS      AORTIC VALVE REPLACEMENT  07/2018    BACK SURGERY      discectomy    CARDIAC CATHETERIZATION      PCI 2014, 2015    CARDIAC PROCEDURE N/A 7/11/2022    LEFT HEART CATH / CORONARY ANGIOGRAPHY performed by Aishwarya Anne MD at [address] [place] [address] CATH LAB   [facility]      C3-4 fusion    EGD  6/17/2022         HIP ARTHROPLASTY Left     SPINAL CORD STIMULATOR SURGERY      lower back     UPPER GASTROINTESTINAL ENDOSCOPY N/A 6/17/2022    EGD ESOPHAGOGASTRODUODENOSCOPY/ PT HAS PACEMAKER performed by Yvonne Haque MD at [facility] ENDOSCOPY     Allergies   Allergen Reactions    Carbamazepine Anaphylaxis    Lacosamide Nausea Only    Lorazepam Other (See Comments)     Violent behavior    Nitroglycerin Other (See Comments)     hypotension      Family History   Problem Relation Age of Onset    Heart Disease Sister     Heart Disease Mother       Social History     Socioeconomic History    Marital status: Legally      Spouse name: Not on file    Number of children: Not on file    Years of education: Not on file    Highest education level: Not on file   Occupational History    Not on file   Tobacco Use    Smoking status: Current Every Day Smoker     Packs/day: 0.25    Smokeless tobacco: Never Used    Tobacco comment: Quit smoking: cutting back to 4 Cigarettes a day   Vaping Use    Vaping Use: Never used   Substance and Sexual Activity    Alcohol use: Not Currently    Drug use: Yes     Comment: cannabis used: two weeks ago as of 6/15/2022 edibles     Sexual activity: Not on file   Other Topics Concern    Not on file   Social History Narrative    Not on file     Social Determinants of Health Financial Resource Strain:     Difficulty of Paying Living Expenses: Not on file   Food Insecurity:     Worried About Running Out of Food in the Last Year: Not on file    Rajendra of Food in the Last Year: Not on file   Transportation Needs:     Lack of Transportation (Medical): Not on file    Lack of Transportation (Non-Medical):  Not on file   Physical Activity:     Days of Exercise per Week: Not on file    Minutes of Exercise per Session: Not on file   Stress:     Feeling of Stress : Not on file   Social Connections:     Frequency of Communication with Friends and Family: Not on file    Frequency of Social Gatherings with Friends and Family: Not on file    Attends Restorationism Services: Not on file    Active Member of [address] [place] [address] or Organizations: Not on file    Attends Club or Organization Meetings: Not on file    Marital Status: Not on file   Intimate Partner Violence:     Fear of Current or Ex-Partner: Not on file    Emotionally Abused: Not on file    Physically Abused: Not on file    Sexually Abused: Not on file   Housing Stability:     Unable to Pay for Housing in the Last Year: Not on file    Number of [place] in the Last Year: Not on file    Unstable Housing in the Last Year: Not on file     Current Outpatient Medications   Medication Sig Dispense Refill    omeprazole (PRILOSEC) 20 MG delayed release capsule Take 1 capsule by mouth in the morning and at bedtime 30 capsule 3    Cholecalciferol (VITAMIN D3) 75 MCG (3000 UT) TABS Take by mouth      albuterol sulfate  (90 Base) MCG/ACT inhaler Inhale 2 puffs into the lungs every 4 hours as needed      aspirin 81 MG chewable tablet Take 81 mg by mouth daily      atorvastatin (LIPITOR) 40 MG tablet Take 40 mg by mouth daily      cyclobenzaprine (FLEXERIL) 5 MG tablet Take 5 mg by mouth 3 times daily as needed      dabigatran (PRADAXA) 150 MG capsule Take 150 mg by mouth every 12 hours      fluticasone-salmeterol (ADVAIR) 250-50 MCG/ACT AEPB diskus inhaler Inhale 1 puff into the lungs every 12 hours      furosemide (LASIX) 40 MG tablet Take 40 mg by mouth daily as needed      ipratropium-albuterol (DUONEB) 0.5-2.5 (3) MG/3ML SOLN nebulizer solution Inhale 3 mLs into the lungs every 4 hours as needed       metoprolol succinate (TOPROL XL) 100 MG extended release tablet Take 100 mg by mouth every 12 hours      ondansetron (ZOFRAN-ODT) 8 MG TBDP disintegrating tablet Take 8 mg by mouth every 8 hours as needed      potassium chloride (KLOR-CON M) 20 MEQ extended release tablet Take 20 mEq by mouth as needed       No current facility-administered medications for this visit.      Patient Active Problem List   Diagnosis    Leg injury    Hyperlipidemia    Atrial fibrillation, persistent ([address]    CAD in native artery    Hypertension    Pulmonary fibrosis ([address]    Longstanding persistent atrial fibrillation (HCC)    Shortness of breath at rest    CAP (community acquired pneumonia)    PAD (peripheral artery disease) ([address]    Iron deficiency anemia    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[address]    COPD with acute lower respiratory infection ([address]    Heart failure with mid-range ejection fraction (HCC)    History of transcatheter aortic valve replacement (TAVR)    Septic shock with acute organ dysfunction due to pneumococcus ([address]    Acute on chronic heart failure with reduced ejection fraction and diastolic dysfunction (HCC)    Acute on chronic HFrEF (heart failure with reduced ejection fraction) ([address]    Presence of cardiac resynchronization therapy pacemaker (CRT-P)    Chronic lung disease    Chronic dyspnea    Establishing care with new doctor, encounter for    Chronic midline low back pain    Hx of fracture of femur    Productive cough    Chronic renal disease, stage III ([address]          ROS Review of Systems    Constitutional:                    No recent weight changes, fever, fatigue, sleep difficulties, loss of appetite ENT/Mouth:  No hearing loss, No ringing in the ears, chronic sinus problem, nose bleeds,sore throat, voice change, hoarseness, swollen glands in neck, or difficulties with chewing and swallowing. Cardiovascular:  No chest pain/angina pectoris, palpitations, swelling of feet/ankles/hands, or calf pain while walking. Respiratory: No chronic or frequent coughs, spitting up blood, shortness of breath, No asthma, or wheezing. Gastrointestinal: No a bdominal pain, heartburn, nausea, vomiting, constipation, or frequent diarrhea     Genitourinary: No frequent urination, burning or painful urination, or blood in urine     Musculoskeletal:   POS neck, shoulder, and back pain     Integument:   No rash/itching     Neurological:   Dizziness/vertigo, No numbness/tingling sensation, tremors, No weakness in limbs, frequent or recurring headaches, memory loss or confusion. Physical Exam:    General: No acute distress  Head normocephalic and atraumatic  Mood and affect appropriate  CV: Regular rate   Resp: No increased work of breathing  Skin: warm and dry   Awake, alert, and oriented   Speech fluent  Eyes open spontaneously   Face symmetric and tongue midline on protrusion  Sternocleidomastoid and trapezius 5/5  No mid-line cervical, thoracic, or lumbar tenderness to palpation   Patient with strength exam as follows:   Upper Extremities: Right Left      Deltoid  5 5    Biceps  5 5    Triceps 5 +3      5 5   Hand Intrinsics  5 5  Wrist flexors/extensors  5 5       Sensation intact to light touch and pin-prick   DTR 2+  No clonus or babinski present   No Lamar's sign present bilaterally   Gait electric wheelchair    Assessment & Plan:  Jarocho Rios is a 79 y.o. male who presents to be evaluated for his neck and shoulder pain. I am sending the patient for a cervical CT myelogram to check for evidence of nerve compromise because he is currently unable to have an MRI.   I am also sending a referral to  [facility] for pain management to see if the patient may be candidate for a cervical spinal cord stimulator or to restart neck injections. Patient will follow up with me after the CT myelogram is complete to review the imaging. No diagnosis found. Notes are transcribed with [employer], a medical voice recording dictation service, and may contain minor errors.     Tricia Rice, NP  [address]

## 2022-07-14 ENCOUNTER — TELEPHONE (OUTPATIENT)
Dept: CARDIOLOGY CLINIC | Age: 67
End: 2022-07-14

## 2022-07-14 NOTE — TELEPHONE ENCOUNTER
Cardiac Clearance     Physician or Practice Requestin [facility] Interventional Radiology  : [address] [place] Phone Number: [phone]  Fax Number: [phone]  Date of Surgery/Procedure: 2022  Type of Surgery or Procedure: Cervical Myelogram  Type of Anesthesia: Local   Type of Clearance Requested: Medication Hold Only  Medication to Hold: paradxa   Days to Hold: 48 hours       Aortic valve replacement 2018     CAD (coronary artery disease)       PCI in ,     COPD (chronic obstructive pulmonary disease) ([address]      Dyslipidemia      Heart failure ([address]      HTN (hypertension)      Ischemic cardiomyopathy      Paroxysmal atrial fibrillation ([address]      Pulmonary fibrosis ([address]      Seizure disorder ([address]      Systolic heart failure ([address]          HX AFIB ABLATION         HX AORTIC VALVE REPLACEMENT   2018    HX HEART CATHETERIZATION         PCI ,

## 2022-07-14 NOTE — TELEPHONE ENCOUNTER
Cardiac Clearance    Physician or Practice Requesting: [facility] Interventional Radiology  : [address] [place] Phone Number: [phone]  Fax Number: [phone]  Date of Surgery/Procedure: 07/19/2022  Type of Surgery or Procedure: Cervical Myelogram  Type of Anesthesia: Local   Type of Clearance Requested: Medication Hold Only  Medication to Hold: paradxa   Days to Hold: 48 hours

## 2022-07-18 ENCOUNTER — TELEPHONE (OUTPATIENT)
Dept: PULMONOLOGY | Age: 67
End: 2022-07-18

## 2022-07-19 ENCOUNTER — TELEMEDICINE (OUTPATIENT)
Dept: INTERNAL MEDICINE CLINIC | Facility: CLINIC | Age: 67
End: 2022-07-19
Payer: MEDICARE

## 2022-07-19 ENCOUNTER — HOSPITAL ENCOUNTER (OUTPATIENT)
Dept: CT IMAGING | Age: 67
Discharge: HOME | End: 2022-07-22
Payer: MEDICARE

## 2022-07-19 ENCOUNTER — HOSPITAL ENCOUNTER (OUTPATIENT)
Dept: INTERVENTIONAL RADIOLOGY/VASCULAR | Age: 67
Discharge: HOME | End: 2022-07-22
Payer: MEDICARE

## 2022-07-19 VITALS
OXYGEN SATURATION: 99 % | HEART RATE: 70 BPM | HEIGHT: 73 IN | DIASTOLIC BLOOD PRESSURE: 59 MMHG | SYSTOLIC BLOOD PRESSURE: 126 MMHG | WEIGHT: 240 LBS | BODY MASS INDEX: 31.81 KG/M2

## 2022-07-19 VITALS
HEART RATE: 70 BPM | TEMPERATURE: 97.5 F | SYSTOLIC BLOOD PRESSURE: 148 MMHG | DIASTOLIC BLOOD PRESSURE: 79 MMHG | OXYGEN SATURATION: 98 % | RESPIRATION RATE: 18 BRPM

## 2022-07-19 DIAGNOSIS — E78.5 HYPERLIPIDEMIA, UNSPECIFIED HYPERLIPIDEMIA TYPE: ICD-10-CM

## 2022-07-19 DIAGNOSIS — I48.19 ATRIAL FIBRILLATION, PERSISTENT (HCC): Primary | ICD-10-CM

## 2022-07-19 DIAGNOSIS — R73.9 HIGH BLOOD SUGAR: ICD-10-CM

## 2022-07-19 DIAGNOSIS — N18.30 STAGE 3 CHRONIC KIDNEY DISEASE, UNSPECIFIED WHETHER STAGE 3A OR 3B CKD (HCC): ICD-10-CM

## 2022-07-19 DIAGNOSIS — M54.2 NECK PAIN: ICD-10-CM

## 2022-07-19 DIAGNOSIS — I10 HYPERTENSION, UNSPECIFIED TYPE: ICD-10-CM

## 2022-07-19 DIAGNOSIS — M25.512 BILATERAL SHOULDER PAIN, UNSPECIFIED CHRONICITY: ICD-10-CM

## 2022-07-19 DIAGNOSIS — M25.511 BILATERAL SHOULDER PAIN, UNSPECIFIED CHRONICITY: ICD-10-CM

## 2022-07-19 DIAGNOSIS — J44.0 COPD WITH ACUTE LOWER RESPIRATORY INFECTION (HCC): ICD-10-CM

## 2022-07-19 DIAGNOSIS — G47.33 OSA (OBSTRUCTIVE SLEEP APNEA): ICD-10-CM

## 2022-07-19 PROCEDURE — 2500000003 HC RX 250 WO HCPCS: Performed by: PHYSICIAN ASSISTANT

## 2022-07-19 PROCEDURE — 99214 OFFICE O/P EST MOD 30 MIN: CPT | Performed by: INTERNAL MEDICINE

## 2022-07-19 PROCEDURE — 72126 CT NECK SPINE W/DYE: CPT

## 2022-07-19 PROCEDURE — 6360000004 HC RX CONTRAST MEDICATION: Performed by: PHYSICIAN ASSISTANT

## 2022-07-19 PROCEDURE — 2709999900 IR MYELOGRAM CERVICAL

## 2022-07-19 RX ORDER — LIDOCAINE HYDROCHLORIDE 20 MG/ML
INJECTION, SOLUTION INFILTRATION; PERINEURAL
Status: COMPLETED | OUTPATIENT
Start: 2022-07-19 | End: 2022-07-19

## 2022-07-19 RX ADMIN — IOPAMIDOL 10 ML: 612 INJECTION, SOLUTION INTRATHECAL at 09:39

## 2022-07-19 RX ADMIN — LIDOCAINE HYDROCHLORIDE 5 ML: 20 INJECTION, SOLUTION INFILTRATION; PERINEURAL at 09:36

## 2022-07-19 ASSESSMENT — PAIN - FUNCTIONAL ASSESSMENT: PAIN_FUNCTIONAL_ASSESSMENT: 0-10

## 2022-07-19 NOTE — DISCHARGE INSTRUCTIONS
If you have any questions about your procedure, please call the Interventional Radiology department at [phone]. After business hours (5pm) and weekends, call the answering service at [phone] and ask for the Radiologist on call to be paged. Si tiene Preguntas acerca del procedimiento, por favor llame al departamento de Radiología Intervencional al [phone] Interventional Radiology General Nurse Discharge    After general anesthesia or intravenous sedation, for 24 hours or while taking prescription Narcotics:  Limit your activities  Do not drive and operate hazardous machinery  Do not make important personal or business decisions  Do  not drink alcoholic beverages  If you have not urinated within 8 hours after discharge, please contact your surgeon on call. * Please give a list of your current medications to your Primary Care Provider. * Please update this list whenever your medications are discontinued, doses are     changed, or new medications (including over-the-counter products) are added. * Please carry medication information at all times in case of emergency situations. These are general instructions for a healthy lifestyle:    No smoking/ No tobacco products/ Avoid exposure to second hand smoke  Surgeon General's Warning:  Quitting smoking now greatly reduces serious risk to your health. Obesity, smoking, and sedentary lifestyle greatly increases your risk for illness  A healthy diet, regular physical exercise & weight monitoring are important for maintaining a healthy lifestyle    You may be retaining fluid if you have a history of heart failure or if you experience any of the following symptoms:  Weight gain of 3 pounds or more overnight or 5 pounds in a week, increased swelling in our hands or feet or shortness of breath while lying flat in bed.   Please call your doctor as soon as you notice any of these symptoms; do not wait until your next office visit. Recognize signs and symptoms of STROKE:  F-face looks uneven    A-arms unable to move or move unevenly    S-speech slurred or non-existent    T-time-call 911 as soon as signs and symptoms begin-DO NOT go       Back to bed or wait to see if you get better-TIME IS BRAIN. de horas de oficina (5 pm) y los fines de Margaret, llamar al Richard leroy [phone] y pregunte por el Radiologo de [facility].

## 2022-07-19 NOTE — PROGRESS NOTES
FOLLOW UP VISIT    Subjective:    Ashley Chen (: 1955) is a 79 y.o., male,   No chief complaint on file. HPI:  DATE Of Admission 2022   DATE Of Discharge 2022     Very nice 79year old in for ER follow up. He went to ER for chest pain with SOB and diaphoresis. He had a Cath done by Cardiology clean and was put on PPI. He is c/o more pain in his legs and arms. He was admitted to be observed over night. He did his second sleep study the Pulmonary MD is going to start 02 at night . His neurosurgeon is going to refer him to Pain Mgt. He got a CT Myelogram today of his neck and shoulder. He is much improved on PPI is not having chest pain. He is seeing Cardiology virtual visit on the             The morning of discharge, patient was up feeling well without any complaints of chest pain or shortness of breath. Patient's right radial cath site was clean, dry and intact without hematoma or bruit. Patient's labs were WNL. Patient was seen and examined by Dr. Vinny Mendoza and determined stable and ready for discharge. Patient instructed to increase Prilosec to twice a day. Patient will follow up with PCP for work up of non cardiac chest pain. The patient will follow up with [facility] -- Dr. Susie Jett. Patient has been referred to cardiac rehab.            The following portions of the patient's history were reviewed and updated as appropriate:      Past Medical History:   Diagnosis Date    Aortic stenosis     Aortic valve replacement 2018    CAD (coronary artery disease)     PCI in ,  had MI and then stents    Cancer ([address]     SCC removed face     CHF (congestive heart failure) ([address]     Chronic renal disease, stage III ([address]     sees neph and does not have actual diagnosis at this time    COPD (chronic obstructive pulmonary disease) ([address]     fibrosis in lungs    Dyslipidemia     Heart failure ([address]     CHF per patient    HTN (hypertension)     med controlled Hyperlipidemia     Ischemic cardiomyopathy     Pacemaker     only pacemaker    Paroxysmal atrial fibrillation (HCC)     pradaxa for this    Pulmonary fibrosis (HCC)     Seizure disorder (HCC)     lyme disease - small lesion frontal part of brain - no maintenance meds - last seizure 2 months ago, due to fall - before that it was nine years    Systolic heart failure [facility])        Past Surgical History:   Procedure Laterality Date    ABLATION OF DYSRHYTHMIC FOCUS      AORTIC VALVE REPLACEMENT  07/2018    BACK SURGERY      discectomy    CARDIAC CATHETERIZATION      PCI 2014, 2015    CARDIAC PROCEDURE N/A 7/11/2022    LEFT HEART CATH / CORONARY ANGIOGRAPHY performed by Darrel Tapia MD at [address] [place] [address] CATH LAB    CERVICAL FUSION      C3-4 fusion    EGD  6/17/2022         HIP ARTHROPLASTY Left     SPINAL CORD STIMULATOR SURGERY      lower back     UPPER GASTROINTESTINAL ENDOSCOPY N/A 6/17/2022    EGD ESOPHAGOGASTRODUODENOSCOPY/ PT HAS PACEMAKER performed by Migel Wallace MD at [facility] ENDOSCOPY       Family History   Problem Relation Age of Onset    Heart Disease Sister     Heart Disease Mother        Social History     Socioeconomic History    Marital status: Legally      Spouse name: Not on file    Number of children: Not on file    Years of education: Not on file    Highest education level: Not on file   Occupational History    Not on file   Tobacco Use    Smoking status: Every Day     Packs/day: 0.25     Types: Cigarettes    Smokeless tobacco: Never    Tobacco comments:     Quit smoking: cutting back to 4 Cigarettes a day   Vaping Use    Vaping Use: Never used   Substance and Sexual Activity    Alcohol use: Not Currently    Drug use: Yes     Comment: cannabis used: two weeks ago as of 6/15/2022 edibles     Sexual activity: Not on file   Other Topics Concern    Not on file   Social History Narrative    Not on file     Social Determinants of Health     Financial Resource Strain: Not on file   Food Insecurity: Not on file   Transportation Needs: Not on file   Physical Activity: Not on file   Stress: Not on file   Social Connections: Not on file   Intimate Partner Violence: Not on file   Housing Stability: Not on file       Current Outpatient Medications   Medication Sig Dispense Refill    omeprazole (PRILOSEC) 20 MG delayed release capsule Take 1 capsule by mouth in the morning and at bedtime 30 capsule 3    Cholecalciferol (VITAMIN D3) 75 MCG (3000 UT) TABS Take by mouth      albuterol sulfate  (90 Base) MCG/ACT inhaler Inhale 2 puffs into the lungs every 4 hours as needed      aspirin 81 MG chewable tablet Take 81 mg by mouth daily      atorvastatin (LIPITOR) 40 MG tablet Take 40 mg by mouth daily      cyclobenzaprine (FLEXERIL) 5 MG tablet Take 5 mg by mouth 3 times daily as needed      dabigatran (PRADAXA) 150 MG capsule Take 150 mg by mouth every 12 hours      fluticasone-salmeterol (ADVAIR) 250-50 MCG/ACT AEPB diskus inhaler Inhale 1 puff into the lungs every 12 hours      furosemide (LASIX) 40 MG tablet Take 40 mg by mouth daily as needed      ipratropium-albuterol (DUONEB) 0.5-2.5 (3) MG/3ML SOLN nebulizer solution Inhale 3 mLs into the lungs every 4 hours as needed       metoprolol succinate (TOPROL XL) 100 MG extended release tablet Take 100 mg by mouth every 12 hours      ondansetron (ZOFRAN-ODT) 8 MG TBDP disintegrating tablet Take 8 mg by mouth every 8 hours as needed      potassium chloride (KLOR-CON M) 20 MEQ extended release tablet Take 20 mEq by mouth as needed       No current facility-administered medications for this visit. Allergies as of 07/19/2022 - Fully Reviewed 07/19/2022   Allergen Reaction Noted    Carbamazepine Anaphylaxis 11/07/2021    Lacosamide Nausea Only 09/24/2013    Lorazepam Other (See Comments) 11/15/2021    Nitroglycerin Other (See Comments) 12/19/2021       Review of Systems    Objective: There were no vitals taken for this visit.     Physical Exam    No results found for this visit on 07/19/22. Assessent & Plan  79year old in for hospital discharge  Was admitted 7- to 7-   Had a Cath mild obstructive disease  Is awaiting further resyults on sleep study going to [place]  sending to pain mgmet  Continue PPI has GI   Chest pain has resolved with PPI     Renetta Fernandez was evaluated through a synchronous (real-time) audio-video encounter, and/or her healthcare decision maker, is aware that it is a billable service, which includes applicable co-pays, with coverage as determined by her insurance carrier. She provided verbal consent to proceed and patient identification was verified. This visit was conducted pursuant to the emergency declaration under the [address] [place] [address], [address] [place] [address] authority and the [employer] General Act. A caregiver was present when appropriate. Ability to conduct physical exam was limited. The patient was located at home in a state where the provider was licensed to provide care. The patient and/or patient representative voiced understanding and agreement with the current diagnoses, recommendations, and possible side effects. No follow-up provider specified.       Bryan Stroud MD

## 2022-07-19 NOTE — OR NURSING
Report to [facility]. Luba Castellanos in [address] notified about pt coming over at 1045.

## 2022-07-26 ENCOUNTER — TELEPHONE (OUTPATIENT)
Dept: PULMONOLOGY | Age: 67
End: 2022-07-26

## 2022-07-26 NOTE — PROGRESS NOTES
He is a 41-year-old male seen today secondary to issues with oxygen, needing updated orders. I am seeing him today for the first time, his record is reviewed. Seen 5/5/2022 by Dr. Lynsey Triana secondary to shortness of breath. At that time, reported that he had been evaluated while he lived in [place], and was told he had pulmonary fibrosis. Also reports being told he had COPD. Tobacco history is positive for up to 1 pack/day for the last 50 years and unfortunately he continues to smoke. DIAGNOSTICs:    CPFT's 6/7/2018-FEV1 1.98 L/51%, FVC 3.12L?60%%. DLCO 51%, DLCO/VA 70.6%  Echo 4/22-EF 47%. Indeterminate diastolic function. CXR 4/22-no acute abnormality. Spirometry 5/2022-moderately severe restrictive defect. Overnight oximetry 7/10/22-Showed desaturation 88% or less for 55 minutes, lowest saturation 74%. Recommendations and oxygen 2 L/min with sleep. HRCT 6/9/2022-no diffuse interstitial lung disease. There is pleural-parenchymal scarring in both apices. Scattered subpleural bullae in the upper lung zones. CXR 7/11/2022-no acute cardiopulmonary abnormality.

## 2022-07-26 NOTE — TELEPHONE ENCOUNTER
Patient needs a prescription for oxygen . Says he is out and [facility] will not fill it without a new prescription .  Patient has appt 9/22/22

## 2022-07-26 NOTE — TELEPHONE ENCOUNTER
Patient needs a prescription for oxygen .  Says he is out and [facility] will not fill it without a new prescription

## 2022-07-28 ENCOUNTER — TELEMEDICINE (OUTPATIENT)
Dept: PULMONOLOGY | Age: 67
End: 2022-07-28
Payer: MEDICARE

## 2022-07-28 ENCOUNTER — TELEPHONE (OUTPATIENT)
Dept: INTERNAL MEDICINE CLINIC | Facility: CLINIC | Age: 67
End: 2022-07-28

## 2022-07-28 DIAGNOSIS — F17.219 CIGARETTE NICOTINE DEPENDENCE WITH NICOTINE-INDUCED DISORDER: ICD-10-CM

## 2022-07-28 DIAGNOSIS — G47.34 NOCTURNAL HYPOXIA: Primary | ICD-10-CM

## 2022-07-28 DIAGNOSIS — J44.9 CHRONIC OBSTRUCTIVE PULMONARY DISEASE, UNSPECIFIED COPD TYPE (HCC): ICD-10-CM

## 2022-07-28 DIAGNOSIS — I50.22 HEART FAILURE WITH MID-RANGE EJECTION FRACTION (HCC): ICD-10-CM

## 2022-07-28 PROCEDURE — 99213 OFFICE O/P EST LOW 20 MIN: CPT | Performed by: NURSE PRACTITIONER

## 2022-07-28 PROCEDURE — 1123F ACP DISCUSS/DSCN MKR DOCD: CPT | Performed by: NURSE PRACTITIONER

## 2022-07-28 PROCEDURE — G8427 DOCREV CUR MEDS BY ELIG CLIN: HCPCS | Performed by: NURSE PRACTITIONER

## 2022-07-28 PROCEDURE — 3017F COLORECTAL CA SCREEN DOC REV: CPT | Performed by: NURSE PRACTITIONER

## 2022-07-28 ASSESSMENT — ENCOUNTER SYMPTOMS
SHORTNESS OF BREATH: 1
DIARRHEA: 0
WHEEZING: 0
ABDOMINAL PAIN: 0
NAUSEA: 0
CONSTIPATION: 0
VOMITING: 0
CHEST TIGHTNESS: 0
COUGH: 0

## 2022-07-28 NOTE — TELEPHONE ENCOUNTER
Pt told her he is starting to have trouble initiating flow to urinate, has been happening for a few days now.

## 2022-07-28 NOTE — PROGRESS NOTES
Ashley Chen, was evaluated through a synchronous (real-time) audio-video encounter. The patient (or guardian if applicable) is aware that this is a billable service, which includes applicable co-pays. This Virtual Visit was conducted with patient's (and/or legal guardian's) consent. The visit was conducted pursuant to the emergency declaration under the [address] [place] [address] and the [employer] General Act. Patient identification was verified, and a caregiver was present when appropriate. The patient was located in a state where the provider was licensed to provide care. [place].    Consent:  The patient/healthcare decision maker is aware that this patient-initiated Telehealth encounter is a billable service, with coverage as determined by his insurance carrier. The patient is aware that he/she may receive a bill and has provided verbal consent to proceed:  yes    CHIEF COMPLAINT:    Chief Complaint   Patient presents with    Follow-up    Other     Nocturnal hypoxia         HISTORY OF PRESENT ILLNESS:    He is a 71-year-old male seen today secondary to issues with oxygen, needing updated orders. I am seeing him today for the first time, his record is reviewed. Seen 5/5/2022 by Dr. Juan Manuel Laughlin secondary to shortness of breath. At that time, reported that he had been evaluated while he lived in [place], and was told he had pulmonary fibrosis. Also reports being told he had COPD. Tobacco history is positive for up to 1 pack/day for the last 50 years and unfortunately he continues to smoke. History is also notable for PAD, longstanding persistent A. fib, status post ablation/pacemaker, status post TAVR, CAD-status post PCI, CHF. Most recent EF is 45-50%. Has difficulties balancing diuretic dosing with renal function. Using Advair twice daily, DuoNeb via nebulizer intermittently.     Repeat overnight oximetry 7/10/2022 demonstrated desaturation 88% or less for 55 minutes with lowest saturation 74%. Recommendations were for oxygen at 2 L/min with sleep. High-resolution chest CT demonstrated no diffuse interstitial lung disease but pleural-parenchymal scarring in the apices and scattered subpleural bulla in the upper lung zones. His family indicates episodes of apnea and snoring, has daytime hypersomnolence. He reports attempts of sleep study x2 but reports had suboptimal sleep times and therefore study was nondiagnostic. DIAGNOSTICs:     CPFT's 6/7/2018-FEV1 1.98 L/51%, FVC 3.12L/60%%. DLCO 51%, DLCO/VA 70.6%  Echo 4/22-EF 47%. Indeterminate diastolic function. CXR 4/22-no acute abnormality. Spirometry 5/2022-moderately severe restrictive defect. Overnight oximetry 7/10/22-Showed desaturation 88% or less for 55 minutes, lowest saturation 74%. Recommendations and oxygen 2 L/min with sleep. HRCT 6/9/2022-no diffuse interstitial lung disease. There is pleural-parenchymal scarring in both apices. Scattered subpleural bullae in the upper lung zones. CXR 7/11/2022-no acute cardiopulmonary abnormality.                Past Medical History:   Diagnosis Date    Aortic stenosis     Aortic valve replacement 7/2018    CAD (coronary artery disease)     PCI in 2014, 2015 had MI and then stents    Cancer ([address]     SCC removed face     CHF (congestive heart failure) ([address]     Chronic renal disease, stage III ([address]     sees neph and does not have actual diagnosis at this time    COPD (chronic obstructive pulmonary disease) ([address]     fibrosis in lungs    Dyslipidemia     Heart failure ([address]     CHF per patient    HTN (hypertension)     med controlled    Hyperlipidemia     Ischemic cardiomyopathy     Pacemaker     only pacemaker    Paroxysmal atrial fibrillation ([address]     pradaxa for this    Pulmonary fibrosis ([address]     Seizure disorder ([address]     lyme disease - small lesion frontal part of brain - no maintenance meds - last seizure 2 months ago, due to fall - before that it was nine years    Systolic heart failure [facility])          Patient Active Problem List   Diagnosis    Leg injury    Hyperlipidemia    Atrial fibrillation, persistent ([address]    CAD in native artery    Hypertension    Pulmonary fibrosis ([address]    Longstanding persistent atrial fibrillation (HCC)    Shortness of breath at rest    CAP (community acquired pneumonia)    PAD (peripheral artery disease) ([address]    Iron deficiency anemia    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HCC)    COPD with acute lower respiratory infection ([address]    Heart failure with mid-range ejection fraction ([address]    History of transcatheter aortic valve replacement (TAVR)    Septic shock with acute organ dysfunction due to pneumococcus ([address]    Acute on chronic heart failure with reduced ejection fraction and diastolic dysfunction (HCC)    Acute on chronic HFrEF (heart failure with reduced ejection fraction) ([address]    Presence of cardiac resynchronization therapy pacemaker (CRT-P)    Chronic lung disease    Chronic dyspnea    Establishing care with new doctor, encounter for    Chronic midline low back pain    Hx of fracture of femur    Productive cough    Chronic renal disease, stage III ([address]    Nocturnal hypoxia         Past Surgical History:   Procedure Laterality Date    ABLATION OF DYSRHYTHMIC FOCUS      AORTIC VALVE REPLACEMENT  07/2018    BACK SURGERY      discectomy    CARDIAC CATHETERIZATION      PCI 2014, 2015    CARDIAC PROCEDURE N/A 7/11/2022    LEFT HEART CATH / CORONARY ANGIOGRAPHY performed by Derian Alvarado MD at [address] [place] [address] CATH LAB    CERVICAL FUSION      C3-4 fusion    EGD  6/17/2022         HIP ARTHROPLASTY Left     SPINAL CORD STIMULATOR SURGERY      lower back     UPPER GASTROINTESTINAL ENDOSCOPY N/A 6/17/2022    EGD ESOPHAGOGASTRODUODENOSCOPY/ PT HAS PACEMAKER performed by Anatoliy Rodriguez MD at [facility] ENDOSCOPY       Allergies   Allergen Reactions    Carbamazepine Anaphylaxis Lacosamide Nausea Only    Lorazepam Other (See Comments)     Violent behavior    Nitroglycerin Other (See Comments)     hypotension       Current Outpatient Medications   Medication Sig    omeprazole (PRILOSEC) 20 MG delayed release capsule Take 1 capsule by mouth in the morning and at bedtime    Cholecalciferol (VITAMIN D3) 75 MCG (3000 UT) TABS Take by mouth    albuterol sulfate  (90 Base) MCG/ACT inhaler Inhale 2 puffs into the lungs every 4 hours as needed    aspirin 81 MG chewable tablet Take 81 mg by mouth daily    atorvastatin (LIPITOR) 40 MG tablet Take 40 mg by mouth daily    cyclobenzaprine (FLEXERIL) 5 MG tablet Take 5 mg by mouth 3 times daily as needed    dabigatran (PRADAXA) 150 MG capsule Take 150 mg by mouth every 12 hours    fluticasone-salmeterol (ADVAIR) 250-50 MCG/ACT AEPB diskus inhaler Inhale 1 puff into the lungs every 12 hours    furosemide (LASIX) 40 MG tablet Take 40 mg by mouth daily as needed    ipratropium-albuterol (DUONEB) 0.5-2.5 (3) MG/3ML SOLN nebulizer solution Inhale 3 mLs into the lungs every 4 hours as needed     metoprolol succinate (TOPROL XL) 100 MG extended release tablet Take 100 mg by mouth every 12 hours    ondansetron (ZOFRAN-ODT) 8 MG TBDP disintegrating tablet Take 8 mg by mouth every 8 hours as needed    potassium chloride (KLOR-CON M) 20 MEQ extended release tablet Take 20 mEq by mouth as needed     No current facility-administered medications for this visit. PHYSICAL EXAM:    [unfilled]  There is no height or weight on file to calculate BMI. GENERAL APPEARANCE:  The patient is  in no respiratory distress. HEENT:  PERRL. Conjunctivae unremarkable. LUNGS:   Normal respiratory effort      NEURO:  The patient is alert and oriented to person, place, and time. Memory appears intact and mood is normal.      Review of Systems   Constitutional:  Positive for fatigue. Negative for chills, fever and unexpected weight change. Respiratory:  Positive for shortness of breath. Negative for cough, chest tightness and wheezing. Cardiovascular:  Positive for leg swelling. Negative for chest pain and palpitations. LE edema is better. Gastrointestinal:  Negative for abdominal pain, constipation, diarrhea, nausea and vomiting. Neurological:  Negative for dizziness, tremors, seizures, weakness and headaches. DIAGNOSTICS:     CXR:      XR CHEST (2 VW) 04/28/2022    Narrative  EXAM: XR CHEST PA LAT  INDICATION: Acute on chronic heart failure, chronic lung disease, productive  cough  COMPARISON: Chest radiograph, 4/7/2022    FINDINGS:  The cardiomediastinal silhouette is within normal limits in size with  atherosclerotic calcifications in the aorta. Pacemaker leads are intact. Prior  aortic valve replacement. No pleural effusion or pneumothorax. No focal  parenchymal process in the lungs. No acute osseous abnormality. Impression  No acute cardiopulmonary abnormality. CT HIGH RES:    CT CHEST HIGH RESOLUTION 06/09/2022    Narrative  HIGH-RESOLUTION CHEST CT SCAN (HRCT). INDICATION: Shortness breath. Patient states history of pulmonary fibrosis. COPD. TECHNIQUE: Routine noncontrasted 5 mm scans of the chest and selected prone and  supine 1 mm HRCT protocol images were obtained. Radiation dose reduction  techniques were used for this study. Our CT scanners use one or more of the  following:  Automated exposure control, adjustment of the mA and or kV according  to patient size, iterative reconstruction. COMPARISON:  Recent chest x-ray. FINDINGS:  LUNGS: Pleural parenchymal scarring in the apices. There are small subpleural  bulla in the apices. A few scattered areas of interlobular septal thickening. No  diffuse interstitial pattern. No lobar consolidation. .  AIRWAYS: Trachea and proximal bronchi grossly patent. No appreciable  bronchiectasis. PLEURA: No effusion or thickening or calcifications.   LYMPH NODES: No enlarged axillary, hilar or mediastinal lymph nodes. HEART: Normal. Prosthetic aortic valve and left-sided cardiac pacemaker  present. .  CORONARIES: There are calcifications. AORTA: Normal caliber and calcified. UPPER ABDOMEN: Normal size adrenal glands. SKELETAL/CHEST WALL: DJD, otherwise no gross bony lesions. Impression  No diffuse interstitial lung disease. There is pleuroparenchymal  scarring in both apices. Scattered subpleural bulla in the upper lung zones. Aortocoronary atherosclerosis. ASSESSMENT:   (Medical Decision Making)                                                                                                                                          Encounter Diagnoses   Name Primary? Nocturnal hypoxia Yes    Chronic obstructive pulmonary disease, unspecified COPD type ([facility]     Heart failure with mid-range ejection fraction (HCC)     Cigarette nicotine dependence with nicotine-induced disorder      Desaturates 88% or less for 55 minutes, oxygen has been ordered but was over of 30-day window from face-to-face visit. Will submit order again today. I have asked him to contact the company if he has not heard from them by tomorrow afternoon. CHF reportedly compensated at this time. He is followed closely by cardiology and his internist.                        Unfortunately, he continues to smoke. He was counseled during today's visit. Additionally, he reports some discovery of mold and asbestos in his residential living quarters, reports that measures are ineffective for eradication. PLAN:       Oxygen at 2 L/min with sleep, recheck overnight on this setting in 2 weeks. Continue inhaled Rx as prescribed. Smoking cessation. He will keep his previously scheduled appointment with Dr. Samra Piper in September, sooner if needed.       Total  time spent with patient - 24 min      Collaborating MD: Dr. Christa Francisco      Documentation:    We discussed the expected course, resolution and complications of the diagnosis(es) in detail. Medication risks, benefits, costs, interactions, and alternatives were discussed as indicated. I advised the patient to contact the office if their condition worsens, changes or fails to improve as anticipated. The patient expressed understanding with the diagnosis(es) and plan. Pursuant to the emergency declaration under the [facility] [place] [address] waiver authority and the [employer] Act, this telemedicine visit was conducted, with patient's consent, to reduce the patient's risk of exposure to COVID-19 and provide continuity of care for an established patient. Services were provided through a video synchronous or telephone discussion to substitute for in-person clinic visit. I affirm this is a Patient Initiated Episode with an Established Patient who has not had a related appointment within my department in the past 7 days or scheduled within the next 24 hours.     Note: not billable if this call serves to triage the patient into an appointment for the relevant concern      Juliette Head, APRN - CNP

## 2022-07-29 DIAGNOSIS — E55.9 VITAMIN D DEFICIENCY, UNSPECIFIED: ICD-10-CM

## 2022-07-29 RX ORDER — ACETAMINOPHEN 160 MG
TABLET,DISINTEGRATING ORAL
Qty: 90 CAPSULE | Refills: 1 | Status: SHIPPED | OUTPATIENT
Start: 2022-07-29

## 2022-08-01 ENCOUNTER — OFFICE VISIT (OUTPATIENT)
Dept: NEUROSURGERY | Age: 67
End: 2022-08-01
Payer: MEDICARE

## 2022-08-01 VITALS
HEART RATE: 69 BPM | OXYGEN SATURATION: 99 % | SYSTOLIC BLOOD PRESSURE: 137 MMHG | BODY MASS INDEX: 31.54 KG/M2 | WEIGHT: 238 LBS | DIASTOLIC BLOOD PRESSURE: 71 MMHG | TEMPERATURE: 98.1 F | HEIGHT: 73 IN

## 2022-08-01 DIAGNOSIS — M25.511 BILATERAL SHOULDER PAIN, UNSPECIFIED CHRONICITY: Primary | ICD-10-CM

## 2022-08-01 DIAGNOSIS — M25.512 BILATERAL SHOULDER PAIN, UNSPECIFIED CHRONICITY: Primary | ICD-10-CM

## 2022-08-01 PROCEDURE — G8427 DOCREV CUR MEDS BY ELIG CLIN: HCPCS | Performed by: NURSE PRACTITIONER

## 2022-08-01 PROCEDURE — 4004F PT TOBACCO SCREEN RCVD TLK: CPT | Performed by: NURSE PRACTITIONER

## 2022-08-01 PROCEDURE — 99214 OFFICE O/P EST MOD 30 MIN: CPT | Performed by: NURSE PRACTITIONER

## 2022-08-01 PROCEDURE — 1123F ACP DISCUSS/DSCN MKR DOCD: CPT | Performed by: NURSE PRACTITIONER

## 2022-08-01 PROCEDURE — G8417 CALC BMI ABV UP PARAM F/U: HCPCS | Performed by: NURSE PRACTITIONER

## 2022-08-01 PROCEDURE — 3017F COLORECTAL CA SCREEN DOC REV: CPT | Performed by: NURSE PRACTITIONER

## 2022-08-01 ASSESSMENT — PATIENT HEALTH QUESTIONNAIRE - PHQ9
2. FEELING DOWN, DEPRESSED OR HOPELESS: 0
1. LITTLE INTEREST OR PLEASURE IN DOING THINGS: 0
SUM OF ALL RESPONSES TO PHQ QUESTIONS 1-9: 0
SUM OF ALL RESPONSES TO PHQ9 QUESTIONS 1 & 2: 0

## 2022-08-01 NOTE — PROGRESS NOTES
[facility] NOTE:   History of Present Illness:    CC: Cervical CT myelogram review    Brianna Sprague is a 79 y.o. male here to review his cervical CT myelogram.  Patient states that he fell yesterday while trying to transfer into his chair because his shoulders buckled due to a really sharp pain. The cervical CT myelogram reveals a previous C3-5 fusion; with mild bilateral foraminal narrowing at C3-4, bilateral foraminal narrowing at C4-5, bilateral foraminal narrowing at C5-6, and bilateral foraminal narrowing at C6-7. There is no evidence of nerve compromise.     Past Medical History:   Diagnosis Date    Aortic stenosis     Aortic valve replacement 7/2018    CAD (coronary artery disease)     PCI in 2014, 2015 had MI and then stents    Cancer ([address]     SCC removed face     CHF (congestive heart failure) ([address]     Chronic renal disease, stage III ([address]     sees neph and does not have actual diagnosis at this time    COPD (chronic obstructive pulmonary disease) ([address]     fibrosis in lungs    Dyslipidemia     Heart failure ([address]     CHF per patient    HTN (hypertension)     med controlled    Hyperlipidemia     Ischemic cardiomyopathy     Pacemaker     only pacemaker    Paroxysmal atrial fibrillation ([address]     pradaxa for this    Pulmonary fibrosis ([address]     Seizure disorder ([address]     lyme disease - small lesion frontal part of brain - no maintenance meds - last seizure 2 months ago, due to fall - before that it was nine years    Systolic heart failure [facility])       Past Surgical History:   Procedure Laterality Date    ABLATION OF DYSRHYTHMIC FOCUS      AORTIC VALVE REPLACEMENT  07/2018    BACK SURGERY      discectomy    CARDIAC CATHETERIZATION      PCI 2014, 2015    CARDIAC PROCEDURE N/A 7/11/2022    LEFT HEART CATH / CORONARY ANGIOGRAPHY performed by Lian Zapata MD at [facility] CATH LAB    CERVICAL FUSION      C3-4 fusion    EGD  6/17/2022         HIP ARTHROPLASTY Left SPINAL CORD STIMULATOR SURGERY      lower back     UPPER GASTROINTESTINAL ENDOSCOPY N/A 6/17/2022    EGD ESOPHAGOGASTRODUODENOSCOPY/ PT HAS PACEMAKER performed by Milla Issa MD at [facility] ENDOSCOPY     Allergies   Allergen Reactions    Carbamazepine Anaphylaxis    Lacosamide Nausea Only    Lorazepam Other (See Comments)     Violent behavior    Nitroglycerin Other (See Comments)     hypotension      Family History   Problem Relation Age of Onset    Heart Disease Sister     Heart Disease Mother       Social History     Socioeconomic History    Marital status: Legally      Spouse name: Not on file    Number of children: Not on file    Years of education: Not on file    Highest education level: Not on file   Occupational History    Not on file   Tobacco Use    Smoking status: Every Day     Packs/day: 1.50     Years: 56.00     Pack years: 84.00     Types: Cigarettes    Smokeless tobacco: Never    Tobacco comments:     Quit smoking: cutting back to 4 Cigarettes a day   Vaping Use    Vaping Use: Never used   Substance and Sexual Activity    Alcohol use: Not Currently    Drug use: Yes     Comment: cannabis used: two weeks ago as of 6/15/2022 edibles     Sexual activity: Not on file   Other Topics Concern    Not on file   Social History Narrative    Not on file     Social Determinants of Health     Financial Resource Strain: Not on file   Food Insecurity: Not on file   Transportation Needs: Not on file   Physical Activity: Not on file   Stress: Not on file   Social Connections: Not on file   Intimate Partner Violence: Not on file   Housing Stability: Not on file     Current Outpatient Medications   Medication Sig Dispense Refill    Cholecalciferol (VITAMIN D3) 50 MCG (2000 UT) CAPS TAKE 1 CAPSULE BY MOUTH EVERY DAY 90 capsule 1    omeprazole (PRILOSEC) 20 MG delayed release capsule Take 1 capsule by mouth in the morning and at bedtime 30 capsule 3    albuterol sulfate  (90 Base) MCG/ACT inhaler Inhale 2 puffs into the lungs every 4 hours as needed      aspirin 81 MG chewable tablet Take 81 mg by mouth daily      atorvastatin (LIPITOR) 40 MG tablet Take 40 mg by mouth daily      cyclobenzaprine (FLEXERIL) 5 MG tablet Take 5 mg by mouth 3 times daily as needed      dabigatran (PRADAXA) 150 MG capsule Take 150 mg by mouth every 12 hours      fluticasone-salmeterol (ADVAIR) 250-50 MCG/ACT AEPB diskus inhaler Inhale 1 puff into the lungs every 12 hours      furosemide (LASIX) 40 MG tablet Take 40 mg by mouth daily as needed      ipratropium-albuterol (DUONEB) 0.5-2.5 (3) MG/3ML SOLN nebulizer solution Inhale 3 mLs into the lungs every 4 hours as needed       metoprolol succinate (TOPROL XL) 100 MG extended release tablet Take 100 mg by mouth every 12 hours      ondansetron (ZOFRAN-ODT) 8 MG TBDP disintegrating tablet Take 8 mg by mouth every 8 hours as needed      potassium chloride (KLOR-CON M) 20 MEQ extended release tablet Take 20 mEq by mouth as needed       No current facility-administered medications for this visit.      Patient Active Problem List   Diagnosis    Leg injury    Hyperlipidemia    Atrial fibrillation, persistent (HCC)    CAD in native artery    Hypertension    Pulmonary fibrosis ([address]    Longstanding persistent atrial fibrillation (HCC)    Shortness of breath at rest    CAP (community acquired pneumonia)    PAD (peripheral artery disease) ([address]    Iron deficiency anemia    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HCC)    COPD with acute lower respiratory infection ([address]    Heart failure with mid-range ejection fraction (HCC)    History of transcatheter aortic valve replacement (TAVR)    Septic shock with acute organ dysfunction due to pneumococcus ([address]    Acute on chronic heart failure with reduced ejection fraction and diastolic dysfunction (HCC)    Acute on chronic HFrEF (heart failure with reduced ejection fraction) ([address]    Presence of cardiac resynchronization therapy pacemaker (CRT-P)    Chronic lung disease    Chronic dyspnea    Establishing care with new doctor, encounter for    Chronic midline low back pain    Hx of fracture of femur    Productive cough    Chronic renal disease, stage III (HCC)    Nocturnal hypoxia    Cigarette nicotine dependence with nicotine-induced disorder          ROS Review of Systems    Constitutional:                    No recent weight changes, fever, fatigue, sleep difficulties, loss of appetite   ENT/Mouth:  No hearing loss, No ringing in the ears, chronic sinus problem, nose bleeds,sore throat, voice change, hoarseness, swollen glands in neck, or difficulties with chewing and swallowing. Cardiovascular:  No chest pain/angina pectoris, palpitations, swelling of feet/ankles/hands, or calf pain while walking. Respiratory: No chronic or frequent coughs, spitting up blood, shortness of breath, No asthma, or wheezing. Gastrointestinal: No a bdominal pain, heartburn, nausea, vomiting, constipation, or frequent diarrhea     Genitourinary: No frequent urination, burning or painful urination, or blood in urine     Musculoskeletal:   POS bilateral shoulder pain     Integument:   No rash/itching     Neurological:   Dizziness/vertigo, No numbness/tingling sensation, tremors, No weakness in limbs, frequent or recurring headaches, memory loss or confusion.        Physical Exam:    General: No acute distress  Head normocephalic and atraumatic  Mood and affect appropriate  CV: Regular rate   Resp: No increased work of breathing  Skin: warm and dry   Awake, alert, and oriented   Speech fluent  Eyes open spontaneously   Face symmetric and tongue midline on protrusion  Sternocleidomastoid and trapezius 5/5  No mid-line cervical, thoracic, or lumbar tenderness to palpation   Patient with strength exam as follows:   Upper Extremities: Right Left      Deltoid  5 5    Biceps  5 5    Triceps  5 +4      5 5   Hand Intrinsics  5 5  Wrist flexors/extensors  5 5             Sensation intact to light touch and pin-prick   DTR 2+  No clonus or babinski present   No Lamar's sign present bilaterally   Gait wheelchair    Assessment & Plan:  John Hoang is a 79 y.o. male who presents to review his cervical CT myelogram.  I have independently reviewed and interpreted the patient's imaging and I am unable to find a structural orgin to his current symptoms. Because the patient requires his shoulders in order to remain independent I am sending him to [facility] to be evaluated by either Dr. Sarah Dominique or Dr. Siddharth Khalil concerning his shoulders. Patient is aware that he has a very complex medical condition that may complicate treatment. No diagnosis found. Notes are transcribed with [employer], a medical voice recording dictation service, and may contain minor errors.     Silvester Cowden, RAUL  [address]

## 2022-08-04 ENCOUNTER — TELEPHONE (OUTPATIENT)
Dept: CARDIOLOGY CLINIC | Age: 67
End: 2022-08-04

## 2022-08-04 ENCOUNTER — TELEPHONE (OUTPATIENT)
Dept: PULMONOLOGY | Age: 67
End: 2022-08-04

## 2022-08-04 NOTE — TELEPHONE ENCOUNTER
Patient talked with Zak Wellington last visit about the mold problem in his appt . He talked with his  for a Air Purifier and the [facility] will pay for it.  . It will need an order and sent to a [employer] company please contact patient Chad Willett and the [facility] will     [phone]

## 2022-08-04 NOTE — TELEPHONE ENCOUNTER
Perla Riojas of Dr. Aldana Ar response. Brown Leroy verbalized understanding and states she will notify patient.

## 2022-08-04 NOTE — TELEPHONE ENCOUNTER
Ms Nilo Fuentes, I called three DME companies and they don't carry air purifiers, please advice, Braden Salas, [place]

## 2022-08-04 NOTE — TELEPHONE ENCOUNTER
Adolfo Asher from [facility] called and stated the patient wanted her to call  because his apartment complex is dealing with mold. He just wanted to make him aware of the situation. Patient has a contact in [employer] that he would like  to sent them a note to get an air purifier for his apartment. Please call if/when it is able to get done.

## 2022-08-05 NOTE — TELEPHONE ENCOUNTER
I believe the discussion was that mold had been detected and he planned to obtain air purifier. I do not know of any DME company that would supply this, even with an order. I recommend that he discuss with the  having his [address] [place] [address] provider address this. He may need to purchase it outright (possibly order from a local pharmacy) and have [address] [place] [address] reimburse him for it.

## 2022-08-08 NOTE — TELEPHONE ENCOUNTER
I spoke to the patient and gave him message, he will speak to  and find out if he would get reimbursed if he purchased air purifier, he voiced understanding [facility] Stroke Education Note    Patient: Costa Montilla Date: 2019   : 1935 Attending: Audrey Medina MD       Patient admitted with CVA. Intervention none    Previously provided stroke teaching materials reviewed. Reviewed with patient and son specific information regarding stroke causes, different types of stroke, signs and symptoms of stroke and when to call 46. PMD f/u recommended after discharge. Patient's uncontrollable risk factors areAge over 55 years (19 1053) . Patient's controllable risk factors  High blood pressure; High cholesterol;Irregular heart beat (Atrial Fib) (19 1053). Reviewed with patient and son the following diagnostic Tests: CT Head cholesterol HgA1C Cerebral Angiogram results were discussed Explanation of upcoming planned procedures MRI were discussed. Reviewed with patient and son the recommended lifestyle modifications: monitor blood pressure, monitor blood sugar,. Patient and son allowed to verbalize fears and concerns about stroke diagnosis. Questions were encouraged and answered. Reviewed BE FAST and encouraged call to 911 if patient experiencing s/s of stroke, Discussed increasing activity. Encouraged pt to raise heart rate 30 minutes, 3 times a week.  , Encouraged pt to check BP at home. Discussed checking different times of the day and encouraged pt record readings to show PCP., Discussed [facility] IPR. Reviewed admission process, gym, apartment, 3 hours of therapy a day, and average LOS, Discussed f/u with PCP 7-10 days after discharge and Discussed f/u with neurology after discharge. Reinforce teaching and risk factor modification throughout patient's hospitalization.     Suki Purdy RN  Stroke Nurse Navigator

## 2022-08-15 NOTE — PROGRESS NOTES
[facility] [[email or web address]] CARDIOLOGY Follow Up                 Reason for Visit: Stable ischemic heart disease    Subjective:     Patient is a 79 y.o. male with a PMH of PAD, AF, status post CRT-P/AV node ablation, status post TAVR, CAD status post PCI, HFmrEF, hyperlipidemia, hypertension, COPD, and pulmonary fibrosis who presents for follow-up. The patient was last seen in April 2022. He was referred to pulmonary medicine. The patient visited pulmonary medicine on May 5. The patient was recently admitted with chest pain. He had an LHC that was noted to demonstrate nonobstructive CAD and patent stenting. The patient had a limited TTE in April 2022 that was noted to demonstrate an EF of 45 to 50%. He had a full TTE in December 2021 that was noted to demonstrate mild to moderate MR. His pAV was documented to be normal functioning. The patient reports that mold and asbestos was found in this building that he lives. He is in the process of moving out of the building. The patient reports acute on chronic dyspnea since January since he moved into the building. He denies angina.     Past Medical History:   Diagnosis Date    Aortic stenosis     Aortic valve replacement 7/2018    CAD (coronary artery disease)     PCI in 2014, 2015 had MI and then stents    Cancer ([address]     SCC removed face     CHF (congestive heart failure) ([address]     Chronic renal disease, stage III ([address]     sees neph and does not have actual diagnosis at this time    COPD (chronic obstructive pulmonary disease) ([address]     fibrosis in lungs    Dyslipidemia     Heart failure ([address]     CHF per patient    HTN (hypertension)     med controlled    Hyperlipidemia     Ischemic cardiomyopathy     Pacemaker     only pacemaker    Paroxysmal atrial fibrillation ([address]     pradaxa for this    Pulmonary fibrosis ([address]     Seizure disorder ([address]     lyme disease - small lesion frontal part of brain - no maintenance meds - last seizure 2 months ago, due to fall - before that it was nine years    Systolic heart failure [facility])       Past Surgical History:   Procedure Laterality Date    ABLATION OF DYSRHYTHMIC FOCUS      AORTIC VALVE REPLACEMENT  07/2018    BACK SURGERY      discectomy    CARDIAC CATHETERIZATION      PCI 2014, 2015    CARDIAC PROCEDURE N/A 7/11/2022    LEFT HEART CATH / CORONARY ANGIOGRAPHY performed by Jeffrey Sanders MD at [address] [place] [address] CATH LAB    CERVICAL FUSION      C3-4 fusion    EGD  6/17/2022         HIP ARTHROPLASTY Left     SPINAL CORD STIMULATOR SURGERY      lower back     UPPER GASTROINTESTINAL ENDOSCOPY N/A 6/17/2022    EGD ESOPHAGOGASTRODUODENOSCOPY/ PT HAS PACEMAKER performed by Lianne Riggins MD at [facility] ENDOSCOPY      Family History   Problem Relation Age of Onset    Heart Disease Sister     Heart Disease Mother       Social History     Tobacco Use    Smoking status: Every Day     Packs/day: 1.50     Years: 56.00     Pack years: 84.00     Types: Cigarettes    Smokeless tobacco: Never    Tobacco comments:     Quit smoking: cutting back to 4 Cigarettes a day   Substance Use Topics    Alcohol use: Not Currently      Allergies   Allergen Reactions    Carbamazepine Anaphylaxis    Lacosamide Nausea Only    Lorazepam Other (See Comments)     Violent behavior    Nitroglycerin Other (See Comments)     hypotension         ROS:  No obvious pertinent positives on review of systems except for what was outlined above.        Objective:       BP (!) 142/84   Pulse 72   Ht 6' 1\" (1.854 m)   BMI 31.40 kg/m²     BP Readings from Last 3 Encounters:   08/16/22 (!) 142/84   08/01/22 137/71   07/19/22 (!) 148/79       Wt Readings from Last 3 Encounters:   08/01/22 238 lb (108 kg)   07/19/22 240 lb (108.9 kg)   07/12/22 240 lb (108.9 kg)       General/Constitutional:   Alert and oriented x 3, no acute distress  HEENT:   normocephalic, atraumatic, moist mucous membranes  Neck:   No JVD or carotid bruits bilaterally  Cardiovascular:   regular rate and rhythm, no rub/gallop mid-range ejection fraction (HCC)  - Clinically euvolemic on as needed p.o. Lasix    6. Hyperlipidemia, unspecified hyperlipidemia type  - Continue with Lipitor    7. Hypertension, unspecified type  - Well-controlled  - Continue with Toprol-XL    8.  Atrial fibrillation, unspecified type (HCC)  - [id number] equals 4  - Status post CRT-P and AV node ablation  - Continue with Toprol-XL and Pradaxa    F/U: 6 months    Natasha Serrano MD

## 2022-08-16 ENCOUNTER — OFFICE VISIT (OUTPATIENT)
Dept: CARDIOLOGY CLINIC | Age: 67
End: 2022-08-16
Payer: MEDICARE

## 2022-08-16 VITALS
DIASTOLIC BLOOD PRESSURE: 84 MMHG | HEIGHT: 73 IN | SYSTOLIC BLOOD PRESSURE: 142 MMHG | HEART RATE: 72 BPM | BODY MASS INDEX: 31.4 KG/M2

## 2022-08-16 DIAGNOSIS — I10 HYPERTENSION, UNSPECIFIED TYPE: ICD-10-CM

## 2022-08-16 DIAGNOSIS — Z95.0 PRESENCE OF CARDIAC RESYNCHRONIZATION THERAPY PACEMAKER (CRT-P): ICD-10-CM

## 2022-08-16 DIAGNOSIS — E78.5 HYPERLIPIDEMIA, UNSPECIFIED HYPERLIPIDEMIA TYPE: ICD-10-CM

## 2022-08-16 DIAGNOSIS — I48.91 ATRIAL FIBRILLATION, UNSPECIFIED TYPE (HCC): ICD-10-CM

## 2022-08-16 DIAGNOSIS — I50.22 HEART FAILURE WITH MID-RANGE EJECTION FRACTION (HCC): ICD-10-CM

## 2022-08-16 DIAGNOSIS — Z95.2 HISTORY OF TRANSCATHETER AORTIC VALVE REPLACEMENT (TAVR): ICD-10-CM

## 2022-08-16 DIAGNOSIS — I25.10 CAD IN NATIVE ARTERY: ICD-10-CM

## 2022-08-16 DIAGNOSIS — I73.9 PAD (PERIPHERAL ARTERY DISEASE) (HCC): Primary | ICD-10-CM

## 2022-08-16 PROCEDURE — 99214 OFFICE O/P EST MOD 30 MIN: CPT | Performed by: INTERNAL MEDICINE

## 2022-08-16 PROCEDURE — 1123F ACP DISCUSS/DSCN MKR DOCD: CPT | Performed by: INTERNAL MEDICINE

## 2022-08-19 ENCOUNTER — TELEPHONE (OUTPATIENT)
Dept: INTERNAL MEDICINE CLINIC | Facility: CLINIC | Age: 67
End: 2022-08-19

## 2022-09-01 ENCOUNTER — NURSE ONLY (OUTPATIENT)
Dept: PULMONOLOGY | Age: 67
End: 2022-09-01
Payer: MEDICARE

## 2022-09-01 DIAGNOSIS — R06.09 DOE (DYSPNEA ON EXERTION): Primary | ICD-10-CM

## 2022-09-01 DIAGNOSIS — J44.9 CHRONIC OBSTRUCTIVE PULMONARY DISEASE, UNSPECIFIED COPD TYPE (HCC): ICD-10-CM

## 2022-09-01 LAB
DLCO %PRED: 43 %
DLCO: 15.3 ML/MIN/MMHG
FEV 1 , POC: 1.88 L
FEV1 % PRED, POC: 49 %
FEV1/FVC, POC: NORMAL
FVC % PRED, POC: 55 %
FVC, POC: NORMAL
TLC %PRED: 76 %
TLC: 5.78 L

## 2022-09-01 PROCEDURE — 94726 PLETHYSMOGRAPHY LUNG VOLUMES: CPT | Performed by: INTERNAL MEDICINE

## 2022-09-01 PROCEDURE — 94729 DIFFUSING CAPACITY: CPT | Performed by: INTERNAL MEDICINE

## 2022-09-01 PROCEDURE — 94060 EVALUATION OF WHEEZING: CPT | Performed by: INTERNAL MEDICINE

## 2022-09-01 ASSESSMENT — PULMONARY FUNCTION TESTS
FEV1_PERCENT_PREDICTED_POC: 49
FVC_PERCENT_PREDICTED_POC: 55

## 2022-09-21 ENCOUNTER — OFFICE VISIT (OUTPATIENT)
Dept: INTERNAL MEDICINE CLINIC | Facility: CLINIC | Age: 67
End: 2022-09-21
Payer: MEDICARE

## 2022-09-21 VITALS
HEIGHT: 73 IN | WEIGHT: 241 LBS | OXYGEN SATURATION: 95 % | BODY MASS INDEX: 31.94 KG/M2 | DIASTOLIC BLOOD PRESSURE: 70 MMHG | SYSTOLIC BLOOD PRESSURE: 136 MMHG | HEART RATE: 74 BPM

## 2022-09-21 DIAGNOSIS — D50.8 OTHER IRON DEFICIENCY ANEMIA: ICD-10-CM

## 2022-09-21 DIAGNOSIS — J84.10 PULMONARY FIBROSIS (HCC): ICD-10-CM

## 2022-09-21 DIAGNOSIS — E78.5 HYPERLIPIDEMIA, UNSPECIFIED HYPERLIPIDEMIA TYPE: ICD-10-CM

## 2022-09-21 DIAGNOSIS — R73.9 HIGH BLOOD SUGAR: ICD-10-CM

## 2022-09-21 DIAGNOSIS — I10 HYPERTENSION, UNSPECIFIED TYPE: ICD-10-CM

## 2022-09-21 DIAGNOSIS — J98.4 CHRONIC LUNG DISEASE: ICD-10-CM

## 2022-09-21 DIAGNOSIS — I50.43 ACUTE ON CHRONIC HEART FAILURE WITH REDUCED EJECTION FRACTION AND DIASTOLIC DYSFUNCTION (HCC): ICD-10-CM

## 2022-09-21 DIAGNOSIS — G47.33 OSA (OBSTRUCTIVE SLEEP APNEA): ICD-10-CM

## 2022-09-21 DIAGNOSIS — I48.91 ATRIAL FIBRILLATION, UNSPECIFIED TYPE (HCC): Primary | ICD-10-CM

## 2022-09-21 DIAGNOSIS — F17.219 CIGARETTE NICOTINE DEPENDENCE WITH NICOTINE-INDUCED DISORDER: ICD-10-CM

## 2022-09-21 DIAGNOSIS — N18.30 STAGE 3 CHRONIC KIDNEY DISEASE, UNSPECIFIED WHETHER STAGE 3A OR 3B CKD (HCC): ICD-10-CM

## 2022-09-21 PROCEDURE — 99215 OFFICE O/P EST HI 40 MIN: CPT | Performed by: INTERNAL MEDICINE

## 2022-09-21 PROCEDURE — 3017F COLORECTAL CA SCREEN DOC REV: CPT | Performed by: INTERNAL MEDICINE

## 2022-09-21 PROCEDURE — G8417 CALC BMI ABV UP PARAM F/U: HCPCS | Performed by: INTERNAL MEDICINE

## 2022-09-21 PROCEDURE — G8427 DOCREV CUR MEDS BY ELIG CLIN: HCPCS | Performed by: INTERNAL MEDICINE

## 2022-09-21 PROCEDURE — 1123F ACP DISCUSS/DSCN MKR DOCD: CPT | Performed by: INTERNAL MEDICINE

## 2022-09-21 PROCEDURE — 4004F PT TOBACCO SCREEN RCVD TLK: CPT | Performed by: INTERNAL MEDICINE

## 2022-09-21 RX ORDER — ONDANSETRON 8 MG/1
8 TABLET, ORALLY DISINTEGRATING ORAL EVERY 8 HOURS PRN
Qty: 30 TABLET | Refills: 0 | Status: SHIPPED | OUTPATIENT
Start: 2022-09-21

## 2022-09-21 RX ORDER — FERROUS SULFATE 325(65) MG
325 TABLET ORAL 2 TIMES DAILY
COMMUNITY

## 2022-09-21 ASSESSMENT — ENCOUNTER SYMPTOMS
SHORTNESS OF BREATH: 0
BLURRED VISION: 0

## 2022-09-21 ASSESSMENT — PATIENT HEALTH QUESTIONNAIRE - PHQ9
SUM OF ALL RESPONSES TO PHQ QUESTIONS 1-9: 0
1. LITTLE INTEREST OR PLEASURE IN DOING THINGS: 0
SUM OF ALL RESPONSES TO PHQ QUESTIONS 1-9: 0
SUM OF ALL RESPONSES TO PHQ QUESTIONS 1-9: 0
SUM OF ALL RESPONSES TO PHQ9 QUESTIONS 1 & 2: 0
SUM OF ALL RESPONSES TO PHQ QUESTIONS 1-9: 0
2. FEELING DOWN, DEPRESSED OR HOPELESS: 0

## 2022-09-21 NOTE — PROGRESS NOTES
FOLLOW UP VISIT    Subjective:    Ashley Chen (: 1955) is a 79 y.o., male,   Chief Complaint   Patient presents with    Follow-up Chronic Condition       HPI:  79year old in to follow up. 1. Atrial Fib- dx 6 years ago on Pradaxa does go to Cardiology . Had ablations and pacemaker  2. COPD- has Pulmonary continues to smoke- has seen them has black mold in house pulmonary writing note to move  3. Recent hospital stay no longer CP after PPI   4. Pulmo mary ann fibrosis- has Pulmonary - Dr. Brayden Kern   5. Back pain - Degenerative Spine- on Flexeril taken off of baclofen due to kidneys  6. CKD_   7. CHF- on BB and lasix has Cardiology   8. S/P TAVR   9. Smoker- continues to smoke   10. Anemia- has had EGD and COLO   11. Chronic Pain-states why BP is high     Hypertension  This is a chronic problem. The current episode started more than 1 year ago. Pertinent negatives include no anxiety, blurred vision, headaches, malaise/fatigue, neck pain, palpitations, peripheral edema, PND, shortness of breath or sweats.        The following portions of the patient's history were reviewed and updated as appropriate:      Past Medical History:   Diagnosis Date    Aortic stenosis     Aortic valve replacement 2018    CAD (coronary artery disease)     PCI in ,  had MI and then stents    Cancer ([address]     SCC removed face     CHF (congestive heart failure) ([address]     Chronic renal disease, stage III ([address]     sees neph and does not have actual diagnosis at this time    COPD (chronic obstructive pulmonary disease) ([address]     fibrosis in lungs    Dyslipidemia     Heart failure ([address]     CHF per patient    HTN (hypertension)     med controlled    Hyperlipidemia     Ischemic cardiomyopathy     Pacemaker     only pacemaker    Paroxysmal atrial fibrillation (HCC)     pradaxa for this    Pulmonary fibrosis ([address]     Seizure disorder ([address]     lyme disease - small lesion frontal part of brain - no maintenance meds - last seizure 2 months ago, due to fall - before that it was nine years    Systolic heart failure [facility])        Past Surgical History:   Procedure Laterality Date    ABLATION OF DYSRHYTHMIC FOCUS      AORTIC VALVE REPLACEMENT  07/2018    BACK SURGERY      discectomy    CARDIAC CATHETERIZATION      PCI 2014, 2015    CARDIAC PROCEDURE N/A 7/11/2022    LEFT HEART CATH / CORONARY ANGIOGRAPHY performed by Darrel Tapia MD at [address] [place] [address] CATH LAB   [facility]      C3-4 fusion    EGD  6/17/2022         HIP ARTHROPLASTY Left     SPINAL CORD STIMULATOR SURGERY      lower back     UPPER GASTROINTESTINAL ENDOSCOPY N/A 6/17/2022    EGD ESOPHAGOGASTRODUODENOSCOPY/ PT HAS PACEMAKER performed by Migel Wallace MD at [facility] ENDOSCOPY       Family History   Problem Relation Age of Onset    Heart Disease Sister     Heart Disease Mother        Social History     Socioeconomic History    Marital status: Legally      Spouse name: Not on file    Number of children: Not on file    Years of education: Not on file    Highest education level: Not on file   Occupational History    Not on file   Tobacco Use    Smoking status: Every Day     Packs/day: 1.50     Years: 56.00     Pack years: 84.00     Types: Cigarettes    Smokeless tobacco: Never    Tobacco comments:     Quit smoking: cutting back to 4 Cigarettes a day   Vaping Use    Vaping Use: Never used   Substance and Sexual Activity    Alcohol use: Not Currently    Drug use: Yes     Comment: cannabis used: two weeks ago as of 6/15/2022 edibles     Sexual activity: Not on file   Other Topics Concern    Not on file   Social History Narrative    Not on file     Social Determinants of Health     Financial Resource Strain: Not on file   Food Insecurity: Not on file   Transportation Needs: Not on file   Physical Activity: Not on file   Stress: Not on file   Social Connections: Not on file   Intimate Partner Violence: Not on file   Housing Stability: breath. Cardiovascular:  Negative for palpitations and PND. Musculoskeletal:  Negative for neck pain. Neurological:  Negative for headaches. Objective:    Blood pressure 136/70, pulse 74, height 6' 1\" (1.854 m), weight 241 lb (109.3 kg), SpO2 95 %. Physical Exam  Vitals and nursing note reviewed. Constitutional:       Appearance: Normal appearance. Cardiovascular:      Rate and Rhythm: Normal rate and regular rhythm. Pulses: Normal pulses. Heart sounds: Normal heart sounds. Pulmonary:      Effort: Pulmonary effort is normal.      Breath sounds: Wheezing present. Musculoskeletal:      Cervical back: Normal range of motion and neck supple. Neurological:      Mental Status: He is alert. No results found for this visit on 09/21/22. Assessent & Plan    1. Atrial fibrillation, persistent ([address]  C/w meds  2. CAD in native artery  3. PAD (peripheral artery disease) ([address]  4. Heart failure with mid-range ejection fraction ([address] on meds has Pacemaker  5. Presence of cardiac resynchronization therapy pacemaker (CRT-P)  6. Pulmonary fibrosis ([address]  7. Chronic lung disease has Pulmonary on inhalers advised to stop smoking has had CT done 6/2022   8. 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HCC)  9. Stage 3 chronic kidney disease, unspecified whether stage 3a or 3b CKD ([address]  10. Hyperlipidemia, unspecified hyperlipidemia type  11. History of transcatheter aortic valve replacement (TAVR)  12. Other iron deficiency anemia  Comments:  is going to GI - getting EGD suspect loss from UGI- if normal will get COLO   13. Health care maintenance  Comments:  EGD 2022 COLO UTD     14. High blood pressure- patient is in pain  15. Hospt D/C for cp has resolved  16. Smoker- advsied to quit - 3 min spent on advising to quit  17. Iron def- is on iron   18,.  Obesity is losing weight            The patient and/or patient representative voiced understanding and agreement with the current diagnoses, recommendations,

## 2022-09-22 ENCOUNTER — OFFICE VISIT (OUTPATIENT)
Dept: PULMONOLOGY | Age: 67
End: 2022-09-22
Payer: MEDICARE

## 2022-09-22 VITALS
WEIGHT: 238 LBS | SYSTOLIC BLOOD PRESSURE: 112 MMHG | BODY MASS INDEX: 31.54 KG/M2 | HEIGHT: 73 IN | OXYGEN SATURATION: 95 % | HEART RATE: 76 BPM | TEMPERATURE: 96.9 F | DIASTOLIC BLOOD PRESSURE: 78 MMHG

## 2022-09-22 DIAGNOSIS — J44.9 CHRONIC OBSTRUCTIVE PULMONARY DISEASE, UNSPECIFIED COPD TYPE (HCC): ICD-10-CM

## 2022-09-22 DIAGNOSIS — F17.210 SMOKING GREATER THAN 30 PACK YEARS: ICD-10-CM

## 2022-09-22 DIAGNOSIS — J84.9 ILD (INTERSTITIAL LUNG DISEASE) (HCC): ICD-10-CM

## 2022-09-22 DIAGNOSIS — G47.34 NOCTURNAL HYPOXIA: Primary | ICD-10-CM

## 2022-09-22 DIAGNOSIS — R06.83 SNORING: ICD-10-CM

## 2022-09-22 LAB
ALBUMIN SERPL-MCNC: 3.6 G/DL (ref 3.2–4.6)
ALBUMIN/GLOB SERPL: 0.9 {RATIO} (ref 1.2–3.5)
ALP SERPL-CCNC: 111 U/L (ref 50–136)
ALT SERPL-CCNC: 35 U/L (ref 12–65)
ANION GAP SERPL CALC-SCNC: 7 MMOL/L (ref 4–13)
AST SERPL-CCNC: 18 U/L (ref 15–37)
BASOPHILS # BLD: 0.1 K/UL (ref 0–0.2)
BASOPHILS NFR BLD: 1 % (ref 0–2)
BILIRUB SERPL-MCNC: 0.7 MG/DL (ref 0.2–1.1)
BUN SERPL-MCNC: 20 MG/DL (ref 8–23)
CALCIUM SERPL-MCNC: 9.7 MG/DL (ref 8.3–10.4)
CHLORIDE SERPL-SCNC: 106 MMOL/L (ref 101–110)
CHOLEST SERPL-MCNC: 222 MG/DL
CO2 SERPL-SCNC: 24 MMOL/L (ref 21–32)
CREAT SERPL-MCNC: 1.2 MG/DL (ref 0.8–1.5)
DIFFERENTIAL METHOD BLD: ABNORMAL
EOSINOPHIL # BLD: 0.2 K/UL (ref 0–0.8)
EOSINOPHIL NFR BLD: 2 % (ref 0.5–7.8)
ERYTHROCYTE [DISTWIDTH] IN BLOOD BY AUTOMATED COUNT: 17.1 % (ref 11.9–14.6)
EST. AVERAGE GLUCOSE BLD GHB EST-MCNC: 120 MG/DL
GLOBULIN SER CALC-MCNC: 3.9 G/DL (ref 2.3–3.5)
GLUCOSE SERPL-MCNC: 95 MG/DL (ref 65–100)
HBA1C MFR BLD: 5.8 % (ref 4.8–5.6)
HCT VFR BLD AUTO: 48.9 % (ref 41.1–50.3)
HDLC SERPL-MCNC: 38 MG/DL (ref 40–60)
HDLC SERPL: 5.8 {RATIO}
HGB BLD-MCNC: 15.4 G/DL (ref 13.6–17.2)
IMM GRANULOCYTES # BLD AUTO: 0 K/UL (ref 0–0.5)
IMM GRANULOCYTES NFR BLD AUTO: 0 % (ref 0–5)
LDLC SERPL CALC-MCNC: 155.4 MG/DL
LYMPHOCYTES # BLD: 1.8 K/UL (ref 0.5–4.6)
LYMPHOCYTES NFR BLD: 19 % (ref 13–44)
MCH RBC QN AUTO: 26.6 PG (ref 26.1–32.9)
MCHC RBC AUTO-ENTMCNC: 31.5 G/DL (ref 31.4–35)
MCV RBC AUTO: 84.6 FL (ref 79.6–97.8)
MONOCYTES # BLD: 1 K/UL (ref 0.1–1.3)
MONOCYTES NFR BLD: 10 % (ref 4–12)
NEUTS SEG # BLD: 6.6 K/UL (ref 1.7–8.2)
NEUTS SEG NFR BLD: 68 % (ref 43–78)
NRBC # BLD: 0 K/UL (ref 0–0.2)
PLATELET # BLD AUTO: 149 K/UL (ref 150–450)
PMV BLD AUTO: 10 FL (ref 9.4–12.3)
POTASSIUM SERPL-SCNC: 4 MMOL/L (ref 3.5–5.1)
PROT SERPL-MCNC: 7.5 G/DL (ref 6.3–8.2)
RBC # BLD AUTO: 5.78 M/UL (ref 4.23–5.6)
SODIUM SERPL-SCNC: 137 MMOL/L (ref 138–145)
TRIGL SERPL-MCNC: 143 MG/DL (ref 35–150)
VLDLC SERPL CALC-MCNC: 28.6 MG/DL (ref 6–23)
WBC # BLD AUTO: 9.6 K/UL (ref 4.3–11.1)

## 2022-09-22 PROCEDURE — 4004F PT TOBACCO SCREEN RCVD TLK: CPT | Performed by: INTERNAL MEDICINE

## 2022-09-22 PROCEDURE — 3017F COLORECTAL CA SCREEN DOC REV: CPT | Performed by: INTERNAL MEDICINE

## 2022-09-22 PROCEDURE — 1123F ACP DISCUSS/DSCN MKR DOCD: CPT | Performed by: INTERNAL MEDICINE

## 2022-09-22 PROCEDURE — 99214 OFFICE O/P EST MOD 30 MIN: CPT | Performed by: INTERNAL MEDICINE

## 2022-09-22 PROCEDURE — G8427 DOCREV CUR MEDS BY ELIG CLIN: HCPCS | Performed by: INTERNAL MEDICINE

## 2022-09-22 PROCEDURE — 3023F SPIROM DOC REV: CPT | Performed by: INTERNAL MEDICINE

## 2022-09-22 PROCEDURE — G8417 CALC BMI ABV UP PARAM F/U: HCPCS | Performed by: INTERNAL MEDICINE

## 2022-09-22 ASSESSMENT — ENCOUNTER SYMPTOMS
SHORTNESS OF BREATH: 1
BACK PAIN: 1
WHEEZING: 1

## 2022-09-22 NOTE — PROGRESS NOTES
[address], [address] [facility]  [phone]      Patient Name:  Leroy Tai  YOB: 1955      Office Visit 9/22/2022    CHIEF COMPLAINT:    Chief Complaint   Patient presents with    Follow-up         HISTORY OF PRESENT ILLNESS:       Patient is 79years old white male who is here today for follow-up of COPD and hypoxemia. Today patient reports that he is been doing Rolanda Oris however he recently found that ventilators in his apartment not dirty for mold and he feels that is making his breathing worse. He tried to put  air filter in his apartment which somehow help however he is trying to move to different apartment which I agree would be beneficial for his lung disease. He had however CT and complete pulmonary function test since last visit and I have discussed the results of it with him. He also had overnight oximetry on 2 L which was adequate. He still has symptoms of sleep apnea such as choking at night and gasping and snoring. He recalls he had sleep study maybe seven years ago. He continues to use wheelchair and actually has been in wheelchair for 14 years. He does walk some with cane around the house. He continues to use Spiriva and Advair and nebulizer. He does have wheezing however no significant cough. He continues to smoke however he cut down and continues to cut down and he will quit in two weeks he gets down by one cigarette every week.     Past Medical History:   Diagnosis Date    Aortic stenosis     Aortic valve replacement 7/2018    CAD (coronary artery disease)     PCI in 2014, 2015 had MI and then stents    Cancer ([address]     SCC removed face     CHF (congestive heart failure) ([address]     Chronic renal disease, stage III ([address]     sees neph and does not have actual diagnosis at this time    COPD (chronic obstructive pulmonary disease) ([address]     fibrosis in lungs    Dyslipidemia     Heart failure ([address]     CHF per patient    HTN (hypertension)     med controlled Hyperlipidemia     Ischemic cardiomyopathy     Pacemaker     only pacemaker    Paroxysmal atrial fibrillation (HCC)     pradaxa for this    Pulmonary fibrosis (HCC)     Seizure disorder (HCC)     lyme disease - small lesion frontal part of brain - no maintenance meds - last seizure 2 months ago, due to fall - before that it was nine years    Systolic heart failure [facility])          Patient Active Problem List   Diagnosis    Leg injury    Hyperlipidemia    Atrial fibrillation ([address]    CAD in native artery    Hypertension    Pulmonary fibrosis (HCC)    Longstanding persistent atrial fibrillation (HCC)    Shortness of breath at rest    CAP (community acquired pneumonia)    PAD (peripheral artery disease) ([address]    Iron deficiency anemia    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HCC)    COPD with acute lower respiratory infection ([address]    Heart failure with mid-range ejection fraction ([address]    History of transcatheter aortic valve replacement (TAVR)    Septic shock with acute organ dysfunction due to pneumococcus ([address]    Acute on chronic heart failure with reduced ejection fraction and diastolic dysfunction (HCC)    Acute on chronic HFrEF (heart failure with reduced ejection fraction) ([address]    Presence of cardiac resynchronization therapy pacemaker (CRT-P)    Chronic lung disease    Chronic dyspnea    Establishing care with new doctor, encounter for    Chronic midline low back pain    Hx of fracture of femur    Productive cough    Chronic renal disease, stage III ([address]    Nocturnal hypoxia    Cigarette nicotine dependence with nicotine-induced disorder           Past Surgical History:   Procedure Laterality Date    ABLATION OF DYSRHYTHMIC FOCUS      AORTIC VALVE REPLACEMENT  07/2018    BACK SURGERY      discectomy    CARDIAC CATHETERIZATION      PCI 2014, 2015    CARDIAC PROCEDURE N/A 7/11/2022    LEFT HEART CATH / CORONARY ANGIOGRAPHY performed by Sasha Adam MD at [facility] CARDIAC CATH LAB    CERVICAL FUSION      C3-4 fusion EGD  6/17/2022         HIP ARTHROPLASTY Left     SPINAL CORD STIMULATOR SURGERY      lower back     UPPER GASTROINTESTINAL ENDOSCOPY N/A 6/17/2022    EGD ESOPHAGOGASTRODUODENOSCOPY/ PT HAS PACEMAKER performed by Yvonne Haque MD at [facility] ENDOSCOPY             Social History     Socioeconomic History    Marital status: Legally      Spouse name: Not on file    Number of children: Not on file    Years of education: Not on file    Highest education level: Not on file   Occupational History    Not on file   Tobacco Use    Smoking status: Every Day     Packs/day: 1.50     Years: 56.00     Pack years: 84.00     Types: Cigarettes    Smokeless tobacco: Never    Tobacco comments:     Quit smoking: cutting back to 4 Cigarettes a day   Vaping Use    Vaping Use: Never used   Substance and Sexual Activity    Alcohol use: Not Currently    Drug use: Yes     Comment: cannabis used: two weeks ago as of 6/15/2022 edibles     Sexual activity: Not on file   Other Topics Concern    Not on file   Social History Narrative    Not on file     Social Determinants of Health     Financial Resource Strain: Not on file   Food Insecurity: Not on file   Transportation Needs: Not on file   Physical Activity: Not on file   Stress: Not on file   Social Connections: Not on file   Intimate Partner Violence: Not on file   Housing Stability: Not on file         Family History   Problem Relation Age of Onset    Heart Disease Sister     Heart Disease Mother          Allergies   Allergen Reactions    Carbamazepine Anaphylaxis    Lacosamide Nausea Only    Lorazepam Other (See Comments)     Violent behavior    Nitroglycerin Other (See Comments)     hypotension         Current Outpatient Medications   Medication Sig    tiotropium (SPIRIVA RESPIMAT) 2.5 MCG/ACT AERS inhaler Inhale 2 puffs into the lungs daily    ferrous sulfate (IRON 325) 325 (65 Fe) MG tablet Take 325 mg by mouth 2 times daily    ondansetron (ZOFRAN-ODT) 8 MG TBDP disintegrating tablet Take 1 tablet by mouth every 8 hours as needed for Nausea    Cholecalciferol (VITAMIN D3) 50 MCG (2000 UT) CAPS TAKE 1 CAPSULE BY MOUTH EVERY DAY    omeprazole (PRILOSEC) 20 MG delayed release capsule Take 1 capsule by mouth in the morning and at bedtime    albuterol sulfate  (90 Base) MCG/ACT inhaler Inhale 2 puffs into the lungs every 4 hours as needed    aspirin 81 MG chewable tablet Take 81 mg by mouth daily    atorvastatin (LIPITOR) 40 MG tablet Take 40 mg by mouth daily    cyclobenzaprine (FLEXERIL) 5 MG tablet Take 5 mg by mouth 3 times daily as needed    dabigatran (PRADAXA) 150 MG capsule Take 150 mg by mouth every 12 hours    fluticasone-salmeterol (ADVAIR) 250-50 MCG/ACT AEPB diskus inhaler Inhale 1 puff into the lungs every 12 hours    furosemide (LASIX) 40 MG tablet Take 40 mg by mouth daily as needed    ipratropium-albuterol (DUONEB) 0.5-2.5 (3) MG/3ML SOLN nebulizer solution Inhale 3 mLs into the lungs every 4 hours as needed     metoprolol succinate (TOPROL XL) 100 MG extended release tablet Take 100 mg by mouth every 12 hours    potassium chloride (KLOR-CON M) 20 MEQ extended release tablet Take 20 mEq by mouth as needed     No current facility-administered medications for this visit. REVIEW OF SYSTEMS:  Review of Systems   Constitutional:  Positive for fatigue. HENT:  Positive for postnasal drip. Respiratory:  Positive for shortness of breath and wheezing. Musculoskeletal:  Positive for back pain and gait problem. Neurological:  Positive for numbness. All other systems reviewed and are negative. PHYSICAL EXAM:    Vitals:    09/22/22 0815   BP: 112/78   Pulse: 76   Temp: 96.9 °F (36.1 °C)   SpO2: 95%        GENERAL APPEARANCE:   The patient is normal weight and in no respiratory distress. HEENT:   PERRL. Conjunctivae unremarkable. Nasal mucosa is without epistaxis, exudate, or polyps. Gums and dentition are unremarkable.   There is no oropharyngeal documented in the medical record. Discussed various smoking cessation products including pills, patches, inhaler, gum, weaning self off, \"cold turkey\", and smoking cessation classes. Discussed also with patient disease risk of ongoing smoking including CVA, lung cancer, and heart disease. At this point, patient's commitment to quitting Is high. 5  minutes were spent counseling patient regarding tobacco cessation. PLAN:    -quit smoking  - continue spiriva/Advair  - home sleep study  - follow up in 6 months     Orders Placed This Encounter   Procedures    Ambulatory Referral to Sleep Studies     Referral Priority:   Routine     Referral Type:   Consult for Advice and Opinion     Referral Reason:   Specialty Services Required     Requested Specialty:   Sleep Center     Number of Visits Requested:   1       No orders of the defined types were placed in this encounter. Gia Anthony MD  Electronically signed    Dictated using voice recognition software.   Proof read but unrecognized errors may exist.

## 2022-09-29 PROCEDURE — 93294 REM INTERROG EVL PM/LDLS PM: CPT | Performed by: INTERNAL MEDICINE

## 2022-09-29 PROCEDURE — 93296 REM INTERROG EVL PM/IDS: CPT | Performed by: INTERNAL MEDICINE

## 2022-10-10 ENCOUNTER — TELEPHONE (OUTPATIENT)
Dept: PULMONOLOGY | Age: 67
End: 2022-10-10

## 2022-10-10 NOTE — TELEPHONE ENCOUNTER
Last seen: 9/22/22  Hx: COPD, ILD, hypoxemia, PAD, CKD    Patient reporting productive cough, sob, wheezing since last week, has tried cough medication w/ no change; 2 negative home COVID tests, has O2 for night use only. Contacted patient regarding symptoms, reports has intermittent fevers, has been as high as 102, able to control w/ tylenol or ibuprofen; using albuterol in nebulizer about every 4 hours, w/ short lived relief; sputum is yellow w/ some brown; has not had any edema so not taking any lasix; no known exposures to COVID or flu; last negative test was this morning. Notes hx of frequent bronchitis that progresses to PNA, willing to have virtual visit or have something called in.

## 2022-10-10 NOTE — TELEPHONE ENCOUNTER
TRIAGE CALL      Complaint: cannot breathe  Cough: yes  Productive:  yes  Bloody Sputum:  no  Increased SOB/Wheezing:  yes both  Duration: last friday   Fever/Chills: yes  OTC Meds tried: cough med     Have taken 2 covid test both negative . Patient is wheezing over the phone . Oxygen 2l only at night .

## 2022-10-11 ENCOUNTER — APPOINTMENT (OUTPATIENT)
Dept: CT IMAGING | Age: 67
DRG: 065 | End: 2022-10-11
Payer: MEDICARE

## 2022-10-11 ENCOUNTER — HOSPITAL ENCOUNTER (INPATIENT)
Age: 67
LOS: 1 days | Discharge: NURSING, REHAB OR LONG-TERM CARE | DRG: 065 | End: 2022-10-16
Attending: EMERGENCY MEDICINE | Admitting: INTERNAL MEDICINE
Payer: MEDICARE

## 2022-10-11 ENCOUNTER — TELEPHONE (OUTPATIENT)
Dept: INTERNAL MEDICINE CLINIC | Facility: CLINIC | Age: 67
End: 2022-10-11

## 2022-10-11 DIAGNOSIS — R53.1 RIGHT SIDED WEAKNESS: ICD-10-CM

## 2022-10-11 DIAGNOSIS — I63.81 CEREBROVASCULAR ACCIDENT (CVA) DUE TO STENOSIS OF SMALL ARTERY (HCC): ICD-10-CM

## 2022-10-11 DIAGNOSIS — I63.9 CEREBROVASCULAR ACCIDENT (CVA), UNSPECIFIED MECHANISM (HCC): Primary | ICD-10-CM

## 2022-10-11 PROBLEM — J44.9 COPD (CHRONIC OBSTRUCTIVE PULMONARY DISEASE) (HCC): Status: ACTIVE | Noted: 2021-12-03

## 2022-10-11 PROBLEM — I48.11 LONGSTANDING PERSISTENT ATRIAL FIBRILLATION (HCC): Status: ACTIVE | Noted: 2022-01-31

## 2022-10-11 PROBLEM — D50.9 IRON DEFICIENCY ANEMIA: Status: ACTIVE | Noted: 2022-01-31

## 2022-10-11 PROBLEM — I73.9 PAD (PERIPHERAL ARTERY DISEASE) (HCC): Status: ACTIVE | Noted: 2022-01-31

## 2022-10-11 PROBLEM — Z95.2 S/P TAVR (TRANSCATHETER AORTIC VALVE REPLACEMENT): Status: ACTIVE | Noted: 2022-01-03

## 2022-10-11 LAB
ALBUMIN SERPL-MCNC: 3.5 G/DL (ref 3.2–4.6)
ALBUMIN/GLOB SERPL: 0.8 {RATIO} (ref 0.4–1.6)
ALP SERPL-CCNC: 118 U/L (ref 50–136)
ALT SERPL-CCNC: 30 U/L (ref 12–65)
ANION GAP SERPL CALC-SCNC: 9 MMOL/L (ref 2–11)
AST SERPL-CCNC: 17 U/L (ref 15–37)
BASOPHILS # BLD: 0.1 K/UL (ref 0–0.2)
BASOPHILS NFR BLD: 1 % (ref 0–2)
BILIRUB SERPL-MCNC: 0.6 MG/DL (ref 0.2–1.1)
BUN SERPL-MCNC: 13 MG/DL (ref 8–23)
CALCIUM SERPL-MCNC: 9.2 MG/DL (ref 8.3–10.4)
CHLORIDE SERPL-SCNC: 106 MMOL/L (ref 101–110)
CO2 SERPL-SCNC: 23 MMOL/L (ref 21–32)
CREAT SERPL-MCNC: 1 MG/DL (ref 0.8–1.5)
DIFFERENTIAL METHOD BLD: ABNORMAL
EOSINOPHIL # BLD: 0.2 K/UL (ref 0–0.8)
EOSINOPHIL NFR BLD: 2 % (ref 0.5–7.8)
ERYTHROCYTE [DISTWIDTH] IN BLOOD BY AUTOMATED COUNT: 16 % (ref 11.9–14.6)
GLOBULIN SER CALC-MCNC: 4.3 G/DL (ref 2.8–4.5)
GLUCOSE BLD STRIP.AUTO-MCNC: 100 MG/DL (ref 65–100)
GLUCOSE BLD-MCNC: 100 MG/DL
GLUCOSE SERPL-MCNC: 95 MG/DL (ref 65–100)
HCT VFR BLD AUTO: 47.2 % (ref 41.1–50.3)
HGB BLD-MCNC: 15.3 G/DL (ref 13.6–17.2)
IMM GRANULOCYTES # BLD AUTO: 0.1 K/UL (ref 0–0.5)
IMM GRANULOCYTES NFR BLD AUTO: 1 % (ref 0–5)
INR PPP: 0.8
LYMPHOCYTES # BLD: 1.8 K/UL (ref 0.5–4.6)
LYMPHOCYTES NFR BLD: 19 % (ref 13–44)
MCH RBC QN AUTO: 27.1 PG (ref 26.1–32.9)
MCHC RBC AUTO-ENTMCNC: 32.4 G/DL (ref 31.4–35)
MCV RBC AUTO: 83.5 FL (ref 82–102)
MONOCYTES # BLD: 0.9 K/UL (ref 0.1–1.3)
MONOCYTES NFR BLD: 9 % (ref 4–12)
NEUTS SEG # BLD: 6.8 K/UL (ref 1.7–8.2)
NEUTS SEG NFR BLD: 68 % (ref 43–78)
NRBC # BLD: 0 K/UL (ref 0–0.2)
PLATELET # BLD AUTO: 145 K/UL (ref 150–450)
PMV BLD AUTO: 9.4 FL (ref 9.4–12.3)
POTASSIUM SERPL-SCNC: 3.8 MMOL/L (ref 3.5–5.1)
PROT SERPL-MCNC: 7.8 G/DL (ref 6.3–8.2)
PROTHROMBIN TIME: 11.4 SEC (ref 12.6–14.5)
RBC # BLD AUTO: 5.65 M/UL (ref 4.23–5.6)
SERVICE CMNT-IMP: NORMAL
SODIUM SERPL-SCNC: 138 MMOL/L (ref 133–143)
TROPONIN I SERPL HS-MCNC: 14.3 PG/ML (ref 0–14)
WBC # BLD AUTO: 9.8 K/UL (ref 4.3–11.1)

## 2022-10-11 PROCEDURE — 4A03X5D MEASUREMENT OF ARTERIAL FLOW, INTRACRANIAL, EXTERNAL APPROACH: ICD-10-PCS | Performed by: RADIOLOGY

## 2022-10-11 PROCEDURE — 93005 ELECTROCARDIOGRAM TRACING: CPT | Performed by: EMERGENCY MEDICINE

## 2022-10-11 PROCEDURE — 70496 CT ANGIOGRAPHY HEAD: CPT

## 2022-10-11 PROCEDURE — 6360000004 HC RX CONTRAST MEDICATION: Performed by: EMERGENCY MEDICINE

## 2022-10-11 PROCEDURE — 85610 PROTHROMBIN TIME: CPT

## 2022-10-11 PROCEDURE — 0042T CT BRAIN PERFUSION: CPT

## 2022-10-11 PROCEDURE — 84484 ASSAY OF TROPONIN QUANT: CPT

## 2022-10-11 PROCEDURE — 70450 CT HEAD/BRAIN W/O DYE: CPT

## 2022-10-11 PROCEDURE — 6370000000 HC RX 637 (ALT 250 FOR IP): Performed by: FAMILY MEDICINE

## 2022-10-11 PROCEDURE — G0378 HOSPITAL OBSERVATION PER HR: HCPCS

## 2022-10-11 PROCEDURE — 99285 EMERGENCY DEPT VISIT HI MDM: CPT

## 2022-10-11 PROCEDURE — 80053 COMPREHEN METABOLIC PANEL: CPT

## 2022-10-11 PROCEDURE — 82962 GLUCOSE BLOOD TEST: CPT

## 2022-10-11 PROCEDURE — 85025 COMPLETE CBC W/AUTO DIFF WBC: CPT

## 2022-10-11 RX ORDER — DOXYCYCLINE HYCLATE 100 MG
100 TABLET ORAL 2 TIMES DAILY
Qty: 14 TABLET | Refills: 0 | Status: ON HOLD | OUTPATIENT
Start: 2022-10-11 | End: 2022-10-16 | Stop reason: HOSPADM

## 2022-10-11 RX ORDER — ASPIRIN 81 MG/1
81 TABLET, CHEWABLE ORAL DAILY
Status: DISCONTINUED | OUTPATIENT
Start: 2022-10-12 | End: 2022-10-16 | Stop reason: HOSPADM

## 2022-10-11 RX ORDER — LABETALOL HYDROCHLORIDE 5 MG/ML
10 INJECTION, SOLUTION INTRAVENOUS EVERY 10 MIN PRN
Status: DISCONTINUED | OUTPATIENT
Start: 2022-10-11 | End: 2022-10-16 | Stop reason: HOSPADM

## 2022-10-11 RX ORDER — ATORVASTATIN CALCIUM 80 MG/1
80 TABLET, FILM COATED ORAL NIGHTLY
Status: DISCONTINUED | OUTPATIENT
Start: 2022-10-11 | End: 2022-10-16 | Stop reason: HOSPADM

## 2022-10-11 RX ORDER — NICOTINE 21 MG/24HR
1 PATCH, TRANSDERMAL 24 HOURS TRANSDERMAL EVERY 24 HOURS
Status: DISCONTINUED | OUTPATIENT
Start: 2022-10-11 | End: 2022-10-16 | Stop reason: HOSPADM

## 2022-10-11 RX ORDER — ACETAMINOPHEN 325 MG/1
650 TABLET ORAL EVERY 4 HOURS PRN
Status: DISCONTINUED | OUTPATIENT
Start: 2022-10-11 | End: 2022-10-16 | Stop reason: HOSPADM

## 2022-10-11 RX ORDER — POLYETHYLENE GLYCOL 3350 17 G/17G
17 POWDER, FOR SOLUTION ORAL DAILY PRN
Status: DISCONTINUED | OUTPATIENT
Start: 2022-10-11 | End: 2022-10-16 | Stop reason: HOSPADM

## 2022-10-11 RX ORDER — FERROUS SULFATE 325(65) MG
325 TABLET ORAL 2 TIMES DAILY
Status: DISCONTINUED | OUTPATIENT
Start: 2022-10-11 | End: 2022-10-16 | Stop reason: HOSPADM

## 2022-10-11 RX ORDER — PANTOPRAZOLE SODIUM 40 MG/1
40 TABLET, DELAYED RELEASE ORAL
Refills: 3 | Status: DISCONTINUED | OUTPATIENT
Start: 2022-10-12 | End: 2022-10-16 | Stop reason: HOSPADM

## 2022-10-11 RX ORDER — ATORVASTATIN CALCIUM 40 MG/1
80 TABLET, FILM COATED ORAL NIGHTLY
Status: DISCONTINUED | OUTPATIENT
Start: 2022-10-11 | End: 2022-10-11

## 2022-10-11 RX ORDER — ASPIRIN 300 MG/1
300 SUPPOSITORY RECTAL DAILY
Status: DISCONTINUED | OUTPATIENT
Start: 2022-10-12 | End: 2022-10-11

## 2022-10-11 RX ORDER — IPRATROPIUM BROMIDE AND ALBUTEROL SULFATE 2.5; .5 MG/3ML; MG/3ML
3 SOLUTION RESPIRATORY (INHALATION) EVERY 4 HOURS PRN
Status: DISCONTINUED | OUTPATIENT
Start: 2022-10-11 | End: 2022-10-16 | Stop reason: HOSPADM

## 2022-10-11 RX ORDER — DABIGATRAN ETEXILATE 150 MG/1
150 CAPSULE, COATED PELLETS ORAL EVERY 12 HOURS
Status: DISCONTINUED | OUTPATIENT
Start: 2022-10-11 | End: 2022-10-16 | Stop reason: HOSPADM

## 2022-10-11 RX ORDER — ASPIRIN 81 MG/1
81 TABLET ORAL DAILY
Status: DISCONTINUED | OUTPATIENT
Start: 2022-10-12 | End: 2022-10-11

## 2022-10-11 RX ORDER — NICOTINE 21 MG/24HR
1 PATCH, TRANSDERMAL 24 HOURS TRANSDERMAL DAILY PRN
Status: DISCONTINUED | OUTPATIENT
Start: 2022-10-11 | End: 2022-10-11

## 2022-10-11 RX ORDER — BISACODYL 10 MG
10 SUPPOSITORY, RECTAL RECTAL DAILY PRN
Status: DISCONTINUED | OUTPATIENT
Start: 2022-10-11 | End: 2022-10-16 | Stop reason: HOSPADM

## 2022-10-11 RX ORDER — ACETAMINOPHEN 650 MG/1
650 SUPPOSITORY RECTAL EVERY 4 HOURS PRN
Status: DISCONTINUED | OUTPATIENT
Start: 2022-10-11 | End: 2022-10-16 | Stop reason: HOSPADM

## 2022-10-11 RX ADMIN — FERROUS SULFATE TAB 325 MG (65 MG ELEMENTAL FE) 325 MG: 325 (65 FE) TAB at 23:13

## 2022-10-11 RX ADMIN — ACETAMINOPHEN 650 MG: 325 TABLET ORAL at 23:13

## 2022-10-11 RX ADMIN — IOPAMIDOL 100 ML: 755 INJECTION, SOLUTION INTRAVENOUS at 20:11

## 2022-10-11 RX ADMIN — DABIGATRAN ETEXILATE MESYLATE 150 MG: 150 CAPSULE ORAL at 23:14

## 2022-10-11 RX ADMIN — ATORVASTATIN CALCIUM 80 MG: 80 TABLET, FILM COATED ORAL at 23:13

## 2022-10-11 ASSESSMENT — PAIN SCALES - GENERAL
PAINLEVEL_OUTOF10: 6
PAINLEVEL_OUTOF10: 6
PAINLEVEL_OUTOF10: 7
PAINLEVEL_OUTOF10: 7

## 2022-10-11 ASSESSMENT — PAIN DESCRIPTION - ORIENTATION
ORIENTATION: LEFT
ORIENTATION: RIGHT
ORIENTATION: LEFT

## 2022-10-11 ASSESSMENT — PAIN DESCRIPTION - PAIN TYPE: TYPE: ACUTE PAIN

## 2022-10-11 ASSESSMENT — ENCOUNTER SYMPTOMS
CHEST TIGHTNESS: 0
BACK PAIN: 0
ABDOMINAL PAIN: 0

## 2022-10-11 ASSESSMENT — PAIN DESCRIPTION - DESCRIPTORS
DESCRIPTORS: THROBBING

## 2022-10-11 ASSESSMENT — PAIN - FUNCTIONAL ASSESSMENT
PAIN_FUNCTIONAL_ASSESSMENT: 0-10
PAIN_FUNCTIONAL_ASSESSMENT: 0-10

## 2022-10-11 ASSESSMENT — PAIN DESCRIPTION - LOCATION
LOCATION: HEAD

## 2022-10-11 NOTE — TELEPHONE ENCOUNTER
Called for new headache (worst ever), BP to 180/112, 160/97; headache frontal over eye; some numbness right face also -? Can feel clumsy also; some blur of vision also reports--has a fib; on pradaxa.  Taken off lisinopril due to lower BP recently-symptoms warrant ER evaluation urgently -he will call ambulance to transport and not drive

## 2022-10-11 NOTE — ED TRIAGE NOTES
Pt arrives via [facility] from home, pt presents as an EMS Stroke Alert. At 1630 pt had a sudden onset of left sided headache with R facial droop and R leg weakness and trouble getting his . Pt states his headache was the worst ever. Upon EMS arrival pt was hypertensive, initial /118. Pt is on Pradaxa.

## 2022-10-11 NOTE — TELEPHONE ENCOUNTER
MD Yayo Patel RN 16 hours ago (5:12 PM)     CM  Can try doxy 100 bid -may need to come in with cxr if no better      Reviewed with pt, verbalized understanding. Medication sent.

## 2022-10-11 NOTE — ED PROVIDER NOTES
Emergency Department Provider Note                   PCP:                Antonia Zambrano MD               Age: 79 y.o. Sex: male     No diagnosis found. DISPOSITION          MDM  Number of Diagnoses or Management Options  Diagnosis management comments: Patient having evidence of acute CVA with possible hypertensive emergency. We will start Cardene drip to control blood pressure and awaiting neurology assessment. And states he is negative for acute intracranial process per radiology interpretation. Patient is on Pradaxa which would be a contraindication to thrombolytic therapy. Discussed with neurologist after his assessment. We will proceed with CTA and CT perfusion to evaluate for any possible lesion that may be amenable to intervention. Otherwise admission for stroke evaluation. Amount and/or Complexity of Data Reviewed  Clinical lab tests: ordered and reviewed  Tests in the radiology section of CPT®: ordered and reviewed  Review and summarize past medical records: yes  Discuss the patient with other providers: yes  Independent visualization of images, tracings, or specimens: yes (EKG at 1906: Is a paced rhythm, rate of 70 with left bundle branch block and nondiagnostic.)    Risk of Complications, Morbidity, and/or Mortality  Presenting problems: high  Diagnostic procedures: high  Management options: high  General comments: ===================================================================  This patient is critically ill and there is a high probability of of imminent or life threatening deterioration in the patient's condition without immediate management. The nature of the patient's clinical problem is: ACUTE CVA; HYPERTENSIVE EMERGENCY    I have spent 45 minutes in direct patient care, documentation, review of labs/xrays/old records, discussion with Staff, Nursing . The time involved in the performance of separately reportable procedures was not counted toward critical care time. Herschell Pallas, , DO; 10/11/2022 @7:13 PM  ===================================================================            Patient Progress  Patient progress: stable             Orders Placed This Encounter   Procedures    CT HEAD WO CONTRAST    CBC with Auto Differential    Comprehensive Metabolic Panel    Protime-INR    Troponin    Diet NPO    NIHSS    Nursing swallow assessment    POCT Glucose    EKG 12 Lead        Medications - No data to display    New Prescriptions    No medications on file        Caroline Ibanez is a 79 y.o. male who presents to the Emergency Department with chief complaint of  No chief complaint on file. Patient arrived by EMS from home with a history of onset of severe left-sided headache approximately 1630. He arrived to the emergency department at [phone] with additional history of onset of right-sided facial numbness and droop and right upper and lower extremity weakness about 40 minutes prior to arrival.  Patient had a history of hypertension but states he was taken off of his lisinopril due to low blood pressure. He has a history of pacemaker also has a history of spinal stimulator. The history is provided by the patient and medical records. Review of Systems   Constitutional:  Negative for chills and fever. Respiratory:  Negative for chest tightness. Cardiovascular:  Negative for chest pain. Gastrointestinal:  Negative for abdominal pain. Musculoskeletal:  Negative for back pain.      Past Medical History:   Diagnosis Date    Aortic stenosis     Aortic valve replacement 7/2018    CAD (coronary artery disease)     PCI in 2014, 2015 had MI and then stents    Cancer ([address]     SCC removed face     CHF (congestive heart failure) ([address]     Chronic renal disease, stage III ([address]     sees neph and does not have actual diagnosis at this time    COPD (chronic obstructive pulmonary disease) ([address]     fibrosis in lungs    Dyslipidemia     Heart failure ([address]     CHF per patient    HTN (hypertension)     med controlled    Hyperlipidemia     Ischemic cardiomyopathy     Pacemaker     only pacemaker    Paroxysmal atrial fibrillation (HCC)     pradaxa for this    Pulmonary fibrosis (HCC)     Seizure disorder [facility]     lyme disease - small lesion frontal part of brain - no maintenance meds - last seizure 2 months ago, due to fall - before that it was nine years    Systolic heart failure [facility])         Past Surgical History:   Procedure Laterality Date    ABLATION OF DYSRHYTHMIC FOCUS      AORTIC VALVE REPLACEMENT  07/2018    BACK SURGERY      discectomy    CARDIAC CATHETERIZATION      PCI 2014, 2015    CARDIAC PROCEDURE N/A 7/11/2022    LEFT HEART CATH / CORONARY ANGIOGRAPHY performed by Therese Dill MD at [address] [place] [address] CATH LAB    CERVICAL FUSION      C3-4 fusion    EGD  6/17/2022         HIP ARTHROPLASTY Left     SPINAL CORD STIMULATOR SURGERY      lower back     UPPER GASTROINTESTINAL ENDOSCOPY N/A 6/17/2022    EGD ESOPHAGOGASTRODUODENOSCOPY/ PT HAS PACEMAKER performed by Inocencio Mosher MD at [facility] ENDOSCOPY        Family History   Problem Relation Age of Onset    Heart Disease Sister     Heart Disease Mother         Social History     Socioeconomic History    Marital status: Legally    Tobacco Use    Smoking status: Every Day     Packs/day: 1.50     Years: 56.00     Pack years: 84.00     Types: Cigarettes    Smokeless tobacco: Never    Tobacco comments:     Quit smoking: cutting back to 4 Cigarettes a day   Vaping Use    Vaping Use: Never used   Substance and Sexual Activity    Alcohol use: Not Currently    Drug use: Yes     Comment: cannabis used: two weeks ago as of 6/15/2022 edibles          Carbamazepine, Lacosamide, Lorazepam, and Nitroglycerin     Previous Medications    ALBUTEROL SULFATE  (90 BASE) MCG/ACT INHALER    Inhale 2 puffs into the lungs every 4 hours as needed    ASPIRIN 81 MG CHEWABLE TABLET    Take 81 mg by mouth daily    ATORVASTATIN (LIPITOR) 40 MG TABLET    Take 40 mg by mouth daily    CHOLECALCIFEROL (VITAMIN D3) 50 MCG (2000 UT) CAPS    TAKE 1 CAPSULE BY MOUTH EVERY DAY    CYCLOBENZAPRINE (FLEXERIL) 5 MG TABLET    Take 5 mg by mouth 3 times daily as needed    DABIGATRAN (PRADAXA) 150 MG CAPSULE    Take 150 mg by mouth every 12 hours    DOXYCYCLINE HYCLATE (VIBRA-TABS) 100 MG TABLET    Take 1 tablet by mouth 2 times daily for 7 days    FERROUS SULFATE (IRON 325) 325 (65 FE) MG TABLET    Take 325 mg by mouth 2 times daily    FLUTICASONE-SALMETEROL (ADVAIR) 250-50 MCG/ACT AEPB DISKUS INHALER    Inhale 1 puff into the lungs every 12 hours    FUROSEMIDE (LASIX) 40 MG TABLET    Take 40 mg by mouth daily as needed    IPRATROPIUM-ALBUTEROL (DUONEB) 0.5-2.5 (3) MG/3ML SOLN NEBULIZER SOLUTION    Inhale 3 mLs into the lungs every 4 hours as needed     METOPROLOL SUCCINATE (TOPROL XL) 100 MG EXTENDED RELEASE TABLET    Take 100 mg by mouth every 12 hours    OMEPRAZOLE (PRILOSEC) 20 MG DELAYED RELEASE CAPSULE    Take 1 capsule by mouth in the morning and at bedtime    ONDANSETRON (ZOFRAN-ODT) 8 MG TBDP DISINTEGRATING TABLET    Take 1 tablet by mouth every 8 hours as needed for Nausea    POTASSIUM CHLORIDE (KLOR-CON M) 20 MEQ EXTENDED RELEASE TABLET    Take 20 mEq by mouth as needed    TIOTROPIUM (SPIRIVA RESPIMAT) 2.5 MCG/ACT AERS INHALER    Inhale 2 puffs into the lungs daily        Vitals signs and nursing note reviewed. No data found. Physical Exam  Vitals and nursing note reviewed. Constitutional:       General: He is not in acute distress. Appearance: He is obese. He is not ill-appearing, toxic-appearing or diaphoretic. HENT:      Head: Normocephalic and atraumatic. Nose: Nose normal.      Mouth/Throat:      Mouth: Mucous membranes are dry. Pharynx: Oropharynx is clear. No oropharyngeal exudate or posterior oropharyngeal erythema. Eyes:      Extraocular Movements: Extraocular movements intact.       Conjunctiva/sclera: Conjunctivae normal.      Pupils: Pupils are equal, round, and reactive to light. Cardiovascular:      Rate and Rhythm: Normal rate and regular rhythm. Pulses: Normal pulses. Heart sounds: Normal heart sounds. Pulmonary:      Effort: Pulmonary effort is normal.      Breath sounds: Normal breath sounds. Abdominal:      General: There is no distension. Palpations: Abdomen is soft. Tenderness: There is no abdominal tenderness. There is no right CVA tenderness, left CVA tenderness or guarding. Musculoskeletal:         General: Normal range of motion. Cervical back: Normal range of motion and neck supple. Skin:     General: Skin is warm and dry. Neurological:      Mental Status: He is alert and oriented to person, place, and time. Sensory: Sensory deficit present. Motor: Weakness present. Comments: Positive right-sided facial droop with some dysarthria. Also noted right upper and lower extremity weakness. Decreased sensation to the right side of the face. Psychiatric:         Mood and Affect: Mood normal.         Behavior: Behavior normal.         Thought Content: Thought content normal.        Procedures    No results found for any visits on 10/11/22. CT HEAD WO CONTRAST    (Results Pending)                       Voice dictation software was used during the making of this note. This software is not perfect and grammatical and other typographical errors may be present. This note has not been completely proofread for errors.      Sudeep Alford, DO  10/11/22 Swetha Stephens , DO  10/11/22 Swetha Stephens , DO  10/11/22 2010

## 2022-10-12 ENCOUNTER — APPOINTMENT (OUTPATIENT)
Dept: NON INVASIVE DIAGNOSTICS | Age: 67
DRG: 065 | End: 2022-10-12
Payer: MEDICARE

## 2022-10-12 PROBLEM — I63.9 ACUTE CVA (CEREBROVASCULAR ACCIDENT) (HCC): Status: ACTIVE | Noted: 2022-10-12

## 2022-10-12 LAB
ANION GAP SERPL CALC-SCNC: 15 MMOL/L (ref 2–11)
BUN SERPL-MCNC: 13 MG/DL (ref 8–23)
CALCIUM SERPL-MCNC: 9 MG/DL (ref 8.3–10.4)
CHLORIDE SERPL-SCNC: 107 MMOL/L (ref 101–110)
CHOLEST SERPL-MCNC: 176 MG/DL
CO2 SERPL-SCNC: 20 MMOL/L (ref 21–32)
CREAT SERPL-MCNC: 0.9 MG/DL (ref 0.8–1.5)
ECHO AO ASC DIAM: 2.6 CM
ECHO AO ASCENDING AORTA INDEX: 1.13 CM/M2
ECHO AO ROOT DIAM: 2 CM
ECHO AO ROOT INDEX: 0.87 CM/M2
ECHO AV ACCELERATION TIME: 98 MS
ECHO AV AREA PEAK VELOCITY: 1.2 CM2
ECHO AV AREA VTI: 1 CM2
ECHO AV AREA/BSA PEAK VELOCITY: 0.5 CM2/M2
ECHO AV AREA/BSA VTI: 0.4 CM2/M2
ECHO AV MEAN GRADIENT: 30 MMHG
ECHO AV MEAN VELOCITY: 2.7 M/S
ECHO AV PEAK GRADIENT: 47 MMHG
ECHO AV PEAK VELOCITY: 3.4 M/S
ECHO AV VELOCITY RATIO: 0.41
ECHO AV VTI: 67 CM
ECHO BSA: 2.34 M2
ECHO EST RA PRESSURE: 3 MMHG
ECHO IVC PROX: 1.3 CM
ECHO LA AREA 2C: 17.1 CM2
ECHO LA AREA 4C: 17.5 CM2
ECHO LA DIAMETER INDEX: 1.83 CM/M2
ECHO LA DIAMETER: 4.2 CM
ECHO LA MAJOR AXIS: 6 CM
ECHO LA MINOR AXIS: 5 CM
ECHO LA TO AORTIC ROOT RATIO: 2.1
ECHO LA VOL BP: 48 ML (ref 18–58)
ECHO LA VOL/BSA BIPLANE: 21 ML/M2 (ref 16–34)
ECHO LV E' LATERAL VELOCITY: 8 CM/S
ECHO LV E' SEPTAL VELOCITY: 5 CM/S
ECHO LV EDV A2C: 125 ML
ECHO LV EDV A4C: 154 ML
ECHO LV EDV INDEX A4C: 67 ML/M2
ECHO LV EDV NDEX A2C: 54 ML/M2
ECHO LV EJECTION FRACTION A2C: 60 %
ECHO LV EJECTION FRACTION A4C: 59 %
ECHO LV EJECTION FRACTION BIPLANE: 59 % (ref 55–100)
ECHO LV ESV A2C: 50 ML
ECHO LV ESV A4C: 63 ML
ECHO LV ESV INDEX A2C: 22 ML/M2
ECHO LV ESV INDEX A4C: 27 ML/M2
ECHO LV FRACTIONAL SHORTENING: 17 % (ref 28–44)
ECHO LV INTERNAL DIMENSION DIASTOLE INDEX: 2.57 CM/M2
ECHO LV INTERNAL DIMENSION DIASTOLIC: 5.9 CM (ref 4.2–5.9)
ECHO LV INTERNAL DIMENSION SYSTOLIC INDEX: 2.13 CM/M2
ECHO LV INTERNAL DIMENSION SYSTOLIC: 4.9 CM
ECHO LV IVSD: 0.8 CM (ref 0.6–1)
ECHO LV MASS 2D: 195 G (ref 88–224)
ECHO LV MASS INDEX 2D: 84.8 G/M2 (ref 49–115)
ECHO LV POSTERIOR WALL DIASTOLIC: 0.9 CM (ref 0.6–1)
ECHO LV RELATIVE WALL THICKNESS RATIO: 0.31
ECHO LVOT AREA: 2.8 CM2
ECHO LVOT AV VTI INDEX: 0.37
ECHO LVOT DIAM: 1.9 CM
ECHO LVOT MEAN GRADIENT: 5 MMHG
ECHO LVOT PEAK GRADIENT: 8 MMHG
ECHO LVOT PEAK VELOCITY: 1.4 M/S
ECHO LVOT STROKE VOLUME INDEX: 30.3 ML/M2
ECHO LVOT SV: 69.7 ML
ECHO LVOT VTI: 24.6 CM
ECHO MV AREA VTI: 0.9 CM2
ECHO MV E VELOCITY: 1.91 M/S
ECHO MV E/E' LATERAL: 23.88
ECHO MV E/E' RATIO (AVERAGED): 31.04
ECHO MV E/E' SEPTAL: 38.2
ECHO MV LVOT VTI INDEX: 3.24
ECHO MV MAX VELOCITY: 2.3 M/S
ECHO MV MEAN GRADIENT: 9 MMHG
ECHO MV MEAN VELOCITY: 1.4 M/S
ECHO MV PEAK GRADIENT: 22 MMHG
ECHO MV VTI: 79.6 CM
ECHO PV ACCELERATION TIME (AT): 151 MS
ECHO PV MAX VELOCITY: 0.9 M/S
ECHO PV PEAK GRADIENT: 4 MMHG
ECHO RV BASAL DIMENSION: 3.3 CM
ECHO RV INTERNAL DIMENSION: 2.8 CM
ECHO RV TAPSE: 2 CM (ref 1.7–?)
EKG ATRIAL RATE: 217 BPM
EKG DIAGNOSIS: NORMAL
EKG Q-T INTERVAL: 468 MS
EKG QRS DURATION: 179 MS
EKG QTC CALCULATION (BAZETT): 505 MS
EKG R AXIS: 269 DEGREES
EKG T AXIS: 86 DEGREES
EKG VENTRICULAR RATE: 70 BPM
ERYTHROCYTE [DISTWIDTH] IN BLOOD BY AUTOMATED COUNT: 16.1 % (ref 11.9–14.6)
EST. AVERAGE GLUCOSE BLD GHB EST-MCNC: 123 MG/DL
GLUCOSE SERPL-MCNC: 88 MG/DL (ref 65–100)
HBA1C MFR BLD: 5.9 % (ref 4.8–5.6)
HCT VFR BLD AUTO: 44.6 % (ref 41.1–50.3)
HDLC SERPL-MCNC: 40 MG/DL (ref 40–60)
HDLC SERPL: 4.4 {RATIO}
HGB BLD-MCNC: 14.3 G/DL (ref 13.6–17.2)
LDLC SERPL CALC-MCNC: 122 MG/DL
LV EF: 58 %
LVEF MODALITY: NORMAL
MCH RBC QN AUTO: 26.8 PG (ref 26.1–32.9)
MCHC RBC AUTO-ENTMCNC: 32.1 G/DL (ref 31.4–35)
MCV RBC AUTO: 83.5 FL (ref 82–102)
NRBC # BLD: 0 K/UL (ref 0–0.2)
PLATELET # BLD AUTO: 139 K/UL (ref 150–450)
PMV BLD AUTO: 9.8 FL (ref 9.4–12.3)
POTASSIUM SERPL-SCNC: 4 MMOL/L (ref 3.5–5.1)
RBC # BLD AUTO: 5.34 M/UL (ref 4.23–5.6)
SODIUM SERPL-SCNC: 142 MMOL/L (ref 133–143)
TRIGL SERPL-MCNC: 70 MG/DL (ref 35–150)
VLDLC SERPL CALC-MCNC: 14 MG/DL (ref 6–23)
WBC # BLD AUTO: 7.9 K/UL (ref 4.3–11.1)

## 2022-10-12 PROCEDURE — 94640 AIRWAY INHALATION TREATMENT: CPT

## 2022-10-12 PROCEDURE — 97162 PT EVAL MOD COMPLEX 30 MIN: CPT

## 2022-10-12 PROCEDURE — 92610 EVALUATE SWALLOWING FUNCTION: CPT

## 2022-10-12 PROCEDURE — 93306 TTE W/DOPPLER COMPLETE: CPT | Performed by: INTERNAL MEDICINE

## 2022-10-12 PROCEDURE — 97112 NEUROMUSCULAR REEDUCATION: CPT

## 2022-10-12 PROCEDURE — C8929 TTE W OR WO FOL WCON,DOPPLER: HCPCS

## 2022-10-12 PROCEDURE — 36415 COLL VENOUS BLD VENIPUNCTURE: CPT

## 2022-10-12 PROCEDURE — 94760 N-INVAS EAR/PLS OXIMETRY 1: CPT

## 2022-10-12 PROCEDURE — 6360000004 HC RX CONTRAST MEDICATION: Performed by: INTERNAL MEDICINE

## 2022-10-12 PROCEDURE — 83036 HEMOGLOBIN GLYCOSYLATED A1C: CPT

## 2022-10-12 PROCEDURE — 6370000000 HC RX 637 (ALT 250 FOR IP): Performed by: FAMILY MEDICINE

## 2022-10-12 PROCEDURE — 2580000003 HC RX 258: Performed by: INTERNAL MEDICINE

## 2022-10-12 PROCEDURE — 85027 COMPLETE CBC AUTOMATED: CPT

## 2022-10-12 PROCEDURE — 80061 LIPID PANEL: CPT

## 2022-10-12 PROCEDURE — 97535 SELF CARE MNGMENT TRAINING: CPT

## 2022-10-12 PROCEDURE — G0378 HOSPITAL OBSERVATION PER HR: HCPCS

## 2022-10-12 PROCEDURE — 80048 BASIC METABOLIC PNL TOTAL CA: CPT

## 2022-10-12 PROCEDURE — 97530 THERAPEUTIC ACTIVITIES: CPT

## 2022-10-12 PROCEDURE — 97166 OT EVAL MOD COMPLEX 45 MIN: CPT

## 2022-10-12 PROCEDURE — 99219 PR INITIAL OBSERVATION CARE/DAY 50 MINUTES: CPT | Performed by: PSYCHIATRY & NEUROLOGY

## 2022-10-12 PROCEDURE — 6370000000 HC RX 637 (ALT 250 FOR IP): Performed by: HOSPITALIST

## 2022-10-12 RX ORDER — ALBUTEROL SULFATE 90 UG/1
2 AEROSOL, METERED RESPIRATORY (INHALATION) EVERY 4 HOURS PRN
Status: DISCONTINUED | OUTPATIENT
Start: 2022-10-12 | End: 2022-10-16 | Stop reason: HOSPADM

## 2022-10-12 RX ORDER — TRAMADOL HYDROCHLORIDE 50 MG/1
50 TABLET ORAL EVERY 6 HOURS PRN
Status: DISCONTINUED | OUTPATIENT
Start: 2022-10-12 | End: 2022-10-16 | Stop reason: HOSPADM

## 2022-10-12 RX ORDER — METOPROLOL SUCCINATE 50 MG/1
100 TABLET, EXTENDED RELEASE ORAL EVERY 12 HOURS
Status: DISCONTINUED | OUTPATIENT
Start: 2022-10-12 | End: 2022-10-16 | Stop reason: HOSPADM

## 2022-10-12 RX ADMIN — DABIGATRAN ETEXILATE MESYLATE 150 MG: 150 CAPSULE ORAL at 22:28

## 2022-10-12 RX ADMIN — ATORVASTATIN CALCIUM 80 MG: 80 TABLET, FILM COATED ORAL at 20:13

## 2022-10-12 RX ADMIN — TRAMADOL HYDROCHLORIDE 50 MG: 50 TABLET, COATED ORAL at 22:28

## 2022-10-12 RX ADMIN — DABIGATRAN ETEXILATE MESYLATE 150 MG: 150 CAPSULE ORAL at 10:44

## 2022-10-12 RX ADMIN — PANTOPRAZOLE SODIUM 40 MG: 40 TABLET, DELAYED RELEASE ORAL at 17:13

## 2022-10-12 RX ADMIN — TIOTROPIUM BROMIDE INHALATION SPRAY 2 PUFF: 3.12 SPRAY, METERED RESPIRATORY (INHALATION) at 07:49

## 2022-10-12 RX ADMIN — FERROUS SULFATE TAB 325 MG (65 MG ELEMENTAL FE) 325 MG: 325 (65 FE) TAB at 10:44

## 2022-10-12 RX ADMIN — FERROUS SULFATE TAB 325 MG (65 MG ELEMENTAL FE) 325 MG: 325 (65 FE) TAB at 20:13

## 2022-10-12 RX ADMIN — ACETAMINOPHEN 650 MG: 325 TABLET ORAL at 20:13

## 2022-10-12 RX ADMIN — MOMETASONE FUROATE AND FORMOTEROL FUMARATE DIHYDRATE 2 PUFF: 200; 5 AEROSOL RESPIRATORY (INHALATION) at 20:44

## 2022-10-12 RX ADMIN — ASPIRIN 81 MG: 81 TABLET, CHEWABLE ORAL at 10:44

## 2022-10-12 RX ADMIN — MOMETASONE FUROATE AND FORMOTEROL FUMARATE DIHYDRATE 2 PUFF: 200; 5 AEROSOL RESPIRATORY (INHALATION) at 07:45

## 2022-10-12 RX ADMIN — PANTOPRAZOLE SODIUM 40 MG: 40 TABLET, DELAYED RELEASE ORAL at 06:08

## 2022-10-12 RX ADMIN — PERFLUTREN 0.45 ML: 6.52 INJECTION, SUSPENSION INTRAVENOUS at 10:17

## 2022-10-12 ASSESSMENT — PAIN - FUNCTIONAL ASSESSMENT
PAIN_FUNCTIONAL_ASSESSMENT: PREVENTS OR INTERFERES SOME ACTIVE ACTIVITIES AND ADLS
PAIN_FUNCTIONAL_ASSESSMENT: PREVENTS OR INTERFERES SOME ACTIVE ACTIVITIES AND ADLS

## 2022-10-12 ASSESSMENT — PAIN SCALES - GENERAL
PAINLEVEL_OUTOF10: 5
PAINLEVEL_OUTOF10: 5
PAINLEVEL_OUTOF10: 0
PAINLEVEL_OUTOF10: 6
PAINLEVEL_OUTOF10: 0
PAINLEVEL_OUTOF10: 0
PAINLEVEL_OUTOF10: 4
PAINLEVEL_OUTOF10: 5

## 2022-10-12 ASSESSMENT — PAIN DESCRIPTION - DESCRIPTORS
DESCRIPTORS: ACHING

## 2022-10-12 ASSESSMENT — PAIN DESCRIPTION - ORIENTATION
ORIENTATION: LEFT
ORIENTATION: MID
ORIENTATION: LEFT
ORIENTATION: MID
ORIENTATION: LEFT

## 2022-10-12 ASSESSMENT — PAIN DESCRIPTION - ONSET
ONSET: ON-GOING
ONSET: GRADUAL

## 2022-10-12 ASSESSMENT — PAIN DESCRIPTION - LOCATION
LOCATION: BACK

## 2022-10-12 ASSESSMENT — PAIN DESCRIPTION - PAIN TYPE
TYPE: CHRONIC PAIN
TYPE: CHRONIC PAIN

## 2022-10-12 NOTE — CARE COORDINATION
Chart review complete, CM met with pt at bedside, pt found laying in stretcher no distress noted at this time. Pt is pending therapy evaluations to be completed to assist with DC needs. Pt has CLTC aid 4 hours/day 6 days/ week. Demographics, insurance and PCP confirmed. [address] letter delivered to pt, verbal and written information provided to pt with pt signature obtained and verbal understanding of information provided. Pt left with copy and signed copy placed in chart. CM staff will remain available to assist with dc needs as needed       10/12/22 4955   Service Assessment   Patient Orientation Alert and Oriented   Cognition Alert   History Provided By Patient   Primary Caregiver Self   Accompanied By/Relationship none   Support Systems Family Members   Patient's Healthcare Decision Maker is: Legal Next of Karla 69   PCP Verified by CM Yes  Sammy Roper MD last visit approx 1mth ago)   Last Visit to PCP Within last 3 months   Prior Functional Level Independent in ADLs/IADLs;Assistance with the following:  (pt has CLTC Aid 3-4 hours per day 6 days per week)   Current Functional Level Independent in ADLs/IADLs;Assistance with the following:   Can patient return to prior living arrangement Yes   Ability to make needs known: Good   Family able to assist with home care needs: Yes   Would you like for me to discuss the discharge plan with any other family members/significant others, and if so, who? No   Community Resources ECF/Home Care  [facility])   CM/JOSE G Referral Other (see comment)  (dispo needs)   Social/Functional History   Lives With Family   Type of [address] One level  (pt lives on 4th floor with elevator axcess)   Bathroom Toilet Handicap height   [address]; Wheelchair-manual;Wheelchair-electric   Receives Help From Personal care attendant   ADL Assistance Needs assistance   Ambulation Assistance Needs assistance   Transfer Assistance Needs assistance   Active  No   Patient's  Info public transportation when needed. Occupation Retired; On disability   Discharge Planning   Type of Residence Other (Comment)  (unknown at this time pending therapy evaluations to be completed.)   Living Arrangements Alone   Current Services Prior To Admission Private Duty Homecare   Potential Assistance Needed Other (Comment)  (unknown)   DME Ordered? No   Potential Assistance Purchasing Medications No   Patient expects to be discharged to: Unknown   One/Two Story Residence One story   History of falls?  0   Services At/After Discharge   Services At/After Discharge Aide services  (resume CLTC aid at dc if dc home)

## 2022-10-12 NOTE — ACP (ADVANCE CARE PLANNING)
[facility] Hospitalist Service  At the heart of better care     Advance Care Planning   Admit Date:  10/11/2022  6:51 PM   Name:  Ammon George   Age:  79 y.o. Sex:  male  :  1955   MRN:  [id number]   Room:  2/[facility]    Ammon George is able to make his own decisions:   Yes    Patient / surrogate decision-maker directed code status:  FULL    Patient or surrogate consented to discussion of the current conditions, workup, management plans, prognosis, and the risk for further deterioration. Time spent: 17 minutes in direct discussion.       Signed:  Lilia Adam DO

## 2022-10-12 NOTE — H&P
Hospitalist Admission History and Physical         NAME:            Bernice Van    Age:                79 y.o.    :               1955    MRN:              [id number]    PCP: Macrina Holman MD    Consulting MD:    Treatment Team: Attending Provider: Alicia Benitez DO; Registered Nurse: Terrell Massey RN         Chief Complaint   Patient presents with    Facial Droop   HPI:    Patient is a 79 y.o. male who presented to the ED for cc sudden left sided headache with right sided weakness starting this evening. He is unsure of exact time. Hx of a fib on Pradaxa, HTN, HLD, dCHF EF 47%, CAD, s/p TAVR, s/p pacemaker, PAD, CKD stage 3, spinal stimulator, and COPD requiring 2L NC at night.      CT head and CTA head and neck with no significant findings    CT perfusion pending   Past Medical History:   Diagnosis Date    Aortic stenosis     Aortic valve replacement 2018    CAD (coronary artery disease)     PCI in ,  had MI and then stents    Cancer ([address]     SCC removed face     CHF (congestive heart failure) ([address]     Chronic renal disease, stage III ([address]     sees neph and does not have actual diagnosis at this time    COPD (chronic obstructive pulmonary disease) ([address]     fibrosis in lungs    Dyslipidemia     Heart failure ([address]     CHF per patient    HTN (hypertension)     med controlled    Hyperlipidemia     Ischemic cardiomyopathy     Pacemaker     only pacemaker    Paroxysmal atrial fibrillation ([address]     pradaxa for this    Pulmonary fibrosis ([address]     Seizure disorder ([address]     lyme disease - small lesion frontal part of brain - no maintenance meds - last seizure 2 months ago, due to fall - before that it was nine years    Systolic heart failure [facility])             Past Surgical History:   Procedure Laterality Date    ABLATION OF DYSRHYTHMIC FOCUS      AORTIC VALVE REPLACEMENT  2018    BACK SURGERY      discectomy    CARDIAC CATHETERIZATION      PCI ,     CARDIAC PROCEDURE N/A 7/11/2022    LEFT HEART CATH / CORONARY ANGIOGRAPHY performed by Vandana Beyer MD at [address] [place] [address] CATH LAB    CERVICAL FUSION      C3-4 fusion    EGD  6/17/2022         HIP ARTHROPLASTY Left     SPINAL CORD STIMULATOR SURGERY      lower back     UPPER GASTROINTESTINAL ENDOSCOPY N/A 6/17/2022    EGD ESOPHAGOGASTRODUODENOSCOPY/ PT HAS PACEMAKER performed by Sandra Ashraf MD at [facility] ENDOSCOPY            Family History   Problem Relation Age of Onset    Heart Disease Sister     Heart Disease Mother        Family history reviewed and negative except as noted above. Social History     Social History Narrative    Not on file            Social History     Tobacco Use    Smoking status: Every Day     Packs/day: 1.50     Years: 56.00     Pack years: 84.00     Types: Cigarettes    Smokeless tobacco: Never    Tobacco comments:     Quit smoking: cutting back to 4 Cigarettes a day   Substance Use Topics    Alcohol use: Not Currently            Social History     Substance and Sexual Activity   Drug Use Yes    Comment: cannabis used: two weeks ago as of 6/15/2022 edibles                  Allergies   Allergen Reactions    Carbamazepine Anaphylaxis    Lacosamide Nausea Only    Lorazepam Other (See Comments)     Violent behavior    Nitroglycerin Other (See Comments)     hypotension            Prior to Admission medications    Medication Sig Start Date End Date Taking?  Authorizing Provider   doxycycline hyclate (VIBRA-TABS) 100 MG tablet Take 1 tablet by mouth 2 times daily for 7 days 10/11/22 10/18/22  Camille Gay MD   tiotropium (SPIRIVA RESPIMAT) 2.5 MCG/ACT AERS inhaler Inhale 2 puffs into the lungs daily    Historical Provider, MD   ferrous sulfate (IRON 325) 325 (65 Fe) MG tablet Take 325 mg by mouth 2 times daily    Historical Provider, MD   ondansetron (ZOFRAN-ODT) 8 MG TBDP disintegrating tablet Take 1 tablet by mouth every 8 hours as needed for Nausea 9/21/22   Hermes Camp MD   Cholecalciferol (VITAMIN D3) 50 MCG (2000 UT) CAPS TAKE 1 CAPSULE BY MOUTH EVERY DAY 7/29/22   Sil Garza DO   omeprazole (PRILOSEC) 20 MG delayed release capsule Take 1 capsule by mouth in the morning and at bedtime 7/12/22   GEORGE Hoff CNP   albuterol sulfate  (90 Base) MCG/ACT inhaler Inhale 2 puffs into the lungs every 4 hours as needed 4/18/22   Ar Automatic Reconciliation   aspirin 81 MG chewable tablet Take 81 mg by mouth daily    Ar Automatic Reconciliation   atorvastatin (LIPITOR) 40 MG tablet Take 40 mg by mouth daily    Ar Automatic Reconciliation   cyclobenzaprine (FLEXERIL) 5 MG tablet Take 5 mg by mouth 3 times daily as needed 4/28/22   Ar Automatic Reconciliation   dabigatran (PRADAXA) 150 MG capsule Take 150 mg by mouth every 12 hours 12/7/21   Ar Automatic Reconciliation   fluticasone-salmeterol (ADVAIR) 250-50 MCG/ACT AEPB diskus inhaler Inhale 1 puff into the lungs every 12 hours    Ar Automatic Reconciliation   furosemide (LASIX) 40 MG tablet Take 40 mg by mouth daily as needed 4/9/22   Ar Automatic Reconciliation   ipratropium-albuterol (DUONEB) 0.5-2.5 (3) MG/3ML SOLN nebulizer solution Inhale 3 mLs into the lungs every 4 hours as needed     Ar Automatic Reconciliation   metoprolol succinate (TOPROL XL) 100 MG extended release tablet Take 100 mg by mouth every 12 hours 12/7/21   Ar Automatic Reconciliation   potassium chloride (KLOR-CON M) 20 MEQ extended release tablet Take 20 mEq by mouth as needed 4/9/22   Ar Automatic Reconciliation                      Review of Systems         Constitutional: NAD    Eyes:  no change in visual acuity, no photophobia    Ears, nose, mouth, throat, and face: no  Odynphagia, dysphagia, no thrush or exudate, negative for chronic sinus congestion, recurrent headaches    Respiratory: negative for SOB, hemoptysis or cough    Cardiovascular: negative for CP, palpitations, or PND    Gastrointestinal: negative for abdominal pain, no hematemesis, hematochezia or BRBPR    Genitourinary: no urgency, frequency, or dysuria, no nocturia    Integument/breast: negative for skin rash or skin lesions    Hematologic/lymphatic: negative for known bleeding disorder    Musculoskeletal:Right sided weakness that is starting to improve    Neurological: headache that has resolved     Behavioral/Psych: negative for depression or chronic anxiety,    Endocrine: negative for polydyspia, polyuria or intolerance to heat or cold    Allergic/Immunologic: negative for chronic allergic rhinitis, or known connective tissue disorder              Objective:         Patient Vitals for the past 24 hrs:   Temp Pulse Resp BP SpO2   10/11/22 2130 -- 74 17 (!) 152/98 99 %   10/11/22 2115 98.6 °F (37 °C) 72 18 (!) 157/96 98 %   10/11/22 2000 -- 70 14 (!) 149/96 97 %   10/11/22 1946 -- 70 26 (!) 176/95 (!) 88 %   10/11/22 1919 -- 70 23 (!) 168/101 98 %   10/11/22 1905 -- 70 12 (!) 213/96 97 %            No intake/output data recorded. No intake/output data recorded.          Data Review:   Recent Results (from the past 24 hour(s))   EKG 12 Lead    Collection Time: 10/11/22  7:18 PM   Result Value Ref Range    Ventricular Rate 70 BPM    Atrial Rate 217 BPM    QRS Duration 179 ms    Q-T Interval 468 ms    QTc Calculation (Bazett) 505 ms    R Axis 269 degrees    T Axis 86 degrees    Diagnosis Atrial fibrillation    POCT Glucose    Collection Time: 10/11/22  7:21 PM   Result Value Ref Range    Glucose 100 mg/dL   POCT Glucose    Collection Time: 10/11/22  7:21 PM   Result Value Ref Range    POC Glucose 100 65 - 100 mg/dL    Performed by: Textingly    Comprehensive Metabolic Panel    Collection Time: 10/11/22  7:58 PM   Result Value Ref Range    Sodium 138 133 - 143 mmol/L    Potassium 3.8 3.5 - 5.1 mmol/L    Chloride 106 101 - 110 mmol/L    CO2 23 21 - 32 mmol/L    Anion Gap 9 2 - 11 mmol/L    Glucose 95 65 - 100 mg/dL    BUN 13 8 - 23 MG/DL    Creatinine 1.00 0.8 - 1.5 MG/DL    Est, Glom Filt Rate >60 >60 ml/min/1.73m2    Calcium 9.2 8.3 - 10.4 MG/DL    Total Bilirubin 0.6 0.2 - 1.1 MG/DL    ALT 30 12 - 65 U/L    AST 17 15 - 37 U/L    Alk Phosphatase 118 50 - 136 U/L    Total Protein 7.8 6.3 - 8.2 g/dL    Albumin 3.5 3.2 - 4.6 g/dL    Globulin 4.3 2.8 - 4.5 g/dL    Albumin/Globulin Ratio 0.8 0.4 - 1.6     Protime-INR    Collection Time: 10/11/22  7:58 PM   Result Value Ref Range    Protime 11.4 (L) 12.6 - 14.5 sec    INR 0.8     Troponin    Collection Time: 10/11/22  7:58 PM   Result Value Ref Range    Troponin, High Sensitivity 14.3 (H) 0 - 14 pg/mL   CBC with Auto Differential    Collection Time: 10/11/22  8:04 PM   Result Value Ref Range    WBC 9.8 4.3 - 11.1 K/uL    RBC 5.65 (H) 4.23 - 5.6 M/uL    Hemoglobin 15.3 13.6 - 17.2 g/dL    Hematocrit 47.2 41.1 - 50.3 %    MCV 83.5 82 - 102 FL    MCH 27.1 26.1 - 32.9 PG    MCHC 32.4 31.4 - 35.0 g/dL    RDW 16.0 (H) 11.9 - 14.6 %    Platelets 837 (L) 060 - 450 K/uL    MPV 9.4 9.4 - 12.3 FL    nRBC 0.00 0.0 - 0.2 K/uL    Differential Type AUTOMATED      Seg Neutrophils 68 43 - 78 %    Lymphocytes 19 13 - 44 %    Monocytes 9 4.0 - 12.0 %    Eosinophils % 2 0.5 - 7.8 %    Basophils 1 0.0 - 2.0 %    Immature Granulocytes 1 0.0 - 5.0 %    Segs Absolute 6.8 1.7 - 8.2 K/UL    Absolute Lymph # 1.8 0.5 - 4.6 K/UL    Absolute Mono # 0.9 0.1 - 1.3 K/UL    Absolute Eos # 0.2 0.0 - 0.8 K/UL    Basophils Absolute 0.1 0.0 - 0.2 K/UL    Absolute Immature Granulocyte 0.1 0.0 - 0.5 K/UL            Physical Exam:         General:    Alert, cooperative, no distress, appears stated age. Good sensation to face bilaterally     Eyes:    Conjunctivae/corneas clear. PERRL    Ears:    Normal     Nose:    Nares normal.     Mouth/Throat:    Lips, mucosa, and tongue normal. Teeth and gums normal. No facial droop or tongue deviation     Neck:     no JVD. Back:     deferred    Lungs:     Clear to auscultation bilaterally. Heart:    Irregularly irregular     Abdomen:     Soft, non-tender.  Bowel sounds normal. No masses,  No organomegaly. Extremities:    Loss of sensation to right side when compared to left. Right arm with 3/5 strength. Right LE with 4/5 strength. Left side normal    Skin:    Superficial lesions on extremities     Neurologic:    As above          Assessment and Plan         Principal Problem:    Right sided weakness  Active Problems:    Nocturnal hypoxia    Cigarette nicotine dependence with nicotine-induced disorder    Hyperlipidemia    Atrial fibrillation (HCC)    CAD in native artery    Hypertension    Longstanding persistent atrial fibrillation (HCC)    PAD (peripheral artery disease) (HCC)    Iron deficiency anemia    S/P TAVR (transcatheter aortic valve replacement)    COPD (chronic obstructive pulmonary disease) (HCC)    Chronic renal disease, stage III (HCC)  Resolved Problems:    * No resolved hospital problems. *    Right sided weakness that is starting to improve - Suspecting TIA. ASA, pradaxa, and increase statin dose. Needs to be compliant with Pradaxa since he admits to missing an occasional dose. Due to spinal stimulator, cannot perform MRI. Will want PT/OT to see to ensure safe to go home. If not improved by AM, could consider consult to neurology for suspected CVA. Order ECHO to see if with PFO     dCHF - appears compensated. PRN lasix. HTN- allow permissive HTN for 24 hours    COPD - wears 2L at  night. Home meds. S/p TAVR - noted    PPI BID    Place as obs.  Likely can dc 10/12 since symptoms are starting to improve    DVT prophylaxis - SCDs    Signed By:    Madelyn Do DO    October 11, 2022

## 2022-10-12 NOTE — CONSULTS
Consult    Patient: Felecia Schwab MRN: [id number]     YOB: 1955  Age: 79 y.o. Sex: male      Subjective:      Felecia Schwab is a 79 y.o. male who is being seen for stroke. The patient has multiple chronic medical conditions as listed below. He has neurological complications secondary to Lyme disease. He presented with acute onset right-sided weakness involving right upper limb and right lower limb. Onset of symptoms were acute. Duration has been greater than 24 hours. Severity is considered severe. It has not improved since being in the hospital.  No associated changes in speech or language. He also reports a severe headache at the onset of symptoms. He cannot have an MRI secondary to spinal cord stimulator.     Past Medical History:   Diagnosis Date    Aortic stenosis     Aortic valve replacement 7/2018    CAD (coronary artery disease)     PCI in 2014, 2015 had MI and then stents    Cancer ([address]     SCC removed face     CHF (congestive heart failure) ([address]     Chronic renal disease, stage III ([address]     sees neph and does not have actual diagnosis at this time    COPD (chronic obstructive pulmonary disease) ([address]     fibrosis in lungs    Dyslipidemia     Heart failure ([address]     CHF per patient    HTN (hypertension)     med controlled    Hyperlipidemia     Ischemic cardiomyopathy     Pacemaker     only pacemaker    Paroxysmal atrial fibrillation ([address]     pradaxa for this    Pulmonary fibrosis ([address]     Seizure disorder ([address]     lyme disease - small lesion frontal part of brain - no maintenance meds - last seizure 2 months ago, due to fall - before that it was nine years    Systolic heart failure [facility])      Past Surgical History:   Procedure Laterality Date    ABLATION OF DYSRHYTHMIC FOCUS      AORTIC VALVE REPLACEMENT  07/2018    BACK SURGERY      discectomy    CARDIAC CATHETERIZATION      PCI 2014, 2015    CARDIAC PROCEDURE N/A 7/11/2022    LEFT HEART CATH / CORONARY ANGIOGRAPHY performed by Zunilda Starr MD at [facility] CATH LAB    CERVICAL FUSION      C3-4 fusion    EGD  6/17/2022         HIP ARTHROPLASTY Left     SPINAL CORD STIMULATOR SURGERY      lower back     UPPER GASTROINTESTINAL ENDOSCOPY N/A 6/17/2022    EGD ESOPHAGOGASTRODUODENOSCOPY/ PT HAS PACEMAKER performed by Eugenio Art MD at [facility] ENDOSCOPY      Family History   Problem Relation Age of Onset    Heart Disease Sister     Heart Disease Mother      Social History     Tobacco Use    Smoking status: Every Day     Packs/day: 1.50     Years: 56.00     Pack years: 84.00     Types: Cigarettes    Smokeless tobacco: Never    Tobacco comments:     Quit smoking: cutting back to 4 Cigarettes a day   Substance Use Topics    Alcohol use: Not Currently      Current Facility-Administered Medications   Medication Dose Route Frequency Provider Last Rate Last Admin    albuterol sulfate HFA (PROVENTIL;VENTOLIN;PROAIR) 108 (90 Base) MCG/ACT inhaler 2 puff  2 puff Inhalation Q4H PRN Radha Arrington        [Held by provider] metoprolol succinate (TOPROL XL) extended release tablet 100 mg  100 mg Oral Q12H Radha Arrington        aspirin chewable tablet 81 mg  81 mg Oral Daily Michelle Breannas, DO   81 mg at 10/12/22 1044    dabigatran (PRADAXA) capsule 150 mg  150 mg Oral Q12H Michelle Salcedo, DO   150 mg at 10/12/22 1044    ferrous sulfate (IRON 325) tablet 325 mg  325 mg Oral BID Michelle Salcedo, DO   325 mg at 10/12/22 1044    mometasone-formoterol (DULERA) 200-5 MCG/ACT inhaler 2 puff  2 puff Inhalation BID Michelle Salcedo, DO   2 puff at 10/12/22 0745    ipratropium-albuterol (DUONEB) nebulizer solution 3 mL  3 mL Inhalation Q4H PRN Michelle Wescks, DO        pantoprazole (PROTONIX) tablet 40 mg  40 mg Oral BID AC Michelle Kicristinas, DO   40 mg at 10/12/22 0608    tiotropium (SPIRIVA RESPIMAT) 2.5 MCG/ACT inhaler 2 puff  2 puff Inhalation Daily Michelle Matias, DO   2 puff at 10/12/22 0749    acetaminophen (TYLENOL) tablet 650 mg  650 mg Oral Q4H PRN Melody Alejo, DO   650 mg at 10/11/22 2313    Or    acetaminophen (TYLENOL) suppository 650 mg  650 mg Rectal Q4H PRN Melody Sapna, DO        polyethylene glycol (GLYCOLAX) packet 17 g  17 g Oral Daily PRN Melody Alejo, DO        bisacodyl (DULCOLAX) suppository 10 mg  10 mg Rectal Daily PRN Melody Alejo, DO        atorvastatin (LIPITOR) tablet 80 mg  80 mg Oral Nightly Melody Alejo, DO   80 mg at 10/11/22 2313    labetalol (NORMODYNE;TRANDATE) injection 10 mg  10 mg IntraVENous Q10 Min PRN Melody Alejo, DO        nicotine (NICODERM CQ) 14 MG/24HR 1 patch  1 patch TransDERmal Q24H Melody Alejo, DO            Allergies   Allergen Reactions    Carbamazepine Anaphylaxis    Lacosamide Nausea Only    Lorazepam Other (See Comments)     Violent behavior    Nitroglycerin Other (See Comments)     hypotension       Review of Systems:  CONSTITUTIONAL: No weight loss, fever, chills, but positive weakness on the right  HEENT: Eyes: Positive blurred vision. Ears, Nose, Throat: No hearing loss, sneezing, congestion, runny nose or sore throat. SKIN: No rash or itching. CARDIOVASCULAR: No chest pain, chest pressure or chest discomfort. No palpitations or edema. RESPIRATORY: No shortness of breath, cough or sputum. GASTROINTESTINAL: No anorexia, nausea, vomiting or diarrhea. No abdominal pain or blood. GENITOURINARY: no burning with urination. NEUROLOGICAL: See above   MUSCULOSKELETAL: No muscle, back pain, joint pain or stiffness. HEMATOLOGIC: No anemia, bleeding or bruising. LYMPHATICS: No enlarged nodes. No history of splenectomy. ENDOCRINOLOGIC: No reports of sweating, cold or heat intolerance. No polyuria or polydipsia. ALLERGIES: No history of asthma, hives, eczema or rhinitis.         Objective:     Vitals:    10/12/22 0740 10/12/22 0746 10/12/22 1046 10/12/22 1447   BP: (!) 178/78  (!) 141/92 138/75   Pulse: 70  80 74   Resp: 15  16    Temp: 98.4 °F (36.9 °C)  98.2 °F (36.8 °C) 98.1 °F (36.7 °C)   TempSrc: Oral  Oral    SpO2: 92% 93% 91% 91%   Weight:       Height:            Physical Exam:  General - Well developed, well nourished, in no apparent distress. Pleasant and conversant  HEENT - Normocephalic, atraumatic. Conjunctiva, tympanic membranes, and oropharynx are clear. Neck - Supple without masses, no bruits   Cardiovascular - Regular rate and rhythm. Normal S1, S2 without murmurs, rubs, or gallops. Lungs - Clear to auscultation. Abdomen - Soft, nontender with normal bowel sounds. Extremities - Peripheral pulses intact. No edema and no rashes. Neurological examination - Comprehension, attention , memory and reasoning are intact. Language and speech are normal. On cranial nerve examination pupils are equal round and reactive to light. Fundoscopic examination is normal. Visual acuity is adequate. Visual fields are full to finger confrontation. Extraocular motility is normal. Face is symmetric and sensation is intact to light touch. Hearing is intact to finger rustle bilaterally. Motor examination - There is normal muscle tone and bulk. Power is full throughout on the left. On the right he has mostly 2/5 strength with relatively preserved flexion at the hand on the right. Muscle stretch reflexes are normoactive and there are no pathological reflexes present. Sensation is diminished in the right upper limb and right lower limb. Cerebellar examination is normal. Gait and stance not assessed. The patient cannot walk.     NIHSS   NIHSS Score: 8  1a-Level of Consciousness 0  1b-What is Month/Age 0  1c-Open/Close Eyes&Hand 0  2 -Best Gaze 0  3 -Visual Fields 0  4 -Facial Palsy 0  5a-Motor-Left Arm 0  5b-Motor-Right Arm 4  6a-Motor-Left Leg 0  6b-Motor-Right Leg 4  7 -Limb Ataxia 0  8 -Sensory 0  9 -Best Language 0  10-Dysarthria 0  11-Extinction/Inattention 0    Lab Results   Component Value Date/Time    CHOL 176 10/12/2022 05:49 AM    HDL 40 10/12/2022 05:49 AM CT Results (most recent): Personally Reviewed   Results for orders placed during the hospital encounter of 10/11/22    CT HEAD WO CONTRAST    Narrative  CT head without contrast    History: Hypertension, right-sided weakness    Technique: 5mm axial images were obtained from the skull base to the vertex  without intravenous contrast.  Radiation dose reduction techniques were used for  this study:  Our CT scanners use one or all of the following: Automated exposure  control, adjustment of the mA and/or kVp according to patient's size, iterative  reconstruction. Sagittal and coronal reformatted images were submitted. Comparison: 05/06/2022    Findings: The ventricles and sulci are normal in size and configuration. There  are no extra-axial fluid collections. There is no evidence to suggest an acute  major territorial infarct. Patchy areas of decreased attenuation are present  within the supratentorial white matter. These are nonspecific findings but would  be most compatible with mild  chronic small vessel ischemic change. There is no  of acute intraparenchymal hemorrhage or mass effect. The bony calvarium is  intact. The visualized mastoid air cells and paranasal sinuses are well  pneumatized and aerated. Impression  Unremarkable unenhanced CT scan of the brain for acute intracranial  abnormality. Most recent Echo  Results for orders placed during the hospital encounter of 10/11/22    Transthoracic echocardiogram (TTE) complete with contrast, bubble, strain, and 3D PRN    Interpretation Summary    Left Ventricle: Normal left ventricular systolic function with a visually estimated EF of 55 - 60%. Left ventricle size is normal. Normal wall thickness. Apical Septal motion is consistent with pacing. Normal wall motion. Diastolic function indeterminate in the setting of atrial fibrillation. Average E/e' ratio is 31.04. Aortic Valve: Not well visualized.  Appropriately positioned transcatheter bioprosthetic valve that is well-seated. No regurgitation. Unable to assess stenosis. Elevated prosthetic gradient. AV mean gradient is 30 mmHg. AV peak gradient is 47 mmHg. Mitral Valve: Mildly calcified leaflet, at the anterior and posterior leaflets- tips. ? Rheumatic etiology Mild annular calcification of the mitral valve. Moderate stenosis noted. MV mean gradient is 9 mmHg\at a heart rate of 70 bpm.    Left Atrium: Left atrium is moderately dilated. Interatrial Septum: Agitated saline study was negative with and without provocation. Contrast used: Definity.    -No obvious cardiac source for emboli noted. -Underlying atrial fibrillation s/p AV ale ablation and permanent pacemaker placement-ventricular paced rhythm.  -Negative bubble study for intracardiac shunt.  -Bioprosthetic aortic valve is not well visualized although gradients are elevated. Elevated mitral valve gradients were also noted-consider GODFREY if clinically indicated. Most recent CTA Personally Reviewed  Results for orders placed during the hospital encounter of 10/11/22    CTA HEAD NECK W CONTRAST    Narrative  EXAM: CT angiogram of the head and neck. INDICATION: Right-sided weakness. COMPARISON: None. TECHNIQUE: Axial CT images of the head and neck were obtained in the arterial  phase after the intravenous injection of 100 mL Isovue 370 CT contrast. Stenosis  is graded according to the NASCET criterion. 3D reformatted images were  performed. Radiation dose reduction techniques were used for this study. Our CT  scanners use one or all of the following:  Automated exposure control,  adjustment of the mA or kV according to patient size, iterative reconstruction. Images sent to [facility] for post-processing. FINDINGS: No aneurysm, dissection or high-grade vessel stenosis is identified. There are mild atherosclerotic changes in both carotid bulbs, causing less than  25% stenosis bilaterally.  The dural venous sinuses are patent. There is no neck  mass or adenopathy. Impression  No aneurysm, dissection or high-grade vessel stenosis in the  arteries of the head or neck. Assessment and Plan    63-year-old man with acute onset right-sided hemiparesis. Clinical examination is highly suggestive of stroke. His symptoms localize to the posterior limb of the internal capsule on the left. Strokes in this area are typically caused by small vessel ischemic disease. In the setting of hypertension, this is the most likely etiology. Recommendations    Continue Pradaxa. Add aspirin    Long-term blood pressure goal is less than 140/90 or less than 130/80, if tolerable    Smoking cessation is necessary    The patient cannot have an MRI due to a spinal cord stimulator.   Stroke is a clinical diagnosis and his symptoms are consistent with stroke

## 2022-10-12 NOTE — DISCHARGE INSTRUCTIONS

## 2022-10-12 NOTE — PROGRESS NOTES
OBSERVATION STATUS    SPEECH LANGUAGE PATHOLOGY: DYSPHAGIA  Initial Assessment    NAME: Jean Marie Ahn  : 1955  MRN: [id number]    ADMISSION DATE: 10/11/2022  PRIMARY DIAGNOSIS: Right sided weakness  Right sided weakness [R53.1]  Cerebrovascular accident (CVA), unspecified mechanism ([facility] [address] [I63.9]    ICD-10: Treatment Diagnosis: R13.11 Dysphagia, Oral Phase    RECOMMENDATIONS   Diet:  Diet Solids Recommendation: Easy to Chew  Liquid Consistency Recommendation: Thin    Medications: PO        Therapeutic Interventions: Patient/Family education   Compensatory Swallowing Strategies: Upright as possible for all oral intake;Remain upright for 30-45 minutes after meals;Small bites/sips   Patient continues to require skilled intervention: Yes (pending MRI results)  D/C Recommendations: Ongoing speech therapy is recommended during this hospitalization, To be determined     ASSESSMENT    Dysphagia Diagnosis: Swallow function appears [facility]  Increased mastication with regular solids due to absent lower dentition. No overt s/sx pharyngeal dysphagia. Recommend continue easy to chew diet and thin liquids. GENERAL    History of Present Injury/Illness: Mr. Gregory Petty  has a past medical history of Aortic stenosis, CAD (coronary artery disease), Cancer ([address] CHF (congestive heart failure) ([address] Chronic renal disease, stage III ([address] COPD (chronic obstructive pulmonary disease) ([address] Dyslipidemia, Heart failure ([address] HTN (hypertension), Hyperlipidemia, Ischemic cardiomyopathy, Pacemaker, Paroxysmal atrial fibrillation ([address] Pulmonary fibrosis ([address] Seizure disorder ([address] and Systolic heart failure ([address] . He also  has a past surgical history that includes Cardiac catheterization; Aortic valve replacement (2018); ablation of dysrhythmic focus; Hip Arthroplasty (Left); cervical fusion; back surgery; Spinal Cord Stimulator Surgery; EGD (2022);  Upper gastrointestinal endoscopy (N/A, 2022); and Cardiac procedure (N/A, 7/11/2022). Subjective: patient aslep but easily roused and alert. Behavior/Cognition: Alert; Cooperative;Pleasant mood  Communication Observation: Functional  Follows Directions: Complex    Prior Dysphagia History: none per report. Per chart, recent EGD 6/17/2022 showed mild esophagitis and hiatal hernia. Current Diet : Easy to chew  Current Liquid Diet : Thin    Respiratory Status: Room air              Pain:   Patient does not appear in pain, Patient does not c/o pain                                        OBJECTIVE    Patient Positioning: Upright in bed   Oral Motor   Labial:  (right asymmetry;inconsistent)  Oral Hygiene: Moist;Clean  Lingual: No impairment  Dentition: Dentures top    Baseline Vocal Quality: Normal    Oropharyngeal Phase:     Assessment Method(s): Observation;Palpation  Consistency Presented: Thin;Pureed;Mixed consistency; Regular  How Presented: Self-fed/presented;SLP-fed/Presented;Spoon;Cup/gulp;Cup/sip (patient reports he prefers cup)  Bolus Acceptance: No impairment  Type of Impairment: Mastication (mildly reduced quality/strength due to absent lower dentition)  Propulsion: No impairment  Oral Residue: None  Initiation of Swallow: No impairment  Laryngeal Elevation: Functional  Aspiration Signs/Symptoms: None  Pharyngeal Phase Characteristics:  (reported slight sticking sensation with cracker in throat that resolved with liquid risne)              PLAN    Duration/Frequency: SLP will follow up for cognitive linguistic evaluation if indicated by imaging or course of hospitalization. Frequency/Duration TBD.      Dysphagia Outcome and Severity Scale (TANA)  Dysphagia Outcome Severity Scale: Level 6: Within functional limits/Modified independence  Interpretation of Tool: The Dysphagia Outcome and Severity Scale (TANA) is a simple, easy-to-use, 7-point scale developed to systematically rate the functional severity of dysphagia based on objective assessment and make recommendations for diet level, independence level, and type of nutrition.    Normal(7), Functional(6), Mild(5), Mild-Moderate(4), Moderate(3), Moderate-Severe(2), Severe(1)    Speech Therapy Prognosis  Prognosis: Excellent    Education: Patient  Patient Education: diet consistencies, role of SLP  Patient Education Response: Verbalizes understanding, Demonstrated understanding    PRECAUTIONS/ALLERGIES: Carbamazepine, Lacosamide, Lorazepam, and Nitroglycerin   Safety Devices in place: Yes  Type of devices: Call light within reach, Left in bed    Therapy Time  SLP Individual Minutes  Time In: 5334  Time Out: 0845  Minutes: [address]  10/12/2022 8:58 AM

## 2022-10-12 NOTE — CONSULTS
IRC/ PM&R Consult under Code S Protocol    Pt off the floor at the time of my visit. Therefore, I will evaluate tomorrow. EHR reviewed . HPI; this is a chronically debilitated 70yo gentleman with neurological complications for Lyme disease. He also    has a hx of severe spinal osteoarthritis / degenerative disease with chronic b/l LE weakness,  s/p upper back surgery with titanium spinal cage, s/p neuro-spinal stimulator. He presented to the ED with a sudden onset of left sided headache with right sided weakness while sitting outside . Of note,  He also has a hx of atrial fibrillation on Pradaxa, HTN, HLD, dCHF EF 47%, CAD, s/p TAVR, s/p pacemaker, PAD, CKD stage 3, COPD requiring 2L NC at night with ongoing tobacco abuse. In ER, CT head and CTA head and neck with no significant findings; CT brain perfusion without findings of large vessel hypoperfusion. Pt's right sided weakness and sensation deficits remains this morning  Patient CANNOT have an MRI due to having a spinal stimulator. He has been seen by Dr Steve Pitt of Neurology. NIHSS per his exam is 8  Per his notation \"Clinical examination is highly suggestive of stroke. His symptoms localize to the posterior limb of the internal capsule on the left. Strokes in this area are typically caused by small vessel ischemic disease. In the setting of hypertension, this is the most likely etiology. \"    He has been seen by PT and OT who both recommend IRC Baseline is a bit unclear looking at the notes. He has a [facility] aide 4hr/d , six days a week  Per PT notes \" At baseline, pt primarily uses a wheelchair due to severe degenerative spinal changes from lyme disease. He sleeps in a hospital bed and uses elevated HOB and a trapeze to sit up. He uses a lift to get his legs back in bed. He transfers with a slide board. He was able to perform lateral scooting with CGA and multiple rest breaks. Cueing for paying attention to R arm, proper positioning, task sequencing.  Pt returned to supine with modA of 2, positioning for comfort, pressure relief, and support of R UE. Pt is heavily reliant on his upper body strength to perform mobility and ADL's and is currently very limited by R UE weakness. \"  This makes it difficult to vision him white water rafting recently  Regardless, he appears to have right sided weakness outside his normal level of debilitation. Will return tomorrow to complete full consultation. If [facility] appropriate, would need [facility] Medicare approval.  Thank you for consultation    Taisha Watson MD, Medical Director  [address] [place] [address]

## 2022-10-12 NOTE — ED NOTES
Pt returned from CT via stretcher accompanied by this writer. He remains awake and is now oriented x 4 with improvement in his speech and sensation in rt side. He states, \" It is starting to feel a little more like normal, but still can't feel as well on that side.  \"     Wendy Scott, YOUNG  10/11/22 5732

## 2022-10-12 NOTE — ED NOTES
[facility] and d/c'd with cath intact by CT tech. Pt remains in CT for Ultrasound guided attempt by Willem Oates, RN, Charge Nurse.      Rajan Radford RN  10/11/22 8830

## 2022-10-12 NOTE — ED NOTES
[facility]. Pt remains alert with no change since last assessed with exception of BP improved as noted and pt less hypertensive.       Benedicto Hope RN  10/11/22 2148       Benedicto Hope RN  10/11/22 2149

## 2022-10-12 NOTE — PROGRESS NOTES
ACUTE PHYSICAL THERAPY GOALS:   (Developed with and agreed upon by patient and/or caregiver.)    (1.) Bernice Van  will move from supine to sit and sit to supine  with STAND BY ASSIST within 7 treatment day(s). (2.) Bernice Van will transfer from bed to chair and chair to bed with MINIMAL ASSIST using the least restrictive device within 7 treatment day(s). (3.) Ashley Chen will perform STS, standing static and dynamic balance activities x 10 minutes with MINIMAL ASSIST to improve safety within 7 treatment day(s). (4.) Ashley Chen will perform bilateral lower extremity exercises x 20 min for HEP with INDEPENDENCE to improve strength, endurance, and functional mobility within 7 treatment day(s). PHYSICAL THERAPY Initial Assessment, Daily Note, and AM  (Link to Caseload Tracking: PT Visit Days : 1  Acknowledge Orders  Time In/Out  PT Charge Capture  Rehab Caseload Tracker    Bernice Van is a 79 y.o. male   PRIMARY DIAGNOSIS: Right sided weakness  Right sided weakness [R53.1]  Cerebrovascular accident (CVA), unspecified mechanism ([facility] [address] [I63.9]       Reason for Referral: Generalized Muscle Weakness (M62.81)  Other lack of cordination (R27.8)  Difficulty in walking, Not elsewhere classified (R26.2)  History of falling (Z91.81)  Observation: Payor: [facility] MEDICARE / Plan: Reema Sernair / Product Type: *No Product type* /     ASSESSMENT:     REHAB RECOMMENDATIONS:   Recommendation to date pending progress:  Setting:  Inpatient Rehab Facility    Equipment:    To Be Determined  Pt has power w/c, manual w/c, slide board, forearm crutches, trapeze, hospital bed, tub bench, BSC     ASSESSMENT:  Mr. Falguni Sesay  is a 79year old M who presents with new onset of R sided weakness. At baseline, pt primarily uses a wheelchair due to severe degenerative spinal changes from lyme disease. He sleeps in a hospital bed and uses elevated HOB and a trapeze to sit up.  He uses a lift to get his legs back in bed. He transfers with a slide board. He has an aide that comes 6 days a week for a few hours at a time. He occasionally ambulates short distances with forearm crutches with assistance. This date pt performs mobility including bed mobility with modA of 2. Pt noted to be significantly weaker on R side than L. He was able to perform lateral scooting with CGA and multiple rest breaks. Cueing for paying attention to R arm, proper positioning, task sequencing. Pt returned to supine with modA of 2, positioning for comfort, pressure relief, and support of R UE. Pt is heavily reliant on his upper body strength to perform mobility and ADL's and is currently very limited by R UE weakness. Pt is eager to work with therapy and return to baseline LOF. He would greatly benefit from [facility] at / given PLOF and current deficits. Pt presents as functioning below his baseline, with deficits in mobility including transfers, gait, balance, and activity tolerance. Pt will benefit from skilled therapy services to address stated deficits to promote return to highest level of function, independence, and safety. Will continue to follow.      [facility] [address] [address] AM-PAC 6 Clicks Basic Mobility Inpatient Short Form  AM-PAC Mobility Inpatient   How much difficulty turning over in bed?: A Lot  How much difficulty sitting down on / standing up from a chair with arms?: A Lot  How much difficulty moving from lying on back to sitting on side of bed?: A Lot  How much help from another person moving to and from a bed to a chair?: A Lot  How much help from another person needed to walk in hospital room?: Total  How much help from another person for climbing 3-5 steps with a railing?: Total  -PAC Inpatient Mobility Raw Score : 10  AM-PAC Inpatient T-Scale Score : 32.29  Mobility Inpatient CMS 0-100% Score: 76.75  Mobility Inpatient CMS G-Code Modifier : CL    SUBJECTIVE:   Mr. Lucien Genao states, \"I need my upper body strength\"     Social/Functional Lives With: Family  Type of Home: Apartment  Home Layout: One level (pt lives on 4th floor with elevator axcess)  Bathroom Toilet: Handicap height  Home Equipment: Kei Olivares Wheelchair-electric  Receives Help From: Personal care attendant  ADL Assistance: Needs assistance  Ambulation Assistance: Needs assistance  Transfer Assistance: Needs assistance  Active : No  Patient's  Info: public transportation when needed.   Occupation: Retired, On disability    OBJECTIVE:     PAIN: Cyndi Holter / O2: PRECAUTION / Sameera Pencil / DRAINS:   Pre Treatment:   Pain Assessment: None - Denies Pain      Post Treatment: 0 Vitals        Oxygen      Telemetry     RESTRICTIONS/PRECAUTIONS:  Restrictions/Precautions: Fall Risk                 GROSS EVALUATION: B LE Intact Impaired (Comments):   AROM []  Limited B knee extension, hip flexion R worse than L, limited R DF   PROM [] R LE very stiff   Strength []  L LE grossly 4-/5, R LE 2-/5   Balance [] Posture: Fair  Sitting - Static: Good  Sitting - Dynamic: Fair, +   Posture [] Forward Head  Rounded Shoulders   Sensation []  B diminished light touch, R worse than L   Coordination []   NT due to limited ROM   Tone []  R LE very stiff   Edema []    Activity Tolerance [] Patient limited by fatigue multiple prolonged rest breaks required with scooting    []      COGNITION/  PERCEPTION: Intact Impaired (Comments):   Orientation [x]     Vision [x]     Hearing [x]     Cognition  [x]       MOBILITY: I Mod I S SBA CGA Min Mod Max Total  NT x2 Comments:   Bed Mobility    Rolling [] [] [] [] [] [] [x] [] [] [] [x]    Supine to Sit [] [] [] [] [] [] [x] [] [] [] [x]    Scooting [] [] [] [] [x] [] [] [] [] [] [x]    Sit to Supine [] [] [] [] [] [] [x] [] [] [] [x]    Transfers    Sit to Stand [] [] [] [] [] [] [] [] [] [x] []    Bed to Chair [] [] [] [] [] [] [] [] [] [x] []    Stand to Sit [] [] [] [] [] [] [] [] [] [x] []     [] [] [] [] [] [] [] [] [] [] []    I=Independent, Mod I=Modified Independent, S=Supervision, SBA=Standby Assistance, CGA=Contact Guard Assistance,   Min=Minimal Assistance, Mod=Moderate Assistance, Max=Maximal Assistance, Total=Total Assistance, NT=Not Tested    GAIT: I Mod I S SBA CGA Min Mod Max Total  NT x2 Comments:   Level of Assistance [] [] [] [] [] [] [] [] [] [x] []    Distance   N/a    DME N/A    Gait Quality N/A    Weightbearing Status Restrictions/Precautions  Restrictions/Precautions: Fall Risk    Stairs      I=Independent, Mod I=Modified Independent, S=Supervision, SBA=Standby Assistance, CGA=Contact Guard Assistance,   Min=Minimal Assistance, Mod=Moderate Assistance, Max=Maximal Assistance, Total=Total Assistance, NT=Not Tested    PLAN:   FREQUENCY AND DURATION: 3 times/week for duration of hospital stay or until stated goals are met, whichever comes first.    THERAPY PROGNOSIS: Good    PROBLEM LIST:   (Skilled intervention is medically necessary to address:)  Decreased ADL/Functional Activities  Decreased Activity Tolerance  Decreased AROM/PROM  Decreased Balance  Decreased Gait Ability  Decreased Strength  Decreased Transfer Abilities INTERVENTIONS PLANNED:   (Benefits and precautions of physical therapy have been discussed with the patient.)  Self Care Training  Therapeutic Activity  Therapeutic Exercise/HEP  Neuromuscular Re-education  Gait Training  Education       TREATMENT:   EVALUATION: MODERATE COMPLEXITY: (Untimed Charge)    TREATMENT:   Co-Treatment PT/OT necessary due to patient's decreased overall endurance/tolerance levels, as well as need for high level skilled assistance to complete functional transfers/mobility and functional tasks  Therapeutic Activity (53 Minutes): Therapeutic activity included Rolling, Supine to Sit, Sit to Supine, Scooting, Lateral Scooting, and Sitting balance  to improve functional Activity tolerance, Balance, Mobility, Strength, and ROM.     TREATMENT GRID:  N/A    AFTER TREATMENT PRECAUTIONS: Bed, Bed/Chair Locked, Call light within reach, Needs within reach, and RN notified    INTERDISCIPLINARY COLLABORATION:  MD/ PA/ NP , RN/ PCT, and OT/ TREVIZO    EDUCATION: Education Given To: Patient  Education Provided: Role of Therapy;Plan of Care; Fall Prevention Strategies; Equipment; Energy Conservation  Education Method: Verbal  Barriers to Learning: None  Education Outcome: Verbalized understanding    TIME IN/OUT:  Time In: 1034  Time Out: 1138  Minutes: 59    DERRELL ROTHMAN, PT

## 2022-10-12 NOTE — PROGRESS NOTES
Hospitalist Progress Note   Admit Date:  10/11/2022  6:51 PM   Name:  Daya Collins   Age:  79 y.o. Sex:  male  :  1955   MRN:  [id number]   Room:  [place] [address]    Presenting Complaint: Facial Droop     Reason(s) for Admission: Right sided weakness [R53.1]  Cerebrovascular accident (CVA), unspecified mechanism [facility]) [I63.9]     Hospital Course:   Patient is a 79 y.o. male who presented to the ED for cc sudden left sided headache with right sided weakness while sitting outside. He was unsure of exact time of onset. He immediately went up to his apartment where his aid took his blood pressure which was persistently elevated. EMS called and pt brought to the ER. Mr. Elidia Sevilla has a hx of severe spinal osteoarthritis / degenerative disease with chronic b/l LE weakness,  s/p upper back surgery with titanium spinal cage, s/p neuro-spinal stimulator. Pt reports he is very independent at home using trapeze and slide board with wheelchair \"I only need my aid to tie my shoes. \"  Pt is able to use both upper extremities well and tells me he was just up in [place] last week white water rafting. He also has a hx of atrial fibrillation on Pradaxa, HTN, HLD, dCHF EF 47%, CAD, s/p TAVR, s/p pacemaker, PAD, CKD stage 3, COPD requiring 2L NC at night with ongoing tobacco abuse. In ER, CT head and CTA head and neck with no significant findings; CT brain perfusion without findings of large vessel hypoperfusion. Pt's right sided weakness and sensation deficits remains persistent upon my exam this AM.    Subjective & 24hr Events (10/12/22): \"I just can't move my right arm and really don't feel much in my right arm and leg. I need my right arm to work because I want to continue to be independent. \"   Pleasant 67y.o. WM sitting up in bed with noted complaints. C/o ongoing RUE / RLE sensation deficits, RUE weakness compared to LUE.   Denies HA, visual deficits, CP, dyspnea, N/V, abd pain, urinary / fecal incontinence. Assessment & Plan:     Principal Problem:    Right sided weakness  - worrisome for acute CVA despite negative CT head and CTA head  - unable to undergo MRI secondary to spinal stimulator (pt reports this was what was told to him when he underwent stimulator placement)  - neuro consult pending  - permissive HTN  - echo with LA enlargement (on pradaxa)    Active Problems:    Uncontrolled HTN  - permissive HTN in setting of possible acute CVA  - prn labetalol with parameters in place      Chronic debility / chronic back pain / degenerative disease  - appreciate PT/OT eval, recommending inpatient rehab  - s/p spinal stimulator      Atrial fibrillation  - encouraged compliance with pradaxa  - rate ~controlled      Hx of TAVR  - aware      Cigarette smoker / COPD  - REALLY NEEDS TO QUIT SMOKING  - cont inhalers  - not in acute exacerbation      Nocturnal hypoxia  - cont supplemental O2 at hs and prn during day      Hyperlipidemia  - cont statin tx      CAD in native artery  - cont ASA / statin  - resume metoprolol next 24hrs      Iron deficiency anemia  - h/h stable  - cont oral supplementation      Chronic renal disease, stage III (HCC)  - renal fxn stable      Anticipated discharge needs:    - IRC    Diet:  ADULT DIET; Easy to Chew; Low Fat/Low Chol/High Fiber/2 gm Na  DVT PPx: pradaxa  Code status: Full Code    Hospital Problems:  Principal Problem:    Right sided weakness  Active Problems:    Nocturnal hypoxia    Cigarette nicotine dependence with nicotine-induced disorder    Hyperlipidemia    Atrial fibrillation (HCC)    CAD in native artery    Hypertension    Longstanding persistent atrial fibrillation (HCC)    PAD (peripheral artery disease) (HCC)    Iron deficiency anemia    S/P TAVR (transcatheter aortic valve replacement)    COPD (chronic obstructive pulmonary disease) (HCC)    Chronic renal disease, stage III (HCC)  Resolved Problems:    * No resolved hospital problems. *      Objective:   Patient Vitals for the past 24 hrs:   Temp Pulse Resp BP SpO2   10/12/22 1046 98.2 °F (36.8 °C) 80 16 (!) 141/92 91 %   10/12/22 0746 -- -- -- -- 93 %   10/12/22 0740 98.4 °F (36.9 °C) 70 15 (!) 178/78 92 %   10/12/22 0315 98.2 °F (36.8 °C) 69 16 130/79 94 %   10/11/22 2345 98.1 °F (36.7 °C) 70 16 (!) 159/99 97 %   10/11/22 2300 -- 82 15 (!) 164/85 95 %   10/11/22 2245 -- 81 18 (!) 135/102 95 %   10/11/22 2200 -- 82 20 (!) 158/95 100 %   10/11/22 2130 -- 74 17 (!) 152/98 99 %   10/11/22 2115 98.6 °F (37 °C) 72 18 (!) 157/96 98 %   10/11/22 2000 -- 70 14 (!) 149/96 97 %   10/11/22 1946 -- 70 26 (!) 176/95 (!) 88 %   10/11/22 1919 -- 70 23 (!) 168/101 98 %   10/11/22 1905 -- 70 12 (!) 213/96 97 %       Oxygen Therapy  SpO2: 91 %  Pulse Oximeter Device Mode: Intermittent  O2 Flow Rate (L/min): 2 L/min    Estimated body mass index is 30.99 kg/m² as calculated from the following:    Height as of this encounter: 6' 1\" (1.854 m). Weight as of this encounter: 234 lb 14.4 oz (106.5 kg). Intake/Output Summary (Last 24 hours) at 10/12/2022 1419  Last data filed at 10/12/2022 1036  Gross per 24 hour   Intake --   Output 1300 ml   Net -1300 ml         Physical Exam:     Blood pressure (!) 141/92, pulse 80, temperature 98.2 °F (36.8 °C), temperature source Oral, resp. rate 16, height 6' 1\" (1.854 m), weight 234 lb 14.4 oz (106.5 kg), SpO2 91 %. General:    Well nourished. Head:  Normocephalic, atraumatic  Eyes:  Sclerae appear normal.  Pupils equally round. No lid lag, no nystagmus  ENT:  Nares appear normal, no drainage. Moist oral mucosa; no facial droop  Neck:  No restricted ROM. Trachea midline   CV:   Irreg rhythm, rate ~controlled; no gross murmur  Lungs:   Scattered rhonchi, no wheezing, no accessory muscles used  Abdomen: Bowel sounds present. Soft, nontender, nondistended.   Extremities: RUE weaker than LUE; sensation deficits RUE / RLE compared to LUE / LLE; no edema  Skin:     No rashes and normal coloration. Warm and dry. Neuro:  A&Ox3; muscle weakness / sensation deficits as noted above  Psych:  Normal mood and affect.       I have personally reviewed labs and tests showing:  Recent Labs:  Recent Results (from the past 48 hour(s))   EKG 12 Lead    Collection Time: 10/11/22  7:18 PM   Result Value Ref Range    Ventricular Rate 70 BPM    Atrial Rate 217 BPM    QRS Duration 179 ms    Q-T Interval 468 ms    QTc Calculation (Bazett) 505 ms    R Axis 269 degrees    T Axis 86 degrees    Diagnosis V-paced rhythm    POCT Glucose    Collection Time: 10/11/22  7:21 PM   Result Value Ref Range    Glucose 100 mg/dL   POCT Glucose    Collection Time: 10/11/22  7:21 PM   Result Value Ref Range    POC Glucose 100 65 - 100 mg/dL    Performed by: Nicolle Rodriguez    Comprehensive Metabolic Panel    Collection Time: 10/11/22  7:58 PM   Result Value Ref Range    Sodium 138 133 - 143 mmol/L    Potassium 3.8 3.5 - 5.1 mmol/L    Chloride 106 101 - 110 mmol/L    CO2 23 21 - 32 mmol/L    Anion Gap 9 2 - 11 mmol/L    Glucose 95 65 - 100 mg/dL    BUN 13 8 - 23 MG/DL    Creatinine 1.00 0.8 - 1.5 MG/DL    Est, Glom Filt Rate >60 >60 ml/min/1.73m2    Calcium 9.2 8.3 - 10.4 MG/DL    Total Bilirubin 0.6 0.2 - 1.1 MG/DL    ALT 30 12 - 65 U/L    AST 17 15 - 37 U/L    Alk Phosphatase 118 50 - 136 U/L    Total Protein 7.8 6.3 - 8.2 g/dL    Albumin 3.5 3.2 - 4.6 g/dL    Globulin 4.3 2.8 - 4.5 g/dL    Albumin/Globulin Ratio 0.8 0.4 - 1.6     Protime-INR    Collection Time: 10/11/22  7:58 PM   Result Value Ref Range    Protime 11.4 (L) 12.6 - 14.5 sec    INR 0.8     Troponin    Collection Time: 10/11/22  7:58 PM   Result Value Ref Range    Troponin, High Sensitivity 14.3 (H) 0 - 14 pg/mL   CBC with Auto Differential    Collection Time: 10/11/22  8:04 PM   Result Value Ref Range    WBC 9.8 4.3 - 11.1 K/uL    RBC 5.65 (H) 4.23 - 5.6 M/uL    Hemoglobin 15.3 13.6 - 17.2 g/dL    Hematocrit 47.2 41.1 - 50.3 %    MCV 83.5 82 - 102 FL    MCH 27.1 26.1 - 32.9 PG    MCHC 32.4 31.4 - 35.0 g/dL    RDW 16.0 (H) 11.9 - 14.6 %    Platelets 443 (L) 049 - 450 K/uL    MPV 9.4 9.4 - 12.3 FL    nRBC 0.00 0.0 - 0.2 K/uL    Differential Type AUTOMATED      Seg Neutrophils 68 43 - 78 %    Lymphocytes 19 13 - 44 %    Monocytes 9 4.0 - 12.0 %    Eosinophils % 2 0.5 - 7.8 %    Basophils 1 0.0 - 2.0 %    Immature Granulocytes 1 0.0 - 5.0 %    Segs Absolute 6.8 1.7 - 8.2 K/UL    Absolute Lymph # 1.8 0.5 - 4.6 K/UL    Absolute Mono # 0.9 0.1 - 1.3 K/UL    Absolute Eos # 0.2 0.0 - 0.8 K/UL    Basophils Absolute 0.1 0.0 - 0.2 K/UL    Absolute Immature Granulocyte 0.1 0.0 - 0.5 K/UL   Basic Metabolic Panel w/ Reflex to MG    Collection Time: 10/12/22  5:49 AM   Result Value Ref Range    Sodium 142 133 - 143 mmol/L    Potassium 4.0 3.5 - 5.1 mmol/L    Chloride 107 101 - 110 mmol/L    CO2 20 (L) 21 - 32 mmol/L    Anion Gap 15 (H) 2 - 11 mmol/L    Glucose 88 65 - 100 mg/dL    BUN 13 8 - 23 MG/DL    Creatinine 0.90 0.8 - 1.5 MG/DL    Est, Glom Filt Rate >60 >60 ml/min/1.73m2    Calcium 9.0 8.3 - 10.4 MG/DL   CBC    Collection Time: 10/12/22  5:49 AM   Result Value Ref Range    WBC 7.9 4.3 - 11.1 K/uL    RBC 5.34 4.23 - 5.6 M/uL    Hemoglobin 14.3 13.6 - 17.2 g/dL    Hematocrit 44.6 41.1 - 50.3 %    MCV 83.5 82 - 102 FL    MCH 26.8 26.1 - 32.9 PG    MCHC 32.1 31.4 - 35.0 g/dL    RDW 16.1 (H) 11.9 - 14.6 %    Platelets 869 (L) 104 - 450 K/uL    MPV 9.8 9.4 - 12.3 FL    nRBC 0.00 0.0 - 0.2 K/uL   Lipid Panel    Collection Time: 10/12/22  5:49 AM   Result Value Ref Range    Cholesterol, Total 176 <200 MG/DL    Triglycerides 70 35 - 150 MG/DL    HDL 40 40 - 60 MG/DL    LDL Calculated 122 (H) <100 MG/DL    VLDL Cholesterol Calculated 14 6.0 - 23.0 MG/DL    Chol/HDL Ratio 4.4     Hemoglobin A1C    Collection Time: 10/12/22  5:49 AM   Result Value Ref Range    Hemoglobin A1C 5.9 (H) 4.8 - 5.6 %    eAG 123 mg/dL   Transthoracic echocardiogram (TTE) complete with contrast, bubble, strain, and 3D PRN    Collection Time: 10/12/22 10:19 AM   Result Value Ref Range    LV EDV A2C 125 mL    LV EDV A4C 154 mL    LV ESV A2C 50 mL    LV ESV A4C 63 mL    IVSd 0.8 0.6 - 1.0 cm    LVIDd 5.9 4.2 - 5.9 cm    LVIDs 4.9 cm    LVOT Diameter 1.9 cm    LVOT Mean Gradient 5 mmHg    LVOT VTI 24.6 cm    LVOT Peak Velocity 1.4 m/s    LVOT Peak Gradient 8 mmHg    LVPWd 0.9 0.6 - 1.0 cm    LV E' Lateral Velocity 8 cm/s    LV E' Septal Velocity 5 cm/s    LV Ejection Fraction A2C 60 %    LV Ejection Fraction A4C 59 %    EF BP 59 55 - 100 %    LVOT Area 2.8 cm2    LVOT SV 69.7 ml    LA Minor Axis 5.0 cm    LA Major Axis 6.0 cm    LA Area 2C 17.1 cm2    LA Area 4C 17.5 cm2    LA Volume BP 48 18 - 58 mL    LA Diameter 4.2 cm    AV AT 98.00 ms    AV Mean Velocity 2.7 m/s    AV Mean Gradient 30 mmHg    AV VTI 67.0 cm    AV Peak Velocity 3.4 m/s    AV Peak Gradient 47 mmHg    AV Area by VTI 1.0 cm2    AV Area by Peak Velocity 1.2 cm2    Aortic Root 2.0 cm    Ascending Aorta 2.6 cm    IVC Proxmal 1.3 cm    MV E Velocity 1.91 m/s    MV Mean Gradient 9 mmHg    MV VTI 79.6 cm    MV Mean Velocity 1.4 m/s    MV Max Velocity 2.3 m/s    MV Peak Gradient 22 mmHg    MV Area by VTI 0.9 cm2    PV .0 ms    PV Max Velocity 0.9 m/s    PV Peak Gradient 4 mmHg    RVIDd 2.8 cm    RV Basal Dimension 3.3 cm    TAPSE 2.0 1.7 cm    Body Surface Area 2.34 m2    Fractional Shortening 2D 17 28 - 44 %    LV ESV Index A4C 27 mL/m2    LV EDV Index A4C 67 mL/m2    LV ESV Index A2C 22 mL/m2    LV EDV Index A2C 54 mL/m2    LVIDd Index 2.57 cm/m2    LVIDs Index 2.13 cm/m2    LV RWT Ratio 0.31     LV Mass 2D 195.0 88 - 224 g    LV Mass 2D Index 84.8 49 - 115 g/m2    E/E' Ratio (Averaged) 31.04     E/E' Lateral 23.88     E/E' Septal 38.20     LA Volume Index BP 21 16 - 34 ml/m2    LVOT Stroke Volume Index 30.3 mL/m2    LA Size Index 1.83 cm/m2    LA/AO Root Ratio 2.10     Ao Root Index 0.87 cm/m2    Ascending Aorta Index 1.13 cm/m2    AV Velocity Ratio 0. 41     LVOT:AV VTI Index 0.37     PAM/BSA VTI 0.4 cm2/m2    PAM/BSA Peak Velocity 0.5 cm2/m2    MV:LVOT VTI Index 3.24     Est. RA Pressure 3 mmHg       I have personally reviewed imaging studies showing: Other Studies:  CT BRAIN PERFUSION   Final Result   As above. CTA HEAD NECK W CONTRAST   Final Result   No aneurysm, dissection or high-grade vessel stenosis in the   arteries of the head or neck. CT HEAD WO CONTRAST   Final Result   Unremarkable unenhanced CT scan of the brain for acute intracranial   abnormality. Current Meds:  Current Facility-Administered Medications   Medication Dose Route Frequency    albuterol sulfate HFA (PROVENTIL;VENTOLIN;PROAIR) 108 (90 Base) MCG/ACT inhaler 2 puff  2 puff Inhalation Q4H PRN    [Held by provider] metoprolol succinate (TOPROL XL) extended release tablet 100 mg  100 mg Oral Q12H    aspirin chewable tablet 81 mg  81 mg Oral Daily    dabigatran (PRADAXA) capsule 150 mg  150 mg Oral Q12H    ferrous sulfate (IRON 325) tablet 325 mg  325 mg Oral BID    mometasone-formoterol (DULERA) 200-5 MCG/ACT inhaler 2 puff  2 puff Inhalation BID    ipratropium-albuterol (DUONEB) nebulizer solution 3 mL  3 mL Inhalation Q4H PRN    pantoprazole (PROTONIX) tablet 40 mg  40 mg Oral BID AC    tiotropium (SPIRIVA RESPIMAT) 2.5 MCG/ACT inhaler 2 puff  2 puff Inhalation Daily    acetaminophen (TYLENOL) tablet 650 mg  650 mg Oral Q4H PRN    Or    acetaminophen (TYLENOL) suppository 650 mg  650 mg Rectal Q4H PRN    polyethylene glycol (GLYCOLAX) packet 17 g  17 g Oral Daily PRN    bisacodyl (DULCOLAX) suppository 10 mg  10 mg Rectal Daily PRN    atorvastatin (LIPITOR) tablet 80 mg  80 mg Oral Nightly    labetalol (NORMODYNE;TRANDATE) injection 10 mg  10 mg IntraVENous Q10 Min PRN    nicotine (NICODERM CQ) 14 MG/24HR 1 patch  1 patch TransDERmal Q24H       Signed:  Radha Arrington    Part of this note may have been written by using a voice dictation software.   The note has been proof read but may still contain some grammatical/other typographical errors.

## 2022-10-12 NOTE — PROGRESS NOTES
ACUTE OCCUPATIONAL THERAPY GOALS:   (Developed with and agreed upon by patient and/or caregiver.)  1. Patient will complete lower body bathing and dressing with MIN A and adaptive equipment as needed. 2. Patient will complete upper body bathing and dressing with SPV and adaptive equipment as needed. 3. Patient will complete toileting with CGA. 4. Patient will tolerate 30 minutes of OT treatment with 1-2 rest breaks to increase activity tolerance for ADLs. 5. Patient will complete functional transfers with CGA and adaptive equipment as needed. 6. Patient will complete functional activity with CGA and adaptive equipment as needed. Timeframe: 7 visits      OCCUPATIONAL THERAPY Initial Assessment, Daily Note, and AM       OT Visit Days: 1  Acknowledge Orders  Time  OT Charge Capture  Rehab Caseload Tracker      Ammon George is a 79 y.o. male   PRIMARY DIAGNOSIS: Acute CVA (cerebrovascular accident) ([address]  Right sided weakness [R53.1]  Cerebrovascular accident (CVA), unspecified mechanism ([address] [I63.9]       Reason for Referral: Generalized Muscle Weakness (M62.81)  Other lack of cordination (R27.8)  Difficulty in walking, Not elsewhere classified (R26.2)  History of falling (Z91.81)  Observation: Payor: [facility] MEDICARE / Plan: Abelino Vasquez / Product Type: *No Product type* /     ASSESSMENT:     REHAB RECOMMENDATIONS:   Recommendation to date pending progress:  Setting:  Inpatient Rehab Facility    Equipment:    To Be Determined  Has all the equipment needed at home to function at his baseline: hospital bed, recliner chair/lift chair, slide board, forearm canes, drop arm BSC, trapeze above bed, tub bench, dressing/bathing aides, manual WC and electric WC     ASSESSMENT:  Mr. Edy Zeng presents with new onset of R sided weakness affecting his functional mobility and ADL performance. Pt has a medical history of degenerative spinal condition that has impaired his mobility over the years.  At baseline, he lives alone in 1 level apartment with elevator to enter. Pt reports performing most of his ADLs with independence using adaptive equipment and additional time. He reports mainly performing SBT from surface to surface but cane walk a short distance with forearm canes. He mobilizes in the community/stores with electric WC and taking public transit. He has a [facility] aide that comes 6 days/week for a few hours to help with bathing and general household tasks. This patient relies heavily on his arms during all of his daily tasks and mobility. The new onset of weakness and decreased sensation in his RUE has impaired his ability to perform his daily tasks at his baseline level of function. He admits to working hard with therapy in years past to achieve his almost independent level of function and is eager to do what it takes to return to his baseline. He would greatly benefit from intensive therapy in an inpatient rehab setting following acute discharge. [address] [facility] [address] [address] AM-PAC 6 Clicks Daily Activity Inpatient Short Form:    AM-PAC Daily Activity Inpatient   How much help for putting on and taking off regular lower body clothing?: A Lot  How much help for Bathing?: A Lot  How much help for Toileting?: A Lot  How much help for putting on and taking off regular upper body clothing?: A Lot  How much help for taking care of personal grooming?: A Little  How much help for eating meals?: A Little  [facility] [address] Inpatient Daily Activity Raw Score: 14  AM-PAC Inpatient ADL T-Scale Score : 33.39  ADL Inpatient CMS 0-100% Score: 59.67  ADL Inpatient CMS G-Code Modifier : CK           SUBJECTIVE:     Mr. John Brown states, \"I'm worried I won't be able to do my normal routine at home anymore. \"     Social/Functional Lives With: Family  Type of Home: Apartment  Home Layout: One level (pt lives on 4th floor with elevator axcess)  Bathroom Toilet: Handicap height  Home Equipment: Kei Olivares Wheelchair-electric  Receives Help From: Personal care attendant  ADL Assistance: Needs assistance  Ambulation Assistance: Needs assistance  Transfer Assistance: Needs assistance  Active : No  Patient's  Info: public transportation when needed.   Occupation: Retired, On disability    OBJECTIVE:     Stephanie Traore / Justo Bensonning / AIRWAY: Telemetry     RESTRICTIONS/PRECAUTIONS:  Restrictions/Precautions: Fall Risk    PAIN: VITALS / O2:   Pre Treatment:          Post Treatment: no c/o pain       Vitals          Oxygen            GROSS EVALUATION: INTACT IMPAIRED   (See Comments)   UE AROM [] []R UE impaired, unable to move against gravity, AAROM provided and had some engagement   UE PROM [] []R UE stiff and somewhat painful to move through ROM,    Strength []  R UE: 2/5     Posture / Balance []  Fair+/good sitting balance, did not stand this eval date   Sensation []  Decreased sensation in R UE   Coordination []  Bimanual coordination impaired, encouraged patient to engage/AA RUE whenever possible     Tone [x]       Edema []    Activity Tolerance []  Motivated, fatigues     Hand Dominance R [] L []      COGNITION/  PERCEPTION: INTACT IMPAIRED   (See Comments)   Orientation [x]     Vision [x]     Hearing [x]     Cognition  [x]     Perception [x]       MOBILITY: I Mod I S SBA CGA Min Mod Max Total  NT x2 Comments:   Bed Mobility    Rolling [] [] [] [] [] [] [] [] [] [] []    Supine to Sit [] [] [] [] [] [] [x] [] [] [] []    Scooting [] [] [] [] [x] [] [] [] [] [] [] Additional time   Sit to Supine [] [] [] [] [] [] [x] [] [] [] []    Transfers    Sit to Stand [] [] [] [] [] [] [] [] [] [] []    Bed to Chair [] [] [] [] [] [] [] [] [] [] []    Stand to Sit [] [] [] [] [] [] [] [] [] [] []    Tub/Shower [] [] [] [] [] [] [] [] [] [] []     Toilet [] [] [] [] [] [] [] [] [] [] []      [] [] [] [] [] [] [] [] [] [] []    I=Independent, Mod I=Modified Independent, S=Supervision/Setup, SBA=Standby Assistance, CGA=Contact Guard Assistance, Min=Minimal Assistance, Mod=Moderate Assistance, Max=Maximal Assistance, Total=Total Assistance, NT=Not Tested    ACTIVITIES OF DAILY LIVING: I Mod I S SBA CGA Min Mod Max Total NT Comments   BASIC ADLs:              Upper Body Bathing  [] [] [] [] [] [] [] [] [] []    Lower Body Bathing [] [] [] [] [] [] [] [] [] []    Toileting [] [] [] [] [] [] [] [] [] []    Upper Body Dressing [] [] [] [] [] [] [x] [] [] []    Lower Body Dressing [] [] [] [] [] [] [] [] [x] []    Feeding [] [] [] [] [] [] [] [] [] []    Grooming [] [] [] [x] [] [] [] [] [] []    Personal Device Care [] [] [] [] [] [] [] [] [] []    Functional Mobility [] [] [] [] [x] [] [x] [] [] []    I=Independent, Mod I=Modified Independent, S=Supervision/Setup, SBA=Standby Assistance, CGA=Contact Guard Assistance, Min=Minimal Assistance, Mod=Moderate Assistance, Max=Maximal Assistance, Total=Total Assistance, NT=Not Tested    PLAN:   FREQUENCY/DURATION   OT Plan of Care: 3 times/week for duration of hospital stay or until stated goals are met, whichever comes first.    PROBLEM LIST:   (Skilled intervention is medically necessary to address:)  Decreased ADL/Functional Activities  Decreased Activity Tolerance  Decreased AROM/PROM  Decreased Balance  Decreased Coordination  Decreased Gait Ability  Decreased Safety Awareness  Decreased Strength  Decreased Transfer Abilities   INTERVENTIONS PLANNED:  (Benefits and precautions of occupational therapy have been discussed with the patient.)  Self Care Training  Therapeutic Activity  Therapeutic Exercise/HEP  Neuromuscular Re-education  Manual Therapy  Education         TREATMENT:     EVALUATION: MODERATE COMPLEXITY: (Untimed Charge)    TREATMENT:   Co-Treatment PT/OT necessary due to patient's decreased overall endurance/tolerance levels, as well as need for high level skilled assistance to complete functional transfers/mobility and functional tasks  Neuromuscular Re-education (30 Minutes): Neuromuscular Re-education included Balance Training, Coordination training, Functional mobility with facilitation, Postural training, Sitting balance training, and Standing balance training to improve Balance, Coordination, and Functional Mobility. Self Care (30 minutes): Patient participated in upper body dressing, lower body dressing, personal device care, and grooming ADLs in unsupported sitting and supine with moderate verbal and manual cueing to increase independence, increase activity tolerance, and increase safety awareness. Patient also participated in functional mobility, functional transfer, and adaptive equipment training to increase independence, decrease assistance required, and increase activity tolerance. TREATMENT GRID:  N/A    AFTER TREATMENT PRECAUTIONS: Bed, Bed/Chair Locked, Call light within reach, Needs within reach, and RN notified    INTERDISCIPLINARY COLLABORATION:  RN/ PCT and PT/ PTA    EDUCATION:  Education Given To: Patient  Education Provided: Role of Therapy;Plan of Care;ADL Adaptive Strategies;Transfer Training;Energy Conservation;Equipment; Fall Prevention Strategies  Education Method: Demonstration;Verbal  Barriers to Learning: None  Education Outcome: Verbalized understanding;Demonstrated understanding;Continued education needed    TOTAL TREATMENT DURATION AND TIME:  Time In: 1034  Time Out: 1138  Minutes: FLAKO Kelly [address] [place] [address]

## 2022-10-12 NOTE — ED NOTES
Report to Nanda Yanes in [employer]. Pt remains in nad and Rt sided weakness continues to partially resolve with motor strength improving.       Doll Peabody, RN  10/11/22 [id number]

## 2022-10-12 NOTE — ED NOTES
Dr Damion Munson informed of HTN improved and BP now 149/96. Jovon Mkceon and verified from Dr Damion Munson to [address] gtt for now. Continue to monitor for HTN or further changes.       Carlito White RN  10/11/22 2037

## 2022-10-13 LAB
ANION GAP SERPL CALC-SCNC: 5 MMOL/L (ref 2–11)
BUN SERPL-MCNC: 14 MG/DL (ref 8–23)
CALCIUM SERPL-MCNC: 9 MG/DL (ref 8.3–10.4)
CHLORIDE SERPL-SCNC: 107 MMOL/L (ref 101–110)
CO2 SERPL-SCNC: 24 MMOL/L (ref 21–32)
CREAT SERPL-MCNC: 0.9 MG/DL (ref 0.8–1.5)
ERYTHROCYTE [DISTWIDTH] IN BLOOD BY AUTOMATED COUNT: 15.9 % (ref 11.9–14.6)
GLUCOSE SERPL-MCNC: 93 MG/DL (ref 65–100)
HCT VFR BLD AUTO: 46.3 % (ref 41.1–50.3)
HGB BLD-MCNC: 15.2 G/DL (ref 13.6–17.2)
MCH RBC QN AUTO: 27.1 PG (ref 26.1–32.9)
MCHC RBC AUTO-ENTMCNC: 32.8 G/DL (ref 31.4–35)
MCV RBC AUTO: 82.5 FL (ref 82–102)
NRBC # BLD: 0 K/UL (ref 0–0.2)
PLATELET # BLD AUTO: 137 K/UL (ref 150–450)
PMV BLD AUTO: 9.3 FL (ref 9.4–12.3)
POTASSIUM SERPL-SCNC: 4.1 MMOL/L (ref 3.5–5.1)
RBC # BLD AUTO: 5.61 M/UL (ref 4.23–5.6)
SODIUM SERPL-SCNC: 136 MMOL/L (ref 133–143)
WBC # BLD AUTO: 7.9 K/UL (ref 4.3–11.1)

## 2022-10-13 PROCEDURE — G0378 HOSPITAL OBSERVATION PER HR: HCPCS

## 2022-10-13 PROCEDURE — 6370000000 HC RX 637 (ALT 250 FOR IP): Performed by: FAMILY MEDICINE

## 2022-10-13 PROCEDURE — 97530 THERAPEUTIC ACTIVITIES: CPT

## 2022-10-13 PROCEDURE — 99225 PR SBSQ OBSERVATION CARE/DAY 25 MINUTES: CPT | Performed by: PSYCHIATRY & NEUROLOGY

## 2022-10-13 PROCEDURE — 36415 COLL VENOUS BLD VENIPUNCTURE: CPT

## 2022-10-13 PROCEDURE — 94640 AIRWAY INHALATION TREATMENT: CPT

## 2022-10-13 PROCEDURE — 99231 SBSQ HOSP IP/OBS SF/LOW 25: CPT | Performed by: PHYSICAL MEDICINE & REHABILITATION

## 2022-10-13 PROCEDURE — 80048 BASIC METABOLIC PNL TOTAL CA: CPT

## 2022-10-13 PROCEDURE — 6370000000 HC RX 637 (ALT 250 FOR IP): Performed by: HOSPITALIST

## 2022-10-13 PROCEDURE — APPSS45 APP SPLIT SHARED TIME 31-45 MINUTES: Performed by: NURSE PRACTITIONER

## 2022-10-13 PROCEDURE — 85027 COMPLETE CBC AUTOMATED: CPT

## 2022-10-13 RX ADMIN — MOMETASONE FUROATE AND FORMOTEROL FUMARATE DIHYDRATE 2 PUFF: 200; 5 AEROSOL RESPIRATORY (INHALATION) at 19:09

## 2022-10-13 RX ADMIN — DABIGATRAN ETEXILATE MESYLATE 150 MG: 150 CAPSULE ORAL at 09:35

## 2022-10-13 RX ADMIN — PANTOPRAZOLE SODIUM 40 MG: 40 TABLET, DELAYED RELEASE ORAL at 16:51

## 2022-10-13 RX ADMIN — FERROUS SULFATE TAB 325 MG (65 MG ELEMENTAL FE) 325 MG: 325 (65 FE) TAB at 21:20

## 2022-10-13 RX ADMIN — PANTOPRAZOLE SODIUM 40 MG: 40 TABLET, DELAYED RELEASE ORAL at 05:24

## 2022-10-13 RX ADMIN — TRAMADOL HYDROCHLORIDE 50 MG: 50 TABLET, COATED ORAL at 17:02

## 2022-10-13 RX ADMIN — DABIGATRAN ETEXILATE MESYLATE 150 MG: 150 CAPSULE ORAL at 21:20

## 2022-10-13 RX ADMIN — FERROUS SULFATE TAB 325 MG (65 MG ELEMENTAL FE) 325 MG: 325 (65 FE) TAB at 08:21

## 2022-10-13 RX ADMIN — ATORVASTATIN CALCIUM 80 MG: 80 TABLET, FILM COATED ORAL at 21:20

## 2022-10-13 RX ADMIN — ASPIRIN 81 MG: 81 TABLET, CHEWABLE ORAL at 08:21

## 2022-10-13 RX ADMIN — TRAMADOL HYDROCHLORIDE 50 MG: 50 TABLET, COATED ORAL at 09:35

## 2022-10-13 ASSESSMENT — PAIN SCALES - GENERAL
PAINLEVEL_OUTOF10: 3
PAINLEVEL_OUTOF10: 0
PAINLEVEL_OUTOF10: 0
PAINLEVEL_OUTOF10: 7
PAINLEVEL_OUTOF10: 0
PAINLEVEL_OUTOF10: 0
PAINLEVEL_OUTOF10: 4
PAINLEVEL_OUTOF10: 0
PAINLEVEL_OUTOF10: 7
PAINLEVEL_OUTOF10: 0

## 2022-10-13 ASSESSMENT — PAIN DESCRIPTION - LOCATION
LOCATION: BACK

## 2022-10-13 ASSESSMENT — PAIN DESCRIPTION - ONSET: ONSET: ON-GOING

## 2022-10-13 ASSESSMENT — PAIN DESCRIPTION - ORIENTATION
ORIENTATION: MID
ORIENTATION: LOWER;MID
ORIENTATION: MID;LOWER

## 2022-10-13 ASSESSMENT — PAIN DESCRIPTION - DESCRIPTORS
DESCRIPTORS: ACHING

## 2022-10-13 ASSESSMENT — PAIN DESCRIPTION - PAIN TYPE: TYPE: CHRONIC PAIN

## 2022-10-13 NOTE — PROGRESS NOTES
[facility] follow up    I have reviewed EHR including therapy notes who recommend [facility]. Lynsey Rangel With [facility] it is VERY unlikely that he could be given an aide for longer periods of time. I am quite concerned that living independently wont be possible if his RUE strength and coordination doesn't significantly improve. I have spoken with the [facility] team and we all feel that pt needs to go to a lower level of care which [facility] would likely approve. If we took him to [facility] and he didn't improve his function significantly, [facility] Medicare would NOT allow subsequent placement in a SNF    Slt improvement in right sided movement/strength. 2/5 per neuro. He continues to have sensory deficits as well. From a rehab prognostic standpoint, I do not think that he will have enough RUE strength to care for self which has always required the strength in his U[facility]      Taisha Goss MD, Medical Director  [address] [place] [address]

## 2022-10-13 NOTE — PROGRESS NOTES
ACUTE PHYSICAL THERAPY GOALS:   (Developed with and agreed upon by patient and/or caregiver.)  (1.) Darin Hanley  will move from supine to sit and sit to supine  with STAND BY ASSIST within 7 treatment day(s). (2.) Darin Hanley will transfer from bed to chair and chair to bed with MINIMAL ASSIST using the least restrictive device within 7 treatment day(s). (3.) Ashley Chen will perform STS, standing static and dynamic balance activities x 10 minutes with MINIMAL ASSIST to improve safety within 7 treatment day(s). (4.) Ashley Chen will perform bilateral lower extremity exercises x 20 min for HEP with INDEPENDENCE to improve strength, endurance, and functional mobility within 7 treatment day(s). PHYSICAL THERAPY: Daily Note AM   (Link to Caseload Tracking: PT Visit Days : 2  Time In/Out PT Charge Capture  Rehab Caseload Tracker  Orders    Darin Hanley is a 79 y.o. male   PRIMARY DIAGNOSIS: Cerebrovascular accident (CVA) ([facility] [address]  Right sided weakness [R53.1]  Cerebrovascular accident (CVA), unspecified mechanism ([address] [I63.9]       Observation: Payor: Claudetta Failing / Plan: Shanelle Spare COMPLETE / Product Type: *No Product type* /     ASSESSMENT:     REHAB RECOMMENDATIONS:   Recommendation to date pending progress:  Setting:  Inpatient Rehab Facility    Equipment:    To Be Determined     ASSESSMENT:  Mr. J Carlos Dorsey was supine upon contact and agreeable to PT; patient is highly motivated. Patient performed supine to sit with min assist and assistance for RLE and cues for technique. Patient dmeonstrated good static sitting balance. Patient transferred to recliner chair via squat pivot transfer with min-mod assist and cues for technique/set up. Once in recliner chair patient participated in LE exercises with AAROM provided to RLE with focus on eccentric contractions. Patient lives alone but has a power w/c that he performs sliding board transfers to and from.  He also has a hospital bed with a trapeze as well as CLCT aide 6 days a week. Good progress today. Patient is very motivated. Will continue PT efforts. SUBJECTIVE:   Mr. Sofia Arreaga states, \"I'll do whatever it takes. I'll go home where I will work my butt off\"     Social/Functional Lives With: Family  Type of Home: [address] [place] [address] Drive: One level (pt lives on 4th floor with elevator axcess)  Bathroom Toilet: Handicap height  Home Equipment: Braham Pouch, Wheelchair-electric  Receives Help From: Personal care attendant  ADL Assistance: Needs assistance  Ambulation Assistance: Needs assistance  Transfer Assistance: Needs assistance  Active : No  Patient's  Info: public transportation when needed.   Occupation: Retired, On disability  OBJECTIVE:     PAIN: Latonya Love / O2: PRECAUTION / Rhiannon Garre / DRAINS:   Pre Treatment:   Pain Level: 0      Post Treatment: 0 Vitals        Oxygen    Telemetry     RESTRICTIONS/PRECAUTIONS:  Restrictions/Precautions  Restrictions/Precautions: Fall Risk  Restrictions/Precautions: Fall Risk     MOBILITY: I Mod I S SBA CGA Min Mod Max Total  NT x2 Comments:   Bed Mobility    Rolling [] [] [] [] [] [] [] [] [] [] []    Supine to Sit [] [] [] [] [] [x] [] [] [] [] []    Scooting [] [] [] [] [] [] [] [] [] [] []    Sit to Supine [] [] [] [] [] [] [] [] [] [] []    Transfers    Sit to Stand [] [] [] [] [] [] [] [] [] [] []    Bed to Chair [] [] [] [] [] [x] [x] [] [] [] []    Stand to Sit [] [] [] [] [] [] [] [] [] [] []     [] [] [] [] [] [] [] [] [] [] []    I=Independent, Mod I=Modified Independent, S=Supervision, SBA=Standby Assistance, CGA=Contact Guard Assistance,   Min=Minimal Assistance, Mod=Moderate Assistance, Max=Maximal Assistance, Total=Total Assistance, NT=Not Tested    BALANCE: Good Fair+ Fair Fair- Poor NT Comments   Sitting Static [x] [] [] [] [] []    Sitting Dynamic [] [x] [] [] [] []              Standing Static [] [] [] [] [] []    Standing Dynamic [] [] [] [] [] [] GAIT: I Mod I S SBA CGA Min Mod Max Total  NT x2 Comments:   Level of Assistance [] [] [] [] [] [] [] [] [] [] []    Distance   feet    DME N/A    Gait Quality N/A    Weightbearing Status      Stairs      I=Independent, Mod I=Modified Independent, S=Supervision, SBA=Standby Assistance, CGA=Contact Guard Assistance,   Min=Minimal Assistance, Mod=Moderate Assistance, Max=Maximal Assistance, Total=Total Assistance, NT=Not Tested    PLAN:   FREQUENCY AND DURATION: 3 times/week for duration of hospital stay or until stated goals are met, whichever comes first.    TREATMENT:   TREATMENT:   Therapeutic Activity (23 Minutes): Therapeutic activity included Supine to Sit, Scooting, Transfer Training, Sitting balance , and LE exercises to improve functional Activity tolerance, Balance, Mobility, and Strength. TREATMENT GRID:  N/A    AFTER TREATMENT PRECAUTIONS: Bed/Chair Locked, Call light within reach, Chair, Needs within reach, and RN notified    INTERDISCIPLINARY COLLABORATION:  RN/ PCT and PT/ PTA    EDUCATION:      TIME IN/OUT:  Time In: Gloria  Time Out: [id number] [phone] [id number]  Minutes: 1025 [address] OSWALDO Fuentes PTA

## 2022-10-13 NOTE — PROGRESS NOTES
Neurology Daily Progress Note     Assessment:     26-year-old man with acute onset right-sided hemiparesis. Clinical examination is highly suggestive of stroke. His symptoms localize to the posterior limb of the internal capsule on the left. Strokes in this area are typically caused by small vessel ischemic disease. In the setting of hypertension, this is the most likely etiology. Plan:     Continue Pradaxa and aspirin    Long-term blood pressure goal is less than 140/90 or less than 130/80, if tolerable    Smoking cessation is necessary    The patient cannot have an MRI due to a spinal cord stimulator. Stroke is a clinical diagnosis and his symptoms are consistent with stroke     PT/OT    Patient can follow up with neurology after d/c     Subjective: Interval history:    Patient doing well. Reports he has a little more movement in right side. No issues noted. TTE without thrombus. Shows LAD. . A1c 5.9. History:    Corina Martinez is a 79 y.o. male who is being seen for stroke.     Past Medical History:   Diagnosis Date    Aortic stenosis     Aortic valve replacement 7/2018    CAD (coronary artery disease)     PCI in 2014, 2015 had MI and then stents    Cancer ([address]     SCC removed face     CHF (congestive heart failure) ([address]     Chronic renal disease, stage III ([address]     sees neph and does not have actual diagnosis at this time    COPD (chronic obstructive pulmonary disease) ([address]     fibrosis in lungs    Dyslipidemia     Heart failure ([address]     CHF per patient    HTN (hypertension)     med controlled    Hyperlipidemia     Ischemic cardiomyopathy     Pacemaker     only pacemaker    Paroxysmal atrial fibrillation ([address]     pradaxa for this    Pulmonary fibrosis ([address]     Seizure disorder ([address]     lyme disease - small lesion frontal part of brain - no maintenance meds - last seizure 2 months ago, due to fall - before that it was nine years    Systolic heart failure (HCC)      Past Surgical History:   Procedure Laterality Date    ABLATION OF DYSRHYTHMIC FOCUS      AORTIC VALVE REPLACEMENT  07/2018    BACK SURGERY      discectomy    CARDIAC CATHETERIZATION      PCI 2014, 2015    CARDIAC PROCEDURE N/A 7/11/2022    LEFT HEART CATH / CORONARY ANGIOGRAPHY performed by Evans Watters MD at [address] [place] [address] CATH LAB    CERVICAL FUSION      C3-4 fusion    EGD  6/17/2022         HIP ARTHROPLASTY Left     SPINAL CORD STIMULATOR SURGERY      lower back     UPPER GASTROINTESTINAL ENDOSCOPY N/A 6/17/2022    EGD ESOPHAGOGASTRODUODENOSCOPY/ PT HAS PACEMAKER performed by Aline Chacon MD at [facility] ENDOSCOPY      Family History   Problem Relation Age of Onset    Heart Disease Sister     Heart Disease Mother      Social History     Tobacco Use    Smoking status: Every Day     Packs/day: 1.50     Years: 56.00     Pack years: 84.00     Types: Cigarettes    Smokeless tobacco: Never    Tobacco comments:     Quit smoking: cutting back to 4 Cigarettes a day   Substance Use Topics    Alcohol use: Not Currently      Current Facility-Administered Medications   Medication Dose Route Frequency Provider Last Rate Last Admin    albuterol sulfate HFA (PROVENTIL;VENTOLIN;PROAIR) 108 (90 Base) MCG/ACT inhaler 2 puff  2 puff Inhalation Q4H PRN Radha Arrington        [Held by provider] metoprolol succinate (TOPROL XL) extended release tablet 100 mg  100 mg Oral Q12H Radha Arrington        traMADol [place]) tablet 50 mg  50 mg Oral Q6H PRN Maira Garcia MD   50 mg at 10/13/22 0935    aspirin chewable tablet 81 mg  81 mg Oral Daily Evelena Grumbles, DO   81 mg at 10/13/22 9594    dabigatran (PRADAXA) capsule 150 mg  150 mg Oral Q12H Evelena Grumbles, DO   150 mg at 10/13/22 0935    ferrous sulfate (IRON 325) tablet 325 mg  325 mg Oral BID Evelena Grumbles, DO   325 mg at 10/13/22 2961    mometasone-formoterol (DULERA) 200-5 MCG/ACT inhaler 2 puff  2 puff Inhalation BID Evelena Grumbles, DO   2 puff at 10/12/22 2044 ipratropium-albuterol (DUONEB) nebulizer solution 3 mL  3 mL Inhalation Q4H PRN Karolynn Oas, DO        pantoprazole (PROTONIX) tablet 40 mg  40 mg Oral BID AC Karolynn Oas, DO   40 mg at 10/13/22 0524    tiotropium (SPIRIVA RESPIMAT) 2.5 MCG/ACT inhaler 2 puff  2 puff Inhalation Daily Karolynn Oas, DO   2 puff at 10/12/22 0749    acetaminophen (TYLENOL) tablet 650 mg  650 mg Oral Q4H PRN Karolynn Oas, DO   650 mg at 10/12/22 2013    Or    acetaminophen (TYLENOL) suppository 650 mg  650 mg Rectal Q4H PRN Karolynn Oas, DO        polyethylene glycol (GLYCOLAX) packet 17 g  17 g Oral Daily PRN Karolynn Oas, DO        bisacodyl (DULCOLAX) suppository 10 mg  10 mg Rectal Daily PRN Karolynn Oas, DO        atorvastatin (LIPITOR) tablet 80 mg  80 mg Oral Nightly Karolynn Oas, DO   80 mg at 10/12/22 2013    labetalol (NORMODYNE;TRANDATE) injection 10 mg  10 mg IntraVENous Q10 Min PRN Karolynn Oas, DO        nicotine (NICODERM CQ) 14 MG/24HR 1 patch  1 patch TransDERmal Q24H Karolynn Oas, DO            Allergies   Allergen Reactions    Carbamazepine Anaphylaxis    Lacosamide Nausea Only    Lorazepam Other (See Comments)     Violent behavior    Nitroglycerin Other (See Comments)     hypotension       Review of systems negative with exception of pertinent positives and negatives noted above.        Objective:     Vitals:    10/13/22 0300 10/13/22 0715 10/13/22 0935 10/13/22 1005   BP: (!) 145/89 (!) 143/102 139/88    Pulse:  74     Resp:  12  16   Temp:  98.1 °F (36.7 °C)     TempSrc:  Oral     SpO2:  94%     Weight:       Height:              Current Facility-Administered Medications:     albuterol sulfate HFA (PROVENTIL;VENTOLIN;PROAIR) 108 (90 Base) MCG/ACT inhaler 2 puff, 2 puff, Inhalation, Q4H PRN, May Y Neftaly, [address]    [Held by provider] metoprolol succinate (TOPROL XL) extended release tablet 100 mg, 100 mg, Oral, Q12H, Kristin Higginbotham, [address]    traMADol (ULTRAM) tablet 50 mg, 50 mg, Oral, Q6H PRN, Esequiel Jacques MD, 50 mg at 10/13/22 0935    aspirin chewable tablet 81 mg, 81 mg, Oral, Daily, Samjasmine Bassys, DO, 81 mg at 10/13/22 4411    dabigatran (PRADAXA) capsule 150 mg, 150 mg, Oral, Q12H, Samule Louisville, DO, 150 mg at 10/13/22 0935    ferrous sulfate (IRON 325) tablet 325 mg, 325 mg, Oral, BID, Samule [place], DO, 325 mg at 10/13/22 8882    mometasone-formoterol (DULERA) 200-5 MCG/ACT inhaler 2 puff, 2 puff, Inhalation, BID, Samule [place], DO, 2 puff at 10/12/22 2044    ipratropium-albuterol (DUONEB) nebulizer solution 3 mL, 3 mL, Inhalation, Q4H PRN, Kamlesh [place], DO    pantoprazole (PROTONIX) tablet 40 mg, 40 mg, Oral, BID AC, Samjasmine [place], DO, 40 mg at 10/13/22 0524    tiotropium (SPIRIVA RESPIMAT) 2.5 MCG/ACT inhaler 2 puff, 2 puff, Inhalation, Daily, Samjasmine Bassys, DO, 2 puff at 10/12/22 0749    acetaminophen (TYLENOL) tablet 650 mg, 650 mg, Oral, Q4H PRN, 650 mg at 10/12/22 2013 **OR** acetaminophen (TYLENOL) suppository 650 mg, 650 mg, Rectal, Q4H PRN, Kamlesh [place], DO    polyethylene glycol (GLYCOLAX) packet 17 g, 17 g, Oral, Daily PRN, Beauule Louisville, DO    bisacodyl (DULCOLAX) suppository 10 mg, 10 mg, Rectal, Daily PRN, Samule [place], DO    atorvastatin (LIPITOR) tablet 80 mg, 80 mg, Oral, Nightly, Kamlesh [place], DO, 80 mg at 10/12/22 2013    labetalol (NORMODYNE;TRANDATE) injection 10 mg, 10 mg, IntraVENous, Q10 Min PRN, Kamlesh [place], DO    nicotine (NICODERM CQ) 14 MG/24HR 1 patch, 1 patch, TransDERmal, Q24H, Samule Louisville, DO    Recent Results (from the past 12 hour(s))   Basic Metabolic Panel w/ Reflex to MG    Collection Time: 10/13/22  5:56 AM   Result Value Ref Range    Sodium 136 133 - 143 mmol/L    Potassium 4.1 3.5 - 5.1 mmol/L    Chloride 107 101 - 110 mmol/L    CO2 24 21 - 32 mmol/L    Anion Gap 5 2 - 11 mmol/L    Glucose 93 65 - 100 mg/dL    BUN 14 8 - 23 MG/DL    Creatinine 0.90 0.8 - 1.5 MG/DL    Est, Glom Filt Rate >60 >60 ml/min/1.73m2 Calcium 9.0 8.3 - 10.4 MG/DL   CBC    Collection Time: 10/13/22  5:56 AM   Result Value Ref Range    WBC 7.9 4.3 - 11.1 K/uL    RBC 5.61 (H) 4.23 - 5.6 M/uL    Hemoglobin 15.2 13.6 - 17.2 g/dL    Hematocrit 46.3 41.1 - 50.3 %    MCV 82.5 82 - 102 FL    MCH 27.1 26.1 - 32.9 PG    MCHC 32.8 31.4 - 35.0 g/dL    RDW 15.9 (H) 11.9 - 14.6 %    Platelets 583 (L) 480 - 450 K/uL    MPV 9.3 (L) 9.4 - 12.3 FL    nRBC 0.00 0.0 - 0.2 K/uL       CT Results (most recent):  CT reviewed. Results do not populate into note    Most recent MRI   No results found for this or any previous visit. Most recent MRA   No results found for this or any previous visit. Most recent CTA  No results found for this or any previous visit. Most recent Echo  Results for orders placed during the hospital encounter of 10/11/22    Transthoracic echocardiogram (TTE) complete with contrast, bubble, strain, and 3D PRN    Interpretation Summary    Left Ventricle: Normal left ventricular systolic function with a visually estimated EF of 55 - 60%. Left ventricle size is normal. Normal wall thickness. Apical Septal motion is consistent with pacing. Normal wall motion. Diastolic function indeterminate in the setting of atrial fibrillation. Average E/e' ratio is 31.04. Aortic Valve: Not well visualized. Appropriately positioned transcatheter bioprosthetic valve that is well-seated. No regurgitation. Unable to assess stenosis. Elevated prosthetic gradient. AV mean gradient is 30 mmHg. AV peak gradient is 47 mmHg. Mitral Valve: Mildly calcified leaflet, at the anterior and posterior leaflets- tips. ? Rheumatic etiology Mild annular calcification of the mitral valve. Moderate stenosis noted. MV mean gradient is 9 mmHg\at a heart rate of 70 bpm.    Left Atrium: Left atrium is moderately dilated. Interatrial Septum: Agitated saline study was negative with and without provocation.     Contrast used: Definity.    -No obvious cardiac source for emboli noted. -Underlying atrial fibrillation s/p AV ale ablation and permanent pacemaker placement-ventricular paced rhythm.  -Negative bubble study for intracardiac shunt.  -Bioprosthetic aortic valve is not well visualized although gradients are elevated. Elevated mitral valve gradients were also noted-consider GODFREY if clinically indicated. Physical Exam:  General - Well developed, well nourished, in no apparent distress. Pleasant and conversant  HEENT - Normocephalic, atraumatic. Conjunctiva, tympanic membranes, and oropharynx are clear. Neck - Supple without masses, no bruits   Extremities - Peripheral pulses intact. No edema and no rashes. Neurological examination - Comprehension, attention , memory and reasoning are intact. Language and speech are normal. On cranial nerve examination pupils are equal round and reactive to light. Visual acuity is adequate. Visual fields are full to finger confrontation. Extraocular motility is normal. Face is symmetric and sensation is intact to light touch. Hearing is intact to finger rustle bilaterally. Motor examination - There is normal muscle tone and bulk. Power is full throughout on the left. On the right he has mostly 2/5 strength with relatively preserved flexion at the hand on the right. Muscle stretch reflexes are normoactive and there are no pathological reflexes present. Sensation is diminished in the right upper limb and right lower limb. Cerebellar examination is normal. Gait and stance not assessed. The patient cannot walk.     Signed By: GEORGE Cool     October 13, 2022

## 2022-10-13 NOTE — PROGRESS NOTES
ACUTE PHYSICAL THERAPY GOALS:   (Developed with and agreed upon by patient and/or caregiver.)  (1.) Daya Collins  will move from supine to sit and sit to supine  with STAND BY ASSIST within 7 treatment day(s). (2.) Daya Collins will transfer from bed to chair and chair to bed with MINIMAL ASSIST using the least restrictive device within 7 treatment day(s). (3.) Ashley Chen will perform STS, standing static and dynamic balance activities x 10 minutes with MINIMAL ASSIST to improve safety within 7 treatment day(s). (4.) Ashley Chen will perform bilateral lower extremity exercises x 20 min for HEP with INDEPENDENCE to improve strength, endurance, and functional mobility within 7 treatment day(s). PHYSICAL THERAPY: Daily Note AM   (Link to Caseload Tracking: PT Visit Days : 2  Time In/Out PT Charge Capture  Rehab Caseload Tracker  Orders    Daya Collins is a 79 y.o. male   PRIMARY DIAGNOSIS: Cerebrovascular accident (CVA) ([facility] [address]  Right sided weakness [R53.1]  Cerebrovascular accident (CVA), unspecified mechanism ([address] [I63.9]       Observation: Payor: Bennie Zhou / Plan: Charissa Choudhury / Product Type: *No Product type* /     ASSESSMENT:     REHAB RECOMMENDATIONS:   Recommendation to date pending progress:  Setting:  Inpatient Rehab Facility    Equipment:    To Be Determined     ASSESSMENT:  Mr. Elidia Sevilla was sitting up in recliner chair upon contact and agreeable to PT; patient is highly motivated. Patient  transfer back to bed via squat pivot transfer to left side with min-mod assist and cues for technique/set up. Patient typically performs sliding board transfer. Will plan to do SBT next PT session. Patient returned to supine with mod assist for LE's. Patient typically utilizes a leg lifting device to assist LE's into the bed. Reviewed LE's with patient to perform throughout the day. Patient demonstrated understanding.  Patient lives alone but has a power w/c that he performs sliding board transfers to and from. He also has a hospital bed with a trapeze as well as CLCT aide 6 days a week for 3 hours a day (he is talking with his  to increase hours). Good progress today. Patient is very motivated. Will continue PT efforts. SUBJECTIVE:   Mr. Maurice Lee states, \"I just want to get home and continue to work on all of this\"     Social/Functional Lives With: Family  Type of Home: [facility] [address]: One level (pt lives on 4th floor with elevator axcess)  Bathroom Toilet: Handicap height  Home Equipment: Nora Paget, Wheelchair-electric  Receives Help From: Personal care attendant  ADL Assistance: Needs assistance  Ambulation Assistance: Needs assistance  Transfer Assistance: Needs assistance  Active : No  Patient's  Info: public transportation when needed.   Occupation: Retired, On disability  OBJECTIVE:     PAIN: Anamaria Rich / O2: PRECAUTION / Sis Leer / DRAINS:   Pre Treatment:   Pain Level: 0      Post Treatment: 0 Vitals        Oxygen    Telemetry     RESTRICTIONS/PRECAUTIONS:  Restrictions/Precautions  Restrictions/Precautions: Fall Risk  Restrictions/Precautions: Fall Risk     MOBILITY: I Mod I S SBA CGA Min Mod Max Total  NT x2 Comments:   Bed Mobility    Rolling [] [] [] [] [] [] [] [] [] [] []    Supine to Sit [] [] [] [] [] [] [] [] [] [] []    Scooting [] [] [] [] [] [] [] [] [] [] []    Sit to Supine [] [] [] [] [] [] [x] [] [] [] []    Transfers    Sit to Stand [] [] [] [] [] [] [] [] [] [] []    Bed to Chair [] [] [] [] [] [x] [x] [] [] [] []    Stand to Sit [] [] [] [] [] [] [] [] [] [] []     [] [] [] [] [] [] [] [] [] [] []    I=Independent, Mod I=Modified Independent, S=Supervision, SBA=Standby Assistance, CGA=Contact Guard Assistance,   Min=Minimal Assistance, Mod=Moderate Assistance, Max=Maximal Assistance, Total=Total Assistance, NT=Not Tested    BALANCE: Good Fair+ Fair Fair- Poor NT Comments   Sitting Static [x] [] [] [] [] []    Sitting Dynamic [] [x] [] [] [] []              Standing Static [] [] [] [] [] []    Standing Dynamic [] [] [] [] [] []      GAIT: I Mod I S SBA CGA Min Mod Max Total  NT x2 Comments:   Level of Assistance [] [] [] [] [] [] [] [] [] [] []    Distance   feet    DME N/A    Gait Quality N/A    Weightbearing Status      Stairs      I=Independent, Mod I=Modified Independent, S=Supervision, SBA=Standby Assistance, CGA=Contact Guard Assistance,   Min=Minimal Assistance, Mod=Moderate Assistance, Max=Maximal Assistance, Total=Total Assistance, NT=Not Tested    PLAN:   FREQUENCY AND DURATION: 3 times/week for duration of hospital stay or until stated goals are met, whichever comes first.    TREATMENT:   TREATMENT:   Therapeutic Activity (15 Minutes): Therapeutic activity included Sit to Supine, Scooting, Transfer Training, Sitting balance , and reviewed LE exercises to improve functional Activity tolerance, Balance, Mobility, and Strength. TREATMENT GRID:  N/A    AFTER TREATMENT PRECAUTIONS: Bed, Bed/Chair Locked, Call light within reach, Needs within reach, RN notified, and Side rails x3    INTERDISCIPLINARY COLLABORATION:  RN/ PCT and PT/ PTA    EDUCATION:      TIME IN/OUT:  Time In: 1355  Time Out: 99 Eliana Fields  Minutes: 541 Pio Fuentes PTA

## 2022-10-13 NOTE — PROGRESS NOTES
Bedside and Verbal shift change report to be given to Yohannes Ledesma RN (oncoming nurse) by self Haily Sour nurse). Report included the following information SBAR, Kardex, MAR, and Recent Results. Patient lying on left side. Awake and alert. NIH remains 7. No distress noted.

## 2022-10-13 NOTE — CARE COORDINATION
10/13/22 4129   Service Assessment   Patient Orientation Alert and Oriented   Cognition Alert   History Provided By Patient   Primary Caregiver Self   Prior Functional Level Independent in ADLs/IADLs   Current Functional Level Independent in ADLs/IADLs         CM met with patient discussed rehab options. Patient requested referral to [facility]- per PT recommendation. Back up choice [facility]. [facility] liaison notified. CM to continue to follow.

## 2022-10-13 NOTE — PROGRESS NOTES
Patient complaining of severe back pain. Has taken Tylenol, repositioned in bed and applied heat packs with only temporary relief. Patient has chronic back pain in which he has a back stimulator but the remote is at home. States it has shut down and is not working.   Perfect served MD.

## 2022-10-13 NOTE — PROGRESS NOTES
Hospitalist Progress Note   Admit Date:  10/11/2022  6:51 PM   Name:  Phil Agosto   Age:  79 y.o. Sex:  male  :  1955   MRN:  [id number]   Room:  [place] [address]    Presenting Complaint: Facial Droop     Reason(s) for Admission: Right sided weakness [R53.1]  Cerebrovascular accident (CVA), unspecified mechanism [facility]) [I63.9]     Hospital Course:   Patient is a 79 y.o. male who presented to the ED for cc sudden left sided headache with right sided weakness while sitting outside. He was unsure of exact time of onset. He immediately went up to his apartment where his aid took his blood pressure which was persistently elevated. EMS called and pt brought to the ER. Mr. Yvonne Velásquez has a hx of severe spinal osteoarthritis / degenerative disease with chronic b/l LE weakness,  s/p upper back surgery with titanium spinal cage, s/p neuro-spinal stimulator. Pt reports he is very independent at home using trapeze and slide board with wheelchair \"I only need my aid to tie my shoes. \"  Pt is able to use both upper extremities well and tells me he was just up in [place] last week white water rafting. He also has a hx of atrial fibrillation on Pradaxa, HTN, HLD, dCHF EF 47%, CAD, s/p TAVR, s/p pacemaker, PAD, CKD stage 3, COPD requiring 2L NC at night with ongoing tobacco abuse. In ER, CT head and CTA head and neck with no significant findings; CT brain perfusion without findings of large vessel hypoperfusion. Subjective & 24hr Events (10/13/22): \"I can move my right arm a bit better today. I need to go get intense rehab so I can keep my strength. \"  Very determined pleasant 67y.o. WM laying in bed admitting to improved RUE strength; sensation deficits persists    C/o ongoing RUE / RLE sensation deficits though improved RUE strength this AM.  Denies HA, visual deficits, CP, dyspnea, N/V, abd pain, urinary / fecal incontinence.       Assessment & Plan:     Principal Problem:    Right sided weakness / clinical dx acute CVA per neurology  - ASA 81mg daily added to pradaxa per Dr. Jada Cristobal recommendation  - Echo with LA enlargement though established afib  worrisome for acute CVA despite negative CT head and CTA head  - permissive HTN next 24hrs    Active Problems:    Uncontrolled HTN  - permissive HTN in setting of possible acute CVA  - prn labetalol with parameters in place      Chronic debility / chronic back pain / degenerative disease  - appreciate PT/OT eval, recommending inpatient rehab  - s/p spinal stimulator      Atrial fibrillation  - rate ~controlled  - cont pradaxa      Hx of TAVR  - aware      Cigarette smoker / COPD  - REALLY NEEDS TO QUIT SMOKING  - cont inhalers  - not in acute exacerbation      Nocturnal hypoxia  - cont supplemental O2 at hs and prn during day      Hyperlipidemia  - cont statin tx      CAD in native artery  - cont ASA / statin  - will resume metoprolol in AM      Iron deficiency anemia  - h/h stable  - cont oral supplementation      Chronic renal disease, stage III (HCC)  - renal fxn stable      Anticipated discharge needs:    - IRC recommended per PT    Diet:  ADULT DIET; Easy to Chew; Low Fat/Low Chol/High Fiber/2 gm Na  DVT PPx: pradaxa  Code status: Full Code    Hospital Problems:  Principal Problem:    Acute CVA (cerebrovascular accident) ([address]  Active Problems:    Nocturnal hypoxia    Cigarette nicotine dependence with nicotine-induced disorder    Right sided weakness    Hyperlipidemia    Atrial fibrillation (HCC)    CAD in native artery    Hypertension    Longstanding persistent atrial fibrillation (HCC)    PAD (peripheral artery disease) (HCC)    Iron deficiency anemia    S/P TAVR (transcatheter aortic valve replacement)    COPD (chronic obstructive pulmonary disease) (HCC)    Chronic renal disease, stage III (HCC)  Resolved Problems:    * No resolved hospital problems.  *      Objective:   Patient Vitals for the past 24 hrs:   Temp Pulse Resp BP SpO2   10/13/22 0300 -- -- -- (!) 145/89 --   10/13/22 0245 97.3 °F (36.3 °C) 74 16 (!) 150/91 --   10/12/22 2230 97.9 °F (36.6 °C) 71 16 110/86 --   10/12/22 2100 -- 70 -- 124/67 --   10/12/22 1900 -- -- -- -- 95 %   10/12/22 1447 98.1 °F (36.7 °C) 74 16 138/75 91 %   10/12/22 1046 98.2 °F (36.8 °C) 80 16 (!) 141/92 91 %   10/12/22 0746 -- -- -- -- 93 %   10/12/22 0740 98.4 °F (36.9 °C) 70 15 (!) 178/78 92 %         Oxygen Therapy  SpO2: 95 %  Pulse Oximeter Device Mode: Intermittent  O2 Device: Nasal cannula  O2 Flow Rate (L/min): 2 L/min    Estimated body mass index is 30.99 kg/m² as calculated from the following:    Height as of this encounter: 6' 1\" (1.854 m). Weight as of this encounter: 234 lb 14.4 oz (106.5 kg). Intake/Output Summary (Last 24 hours) at 10/13/2022 0647  Last data filed at 10/13/2022 0247  Gross per 24 hour   Intake 1200 ml   Output 2600 ml   Net -1400 ml           Physical Exam:     Blood pressure (!) 145/89, pulse 74, temperature 97.3 °F (36.3 °C), temperature source Oral, resp. rate 16, height 6' 1\" (1.854 m), weight 234 lb 14.4 oz (106.5 kg), SpO2 95 %. General:    Well nourished. Head:  Normocephalic, atraumatic  Eyes:  Sclerae appear normal.  Pupils equally round. No lid lag, no nystagmus  ENT:  Nares appear normal, no drainage. Moist oral mucosa; no facial droop  Neck:  No restricted ROM. Trachea midline   CV:   Irreg rhythm, rate ~controlled; no gross murmur  Lungs:   Scattered rhonchi, no wheezing, no accessory muscles used  Abdomen: Bowel sounds present. Soft, nontender, nondistended. Extremities: RUE weaker than LUE with improved muscle strength this AM to RUE; sensation deficits RUE / RLE compared to LUE / LLE; no edema  Skin:     No rashes and normal coloration. Warm and dry. Neuro:  A&Ox3; muscle weakness / sensation deficits as noted above  Psych:  Normal mood and affect.       I have personally reviewed labs and tests showing:  Recent Labs:  Recent Results (from the past 48 hour(s))   EKG 12 Lead    Collection Time: 10/11/22  7:18 PM   Result Value Ref Range    Ventricular Rate 70 BPM    Atrial Rate 217 BPM    QRS Duration 179 ms    Q-T Interval 468 ms    QTc Calculation (Bazett) 505 ms    R Axis 269 degrees    T Axis 86 degrees    Diagnosis V-paced rhythm    POCT Glucose    Collection Time: 10/11/22  7:21 PM   Result Value Ref Range    Glucose 100 mg/dL   POCT Glucose    Collection Time: 10/11/22  7:21 PM   Result Value Ref Range    POC Glucose 100 65 - 100 mg/dL    Performed by: Sandra Shaw    Comprehensive Metabolic Panel    Collection Time: 10/11/22  7:58 PM   Result Value Ref Range    Sodium 138 133 - 143 mmol/L    Potassium 3.8 3.5 - 5.1 mmol/L    Chloride 106 101 - 110 mmol/L    CO2 23 21 - 32 mmol/L    Anion Gap 9 2 - 11 mmol/L    Glucose 95 65 - 100 mg/dL    BUN 13 8 - 23 MG/DL    Creatinine 1.00 0.8 - 1.5 MG/DL    Est, Glom Filt Rate >60 >60 ml/min/1.73m2    Calcium 9.2 8.3 - 10.4 MG/DL    Total Bilirubin 0.6 0.2 - 1.1 MG/DL    ALT 30 12 - 65 U/L    AST 17 15 - 37 U/L    Alk Phosphatase 118 50 - 136 U/L    Total Protein 7.8 6.3 - 8.2 g/dL    Albumin 3.5 3.2 - 4.6 g/dL    Globulin 4.3 2.8 - 4.5 g/dL    Albumin/Globulin Ratio 0.8 0.4 - 1.6     Protime-INR    Collection Time: 10/11/22  7:58 PM   Result Value Ref Range    Protime 11.4 (L) 12.6 - 14.5 sec    INR 0.8     Troponin    Collection Time: 10/11/22  7:58 PM   Result Value Ref Range    Troponin, High Sensitivity 14.3 (H) 0 - 14 pg/mL   CBC with Auto Differential    Collection Time: 10/11/22  8:04 PM   Result Value Ref Range    WBC 9.8 4.3 - 11.1 K/uL    RBC 5.65 (H) 4.23 - 5.6 M/uL    Hemoglobin 15.3 13.6 - 17.2 g/dL    Hematocrit 47.2 41.1 - 50.3 %    MCV 83.5 82 - 102 FL    MCH 27.1 26.1 - 32.9 PG    MCHC 32.4 31.4 - 35.0 g/dL    RDW 16.0 (H) 11.9 - 14.6 %    Platelets 387 (L) 591 - 450 K/uL    MPV 9.4 9.4 - 12.3 FL    nRBC 0.00 0.0 - 0.2 K/uL    Differential Type AUTOMATED      Seg Neutrophils 68 43 - 78 % Lymphocytes 19 13 - 44 %    Monocytes 9 4.0 - 12.0 %    Eosinophils % 2 0.5 - 7.8 %    Basophils 1 0.0 - 2.0 %    Immature Granulocytes 1 0.0 - 5.0 %    Segs Absolute 6.8 1.7 - 8.2 K/UL    Absolute Lymph # 1.8 0.5 - 4.6 K/UL    Absolute Mono # 0.9 0.1 - 1.3 K/UL    Absolute Eos # 0.2 0.0 - 0.8 K/UL    Basophils Absolute 0.1 0.0 - 0.2 K/UL    Absolute Immature Granulocyte 0.1 0.0 - 0.5 K/UL   Basic Metabolic Panel w/ Reflex to MG    Collection Time: 10/12/22  5:49 AM   Result Value Ref Range    Sodium 142 133 - 143 mmol/L    Potassium 4.0 3.5 - 5.1 mmol/L    Chloride 107 101 - 110 mmol/L    CO2 20 (L) 21 - 32 mmol/L    Anion Gap 15 (H) 2 - 11 mmol/L    Glucose 88 65 - 100 mg/dL    BUN 13 8 - 23 MG/DL    Creatinine 0.90 0.8 - 1.5 MG/DL    Est, Glom Filt Rate >60 >60 ml/min/1.73m2    Calcium 9.0 8.3 - 10.4 MG/DL   CBC    Collection Time: 10/12/22  5:49 AM   Result Value Ref Range    WBC 7.9 4.3 - 11.1 K/uL    RBC 5.34 4.23 - 5.6 M/uL    Hemoglobin 14.3 13.6 - 17.2 g/dL    Hematocrit 44.6 41.1 - 50.3 %    MCV 83.5 82 - 102 FL    MCH 26.8 26.1 - 32.9 PG    MCHC 32.1 31.4 - 35.0 g/dL    RDW 16.1 (H) 11.9 - 14.6 %    Platelets 270 (L) 829 - 450 K/uL    MPV 9.8 9.4 - 12.3 FL    nRBC 0.00 0.0 - 0.2 K/uL   Lipid Panel    Collection Time: 10/12/22  5:49 AM   Result Value Ref Range    Cholesterol, Total 176 <200 MG/DL    Triglycerides 70 35 - 150 MG/DL    HDL 40 40 - 60 MG/DL    LDL Calculated 122 (H) <100 MG/DL    VLDL Cholesterol Calculated 14 6.0 - 23.0 MG/DL    Chol/HDL Ratio 4.4     Hemoglobin A1C    Collection Time: 10/12/22  5:49 AM   Result Value Ref Range    Hemoglobin A1C 5.9 (H) 4.8 - 5.6 %    eAG 123 mg/dL   Transthoracic echocardiogram (TTE) complete with contrast, bubble, strain, and 3D PRN    Collection Time: 10/12/22 10:19 AM   Result Value Ref Range    LV EDV A2C 125 mL    LV EDV A4C 154 mL    LV ESV A2C 50 mL    LV ESV A4C 63 mL    IVSd 0.8 0.6 - 1.0 cm    LVIDd 5.9 4.2 - 5.9 cm    LVIDs 4.9 cm    LVOT Diameter 1.9 cm    LVOT Mean Gradient 5 mmHg    LVOT VTI 24.6 cm    LVOT Peak Velocity 1.4 m/s    LVOT Peak Gradient 8 mmHg    LVPWd 0.9 0.6 - 1.0 cm    LV E' Lateral Velocity 8 cm/s    LV E' Septal Velocity 5 cm/s    LV Ejection Fraction A2C 60 %    LV Ejection Fraction A4C 59 %    EF BP 59 55 - 100 %    LVOT Area 2.8 cm2    LVOT SV 69.7 ml    LA Minor Axis 5.0 cm    LA Major Axis 6.0 cm    LA Area 2C 17.1 cm2    LA Area 4C 17.5 cm2    LA Volume BP 48 18 - 58 mL    LA Diameter 4.2 cm    AV AT 98.00 ms    AV Mean Velocity 2.7 m/s    AV Mean Gradient 30 mmHg    AV VTI 67.0 cm    AV Peak Velocity 3.4 m/s    AV Peak Gradient 47 mmHg    AV Area by VTI 1.0 cm2    AV Area by Peak Velocity 1.2 cm2    Aortic Root 2.0 cm    Ascending Aorta 2.6 cm    IVC Proxmal 1.3 cm    MV E Velocity 1.91 m/s    MV Mean Gradient 9 mmHg    MV VTI 79.6 cm    MV Mean Velocity 1.4 m/s    MV Max Velocity 2.3 m/s    MV Peak Gradient 22 mmHg    MV Area by VTI 0.9 cm2    PV .0 ms    PV Max Velocity 0.9 m/s    PV Peak Gradient 4 mmHg    RVIDd 2.8 cm    RV Basal Dimension 3.3 cm    TAPSE 2.0 1.7 cm    Body Surface Area 2.34 m2    Fractional Shortening 2D 17 28 - 44 %    LV ESV Index A4C 27 mL/m2    LV EDV Index A4C 67 mL/m2    LV ESV Index A2C 22 mL/m2    LV EDV Index A2C 54 mL/m2    LVIDd Index 2.57 cm/m2    LVIDs Index 2.13 cm/m2    LV RWT Ratio 0.31     LV Mass 2D 195.0 88 - 224 g    LV Mass 2D Index 84.8 49 - 115 g/m2    E/E' Ratio (Averaged) 31.04     E/E' Lateral 23.88     E/E' Septal 38.20     LA Volume Index BP 21 16 - 34 ml/m2    LVOT Stroke Volume Index 30.3 mL/m2    LA Size Index 1.83 cm/m2    LA/AO Root Ratio 2.10     Ao Root Index 0.87 cm/m2    Ascending Aorta Index 1.13 cm/m2    AV Velocity Ratio 0.41     LVOT:AV VTI Index 0.37     PAM/BSA VTI 0.4 cm2/m2    PAM/BSA Peak Velocity 0.5 cm2/m2    MV:LVOT VTI Index 3.24     Est. RA Pressure 3 mmHg   CBC    Collection Time: 10/13/22  5:56 AM   Result Value Ref Range    WBC 7.9 4.3 - 11.1 K/uL    RBC 5.61 (H) 4.23 - 5.6 M/uL    Hemoglobin 15.2 13.6 - 17.2 g/dL    Hematocrit 46.3 41.1 - 50.3 %    MCV 82.5 82 - 102 FL    MCH 27.1 26.1 - 32.9 PG    MCHC 32.8 31.4 - 35.0 g/dL    RDW 15.9 (H) 11.9 - 14.6 %    Platelets 970 (L) 792 - 450 K/uL    MPV 9.3 (L) 9.4 - 12.3 FL    nRBC 0.00 0.0 - 0.2 K/uL       I have personally reviewed imaging studies showing: Other Studies:  CT BRAIN PERFUSION   Final Result   As above. CTA HEAD NECK W CONTRAST   Final Result   No aneurysm, dissection or high-grade vessel stenosis in the   arteries of the head or neck. CT HEAD WO CONTRAST   Final Result   Unremarkable unenhanced CT scan of the brain for acute intracranial   abnormality.                       Current Meds:  Current Facility-Administered Medications   Medication Dose Route Frequency    albuterol sulfate HFA (PROVENTIL;VENTOLIN;PROAIR) 108 (90 Base) MCG/ACT inhaler 2 puff  2 puff Inhalation Q4H PRN    [Held by provider] metoprolol succinate (TOPROL XL) extended release tablet 100 mg  100 mg Oral Q12H    traMADol (ULTRAM) tablet 50 mg  50 mg Oral Q6H PRN    aspirin chewable tablet 81 mg  81 mg Oral Daily    dabigatran (PRADAXA) capsule 150 mg  150 mg Oral Q12H    ferrous sulfate (IRON 325) tablet 325 mg  325 mg Oral BID    mometasone-formoterol (DULERA) 200-5 MCG/ACT inhaler 2 puff  2 puff Inhalation BID    ipratropium-albuterol (DUONEB) nebulizer solution 3 mL  3 mL Inhalation Q4H PRN    pantoprazole (PROTONIX) tablet 40 mg  40 mg Oral BID AC    tiotropium (SPIRIVA RESPIMAT) 2.5 MCG/ACT inhaler 2 puff  2 puff Inhalation Daily    acetaminophen (TYLENOL) tablet 650 mg  650 mg Oral Q4H PRN    Or    acetaminophen (TYLENOL) suppository 650 mg  650 mg Rectal Q4H PRN    polyethylene glycol (GLYCOLAX) packet 17 g  17 g Oral Daily PRN    bisacodyl (DULCOLAX) suppository 10 mg  10 mg Rectal Daily PRN    atorvastatin (LIPITOR) tablet 80 mg  80 mg Oral Nightly    labetalol (NORMODYNE;TRANDATE) injection 10 mg 10 mg IntraVENous Q10 Min PRN    nicotine (NICODERM CQ) 14 MG/24HR 1 patch  1 patch TransDERmal Q24H       Signed:  Radha Arrington    Part of this note may have been written by using a voice dictation software. The note has been proof read but may still contain some grammatical/other typographical errors.

## 2022-10-13 NOTE — PROGRESS NOTES
Patients blood pressure is 157/102. It does not meet the order protocol to give the PRN Labetalol. Dr. Armida Jackson notified.

## 2022-10-13 NOTE — CARE COORDINATION
[facility]                                                                                                 PATIENT INFORMATION   Patient Name: Yani Cameron: [de-identified] Patient MRN: [id number]   Address: [address] [place] [address] [facility] [address] Patient CSN: [id number]      Episcopal: Other   Sex: Male Marital Status: Legally    : 1955 Age:   79 yrs   Home Phone: [phone]  Mobile Phone:   [phone]        Race: Daksha Farnsworth (non-) Employer:   Retired   Language: English Admitted/Arrived From:      [facility] Date: 10/11/2022 Admit Time:  6:51 PM   Patient Class: Observation Service: Medical   Admit Source: Non-health care facility* Admit Type: Emergency   Admitting Provider:   Attending Provider: Jeramie*   Unit: [facility] Room/Bed: [place] [address]    Admission Diagnosis: Right sided weakness, Cerebrovascular accident (CVA), unspecified mechanism ([address] and codes: 728.87     Emergency Complaint: Stroke                Discharge Date:   Discharge Time:     [address] INFORMATION   Name:  Ashley Chen Address: [address] [place] [address] Rel:  Self   Phone: [phone]   [address] : 1955   EMERGENCY CONTACTS   Name: Obed Coil:  Mobile: [phone] Rel: Other   COVERAGE INFORMATION   Primary Insurance:   [address] [place] [address] Road: [facility]  [facility]   Plan Name: Mirian Paul Dual Complete Pt Rel to Subscriber: Self [[id number]]   Claim Address: [address] [place] [address] Sex: Male      Policy #:  [id number]    Group #: [id number] Group Name:       [address] [place] [address] #: Auth number: N/A Ins Phone:         Secondary Insurance: [place] Subscriber: Janki Michaels   [place] Pt Rel to Subscriber: Self   Claim Address: [address] Sex:  Male     Policy #: [id number]    Group #:   Group Name:     [address] [place] [address] #: N/A Ins Phone:         Accident Date:    Accident Type:     PROVIDER INFORMATION   PCP:         Macrina Holman MD PCP Phone:  [phone]   Referring Prov:   No ref.  provider found Referring Phone:  Referring Fax:  N/A      Advanced Directive:  <no information> Research:     Lab Client:   Enrollment Status:            Printed on 10/13/22  5:34 PM Page

## 2022-10-14 LAB
ANION GAP SERPL CALC-SCNC: 6 MMOL/L (ref 2–11)
BUN SERPL-MCNC: 16 MG/DL (ref 8–23)
CALCIUM SERPL-MCNC: 8.9 MG/DL (ref 8.3–10.4)
CHLORIDE SERPL-SCNC: 106 MMOL/L (ref 101–110)
CO2 SERPL-SCNC: 24 MMOL/L (ref 21–32)
CREAT SERPL-MCNC: 1 MG/DL (ref 0.8–1.5)
ERYTHROCYTE [DISTWIDTH] IN BLOOD BY AUTOMATED COUNT: 15.9 % (ref 11.9–14.6)
GLUCOSE SERPL-MCNC: 60 MG/DL (ref 65–100)
HCT VFR BLD AUTO: 50.5 % (ref 41.1–50.3)
HGB BLD-MCNC: 16.3 G/DL (ref 13.6–17.2)
MCH RBC QN AUTO: 26.6 PG (ref 26.1–32.9)
MCHC RBC AUTO-ENTMCNC: 32.3 G/DL (ref 31.4–35)
MCV RBC AUTO: 82.4 FL (ref 82–102)
NRBC # BLD: 0 K/UL (ref 0–0.2)
PLATELET # BLD AUTO: 156 K/UL (ref 150–450)
PMV BLD AUTO: 9.6 FL (ref 9.4–12.3)
POTASSIUM SERPL-SCNC: 4.3 MMOL/L (ref 3.5–5.1)
RBC # BLD AUTO: 6.13 M/UL (ref 4.23–5.6)
SODIUM SERPL-SCNC: 136 MMOL/L (ref 133–143)
WBC # BLD AUTO: 9.2 K/UL (ref 4.3–11.1)

## 2022-10-14 PROCEDURE — 97530 THERAPEUTIC ACTIVITIES: CPT

## 2022-10-14 PROCEDURE — 6370000000 HC RX 637 (ALT 250 FOR IP): Performed by: PHYSICIAN ASSISTANT

## 2022-10-14 PROCEDURE — G0378 HOSPITAL OBSERVATION PER HR: HCPCS

## 2022-10-14 PROCEDURE — 6370000000 HC RX 637 (ALT 250 FOR IP): Performed by: HOSPITALIST

## 2022-10-14 PROCEDURE — 80048 BASIC METABOLIC PNL TOTAL CA: CPT

## 2022-10-14 PROCEDURE — 85027 COMPLETE CBC AUTOMATED: CPT

## 2022-10-14 PROCEDURE — 6370000000 HC RX 637 (ALT 250 FOR IP): Performed by: FAMILY MEDICINE

## 2022-10-14 PROCEDURE — 94640 AIRWAY INHALATION TREATMENT: CPT

## 2022-10-14 PROCEDURE — 92523 SPEECH SOUND LANG COMPREHEN: CPT

## 2022-10-14 PROCEDURE — 36415 COLL VENOUS BLD VENIPUNCTURE: CPT

## 2022-10-14 RX ORDER — CYCLOBENZAPRINE HCL 5 MG
5 TABLET ORAL 3 TIMES DAILY PRN
Status: DISCONTINUED | OUTPATIENT
Start: 2022-10-14 | End: 2022-10-16 | Stop reason: HOSPADM

## 2022-10-14 RX ADMIN — MOMETASONE FUROATE AND FORMOTEROL FUMARATE DIHYDRATE 2 PUFF: 200; 5 AEROSOL RESPIRATORY (INHALATION) at 08:39

## 2022-10-14 RX ADMIN — MOMETASONE FUROATE AND FORMOTEROL FUMARATE DIHYDRATE 2 PUFF: 200; 5 AEROSOL RESPIRATORY (INHALATION) at 20:12

## 2022-10-14 RX ADMIN — PANTOPRAZOLE SODIUM 40 MG: 40 TABLET, DELAYED RELEASE ORAL at 16:36

## 2022-10-14 RX ADMIN — FERROUS SULFATE TAB 325 MG (65 MG ELEMENTAL FE) 325 MG: 325 (65 FE) TAB at 22:16

## 2022-10-14 RX ADMIN — FERROUS SULFATE TAB 325 MG (65 MG ELEMENTAL FE) 325 MG: 325 (65 FE) TAB at 09:44

## 2022-10-14 RX ADMIN — METOPROLOL SUCCINATE 100 MG: 100 TABLET, EXTENDED RELEASE ORAL at 22:16

## 2022-10-14 RX ADMIN — CYCLOBENZAPRINE 5 MG: 10 TABLET, FILM COATED ORAL at 16:35

## 2022-10-14 RX ADMIN — TRAMADOL HYDROCHLORIDE 50 MG: 50 TABLET, COATED ORAL at 16:35

## 2022-10-14 RX ADMIN — DABIGATRAN ETEXILATE MESYLATE 150 MG: 150 CAPSULE ORAL at 22:24

## 2022-10-14 RX ADMIN — ATORVASTATIN CALCIUM 80 MG: 80 TABLET, FILM COATED ORAL at 22:16

## 2022-10-14 RX ADMIN — DABIGATRAN ETEXILATE MESYLATE 150 MG: 150 CAPSULE ORAL at 09:44

## 2022-10-14 RX ADMIN — METOPROLOL SUCCINATE 100 MG: 100 TABLET, EXTENDED RELEASE ORAL at 12:44

## 2022-10-14 RX ADMIN — PANTOPRAZOLE SODIUM 40 MG: 40 TABLET, DELAYED RELEASE ORAL at 06:15

## 2022-10-14 RX ADMIN — ASPIRIN 81 MG: 81 TABLET, CHEWABLE ORAL at 09:44

## 2022-10-14 RX ADMIN — TIOTROPIUM BROMIDE INHALATION SPRAY 2 PUFF: 3.12 SPRAY, METERED RESPIRATORY (INHALATION) at 08:00

## 2022-10-14 ASSESSMENT — PAIN SCALES - GENERAL
PAINLEVEL_OUTOF10: 0
PAINLEVEL_OUTOF10: 5
PAINLEVEL_OUTOF10: 0
PAINLEVEL_OUTOF10: 6
PAINLEVEL_OUTOF10: 0
PAINLEVEL_OUTOF10: 0
PAINLEVEL_OUTOF10: 4

## 2022-10-14 ASSESSMENT — PAIN DESCRIPTION - LOCATION: LOCATION: GENERALIZED;BACK

## 2022-10-14 NOTE — CARE COORDINATION
10/14/22 [facility] Discharge   Transition of Care Consult (CM Consult) Discharge Planning;Acute Rehab       Patient evaluated by [facility]- accepted by medical director, will need precert. Auth started. CM to continue to follow for dc needs.

## 2022-10-14 NOTE — PROGRESS NOTES
NIH Stroke Scale  Interval: Hand-off/Transfer  Level of Consciousness (1a): Alert  LOC Questions (1b): Answers both correctly  LOC Commands (1c): Performs both tasks correctly  Best Gaze (2): Normal  Visual (3): (!) Partial hemianopia  Facial Palsy (4): Normal symmetrical movement  Motor Arm, Left (5a): No drift  Motor Arm, Right (5b): Some effort against gravity  Motor Leg, Left (6a): No drift  Motor Leg, Right (6b):  Some effort against gravity  Limb Ataxia (7): (!) Present in one limb  Sensory (8): (!) Mild to Moderate  Best Language (9): No aphasia  Dysarthria (10): Normal  Extinction and Inattention (11): No abnormality  Total: 7

## 2022-10-14 NOTE — PROGRESS NOTES
increase hours). Good progress today. Patient is very motivated. Will continue PT efforts. SUBJECTIVE:   Mr. Shawn Neff states, \"I'm ready\"     Social/Functional Lives With: Family  Type of Home: [address]: One level (pt lives on 4th floor with elevator axcess)  Bathroom Toilet: Handicap height  Home Equipment: Daniela Pacini, Wheelchair-electric  Receives Help From: Personal care attendant  ADL Assistance: Needs assistance  Ambulation Assistance: Needs assistance  Transfer Assistance: Needs assistance  Active : No  Patient's  Info: public transportation when needed.   Occupation: Retired, On disability  OBJECTIVE:     PAIN: Everrett Lager / O2: PRECAUTION / Ritchie Sujata / DRAINS:   Pre Treatment:   Pain Level: 0      Post Treatment: 0 Vitals        Oxygen    Telemetry     RESTRICTIONS/PRECAUTIONS:  Restrictions/Precautions  Restrictions/Precautions: Fall Risk  Restrictions/Precautions: Fall Risk     MOBILITY: I Mod I S SBA CGA Min Mod Max Total  NT x2 Comments:   Bed Mobility    Rolling [] [] [] [] [] [] [] [] [] [] []    Supine to Sit [] [] [] [] [] [] [] [] [] [] []    Scooting [] [] [] [] [] [] [] [] [] [] []    Sit to Supine [] [] [] [] [] [] [x] [] [] [] []    Transfers    Sit to Stand [] [] [] [] [] [x] [] [] [] [] []    Bed to Chair [] [] [] [] [] [x] [] [] [] [] []    Stand to Sit [] [] [] [] [] [x] [] [] [] [] []     [] [] [] [] [] [] [] [] [] [] []    I=Independent, Mod I=Modified Independent, S=Supervision, SBA=Standby Assistance, CGA=Contact Guard Assistance,   Min=Minimal Assistance, Mod=Moderate Assistance, Max=Maximal Assistance, Total=Total Assistance, NT=Not Tested    BALANCE: Good Fair+ Fair Fair- Poor NT Comments   Sitting Static [x] [] [] [] [] []    Sitting Dynamic [] [x] [] [] [] []              Standing Static [] [] [] [] [] []    Standing Dynamic [] [] [] [] [] []      GAIT: I Mod I S SBA CGA Min Mod Max Total  NT x2 Comments:   Level of Assistance [] [] [] [] [] [] [] [] [] [] []    Distance       DME N/A    Gait Quality N/A    Weightbearing Status      Stairs      I=Independent, Mod I=Modified Independent, S=Supervision, SBA=Standby Assistance, CGA=Contact Guard Assistance,   Min=Minimal Assistance, Mod=Moderate Assistance, Max=Maximal Assistance, Total=Total Assistance, NT=Not Tested    PLAN:   FREQUENCY AND DURATION: 3 times/week for duration of hospital stay or until stated goals are met, whichever comes first.    TREATMENT:   TREATMENT:   Therapeutic Activity (8 Minutes): Therapeutic activity included Sit to Supine, Scooting, Lateral Scooting, Transfer Training, and Sitting balance  to improve functional Activity tolerance, Balance, Mobility, and Strength. TREATMENT GRID:  N/A    AFTER TREATMENT PRECAUTIONS: Bed, Bed/Chair Locked, Call light within reach, Needs within reach, RN notified, and Side rails x3    INTERDISCIPLINARY COLLABORATION:  RN/ PCT and PT/ PTA    EDUCATION:      TIME IN/OUT:  Time In: 1219  Time Out: 300 [address]  Minutes: [address] [facility] [address] Fuentes PTA

## 2022-10-14 NOTE — PROGRESS NOTES
Pt woke up out his nap , notice he had bleeding from his Rt upper back. Upon entering the room, pt states that he pulled his IV out. Pt requested to leave out being that he is not receiving any IV medications or fluids. Followed up with PA, order received okay to leave out.

## 2022-10-14 NOTE — CARE COORDINATION
Precert denied per [facility]. Patient will need peer to peer completed by attending. Due Monday 10/17/22  11:30 am  [phone]  Option 5    Attending MD Notified.

## 2022-10-14 NOTE — PROGRESS NOTES
LTG: Patient will improve neuro-linguistic abilities to increase safety and awareness in functional living environment. STG: Patient will complete working memory tasks with 90% accuracy given minimal cueing   STG: Patient will immediately recall information from skye/appointment with 100% accuracy given minimal cueing  STG: Patient will utilize memory compensatory strategies to improve recall of functional information with 100%    SPEECH LANGUAGE PATHOLOGY: COMMUNICATION Initial Assessment    MRN: [id number]    ADMISSION DATE: 10/11/2022  PRIMARY DIAGNOSIS: Cerebrovascular accident (CVA) ([facility] [address]  Right sided weakness [R53.1]  Cerebrovascular accident (CVA), unspecified mechanism ([facility] [address] [I63.9]    ICD-10: Treatment Diagnosis: R41.841 Cognitive-Communication Deficit    RECOMMENDATIONS:   Recommendations:  Cognitive linguistic treatment     Patient continues to require skilled intervention: Yes  Ongoing speech therapy is recommended during this hospitalization, Ongoing speech therapy is recommended at next level of care       ASSESSMENT    Patient's expressive and receptive language within functional limits. Speech fully intelligible at conversation level. Patient demonstrates mild cognitive linguistic deficits in short term memory. Patient reports he has noted decline in memory, which he describes as increased \"forgetfulness\" requiring increased use of compensatory strategies and/or assistance from his aide. Patient appears to have good insight. Reports he still feels a little more \"foggy\" than usual.   Will benefit from follow up with speech therapy for ongoing therapeutic evaluation and treatment in order to improve cognitive linguistic abilities and increase functional independence. GENERAL      Communication Observation: Functional  Follows Directions: Complex  Patient Positioning: Upright in bed   Patient reports he independently manages bills, appointments, meds.   Aide assists with cooking and patient reports Elvin Yuan looks over things behind me\". Endorses forgetfulness. Uses external aids(calendar, timers, etc)to compensate. Respiratory Status: Room air               Prior Dysphagia History: none       Pain:   Patient does not c/o pain, Patient does not appear in pain                       OBJECTIVE   SLUMS and other measures completed to evaluate cognitive linguistic functioning: Total score: 19/24    Orientation-3/3  Recall(5 words)- immediate: 5/5; delayed:2/5 (4/5 with category cue, 5/5 with multiple choice)  Problem solving:3/3  Divergent naming(concrete category): 3/3  Digit manipulation: 2/2  Visuospatial: x/2  Clock drawing: x/4  Immediate recall (passage)-6/8 (when discussing missed item, patient reports \"that's the part i'm trying to figure out, it got blurry for me\"). Patient with occasional difficulty recalling specific words at conversation level(I.e. potential rehab facility). Safety/Judgment  Safety/Judgment: Within Functional Limits    PLAN    Duration/Frequency: Continue to follow patient 1x/week for duration of hospitalization and/or until goals met    Speech Therapy Prognosis  Prognosis: Excellent    MODIFIED CYN SCALE (mRS) SCORE: 2  Interpretation of Tool: The Modified Cyn Scale is a scale used to quantify level of disability as it relates to a patient's functional abilities. No Symptoms(0); No significant disability despite symptoms; able to carry out all usual duties and activities(1); Slight disability; unable to carry out all previous activities but able to look after own affairs without assistance(2); Moderate disability; requiring some help but able to walk without assistance(3); Moderately severe disability; unable to walk without assistance and unable to attend to own bodily needs without assistance(4);  Severe disability; bedridden, incontinent, and requiring constant nursing care and attention(5)      Education: Patient  Patient Education: cognitive linguistic treatment  Patient Education Response: Verbalizes understanding, Demonstrated understanding    PRECAUTIONS/ALLERGIES: Carbamazepine, Lacosamide, Lorazepam, and Nitroglycerin     Safety Devices in place: Yes  Type of devices: Call light within reach, Left in bed    Therapy Time  SLP Individual Minutes  Time In: 0273  Time Out: 0546  Minutes: Elvis Raya, [place]  10/14/2022 2:33 PM

## 2022-10-14 NOTE — PROGRESS NOTES
Hospitalist Progress Note   Admit Date:  10/11/2022  6:51 PM   Name:  Minda Hinton   Age:  79 y.o. Sex:  male  :  1955   MRN:  [id number]   Room:  [place] [address]    Presenting Complaint: Facial Droop     Reason(s) for Admission: Right sided weakness [R53.1]  Cerebrovascular accident (CVA), unspecified mechanism [facility]) [I63.9]     Hospital Course:   Patient is a 79 y.o. male who presented to the ED for cc sudden left sided headache with right sided weakness while sitting outside. He was unsure of exact time of onset. He immediately went up to his apartment where his aid took his blood pressure which was persistently elevated. EMS called and pt brought to the ER. Mr. Georgina Scott has a hx of severe spinal osteoarthritis / degenerative disease with chronic b/l LE weakness,  s/p upper back surgery with titanium spinal cage, s/p neuro-spinal stimulator. Pt reports he is very independent at home using trapeze and slide board with wheelchair \"I only need my aid to tie my shoes. \"  Pt is able to use both upper extremities well and tells me he was just up in [place] last week white water rafting. He also has a hx of atrial fibrillation on Pradaxa, HTN, HLD, dCHF EF 47%, CAD, s/p TAVR, s/p pacemaker, PAD, CKD stage 3, COPD requiring 2L NC at night with ongoing tobacco abuse. In ER, CT head and CTA head and neck with no significant findings; CT brain perfusion without findings of large vessel hypoperfusion. Pt with persistent RUE / RLE weakness and sensation deficits; clinically presents as acute CVA, unable to undergo MRI brain due to spinal stimulator. Subjective & 24hr Events (10/14/22): \"I want to go to [facility] for STR; it's closer to my house. \"  Pleasant 67y.o. WM sitting up in chair in NAD    C/o ongoing RUE / RLE sensation deficits though interval improved RUE strength. Denies HA, visual deficits, CP, dyspnea, N/V, abd pain, urinary / fecal incontinence.       Assessment & Plan:     Principal Problem:    Right sided weakness / clinical dx acute CVA per neurology  - ASA 81mg daily added to pradaxa per Dr. Johana Branham recommendation  - Echo with LA enlargement though established afib  - outpatient f/u with neuro, STR on dc    Active Problems:    Uncontrolled HTN  - will resume metoprolol bid today  - prn labetalol with parameters in place      Chronic debility / chronic back pain / degenerative disease  - appreciate PT/OT eval, recommending inpatient rehab  - s/p spinal stimulator      Atrial fibrillation  - rate ~controlled  - cont pradaxa      Hx of TAVR  - aware      Cigarette smoker / COPD  - REALLY NEEDS TO QUIT SMOKING!!!  - counseled again today  - cont inhalers  - not in acute exacerbation      Nocturnal hypoxia  - cont supplemental O2 at hs and prn during day      Hyperlipidemia  - cont statin tx      CAD in native artery  - cont ASA / statin  - will resume metoprolol in AM      Iron deficiency anemia  - h/h stable  - cont oral supplementation      Chronic renal disease, stage III (HCC)  - renal fxn stable      Anticipated discharge needs:    - STR    Diet:  ADULT DIET; Easy to Chew; Low Fat/Low Chol/High Fiber/2 gm Na  DVT PPx: pradaxa  Code status: Full Code    Hospital Problems:  Principal Problem:    Cerebrovascular accident (CVA) ([address]  Active Problems:    Nocturnal hypoxia    Cigarette nicotine dependence with nicotine-induced disorder    Right sided weakness    Hyperlipidemia    Atrial fibrillation (HCC)    CAD in native artery    Hypertension    Longstanding persistent atrial fibrillation (HCC)    PAD (peripheral artery disease) (HCC)    Iron deficiency anemia    S/P TAVR (transcatheter aortic valve replacement)    COPD (chronic obstructive pulmonary disease) (HCC)    Chronic renal disease, stage III (HCC)  Resolved Problems:    * No resolved hospital problems.  *      Objective:   Patient Vitals for the past 24 hrs:   Temp Pulse Resp BP SpO2   10/14/22 0839 -- -- -- -- 96 %   10/14/22 0805 98.1 °F (36.7 °C) 70 17 (!) 152/108 95 %   10/14/22 0428 97.9 °F (36.6 °C) 82 21 137/82 90 %   10/14/22 0017 97.9 °F (36.6 °C) 70 18 (!) 168/93 91 %   10/13/22 1937 98.4 °F (36.9 °C) 73 18 (!) 153/101 93 %   10/13/22 1909 -- 73 18 -- --   10/13/22 1815 -- -- -- (!) 163/95 --   10/13/22 1655 -- -- -- (!) 177/97 --   10/13/22 1651 -- 70 -- (!) 174/111 --   10/13/22 1527 -- -- -- (!) 164/100 --   10/13/22 1520 98 °F (36.7 °C) 70 17 (!) 158/112 93 %         Oxygen Therapy  SpO2: 96 %  Pulse Oximeter Device Mode: Intermittent  O2 Device: None (Room air)  O2 Flow Rate (L/min): 2 L/min    Estimated body mass index is 30.99 kg/m² as calculated from the following:    Height as of this encounter: 6' 1\" (1.854 m). Weight as of this encounter: 234 lb 14.4 oz (106.5 kg). Intake/Output Summary (Last 24 hours) at 10/14/2022 1154  Last data filed at 10/14/2022 0420  Gross per 24 hour   Intake 240 ml   Output 1250 ml   Net -1010 ml           Physical Exam:     Blood pressure (!) 152/108, pulse 70, temperature 98.1 °F (36.7 °C), temperature source Oral, resp. rate 17, height 6' 1\" (1.854 m), weight 234 lb 14.4 oz (106.5 kg), SpO2 96 %. General:    Well nourished. Head:  Normocephalic, atraumatic  Eyes:  Sclerae appear normal.  Pupils equally round. No lid lag, no nystagmus  ENT:  Nares appear normal, no drainage. Moist oral mucosa; no facial droop  Neck:  No restricted ROM. Trachea midline   CV:   Irreg rhythm, rate ~controlled; no gross murmur  Lungs:   CTA b/l; no wheezing, improved lung fields  Abdomen: Bowel sounds present. Soft, nontender, nondistended. Extremities: RUE weaker than LUE with improved muscle strength this AM to RUE; sensation deficits RUE / RLE compared to LUE / LLE; no edema  Skin:     No rashes and normal coloration. Warm and dry. Neuro:  A&Ox3; muscle weakness / sensation deficits as noted above  Psych:  Normal mood and affect.       I have personally reviewed labs and tests showing:  Recent Labs:  Recent Results (from the past 48 hour(s))   Basic Metabolic Panel w/ Reflex to MG    Collection Time: 10/13/22  5:56 AM   Result Value Ref Range    Sodium 136 133 - 143 mmol/L    Potassium 4.1 3.5 - 5.1 mmol/L    Chloride 107 101 - 110 mmol/L    CO2 24 21 - 32 mmol/L    Anion Gap 5 2 - 11 mmol/L    Glucose 93 65 - 100 mg/dL    BUN 14 8 - 23 MG/DL    Creatinine 0.90 0.8 - 1.5 MG/DL    Est, Glom Filt Rate >60 >60 ml/min/1.73m2    Calcium 9.0 8.3 - 10.4 MG/DL   CBC    Collection Time: 10/13/22  5:56 AM   Result Value Ref Range    WBC 7.9 4.3 - 11.1 K/uL    RBC 5.61 (H) 4.23 - 5.6 M/uL    Hemoglobin 15.2 13.6 - 17.2 g/dL    Hematocrit 46.3 41.1 - 50.3 %    MCV 82.5 82 - 102 FL    MCH 27.1 26.1 - 32.9 PG    MCHC 32.8 31.4 - 35.0 g/dL    RDW 15.9 (H) 11.9 - 14.6 %    Platelets 115 (L) 670 - 450 K/uL    MPV 9.3 (L) 9.4 - 12.3 FL    nRBC 0.00 0.0 - 0.2 K/uL   Basic Metabolic Panel w/ Reflex to MG    Collection Time: 10/14/22  7:23 AM   Result Value Ref Range    Sodium 136 133 - 143 mmol/L    Potassium 4.3 3.5 - 5.1 mmol/L    Chloride 106 101 - 110 mmol/L    CO2 24 21 - 32 mmol/L    Anion Gap 6 2 - 11 mmol/L    Glucose 60 (L) 65 - 100 mg/dL    BUN 16 8 - 23 MG/DL    Creatinine 1.00 0.8 - 1.5 MG/DL    Est, Glom Filt Rate >60 >60 ml/min/1.73m2    Calcium 8.9 8.3 - 10.4 MG/DL   CBC    Collection Time: 10/14/22  7:23 AM   Result Value Ref Range    WBC 9.2 4.3 - 11.1 K/uL    RBC 6.13 (H) 4.23 - 5.6 M/uL    Hemoglobin 16.3 13.6 - 17.2 g/dL    Hematocrit 50.5 (H) 41.1 - 50.3 %    MCV 82.4 82 - 102 FL    MCH 26.6 26.1 - 32.9 PG    MCHC 32.3 31.4 - 35.0 g/dL    RDW 15.9 (H) 11.9 - 14.6 %    Platelets 720 864 - 858 K/uL    MPV 9.6 9.4 - 12.3 FL    nRBC 0.00 0.0 - 0.2 K/uL       I have personally reviewed imaging studies showing: Other Studies:  CT BRAIN PERFUSION   Final Result   As above.       CTA HEAD NECK W CONTRAST   Final Result   No aneurysm, dissection or high-grade vessel stenosis in the arteries of the head or neck. CT HEAD WO CONTRAST   Final Result   Unremarkable unenhanced CT scan of the brain for acute intracranial   abnormality. Current Meds:  Current Facility-Administered Medications   Medication Dose Route Frequency    albuterol sulfate HFA (PROVENTIL;VENTOLIN;PROAIR) 108 (90 Base) MCG/ACT inhaler 2 puff  2 puff Inhalation Q4H PRN    metoprolol succinate (TOPROL XL) extended release tablet 100 mg  100 mg Oral Q12H    traMADol (ULTRAM) tablet 50 mg  50 mg Oral Q6H PRN    aspirin chewable tablet 81 mg  81 mg Oral Daily    dabigatran (PRADAXA) capsule 150 mg  150 mg Oral Q12H    ferrous sulfate (IRON 325) tablet 325 mg  325 mg Oral BID    mometasone-formoterol (DULERA) 200-5 MCG/ACT inhaler 2 puff  2 puff Inhalation BID    ipratropium-albuterol (DUONEB) nebulizer solution 3 mL  3 mL Inhalation Q4H PRN    pantoprazole (PROTONIX) tablet 40 mg  40 mg Oral BID AC    tiotropium (SPIRIVA RESPIMAT) 2.5 MCG/ACT inhaler 2 puff  2 puff Inhalation Daily    acetaminophen (TYLENOL) tablet 650 mg  650 mg Oral Q4H PRN    Or    acetaminophen (TYLENOL) suppository 650 mg  650 mg Rectal Q4H PRN    polyethylene glycol (GLYCOLAX) packet 17 g  17 g Oral Daily PRN    bisacodyl (DULCOLAX) suppository 10 mg  10 mg Rectal Daily PRN    atorvastatin (LIPITOR) tablet 80 mg  80 mg Oral Nightly    labetalol (NORMODYNE;TRANDATE) injection 10 mg  10 mg IntraVENous Q10 Min PRN    nicotine (NICODERM CQ) 14 MG/24HR 1 patch  1 patch TransDERmal Q24H       Signed:  Radha Arrington    Part of this note may have been written by using a voice dictation software. The note has been proof read but may still contain some grammatical/other typographical errors.

## 2022-10-14 NOTE — PROGRESS NOTES
Mayda Sinclair pt sister would like to speak with case management  regarding pt discharge. Issue is that the sister can not get public transportation to check on pt. She is wanting a rehab that she can get to the pt without any issues.

## 2022-10-14 NOTE — PROGRESS NOTES
ACUTE PHYSICAL THERAPY GOALS:   (Developed with and agreed upon by patient and/or caregiver.)  (1.) Errol Petty  will move from supine to sit and sit to supine  with STAND BY ASSIST within 7 treatment day(s). (2.) Errol Petty will transfer from bed to chair and chair to bed with MINIMAL ASSIST using the least restrictive device within 7 treatment day(s). (3.) Ashley Chen will perform STS, standing static and dynamic balance activities x 10 minutes with MINIMAL ASSIST to improve safety within 7 treatment day(s). (4.) Ashley Chen will perform bilateral lower extremity exercises x 20 min for HEP with INDEPENDENCE to improve strength, endurance, and functional mobility within 7 treatment day(s). PHYSICAL THERAPY: Daily Note AM   (Link to Caseload Tracking: PT Visit Days : 3  Time In/Out PT Charge Capture  Rehab Caseload Tracker  Orders    Errol Petty is a 79 y.o. male   PRIMARY DIAGNOSIS: Cerebrovascular accident (CVA) ([facility] [address]  Right sided weakness [R53.1]  Cerebrovascular accident (CVA), unspecified mechanism ([facility] [address] [I63.9]       Observation: Payor: Gina Rosas / Plan: Samaria Kennedy COMPLETE / Product Type: *No Product type* /     ASSESSMENT:     REHAB RECOMMENDATIONS:   Recommendation to date pending progress:  Setting:  Inpatient Rehab Facility    Equipment:    To Be Determined     ASSESSMENT:  Mr. Obinna Reyez was supine upon contact and agreeable to PT. Patient is highly motivated. Patient performs supine to sit with min assist for RLE. Patient then performed sliding board transfer to recliner chair with min assist and cues for set up technique as it was different than he does at home. Patient then performed UE/LE exercises to R side with AAROM and cues for technique. Patient transferred to standing x 2 reps with min assist and cues for hand placement/technique.  Once standing patient ambulated 4' with rolling walker, min assist and cues for sequencing/walker manipulation. Of note, Patient lives alone but has a power w/c that he performs sliding board transfers to and from. He also has a hospital bed with a trapeze as well as CLCT aide 6 days a week for 3 hours a day (he is talking with his  to increase hours). Good progress today. Patient is very motivated. Will continue PT efforts. SUBJECTIVE:   Mr. Arlyn Allen states, \"That was hard\"     Social/Functional Lives With: Family  Type of Home: Apartment  Home Layout: One level (pt lives on 4th floor with elevator axcess)  Bathroom Toilet: Handicap height  Home Equipment: Seldon Juba, Wheelchair-electric  Receives Help From: Personal care attendant  ADL Assistance: Needs assistance  Ambulation Assistance: Needs assistance  Transfer Assistance: Needs assistance  Active : No  Patient's  Info: public transportation when needed.   Occupation: Retired, On disability  OBJECTIVE:     PAIN: Sindhu Hard / O2: PRECAUTION / Lesleigh Robertson / DRAINS:   Pre Treatment:   Pain Level: 0      Post Treatment: 0 Vitals        Oxygen    Telemetry     RESTRICTIONS/PRECAUTIONS:  Restrictions/Precautions  Restrictions/Precautions: Fall Risk  Restrictions/Precautions: Fall Risk     MOBILITY: I Mod I S SBA CGA Min Mod Max Total  NT x2 Comments:   Bed Mobility    Rolling [] [] [] [] [] [] [] [] [] [] []    Supine to Sit [] [] [] [] [] [x] [] [] [] [] []    Scooting [] [] [] [] [] [] [] [] [] [] []    Sit to Supine [] [] [] [] [] [] [] [] [] [] []    Transfers    Sit to Stand [] [] [] [] [] [x] [] [] [] [] []    Bed to Chair [] [] [] [] [] [x] [] [] [] [] []    Stand to Sit [] [] [] [] [] [x] [] [] [] [] []     [] [] [] [] [] [] [] [] [] [] []    I=Independent, Mod I=Modified Independent, S=Supervision, SBA=Standby Assistance, CGA=Contact Guard Assistance,   Min=Minimal Assistance, Mod=Moderate Assistance, Max=Maximal Assistance, Total=Total Assistance, NT=Not Tested    BALANCE: Good Fair+ Fair Fair- Poor NT Comments Sitting Static [x] [] [] [] [] []    Sitting Dynamic [] [x] [] [] [] []              Standing Static [] [] [x] [] [] []    Standing Dynamic [] [] [] [x] [] []      GAIT: I Mod I S SBA CGA Min Mod Max Total  NT x2 Comments:   Level of Assistance [] [] [] [] [] [x] [] [] [] [] []    Distance   4 feet    DME Rolling Walker    Gait Quality Decreased melissa , Decreased step clearance, and Decreased step length    Weightbearing Status      Stairs      I=Independent, Mod I=Modified Independent, S=Supervision, SBA=Standby Assistance, CGA=Contact Guard Assistance,   Min=Minimal Assistance, Mod=Moderate Assistance, Max=Maximal Assistance, Total=Total Assistance, NT=Not Tested    PLAN:   FREQUENCY AND DURATION: 3 times/week for duration of hospital stay or until stated goals are met, whichever comes first.    TREATMENT:   TREATMENT:   Therapeutic Activity (25 Minutes): Therapeutic activity included Supine to Sit, Scooting, Lateral Scooting, Transfer Training, Ambulation on level ground, Sitting balance , Standing balance, and UE/LE exercises to improve functional Activity tolerance, Balance, Mobility, and Strength. TREATMENT GRID:  N/A    AFTER TREATMENT PRECAUTIONS: Bed, Bed/Chair Locked, Call light within reach, Needs within reach, RN notified, and Side rails x3    INTERDISCIPLINARY COLLABORATION:  RN/ PCT and PT/ PTA    EDUCATION:      TIME IN/OUT:  Time In: 1000  Time Out: [place]  Minutes: 100 Keon Fuentes PTA

## 2022-10-14 NOTE — PROGRESS NOTES
Patient resting in bed, alert and oriented, cooperative with care. Right side weakness noted in Right upper and lower extremely. Telemetry in place. Patient on RA. Patient denies pain or distress, safety measures in place, call light within reach.

## 2022-10-15 PROBLEM — I63.9 ACUTE CVA (CEREBROVASCULAR ACCIDENT) (HCC): Status: ACTIVE | Noted: 2022-10-15

## 2022-10-15 LAB — SARS-COV-2: NORMAL

## 2022-10-15 PROCEDURE — 2060000000 HC ICU INTERMEDIATE R&B

## 2022-10-15 PROCEDURE — G0378 HOSPITAL OBSERVATION PER HR: HCPCS

## 2022-10-15 PROCEDURE — 97535 SELF CARE MNGMENT TRAINING: CPT

## 2022-10-15 PROCEDURE — U0005 INFEC AGEN DETEC AMPLI PROBE: HCPCS

## 2022-10-15 PROCEDURE — 94760 N-INVAS EAR/PLS OXIMETRY 1: CPT

## 2022-10-15 PROCEDURE — 6370000000 HC RX 637 (ALT 250 FOR IP): Performed by: HOSPITALIST

## 2022-10-15 PROCEDURE — 1100000000 HC RM PRIVATE

## 2022-10-15 PROCEDURE — 97530 THERAPEUTIC ACTIVITIES: CPT

## 2022-10-15 PROCEDURE — 6370000000 HC RX 637 (ALT 250 FOR IP): Performed by: PHYSICIAN ASSISTANT

## 2022-10-15 PROCEDURE — 82962 GLUCOSE BLOOD TEST: CPT

## 2022-10-15 PROCEDURE — 94640 AIRWAY INHALATION TREATMENT: CPT

## 2022-10-15 PROCEDURE — 6370000000 HC RX 637 (ALT 250 FOR IP): Performed by: FAMILY MEDICINE

## 2022-10-15 RX ADMIN — ASPIRIN 81 MG: 81 TABLET, CHEWABLE ORAL at 08:36

## 2022-10-15 RX ADMIN — PANTOPRAZOLE SODIUM 40 MG: 40 TABLET, DELAYED RELEASE ORAL at 15:04

## 2022-10-15 RX ADMIN — ATORVASTATIN CALCIUM 80 MG: 80 TABLET, FILM COATED ORAL at 21:44

## 2022-10-15 RX ADMIN — PANTOPRAZOLE SODIUM 40 MG: 40 TABLET, DELAYED RELEASE ORAL at 05:39

## 2022-10-15 RX ADMIN — TRAMADOL HYDROCHLORIDE 50 MG: 50 TABLET, COATED ORAL at 15:09

## 2022-10-15 RX ADMIN — METOPROLOL SUCCINATE 100 MG: 100 TABLET, EXTENDED RELEASE ORAL at 12:00

## 2022-10-15 RX ADMIN — FERROUS SULFATE TAB 325 MG (65 MG ELEMENTAL FE) 325 MG: 325 (65 FE) TAB at 08:36

## 2022-10-15 RX ADMIN — MOMETASONE FUROATE AND FORMOTEROL FUMARATE DIHYDRATE 2 PUFF: 200; 5 AEROSOL RESPIRATORY (INHALATION) at 08:17

## 2022-10-15 RX ADMIN — MOMETASONE FUROATE AND FORMOTEROL FUMARATE DIHYDRATE 2 PUFF: 200; 5 AEROSOL RESPIRATORY (INHALATION) at 19:25

## 2022-10-15 RX ADMIN — DABIGATRAN ETEXILATE MESYLATE 150 MG: 150 CAPSULE ORAL at 08:36

## 2022-10-15 RX ADMIN — TIOTROPIUM BROMIDE INHALATION SPRAY 2 PUFF: 3.12 SPRAY, METERED RESPIRATORY (INHALATION) at 08:16

## 2022-10-15 RX ADMIN — FERROUS SULFATE TAB 325 MG (65 MG ELEMENTAL FE) 325 MG: 325 (65 FE) TAB at 21:44

## 2022-10-15 RX ADMIN — CYCLOBENZAPRINE 5 MG: 10 TABLET, FILM COATED ORAL at 15:09

## 2022-10-15 RX ADMIN — DABIGATRAN ETEXILATE MESYLATE 150 MG: 150 CAPSULE ORAL at 21:44

## 2022-10-15 ASSESSMENT — PAIN SCALES - GENERAL
PAINLEVEL_OUTOF10: 8
PAINLEVEL_OUTOF10: 0
PAINLEVEL_OUTOF10: 0

## 2022-10-15 ASSESSMENT — PAIN DESCRIPTION - LOCATION: LOCATION: BACK

## 2022-10-15 ASSESSMENT — PAIN DESCRIPTION - PAIN TYPE: TYPE: ACUTE PAIN

## 2022-10-15 ASSESSMENT — PAIN DESCRIPTION - FREQUENCY: FREQUENCY: CONTINUOUS

## 2022-10-15 ASSESSMENT — PAIN DESCRIPTION - ONSET: ONSET: ON-GOING

## 2022-10-15 NOTE — PROGRESS NOTES
ACUTE OCCUPATIONAL THERAPY GOALS:   (Developed with and agreed upon by patient and/or caregiver.)  1. Patient will complete lower body bathing and dressing with MIN A and adaptive equipment as needed. 2. Patient will complete upper body bathing and dressing with SPV and adaptive equipment as needed. 3. Patient will complete toileting with CGA. 4. Patient will tolerate 30 minutes of OT treatment with 1-2 rest breaks to increase activity tolerance for ADLs. 5. Patient will complete functional transfers with CGA and adaptive equipment as needed. 6. Patient will complete functional activity with CGA and adaptive equipment as needed. Timeframe: 7 visits         OCCUPATIONAL THERAPY: Daily Note AM/PM  OT Visit Days: 2   Time  OT Charge Capture  Rehab Caseload Tracker  OT Orders    Zechariah Torres is a 79 y.o. male   PRIMARY DIAGNOSIS: Cerebrovascular accident (CVA) ([facility]  Right sided weakness [R53.1]  Cerebrovascular accident (CVA), unspecified mechanism ([address] [I63.9]  Acute CVA (cerebrovascular accident) ([facility] [address] [I63.9]       Inpatient: Payor: [facility] MEDICARE / Plan: Milton Dumont / Product Type: *No Product type* /     ASSESSMENT:     REHAB RECOMMENDATIONS: CURRENT LEVEL OF FUNCTION:  (Most Recently Demonstrated)   Recommendation to date pending progress:  Setting:  TBD    Equipment:    To Be Determined Bathing:  Not Tested  Dressing:  Not Tested  Feeding/Grooming:  Maximal Assist for shaving  Toileting:  Not Tested  Functional Mobility:  Minimal Assist     ASSESSMENT:  Mr. Tracey Perez is doing fair today. Pt required min A for bed mobility. Pt demonstrates fair (+) sitting balance at EOB. Pt was able to stand and transfer over to chair with min A. Pt assisted with grooming. Required more assistance with shaving due to right side weakness. Pt later assisted back to bed. Pt required assistance with LEs when returning to bed. Pt very motivated and would do great in [facility] setting.  Will continue to benefit from skilled OT during stay. SUBJECTIVE:     Mr. Obinna Reyez states, \"I will get back on the phone Monday morning\"     Social/Functional Lives With: Family  Type of Home: [address]: One level (pt lives on 4th floor with elevator axcess)  Bathroom Toilet: Handicap height  Home Equipment: [employer] [place], Wheelchair-electric  Receives Help From: Personal care attendant  ADL Assistance: Needs assistance  Ambulation Assistance: Needs assistance  Transfer Assistance: Needs assistance  Active : No  Patient's  Info: public transportation when needed.   Occupation: Retired, On disability    OBJECTIVE:     Timbo Pak / Ani Borges / Verner Clan: NA    RESTRICTIONS/PRECAUTIONS:  Restrictions/Precautions  Restrictions/Precautions: Fall Risk        PAIN: VITALS / O2:   Pre Treatment:            Post Treatment: 0 Vitals          Oxygen        MOBILITY: I Mod I S SBA CGA Min Mod Max Total  NT x2 Comments:   Bed Mobility    Rolling [] [] [] [] [] [] [] [] [] [x] []    Supine to Sit [] [] [] [] [] [x] [x] [] [] [] []    Scooting [] [] [] [] [] [x] [] [] [] [] []    Sit to Supine [] [] [] [] [] [x] [x] [] [] [] []    Transfers    Sit to Stand [] [] [] [] [] [x] [] [] [] [] []    Bed to Chair [] [] [] [] [] [x] [] [] [] [] []    Stand to Sit [] [] [] [] [] [x] [] [] [] [] []    Tub/Shower [] [] [] [] [] [] [] [] [] [x] []     Toilet [] [] [] [] [] [] [] [] [] [x] []      [] [] [] [] [] [] [] [] [] [] []    I=Independent, Mod I=Modified Independent, S=Supervision/Setup, SBA=Standby Assistance, CGA=Contact Guard Assistance, Min=Minimal Assistance, Mod=Moderate Assistance, Max=Maximal Assistance, Total=Total Assistance, NT=Not Tested    ACTIVITIES OF DAILY LIVING: I Mod I S SBA CGA Min Mod Max Total NT Comments   BASIC ADLs:              Upper Body   Bathing [] [] [] [] [] [] [] [] [] [x]    Lower Body Bathing [] [] [] [] [] [] [] [] [] [x]    Toileting [] [] [] [] [] [] [] [] [] [x]    Upper Body Dressing [] [] [] [] [] [] [] [] [] [x]    Lower Body Dressing [] [] [] [] [] [] [] [] [] [x]    Feeding [] [] [] [] [] [] [] [] [] [x]    Grooming [] [] [] [] [] [] [] [x] [] []    Personal Device Care [] [] [] [] [] [] [] [] [] [x]    Functional Mobility [] [] [] [] [] [x] [] [] [] []    I=Independent, Mod I=Modified Independent, S=Supervision/Setup, SBA=Standby Assistance, CGA=Contact Guard Assistance, Min=Minimal Assistance, Mod=Moderate Assistance, Max=Maximal Assistance, Total=Total Assistance, NT=Not Tested    BALANCE: Good Fair+ Fair Fair- Poor NT Comments   Sitting Static [] [x] [] [] [] []    Sitting Dynamic [] [x] [] [] [] []              Standing Static [] [] [x] [] [] []    Standing Dynamic [] [] [x] [] [] []        PLAN:     FREQUENCY/DURATION   OT Plan of Care: 3 times/week for duration of hospital stay or until stated goals are met, whichever comes first.    TREATMENT:     TREATMENT:   Therapeutic Activity (25 Minutes): Therapeutic activity included Supine to Sit, Sit to Supine, Scooting, Transfer Training, Sitting balance , and Standing balance to improve functional Activity tolerance, Balance, Coordination, Mobility, and Strength. Self Care (15 minutes): Patient participated in grooming ADLs in supported sitting with moderate verbal, manual, and tactile cueing to increase independence and decrease assistance required. Patient also participated in functional transfer training to increase independence and decrease assistance required.      TREATMENT GRID:  N/A    AFTER TREATMENT PRECAUTIONS: Bed, Bed/Chair Locked, Call light within reach, Needs within reach, and RN notified    INTERDISCIPLINARY COLLABORATION:  RN/ PCT and OT/ TREVIZO    EDUCATION:       TOTAL TREATMENT DURATION AND TIME:  Time In: 1140 (1425)  Time Out: [phone])  Minutes: 15    Ascencion Saucedo

## 2022-10-15 NOTE — PROGRESS NOTES
Hospitalist Progress Note   Admit Date:  10/11/2022  6:51 PM   Name:  Damian Host   Age:  79 y.o. Sex:  male  :  1955   MRN:  [id number]   Room:  2/    Presenting Complaint: Facial Droop     Reason(s) for Admission: Right sided weakness [R53.1]  Cerebrovascular accident (CVA), unspecified mechanism ([facility] [I63.9]  Acute CVA (cerebrovascular accident) [facility]) [I63.9]     Hospital Course:   Patient is a 79 y.o. male who presented to the ED for cc sudden left sided headache with right sided weakness while sitting outside. He was unsure of exact time of onset. He immediately went up to his apartment where his aid took his blood pressure which was persistently elevated. EMS called and pt brought to the ER. Mr. Jerome Bolton has a hx of severe spinal osteoarthritis / degenerative disease with chronic b/l LE weakness,  s/p upper back surgery with titanium spinal cage, s/p neuro-spinal stimulator. Pt reports he is very independent at home using trapeze and slide board with wheelchair \"I only need my aid to tie my shoes. \"  Pt is able to use both upper extremities well and tells me he was just up in [place] last week white water rafting. He also has a hx of atrial fibrillation on Pradaxa, HTN, HLD, dCHF EF 47%, CAD, s/p TAVR, s/p pacemaker, PAD, CKD stage 3, COPD requiring 2L NC at night with ongoing tobacco abuse. In ER, CT head and CTA head and neck with no significant findings; CT brain perfusion without findings of large vessel hypoperfusion. Pt with persistent RUE / RLE weakness and sensation deficits; clinically presents as acute CVA, unable to undergo MRI brain due to spinal stimulator. Seen in consultation by neurology and deemed presentation as an acute CVA. Per Dr. Bernice Carvalho, cont pradaxa / ASA, high intensity statin; per neuro patient would benefit from rehab. Subjective & 24hr Events (10/15/22):    \"I feel like my right arm is getting a bit better; it's still weak though. \" Pleasant 67y.o. WM sitting up in bed in NAD    C/o ongoing RUE / RLE sensation deficits though interval improved RUE strength. Denies HA, visual deficits, CP, dyspnea, N/V, abd pain, urinary / fecal incontinence.       Assessment & Plan:     Principal Problem:    Right sided weakness / clinical dx acute CVA per neurology  - ASA 81mg daily added to pradaxa per Dr. Howard Columbia recommendation  - Echo with LA enlargement though established afib  - outpatient f/u with neuro, STR on dc    Active Problems:    Uncontrolled HTN  - better ranges with resuming metoprolol bid   - monitor      Chronic debility / chronic back pain / degenerative disease  - appreciate PT/OT ongoing assistance  - s/p spinal stimulator  - STR on dc      Atrial fibrillation  - rate ~controlled  - cont pradaxa      Hx of TAVR  - aware      Cigarette smoker / COPD  - REALLY NEEDS TO QUIT SMOKING!!!  - cont inhalers  - not in acute exacerbation      Nocturnal hypoxia  - cont supplemental O2 at hs and prn during day      Hyperlipidemia  - cont statin tx      CAD in native artery  - cont ASA / statin / BB      Iron deficiency anemia  - h/h stable  - cont oral supplementation      Chronic renal disease, stage III ([address]  - renal fxn stable      Anticipated discharge needs:    - STR pending [id number] (awaiting [employer] MD to call me back)    ADDENDUM:  @15:15, after peer to peer, pt approved for [facility]!!    Diet:  ADULT DIET; Easy to Chew; Low Fat/Low Chol/High Fiber/2 gm Na  DVT PPx: pradaxa  Code status: Full Code    Hospital Problems:  Principal Problem:    Cerebrovascular accident (CVA) ([facility] [address]  Active Problems:    Nocturnal hypoxia    Cigarette nicotine dependence with nicotine-induced disorder    Right sided weakness    Acute CVA (cerebrovascular accident) ([facility] [address]    Hyperlipidemia    Atrial fibrillation ([facility] [address]    CAD in native artery    Hypertension    Longstanding persistent atrial fibrillation (HCC)    PAD (peripheral artery disease) ([facility]    Iron deficiency anemia    S/P TAVR (transcatheter aortic valve replacement)    COPD (chronic obstructive pulmonary disease) (HCC)    Chronic renal disease, stage III (HCC)  Resolved Problems:    * No resolved hospital problems. *      Objective:   Patient Vitals for the past 24 hrs:   Temp Pulse Resp BP SpO2   10/15/22 1200 98 °F (36.7 °C) 75 16 (!) 138/91 97 %   10/15/22 0817 -- 74 16 -- 96 %   10/15/22 0800 98.1 °F (36.7 °C) 72 18 127/89 96 %   10/15/22 0400 97.3 °F (36.3 °C) 70 18 109/77 96 %   10/15/22 0003 -- -- -- -- 95 %   10/15/22 0000 97.7 °F (36.5 °C) 71 18 (!) 125/90 --   10/14/22 2057 97.7 °F (36.5 °C) 78 20 (!) 131/95 95 %   10/14/22 1910 97.7 °F (36.5 °C) 71 18 122/85 95 %         Oxygen Therapy  SpO2: 97 %  Pulse Oximeter Device Mode: Intermittent  O2 Device: None (Room air)  O2 Flow Rate (L/min): 0 L/min    Estimated body mass index is 30.99 kg/m² as calculated from the following:    Height as of this encounter: 6' 1\" (1.854 m). Weight as of this encounter: 234 lb 14.4 oz (106.5 kg). Intake/Output Summary (Last 24 hours) at 10/15/2022 1438  Last data filed at 10/15/2022 1143  Gross per 24 hour   Intake --   Output 1670 ml   Net -1670 ml           Physical Exam:     Blood pressure (!) 138/91, pulse 75, temperature 98 °F (36.7 °C), temperature source Oral, resp. rate 16, height 6' 1\" (1.854 m), weight 234 lb 14.4 oz (106.5 kg), SpO2 97 %. General:    Well nourished. Head:  Normocephalic, atraumatic  Eyes:  Sclerae appear normal.  Pupils equally round. No lid lag, no nystagmus  ENT:  Nares appear normal, no drainage. Moist oral mucosa; no facial droop  Neck:  No restricted ROM. Trachea midline   CV:   Irreg rhythm, rate ~controlled; no gross murmur  Lungs:   CTA b/l; no wheezing, improved lung fields  Abdomen: Bowel sounds present. Soft, nontender, nondistended.   Extremities: RUE weaker than LUE with improved muscle strength this AM to RUE; sensation deficits RUE / RLE compared to LUE / LLE; no edema  Skin:     No rashes and normal coloration. Warm and dry. Neuro:  A&Ox3; muscle weakness / sensation deficits as noted above  Psych:  Normal mood and affect. I have personally reviewed labs and tests showing:  Recent Labs:  Recent Results (from the past 48 hour(s))   Basic Metabolic Panel w/ Reflex to MG    Collection Time: 10/14/22  7:23 AM   Result Value Ref Range    Sodium 136 133 - 143 mmol/L    Potassium 4.3 3.5 - 5.1 mmol/L    Chloride 106 101 - 110 mmol/L    CO2 24 21 - 32 mmol/L    Anion Gap 6 2 - 11 mmol/L    Glucose 60 (L) 65 - 100 mg/dL    BUN 16 8 - 23 MG/DL    Creatinine 1.00 0.8 - 1.5 MG/DL    Est, Glom Filt Rate >60 >60 ml/min/1.73m2    Calcium 8.9 8.3 - 10.4 MG/DL   CBC    Collection Time: 10/14/22  7:23 AM   Result Value Ref Range    WBC 9.2 4.3 - 11.1 K/uL    RBC 6.13 (H) 4.23 - 5.6 M/uL    Hemoglobin 16.3 13.6 - 17.2 g/dL    Hematocrit 50.5 (H) 41.1 - 50.3 %    MCV 82.4 82 - 102 FL    MCH 26.6 26.1 - 32.9 PG    MCHC 32.3 31.4 - 35.0 g/dL    RDW 15.9 (H) 11.9 - 14.6 %    Platelets 427 411 - 418 K/uL    MPV 9.6 9.4 - 12.3 FL    nRBC 0.00 0.0 - 0.2 K/uL       I have personally reviewed imaging studies showing: Other Studies:  CT BRAIN PERFUSION   Final Result   As above. CTA HEAD NECK W CONTRAST   Final Result   No aneurysm, dissection or high-grade vessel stenosis in the   arteries of the head or neck. CT HEAD WO CONTRAST   Final Result   Unremarkable unenhanced CT scan of the brain for acute intracranial   abnormality.                       Current Meds:  Current Facility-Administered Medications   Medication Dose Route Frequency    cyclobenzaprine (FLEXERIL) tablet 5 mg  5 mg Oral TID PRN    albuterol sulfate HFA (PROVENTIL;VENTOLIN;PROAIR) 108 (90 Base) MCG/ACT inhaler 2 puff  2 puff Inhalation Q4H PRN    metoprolol succinate (TOPROL XL) extended release tablet 100 mg  100 mg Oral Q12H    traMADol (ULTRAM) tablet 50 mg  50 mg Oral Q6H PRN aspirin chewable tablet 81 mg  81 mg Oral Daily    dabigatran (PRADAXA) capsule 150 mg  150 mg Oral Q12H    ferrous sulfate (IRON 325) tablet 325 mg  325 mg Oral BID    mometasone-formoterol (DULERA) 200-5 MCG/ACT inhaler 2 puff  2 puff Inhalation BID    ipratropium-albuterol (DUONEB) nebulizer solution 3 mL  3 mL Inhalation Q4H PRN    pantoprazole (PROTONIX) tablet 40 mg  40 mg Oral BID AC    tiotropium (SPIRIVA RESPIMAT) 2.5 MCG/ACT inhaler 2 puff  2 puff Inhalation Daily    acetaminophen (TYLENOL) tablet 650 mg  650 mg Oral Q4H PRN    Or    acetaminophen (TYLENOL) suppository 650 mg  650 mg Rectal Q4H PRN    polyethylene glycol (GLYCOLAX) packet 17 g  17 g Oral Daily PRN    bisacodyl (DULCOLAX) suppository 10 mg  10 mg Rectal Daily PRN    atorvastatin (LIPITOR) tablet 80 mg  80 mg Oral Nightly    labetalol (NORMODYNE;TRANDATE) injection 10 mg  10 mg IntraVENous Q10 Min PRN    nicotine (NICODERM CQ) 14 MG/24HR 1 patch  1 patch TransDERmal Q24H       Signed:  Radha Arrington    Part of this note may have been written by using a voice dictation software. The note has been proof read but may still contain some grammatical/other typographical errors.

## 2022-10-16 ENCOUNTER — APPOINTMENT (OUTPATIENT)
Dept: CT IMAGING | Age: 67
DRG: 065 | End: 2022-10-16
Payer: MEDICARE

## 2022-10-16 VITALS
DIASTOLIC BLOOD PRESSURE: 95 MMHG | OXYGEN SATURATION: 96 % | BODY MASS INDEX: 31.13 KG/M2 | HEART RATE: 72 BPM | WEIGHT: 234.9 LBS | SYSTOLIC BLOOD PRESSURE: 128 MMHG | TEMPERATURE: 98 F | RESPIRATION RATE: 18 BRPM | HEIGHT: 73 IN

## 2022-10-16 LAB
ANION GAP SERPL CALC-SCNC: 3 MMOL/L (ref 2–11)
BASOPHILS # BLD: 0.1 K/UL (ref 0–0.2)
BASOPHILS NFR BLD: 1 % (ref 0–2)
BUN SERPL-MCNC: 20 MG/DL (ref 8–23)
CALCIUM SERPL-MCNC: 8.7 MG/DL (ref 8.3–10.4)
CHLORIDE SERPL-SCNC: 108 MMOL/L (ref 101–110)
CO2 SERPL-SCNC: 25 MMOL/L (ref 21–32)
CREAT SERPL-MCNC: 1.2 MG/DL (ref 0.8–1.5)
DIFFERENTIAL METHOD BLD: ABNORMAL
EOSINOPHIL # BLD: 0.2 K/UL (ref 0–0.8)
EOSINOPHIL NFR BLD: 2 % (ref 0.5–7.8)
ERYTHROCYTE [DISTWIDTH] IN BLOOD BY AUTOMATED COUNT: 15.4 % (ref 11.9–14.6)
GLUCOSE BLD STRIP.AUTO-MCNC: 111 MG/DL (ref 65–100)
GLUCOSE SERPL-MCNC: 105 MG/DL (ref 65–100)
HCT VFR BLD AUTO: 45.8 % (ref 41.1–50.3)
HGB BLD-MCNC: 15.1 G/DL (ref 13.6–17.2)
IMM GRANULOCYTES # BLD AUTO: 0 K/UL (ref 0–0.5)
IMM GRANULOCYTES NFR BLD AUTO: 0 % (ref 0–5)
LYMPHOCYTES # BLD: 2.1 K/UL (ref 0.5–4.6)
LYMPHOCYTES NFR BLD: 22 % (ref 13–44)
MCH RBC QN AUTO: 27.1 PG (ref 26.1–32.9)
MCHC RBC AUTO-ENTMCNC: 33 G/DL (ref 31.4–35)
MCV RBC AUTO: 82.1 FL (ref 82–102)
MONOCYTES # BLD: 1 K/UL (ref 0.1–1.3)
MONOCYTES NFR BLD: 10 % (ref 4–12)
NEUTS SEG # BLD: 6.3 K/UL (ref 1.7–8.2)
NEUTS SEG NFR BLD: 65 % (ref 43–78)
NRBC # BLD: 0 K/UL (ref 0–0.2)
PLATELET # BLD AUTO: 189 K/UL (ref 150–450)
PMV BLD AUTO: 9.4 FL (ref 9.4–12.3)
POTASSIUM SERPL-SCNC: 4.9 MMOL/L (ref 3.5–5.1)
RBC # BLD AUTO: 5.58 M/UL (ref 4.23–5.6)
SARS-COV-2 RNA RESP QL NAA+PROBE: NOT DETECTED
SERVICE CMNT-IMP: ABNORMAL
SODIUM SERPL-SCNC: 136 MMOL/L (ref 133–143)
SOURCE: NORMAL
WBC # BLD AUTO: 9.7 K/UL (ref 4.3–11.1)

## 2022-10-16 PROCEDURE — 99232 SBSQ HOSP IP/OBS MODERATE 35: CPT | Performed by: PSYCHIATRY & NEUROLOGY

## 2022-10-16 PROCEDURE — 6370000000 HC RX 637 (ALT 250 FOR IP): Performed by: FAMILY MEDICINE

## 2022-10-16 PROCEDURE — 85025 COMPLETE CBC W/AUTO DIFF WBC: CPT

## 2022-10-16 PROCEDURE — 6370000000 HC RX 637 (ALT 250 FOR IP): Performed by: PHYSICIAN ASSISTANT

## 2022-10-16 PROCEDURE — 80048 BASIC METABOLIC PNL TOTAL CA: CPT

## 2022-10-16 PROCEDURE — 97530 THERAPEUTIC ACTIVITIES: CPT

## 2022-10-16 PROCEDURE — 94640 AIRWAY INHALATION TREATMENT: CPT

## 2022-10-16 PROCEDURE — 94760 N-INVAS EAR/PLS OXIMETRY 1: CPT

## 2022-10-16 PROCEDURE — 92526 ORAL FUNCTION THERAPY: CPT

## 2022-10-16 PROCEDURE — 70450 CT HEAD/BRAIN W/O DYE: CPT

## 2022-10-16 PROCEDURE — 36415 COLL VENOUS BLD VENIPUNCTURE: CPT

## 2022-10-16 RX ORDER — ATORVASTATIN CALCIUM 80 MG/1
80 TABLET, FILM COATED ORAL NIGHTLY
Qty: 30 TABLET | Refills: 0 | Status: SHIPPED | OUTPATIENT
Start: 2022-10-16 | End: 2022-11-15

## 2022-10-16 RX ADMIN — TIOTROPIUM BROMIDE INHALATION SPRAY 2 PUFF: 3.12 SPRAY, METERED RESPIRATORY (INHALATION) at 07:59

## 2022-10-16 RX ADMIN — METOPROLOL SUCCINATE 100 MG: 100 TABLET, EXTENDED RELEASE ORAL at 10:55

## 2022-10-16 RX ADMIN — MOMETASONE FUROATE AND FORMOTEROL FUMARATE DIHYDRATE 2 PUFF: 200; 5 AEROSOL RESPIRATORY (INHALATION) at 07:59

## 2022-10-16 RX ADMIN — PANTOPRAZOLE SODIUM 40 MG: 40 TABLET, DELAYED RELEASE ORAL at 15:16

## 2022-10-16 RX ADMIN — FERROUS SULFATE TAB 325 MG (65 MG ELEMENTAL FE) 325 MG: 325 (65 FE) TAB at 08:12

## 2022-10-16 RX ADMIN — ASPIRIN 81 MG: 81 TABLET, CHEWABLE ORAL at 08:12

## 2022-10-16 RX ADMIN — DABIGATRAN ETEXILATE MESYLATE 150 MG: 150 CAPSULE ORAL at 08:12

## 2022-10-16 ASSESSMENT — PAIN SCALES - GENERAL: PAINLEVEL_OUTOF10: 0

## 2022-10-16 NOTE — PROGRESS NOTES
Spoke to patient's sister, Joanne Mcrae to obtain information about patient's Pacemaker and Implanted stimulator with patient's consent & instruction:    Information is as follows:    Implanted Stimulator:  Make: [employer]  Model: [id number]  Serial #: [id number]  Implanted 01/30/2019    Pacemaker:  Make: [employer]  Product: CRT-P  Model number: T212  Serial #: [id number]    Implant Lead 1:   Model 7842  Serial [id number]    Implant Lead 2:  Model: [id number]  Serial #: [id number]    Implanted 2/16/2022.       Parviz Roblero RN

## 2022-10-16 NOTE — PROGRESS NOTES
Neurology Daily Progress Note     Assessment:     80-year-old man with acute onset right-sided hemiparesis. Clinical examination is highly suggestive of stroke. His symptoms localize to the posterior limb of the internal capsule on the left. Strokes in this area are typically caused by small vessel ischemic disease. In the setting of hypertension, this is the most likely etiology. The patient had another code stroke called for ? Left-sided weakness. Today, I do not see evidence of a new stroke. Plan:     Continue Pradaxa and aspirin. This is maximum medical therapy. We do not recommend triple therapy    Long-term blood pressure goal is less than 140/90 or less than 130/80, if tolerable    Smoking cessation is necessary    I recommend discharge to a rehab facility. There was some concern that the patient may have had a seizure. This is very unclear to me. If he is still here tomorrow then an EEG can be done, but we will not be able to do that today    Subjective: Interval history:    Continues to have extremity weakness. Last night, a code stroke was called (see notes)    History:    Errol Petty is a 79 y.o. male who is being seen for stroke.     Past Medical History:   Diagnosis Date    Aortic stenosis     Aortic valve replacement 7/2018    CAD (coronary artery disease)     PCI in 2014, 2015 had MI and then stents    Cancer ([address]     SCC removed face     CHF (congestive heart failure) ([address]     Chronic renal disease, stage III ([address]     sees neph and does not have actual diagnosis at this time    COPD (chronic obstructive pulmonary disease) ([address]     fibrosis in lungs    Dyslipidemia     Heart failure ([address]     CHF per patient    HTN (hypertension)     med controlled    Hyperlipidemia     Ischemic cardiomyopathy     Pacemaker     only pacemaker    Paroxysmal atrial fibrillation ([address]     pradaxa for this    Pulmonary fibrosis ([address]     Seizure disorder ([address]     lyme disease - small lesion frontal part of brain - no maintenance meds - last seizure 2 months ago, due to fall - before that it was nine years    Systolic heart failure [facility])      Past Surgical History:   Procedure Laterality Date    ABLATION OF DYSRHYTHMIC FOCUS      AORTIC VALVE REPLACEMENT  07/2018    BACK SURGERY      discectomy    CARDIAC CATHETERIZATION      PCI 2014, 2015    CARDIAC PROCEDURE N/A 7/11/2022    LEFT HEART CATH / CORONARY ANGIOGRAPHY performed by Evans Watters MD at [address] [place] [address] CATH LAB    CERVICAL FUSION      C3-4 fusion    EGD  6/17/2022         HIP ARTHROPLASTY Left     SPINAL CORD STIMULATOR SURGERY      lower back     UPPER GASTROINTESTINAL ENDOSCOPY N/A 6/17/2022    EGD ESOPHAGOGASTRODUODENOSCOPY/ PT HAS PACEMAKER performed by Aline Chacon MD at [facility] ENDOSCOPY      Family History   Problem Relation Age of Onset    Heart Disease Sister     Heart Disease Mother      Social History     Tobacco Use    Smoking status: Every Day     Packs/day: 1.50     Years: 56.00     Pack years: 84.00     Types: Cigarettes    Smokeless tobacco: Never    Tobacco comments:     Quit smoking: cutting back to 4 Cigarettes a day   Substance Use Topics    Alcohol use: Not Currently      Current Facility-Administered Medications   Medication Dose Route Frequency Provider Last Rate Last Admin    cyclobenzaprine (FLEXERIL) tablet 5 mg  5 mg Oral TID PRN BRIGITTE Arrington   5 mg at 10/15/22 1509    albuterol sulfate HFA (PROVENTIL;VENTOLIN;PROAIR) 108 (90 Base) MCG/ACT inhaler 2 puff  2 puff Inhalation Q4H PRN Radha Arrington        metoprolol succinate (TOPROL XL) extended release tablet 100 mg  100 mg Oral Q12H Radha Arrington   100 mg at 10/16/22 1055    traMADol (ULTRAM) tablet 50 mg  50 mg Oral Q6H PRN Maira Garcia MD   50 mg at 10/15/22 1509    aspirin chewable tablet 81 mg  81 mg Oral Daily Evelena Grumbles, DO   81 mg at 10/16/22 9083    dabigatran (PRADAXA) capsule 150 mg  150 mg Oral Q12H Evelena Grumbles, DO   150 mg at 10/16/22 0812    ferrous sulfate (IRON 325) tablet 325 mg  325 mg Oral BID Elenora India, DO   325 mg at 10/16/22 4228    mometasone-formoterol (DULERA) 200-5 MCG/ACT inhaler 2 puff  2 puff Inhalation BID Elenora India, DO   2 puff at 10/16/22 0759    ipratropium-albuterol (DUONEB) nebulizer solution 3 mL  3 mL Inhalation Q4H PRN Elenora India, DO        pantoprazole (PROTONIX) tablet 40 mg  40 mg Oral BID AC Elenora India, DO   40 mg at 10/15/22 1504    tiotropium (SPIRIVA RESPIMAT) 2.5 MCG/ACT inhaler 2 puff  2 puff Inhalation Daily Elenora India, DO   2 puff at 10/16/22 0759    acetaminophen (TYLENOL) tablet 650 mg  650 mg Oral Q4H PRN Elenora India, DO   650 mg at 10/12/22 2013    Or    acetaminophen (TYLENOL) suppository 650 mg  650 mg Rectal Q4H PRN Elenora India, DO        polyethylene glycol (GLYCOLAX) packet 17 g  17 g Oral Daily PRN Elenora India, DO        bisacodyl (DULCOLAX) suppository 10 mg  10 mg Rectal Daily PRN Elenora India, DO        atorvastatin (LIPITOR) tablet 80 mg  80 mg Oral Nightly Elenora India, DO   80 mg at 10/15/22 2144    labetalol (NORMODYNE;TRANDATE) injection 10 mg  10 mg IntraVENous Q10 Min PRN Elenora India, DO        nicotine (NICODERM CQ) 14 MG/24HR 1 patch  1 patch TransDERmal Q24H Elenora India, DO            Allergies   Allergen Reactions    Carbamazepine Anaphylaxis    Lacosamide Nausea Only    Lorazepam Other (See Comments)     Violent behavior    Nitroglycerin Other (See Comments)     hypotension       Review of systems negative with exception of pertinent positives and negatives noted above.        Objective:     Vitals:    10/15/22 2350 10/16/22 0400 10/16/22 0759 10/16/22 0800   BP: 136/82 116/69  (!) 117/90   Pulse:  70 74 74   Resp:  18 16 16   Temp:  97.5 °F (36.4 °C)  97.7 °F (36.5 °C)   TempSrc:  Oral  Oral   SpO2:  95% 95% 95%   Weight:       Height:              Current Facility-Administered Medications:     cyclobenzaprine (FLEXERIL) tablet 5 mg, 5 mg, Oral, TID PRN, May Y Neftaly, [address], 5 mg at 10/15/22 1509    albuterol sulfate HFA (PROVENTIL;VENTOLIN;PROAIR) 108 (90 Base) MCG/ACT inhaler 2 puff, 2 puff, Inhalation, Q4H PRN, May Y Neftaly, [address]    metoprolol succinate (TOPROL XL) extended release tablet 100 mg, 100 mg, Oral, Q12H, May Y Neftaly, [address], 100 mg at 10/16/22 1055    traMADol (ULTRAM) tablet 50 mg, 50 mg, Oral, Q6H PRN, Esequiel Jacques MD, 50 mg at 10/15/22 1509    aspirin chewable tablet 81 mg, 81 mg, Oral, Daily, Samule Idalia, DO, 81 mg at 10/16/22 4168    dabigatran (PRADAXA) capsule 150 mg, 150 mg, Oral, Q12H, Samule Idalia, DO, 150 mg at 10/16/22 8351    ferrous sulfate (IRON 325) tablet 325 mg, 325 mg, Oral, BID, Samule Idalia, DO, 325 mg at 10/16/22 0991    mometasone-formoterol (DULERA) 200-5 MCG/ACT inhaler 2 puff, 2 puff, Inhalation, BID, Samule Idalia, DO, 2 puff at 10/16/22 0759    ipratropium-albuterol (DUONEB) nebulizer solution 3 mL, 3 mL, Inhalation, Q4H PRN, Samule Idalia, DO    pantoprazole (PROTONIX) tablet 40 mg, 40 mg, Oral, BID AC, Samule Idalia, DO, 40 mg at 10/15/22 1504    tiotropium (SPIRIVA RESPIMAT) 2.5 MCG/ACT inhaler 2 puff, 2 puff, Inhalation, Daily, Samule Idalia, DO, 2 puff at 10/16/22 0759    acetaminophen (TYLENOL) tablet 650 mg, 650 mg, Oral, Q4H PRN, 650 mg at 10/12/22 2013 **OR** acetaminophen (TYLENOL) suppository 650 mg, 650 mg, Rectal, Q4H PRN, Samule Idalia, DO    polyethylene glycol (GLYCOLAX) packet 17 g, 17 g, Oral, Daily PRN, Beauule Idalia, DO    bisacodyl (DULCOLAX) suppository 10 mg, 10 mg, Rectal, Daily PRN, Samule Idalia, DO    atorvastatin (LIPITOR) tablet 80 mg, 80 mg, Oral, Nightly, Kamlesh Bassys, DO, 80 mg at 10/15/22 2144    labetalol (NORMODYNE;TRANDATE) injection 10 mg, 10 mg, IntraVENous, Q10 Min PRN, Kamlesh Bassys, DO    nicotine (NICODERM CQ) 14 MG/24HR 1 patch, 1 patch, TransDERmal, Q24H, Kamlesh Hobbs, DO    Recent Results (from the past 12 hour(s))   POCT Glucose    Collection Time: 10/15/22 11:53 PM   Result Value Ref Range    POC Glucose 111 (H) 65 - 100 mg/dL    Performed by: Merlyn Luna    CBC with Auto Differential    Collection Time: 10/16/22 12:30 AM   Result Value Ref Range    WBC 9.7 4.3 - 11.1 K/uL    RBC 5.58 4.23 - 5.6 M/uL    Hemoglobin 15.1 13.6 - 17.2 g/dL    Hematocrit 45.8 41.1 - 50.3 %    MCV 82.1 82 - 102 FL    MCH 27.1 26.1 - 32.9 PG    MCHC 33.0 31.4 - 35.0 g/dL    RDW 15.4 (H) 11.9 - 14.6 %    Platelets 239 340 - 675 K/uL    MPV 9.4 9.4 - 12.3 FL    nRBC 0.00 0.0 - 0.2 K/uL    Differential Type AUTOMATED      Seg Neutrophils 65 43 - 78 %    Lymphocytes 22 13 - 44 %    Monocytes 10 4.0 - 12.0 %    Eosinophils % 2 0.5 - 7.8 %    Basophils 1 0.0 - 2.0 %    Immature Granulocytes 0 0.0 - 5.0 %    Segs Absolute 6.3 1.7 - 8.2 K/UL    Absolute Lymph # 2.1 0.5 - 4.6 K/UL    Absolute Mono # 1.0 0.1 - 1.3 K/UL    Absolute Eos # 0.2 0.0 - 0.8 K/UL    Basophils Absolute 0.1 0.0 - 0.2 K/UL    Absolute Immature Granulocyte 0.0 0.0 - 0.5 K/UL   Basic Metabolic Panel    Collection Time: 10/16/22 12:30 AM   Result Value Ref Range    Sodium 136 133 - 143 mmol/L    Potassium 4.9 3.5 - 5.1 mmol/L    Chloride 108 101 - 110 mmol/L    CO2 25 21 - 32 mmol/L    Anion Gap 3 2 - 11 mmol/L    Glucose 105 (H) 65 - 100 mg/dL    BUN 20 8 - 23 MG/DL    Creatinine 1.20 0.8 - 1.5 MG/DL    Est, Glom Filt Rate >60 >60 ml/min/1.73m2    Calcium 8.7 8.3 - 10.4 MG/DL              Most recent Echo  Results for orders placed during the hospital encounter of 10/11/22    Transthoracic echocardiogram (TTE) complete with contrast, bubble, strain, and 3D PRN    Interpretation Summary    Left Ventricle: Normal left ventricular systolic function with a visually estimated EF of 55 - 60%. Left ventricle size is normal. Normal wall thickness. Apical Septal motion is consistent with pacing. Normal wall motion.  Diastolic function indeterminate in the setting of atrial fibrillation. Average E/e' ratio is 31.04. Aortic Valve: Not well visualized. Appropriately positioned transcatheter bioprosthetic valve that is well-seated. No regurgitation. Unable to assess stenosis. Elevated prosthetic gradient. AV mean gradient is 30 mmHg. AV peak gradient is 47 mmHg. Mitral Valve: Mildly calcified leaflet, at the anterior and posterior leaflets- tips. ? Rheumatic etiology Mild annular calcification of the mitral valve. Moderate stenosis noted. MV mean gradient is 9 mmHg\at a heart rate of 70 bpm.    Left Atrium: Left atrium is moderately dilated. Interatrial Septum: Agitated saline study was negative with and without provocation. Contrast used: Definity.    -No obvious cardiac source for emboli noted. -Underlying atrial fibrillation s/p AV ale ablation and permanent pacemaker placement-ventricular paced rhythm.  -Negative bubble study for intracardiac shunt.  -Bioprosthetic aortic valve is not well visualized although gradients are elevated. Elevated mitral valve gradients were also noted-consider GODFREY if clinically indicated. Physical Exam:  General - Well developed, well nourished, in no apparent distress. Pleasant and conversant  HEENT - Normocephalic, atraumatic. Conjunctiva, tympanic membranes, and oropharynx are clear. Neck - Supple without masses, no bruits   Extremities - Peripheral pulses intact. No edema and no rashes. Neurological examination - Comprehension, attention , memory and reasoning are intact. Language and speech are normal. On cranial nerve examination pupils are equal round and reactive to light. Visual acuity is adequate. Visual fields are full to finger confrontation. Extraocular motility is normal. Face is symmetric and sensation is intact to light touch. Hearing is intact to finger rustle bilaterally. Motor examination - There is normal muscle tone and bulk. Power is full throughout on the left.   On the right he has mostly 2/5 strength with relatively preserved flexion at the hand on the right. Muscle stretch reflexes are normoactive and there are no pathological reflexes present. Sensation is diminished in the right upper limb and right lower limb. Cerebellar examination is normal. Gait and stance not assessed. The patient cannot walk.     Signed By: Artemio Cruz DO     October 16, 2022

## 2022-10-16 NOTE — PROGRESS NOTES
Upon rounds, pt found unable to speak, not following commands. NIH completed. Code Stroke called. Stat head ct completed. Telemed Neuro Consult completed. No new orders at this time. Continue q4h NIH/Neuro checks.

## 2022-10-16 NOTE — PROGRESS NOTES
PACEMAKER AND STIMULATOR Called and requested screening form be filled out, Nurse stated pt has a Pacemaker as well as a Stimulator, informed her to get info of devices as well as informed the Pacemaker PTs can only be done during the weekday hours

## 2022-10-16 NOTE — PROGRESS NOTES
LTG: Patient will improve neuro-linguistic abilities to increase safety and awareness in functional living environment. STG: Patient will complete working memory tasks with 90% accuracy given minimal cueing   STG: Patient will immediately recall information from skye/appointment with 100% accuracy given minimal cueing  STG: Patient will utilize memory compensatory strategies to improve recall of functional information with 100%    SPEECH LANGUAGE PATHOLOGY: DYSPHAGIA Daily Note #1    MRN: [id number]    ADMISSION DATE: 10/11/2022  PRIMARY DIAGNOSIS: Cerebrovascular accident (CVA) ([facility]  Right sided weakness [R53.1]  Cerebrovascular accident (CVA), unspecified mechanism ([facility] [I63.9]  Acute CVA (cerebrovascular accident) ([facility] [I63.9]    ICD-10: Treatment Diagnosis: R13.12 Dysphagia, Oropharyngeal Phase    RECOMMENDATIONS:   Recommendations:  Continue Easy [employer] with thin liquids, meds whole with liquid wash. Cognitive linguistic treatment to continue at next level of care     Patient continues to require skilled intervention: Yes  Ongoing speech therapy is recommended during this hospitalization, Ongoing speech therapy is recommended at next level of care       ASSESSMENT    SLP team re-consulted secondary to Code Stroke yesterday. Pt sitting upright in bed, eating lunch. No overt signs of airway compromise with thin liquids or easy to chew solids as evidenced by timely swallows with no cough response and no change in clear/dry vocal quality. Recommend continue diet as ordered, SLP team will continue cognitive treatment per plan of care. GENERAL      Communication Observation: Functional  Follows Directions: Complex  Patient Positioning: Upright in bed   Patient reports he independently manages bills, appointments, meds. Aide assists with cooking and patient reports Margot Hutchins looks over things behind me\". Endorses forgetfulness. Uses external aids(calendar, timers, etc)to compensate.                    Prior Dysphagia History: none       Pain: No pain reported. OBJECTIVE   Pt consumed [employer] including roast beef, mashed potatoes, and thin liquids with no overt signs/symptoms of laryngeal penetration/aspiration as evidenced by timely swallows with no cough response and no change in clear/dry vocal quality. Pt denies dysphagia as described by SLP. PLAN    Duration/Frequency: Continue to follow patient 1x/week for duration of hospitalization and/or until goals met    Speech Therapy Prognosis  Prognosis: Excellent    MODIFIED LEONILA SCALE (mRS) SCORE: 2  Interpretation of Tool: The Modified [place] Scale is a scale used to quantify level of disability as it relates to a patient's functional abilities. No Symptoms(0); No significant disability despite symptoms; able to carry out all usual duties and activities(1); Slight disability; unable to carry out all previous activities but able to look after own affairs without assistance(2); Moderate disability; requiring some help but able to walk without assistance(3); Moderately severe disability; unable to walk without assistance and unable to attend to own bodily needs without assistance(4);  Severe disability; bedridden, incontinent, and requiring constant nursing care and attention(5)      Education: Patient  Patient Education: cognitive linguistic treatment  Patient Education Response: Verbalizes understanding, Demonstrated understanding    PRECAUTIONS/ALLERGIES: Carbamazepine, Lacosamide, Lorazepam, and Nitroglycerin     Safety Devices in place: Yes  Type of devices: Call light within reach, Left in bed    Therapy Time  SLP Individual Minutes  Time In: 5628  Time Out: 1622  Min[facility]: [facility] [address]  10/16/2022 1:39 PM

## 2022-10-16 NOTE — CARE COORDINATION
Pt is medically cleared for dc today and will transfer to room 201 at [facility] for acute inpatient rehab services. Transport scheduled for 1530 through [address] [place] [address]. RN to call report to #[phone]. CM notified pt of dc and transport time and he is in agreement with this dc plan. CM spoke with pt's sister, Elena Baldwin, via phone, to notify her of pt's dc and transport time. CM remains available to assist as needed. 10/16/22 6091   Service Assessment   Patient Orientation Alert and Oriented   Cognition Alert   History Provided By Patient;Medical Record   Primary Caregiver Private caregiver  (has [facility] aide)   Support Systems Family Members;Home Care Staff   Patient's Healthcare Decision Maker is: Legal Next of Karla 69   PCP Verified by CM Yes   Last Visit to PCP Within last 3 months   Prior Functional Level Independent in ADLs/IADLs;Assistance with the following:;Housework; Bathing;Dressing   Current Functional Level Assistance with the following:;Housework; Mobility;Dressing; Bathing   Can patient return to prior living arrangement No   Ability to make needs known: Good   Family able to assist with home care needs: Yes   Would you like for me to discuss the discharge plan with any other family members/significant others, and if so, who? No   Financial Resources Medicare;Medicaid; ([place])   Community Resources ECF/Home Care  (CLTC)   Social/Functional History   Lives With Family   Type of [address] One level  (pt lives on 4th floor with elevator axcess)   Bathroom Toilet Handicap height   [address]; Wheelchair-manual;Wheelchair-electric;Hospital bed   Receives Help From Personal care attendant   ADL Assistance Needs assistance   Homemaking Assistance Needs assistance   Ambulation Assistance Needs assistance   Transfer Assistance Needs assistance   Active  No   Patient's  Info public transportation when needed. Occupation Retired; On disability   Discharge Planning   Type of Residence Acute Rehab   Living Arrangements Alone   Current Services Prior To Admission Private Duty [address]   DME Ordered? No   Potential Assistance Purchasing Medications No   Type of Home Care Services None   Patient expects to be discharged to: Acute rehab   One/Two Story Residence One story   History of falls? 0   Services At/After Discharge   Transition of Care Consult (CM Consult) Acute Rehab   Services At/After Discharge PT;OT;Inpatient rehab;Nursing services;Transport; In ambulance   [address] Provided? No  (pt already receives [place] services)   Mode of Transport at Discharge [address])   [facility] Transport Time of Discharge 1530   Confirm Follow Up Transport Wheelchair [facility]  (or public transportation)   Condition of Participation: Discharge Planning   The Plan for Transition of Care is related to the following treatment goals: Acute inpatient rehab to improve pt's strength and functional abilities for a safe transition to home   The Patient and/or Patient Representative was provided with a Choice of Provider? Patient   The Patient and/Or Patient Representative agree with the Discharge Plan? Yes   Freedom of Choice list was provided with basic dialogue that supports the patient's individualized plan of care/goals, treatment preferences, and shares the quality data associated with the providers?   Yes

## 2022-10-16 NOTE — DISCHARGE SUMMARY
Hospitalist Discharge Summary   Admit Date:  10/11/2022  6:51 PM   DC Note date: 10/16/2022  Name:  Esme Collins   Age:  79 y.o. Sex:  male  :  1955   MRN:  [id number]   Room:  [facility]  PCP:  Manolo Chan MD    Presenting Complaint: Facial Droop     Initial Admission Diagnosis: Right sided weakness [R53.1]  Cerebrovascular accident (CVA), unspecified mechanism ([address] [I63.9]  Acute CVA (cerebrovascular accident) ([address] [I63.9]     Problem List for this Hospitalization (present on admission):    Principal Problem:    Cerebrovascular accident (CVA) ([address]  Active Problems:    Nocturnal hypoxia    Cigarette nicotine dependence with nicotine-induced disorder    Right sided weakness    Acute CVA (cerebrovascular accident) ([address]    Hyperlipidemia    Atrial fibrillation ([address]    CAD in native artery    Hypertension    Longstanding persistent atrial fibrillation ([address]    PAD (peripheral artery disease) ([address]    Iron deficiency anemia    S/P TAVR (transcatheter aortic valve replacement)    COPD (chronic obstructive pulmonary disease) ([address]    Chronic renal disease, stage III ([address]  Resolved Problems:    * No resolved hospital problems. [facility] Course:  Patient is a 79 y.o. male who presented to the ED for cc sudden left sided headache with right sided weakness while sitting outside. He was unsure of exact time of onset. He immediately went up to his apartment where his aid took his blood pressure which was persistently elevated. EMS called and pt brought to the ER. Mr. Jaime Benítez has a hx of severe spinal osteoarthritis / degenerative disease with chronic b/l LE weakness,  s/p upper back surgery with titanium spinal cage, s/p neuro-spinal stimulator. Pt reports he is very independent at home using trapeze and slide board with wheelchair \"I only need my aid to tie my shoes. \"  Pt is able to use both upper extremities well and tells me he was just up in [place] last week white water rafting. He also has a hx of atrial fibrillation on Pradaxa, HTN, HLD, dCHF EF 47%, CAD, s/p TAVR, s/p pacemaker, PAD, CKD stage 3, COPD requiring 2L NC at night with ongoing tobacco abuse. In ER, CT head and CTA head and neck with no significant findings; CT brain perfusion without findings of large vessel hypoperfusion. Pt with persistent RUE / RLE weakness and sensation deficits; clinically presents as acute CVA, unable to undergo MRI brain due to spinal stimulator. Seen in consultation by neurology and deemed presentation as an acute CVA. Per Dr. Salo Hines, cont pradaxa / ASA, high intensity statin; per neuro patient would benefit from rehab. Overnight pt developed an episode of aphasia and AMS. CODE S called and CT head repeated without acute abnl. Dr. Salo Hines recalled this AM for acute neurologic abnl which had resolved by the time Dr. Salo Hines evaluated Mr. Chen. Recommendations remain to continue pradaxa / ASA which is currently at maximum med tx and to maintain good SBP >140 ~130s / DBP >90 ~80s mmhg but above all SMOKING CESSATION!!! Ok from neuro standpoint for dc to [facility] today. Pt feels much better currently and agreeable with plans. He will need outpatient f/u with neurology and PCP upon discharge from [facility] re[facility]. Disposition: Rehab  Diet: ADULT DIET; Easy to Chew; Low Fat/Low Chol/High Fiber/2 gm Na  Code Status: Full Code    Follow Ups:   Contact information for follow-up providers     Leopoldo Ike, MD Follow up in 1 week(s). Specialty: Internal Medicine  Contact information:  [address]  [address] [place] [address] [place]             Leopoldo Ike, MD .    Specialty: Internal Medicine  Contact information:  [address] [facility] [facility] [place]             GEORGE Cool Follow up in 3 week(s).     Specialty: Neurology  Contact information:  [address]  [address] [facility]  [facility] Contact information for after-discharge care     Discharge [address] . Service: Inpatient Rehabilitation  Contact information:  [facility] [id number] [phone]                           Time spent in patient discharge and coordination 45 minutes. Plan was discussed with patient, RN, case mgmt. All questions answered. Patient was stable at time of discharge. Instructions given to call a physician or return if any concerns.     Current Discharge Medication List        CONTINUE these medications which have CHANGED    Details   atorvastatin (LIPITOR) 80 MG tablet Take 1 tablet by mouth nightly  Qty: 30 tablet, Refills: 0           CONTINUE these medications which have NOT CHANGED    Details   tiotropium (SPIRIVA RESPIMAT) 2.5 MCG/ACT AERS inhaler Inhale 2 puffs into the lungs daily      ferrous sulfate (IRON 325) 325 (65 Fe) MG tablet Take 325 mg by mouth 2 times daily      ondansetron (ZOFRAN-ODT) 8 MG TBDP disintegrating tablet Take 1 tablet by mouth every 8 hours as needed for Nausea  Qty: 30 tablet, Refills: 0      Cholecalciferol (VITAMIN D3) 50 MCG (2000 UT) CAPS TAKE 1 CAPSULE BY MOUTH EVERY DAY  Qty: 90 capsule, Refills: 1    Associated Diagnoses: Vitamin D deficiency, unspecified      omeprazole (PRILOSEC) 20 MG delayed release capsule Take 1 capsule by mouth in the morning and at bedtime  Qty: 30 capsule, Refills: 3      albuterol sulfate  (90 Base) MCG/ACT inhaler Inhale 2 puffs into the lungs every 4 hours as needed      aspirin 81 MG chewable tablet Take 81 mg by mouth daily      cyclobenzaprine (FLEXERIL) 5 MG tablet Take 5 mg by mouth 3 times daily as needed      dabigatran (PRADAXA) 150 MG capsule Take 150 mg by mouth every 12 hours      fluticasone-salmeterol (ADVAIR) 250-50 MCG/ACT AEPB diskus inhaler Inhale 1 puff into the lungs every 12 hours      ipratropium-albuterol (DUONEB) 0.5-2.5 (3) MG/3ML SOLN nebulizer solution Inhale 3 mLs into the lungs every 4 hours as needed       metoprolol succinate (TOPROL XL) 100 MG extended release tablet Take 100 mg by mouth every 12 hours           STOP taking these medications       doxycycline hyclate (VIBRA-TABS) 100 MG tablet Comments:   Reason for Stopping:         furosemide (LASIX) 40 MG tablet Comments:   Reason for Stopping:         potassium chloride (KLOR-CON M) 20 MEQ extended release tablet Comments:   Reason for Stopping:               Procedures done this admission:  * No surgery found *    Consults this admission:  IP CONSULT TO PHYSICAL MEDICINE REHAB  IP CONSULT TO CASE MANAGEMENT  IP CONSULT TO NEUROLOGY  IP CONSULT TO STROKE COORDINATOR  IP CONSULT TO TELE-NEUROLOGY  IP CONSULT TO NEUROLOGY    Echocardiogram results:  10/11/22    TRANSTHORACIC ECHOCARDIOGRAM (TTE) COMPLETE (CONTRAST/BUBBLE/3D PRN) 10/12/2022 12:20 PM (Final)    Interpretation Summary    Left Ventricle: Normal left ventricular systolic function with a visually estimated EF of 55 - 60%. Left ventricle size is normal. Normal wall thickness. Apical Septal motion is consistent with pacing. Normal wall motion. Diastolic function indeterminate in the setting of atrial fibrillation. Average E/e' ratio is 31.04. Aortic Valve: Not well visualized. Appropriately positioned transcatheter bioprosthetic valve that is well-seated. No regurgitation. Unable to assess stenosis. Elevated prosthetic gradient. AV mean gradient is 30 mmHg. AV peak gradient is 47 mmHg. Mitral Valve: Mildly calcified leaflet, at the anterior and posterior leaflets- tips. ? Rheumatic etiology Mild annular calcification of the mitral valve. Moderate stenosis noted. MV mean gradient is 9 mmHg\at a heart rate of 70 bpm.    Left Atrium: Left atrium is moderately dilated. Interatrial Septum: Agitated saline study was negative with and without provocation.     Contrast used: Definity.    -No obvious cardiac source for emboli noted. -Underlying atrial fibrillation s/p AV ale ablation and permanent pacemaker placement-ventricular paced rhythm.  -Negative bubble study for intracardiac shunt.  -Bioprosthetic aortic valve is not well visualized although gradients are elevated. Elevated mitral valve gradients were also noted-consider GODFREY if clinically indicated. Signed by: Neelima Sher MD on 10/12/2022 12:20 PM      Diagnostic Imaging/Tests:   CT HEAD WO CONTRAST    Result Date: 10/16/2022  1. No acute intracranial hemorrhage or CT evidence of acute territorial infarction. 2. No significant change compared to prior exam.    CT HEAD WO CONTRAST    Result Date: 10/11/2022  Unremarkable unenhanced CT scan of the brain for acute intracranial abnormality. CT BRAIN PERFUSION    Result Date: 10/11/2022  As above. CTA HEAD NECK W CONTRAST    Result Date: 10/11/2022  No aneurysm, dissection or high-grade vessel stenosis in the arteries of the head or neck. Labs: Results:       BMP, Mg, Phos Recent Labs     10/14/22  0723 10/16/22  0030    136   K 4.3 4.9    108   CO2 24 25   ANIONGAP 6 3   BUN 16 20   CREATININE 1.00 1.20   LABGLOM >60 >60   CALCIUM 8.9 8.7   GLUCOSE 60* 105*      CBC Recent Labs     10/14/22  0723 10/16/22  0030   WBC 9.2 9.7   RBC 6.13* 5.58   HGB 16.3 15.1   HCT 50.5* 45.8   MCV 82.4 82.1   MCH 26.6 27.1   MCHC 32.3 33.0   RDW 15.9* 15.4*    189   MPV 9.6 9.4   NRBC 0.00 0.00   SEGS  --  65   LYMPHOPCT  --  22   EOSRELPCT  --  2   MONOPCT  --  10   BASOPCT  --  1   IMMGRAN  --  0   SEGSABS  --  6.3   LYMPHSABS  --  2.1   EOSABS  --  0.2   MONOSABS  --  1.0   BASOSABS  --  0.1   ABSIMMGRAN  --  0.0      LFT No results for input(s): BILITOT, BILIDIR, ALKPHOS, AST, ALT, PROT, LABALBU, GLOB in the last 72 hours.    Cardiac  Lab Results   Component Value Date/Time    NTPROBNP 592 07/11/2022 02:24 PM    NTPROBNP 413 06/21/2022 09:21 PM    NTPROBNP 978 06/06/2022 04:06 AM    TROPHS 14.3 10/11/2022 07:58 PM    TROPHS 29.0 07/11/2022 02:24 PM    TROPHS 13.9 06/21/2022 09:21 PM      Coags Lab Results   Component Value Date/Time    PROTIME 11.4 10/11/2022 07:58 PM    PROTIME 13.4 02/14/2022 09:05 AM    PROTIME 11.3 01/04/2022 01:15 PM    INR 0.8 10/11/2022 07:58 PM    INR 1.0 02/14/2022 09:05 AM    INR 0.9 01/04/2022 01:15 PM      A1c Lab Results   Component Value Date/Time    LABA1C 5.9 10/12/2022 05:49 AM    LABA1C 5.8 09/22/2022 10:35 AM     10/12/2022 05:49 AM     09/22/2022 10:35 AM      Lipids Lab Results   Component Value Date/Time    CHOL 176 10/12/2022 05:49 AM    LDLCALC 122 10/12/2022 05:49 AM    LABVLDL 14 10/12/2022 05:49 AM    HDL 40 10/12/2022 05:49 AM    CHOLHDLRATIO 4.4 10/12/2022 05:49 AM    TRIG 70 10/12/2022 05:49 AM      Thyroid  No results found for: Arden Espitia     Most Recent UA Lab Results   Component Value Date/Time    SPECGRAV 1.015 04/08/2022 04:02 AM        No results for input(s): CULTURE in the last 720 hours.     All Labs from Last 24 Hrs:  Recent Results (from the past 24 hour(s))   COVID-19    Collection Time: 10/15/22  4:11 PM    Specimen: Nasopharyngeal Swab   Result Value Ref Range    SARS-CoV-2 Please find results under separate order     POCT Glucose    Collection Time: 10/15/22 11:53 PM   Result Value Ref Range    POC Glucose 111 (H) 65 - 100 mg/dL    Performed by: Sarai Killian    CBC with Auto Differential    Collection Time: 10/16/22 12:30 AM   Result Value Ref Range    WBC 9.7 4.3 - 11.1 K/uL    RBC 5.58 4.23 - 5.6 M/uL    Hemoglobin 15.1 13.6 - 17.2 g/dL    Hematocrit 45.8 41.1 - 50.3 %    MCV 82.1 82 - 102 FL    MCH 27.1 26.1 - 32.9 PG    MCHC 33.0 31.4 - 35.0 g/dL    RDW 15.4 (H) 11.9 - 14.6 %    Platelets 630 069 - 986 K/uL    MPV 9.4 9.4 - 12.3 FL    nRBC 0.00 0.0 - 0.2 K/uL    Differential Type AUTOMATED      Seg Neutrophils 65 43 - 78 %    Lymphocytes 22 13 - 44 %    Monocytes 10 4.0 - 12.0 %    Eosinophils % 2 0.5 - 7.8 %    Basophils 1 0.0 - 2.0 %    Immature Granulocytes 0 0.0 - 5.0 %    Segs Absolute 6.3 1.7 - 8.2 K/UL    Absolute Lymph # 2.1 0.5 - 4.6 K/UL    Absolute Mono # 1.0 0.1 - 1.3 K/UL    Absolute Eos # 0.2 0.0 - 0.8 K/UL    Basophils Absolute 0.1 0.0 - 0.2 K/UL    Absolute Immature Granulocyte 0.0 0.0 - 0.5 K/UL   Basic Metabolic Panel    Collection Time: 10/16/22 12:30 AM   Result Value Ref Range    Sodium 136 133 - 143 mmol/L    Potassium 4.9 3.5 - 5.1 mmol/L    Chloride 108 101 - 110 mmol/L    CO2 25 21 - 32 mmol/L    Anion Gap 3 2 - 11 mmol/L    Glucose 105 (H) 65 - 100 mg/dL    BUN 20 8 - 23 MG/DL    Creatinine 1.20 0.8 - 1.5 MG/DL    Est, Glom Filt Rate >60 >60 ml/min/1.73m2    Calcium 8.7 8.3 - 10.4 MG/DL       Allergies   Allergen Reactions    Carbamazepine Anaphylaxis    Lacosamide Nausea Only    Lorazepam Other (See Comments)     Violent behavior    Nitroglycerin Other (See Comments)     hypotension     Immunization History   Administered Date(s) Administered    COVID-19, PFIZER PURPLE top, DILUTE for use, (age 15 y+), 30mcg/0.3mL 03/15/2021, 04/12/2021    Influenza Virus Vaccine 10/01/2010, 10/07/2011, 01/01/2012, 01/03/2014, 11/29/2016, 11/30/2017, 12/06/2018, 09/20/2019, 10/01/2021    Influenza, FLUZONE (age 72 y+), High Dose, 0.7mL 10/01/2021    PPD Test 12/04/2021    Pneumococcal Vaccine 01/01/2008    Rotavirus Vaccine 01/01/2008       Recent Vital Data:  Patient Vitals for the past 24 hrs:   Temp Pulse Resp BP SpO2   10/16/22 1200 98 °F (36.7 °C) 72 18 (!) 128/95 96 %   10/16/22 0800 97.7 °F (36.5 °C) 74 16 (!) 117/90 95 %   10/16/22 0759 -- 74 16 -- 95 %   10/16/22 0400 97.5 °F (36.4 °C) 70 18 116/69 95 %   10/15/22 2350 -- 70 -- 136/82 95 %   10/15/22 2000 98.1 °F (36.7 °C) 71 16 120/68 93 %   10/15/22 1925 -- 72 16 -- 96 %   10/15/22 1600 97.4 °F (36.3 °C) 71 16 (!) 134/99 95 %       Oxygen Therapy  SpO2: 96 %  Pulse Oximeter Device Mode: Intermittent  Pulse Oximeter Device Location: Left, Finger  O2 Device: None (Room air)  O2 Flow Rate (L/min): 0 L/min    Estimated body mass index is 30.99 kg/m² as calculated from the following:    Height as of this encounter: 6' 1\" (1.854 m). Weight as of this encounter: 234 lb 14.4 oz (106.5 kg). Intake/Output Summary (Last 24 hours) at 10/16/2022 1435  Last data filed at 10/16/2022 0857  Gross per 24 hour   Intake --   Output 1250 ml   Net -1250 ml         Physical Exam:    General:          Well nourished. Head:               Normocephalic, atraumatic  Eyes:               Sclerae appear normal.  Pupils equally round. No lid lag, no nystagmus  ENT:                Nares appear normal, no drainage. Moist oral mucosa; no facial droop  Neck:               No restricted ROM. Trachea midline   CV:                  Irreg rhythm, rate ~controlled; no gross murmur  Lungs:             CTA b/l; no wheezing, improved lung fields  Abdomen: Bowel sounds present. Soft, nontender, nondistended. Extremities:     RUE weaker than LUE with improved muscle strength this AM to RUE; sensation deficits RUE / RLE compared to LUE / LLE; no edema  Skin:                No rashes and normal coloration. Warm and dry. Neuro:             A&Ox3; muscle weakness / sensation deficits as noted above  Psych:             Normal mood and affect. Signed:  Kristin Higginbotham, [address]    Part of this note may have been written by using a voice dictation software. The note has been proof read but may still contain some grammatical/other typographical errors.

## 2022-10-16 NOTE — PROGRESS NOTES
Hospitalist CODE STROKE Note      INTERVAL HISTORY:   Felecia Schwab is a 79y.o. year-old male hospitalized for CVA with right-sided weakness. Patient's initial imaging was negative. Around 0030 AM, CODE S was called when patient was found unable to speak and not following commands. He also was posturing abnormally with his left arm. After a few minutes, patient began to speak and move his left arm more freely. Objective:   /69   Pulse 70   Temp 97.5 °F (36.4 °C) (Oral)   Resp 18   Ht 6' 1\" (1.854 m)   Wt 234 lb 14.4 oz (106.5 kg)   SpO2 95%   BMI 30.99 kg/m²    Temp (24hrs), Av.8 °F (36.6 °C), Min:97.4 °F (36.3 °C), Max:98.1 °F (36.7 °C)       Physical Exam:  General:    The patient is a white male, initially nonrepsonsive, but later mumbling and responding to voice. Head:   Normocephalic/atraumatic. Lungs:   Clear to auscultation bilaterally without wheezes or crackles. No respiratory distress or accessory muscle use. Heart:   Regular rate and rhythm, without murmurs, rubs, or gallops. Abdomen:   Soft, non-tender, non-distended with normoactive bowel sounds. Extremities: Without clubbing, cyanosis, or edema. Skin:     Normal color, texture, and turgor. No rashes, lesions, or jaundice. Pulses: Radial and dorsalis pedis pulses present 2+ bilaterally. Capillary refill <2s. Neurologic: Moving all four extremities well with general 4/5 extremity weakness. Mumbling, but obeying commands.   Psychiatric: Disoriented but responding to voice    Data Review:   Recent Results (from the past 24 hour(s))   COVID-19    Collection Time: 10/15/22  4:11 PM    Specimen: Nasopharyngeal Swab   Result Value Ref Range    SARS-CoV-2 Please find results under separate order     POCT Glucose    Collection Time: 10/15/22 11:53 PM   Result Value Ref Range    POC Glucose 111 (H) 65 - 100 mg/dL    Performed by: Melyssa Cramer    CBC with Auto Differential    Collection Time: 10/16/22 12:30 AM   Result Value Ref Range    WBC 9.7 4.3 - 11.1 K/uL    RBC 5.58 4.23 - 5.6 M/uL    Hemoglobin 15.1 13.6 - 17.2 g/dL    Hematocrit 45.8 41.1 - 50.3 %    MCV 82.1 82 - 102 FL    MCH 27.1 26.1 - 32.9 PG    MCHC 33.0 31.4 - 35.0 g/dL    RDW 15.4 (H) 11.9 - 14.6 %    Platelets 992 580 - 335 K/uL    MPV 9.4 9.4 - 12.3 FL    nRBC 0.00 0.0 - 0.2 K/uL    Differential Type AUTOMATED      Seg Neutrophils 65 43 - 78 %    Lymphocytes 22 13 - 44 %    Monocytes 10 4.0 - 12.0 %    Eosinophils % 2 0.5 - 7.8 %    Basophils 1 0.0 - 2.0 %    Immature Granulocytes 0 0.0 - 5.0 %    Segs Absolute 6.3 1.7 - 8.2 K/UL    Absolute Lymph # 2.1 0.5 - 4.6 K/UL    Absolute Mono # 1.0 0.1 - 1.3 K/UL    Absolute Eos # 0.2 0.0 - 0.8 K/UL    Basophils Absolute 0.1 0.0 - 0.2 K/UL    Absolute Immature Granulocyte 0.0 0.0 - 0.5 K/UL   Basic Metabolic Panel    Collection Time: 10/16/22 12:30 AM   Result Value Ref Range    Sodium 136 133 - 143 mmol/L    Potassium 4.9 3.5 - 5.1 mmol/L    Chloride 108 101 - 110 mmol/L    CO2 25 21 - 32 mmol/L    Anion Gap 3 2 - 11 mmol/L    Glucose 105 (H) 65 - 100 mg/dL    BUN 20 8 - 23 MG/DL    Creatinine 1.20 0.8 - 1.5 MG/DL    Est, Glom Filt Rate >60 >60 ml/min/1.73m2    Calcium 8.7 8.3 - 10.4 MG/DL       Imaging Ruthie Corcoran /Studies:  [unfilled]       Assessment and Plan:     - Left facial droop, left arm weakness. Concerning for new CVA episode. STAT Head CT wo contrast. Consult TELE NEURO. Follow neuro checks. Check CBC, BMP, INR stat. - Disposition: Monitor closely on remote telemetry, follow up tele-neuro recommendations.       Signed By: Lizet Etienne MD     October 16, 2022

## 2022-10-16 NOTE — PROGRESS NOTES
ACUTE OCCUPATIONAL THERAPY GOALS:   (Developed with and agreed upon by patient and/or caregiver.)  1. Patient will complete lower body bathing and dressing with MIN A and adaptive equipment as needed. 2. Patient will complete upper body bathing and dressing with SPV and adaptive equipment as needed. 3. Patient will complete toileting with CGA. 4. Patient will tolerate 30 minutes of OT treatment with 1-2 rest breaks to increase activity tolerance for ADLs. 5. Patient will complete functional transfers with CGA and adaptive equipment as needed. 6. Patient will complete functional activity with CGA and adaptive equipment as needed. Timeframe: 7 visits         OCCUPATIONAL THERAPY: Daily Note AM  OT Visit Days: 3   Time  OT Charge Capture  Rehab Caseload Tracker  OT Orders    Rochelle Rice is a 79 y.o. male   PRIMARY DIAGNOSIS: Cerebrovascular accident (CVA) ([facility]  Right sided weakness [R53.1]  Cerebrovascular accident (CVA), unspecified mechanism ([address] [I63.9]  Acute CVA (cerebrovascular accident) ([facility] [I63.9]       [facility] / Plan: [employer] / Product Type: *No Product type* /     ASSESSMENT:     REHAB RECOMMENDATIONS: CURRENT LEVEL OF FUNCTION:  (Most Recently Demonstrated)   Recommendation to date pending progress:  Setting:  Inpatient Rehab Facility    Equipment:    To Be Determined Bathing:  Not Tested  Dressing:  Not Tested  Feeding/Grooming:  Not Tested   Toileting:  Not Tested  Functional Mobility:  Minimal Assist     ASSESSMENT:  Mr. Carol Woo is doing fair today. Pt required min A for bed mobility. Pt demonstrates fair (+) sitting balance at EOB. Pt was able to stand and transfer over to chair with min A. Pt instructed in AROM/AAROM exercises. Tolerated exercises well. Pt very motivated and would do great in [facility] setting. Pt is to be discharged to [facility] today.        SUBJECTIVE:     Mr. Carol Woo states, \"I was just tired this AM\" Social/Functional Lives With: Family  Type of Home: Apartment  Home Layout: One level (pt lives on 4th floor with elevator axcess)  Bathroom Toilet: Handicap height  Home Equipment: Gurwinder Roll, Wheelchair-electric, Hospital bed  Receives Help From: Personal care attendant  ADL Assistance: Needs assistance  Homemaking Assistance: Needs assistance  Ambulation Assistance: Needs assistance  Transfer Assistance: Needs assistance  Active : No  Patient's  Info: public transportation when needed.   Occupation: Retired, On disability    OBJECTIVE:     Carmen Marks / Dianne Murphy / Ana M Johnson: LENARD    RESTRICTIONS/PRECAUTIONS:  Restrictions/Precautions  Restrictions/Precautions: Fall Risk        PAIN: VITALS / O2:   Pre Treatment:            Post Treatment: 0 Vitals          Oxygen        MOBILITY: I Mod I S SBA CGA Min Mod Max Total  NT x2 Comments:   Bed Mobility    Rolling [] [] [] [] [] [] [] [] [] [x] []    Supine to Sit [] [] [] [] [] [x] [] [] [] [] []    Scooting [] [] [] [] [] [x] [] [] [] [] []    Sit to Supine [] [] [] [] [] [] [] [] [] [x] []    Transfers    Sit to Stand [] [] [] [] [x] [x] [] [] [] [] []    Bed to Chair [] [] [] [] [x] [x] [] [] [] [] []    Stand to Sit [] [] [] [] [] [x] [] [] [] [] []    Tub/Shower [] [] [] [] [] [] [] [] [] [x] []     Toilet [] [] [] [] [] [] [] [] [] [x] []      [] [] [] [] [] [] [] [] [] [] []    I=Independent, Mod I=Modified Independent, S=Supervision/Setup, SBA=Standby Assistance, CGA=Contact Guard Assistance, Min=Minimal Assistance, Mod=Moderate Assistance, Max=Maximal Assistance, Total=Total Assistance, NT=Not Tested    ACTIVITIES OF DAILY LIVING: I Mod I S SBA CGA Min Mod Max Total NT Comments   BASIC ADLs:              Upper Body   Bathing [] [] [] [] [] [] [] [] [] [x]    Lower Body Bathing [] [] [] [] [] [] [] [] [] [x]    Toileting [] [] [] [] [] [] [] [] [] [x]    Upper Body Dressing [] [] [] [] [] [] [] [] [] [x]    Lower Body Dressing [] [] [] [] [] [] [] [] [] [x]    Feeding [] [] [] [] [] [] [] [] [] [x]    Grooming [] [] [] [] [] [] [] [] [] [x]    Personal Device Care [] [] [] [] [] [] [] [] [] [x]    Functional Mobility [] [] [] [] [] [x] [] [] [] []    I=Independent, Mod I=Modified Independent, S=Supervision/Setup, SBA=Standby Assistance, CGA=Contact Guard Assistance, Min=Minimal Assistance, Mod=Moderate Assistance, Max=Maximal Assistance, Total=Total Assistance, NT=Not Tested    BALANCE: Good Fair+ Fair Fair- Poor NT Comments   Sitting Static [] [x] [] [] [] []    Sitting Dynamic [] [x] [] [] [] []              Standing Static [] [] [x] [] [] []    Standing Dynamic [] [] [x] [] [] []        PLAN:     FREQUENCY/DURATION   OT Plan of Care: 3 times/week for duration of hospital stay or until stated goals are met, whichever comes first.    TREATMENT:     TREATMENT:   Therapeutic Activity (25 Minutes): Therapeutic activity included Supine to Sit, Sit to Supine, Scooting, Transfer Training, Sitting balance , Standing balance, and UE Exercises to improve functional Activity tolerance, Balance, Coordination, Mobility, Strength, and ROM.     TREATMENT GRID:  N/A    AFTER TREATMENT PRECAUTIONS: Bed/Chair Locked, Call light within reach, Chair, Needs within reach, and RN notified    INTERDISCIPLINARY COLLABORATION:  RN/ PCT and OT/ TREVIZO    EDUCATION:       TOTAL TREATMENT DURATION AND TIME:  Time In: 1330  Time Out: [facility]  Minutes: 25    SHERIN Baeza

## 2022-10-17 DIAGNOSIS — I48.11 LONGSTANDING PERSISTENT ATRIAL FIBRILLATION (HCC): Primary | ICD-10-CM

## 2022-10-17 DIAGNOSIS — Z95.0 PACEMAKER: ICD-10-CM

## 2022-10-17 DIAGNOSIS — I50.43 ACUTE ON CHRONIC HEART FAILURE WITH REDUCED EJECTION FRACTION AND DIASTOLIC DYSFUNCTION (HCC): ICD-10-CM

## 2022-10-17 DIAGNOSIS — I48.11 LONGSTANDING PERSISTENT ATRIAL FIBRILLATION (HCC): ICD-10-CM

## 2022-11-02 RX ORDER — DABIGATRAN ETEXILATE 150 MG/1
150 CAPSULE ORAL EVERY 12 HOURS
Qty: 60 CAPSULE | Refills: 0 | Status: SHIPPED | OUTPATIENT
Start: 2022-11-02 | End: 2022-11-16 | Stop reason: SDUPTHER

## 2022-11-11 ENCOUNTER — TELEPHONE (OUTPATIENT)
Dept: INTERNAL MEDICINE CLINIC | Facility: CLINIC | Age: 67
End: 2022-11-11

## 2022-11-11 NOTE — TELEPHONE ENCOUNTER
----- Message from April Joanie sent at 11/11/2022  4:30 PM EST -----  Subject: Message to Provider    QUESTIONS  Information for Provider? Patient was returning the office call. The   office was already closed. Patient was called to either make his   appointment on 11/16 virtual or if he wanted to reschedule this. Patient   prefers to come in office.  please call patient back, thank you   ---------------------------------------------------------------------------  --------------  [id number] [employer]  [phone]; OK to leave message on voicemail  ---------------------------------------------------------------------------  --------------  SCRIPT ANSWERS  undefined

## 2022-11-16 ENCOUNTER — TELEMEDICINE (OUTPATIENT)
Dept: INTERNAL MEDICINE CLINIC | Facility: CLINIC | Age: 67
End: 2022-11-16
Payer: MEDICARE

## 2022-11-16 DIAGNOSIS — Z09 HOSPITAL DISCHARGE FOLLOW-UP: Primary | ICD-10-CM

## 2022-11-16 DIAGNOSIS — I63.9 CEREBROVASCULAR ACCIDENT (CVA), UNSPECIFIED MECHANISM (HCC): ICD-10-CM

## 2022-11-16 PROCEDURE — 3017F COLORECTAL CA SCREEN DOC REV: CPT | Performed by: INTERNAL MEDICINE

## 2022-11-16 PROCEDURE — G8428 CUR MEDS NOT DOCUMENT: HCPCS | Performed by: INTERNAL MEDICINE

## 2022-11-16 PROCEDURE — 1123F ACP DISCUSS/DSCN MKR DOCD: CPT | Performed by: INTERNAL MEDICINE

## 2022-11-16 PROCEDURE — 99215 OFFICE O/P EST HI 40 MIN: CPT | Performed by: INTERNAL MEDICINE

## 2022-11-16 RX ORDER — DABIGATRAN ETEXILATE 150 MG/1
150 CAPSULE ORAL EVERY 12 HOURS
Qty: 180 CAPSULE | Refills: 1 | Status: SHIPPED | OUTPATIENT
Start: 2022-11-16 | End: 2022-11-23 | Stop reason: ALTCHOICE

## 2022-11-16 RX ORDER — ATORVASTATIN CALCIUM 80 MG/1
80 TABLET, FILM COATED ORAL NIGHTLY
Qty: 90 TABLET | Refills: 1 | Status: SHIPPED | OUTPATIENT
Start: 2022-11-16 | End: 2022-12-16

## 2022-11-16 RX ORDER — ATORVASTATIN CALCIUM 80 MG/1
80 TABLET, FILM COATED ORAL DAILY
COMMUNITY

## 2022-11-16 RX ORDER — CYCLOBENZAPRINE HCL 10 MG
10 TABLET ORAL NIGHTLY PRN
Qty: 30 TABLET | Refills: 0 | Status: SHIPPED | OUTPATIENT
Start: 2022-11-16 | End: 2022-11-26

## 2022-11-16 NOTE — PROGRESS NOTES
FOLLOW UP VISIT    Subjective:    Ashley Chen (: 1955) is a 79 y.o., male,   Chief Complaint   Patient presents with    Follow-Up from Hospital       HPI:  79year old in for hospital follow up. He had 3 strokes initially went to [facility] and then [facility] when he had a stroke in rehab. He came home from rehab last week. He  is having home PT . He initally presented to ED with left side and right side weakness went to ER admitted and found to have a stroke. He also has a hx of Afib on pradaxa, high cholesterol on statin, CHF , CAD , s/p TAVR and pacemaker  He also has COPD and uses oxygen at night. He had a CTA which was normal CT head in ER showed stroke. He is now at home has PT and has not seen Neuro as of yet.                          The following portions of the patient's history were reviewed and updated as appropriate:      Past Medical History:   Diagnosis Date    Aortic stenosis     Aortic valve replacement 2018    CAD (coronary artery disease)     PCI in ,  had MI and then stents    Cancer ([address]     SCC removed face     CHF (congestive heart failure) ([address]     Chronic renal disease, stage III ([address]     sees neph and does not have actual diagnosis at this time    COPD (chronic obstructive pulmonary disease) ([address]     fibrosis in lungs    Dyslipidemia     Heart failure ([address]     CHF per patient    HTN (hypertension)     med controlled    Hyperlipidemia     Ischemic cardiomyopathy     Pacemaker     only pacemaker    Paroxysmal atrial fibrillation ([address]     pradaxa for this    Pulmonary fibrosis ([address]     Seizure disorder ([address]     lyme disease - small lesion frontal part of brain - no maintenance meds - last seizure 2 months ago, due to fall - before that it was nine years    Systolic heart failure [facility])        Past Surgical History:   Procedure Laterality Date    ABLATION OF DYSRHYTHMIC FOCUS      AORTIC VALVE REPLACEMENT  2018    BACK SURGERY      discectomy    CARDIAC CATHETERIZATION      PCI 2014, 2015    CARDIAC PROCEDURE N/A 7/11/2022    LEFT HEART CATH / CORONARY ANGIOGRAPHY performed by Minnie Garcia MD at [address] [place] [address] CATH LAB    CERVICAL FUSION      C3-4 fusion    EGD  6/17/2022         HIP ARTHROPLASTY Left     SPINAL CORD STIMULATOR SURGERY      lower back     UPPER GASTROINTESTINAL ENDOSCOPY N/A 6/17/2022    EGD ESOPHAGOGASTRODUODENOSCOPY/ PT HAS PACEMAKER performed by Dasia Franks MD at [facility] ENDOSCOPY       Family History   Problem Relation Age of Onset    Heart Disease Sister     Heart Disease Mother        Social History     Socioeconomic History    Marital status: Legally      Spouse name: Not on file    Number of children: Not on file    Years of education: Not on file    Highest education level: Not on file   Occupational History    Not on file   Tobacco Use    Smoking status: Every Day     Packs/day: 1.50     Years: 56.00     Pack years: 84.00     Types: Cigarettes    Smokeless tobacco: Never    Tobacco comments:     Quit smoking: cutting back to 4 Cigarettes a day   Vaping Use    Vaping Use: Never used   Substance and Sexual Activity    Alcohol use: Not Currently    Drug use: Yes     Comment: cannabis used: two weeks ago as of 6/15/2022 edibles     Sexual activity: Not on file   Other Topics Concern    Not on file   Social History Narrative    Not on file     Social Determinants of Health     Financial Resource Strain: Not on file   Food Insecurity: Not on file   Transportation Needs: Not on file   Physical Activity: Not on file   Stress: Not on file   Social Connections: Not on file   Intimate Partner Violence: Not on file   Housing Stability: Not on file       Current Outpatient Medications   Medication Sig Dispense Refill    atorvastatin (LIPITOR) 80 MG tablet Take 80 mg by mouth daily      dabigatran (PRADAXA) 150 MG capsule Take 1 capsule by mouth in the morning and 1 capsule in the evening.  60 capsule 0    tiotropium (SPIRIVA RESPIMAT) 2.5 MCG/ACT AERS inhaler Inhale 2 puffs into the lungs daily      ferrous sulfate (IRON 325) 325 (65 Fe) MG tablet Take 325 mg by mouth 2 times daily      ondansetron (ZOFRAN-ODT) 8 MG TBDP disintegrating tablet Take 1 tablet by mouth every 8 hours as needed for Nausea 30 tablet 0    Cholecalciferol (VITAMIN D3) 50 MCG (2000 UT) CAPS TAKE 1 CAPSULE BY MOUTH EVERY DAY 90 capsule 1    omeprazole (PRILOSEC) 20 MG delayed release capsule Take 1 capsule by mouth in the morning and at bedtime 30 capsule 3    albuterol sulfate  (90 Base) MCG/ACT inhaler Inhale 2 puffs into the lungs every 4 hours as needed      aspirin 81 MG chewable tablet Take 81 mg by mouth daily      cyclobenzaprine (FLEXERIL) 5 MG tablet Take 5 mg by mouth 3 times daily as needed      fluticasone-salmeterol (ADVAIR) 250-50 MCG/ACT AEPB diskus inhaler Inhale 1 puff into the lungs every 12 hours      ipratropium-albuterol (DUONEB) 0.5-2.5 (3) MG/3ML SOLN nebulizer solution Inhale 3 mLs into the lungs every 4 hours as needed       metoprolol succinate (TOPROL XL) 100 MG extended release tablet Take 100 mg by mouth every 12 hours      atorvastatin (LIPITOR) 80 MG tablet Take 1 tablet by mouth nightly 30 tablet 0     No current facility-administered medications for this visit. Allergies as of 11/16/2022 - Fully Reviewed 11/16/2022   Allergen Reaction Noted    Carbamazepine Anaphylaxis 11/07/2021    Lacosamide Nausea Only 09/24/2013    Lorazepam Other (See Comments) 11/15/2021    Nitroglycerin Other (See Comments) 12/19/2021       Review of Systems    Objective: There were no vitals taken for this visit. Physical Exam    No results found for this visit on 11/16/22. Assessent & Plan       Diagnosis Orders   1. Hospital discharge follow-up  [address]is[address] MD, Neurology, [facility]    cyclobenzaprine (FLEXERIL) 10 MG tablet    atorvastatin (LIPITOR) 80 MG tablet    dabigatran (PRADAXA) 150 MG capsule      2.  Cerebrovascular accident (CVA), unspecified mechanism ([facility]  Nallely Mack MD, Neurology, [facility]         79year old in for hospital follow up for stroke. He was at [facility] and at [facility] after stroke in rehab  Reviewed all notes and scans  Does have follow up with Cardiology   Is going to see Neuro   Is getting home PT   Patient feels improved  Patient has stopped smoking. Close follow up  Refill meds    Madhuri Shutters was evaluated through a synchronous (real-time) audio-video encounter, and/or her healthcare decision maker, is aware that it is a billable service, which includes applicable co-pays, with coverage as determined by her insurance carrier. She provided verbal consent to proceed and patient identification was verified. This visit was conducted pursuant to the emergency declaration under the [facility], [address] [place] [address] [facility] authority and the [employer] General Act. A caregiver was present when appropriate. Ability to conduct physical exam was limited. The patient was located at home in a state where the provider was licensed to provide care. The patient and/or patient representative voiced understanding and agreement with the current diagnoses, recommendations, and possible side effects. No follow-up provider specified.       Aris Peñaloza MD

## 2022-11-22 ENCOUNTER — TELEPHONE (OUTPATIENT)
Dept: INTERNAL MEDICINE CLINIC | Facility: CLINIC | Age: 67
End: 2022-11-22

## 2022-11-22 NOTE — TELEPHONE ENCOUNTER
Patient's pharmacy called and spoke with Dayron Lowery regarding a mistake that was made for his Pradaxa refill.  Please call pharmacy

## 2022-11-23 ENCOUNTER — TELEPHONE (OUTPATIENT)
Dept: INTERNAL MEDICINE CLINIC | Facility: CLINIC | Age: 67
End: 2022-11-23

## 2022-11-30 ENCOUNTER — TELEMEDICINE (OUTPATIENT)
Dept: INTERNAL MEDICINE CLINIC | Facility: CLINIC | Age: 67
End: 2022-11-30
Payer: MEDICARE

## 2022-11-30 DIAGNOSIS — I10 HYPERTENSION, UNSPECIFIED TYPE: ICD-10-CM

## 2022-11-30 DIAGNOSIS — I63.9 CEREBROVASCULAR ACCIDENT (CVA), UNSPECIFIED MECHANISM (HCC): Primary | ICD-10-CM

## 2022-11-30 DIAGNOSIS — I48.11 LONGSTANDING PERSISTENT ATRIAL FIBRILLATION (HCC): ICD-10-CM

## 2022-11-30 DIAGNOSIS — J20.9 ACUTE BRONCHITIS, UNSPECIFIED ORGANISM: ICD-10-CM

## 2022-11-30 DIAGNOSIS — I50.22 HEART FAILURE WITH MID-RANGE EJECTION FRACTION (HCC): ICD-10-CM

## 2022-11-30 PROCEDURE — G8484 FLU IMMUNIZE NO ADMIN: HCPCS | Performed by: INTERNAL MEDICINE

## 2022-11-30 PROCEDURE — 4004F PT TOBACCO SCREEN RCVD TLK: CPT | Performed by: INTERNAL MEDICINE

## 2022-11-30 PROCEDURE — 3017F COLORECTAL CA SCREEN DOC REV: CPT | Performed by: INTERNAL MEDICINE

## 2022-11-30 PROCEDURE — 1123F ACP DISCUSS/DSCN MKR DOCD: CPT | Performed by: INTERNAL MEDICINE

## 2022-11-30 PROCEDURE — G8427 DOCREV CUR MEDS BY ELIG CLIN: HCPCS | Performed by: INTERNAL MEDICINE

## 2022-11-30 PROCEDURE — G8417 CALC BMI ABV UP PARAM F/U: HCPCS | Performed by: INTERNAL MEDICINE

## 2022-11-30 PROCEDURE — 99214 OFFICE O/P EST MOD 30 MIN: CPT | Performed by: INTERNAL MEDICINE

## 2022-11-30 RX ORDER — AZITHROMYCIN 250 MG/1
250 TABLET, FILM COATED ORAL SEE ADMIN INSTRUCTIONS
Qty: 6 TABLET | Refills: 0 | Status: SHIPPED | OUTPATIENT
Start: 2022-11-30 | End: 2022-12-05

## 2022-11-30 RX ORDER — PREDNISONE 20 MG/1
TABLET ORAL
Qty: 7 TABLET | Refills: 0 | Status: SHIPPED | OUTPATIENT
Start: 2022-11-30

## 2022-11-30 RX ORDER — BENZONATATE 200 MG/1
200 CAPSULE ORAL 3 TIMES DAILY PRN
Qty: 30 CAPSULE | Refills: 0 | Status: SHIPPED | OUTPATIENT
Start: 2022-11-30 | End: 2022-12-07

## 2022-11-30 ASSESSMENT — ENCOUNTER SYMPTOMS: COUGH: 1

## 2022-11-30 NOTE — PROGRESS NOTES
FOLLOW UP VISIT    Subjective:    Ashley Chen (: 1955) is a 79 y.o., male,   Chief Complaint   Patient presents with    Cough       HPI:  Very nice patient in for follow up. He was In the hospital for stroke. He has not seen the Neurologist as of yet. He states he is doing well with PT it is going slow and swallow has slight improved. He is doing home PT and states it is going well. He does have a cough that started with wheezing he is using his inhaler more    Cough  This is a new problem. The current episode started in the past 7 days. The problem has been gradually worsening. The cough is Productive of sputum. His past medical history is significant for asthma.        The following portions of the patient's history were reviewed and updated as appropriate:      Past Medical History:   Diagnosis Date    Aortic stenosis     Aortic valve replacement 2018    CAD (coronary artery disease)     PCI in ,  had MI and then stents    Cancer ([address]     SCC removed face     CHF (congestive heart failure) ([address]     Chronic renal disease, stage III ([address]     sees neph and does not have actual diagnosis at this time    COPD (chronic obstructive pulmonary disease) ([address]     fibrosis in lungs    Dyslipidemia     Heart failure ([address]     CHF per patient    HTN (hypertension)     med controlled    Hyperlipidemia     Ischemic cardiomyopathy     Pacemaker     only pacemaker    Paroxysmal atrial fibrillation ([address]     pradaxa for this    Pulmonary fibrosis ([address]     Seizure disorder ([address]     lyme disease - small lesion frontal part of brain - no maintenance meds - last seizure 2 months ago, due to fall - before that it was nine years    Systolic heart failure [facility])        Past Surgical History:   Procedure Laterality Date    ABLATION OF DYSRHYTHMIC FOCUS      AORTIC VALVE REPLACEMENT  2018    BACK SURGERY      discectomy    CARDIAC CATHETERIZATION      PCI ,     CARDIAC PROCEDURE N/A 2022 LEFT HEART CATH / CORONARY ANGIOGRAPHY performed by Madelin Perea MD at [facility] CATH LAB    CERVICAL FUSION      C3-4 fusion    EGD  6/17/2022         HIP ARTHROPLASTY Left     SPINAL CORD STIMULATOR SURGERY      lower back     UPPER GASTROINTESTINAL ENDOSCOPY N/A 6/17/2022    EGD ESOPHAGOGASTRODUODENOSCOPY/ PT HAS PACEMAKER performed by Sachi Alvarez MD at [facility] ENDOSCOPY       Family History   Problem Relation Age of Onset    Heart Disease Sister     Heart Disease Mother        Social History     Socioeconomic History    Marital status: Legally      Spouse name: Not on file    Number of children: Not on file    Years of education: Not on file    Highest education level: Not on file   Occupational History    Not on file   Tobacco Use    Smoking status: Every Day     Packs/day: 1.50     Years: 56.00     Pack years: 84.00     Types: Cigarettes    Smokeless tobacco: Never    Tobacco comments:     Quit smoking: cutting back to 4 Cigarettes a day   Vaping Use    Vaping Use: Never used   Substance and Sexual Activity    Alcohol use: Not Currently    Drug use: Yes     Comment: cannabis used: two weeks ago as of 6/15/2022 edibles     Sexual activity: Not on file   Other Topics Concern    Not on file   Social History Narrative    Not on file     Social Determinants of Health     Financial Resource Strain: Not on file   Food Insecurity: Not on file   Transportation Needs: Not on file   Physical Activity: Not on file   Stress: Not on file   Social Connections: Not on file   Intimate Partner Violence: Not on file   Housing Stability: Not on file       Current Outpatient Medications   Medication Sig Dispense Refill    azithromycin (ZITHROMAX) 250 MG tablet Take 1 tablet by mouth See Admin Instructions for 5 days 500mg on day 1 followed by 250mg on days 2 - 5 6 tablet 0    predniSONE (DELTASONE) 20 MG tablet Take 2 day 1-2 then 1 day 3-5 then stop 7 tablet 0    benzonatate (TESSALON) 200 MG capsule Take 1 capsule by mouth 3 times daily as needed for Cough 30 capsule 0    apixaban (ELIQUIS) 5 MG TABS tablet Take 1 tablet by mouth 2 times daily 60 tablet 0    atorvastatin (LIPITOR) 80 MG tablet Take 80 mg by mouth daily      atorvastatin (LIPITOR) 80 MG tablet Take 1 tablet by mouth nightly 90 tablet 1    tiotropium (SPIRIVA RESPIMAT) 2.5 MCG/ACT AERS inhaler Inhale 2 puffs into the lungs daily      ferrous sulfate (IRON 325) 325 (65 Fe) MG tablet Take 325 mg by mouth 2 times daily      ondansetron (ZOFRAN-ODT) 8 MG TBDP disintegrating tablet Take 1 tablet by mouth every 8 hours as needed for Nausea 30 tablet 0    Cholecalciferol (VITAMIN D3) 50 MCG (2000 UT) CAPS TAKE 1 CAPSULE BY MOUTH EVERY DAY 90 capsule 1    omeprazole (PRILOSEC) 20 MG delayed release capsule Take 1 capsule by mouth in the morning and at bedtime 30 capsule 3    albuterol sulfate  (90 Base) MCG/ACT inhaler Inhale 2 puffs into the lungs every 4 hours as needed      aspirin 81 MG chewable tablet Take 81 mg by mouth daily      fluticasone-salmeterol (ADVAIR) 250-50 MCG/ACT AEPB diskus inhaler Inhale 1 puff into the lungs every 12 hours      ipratropium-albuterol (DUONEB) 0.5-2.5 (3) MG/3ML SOLN nebulizer solution Inhale 3 mLs into the lungs every 4 hours as needed       metoprolol succinate (TOPROL XL) 100 MG extended release tablet Take 100 mg by mouth every 12 hours       No current facility-administered medications for this visit. Allergies as of 11/30/2022 - Fully Reviewed 11/30/2022   Allergen Reaction Noted    Carbamazepine Anaphylaxis 11/07/2021    Lacosamide Nausea Only 09/24/2013    Lorazepam Other (See Comments) 11/15/2021    Nitroglycerin Other (See Comments) 12/19/2021       Review of Systems   Respiratory:  Positive for cough. Objective: There were no vitals taken for this visit. Physical Exam    No results found for this visit on 11/30/22. Assessent & Plan    1.  Cerebrovascular accident (CVA), unspecified mechanism ([facility]  2. Heart failure with mid-range ejection fraction ([facility]  3. Longstanding persistent atrial fibrillation ([facility]  4. Hypertension, unspecified type  5. Acute bronchitis, unspecified organism  -     azithromycin (ZITHROMAX) 250 MG tablet; Take 1 tablet by mouth See Admin Instructions for 5 days 500mg on day 1 followed by 250mg on days 2 - 5, Disp-6 tablet, R-0Normal  -     predniSONE (DELTASONE) 20 MG tablet; Take 2 day 1-2 then 1 day 3-5 then stop, Disp-7 tablet, R-0Normal  -     benzonatate (TESSALON) 200 MG capsule; Take 1 capsule by mouth 3 times daily as needed for Cough, Disp-30 capsule, R-0Normal     Get CXR or go to ED if not improved  Go to Neuro had put in stat referral   Check labs in 2 months   Close follow up  To ED if worse    Ashley Chen was evaluated through a synchronous (real-time) audio-video encounter, and/or her healthcare decision maker, is aware that it is a billable service, which includes applicable co-pays, with coverage as determined by her insurance carrier. She provided verbal consent to proceed and patient identification was verified. This visit was conducted pursuant to the emergency declaration under the [address] [place] [address], [address] [place] [address] authority and the [employer] General Act. A caregiver was present when appropriate. Ability to conduct physical exam was limited. The patient was located at home in a state where the provider was licensed to provide care. The patient and/or patient representative voiced understanding and agreement with the current diagnoses, recommendations, and possible side effects. No follow-up provider specified.       Kathi Mckeon MD

## 2022-12-08 ENCOUNTER — TELEPHONE (OUTPATIENT)
Dept: INTERNAL MEDICINE CLINIC | Facility: CLINIC | Age: 67
End: 2022-12-08

## 2022-12-08 NOTE — TELEPHONE ENCOUNTER
Home Health called to report that patient fell Wednesday resulting in bruising to lower back; patient reports pain of 7 out of 10; bp today 162/94

## 2022-12-19 ENCOUNTER — APPOINTMENT (OUTPATIENT)
Dept: GENERAL RADIOLOGY | Age: 67
DRG: 101 | End: 2022-12-19
Payer: MEDICARE

## 2022-12-19 ENCOUNTER — APPOINTMENT (OUTPATIENT)
Dept: CT IMAGING | Age: 67
DRG: 101 | End: 2022-12-19
Payer: MEDICARE

## 2022-12-19 ENCOUNTER — HOSPITAL ENCOUNTER (INPATIENT)
Age: 67
LOS: 4 days | Discharge: HOME HEALTH | DRG: 101 | End: 2022-12-23
Attending: EMERGENCY MEDICINE | Admitting: FAMILY MEDICINE
Payer: MEDICARE

## 2022-12-19 DIAGNOSIS — R40.4 TRANSIENT ALTERATION OF AWARENESS: Primary | ICD-10-CM

## 2022-12-19 PROBLEM — J84.10 PULMONARY FIBROSIS (HCC): Status: ACTIVE | Noted: 2021-12-03

## 2022-12-19 PROBLEM — R41.89 UNRESPONSIVENESS: Status: ACTIVE | Noted: 2022-12-19

## 2022-12-19 LAB
ALBUMIN SERPL-MCNC: 3 G/DL (ref 3.2–4.6)
ALBUMIN/GLOB SERPL: 0.9 {RATIO} (ref 0.4–1.6)
ALP SERPL-CCNC: 102 U/L (ref 50–136)
ALT SERPL-CCNC: 30 U/L (ref 12–65)
ANION GAP SERPL CALC-SCNC: 2 MMOL/L (ref 2–11)
APAP SERPL-MCNC: <2 UG/ML (ref 10–30)
ARTERIAL PATENCY WRIST A: POSITIVE
AST SERPL-CCNC: 21 U/L (ref 15–37)
BASE EXCESS BLD CALC-SCNC: 1.4 MMOL/L
BASOPHILS # BLD: 0.1 K/UL (ref 0–0.2)
BASOPHILS NFR BLD: 1 % (ref 0–2)
BDY SITE: ABNORMAL
BILIRUB SERPL-MCNC: 0.5 MG/DL (ref 0.2–1.1)
BUN SERPL-MCNC: 22 MG/DL (ref 8–23)
CA-I BLD-MCNC: 1.13 MMOL/L (ref 1.12–1.32)
CALCIUM SERPL-MCNC: 8.5 MG/DL (ref 8.3–10.4)
CHLORIDE SERPL-SCNC: 111 MMOL/L (ref 101–110)
CO2 BLD-SCNC: 25 MMOL/L (ref 13–23)
CO2 SERPL-SCNC: 29 MMOL/L (ref 21–32)
CREAT SERPL-MCNC: 1.1 MG/DL (ref 0.8–1.5)
DIFFERENTIAL METHOD BLD: ABNORMAL
EOSINOPHIL # BLD: 0.2 K/UL (ref 0–0.8)
EOSINOPHIL NFR BLD: 3 % (ref 0.5–7.8)
ERYTHROCYTE [DISTWIDTH] IN BLOOD BY AUTOMATED COUNT: 15.6 % (ref 11.9–14.6)
GAS FLOW.O2 O2 DELIVERY SYS: ABNORMAL L/MIN
GLOBULIN SER CALC-MCNC: 3.5 G/DL (ref 2.8–4.5)
GLUCOSE BLD STRIP.AUTO-MCNC: 136 MG/DL (ref 65–100)
GLUCOSE BLD STRIP.AUTO-MCNC: 93 MG/DL (ref 65–100)
GLUCOSE SERPL-MCNC: 104 MG/DL (ref 65–100)
HCO3 BLD-SCNC: 25.6 MMOL/L (ref 22–26)
HCT VFR BLD AUTO: 41.9 % (ref 41.1–50.3)
HGB BLD-MCNC: 13.7 G/DL (ref 13.6–17.2)
IMM GRANULOCYTES # BLD AUTO: 0 K/UL (ref 0–0.5)
IMM GRANULOCYTES NFR BLD AUTO: 0 % (ref 0–5)
INR PPP: 1
LACTATE SERPL-SCNC: 0.9 MMOL/L (ref 0.4–2)
LACTATE SERPL-SCNC: 1.2 MMOL/L (ref 0.4–2)
LYMPHOCYTES # BLD: 1.7 K/UL (ref 0.5–4.6)
LYMPHOCYTES NFR BLD: 21 % (ref 13–44)
MAGNESIUM SERPL-MCNC: 2.1 MG/DL (ref 1.8–2.4)
MCH RBC QN AUTO: 28.7 PG (ref 26.1–32.9)
MCHC RBC AUTO-ENTMCNC: 32.7 G/DL (ref 31.4–35)
MCV RBC AUTO: 87.7 FL (ref 82–102)
MONOCYTES # BLD: 0.8 K/UL (ref 0.1–1.3)
MONOCYTES NFR BLD: 10 % (ref 4–12)
NEUTS SEG # BLD: 5.4 K/UL (ref 1.7–8.2)
NEUTS SEG NFR BLD: 65 % (ref 43–78)
NRBC # BLD: 0 K/UL (ref 0–0.2)
PCO2 BLD: 38.1 MMHG (ref 35–45)
PH BLD: 7.44 [PH] (ref 7.35–7.45)
PLATELET # BLD AUTO: 180 K/UL (ref 150–450)
PMV BLD AUTO: 9.9 FL (ref 9.4–12.3)
PO2 BLD: 75 MMHG (ref 75–100)
POC FIO2: 4
POTASSIUM BLD-SCNC: 3.7 MMOL/L (ref 3.5–5.1)
POTASSIUM SERPL-SCNC: 3.7 MMOL/L (ref 3.5–5.1)
PROT SERPL-MCNC: 6.5 G/DL (ref 6.3–8.2)
PROTHROMBIN TIME: 13.2 SEC (ref 12.6–14.3)
RBC # BLD AUTO: 4.78 M/UL (ref 4.23–5.6)
RESPIRATORY RATE, POC: 16 (ref 5–40)
RESPIRATORY RATE: 16
SALICYLATES SERPL-MCNC: 2.4 MG/DL (ref 2.8–20)
SAO2 % BLD: 95 %
SERVICE CMNT-IMP: ABNORMAL
SERVICE CMNT-IMP: NORMAL
SODIUM BLD-SCNC: 142 MMOL/L (ref 136–145)
SODIUM SERPL-SCNC: 142 MMOL/L (ref 133–143)
SPECIMEN SITE: ABNORMAL
TROPONIN I SERPL HS-MCNC: 13.5 PG/ML (ref 0–14)
TSH, 3RD GENERATION: 1.64 UIU/ML (ref 0.36–3.74)
WBC # BLD AUTO: 8.3 K/UL (ref 4.3–11.1)

## 2022-12-19 PROCEDURE — 84484 ASSAY OF TROPONIN QUANT: CPT

## 2022-12-19 PROCEDURE — 83605 ASSAY OF LACTIC ACID: CPT

## 2022-12-19 PROCEDURE — 80179 DRUG ASSAY SALICYLATE: CPT

## 2022-12-19 PROCEDURE — 70450 CT HEAD/BRAIN W/O DYE: CPT

## 2022-12-19 PROCEDURE — 85610 PROTHROMBIN TIME: CPT

## 2022-12-19 PROCEDURE — 82550 ASSAY OF CK (CPK): CPT

## 2022-12-19 PROCEDURE — 99285 EMERGENCY DEPT VISIT HI MDM: CPT

## 2022-12-19 PROCEDURE — 36600 WITHDRAWAL OF ARTERIAL BLOOD: CPT

## 2022-12-19 PROCEDURE — 2580000003 HC RX 258: Performed by: EMERGENCY MEDICINE

## 2022-12-19 PROCEDURE — 94640 AIRWAY INHALATION TREATMENT: CPT

## 2022-12-19 PROCEDURE — 6370000000 HC RX 637 (ALT 250 FOR IP): Performed by: FAMILY MEDICINE

## 2022-12-19 PROCEDURE — 85025 COMPLETE CBC W/AUTO DIFF WBC: CPT

## 2022-12-19 PROCEDURE — 82330 ASSAY OF CALCIUM: CPT

## 2022-12-19 PROCEDURE — 96372 THER/PROPH/DIAG INJ SC/IM: CPT

## 2022-12-19 PROCEDURE — 6360000002 HC RX W HCPCS: Performed by: EMERGENCY MEDICINE

## 2022-12-19 PROCEDURE — 6360000002 HC RX W HCPCS

## 2022-12-19 PROCEDURE — 93005 ELECTROCARDIOGRAM TRACING: CPT | Performed by: EMERGENCY MEDICINE

## 2022-12-19 PROCEDURE — 80053 COMPREHEN METABOLIC PANEL: CPT

## 2022-12-19 PROCEDURE — 84443 ASSAY THYROID STIM HORMONE: CPT

## 2022-12-19 PROCEDURE — 80143 DRUG ASSAY ACETAMINOPHEN: CPT

## 2022-12-19 PROCEDURE — 96375 TX/PRO/DX INJ NEW DRUG ADDON: CPT

## 2022-12-19 PROCEDURE — 96374 THER/PROPH/DIAG INJ IV PUSH: CPT

## 2022-12-19 PROCEDURE — 1100000000 HC RM PRIVATE

## 2022-12-19 PROCEDURE — 83735 ASSAY OF MAGNESIUM: CPT

## 2022-12-19 PROCEDURE — 82803 BLOOD GASES ANY COMBINATION: CPT

## 2022-12-19 PROCEDURE — 71045 X-RAY EXAM CHEST 1 VIEW: CPT

## 2022-12-19 RX ORDER — LORAZEPAM 2 MG/ML
1 INJECTION INTRAMUSCULAR EVERY 6 HOURS PRN
Status: DISCONTINUED | OUTPATIENT
Start: 2022-12-19 | End: 2022-12-23 | Stop reason: HOSPADM

## 2022-12-19 RX ORDER — ACETAMINOPHEN 325 MG/1
650 TABLET ORAL EVERY 6 HOURS PRN
Status: DISCONTINUED | OUTPATIENT
Start: 2022-12-19 | End: 2022-12-21 | Stop reason: SDUPTHER

## 2022-12-19 RX ORDER — LORAZEPAM 2 MG/ML
1 INJECTION INTRAMUSCULAR ONCE
Status: COMPLETED | OUTPATIENT
Start: 2022-12-19 | End: 2022-12-19

## 2022-12-19 RX ORDER — MIDAZOLAM HYDROCHLORIDE 1 MG/ML
4 INJECTION INTRAMUSCULAR; INTRAVENOUS ONCE
Status: COMPLETED | OUTPATIENT
Start: 2022-12-19 | End: 2022-12-19

## 2022-12-19 RX ORDER — LEVETIRACETAM 500 MG/1
500 TABLET ORAL 2 TIMES DAILY
Status: DISCONTINUED | OUTPATIENT
Start: 2022-12-19 | End: 2022-12-19

## 2022-12-19 RX ORDER — IPRATROPIUM BROMIDE AND ALBUTEROL SULFATE 2.5; .5 MG/3ML; MG/3ML
3 SOLUTION RESPIRATORY (INHALATION) EVERY 4 HOURS PRN
Status: DISCONTINUED | OUTPATIENT
Start: 2022-12-19 | End: 2022-12-23 | Stop reason: HOSPADM

## 2022-12-19 RX ORDER — ASPIRIN 81 MG/1
81 TABLET, CHEWABLE ORAL DAILY
Status: DISCONTINUED | OUTPATIENT
Start: 2022-12-20 | End: 2022-12-23 | Stop reason: HOSPADM

## 2022-12-19 RX ORDER — LEVETIRACETAM 500 MG/1
500 TABLET ORAL 2 TIMES DAILY
Status: DISCONTINUED | OUTPATIENT
Start: 2022-12-20 | End: 2022-12-21 | Stop reason: DRUGHIGH

## 2022-12-19 RX ORDER — SODIUM CHLORIDE 0.9 % (FLUSH) 0.9 %
5-40 SYRINGE (ML) INJECTION PRN
Status: DISCONTINUED | OUTPATIENT
Start: 2022-12-19 | End: 2022-12-23 | Stop reason: HOSPADM

## 2022-12-19 RX ORDER — ACETAMINOPHEN 650 MG/1
650 SUPPOSITORY RECTAL EVERY 6 HOURS PRN
Status: DISCONTINUED | OUTPATIENT
Start: 2022-12-19 | End: 2022-12-21 | Stop reason: SDUPTHER

## 2022-12-19 RX ORDER — PANTOPRAZOLE SODIUM 40 MG/1
40 TABLET, DELAYED RELEASE ORAL
Status: DISCONTINUED | OUTPATIENT
Start: 2022-12-20 | End: 2022-12-23 | Stop reason: HOSPADM

## 2022-12-19 RX ORDER — MIDAZOLAM HYDROCHLORIDE 1 MG/ML
INJECTION INTRAMUSCULAR; INTRAVENOUS
Status: COMPLETED
Start: 2022-12-19 | End: 2022-12-19

## 2022-12-19 RX ORDER — SODIUM CHLORIDE 0.9 % (FLUSH) 0.9 %
5-40 SYRINGE (ML) INJECTION EVERY 12 HOURS SCHEDULED
Status: DISCONTINUED | OUTPATIENT
Start: 2022-12-19 | End: 2022-12-23 | Stop reason: HOSPADM

## 2022-12-19 RX ORDER — SODIUM CHLORIDE 9 MG/ML
INJECTION, SOLUTION INTRAVENOUS PRN
Status: DISCONTINUED | OUTPATIENT
Start: 2022-12-19 | End: 2022-12-23 | Stop reason: HOSPADM

## 2022-12-19 RX ORDER — POLYETHYLENE GLYCOL 3350 17 G/17G
17 POWDER, FOR SOLUTION ORAL DAILY PRN
Status: DISCONTINUED | OUTPATIENT
Start: 2022-12-19 | End: 2022-12-23 | Stop reason: HOSPADM

## 2022-12-19 RX ORDER — 0.9 % SODIUM CHLORIDE 0.9 %
1000 INTRAVENOUS SOLUTION INTRAVENOUS
Status: COMPLETED | OUTPATIENT
Start: 2022-12-19 | End: 2022-12-19

## 2022-12-19 RX ORDER — FERROUS SULFATE 325(65) MG
325 TABLET ORAL 2 TIMES DAILY
Status: DISCONTINUED | OUTPATIENT
Start: 2022-12-19 | End: 2022-12-23 | Stop reason: HOSPADM

## 2022-12-19 RX ORDER — ATORVASTATIN CALCIUM 80 MG/1
80 TABLET, FILM COATED ORAL DAILY
Status: DISCONTINUED | OUTPATIENT
Start: 2022-12-20 | End: 2022-12-23 | Stop reason: HOSPADM

## 2022-12-19 RX ADMIN — MIDAZOLAM 4 MG: 1 INJECTION INTRAMUSCULAR; INTRAVENOUS at 17:08

## 2022-12-19 RX ADMIN — MIDAZOLAM HYDROCHLORIDE 4 MG: 1 INJECTION INTRAMUSCULAR; INTRAVENOUS at 17:08

## 2022-12-19 RX ADMIN — LEVETIRACETAM 4000 MG: 100 INJECTION, SOLUTION INTRAVENOUS at 17:09

## 2022-12-19 RX ADMIN — LORAZEPAM 1 MG: 2 INJECTION INTRAMUSCULAR; INTRAVENOUS at 17:34

## 2022-12-19 RX ADMIN — MOMETASONE FUROATE AND FORMOTEROL FUMARATE DIHYDRATE 2 PUFF: 200; 5 AEROSOL RESPIRATORY (INHALATION) at 23:10

## 2022-12-19 RX ADMIN — SODIUM CHLORIDE 1000 ML: 9 INJECTION, SOLUTION INTRAVENOUS at 20:14

## 2022-12-19 NOTE — ED TRIAGE NOTES
Pt arrives via EMS from home after unresponsiveness. Seized once while loading on stretcher and once again in route. Total of 3 min. Hx of stroke last week. Was found supine on floor by family. Pt arrives unresponsive. Versed given EMS.

## 2022-12-20 LAB
ALBUMIN SERPL-MCNC: 3 G/DL (ref 3.2–4.6)
ALBUMIN/GLOB SERPL: 0.9 {RATIO} (ref 0.4–1.6)
ALP SERPL-CCNC: 99 U/L (ref 50–136)
ALT SERPL-CCNC: 25 U/L (ref 12–65)
AMPHET UR QL SCN: NEGATIVE
ANION GAP SERPL CALC-SCNC: 6 MMOL/L (ref 2–11)
APPEARANCE UR: CLEAR
AST SERPL-CCNC: 18 U/L (ref 15–37)
BARBITURATES UR QL SCN: NEGATIVE
BASOPHILS # BLD: 0.1 K/UL (ref 0–0.2)
BASOPHILS NFR BLD: 1 % (ref 0–2)
BENZODIAZ UR QL: POSITIVE
BILIRUB SERPL-MCNC: 0.8 MG/DL (ref 0.2–1.1)
BILIRUB UR QL: NEGATIVE
BUN SERPL-MCNC: 19 MG/DL (ref 8–23)
CALCIUM SERPL-MCNC: 8.3 MG/DL (ref 8.3–10.4)
CANNABINOIDS UR QL SCN: POSITIVE
CHLORIDE SERPL-SCNC: 114 MMOL/L (ref 101–110)
CK SERPL-CCNC: 62 U/L (ref 21–215)
CO2 SERPL-SCNC: 24 MMOL/L (ref 21–32)
COCAINE UR QL SCN: NEGATIVE
COLOR UR: NORMAL
CREAT SERPL-MCNC: 0.9 MG/DL (ref 0.8–1.5)
DIFFERENTIAL METHOD BLD: ABNORMAL
EKG ATRIAL RATE: 155 BPM
EKG DIAGNOSIS: NORMAL
EKG P AXIS: 0 DEGREES
EKG P-R INTERVAL: 56 MS
EKG Q-T INTERVAL: 456 MS
EKG QRS DURATION: 178 MS
EKG QTC CALCULATION (BAZETT): 493 MS
EKG R AXIS: 260 DEGREES
EKG T AXIS: 84 DEGREES
EKG VENTRICULAR RATE: 70 BPM
EOSINOPHIL # BLD: 0.2 K/UL (ref 0–0.8)
EOSINOPHIL NFR BLD: 2 % (ref 0.5–7.8)
ERYTHROCYTE [DISTWIDTH] IN BLOOD BY AUTOMATED COUNT: 15.7 % (ref 11.9–14.6)
GLOBULIN SER CALC-MCNC: 3.3 G/DL (ref 2.8–4.5)
GLUCOSE SERPL-MCNC: 94 MG/DL (ref 65–100)
GLUCOSE UR STRIP.AUTO-MCNC: NEGATIVE MG/DL
HCT VFR BLD AUTO: 42.1 % (ref 41.1–50.3)
HGB BLD-MCNC: 13.4 G/DL (ref 13.6–17.2)
HGB UR QL STRIP: NEGATIVE
IMM GRANULOCYTES # BLD AUTO: 0 K/UL (ref 0–0.5)
IMM GRANULOCYTES NFR BLD AUTO: 0 % (ref 0–5)
KETONES UR QL STRIP.AUTO: NEGATIVE MG/DL
LEUKOCYTE ESTERASE UR QL STRIP.AUTO: NEGATIVE
LYMPHOCYTES # BLD: 1.6 K/UL (ref 0.5–4.6)
LYMPHOCYTES NFR BLD: 23 % (ref 13–44)
MCH RBC QN AUTO: 28.1 PG (ref 26.1–32.9)
MCHC RBC AUTO-ENTMCNC: 31.8 G/DL (ref 31.4–35)
MCV RBC AUTO: 88.3 FL (ref 82–102)
METHADONE UR QL: NEGATIVE
MONOCYTES # BLD: 0.9 K/UL (ref 0.1–1.3)
MONOCYTES NFR BLD: 12 % (ref 4–12)
NEUTS SEG # BLD: 4.3 K/UL (ref 1.7–8.2)
NEUTS SEG NFR BLD: 62 % (ref 43–78)
NITRITE UR QL STRIP.AUTO: NEGATIVE
NRBC # BLD: 0 K/UL (ref 0–0.2)
OPIATES UR QL: NEGATIVE
PCP UR QL: NEGATIVE
PH UR STRIP: 5 [PH] (ref 5–9)
PLATELET # BLD AUTO: 174 K/UL (ref 150–450)
PMV BLD AUTO: 9.9 FL (ref 9.4–12.3)
POTASSIUM SERPL-SCNC: 3.9 MMOL/L (ref 3.5–5.1)
PROT SERPL-MCNC: 6.3 G/DL (ref 6.3–8.2)
PROT UR STRIP-MCNC: NEGATIVE MG/DL
RBC # BLD AUTO: 4.77 M/UL (ref 4.23–5.6)
SODIUM SERPL-SCNC: 144 MMOL/L (ref 133–143)
SP GR UR REFRACTOMETRY: 1.02 (ref 1–1.02)
UROBILINOGEN UR QL STRIP.AUTO: 0.2 EU/DL (ref 0.2–1)
WBC # BLD AUTO: 7 K/UL (ref 4.3–11.1)

## 2022-12-20 PROCEDURE — 97535 SELF CARE MNGMENT TRAINING: CPT

## 2022-12-20 PROCEDURE — 2580000003 HC RX 258: Performed by: FAMILY MEDICINE

## 2022-12-20 PROCEDURE — 6370000000 HC RX 637 (ALT 250 FOR IP): Performed by: FAMILY MEDICINE

## 2022-12-20 PROCEDURE — 1100000003 HC PRIVATE W/ TELEMETRY

## 2022-12-20 PROCEDURE — 94640 AIRWAY INHALATION TREATMENT: CPT

## 2022-12-20 PROCEDURE — 95816 EEG AWAKE AND DROWSY: CPT

## 2022-12-20 PROCEDURE — 80307 DRUG TEST PRSMV CHEM ANLYZR: CPT

## 2022-12-20 PROCEDURE — 94761 N-INVAS EAR/PLS OXIMETRY MLT: CPT

## 2022-12-20 PROCEDURE — 36415 COLL VENOUS BLD VENIPUNCTURE: CPT

## 2022-12-20 PROCEDURE — 81003 URINALYSIS AUTO W/O SCOPE: CPT

## 2022-12-20 PROCEDURE — 2700000000 HC OXYGEN THERAPY PER DAY

## 2022-12-20 PROCEDURE — 97112 NEUROMUSCULAR REEDUCATION: CPT

## 2022-12-20 PROCEDURE — 97165 OT EVAL LOW COMPLEX 30 MIN: CPT

## 2022-12-20 PROCEDURE — 6360000002 HC RX W HCPCS

## 2022-12-20 PROCEDURE — 97161 PT EVAL LOW COMPLEX 20 MIN: CPT

## 2022-12-20 PROCEDURE — 97530 THERAPEUTIC ACTIVITIES: CPT

## 2022-12-20 PROCEDURE — 2500000003 HC RX 250 WO HCPCS: Performed by: FAMILY MEDICINE

## 2022-12-20 PROCEDURE — 85025 COMPLETE CBC W/AUTO DIFF WBC: CPT

## 2022-12-20 PROCEDURE — 94760 N-INVAS EAR/PLS OXIMETRY 1: CPT

## 2022-12-20 PROCEDURE — 80053 COMPREHEN METABOLIC PANEL: CPT

## 2022-12-20 RX ORDER — LORAZEPAM 2 MG/ML
INJECTION INTRAMUSCULAR
Status: COMPLETED | OUTPATIENT
Start: 2022-12-20 | End: 2022-12-20

## 2022-12-20 RX ORDER — HYDROCODONE BITARTRATE AND ACETAMINOPHEN 5; 325 MG/1; MG/1
1 TABLET ORAL ONCE
Status: COMPLETED | OUTPATIENT
Start: 2022-12-20 | End: 2022-12-20

## 2022-12-20 RX ORDER — CYCLOBENZAPRINE HCL 10 MG
10 TABLET ORAL NIGHTLY PRN
Status: DISCONTINUED | OUTPATIENT
Start: 2022-12-20 | End: 2022-12-21

## 2022-12-20 RX ORDER — APIXABAN 5 MG/1
TABLET, FILM COATED ORAL
Qty: 60 TABLET | Refills: 0 | OUTPATIENT
Start: 2022-12-20

## 2022-12-20 RX ORDER — CYCLOBENZAPRINE HCL 10 MG
10 TABLET ORAL NIGHTLY PRN
COMMUNITY

## 2022-12-20 RX ADMIN — FERROUS SULFATE TAB 325 MG (65 MG ELEMENTAL FE) 325 MG: 325 (65 FE) TAB at 08:41

## 2022-12-20 RX ADMIN — TIOTROPIUM BROMIDE INHALATION SPRAY 2 PUFF: 3.12 SPRAY, METERED RESPIRATORY (INHALATION) at 09:24

## 2022-12-20 RX ADMIN — LEVETIRACETAM 500 MG: 500 TABLET, FILM COATED ORAL at 21:10

## 2022-12-20 RX ADMIN — CYCLOBENZAPRINE 10 MG: 10 TABLET, FILM COATED ORAL at 03:40

## 2022-12-20 RX ADMIN — PANTOPRAZOLE SODIUM 40 MG: 40 TABLET, DELAYED RELEASE ORAL at 05:51

## 2022-12-20 RX ADMIN — MOMETASONE FUROATE AND FORMOTEROL FUMARATE DIHYDRATE 2 PUFF: 200; 5 AEROSOL RESPIRATORY (INHALATION) at 09:24

## 2022-12-20 RX ADMIN — MOMETASONE FUROATE AND FORMOTEROL FUMARATE DIHYDRATE 2 PUFF: 200; 5 AEROSOL RESPIRATORY (INHALATION) at 20:37

## 2022-12-20 RX ADMIN — LORAZEPAM 2 MG: 2 INJECTION INTRAMUSCULAR at 23:49

## 2022-12-20 RX ADMIN — APIXABAN 5 MG: 5 TABLET, FILM COATED ORAL at 08:42

## 2022-12-20 RX ADMIN — Medication 5 UNITS: at 03:49

## 2022-12-20 RX ADMIN — ASPIRIN 81 MG: 81 TABLET, CHEWABLE ORAL at 08:41

## 2022-12-20 RX ADMIN — ATORVASTATIN CALCIUM 80 MG: 80 TABLET, FILM COATED ORAL at 08:42

## 2022-12-20 RX ADMIN — SODIUM CHLORIDE, PRESERVATIVE FREE 10 ML: 5 INJECTION INTRAVENOUS at 21:10

## 2022-12-20 RX ADMIN — LEVETIRACETAM 500 MG: 500 TABLET, FILM COATED ORAL at 08:42

## 2022-12-20 RX ADMIN — SODIUM CHLORIDE, PRESERVATIVE FREE 10 ML: 5 INJECTION INTRAVENOUS at 08:42

## 2022-12-20 RX ADMIN — FERROUS SULFATE TAB 325 MG (65 MG ELEMENTAL FE) 325 MG: 325 (65 FE) TAB at 21:10

## 2022-12-20 RX ADMIN — APIXABAN 5 MG: 5 TABLET, FILM COATED ORAL at 21:10

## 2022-12-20 RX ADMIN — HYDROCODONE BITARTRATE AND ACETAMINOPHEN 1 TABLET: 5; 325 TABLET ORAL at 03:40

## 2022-12-20 ASSESSMENT — PAIN DESCRIPTION - ORIENTATION
ORIENTATION: LOWER

## 2022-12-20 ASSESSMENT — PAIN DESCRIPTION - FREQUENCY
FREQUENCY: CONTINUOUS
FREQUENCY: CONTINUOUS

## 2022-12-20 ASSESSMENT — PAIN DESCRIPTION - DESCRIPTORS
DESCRIPTORS: TINGLING
DESCRIPTORS: SORE
DESCRIPTORS: SPASM;SQUEEZING;THROBBING

## 2022-12-20 ASSESSMENT — PAIN SCALES - GENERAL
PAINLEVEL_OUTOF10: 8
PAINLEVEL_OUTOF10: 4
PAINLEVEL_OUTOF10: 4
PAINLEVEL_OUTOF10: 0
PAINLEVEL_OUTOF10: 10

## 2022-12-20 ASSESSMENT — PAIN DESCRIPTION - PAIN TYPE
TYPE: CHRONIC PAIN;NEUROPATHIC PAIN

## 2022-12-20 ASSESSMENT — PAIN DESCRIPTION - LOCATION
LOCATION: BACK

## 2022-12-20 ASSESSMENT — PAIN DESCRIPTION - ONSET: ONSET: ON-GOING

## 2022-12-20 NOTE — PLAN OF CARE
Patient's sister Elen Finely contacted per patient's request. Mrs. Jeanmarie Chowdhury notified of patient's admission and status. Told Mrs. Jeanmarie Chowdhury that patient would like his spinal stimulator switch to be brought in from home so stimulator can be turned back on. Confirmed with Ninoska Quiroga MD that it is ok for stimulator to be turned back on. Hospital phone in patient's room put in patient's reach so that sister can call his room. Patient notified, has no concerns at this time.

## 2022-12-20 NOTE — ED PROVIDER NOTES
[employer] Emergency Department Provider Note                   PCP:                Kathi Mckeon MD               Age: 79 y.o. Sex: male       ICD-10-CM    1. Transient alteration of awareness  R40.4           DISPOSITION Admitted 12/19/2022 10:16:54 PM       Current Discharge Medication List          Orders Placed This Encounter   Procedures    XR CHEST PORTABLE    CT Head W/O Contrast    CT CERVICAL SPINE WO CONTRAST    CT HEAD WO CONTRAST    CBC with Auto Differential    Comprehensive Metabolic Panel    Lactic Acid    Magnesium    Protime-INR    TSH    Troponin    Urinalysis w rflx microscopic    Urine Drug Screen    Acetaminophen Level    Salicylate    CK    Comprehensive Metabolic Panel w/ Reflex to MG    CBC with Auto Differential    CBC with Auto Differential    Basic Metabolic Panel w/ Reflex to MG    Blood Gas, Arterial    Comprehensive Metabolic Panel    Magnesium    CK    ADULT DIET; Easy to Chew; Low Fat/Low Chol/High Fiber/2 gm Na    Vital signs per unit routine    Telemetry monitoring - 48 hour duration    Up with assistance    Place intermittent pneumatic compression device    Turn or assist with turn approximately every 2 hours if patient is unable to turn self.  Remind patient to turn if necessary    Maintain HOB at the lowest elevation consistent with medical plan of care    Assess skin per unit guidelines    Maintain heels off of bed at all times    Pad/offload medical devices    Use lift equipment for lifting patient    Misc nursing order (specify)    Full code    Inpatient consult to Neurology    Inpatient consult to Neurology    Inpatient consult to Neurology    OT eval and treat    PT eval and treat    RT--ABG WITH ELECTROLYTES    Initiate Oxygen Therapy Protocol    POCT Blood Gas & Electrolytes    POCT Glucose    PLEASE READ & DOCUMENT PPD TEST IN 24 HRS    PLEASE READ & DOCUMENT PPD TEST IN 50 HRS    PLEASE READ & DOCUMENT PPD TEST IN 72 HRS    Arterial Blood Gas, POC    POCT Glucose Arterial Blood Gas, POC    EKG 12 Lead    EEG awake and drowsy    ADMIT TO INPATIENT    Transfer Patient    Seizure precautions    Fall precautions         Parminder Mckeon is a 79 y.o. male who presents to the Emergency Department with chief complaint of    Chief Complaint   Patient presents with    Seizures      55-year-old man with history of CVA presents from a group home type establishment by way of EMS for seizures. Patient had been found down on the floor after a fall, is not clear when the fall occurred patient was initially unresponsive for staff and EMS was summoned as he was being placed onto the EMS stretcher he had a spell which appeared to be a tonic-clonic seizure for about 15 to 20 seconds and then once in the back of the ambulance had a second 1 lasting 20 to 30 seconds at that point EMS administered 5 mg of Versed IM. Blood pressure and blood sugar have been adequate. Reportedly A&O x4 on a good day  Patient was placed in resuscitation room 2 of the ER with work-up initiated for concern of possible head bleed      Review of Systems   Unable to perform ROS: Patient nonverbal   Neurological:  Positive for seizures. Psychiatric/Behavioral:  Positive for decreased concentration. All other systems reviewed and are negative. All other systems reviewed and are negative.       Past Medical History:   Diagnosis Date    Aortic stenosis     Aortic valve replacement 7/2018    CAD (coronary artery disease)     PCI in 2014, 2015 had MI and then stents    Cancer ([address]     SCC removed face     CHF (congestive heart failure) ([address]     Chronic renal disease, stage III ([address]     sees neph and does not have actual diagnosis at this time    COPD (chronic obstructive pulmonary disease) ([address]     fibrosis in lungs    Dyslipidemia     Heart failure ([address]     CHF per patient    HTN (hypertension)     med controlled    Hyperlipidemia     Ischemic cardiomyopathy     Pacemaker     only pacemaker    Paroxysmal atrial fibrillation (HCC)     pradaxa for this    Pulmonary fibrosis (HCC)     Seizure disorder [facility]     lyme disease - small lesion frontal part of brain - no maintenance meds - last seizure 2 months ago, due to fall - before that it was nine years    Systolic heart failure [facility])         Past Surgical History:   Procedure Laterality Date    ABLATION OF DYSRHYTHMIC FOCUS      AORTIC VALVE REPLACEMENT  07/2018    BACK SURGERY      discectomy    CARDIAC CATHETERIZATION      PCI 2014, 2015    CARDIAC PROCEDURE N/A 7/11/2022    LEFT HEART CATH / CORONARY ANGIOGRAPHY performed by Sherin Waldrop MD at [address] [place] [address] CATH LAB    CERVICAL FUSION      C3-4 fusion    EGD  6/17/2022         HIP ARTHROPLASTY Left     SPINAL CORD STIMULATOR SURGERY      lower back     UPPER GASTROINTESTINAL ENDOSCOPY N/A 6/17/2022    EGD ESOPHAGOGASTRODUODENOSCOPY/ PT HAS PACEMAKER performed by Jaimie Shah MD at [facility] ENDOSCOPY        Family History   Problem Relation Age of Onset    Heart Disease Sister     Heart Disease Mother         Social Connections: Not on file        Allergies   Allergen Reactions    Carbamazepine Anaphylaxis    Lacosamide Nausea Only    Lorazepam Other (See Comments)     Violent behavior    Nitroglycerin Other (See Comments)     hypotension        Vitals signs and nursing note reviewed. Patient Vitals for the past 4 hrs:   Pulse Resp BP SpO2   12/21/22 0715 71 19 125/89 --   12/21/22 0444 70 20 136/78 96 %          Physical Exam  Vitals and nursing note reviewed. Constitutional:       General: He is not in acute distress. Appearance: Normal appearance. He is not ill-appearing, toxic-appearing or diaphoretic. Comments: Had numerous short shaking spells some of which were witnessed by nursing, none in my presence. Per nursing they do not look like obvious definite tonic-clonic seizures   HENT:      Head: Normocephalic and atraumatic.       Right Ear: External ear normal.      Left Ear: External ear normal. Nose: Nose normal. No rhinorrhea. Eyes:      General: No scleral icterus. Right eye: No discharge. Left eye: No discharge. Conjunctiva/sclera: Conjunctivae normal.      Pupils: Pupils are equal, round, and reactive to light. Cardiovascular:      Rate and Rhythm: Normal rate and regular rhythm. Pulmonary:      Effort: Pulmonary effort is normal. No respiratory distress. Abdominal:      General: Abdomen is flat. Tenderness: There is no abdominal tenderness. There is no guarding or rebound. Musculoskeletal:         General: No signs of injury. Normal range of motion. Cervical back: Normal range of motion and neck supple. Skin:     General: Skin is warm and dry. Coloration: Skin is not jaundiced or pale. Neurological:      Comments: Later in ER stay patient mumbles some when I speak to him  And at 1 point he seemed to be answering \"no\" when the nurse asked him if he had a wife or family        MDM  Number of Diagnoses or Management Options  Transient alteration of awareness: established, worsening  Diagnosis management comments: Seizure versus pseudoseizure we will admit as patient remains minimally responsive.   Apparent recent work-up at [facility] to include EEG suggests nonepileptic spells       Amount and/or Complexity of Data Reviewed  Clinical lab tests: ordered and reviewed  Tests in the radiology section of CPT®: reviewed and ordered  Decide to obtain previous medical records or to obtain history from someone other than the patient: yes  Discuss the patient with other providers: yes    Risk of Complications, Morbidity, and/or Mortality  Presenting problems: moderate  Diagnostic procedures: low  Management options: low    Patient Progress  Patient progress: stable      Procedures    Labs Reviewed   CBC WITH AUTO DIFFERENTIAL - Abnormal; Notable for the following components:       Result Value    RDW 15.6 (*)     All other components within normal limits COMPREHENSIVE METABOLIC PANEL - Abnormal; Notable for the following components:    Chloride 111 (*)     Glucose 104 (*)     Albumin 3.0 (*)     All other components within normal limits   URINE DRUG SCREEN - Abnormal; Notable for the following components:    Benzodiazepines, Urine Positive (*)     THC, TH-Cannabinol, Urine Positive (*)     All other components within normal limits   ACETAMINOPHEN LEVEL - Abnormal; Notable for the following components:    Acetaminophen Level <2 (*)     All other components within normal limits   SALICYLATE LEVEL - Abnormal; Notable for the following components:    Salicylate, Serum 2.4 (*)     All other components within normal limits   COMPREHENSIVE METABOLIC PANEL W/ REFLEX TO MG FOR LOW K - Abnormal; Notable for the following components:    Sodium 144 (*)     Chloride 114 (*)     Albumin 3.0 (*)     All other components within normal limits   CBC WITH AUTO DIFFERENTIAL - Abnormal; Notable for the following components:    Hemoglobin 13.4 (*)     RDW 15.7 (*)     All other components within normal limits   COMPREHENSIVE METABOLIC PANEL - Abnormal; Notable for the following components:    Glucose 103 (*)     Albumin 2.9 (*)     All other components within normal limits   POCT BLOOD GAS & ELECTROLYTES - Abnormal; Notable for the following components:    POC Glucose 136 (*)     POC TCO2 25 (*)     All other components within normal limits   POCT GLUCOSE - Abnormal; Notable for the following components:    POC Glucose 102 (*)     All other components within normal limits   LACTIC ACID   LACTIC ACID   MAGNESIUM   PROTIME-INR   TSH   TROPONIN   URINALYSIS   CK   MAGNESIUM   CK   CBC WITH AUTO DIFFERENTIAL   BASIC METABOLIC PANEL W/ REFLEX TO MG FOR LOW K   BLOOD GAS, ARTERIAL   POCT GLUCOSE   ARTERIAL BLOOD GAS, POC   ARTERIAL BLOOD GAS, POC   PLEASE READ & DOCUMENT PPD TEST IN 24 HRS   PLEASE READ & DOCUMENT PPD TEST IN 48 HRS        CT CERVICAL SPINE WO CONTRAST   Final Result      1. No acute fracture or dislocation in the cervical spine. 2. Anterior surgical fusion. Degenerative changes. CT HEAD WO CONTRAST   Final Result      1. No CT evidence of acute intracranial abnormality. 2. No significant change compared to prior exam.      CT Head W/O Contrast   Final Result   White matter findings compatible with mild chronic small vessel   ischemic disease. XR CHEST PORTABLE   Final Result   The lungs are clear. Heart is normal in size. Status post TAVR. Implantable cardiac device and leads are unchanged in position. Spinal   stimulator leads overlie the lower thoracic spine. No pneumothorax. No pleural   effusions. Brett Coma Scale  Eye Opening: To speech  Best Verbal Response: Confused  Best Motor Response: Obeys commands  Brett Coma Scale Score: 13                     Voice dictation software was used during the making of this note. This software is not perfect and grammatical and other typographical errors may be present. This note has not been completely proofread for errors.        Jeannette Steel MD  12/21/22 0612

## 2022-12-20 NOTE — ED PROVIDER NOTES
I spoke with Dr. Riddhi Fry regarding the patient's pending lab work and additional work-up needed. The hospitalist was contacted by him about the pending admission. I will follow-up with him once the work-up is complete. Repeat evaluation of the patient finds him with snoring after the midazolam and Ativan given to him for his seizures. Blood pressure was noted to be much lower than previous. Once he woke up a little bit his blood pressure increased. Please see nursing notes. Patient will get a liter of IV fluids at this time be reevaluated. Aron Pollack, DO  12/19/22 2007      Dr. Mitchell Glass the hospitalist on-call was updated regarding the completion of the blood work. Hospitalist will come and see the patient planning on admission. Patient's blood pressure has improved with a liter of IV fluids.      Aron Pollack, DO  12/19/22 2115

## 2022-12-20 NOTE — PROGRESS NOTES
ACUTE OCCUPATIONAL THERAPY GOALS:   (Developed with and agreed upon by patient and/or caregiver.)  1. Patient will complete lower body bathing and dressing with SBA and adaptive equipment as needed. 2. Patient will complete toileting with SBA. 3. Patient will tolerate 30 minutes of OT treatment with 1-2 rest breaks to increase activity tolerance for ADLs. 4. Patient will complete functional transfers with min A and adaptive equipment as needed. 5. Patient will complete functional activity while seated edge of bed with MOD I and adaptive equipment as needed. 6. Patient will tolerate 30 minutes BUE therapeutic activities to increase use of BUE during ADL performance. Timeframe: 7 visits       OCCUPATIONAL THERAPY Initial Assessment, Daily Note, and AM       OT Visit Days: 1   Acknowledge Orders  Time  OT Charge Capture  Rehab Caseload Tracker      West Ramirez is a 79 y.o. male   PRIMARY DIAGNOSIS: Unresponsiveness  Transient alteration of awareness [R40.4]  Unresponsiveness [R41.89]       Reason for Referral: Generalized Muscle Weakness (M62.81)  Other lack of cordination (R27.8)  Difficulty in walking, Not elsewhere classified (R26.2)  Inpatient: Payor: Elian Genao / Plan: Nomi Esters / Product Type: *No Product type* /     ASSESSMENT:     REHAB RECOMMENDATIONS:   Recommendation to date pending progress:  Setting:  Inpatient Rehab Facility    Equipment:    To Be Determined     ASSESSMENT:  Mr. Lucien Genao presents with deficits in overall strength, activity tolerance, ADL performance, and functional mobility. Presents after being found unresponsive at home. Hx of CVA with residual R sided weakness. LUE intact. RUE proximal < RUE distal; diminished R  strength and coordination noted today. Mod A x 2 for functional bed mobility; fair + sitting balance with RUE propped for support.  Completed self-grooming tasks, including washing face and brushing teeth, with use of RUE (supported), with set-up while sitting EOB. Total A for donning socks in preparation for OOB activity. Mod A x 2 for sit <> stand; max A x 2 for SPT to bedside chair. Poor standing balance and coordination d/t RLE weakness. OOB to chair; overall tolerated well and motivated to return to PLOF. At this time, Zechariah Torres is functioning below baseline for ADL performance and functional mobility. Patient would benefit from skilled OT services to address goals and plan of care. [facility] [address] [address] AM-PAC 6 Clicks Daily Activity Inpatient Short Form:    AM-PAC Daily Activity Inpatient   How much help for putting on and taking off regular lower body clothing?: A Lot  How much help for Bathing?: A Lot  How much help for Toileting?: A Lot  How much help for putting on and taking off regular upper body clothing?: A Little  How much help for taking care of personal grooming?: A Little  How much help for eating meals?: A Little  AM-[facility] Inpatient Daily Activity Raw Score: 15  AM-PAC Inpatient ADL T-Scale Score : 34.69  ADL Inpatient CMS 0-100% Score: 56.46  ADL Inpatient CMS G-Code Modifier : CK           SUBJECTIVE:     Mr. Tracey Perez states, \"I want to be able to do it myself. \"     Social/Functional    Lives alone in 1-story home. Hx of R sided weakness from prior CVA; went to [facility] and regain functional use of RUE/RLE. Was using forearm crutches with PT and mostly using w/c for mobility around the home. Slide board for transfers. MOD I for ADLs.    OBJECTIVE:     Maria Luisa Montoya / Antionette Jurist / AIRWAY: Tarango Catheter and IV    RESTRICTIONS/PRECAUTIONS:  Restrictions/Precautions: Fall Risk    PAIN: VITALS / O2:   Pre Treatment:       Numeric 0-10  0/10    Post Treatment:   0/10       Vitals        WFL    Oxygen     Stable on 2L       GROSS EVALUATION: INTACT IMPAIRED   (See Comments)   UE AROM [] [x]RUE   UE PROM [] [x]RUE   Strength []    RUE decreaed; proximal < distal   Posture / Balance []    Fair + unsupported EOB sitting balance  Poor  static standing balance  poor dynamic standing balance   Sensation []  RUE diminished   Coordination []  RUE decreased     Tone []       Edema [x]    Activity Tolerance []    Generally decreased on 2L 02     Hand Dominance R [x] L []      COGNITION/  PERCEPTION: INTACT IMPAIRED   (See Comments)   Orientation [x]     Vision [x]     Hearing [x]     Cognition  [x]     Perception [x]       MOBILITY: I Mod I S SBA CGA Min Mod Max Total  NT x2 Comments:   Bed Mobility    Rolling [] [] [] [] [] [] [x] [] [] [] [x]    Supine to Sit [] [] [] [] [] [] [x] [] [] [] [x]    Scooting [] [] [] [] [] [] [x] [] [] [] [x]    Sit to Supine [] [] [] [] [] [] [] [] [] [] []    Transfers    Sit to Stand [] [] [] [] [] [] [x] [] [] [] [x]    Bed to Chair [] [] [] [] [] [] [] [x] [] [] [x] SPT   Stand to Sit [] [] [] [] [] [] [x] [] [] [] [x]    Tub/Shower [] [] [] [] [] [] [] [] [] [] []     Toilet [] [] [] [] [] [] [] [] [] [] []      [] [] [] [] [] [] [] [] [] [] []    I=Independent, Mod I=Modified Independent, S=Supervision/Setup, SBA=Standby Assistance, CGA=Contact Guard Assistance, Min=Minimal Assistance, Mod=Moderate Assistance, Max=Maximal Assistance, Total=Total Assistance, NT=Not Tested    ACTIVITIES OF DAILY LIVING: I Mod I S SBA CGA Min Mod Max Total NT Comments   BASIC ADLs:              Upper Body Bathing  [] [] [] [] [] [] [] [] [] [x]    Lower Body Bathing [] [] [] [] [] [] [] [] [] [x]    Toileting [] [] [] [] [] [] [] [] [] [x]    Upper Body Dressing [] [] [] [] [] [x] [] [] [] [] Donning new gown   Lower Body Dressing [] [] [] [] [] [] [] [] [x] [] Donning socks   Feeding [] [] [] [] [] [] [] [] [] []    Grooming [] [] [] [x] [] [] [] [] [] [] Sitting EOB washing face and brushing teeth   Personal Device Care [] [] [] [] [] [] [] [] [] []    Functional Mobility [] [] [] [] [] [] [x] [x] [] [] X 2 for bed mobility and SPT to chair   I=Independent, Mod I=Modified Independent, S=Supervision/Setup, SBA=Standby Assistance, CGA=Contact [employer], Min=Minimal Assistance, Mod=Moderate Assistance, Max=Maximal Assistance, Total=Total Assistance, NT=Not Tested    PLAN:     [facility]: 3 times/week for duration of hospital stay or until stated goals are met, whichever comes first.    PROBLEM LIST:   (Skilled intervention is medically necessary to address:)  Decreased ADL/Functional Activities  Decreased Activity Tolerance  Decreased AROM/PROM  Decreased Balance  Decreased Coordination  Decreased Gait Ability  Decreased Safety Awareness  Decreased Strength  Decreased Transfer Abilities  Increased Pain   INTERVENTIONS PLANNED:  (Benefits and precautions of occupational therapy have been discussed with the patient.)  Self Care Training  Therapeutic Activity  Therapeutic Exercise/HEP  Neuromuscular Re-education  Manual Therapy  Education         TREATMENT:     EVALUATION: LOW COMPLEXITY: (Untimed Charge)    TREATMENT:   Neuromuscular Re-education (13 Minutes): Neuromuscular Re-education included Balance Training, Coordination training, Functional mobility with facilitation, Postural training, Sitting balance training, and Standing balance training to improve Balance, Coordination, and Postural Control. Self Care (10 minutes): Patient participated in upper body dressing, lower body dressing, and grooming ADLs in unsupported sitting with minimal verbal cueing to increase independence, decrease assistance required, and increase activity tolerance. Patient also participated in functional mobility, functional transfer, and energy conservation training to increase independence, decrease assistance required, and increase activity tolerance.      TREATMENT GRID:  N/A    AFTER TREATMENT PRECAUTIONS: Call light within reach, Chair, Needs within reach, and RN notified    INTERDISCIPLINARY COLLABORATION:  RN/ PCT, PT/ PTA, and OT/ TREVIZO    EDUCATION:  Education Given To: Patient  Education Provided: Role of Therapy;Plan of Care  Barriers to Learning: None  Education Outcome: Verbalized understanding;Demonstrated understanding      TOTAL TREATMENT DURATION AND TIME:  Time In: [address]  Time Out: [facility] ([place], [place])  Minutes: [facility], OT

## 2022-12-20 NOTE — ED NOTES
TRANSFER - OUT REPORT:    Verbal report given to Eusebia Rahman RN on [employer] [facility]  being transferred to [facility] for routine progression of patient care       Report consisted of patient's Situation, Background, Assessment and   Recommendations(SBAR). Information from the following report(s) Nurse Handoff Report, ED Encounter Summary, and ED SBAR was reviewed with the receiving nurse. Donahue Assessment: Presents to emergency department  because of falls (Syncope, seizure, or loss of consciousness): Yes, Age > 79: No, Altered Mental Status, Intoxication with alcohol or substance confusion (Disorientation, impaired judgment, poor safety awaremess, or inability to follow instructions): Yes, Impaired Mobility: Ambulates or transfers with assistive devices or assistance; Unable to ambulate or transer.: Yes  Lines:   Peripheral IV 12/19/22 Distal;Left; Anterior Leg (Active)        Opportunity for questions and clarification was provided.       Patient transported with:  O2 @ 4lpm          [facility]  12/20/22 5967

## 2022-12-20 NOTE — ED NOTES
Witnessed seizure like activity, shaking all over in patient while laying on left side, lasting 45 seconds at most      Claudene Gaskin, [facility]  12/19/22 8246

## 2022-12-20 NOTE — PROGRESS NOTES
required maxA x 2 for STS today and pivot to chair. Pt observed with decreased B LE step length/clearance. Pt also observed with R LE ataxia and some hyper extension with weightbearing. Mr. J Carlos Dorsey could benefit from skilled PT as he is currently functioning below his baseline.       [address] [facility] [address] [address] AM-PAC 6 Clicks Basic Mobility Inpatient Short Form  AM-PAC Mobility Inpatient   How much difficulty turning over in bed?: A Lot  How much difficulty sitting down on / standing up from a chair with arms?: A Lot  How much difficulty moving from lying on back to sitting on side of bed?: A Lot  How much help from another person moving to and from a bed to a chair?: A Lot  How much help from another person needed to walk in hospital room?: Total  How much help from another person for climbing 3-5 steps with a railing?: Total  AM-PAC Inpatient Mobility Raw Score : 10  AM-PAC Inpatient T-Scale Score : 32.29  Mobility Inpatient CMS 0-100% Score: 76.75  Mobility Inpatient CMS G-Code Modifier : CL    SUBJECTIVE:   Mr. J Carlos Dorsey states, \"I've been through this before\"     Social/Functional    Lives alone but has caregiver; uses forearm crutches for ambulating PTA  OBJECTIVE:     PAIN: Virgle Boggs / O2: PRECAUTION / Kristan Riddles / Sean Piper:   Pre Treatment:   Pain Assessment: None - Denies Pain      Post Treatment: 0 Vitals        Oxygen      None    RESTRICTIONS/PRECAUTIONS:  Restrictions/Precautions: Fall Risk                 GROSS EVALUATION: Intact Impaired (Comments):   AROM []  R LE < 2+   PROM []    Strength []  R LE < 2+   Balance []  Fair sitting and poor standing   Posture [] Forward Head  Rounded Shoulders   Sensation []  Decreased R LE   Coordination []      Tone []     Edema []    Activity Tolerance []  Fatigued with activity    []      COGNITION/  PERCEPTION: Intact Impaired (Comments):   Orientation [x]     Vision []     Hearing [x]     Cognition  [x]       MOBILITY: I Mod I S SBA CGA Min Mod Max Total  NT x2 Comments: Bed Mobility    Rolling [] [] [] [] [] [] [x] [] [] [] [x]    Supine to Sit [] [] [] [] [] [] [x] [] [] [] [x]    Scooting [] [] [] [] [] [] [] [x] [] [] [x]    Sit to Supine [] [] [] [] [] [] [] [] [] [] []    Transfers    Sit to Stand [] [] [] [] [] [] [] [x] [] [] [x]    Bed to Chair [] [] [] [] [] [] [] [x] [] [] [x]    Stand to Sit [] [] [] [] [] [] [] [x] [] [] [x]     [] [] [] [] [] [] [] [] [] [] []    I=Independent, Mod I=Modified Independent, S=Supervision, SBA=Standby Assistance, CGA=Contact Guard Assistance,   Min=Minimal Assistance, Mod=Moderate Assistance, Max=Maximal Assistance, Total=Total Assistance, NT=Not Tested    GAIT: I Mod I S SBA CGA Min Mod Max Total  NT x2 Comments:   Level of Assistance [] [] [] [] [] [] [] [x] [] [] [x]    Distance 2 feet    DME Gait Belt    Gait Quality Ataxic, Decreased step clearance, and Decreased step length    Weightbearing Status Restrictions/Precautions  Restrictions/Precautions: Fall Risk    Stairs      I=Independent, Mod I=Modified Independent, S=Supervision, SBA=Standby Assistance, CGA=Contact Guard Assistance,   Min=Minimal Assistance, Mod=Moderate Assistance, Max=Maximal Assistance, Total=Total Assistance, NT=Not Tested    PLAN:   FREQUENCY AND DURATION: 3 times/week for duration of hospital stay or until stated goals are met, whichever comes first.    THERAPY PROGNOSIS: Good    PROBLEM LIST:   (Skilled intervention is medically necessary to address:)  Decreased ADL/Functional Activities  Decreased Activity Tolerance  Decreased AROM/PROM  Decreased Balance  Decreased Coordination  Decreased Gait Ability  Decreased Strength  Decreased Transfer Abilities INTERVENTIONS PLANNED:   (Benefits and precautions of physical therapy have been discussed with the patient.)  Self Care Training  Therapeutic Activity  Therapeutic Exercise/HEP  Neuromuscular Re-education  Gait Training  Education       TREATMENT:   EVALUATION: LOW COMPLEXITY: (Untimed Charge)    TREATMENT: Co-Treatment PT/OT necessary due to patient's decreased overall endurance/tolerance levels, as well as need for high level skilled assistance to complete functional transfers/mobility and functional tasks  Therapeutic Activity (23 Minutes): Therapeutic activity included Rolling, Supine to Sit, Scooting, Transfer Training, Ambulation on level ground, Sitting balance , and Standing balance to improve functional Activity tolerance, Balance, Coordination, Mobility, and Strength. TREATMENT GRID:  N/A    AFTER TREATMENT PRECAUTIONS: Bed/Chair Locked, Call light within reach, Chair, and Needs within reach    INTERDISCIPLINARY COLLABORATION:  RN/ PCT, PT/ PTA, and OT/ TREVIZO    EDUCATION: Education Given To: Patient  Education Provided: Role of Therapy    TIME IN/OUT:  Time In: 1040  Time Out: [address] [address]  Minutes: 40 Pratibha [place].  Shania Quan

## 2022-12-20 NOTE — PROGRESS NOTES
TRANSFER - IN REPORT:    Verbal report received from [id number] Kelly RN on [facility]  being received from ED for routine progression of patient care      Report consisted of patient's Situation, Background, Assessment and   Recommendations(SBAR). Information from the following report(s) Nurse Handoff Report, Index, ED Encounter Summary, ED SBAR, Adult Overview, Intake/Output, MAR, and Recent Results was reviewed with the receiving nurse. Opportunity for questions and clarification was provided. Assessment completed upon patient's arrival to unit and care assumed. Dual skin assessment completed with Garfield Lemus RN. Sacrum and heels intact. Scattered scars present. Scars noted on back from previous procedures. Skin slightly pale. No additional abnormalities noted.

## 2022-12-20 NOTE — CONSULTS
Consult    Patient: West Ramirez MRN: [id number]     YOB: 1955  Age: 79 y.o. Sex: male      Subjective:      West Ramirez is a 79 y.o. male who is being seen for possible seizure. Please see H&P for details. He was in his normal state of health watching TV and the next thing he knew he was in the ED. EMS states he had seizure activity. He states he recently had a stroke and has been doing rehab for his stroke. His weakness has returned. EEG was done today which was normal. CT head without acute abnormalities.      Past Medical History:   Diagnosis Date    Aortic stenosis     Aortic valve replacement 7/2018    CAD (coronary artery disease)     PCI in 2014, 2015 had MI and then stents    Cancer ([address]     SCC removed face     CHF (congestive heart failure) ([address]     Chronic renal disease, stage III ([address]     sees neph and does not have actual diagnosis at this time    COPD (chronic obstructive pulmonary disease) ([address]     fibrosis in lungs    Dyslipidemia     Heart failure ([address]     CHF per patient    HTN (hypertension)     med controlled    Hyperlipidemia     Ischemic cardiomyopathy     Pacemaker     only pacemaker    Paroxysmal atrial fibrillation ([address]     pradaxa for this    Pulmonary fibrosis ([address]     Seizure disorder ([address]     lyme disease - small lesion frontal part of brain - no maintenance meds - last seizure 2 months ago, due to fall - before that it was nine years    Systolic heart failure [facility])      Past Surgical History:   Procedure Laterality Date    ABLATION OF DYSRHYTHMIC FOCUS      AORTIC VALVE REPLACEMENT  07/2018    BACK SURGERY      discectomy    CARDIAC CATHETERIZATION      PCI 2014, 2015    CARDIAC PROCEDURE N/A 7/11/2022    LEFT HEART CATH / CORONARY ANGIOGRAPHY performed by Syd Madden MD at [facility] CATH LAB    CERVICAL FUSION      C3-4 fusion    EGD  6/17/2022         HIP ARTHROPLASTY Left     SPINAL CORD STIMULATOR SURGERY      lower back     UPPER GASTROINTESTINAL ENDOSCOPY N/A 6/17/2022    EGD ESOPHAGOGASTRODUODENOSCOPY/ PT HAS PACEMAKER performed by Leeann Morataya MD at [facility] ENDOSCOPY      Family History   Problem Relation Age of Onset    Heart Disease Sister     Heart Disease Mother      Social History     Tobacco Use    Smoking status: Every Day     Packs/day: 1.50     Years: 56.00     Pack years: 84.00     Types: Cigarettes    Smokeless tobacco: Never    Tobacco comments:     Quit smoking: cutting back to 4 Cigarettes a day   Substance Use Topics    Alcohol use: Not Currently      Current Facility-Administered Medications   Medication Dose Route Frequency Provider Last Rate Last Admin    cyclobenzaprine (FLEXERIL) tablet 10 mg  10 mg Oral Nightly PRN Rich Arshad MD   10 mg at 12/20/22 0340    levETIRAcetam (KEPPRA) tablet 500 mg  500 mg Oral BID Dicie Africa, DO   500 mg at 12/20/22 8659    LORazepam (ATIVAN) injection 1 mg  1 mg IntraVENous Q6H PRN Dicie Africa, DO        sodium chloride flush 0.9 % injection 5-40 mL  5-40 mL IntraVENous 2 times per day Dicie Africa, DO   10 mL at 12/20/22 0842    sodium chloride flush 0.9 % injection 5-40 mL  5-40 mL IntraVENous PRN Dicie Africa, DO        0.9 % sodium chloride infusion   IntraVENous PRN Dicie Africa, DO        polyethylene glycol (GLYCOLAX) packet 17 g  17 g Oral Daily PRN Dicie Africa, DO        acetaminophen (TYLENOL) tablet 650 mg  650 mg Oral Q6H PRN Dicie Africa, DO        Or    acetaminophen (TYLENOL) suppository 650 mg  650 mg Rectal Q6H PRN Dicie Africa, DO        apixaban (ELIQUIS) tablet 5 mg  5 mg Oral BID Dicie Africa, DO   5 mg at 12/20/22 3468    aspirin chewable tablet 81 mg  81 mg Oral Daily Dicie Africa, DO   81 mg at 12/20/22 0841    atorvastatin (LIPITOR) tablet 80 mg  80 mg Oral Daily Dicie Africa, DO   80 mg at 12/20/22 0842    ferrous sulfate (IRON 325) tablet 325 mg  325 mg Oral BID Dicie Africa, DO   325 mg at 12/20/22 7658 mometasone-formoterol (DULERA) 200-5 MCG/ACT inhaler 2 puff  2 puff Inhalation BID Jordyn DO Best   2 puff at 12/20/22 0924    ipratropium-albuterol (DUONEB) nebulizer solution 3 mL  3 mL Inhalation Q4H PRN Jordyn Jewell,         pantoprazole (PROTONIX) tablet 40 mg  40 mg Oral QAM AC Jordyndakota Jewell DO   40 mg at 12/20/22 0551    tiotropium (SPIRIVA RESPIMAT) 2.5 MCG/ACT inhaler 2 puff  2 puff Inhalation Daily Jordyn DO Best   2 puff at 12/20/22 0356        Allergies   Allergen Reactions    Carbamazepine Anaphylaxis    Lacosamide Nausea Only    Lorazepam Other (See Comments)     Violent behavior    Nitroglycerin Other (See Comments)     hypotension       Review of Systems:  CONSTITUTIONAL: No weight loss, fever, chills, but positive weakness on the right   HEENT: Eyes: No visual loss, blurred vision, double vision or yellow sclerae. Ears, Nose, Throat: No hearing loss, sneezing, congestion, runny nose or sore throat. SKIN: No rash or itching. CARDIOVASCULAR: No chest pain, chest pressure or chest discomfort. No palpitations or edema. RESPIRATORY: No shortness of breath, cough or sputum. GASTROINTESTINAL: No anorexia, nausea, vomiting or diarrhea. No abdominal pain or blood. GENITOURINARY: no burning with urination. NEUROLOGICAL: No headache, dizziness, syncope, paralysis, ataxia, numbness or tingling in the extremities. No change in bowel or bladder control. MUSCULOSKELETAL: No muscle, back pain, joint pain or stiffness. HEMATOLOGIC: No anemia, bleeding or bruising. LYMPHATICS: No enlarged nodes. No history of splenectomy. ENDOCRINOLOGIC: No reports of sweating, cold or heat intolerance. No polyuria or polydipsia. ALLERGIES: No history of asthma, hives, eczema or rhinitis.         Objective:     Vitals:    12/20/22 0728 12/20/22 0924 12/20/22 0927 12/20/22 1309   BP: (!) 144/66   124/81   Pulse: 70 76 76 70   Resp: 16 18 18 18   Temp: 97.3 °F (36.3 °C)   97.9 °F (36.6 °C)   TempSrc: Axillary   Oral   SpO2: 98% 98% 98% 96%   Weight:       Height:            Physical Exam:  General - Well developed, well nourished, in no apparent distress. Pleasant and conversant. Mildly anxious   HEENT - Normocephalic, atraumatic. Conjunctiva, tympanic membranes, and oropharynx are clear. Neck - Supple without masses, no bruits   Cardiovascular - Regular rate and rhythm. Normal S1, S2 without murmurs, rubs, or gallops. Lungs - Clear to auscultation. Abdomen - Soft, nontender with normal bowel sounds. Extremities - Peripheral pulses intact. No edema and no rashes. Neurological examination - Comprehension, attention , memory and reasoning are intact. Language and speech are normal. On cranial nerve examination pupils are equal round and reactive to light. Fundoscopic examination is normal. Visual acuity is adequate. Visual fields are full to finger confrontation. Extraocular motility is normal. Face is symmetric and sensation is intact to light touch. Hearing is intact to finger rustle bilaterally. Motor examination - There is normal muscle tone and bulk. Power is full throughout on the left. In the RUE and RLE he has 1-2/5 strength with a positive Johnston sign and reverse Johnston sign. Muscle stretch reflexes are normoactive and there are no pathological reflexes present. Sensation is intact to light touch, pinprick, vibration and proprioception in all extremities. Cerebellar examination is normal. Gait and stance not assessed due to fall risk. Lab Results   Component Value Date/Time    CHOL 176 10/12/2022 05:49 AM    HDL 40 10/12/2022 05:49 AM        No results found for: HBA1C, WSY3IGDP     CT Results (most recent): Personally Reviewed   Results for orders placed during the hospital encounter of 12/19/22    CT Head W/O Contrast    Narrative  HEAD CT WITHOUT CONTRAST  12/19/2022    HISTORY:   syncope and multiple seizures    TECHNIQUE: Noncontrast axial images were obtained through the brain.   All CT  scans at this facility used dose modulation, interactive reconstruction and/or  weight based dosing when appropriate to reduce radiation dose to as low as  reasonably achievable. COMPARISON: October 16, 2022    FINDINGS: There is no acute intracranial hemorrhage, significant mass effect or  CT evidence of acute large artery territorial infarction. Please note that a  hyperacute infarct or small vessel infarct may not be apparent on initial CT  imaging. Areas of decreased attenuation within the supratentorial white matter suggest  findings of chronic small vessel ischemic disease. There is no hydrocephalus , intra-axial mass or abnormal extra-axial fluid  collection. There are no displaced skull fractures. The mastoid air cells and  paranasal sinuses are clear where imaged. Impression  White matter findings compatible with mild chronic small vessel  ischemic disease. Most recent Echo  Results for orders placed during the hospital encounter of 10/11/22    Transthoracic echocardiogram (TTE) complete with contrast, bubble, strain, and 3D PRN    Interpretation Summary    Left Ventricle: Normal left ventricular systolic function with a visually estimated EF of 55 - 60%. Left ventricle size is normal. Normal wall thickness. Apical Septal motion is consistent with pacing. Normal wall motion. Diastolic function indeterminate in the setting of atrial fibrillation. Average E/e' ratio is 31.04. Aortic Valve: Not well visualized. Appropriately positioned transcatheter bioprosthetic valve that is well-seated. No regurgitation. Unable to assess stenosis. Elevated prosthetic gradient. AV mean gradient is 30 mmHg. AV peak gradient is 47 mmHg. Mitral Valve: Mildly calcified leaflet, at the anterior and posterior leaflets- tips. ? Rheumatic etiology Mild annular calcification of the mitral valve. Moderate stenosis noted.  MV mean gradient is 9 mmHg\at a heart rate of 70 bpm.    Left Atrium: Left atrium is moderately dilated. Interatrial Septum: Agitated saline study was negative with and without provocation. Contrast used: Definity.    -No obvious cardiac source for emboli noted. -Underlying atrial fibrillation s/p AV ale ablation and permanent pacemaker placement-ventricular paced rhythm.  -Negative bubble study for intracardiac shunt.  -Bioprosthetic aortic valve is not well visualized although gradients are elevated. Elevated mitral valve gradients were also noted-consider GODFREY if clinically indicated. Most recent CTA Personally Reviewed  Results for orders placed during the hospital encounter of 10/11/22    CTA HEAD NECK W CONTRAST    Narrative  EXAM: CT angiogram of the head and neck. INDICATION: Right-sided weakness. COMPARISON: None. TECHNIQUE: Axial CT images of the head and neck were obtained in the arterial  phase after the intravenous injection of 100 mL Isovue 370 CT contrast. Stenosis  is graded according to the NASCET criterion. 3D reformatted images were  performed. Radiation dose reduction techniques were used for this study. Our CT  scanners use one or all of the following:  Automated exposure control,  adjustment of the mA or kV according to patient size, iterative reconstruction. Images sent to [facility] for post-processing. FINDINGS: No aneurysm, dissection or high-grade vessel stenosis is identified. There are mild atherosclerotic changes in both carotid bulbs, causing less than  25% stenosis bilaterally. The dural venous sinuses are patent. There is no neck  mass or adenopathy. Impression  No aneurysm, dissection or high-grade vessel stenosis in the  arteries of the head or neck. Assessment and Plan    28-year-old man with a history of seizures who presents after a seizure-like episode. In addition, he had a recent stroke with residual weakness on the right, now worsened after a seizure-like event. CT scan is reassuring.  EEG is normal. His examination reveals functional weakness on the right which may be superimposed on underlying pathology or may be primarily functional. To be cautious, I agree with Keppra 500 mg BID. No further workup necessary. Neurology will sign off.  He states he has a neurologist, but is welcome to see Cyn Montgomery NP in neurology follow-up if he is delayed getting into his primary neurologist.

## 2022-12-20 NOTE — PROGRESS NOTES
Hospitalist Progress Note   Admit Date:  2022  4:11 PM   Name:  Mariann Nation   Age:  79 y.o. Sex:  male  :  1955   MRN:  [id number]   Room:  301/01    Presenting Complaint: Seizures     Reason(s) for Admission: Transient alteration of awareness [R40.4]  Unresponsiveness [R41.89]     Hospital Course:   Patient is a 79 y.o. male who presented to the ED for cc unresponsiveness found by family laying in the floor. Patient unable to give me history and no family in the room. EMS noted seizure like activity. Hx of dCHF EF 55%, CKD stage III, HLD, CAD s/p PCI and TAVR, pacemaker, CVA, severe spinal osteoarthritis / degenerative disease with chronic b/l LE weakness,  s/p upper back surgery with titanium spinal cage, s/p neuro-spinal stimulator, nocturnal hypoxia wearing 2L NC at night, a fib on Eliquis, and seizures no longer on anticonvulsant medications. BP low at times in ER. Labs unremarkable. Lactic acid normal.       CT head with no acute findings  Chest x ray with no obvious pneumonia     UA and UDS pending. Subjective & 24hr Events (22): Patient still complaining of right sided weakness. Assessment & Plan:   Unresponsiveness: possible seizure despite normal EEG. Neurology has evaluated and recommended continuing the [address]. UDS positive for benzos and THC. Patient more alert today    HTN: blood pressure improving.   Will continue to hold bp medications    Pafib: rate controlled will continue Eliquis    HLD: statin    GERD: ppi    Obesity:  complicates care, lifestyle modifications encouraged          Anticipated discharge needs:      PT/OT recommend inpatient rehab    Diet:  ADULT DIET; Easy to Chew; Low Fat/Low Chol/High Fiber/2 gm Na  DVT PPx: Eliquis  Code status: Full Code    Hospital Problems:  Principal Problem:    Unresponsiveness  Active Problems:    Nocturnal hypoxia    Cerebrovascular accident (CVA) ([facility] [address]    Hyperlipidemia    Atrial fibrillation ([address]    Pulmonary fibrosis (HCC)    S/P TAVR (transcatheter aortic valve replacement)    COPD (chronic obstructive pulmonary disease) (HCC)    Chronic renal disease, stage III (HCC)  Resolved Problems:    * No resolved hospital problems.  *      Objective:   Patient Vitals for the past 24 hrs:   Temp Pulse Resp BP SpO2   12/20/22 1639 97.3 °F (36.3 °C) 72 20 123/82 95 %   12/20/22 1542 97.5 °F (36.4 °C) 70 -- 131/81 95 %   12/20/22 1309 97.9 °F (36.6 °C) 70 18 124/81 96 %   12/20/22 0927 -- 76 18 -- 98 %   12/20/22 0924 -- 76 18 -- 98 %   12/20/22 0728 97.3 °F (36.3 °C) 70 16 (!) 144/66 98 %   12/20/22 0456 97.5 °F (36.4 °C) 73 20 126/66 98 %   12/20/22 0250 97.3 °F (36.3 °C) 70 20 122/88 98 %   12/20/22 0215 -- 70 17 (!) 101/56 97 %   12/20/22 0206 -- 73 19 -- 95 %   12/20/22 0201 -- 70 18 (!) 77/52 96 %   12/20/22 0156 -- 70 18 -- 95 %   12/20/22 0136 -- 70 14 -- 99 %   12/20/22 0131 -- 78 17 128/87 --   12/20/22 0126 -- 75 13 -- 99 %   12/20/22 0121 -- 71 13 -- 99 %   12/20/22 0116 -- 77 19 -- 99 %   12/20/22 0111 -- 75 15 111/88 99 %   12/20/22 0109 -- 78 15 -- 100 %   12/20/22 0101 -- 75 15 -- 100 %   12/20/22 0059 -- 81 20 (!) 120/93 99 %   12/20/22 0051 -- 74 19 -- 99 %   12/20/22 0050 -- 79 22 -- 97 %   12/20/22 0049 -- 88 16 -- 96 %   12/20/22 0047 -- 83 19 115/89 96 %   12/20/22 0042 -- 79 17 -- 96 %   12/20/22 0040 -- 80 23 -- 95 %   12/20/22 0039 -- 78 21 -- 94 %   12/20/22 0037 -- 82 21 -- 92 %   12/20/22 0032 -- 70 11 112/76 97 %   12/20/22 0030 -- 70 13 -- 98 %   12/20/22 0027 -- 72 25 -- 96 %   12/20/22 0022 -- 73 19 -- 98 %   12/20/22 0017 -- 72 17 98/72 98 %   12/20/22 0012 -- 70 13 -- 96 %   12/19/22 2345 -- 78 17 (!) 71/54 97 %   12/19/22 2340 -- 70 18 -- 95 %   12/19/22 2335 -- 73 16 -- 96 %   12/19/22 2330 -- 76 18 82/61 --   12/19/22 2325 -- 70 16 -- 94 %   12/19/22 2320 -- 73 17 -- 96 %   12/19/22 2315 -- 70 19 88/70 96 %   12/19/22 2313 -- -- -- -- 93 %   12/19/22 2310 -- 72 21 -- 97 %   12/19/22 2304 -- 74 18 -- 96 %   12/19/22 2303 -- 76 20 (!) 83/58 97 %   12/19/22 2302 -- 80 19 -- --   12/19/22 2301 -- 72 20 -- 96 %   12/19/22 2300 -- 70 22 (!) 81/45 99 %   12/19/22 2259 -- 73 21 -- 99 %   12/19/22 2249 -- 70 16 (!) 102/55 96 %   12/19/22 2239 -- 82 26 -- --   12/19/22 2229 -- 73 16 (!) 81/48 97 %   12/19/22 2219 -- 72 16 (!) 89/57 99 %   12/19/22 2209 -- 70 22 -- 97 %   12/19/22 2206 -- 70 17 -- 98 %   12/19/22 2159 -- 72 14 -- 99 %   12/19/22 2156 -- 70 11 87/62 99 %   12/19/22 2146 -- 72 25 84/67 98 %   12/19/22 2136 -- 71 16 -- 99 %   12/19/22 2126 -- 71 18 (!) 90/54 98 %   12/19/22 2116 -- 70 18 -- 98 %   12/19/22 2109 -- 75 18 -- 100 %   12/19/22 2106 -- 79 23 -- 93 %   12/19/22 2059 -- 72 16 -- --   12/19/22 2049 -- 76 16 -- 99 %   12/19/22 2039 -- 77 20 -- 99 %   12/19/22 2038 -- 76 15 110/89 98 %   12/19/22 2029 -- 72 18 (!) 82/55 97 %   12/19/22 2019 -- 70 19 90/61 97 %   12/19/22 2018 -- 71 19 -- 97 %   12/19/22 2014 -- 74 19 -- 96 %   12/19/22 2008 -- 69 18 -- 95 %   12/19/22 2003 -- 73 18 (!) 95/57 97 %   12/19/22 1959 -- 72 17 (!) 88/57 97 %   12/19/22 1958 -- 70 18 -- 97 %   12/19/22 1953 -- 72 17 -- 97 %   12/19/22 1948 -- 73 17 -- 99 %   12/19/22 1944 -- 70 18 (!) 125/95 97 %   12/19/22 1941 -- 72 14 -- 99 %   12/19/22 1931 -- 72 18 105/62 97 %   12/19/22 1929 -- 73 19 -- 95 %   12/19/22 1921 -- 72 16 -- 96 %   12/19/22 1914 -- 70 20 -- 97 %   12/19/22 1911 -- 72 22 88/63 97 %   12/19/22 1901 -- 73 20 (!) 100/59 98 %   12/19/22 1851 -- 70 21 -- 97 %   12/19/22 1845 -- 75 20 124/88 99 %   12/19/22 1841 -- 74 (!) 32 -- 99 %   12/19/22 1831 -- 72 22 (!) 119/92 98 %   12/19/22 1811 -- 75 22 132/80 99 %   12/19/22 1801 -- 70 23 118/81 100 %   12/19/22 1741 -- 75 27 127/83 99 %   12/19/22 1715 -- 70 13 (!) 132/106 98 %   12/19/22 1711 -- 71 19 (!) 145/93 --       Oxygen Therapy  SpO2: 95 %  Pulse Oximeter Device Mode: Intermittent  O2 Device: Nasal cannula  O2 Flow Rate (L/min): 2 L/min  Blood Gas  Performed?: Yes  Eddie's Test #1: Collateral flow confirmed  Site #1: Right Radial  Site Prepped #1: Yes  Number of Attempts #1: 1  Pressure Held #1: Yes  Complications #1: None  Post-procedure #1: Standard  Specimen Status #1: Point of care  How Tolerated?: Tolerated well    Estimated body mass index is 32.02 kg/m² as calculated from the following:    Height as of this encounter: 6' 1\" (1.854 m). Weight as of this encounter: 242 lb 11.2 oz (110.1 kg). Intake/Output Summary (Last 24 hours) at 12/20/2022 1707  Last data filed at 12/20/2022 1605  Gross per 24 hour   Intake 1810 ml   Output 750 ml   Net 1060 ml         Physical Exam:     Blood pressure 123/82, pulse 72, temperature 97.3 °F (36.3 °C), temperature source Oral, resp. rate 20, height 6' 1\" (1.854 m), weight 242 lb 11.2 oz (110.1 kg), SpO2 95 %. General:    Well nourished. Head:  Normocephalic, atraumatic  Eyes:  Sclerae appear normal.  Pupils equally round. ENT:  Nares appear normal, no drainage. Moist oral mucosa  Neck:  No restricted ROM. Trachea midline   CV:   RRR. No m/r/g. No jugular venous distension. Lungs:   CTAB. No wheezing, rhonchi, or rales. Symmetric expansion. Abdomen: Bowel sounds present. Soft, nontender, nondistended. Extremities: No cyanosis or clubbing. No edema  Skin:     No rashes and normal coloration. Warm and dry. Neuro:  Rt sided extremities 1-2/5 strength. A&Ox3  Psych:  Normal mood and affect.       I have personally reviewed labs and tests showing:  Recent Labs:  Recent Results (from the past 48 hour(s))   EKG 12 Lead    Collection Time: 12/19/22  4:13 PM   Result Value Ref Range    Ventricular Rate 70 BPM    Atrial Rate 155 BPM    P-R Interval 56 ms    QRS Duration 178 ms    Q-T Interval 456 ms    QTc Calculation (Bazett) 493 ms    P Axis 0 degrees    R Axis 260 degrees    T Axis 84 degrees    Diagnosis Atrial fibrillation    POCT Blood Gas & Electrolytes    Collection Time: 12/19/22  4:31 PM   Result Value Ref Range    pH, Arterial, POC 7.44 7.35 - 7.45      pCO2, Arterial, POC 38.1 35 - 45 MMHG    pO2, Arterial, POC 75 75 - 100 MMHG    POC Sodium 142 136 - 145 MMOL/L    POC Potassium 3.7 3.5 - 5.1 MMOL/L    POC Ionized Calcium 1.13 1.12 - 1.32 mmol/L    POC Glucose 136 (H) 65 - 100 MG/DL    Base Excess 1.4 mmol/L    HCO3, Mixed 25.6 22 - 26 MMOL/L    POC TCO2 25 (H) 13 - 23 MMOL/L    POC O2 SAT 95 %    Source ARTERIAL      Site RIGHT RADIAL      POC Eddie's Test Positive      DEVICE NASAL CANNULA      Respiratory Rate 16      Respiratory Rate 16      FIO2 4      Performed by: Dell     eGFR, POC Cannot be calculated >60 ml/min/1.73m2   POCT Glucose    Collection Time: 12/19/22  5:41 PM   Result Value Ref Range    POC Glucose 93 65 - 100 mg/dL    Performed by: Arnol    Lactic Acid    Collection Time: 12/19/22  6:46 PM   Result Value Ref Range    Lactic Acid, Plasma 1.2 0.4 - 2.0 MMOL/L   Lactic Acid    Collection Time: 12/19/22  7:47 PM   Result Value Ref Range    Lactic Acid, Plasma 0.9 0.4 - 2.0 MMOL/L   CBC with Auto Differential    Collection Time: 12/19/22  7:58 PM   Result Value Ref Range    WBC 8.3 4.3 - 11.1 K/uL    RBC 4.78 4.23 - 5.6 M/uL    Hemoglobin 13.7 13.6 - 17.2 g/dL    Hematocrit 41.9 41.1 - 50.3 %    MCV 87.7 82 - 102 FL    MCH 28.7 26.1 - 32.9 PG    MCHC 32.7 31.4 - 35.0 g/dL    RDW 15.6 (H) 11.9 - 14.6 %    Platelets 909 530 - 265 K/uL    MPV 9.9 9.4 - 12.3 FL    nRBC 0.00 0.0 - 0.2 K/uL    Differential Type AUTOMATED      Seg Neutrophils 65 43 - 78 %    Lymphocytes 21 13 - 44 %    Monocytes 10 4.0 - 12.0 %    Eosinophils % 3 0.5 - 7.8 %    Basophils 1 0.0 - 2.0 %    Immature Granulocytes 0 0.0 - 5.0 %    Segs Absolute 5.4 1.7 - 8.2 K/UL    Absolute Lymph # 1.7 0.5 - 4.6 K/UL    Absolute Mono # 0.8 0.1 - 1.3 K/UL    Absolute Eos # 0.2 0.0 - 0.8 K/UL    Basophils Absolute 0.1 0.0 - 0.2 K/UL    Absolute Immature Granulocyte 0.0 0.0 - 0.5 K/UL   Comprehensive Metabolic Panel    Collection Time: 12/19/22  7:58 PM   Result Value Ref Range    Sodium 142 133 - 143 mmol/L    Potassium 3.7 3.5 - 5.1 mmol/L    Chloride 111 (H) 101 - 110 mmol/L    CO2 29 21 - 32 mmol/L    Anion Gap 2 2 - 11 mmol/L    Glucose 104 (H) 65 - 100 mg/dL    BUN 22 8 - 23 MG/DL    Creatinine 1.10 0.8 - 1.5 MG/DL    Est, Glom Filt Rate >60 >60 ml/min/1.73m2    Calcium 8.5 8.3 - 10.4 MG/DL    Total Bilirubin 0.5 0.2 - 1.1 MG/DL    ALT 30 12 - 65 U/L    AST 21 15 - 37 U/L    Alk Phosphatase 102 50 - 136 U/L    Total Protein 6.5 6.3 - 8.2 g/dL    Albumin 3.0 (L) 3.2 - 4.6 g/dL    Globulin 3.5 2.8 - 4.5 g/dL    Albumin/Globulin Ratio 0.9 0.4 - 1.6     Magnesium    Collection Time: 12/19/22  7:58 PM   Result Value Ref Range    Magnesium 2.1 1.8 - 2.4 mg/dL   Protime-INR    Collection Time: 12/19/22  7:58 PM   Result Value Ref Range    Protime 13.2 12.6 - 14.3 sec    INR 1.0     TSH    Collection Time: 12/19/22  7:58 PM   Result Value Ref Range    TSH, 3RD GENERATION 1.640 0.358 - 3.740 uIU/mL   Troponin    Collection Time: 12/19/22  7:58 PM   Result Value Ref Range    Troponin, High Sensitivity 13.5 0 - 14 pg/mL   Acetaminophen Level    Collection Time: 12/19/22  7:58 PM   Result Value Ref Range    Acetaminophen Level <2 (L) 10.0 - 66.8 ug/mL   Salicylate    Collection Time: 12/19/22  7:58 PM   Result Value Ref Range    Salicylate, Serum 2.4 (L) 2.8 - 20.0 MG/DL   CK    Collection Time: 12/19/22  7:58 PM   Result Value Ref Range    Total CK 62 21 - 215 U/L   Urinalysis w rflx microscopic    Collection Time: 12/20/22  1:35 AM   Result Value Ref Range    Color, UA YELLOW/STRAW      Appearance CLEAR      Specific Gravity, UA 1.022 1.001 - 1.023      pH, Urine 5.0 5.0 - 9.0      Protein, UA Negative NEG mg/dL    Glucose, UA Negative mg/dL    Ketones, Urine Negative NEG mg/dL    Bilirubin Urine Negative NEG      Blood, Urine Negative NEG      Urobilinogen, Urine 0.2 0.2 - 1.0 EU/dL    Nitrite, Urine Negative NEG      Leukocyte Esterase, Urine Negative NEG     Urine Drug Screen    Collection Time: 12/20/22  1:35 AM   Result Value Ref Range    PCP, Urine Negative NEG      Benzodiazepines, Urine Positive (A) NEG      Cocaine, Urine Negative NEG      Amphetamine, Urine Negative NEG      Methadone, Urine Negative NEG      THC, TH-Cannabinol, Urine Positive (A) NEG      Opiates, Urine Negative NEG      Barbiturates, Urine Negative NEG     Comprehensive Metabolic Panel w/ Reflex to MG    Collection Time: 12/20/22  4:34 AM   Result Value Ref Range    Sodium 144 (H) 133 - 143 mmol/L    Potassium 3.9 3.5 - 5.1 mmol/L    Chloride 114 (H) 101 - 110 mmol/L    CO2 24 21 - 32 mmol/L    Anion Gap 6 2 - 11 mmol/L    Glucose 94 65 - 100 mg/dL    BUN 19 8 - 23 MG/DL    Creatinine 0.90 0.8 - 1.5 MG/DL    Est, Glom Filt Rate >60 >60 ml/min/1.73m2    Calcium 8.3 8.3 - 10.4 MG/DL    Total Bilirubin 0.8 0.2 - 1.1 MG/DL    ALT 25 12 - 65 U/L    AST 18 15 - 37 U/L    Alk Phosphatase 99 50 - 136 U/L    Total Protein 6.3 6.3 - 8.2 g/dL    Albumin 3.0 (L) 3.2 - 4.6 g/dL    Globulin 3.3 2.8 - 4.5 g/dL    Albumin/Globulin Ratio 0.9 0.4 - 1.6     CBC with Auto Differential    Collection Time: 12/20/22  4:34 AM   Result Value Ref Range    WBC 7.0 4.3 - 11.1 K/uL    RBC 4.77 4.23 - 5.6 M/uL    Hemoglobin 13.4 (L) 13.6 - 17.2 g/dL    Hematocrit 42.1 41.1 - 50.3 %    MCV 88.3 82 - 102 FL    MCH 28.1 26.1 - 32.9 PG    MCHC 31.8 31.4 - 35.0 g/dL    RDW 15.7 (H) 11.9 - 14.6 %    Platelets 392 150 - 637 K/uL    MPV 9.9 9.4 - 12.3 FL    nRBC 0.00 0.0 - 0.2 K/uL    Differential Type AUTOMATED      Seg Neutrophils 62 43 - 78 %    Lymphocytes 23 13 - 44 %    Monocytes 12 4.0 - 12.0 %    Eosinophils % 2 0.5 - 7.8 %    Basophils 1 0.0 - 2.0 %    Immature Granulocytes 0 0.0 - 5.0 %    Segs Absolute 4.3 1.7 - 8.2 K/UL    Absolute Lymph # 1.6 0.5 - 4.6 K/UL    Absolute Mono # 0.9 0.1 - 1.3 K/UL    Absolute Eos # 0.2 0.0 - 0.8 K/UL    Basophils Absolute 0.1 0.0 - 0.2 K/UL    Absolute Immature Granulocyte 0.0 0.0 - 0.5 K/UL       I have personally reviewed imaging studies showing: Other Studies:  CT Head W/O Contrast   Final Result   White matter findings compatible with mild chronic small vessel   ischemic disease. XR CHEST PORTABLE   Final Result   The lungs are clear. Heart is normal in size. Status post TAVR. Implantable cardiac device and leads are unchanged in position. Spinal   stimulator leads overlie the lower thoracic spine. No pneumothorax. No pleural   effusions. Current Meds:  Current Facility-Administered Medications   Medication Dose Route Frequency    cyclobenzaprine (FLEXERIL) tablet 10 mg  10 mg Oral Nightly PRN    levETIRAcetam (KEPPRA) tablet 500 mg  500 mg Oral BID    LORazepam (ATIVAN) injection 1 mg  1 mg IntraVENous Q6H PRN    sodium chloride flush 0.9 % injection 5-40 mL  5-40 mL IntraVENous 2 times per day    sodium chloride flush 0.9 % injection 5-40 mL  5-40 mL IntraVENous PRN    0.9 % sodium chloride infusion   IntraVENous PRN    polyethylene glycol (GLYCOLAX) packet 17 g  17 g Oral Daily PRN    acetaminophen (TYLENOL) tablet 650 mg  650 mg Oral Q6H PRN    Or    acetaminophen (TYLENOL) suppository 650 mg  650 mg Rectal Q6H PRN    apixaban (ELIQUIS) tablet 5 mg  5 mg Oral BID    aspirin chewable tablet 81 mg  81 mg Oral Daily    atorvastatin (LIPITOR) tablet 80 mg  80 mg Oral Daily    ferrous sulfate (IRON 325) tablet 325 mg  325 mg Oral BID    mometasone-formoterol (DULERA) 200-5 MCG/ACT inhaler 2 puff  2 puff Inhalation BID    ipratropium-albuterol (DUONEB) nebulizer solution 3 mL  3 mL Inhalation Q4H PRN    pantoprazole (PROTONIX) tablet 40 mg  40 mg Oral QAM AC    tiotropium (SPIRIVA RESPIMAT) 2.5 MCG/ACT inhaler 2 puff  2 puff Inhalation Daily       Signed:  Mercedes Candelario MD    Part of this note may have been written by using a voice dictation software.   The note has been proof read but may still contain some grammatical/other typographical errors.

## 2022-12-20 NOTE — PROCEDURES
Routine EEG Report    Reason for EEG: Seizure-like event     Scheduled Meds:   levETIRAcetam  500 mg Oral BID    sodium chloride flush  5-40 mL IntraVENous 2 times per day    apixaban  5 mg Oral BID    aspirin  81 mg Oral Daily    atorvastatin  80 mg Oral Daily    ferrous sulfate  325 mg Oral BID    mometasone-formoterol  2 puff Inhalation BID    pantoprazole  40 mg Oral QAM AC    tiotropium  2 puff Inhalation Daily     Continuous Infusions:   sodium chloride       PRN Meds:.cyclobenzaprine, LORazepam, sodium chloride flush, sodium chloride, polyethylene glycol, acetaminophen **OR** acetaminophen, ipratropium-albuterol    Technical Summary: This EEG was performed with a 32-channel digital EEG machine with electrodes placed according to the international 10-20 system of placement. Background:   During the maximal awake state a posterior dominant rhythm of 9 Hz was seen. It was well regulated and well sustained, symmetric, and reactive to eye opening. Anterior background consisted of medium amplitude activity in the alpha range with occasional intermixed beta frequencies. Hyperventilation: Hyperventilation was not performed due to medical condition    Photic Stimulation: Photic stimulation was performed at various flash frequencies and no abnormalites were seen. Sleep: Stage II non-REM sleep was characterized by symmetric vertex sharp transients and symmetric sleep spindles. Impression: This EEG is normal in the awake and sleep states. No interictal epileptiform discharges or lateralizing features were seen.

## 2022-12-21 ENCOUNTER — APPOINTMENT (OUTPATIENT)
Dept: CT IMAGING | Age: 67
DRG: 101 | End: 2022-12-21
Payer: MEDICARE

## 2022-12-21 ENCOUNTER — APPOINTMENT (OUTPATIENT)
Dept: GENERAL RADIOLOGY | Age: 67
DRG: 101 | End: 2022-12-21
Payer: MEDICARE

## 2022-12-21 LAB
ALBUMIN SERPL-MCNC: 2.9 G/DL (ref 3.2–4.6)
ALBUMIN/GLOB SERPL: 0.9 {RATIO} (ref 0.4–1.6)
ALP SERPL-CCNC: 94 U/L (ref 50–136)
ALT SERPL-CCNC: 26 U/L (ref 12–65)
ANION GAP SERPL CALC-SCNC: 4 MMOL/L (ref 2–11)
ANION GAP SERPL CALC-SCNC: 4 MMOL/L (ref 2–11)
ARTERIAL PATENCY WRIST A: POSITIVE
ARTERIAL PATENCY WRIST A: POSITIVE
AST SERPL-CCNC: 17 U/L (ref 15–37)
BASE DEFICIT BLD-SCNC: 0.4 MMOL/L
BASE EXCESS BLD CALC-SCNC: 0.7 MMOL/L
BASOPHILS # BLD: 0.1 K/UL (ref 0–0.2)
BASOPHILS NFR BLD: 1 % (ref 0–2)
BDY SITE: NORMAL
BDY SITE: NORMAL
BILIRUB SERPL-MCNC: 0.7 MG/DL (ref 0.2–1.1)
BUN SERPL-MCNC: 13 MG/DL (ref 8–23)
BUN SERPL-MCNC: 16 MG/DL (ref 8–23)
CALCIUM SERPL-MCNC: 8.6 MG/DL (ref 8.3–10.4)
CALCIUM SERPL-MCNC: 8.7 MG/DL (ref 8.3–10.4)
CHLORIDE SERPL-SCNC: 110 MMOL/L (ref 101–110)
CHLORIDE SERPL-SCNC: 111 MMOL/L (ref 101–110)
CK SERPL-CCNC: 66 U/L (ref 21–215)
CO2 SERPL-SCNC: 25 MMOL/L (ref 21–32)
CO2 SERPL-SCNC: 27 MMOL/L (ref 21–32)
CREAT SERPL-MCNC: 1.1 MG/DL (ref 0.8–1.5)
CREAT SERPL-MCNC: 1.3 MG/DL (ref 0.8–1.5)
DIFFERENTIAL METHOD BLD: ABNORMAL
EOSINOPHIL # BLD: 0.2 K/UL (ref 0–0.8)
EOSINOPHIL NFR BLD: 2 % (ref 0.5–7.8)
ERYTHROCYTE [DISTWIDTH] IN BLOOD BY AUTOMATED COUNT: 15.2 % (ref 11.9–14.6)
GLOBULIN SER CALC-MCNC: 3.4 G/DL (ref 2.8–4.5)
GLUCOSE BLD STRIP.AUTO-MCNC: 102 MG/DL (ref 65–100)
GLUCOSE SERPL-MCNC: 103 MG/DL (ref 65–100)
GLUCOSE SERPL-MCNC: 87 MG/DL (ref 65–100)
HCO3 BLD-SCNC: 24.1 MMOL/L (ref 22–26)
HCO3 BLD-SCNC: 25.4 MMOL/L (ref 22–26)
HCT VFR BLD AUTO: 40.7 % (ref 41.1–50.3)
HGB BLD-MCNC: 13.2 G/DL (ref 13.6–17.2)
IMM GRANULOCYTES # BLD AUTO: 0 K/UL (ref 0–0.5)
IMM GRANULOCYTES NFR BLD AUTO: 0 % (ref 0–5)
LYMPHOCYTES # BLD: 1.9 K/UL (ref 0.5–4.6)
LYMPHOCYTES NFR BLD: 21 % (ref 13–44)
MAGNESIUM SERPL-MCNC: 2.1 MG/DL (ref 1.8–2.4)
MCH RBC QN AUTO: 28.6 PG (ref 26.1–32.9)
MCHC RBC AUTO-ENTMCNC: 32.4 G/DL (ref 31.4–35)
MCV RBC AUTO: 88.1 FL (ref 82–102)
MONOCYTES # BLD: 0.8 K/UL (ref 0.1–1.3)
MONOCYTES NFR BLD: 9 % (ref 4–12)
NEUTS SEG # BLD: 6.1 K/UL (ref 1.7–8.2)
NEUTS SEG NFR BLD: 67 % (ref 43–78)
NRBC # BLD: 0 K/UL (ref 0–0.2)
PCO2 BLD: 38.1 MMHG (ref 35–45)
PCO2 BLD: 40.2 MMHG (ref 35–45)
PH BLD: 7.41 [PH] (ref 7.35–7.45)
PH BLD: 7.41 [PH] (ref 7.35–7.45)
PLATELET # BLD AUTO: 176 K/UL (ref 150–450)
PMV BLD AUTO: 10 FL (ref 9.4–12.3)
PO2 BLD: 82 MMHG (ref 75–100)
PO2 BLD: 85 MMHG (ref 75–100)
POC FIO2: 3
POC FIO2: 3
POTASSIUM SERPL-SCNC: 3.8 MMOL/L (ref 3.5–5.1)
POTASSIUM SERPL-SCNC: 4 MMOL/L (ref 3.5–5.1)
PROT SERPL-MCNC: 6.3 G/DL (ref 6.3–8.2)
RBC # BLD AUTO: 4.62 M/UL (ref 4.23–5.6)
SAO2 % BLD: 96 % (ref 95–98)
SAO2 % BLD: 96.5 % (ref 95–98)
SERVICE CMNT-IMP: ABNORMAL
SERVICE CMNT-IMP: NORMAL
SERVICE CMNT-IMP: NORMAL
SODIUM SERPL-SCNC: 140 MMOL/L (ref 133–143)
SODIUM SERPL-SCNC: 141 MMOL/L (ref 133–143)
SPECIMEN TYPE: NORMAL
SPECIMEN TYPE: NORMAL
WBC # BLD AUTO: 9 K/UL (ref 4.3–11.1)

## 2022-12-21 PROCEDURE — 6360000002 HC RX W HCPCS

## 2022-12-21 PROCEDURE — 94640 AIRWAY INHALATION TREATMENT: CPT

## 2022-12-21 PROCEDURE — 6370000000 HC RX 637 (ALT 250 FOR IP): Performed by: INTERNAL MEDICINE

## 2022-12-21 PROCEDURE — 85025 COMPLETE CBC W/AUTO DIFF WBC: CPT

## 2022-12-21 PROCEDURE — 2580000003 HC RX 258

## 2022-12-21 PROCEDURE — 72125 CT NECK SPINE W/O DYE: CPT

## 2022-12-21 PROCEDURE — 2580000003 HC RX 258: Performed by: HOSPITALIST

## 2022-12-21 PROCEDURE — 2580000003 HC RX 258: Performed by: FAMILY MEDICINE

## 2022-12-21 PROCEDURE — 82803 BLOOD GASES ANY COMBINATION: CPT

## 2022-12-21 PROCEDURE — 82550 ASSAY OF CK (CPK): CPT

## 2022-12-21 PROCEDURE — 70450 CT HEAD/BRAIN W/O DYE: CPT

## 2022-12-21 PROCEDURE — 6360000002 HC RX W HCPCS: Performed by: HOSPITALIST

## 2022-12-21 PROCEDURE — 82962 GLUCOSE BLOOD TEST: CPT

## 2022-12-21 PROCEDURE — 71045 X-RAY EXAM CHEST 1 VIEW: CPT

## 2022-12-21 PROCEDURE — 80053 COMPREHEN METABOLIC PANEL: CPT

## 2022-12-21 PROCEDURE — 6370000000 HC RX 637 (ALT 250 FOR IP): Performed by: FAMILY MEDICINE

## 2022-12-21 PROCEDURE — 36600 WITHDRAWAL OF ARTERIAL BLOOD: CPT

## 2022-12-21 PROCEDURE — 6370000000 HC RX 637 (ALT 250 FOR IP): Performed by: HOSPITALIST

## 2022-12-21 PROCEDURE — 83735 ASSAY OF MAGNESIUM: CPT

## 2022-12-21 PROCEDURE — 1100000000 HC RM PRIVATE

## 2022-12-21 PROCEDURE — 95816 EEG AWAKE AND DROWSY: CPT

## 2022-12-21 PROCEDURE — 6370000000 HC RX 637 (ALT 250 FOR IP): Performed by: PSYCHIATRY & NEUROLOGY

## 2022-12-21 PROCEDURE — 36415 COLL VENOUS BLD VENIPUNCTURE: CPT

## 2022-12-21 RX ORDER — DIAZEPAM 2 MG/1
5 TABLET ORAL NIGHTLY PRN
Status: DISCONTINUED | OUTPATIENT
Start: 2022-12-22 | End: 2022-12-21

## 2022-12-21 RX ORDER — METOPROLOL SUCCINATE 50 MG/1
100 TABLET, EXTENDED RELEASE ORAL EVERY 12 HOURS
Status: DISCONTINUED | OUTPATIENT
Start: 2022-12-21 | End: 2022-12-23 | Stop reason: HOSPADM

## 2022-12-21 RX ORDER — ALBUTEROL SULFATE 90 UG/1
2 AEROSOL, METERED RESPIRATORY (INHALATION) EVERY 4 HOURS PRN
Status: DISCONTINUED | OUTPATIENT
Start: 2022-12-21 | End: 2022-12-23 | Stop reason: HOSPADM

## 2022-12-21 RX ORDER — LORAZEPAM 2 MG/ML
1 INJECTION INTRAMUSCULAR ONCE
Status: COMPLETED | OUTPATIENT
Start: 2022-12-21 | End: 2022-12-21

## 2022-12-21 RX ORDER — CYCLOBENZAPRINE HCL 5 MG
10 TABLET ORAL NIGHTLY PRN
Status: DISCONTINUED | OUTPATIENT
Start: 2022-12-21 | End: 2022-12-23 | Stop reason: HOSPADM

## 2022-12-21 RX ORDER — OXYCODONE HYDROCHLORIDE 5 MG/1
5 TABLET ORAL EVERY 4 HOURS PRN
Status: DISCONTINUED | OUTPATIENT
Start: 2022-12-21 | End: 2022-12-23 | Stop reason: HOSPADM

## 2022-12-21 RX ORDER — LORAZEPAM 2 MG/ML
2 INJECTION INTRAMUSCULAR ONCE
Status: DISCONTINUED | OUTPATIENT
Start: 2022-12-21 | End: 2022-12-23 | Stop reason: HOSPADM

## 2022-12-21 RX ORDER — DIAZEPAM 2 MG/1
5 TABLET ORAL NIGHTLY PRN
Status: DISCONTINUED | OUTPATIENT
Start: 2022-12-21 | End: 2022-12-23 | Stop reason: HOSPADM

## 2022-12-21 RX ORDER — LEVETIRACETAM 500 MG/1
750 TABLET ORAL 2 TIMES DAILY
Status: DISCONTINUED | OUTPATIENT
Start: 2022-12-21 | End: 2022-12-21

## 2022-12-21 RX ORDER — LEVETIRACETAM 500 MG/1
500 TABLET ORAL 2 TIMES DAILY
Status: DISCONTINUED | OUTPATIENT
Start: 2022-12-21 | End: 2022-12-23 | Stop reason: HOSPADM

## 2022-12-21 RX ORDER — LORAZEPAM 2 MG/ML
2 INJECTION INTRAMUSCULAR ONCE
Status: COMPLETED | OUTPATIENT
Start: 2022-12-21 | End: 2022-12-21

## 2022-12-21 RX ADMIN — APIXABAN 5 MG: 5 TABLET, FILM COATED ORAL at 10:12

## 2022-12-21 RX ADMIN — ASPIRIN 81 MG: 81 TABLET, CHEWABLE ORAL at 10:12

## 2022-12-21 RX ADMIN — LEVETIRACETAM 3500 MG: 100 INJECTION, SOLUTION INTRAVENOUS at 04:41

## 2022-12-21 RX ADMIN — LEVETIRACETAM 750 MG: 500 TABLET, FILM COATED ORAL at 12:30

## 2022-12-21 RX ADMIN — LEVETIRACETAM 500 MG: 500 TABLET, FILM COATED ORAL at 22:07

## 2022-12-21 RX ADMIN — DIAZEPAM 5 MG: 2 TABLET ORAL at 23:29

## 2022-12-21 RX ADMIN — FERROUS SULFATE TAB 325 MG (65 MG ELEMENTAL FE) 325 MG: 325 (65 FE) TAB at 22:07

## 2022-12-21 RX ADMIN — METOPROLOL SUCCINATE 100 MG: 50 TABLET, EXTENDED RELEASE ORAL at 17:36

## 2022-12-21 RX ADMIN — LORAZEPAM 1 MG: 2 INJECTION INTRAMUSCULAR; INTRAVENOUS at 00:39

## 2022-12-21 RX ADMIN — LORAZEPAM 2 MG: 2 INJECTION INTRAMUSCULAR; INTRAVENOUS at 01:58

## 2022-12-21 RX ADMIN — ATORVASTATIN CALCIUM 80 MG: 80 TABLET, FILM COATED ORAL at 10:13

## 2022-12-21 RX ADMIN — SODIUM CHLORIDE, PRESERVATIVE FREE 10 ML: 5 INJECTION INTRAVENOUS at 22:08

## 2022-12-21 RX ADMIN — FERROUS SULFATE TAB 325 MG (65 MG ELEMENTAL FE) 325 MG: 325 (65 FE) TAB at 10:13

## 2022-12-21 RX ADMIN — OXYCODONE 5 MG: 5 TABLET ORAL at 17:36

## 2022-12-21 RX ADMIN — DEXTROSE MONOHYDRATE 1000 MG PE: 5 INJECTION, SOLUTION INTRAVENOUS at 01:12

## 2022-12-21 RX ADMIN — SODIUM CHLORIDE 1000 MG: 9 INJECTION, SOLUTION INTRAVENOUS at 00:04

## 2022-12-21 RX ADMIN — SODIUM CHLORIDE, PRESERVATIVE FREE 10 ML: 5 INJECTION INTRAVENOUS at 10:51

## 2022-12-21 RX ADMIN — APIXABAN 5 MG: 5 TABLET, FILM COATED ORAL at 22:07

## 2022-12-21 ASSESSMENT — PAIN SCALES - GENERAL
PAINLEVEL_OUTOF10: 0

## 2022-12-21 ASSESSMENT — PAIN DESCRIPTION - ORIENTATION: ORIENTATION: LOWER

## 2022-12-21 ASSESSMENT — PAIN DESCRIPTION - LOCATION: LOCATION: BACK

## 2022-12-21 NOTE — PROGRESS NOTES
Orders received from MD Stack for ICU transfer. Awaiting bed assignment, left Kevon Hanley (primary contact), a message stating patient's imminent transfer and a call back number. Received verbal order from MD Stack to give dose of Keppra while awaiting ICU transfer.

## 2022-12-21 NOTE — PROGRESS NOTES
Rapid response was called as patient was having mental status epilepticus, patient was not waking up in between seizure episodes, patient was having around 1 seizure every 10 minutes, 1 g of IV Keppra given, loaded with fosphenytoin, CT head ordered which did not show any acute pathology, telemetry neurology consultation requested. Patient continued to have seizures despite of loading with fosphenytoin and Keppra, telemedicine neurologist recommended another 3.5 g of Keppra to make it a total of 4.5 g and another gram of fosphenytoin. Currently patient is waking up in between seizures, he was not initially, I will give Keppra as recommended dose, if no improvement I would give fosphenytoin as well, I am worried about severe sedation that would cause by these medications which need ICU bed and no bed is available in ICU right now, waiting for bed for almost 2 hours at this time. I have obtained ABG 2 times during this process, patient able to maintain airway, not retaining any CO2. I have contacted intensivist about intubation who recommended to monitor, if condition gets worse to intubate.

## 2022-12-21 NOTE — PROGRESS NOTES
At approx 2350, patient was found on the floor, seizing. Pt was having a seizure when he fell out of the bed on the right side. A rapid response was called. Pt was on the floor, not responding. Vital signs were taken. Pt had stopped seizing for approx 20 seconds and then started to seize again. Per Dr. Rangel Duran verbal orders, 2mg IV ativan was given to the patient. The patient stopped seizing and he was picked up off the floor via the nursing staff and placed back into the bed. Vikram Rivera - Dr. Humphrey Travis placed a 20g in his L FA. Pt started to seize again. Keppra infusion started at this time. Pt's seizure lasted 10 seconds. Pt drowsy but able to state his name and FC approx 1 minute after seizure. Pupils E/R/R    0018 - Pt had a 7 second long seizure. Keppra still infusing at this time. Pt able to state name and FC approx 15 seconds after seizure. Pupils E/R/R. Between 0020 and 0023, patient had 3 episodes, approx 10 seconds long, where his eyes would deviate to the right. Pt would not respond to his name at this time and his eyes did not react to threat. 0031 - Pt demonstrated small tremors in hands then stared off to the right. Pt did not respond to his name. This lasted approx 5. Pt was not verbal after this episode. At 0035, 0040, 0043, pt would start to posture (decerebrate) and would seize for about 7-10 seconds. It would take the patient approx a minute to open his eyes, state his name and FC. At approx 0040, I spoke to Dr Montserrat Torres about my concerns for the patient. Pt was contining to seize and his face would turn red/purple when seizing. Concern for airway protection was brought up. Per Dr. Montserrat Torres, Dr. Humphrey Travis will be up and ICU transfer orders will be placed. Pt received another 1mg IV ativan per orders. 65 - Dr Humphrey Travis at bedside. Pt seized again for approx 4-5 seconds. Pt starting to have sonorous breathing after his episodes.  Pt continues to be on 3L O2 via NC and his O2 sat was 97% at this time.     0110 - Pt started to posture again and had a 10 second seizure with a R sided gaze. 0112 - IVPB Cerebyx started per Dr. Kimmy Alan Orders. 0141 - Pt had a 3 second long seizure. 7381 - Dr. Izzy Armenta in the room to assess patient. 5 - Dr Izzy Armenta in the room when the patient had a 5 second seizure. Per Dr. Izzy Armenta verbal orders, another one time dose 2mg     0157 - 5 second seizure noted. 0210 - Pt started talking and asking questions. Pt is confused and think he is home. Oriented to self, john time, place, situation. 8224 - Dr. Oneal Listen at bedside. Pt stated he now remembers he was on the floor. The patient states his legs are cramping as well. 0236 - On the way to CT scan, pt had a 3 second seizure. 0300 - Pt back in room from CT scan. 200 - Tele Neuro MD in room and assessing patient. 3492 - Patient had another seizure lasting 10 seconds. The Tele MD witnessed it as well. Pt turned red/purple during episode and was lethargic after. Pupils E/R/R.    5864 - Pt had a short episode lasting 3 seconds while laying on L side. 4841 - Pt had another episode lasting 5-6 seconds while laying on L side. 1351 - Patient awake and conversing. 0440 - Pt loaded with 3500mg IV Keppra per orders. 0600 - Per Dr. Karina De La Torre orders, pt's Keppra dose increased to 750mg PO BID. Per Dr. Izzy Armenta, first dose to be at 1400. AM Cerebyx held per Dr. Karina De La Torre orders.

## 2022-12-21 NOTE — PROGRESS NOTES
Initial visit made to patient and a prayer was provided.  catina attention to the blue streamer with the  on-call telephone number.         Raymond Sahu, [address] [facility], [facility]

## 2022-12-21 NOTE — PROCEDURES
Routine EEG Report    Reason for EEG: Seizure-like event     Scheduled Meds:   LORazepam  2 mg IntraVENous Once    fosphenytoin  100 mg PE IntraVENous Q8H    levETIRAcetam  750 mg Oral BID    sodium chloride flush  5-40 mL IntraVENous 2 times per day    apixaban  5 mg Oral BID    aspirin  81 mg Oral Daily    atorvastatin  80 mg Oral Daily    ferrous sulfate  325 mg Oral BID    mometasone-formoterol  2 puff Inhalation BID    pantoprazole  40 mg Oral QAM AC    tiotropium  2 puff Inhalation Daily     Continuous Infusions:   sodium chloride       PRN Meds:.oxyCODONE, cyclobenzaprine, LORazepam, sodium chloride flush, sodium chloride, polyethylene glycol, ipratropium-albuterol    Technical Summary: This EEG was performed with a 32-channel digital EEG machine with electrodes placed according to the international 10-20 system of placement. Background:   During the maximal awake state a posterior dominant rhythm of 9 Hz was seen. It was well regulated and well sustained, symmetric, and reactive to eye opening. Anterior background consisted of medium amplitude activity in the alpha range with occasional intermixed beta frequencies. Hyperventilation: Hyperventilation was not performed due to medical condition    Photic Stimulation: Photic stimulation was performed at various flash frequencies and no abnormalites were seen. Sleep: None    Impression: This EEG is normal in the awake state. No interictal epileptiform discharges or lateralizing features were seen.

## 2022-12-21 NOTE — PROGRESS NOTES
Hospitalist Progress Note   Admit Date:  2022  4:11 PM   Name:  Bernice Van   Age:  79 y.o. Sex:  male  :  1955   MRN:  [id number]   Room:  [facility]/    Presenting Complaint: Seizures     Reason(s) for Admission: Transient alteration of awareness [R40.4]  Unresponsiveness [R41.89]     Hospital Course:   Patient is a 79 y.o. male who presented to the ED for cc unresponsiveness found by family laying in the floor. Patient unable to give me history and no family in the room. EMS noted seizure like activity. Hx of dCHF EF 55%, CKD stage III, HLD, CAD s/p PCI and TAVR, pacemaker, CVA, severe spinal osteoarthritis / degenerative disease with chronic b/l LE weakness,  s/p upper back surgery with titanium spinal cage, s/p neuro-spinal stimulator, nocturnal hypoxia wearing 2L NC at night, a fib on Eliquis, and seizures no longer on anticonvulsant medications. BP low at times in ER. Labs unremarkable. Lactic acid normal.       CT head with no acute findings  Chest x ray with no obvious pneumonia     UA and UDS pending. Subjective & 24hr Events (22): Repeat episode of \"seizure\" overnight. Patient transferred to the ICU for closer observation. Given fosphenytoin. Complaining of bilaterallower leg pain      Assessment & Plan:   Unresponsiveness: Neurology has re-evaluated and feels this recent episode might not be a true \"seizure\". EEG x2 were found to be normal.  It should be noted that patient left [facility] on 22 after becoming frustrated with his \"seizure\" w/u there. Currently we will continue Keppra only and monitor in the ICU overnight. HTN: blood pressure consistently elevated. Will restart Metoprolol    Pafib: rate controlled will continue Eliquis    HLD: statin    GERD: ppi    Obesity:  complicates care, lifestyle modifications encouraged    Chronic pain: has spinal stimulator but states it has not been helping.   Will add prn oxy Anticipated discharge needs:      PT/OT recommend inpatient rehab    Diet:  ADULT DIET; Easy to Chew; Low Fat/Low Chol/High Fiber/2 gm Na  DVT PPx: Eliquis  Code status: Full Code    Hospital Problems:  Principal Problem:    Unresponsiveness  Active Problems:    Nocturnal hypoxia    Cerebrovascular accident (CVA) ([facility]    Hyperlipidemia    Atrial fibrillation ([facility]    Pulmonary fibrosis (HCC)    S/P TAVR (transcatheter aortic valve replacement)    COPD (chronic obstructive pulmonary disease) ([facility])    Chronic renal disease, stage III ([facility]  Resolved Problems:    * No resolved hospital problems. *      Objective:   Patient Vitals for the past 24 hrs:   Temp Pulse Resp BP SpO2   12/21/22 1400 -- 78 23 (!) 140/73 95 %   12/21/22 1300 -- 70 30 (!) 152/67 94 %   12/21/22 1200 -- 69 15 (!) 150/69 95 %   12/21/22 1100 -- 69 (!) 38 (!) 143/71 96 %   12/21/22 1000 -- 82 17 (!) 146/102 97 %   12/21/22 0900 -- 69 (!) 31 133/82 99 %   12/21/22 0815 98 °F (36.7 °C) 80 15 136/79 95 %   12/21/22 0715 -- 71 19 125/89 --   12/21/22 0444 -- 70 20 136/78 96 %   12/21/22 0227 -- 71 20 124/78 96 %   12/21/22 0203 -- 70 16 111/82 97 %   12/21/22 0120 -- 79 24 122/78 95 %   12/20/22 2352 -- 79 18 (!) 140/70 99 %   12/20/22 2044 97.7 °F (36.5 °C) 71 20 117/75 94 %   12/20/22 2036 -- -- -- -- 96 %   12/20/22 1639 97.3 °F (36.3 °C) 72 20 123/82 95 %   12/20/22 1542 97.5 °F (36.4 °C) 70 -- 131/81 95 %       Oxygen Therapy  SpO2: 95 %  Pulse Oximeter Device Mode: Intermittent  O2 Device: None (Room air)  O2 Flow Rate (L/min): 2 L/min  Blood Gas  Performed?: Yes  Eddie's Test #1: Collateral flow confirmed  Site #1: Right Radial  Site Prepped #1: Yes  Number of Attempts #1: 1  Pressure Held #1: Yes  Complications #1: None  Post-procedure #1: Standard  Specimen Status #1: Point of care  How Tolerated?: Tolerated well    Estimated body mass index is 31 kg/m² as calculated from the following:    Height as of this encounter: 6' 1\" (1.854 m). Weight as of this encounter: 235 lb (106.6 kg). Intake/Output Summary (Last 24 hours) at 12/21/2022 1446  Last data filed at 12/21/2022 1300  Gross per 24 hour   Intake 840 ml   Output 750 ml   Net 90 ml         Physical Exam:     Blood pressure (!) 140/73, pulse 78, temperature 98 °F (36.7 °C), temperature source Oral, resp. rate 23, height 6' 1\" (1.854 m), weight 235 lb (106.6 kg), SpO2 95 %. General:    Well nourished. obese  Head:  Normocephalic, atraumatic  Eyes:  Sclerae appear normal.  Pupils equally round. ENT:  Nares appear normal, no drainage. Moist oral mucosa  Neck:  No restricted ROM. Trachea midline   CV:   RRR. No m/r/g. No jugular venous distension. Lungs:   CTAB. No wheezing, rhonchi, or rales. Symmetric expansion. Abdomen: Bowel sounds present. Soft, nontender, nondistended. Extremities: No cyanosis or clubbing. No edema  Skin:     No rashes and normal coloration. Warm and dry. Neuro:  Rt sided extremities 1-2/5 strength. A&Ox3  Psych:  Normal mood and affect.       I have personally reviewed labs and tests showing:  Recent Labs:  Recent Results (from the past 48 hour(s))   EKG 12 Lead    Collection Time: 12/19/22  4:13 PM   Result Value Ref Range    Ventricular Rate 70 BPM    Atrial Rate 155 BPM    P-R Interval 56 ms    QRS Duration 178 ms    Q-T Interval 456 ms    QTc Calculation (Bazett) 493 ms    P Axis 0 degrees    R Axis 260 degrees    T Axis 84 degrees    Diagnosis Atrial fibrillation    POCT Blood Gas & Electrolytes    Collection Time: 12/19/22  4:31 PM   Result Value Ref Range    pH, Arterial, POC 7.44 7.35 - 7.45      pCO2, Arterial, POC 38.1 35 - 45 MMHG    pO2, Arterial, POC 75 75 - 100 MMHG    POC Sodium 142 136 - 145 MMOL/L    POC Potassium 3.7 3.5 - 5.1 MMOL/L    POC Ionized Calcium 1.13 1.12 - 1.32 mmol/L    POC Glucose 136 (H) 65 - 100 MG/DL    Base Excess 1.4 mmol/L    HCO3, Mixed 25.6 22 - 26 MMOL/L    POC TCO2 25 (H) 13 - 23 MMOL/L    POC O2 SAT 95 % Source ARTERIAL      Site RIGHT RADIAL      POC Eddie's Test Positive      DEVICE NASAL CANNULA      Respiratory Rate 16      Respiratory Rate 16      FIO2 4      Performed by: Maki Humphrey     eGFR, POC Cannot be calculated >60 ml/min/1.73m2   POCT Glucose    Collection Time: 12/19/22  5:41 PM   Result Value Ref Range    POC Glucose 93 65 - 100 mg/dL    Performed by: Arnol    Lactic Acid    Collection Time: 12/19/22  6:46 PM   Result Value Ref Range    Lactic Acid, Plasma 1.2 0.4 - 2.0 MMOL/L   Lactic Acid    Collection Time: 12/19/22  7:47 PM   Result Value Ref Range    Lactic Acid, Plasma 0.9 0.4 - 2.0 MMOL/L   CBC with Auto Differential    Collection Time: 12/19/22  7:58 PM   Result Value Ref Range    WBC 8.3 4.3 - 11.1 K/uL    RBC 4.78 4.23 - 5.6 M/uL    Hemoglobin 13.7 13.6 - 17.2 g/dL    Hematocrit 41.9 41.1 - 50.3 %    MCV 87.7 82 - 102 FL    MCH 28.7 26.1 - 32.9 PG    MCHC 32.7 31.4 - 35.0 g/dL    RDW 15.6 (H) 11.9 - 14.6 %    Platelets 737 124 - 643 K/uL    MPV 9.9 9.4 - 12.3 FL    nRBC 0.00 0.0 - 0.2 K/uL    Differential Type AUTOMATED      Seg Neutrophils 65 43 - 78 %    Lymphocytes 21 13 - 44 %    Monocytes 10 4.0 - 12.0 %    Eosinophils % 3 0.5 - 7.8 %    Basophils 1 0.0 - 2.0 %    Immature Granulocytes 0 0.0 - 5.0 %    Segs Absolute 5.4 1.7 - 8.2 K/UL    Absolute Lymph # 1.7 0.5 - 4.6 K/UL    Absolute Mono # 0.8 0.1 - 1.3 K/UL    Absolute Eos # 0.2 0.0 - 0.8 K/UL    Basophils Absolute 0.1 0.0 - 0.2 K/UL    Absolute Immature Granulocyte 0.0 0.0 - 0.5 K/UL   Comprehensive Metabolic Panel    Collection Time: 12/19/22  7:58 PM   Result Value Ref Range    Sodium 142 133 - 143 mmol/L    Potassium 3.7 3.5 - 5.1 mmol/L    Chloride 111 (H) 101 - 110 mmol/L    CO2 29 21 - 32 mmol/L    Anion Gap 2 2 - 11 mmol/L    Glucose 104 (H) 65 - 100 mg/dL    BUN 22 8 - 23 MG/DL    Creatinine 1.10 0.8 - 1.5 MG/DL    Est, Glom Filt Rate >60 >60 ml/min/1.73m2    Calcium 8.5 8.3 - 10.4 MG/DL    Total Bilirubin 0.5 0.2 - 1.1 MG/DL    ALT 30 12 - 65 U/L    AST 21 15 - 37 U/L    Alk Phosphatase 102 50 - 136 U/L    Total Protein 6.5 6.3 - 8.2 g/dL    Albumin 3.0 (L) 3.2 - 4.6 g/dL    Globulin 3.5 2.8 - 4.5 g/dL    Albumin/Globulin Ratio 0.9 0.4 - 1.6     Magnesium    Collection Time: 12/19/22  7:58 PM   Result Value Ref Range    Magnesium 2.1 1.8 - 2.4 mg/dL   Protime-INR    Collection Time: 12/19/22  7:58 PM   Result Value Ref Range    Protime 13.2 12.6 - 14.3 sec    INR 1.0     TSH    Collection Time: 12/19/22  7:58 PM   Result Value Ref Range    TSH, 3RD GENERATION 1.640 0.358 - 3.740 uIU/mL   Troponin    Collection Time: 12/19/22  7:58 PM   Result Value Ref Range    Troponin, High Sensitivity 13.5 0 - 14 pg/mL   Acetaminophen Level    Collection Time: 12/19/22  7:58 PM   Result Value Ref Range    Acetaminophen Level <2 (L) 10.0 - 39.6 ug/mL   Salicylate    Collection Time: 12/19/22  7:58 PM   Result Value Ref Range    Salicylate, Serum 2.4 (L) 2.8 - 20.0 MG/DL   CK    Collection Time: 12/19/22  7:58 PM   Result Value Ref Range    Total CK 62 21 - 215 U/L   Urinalysis w rflx microscopic    Collection Time: 12/20/22  1:35 AM   Result Value Ref Range    Color, UA YELLOW/STRAW      Appearance CLEAR      Specific Gravity, UA 1.022 1.001 - 1.023      pH, Urine 5.0 5.0 - 9.0      Protein, UA Negative NEG mg/dL    Glucose, UA Negative mg/dL    Ketones, Urine Negative NEG mg/dL    Bilirubin Urine Negative NEG      Blood, Urine Negative NEG      Urobilinogen, Urine 0.2 0.2 - 1.0 EU/dL    Nitrite, Urine Negative NEG      Leukocyte Esterase, Urine Negative NEG     Urine Drug Screen    Collection Time: 12/20/22  1:35 AM   Result Value Ref Range    PCP, Urine Negative NEG      Benzodiazepines, Urine Positive (A) NEG      Cocaine, Urine Negative NEG      Amphetamine, Urine Negative NEG      Methadone, Urine Negative NEG      THC, TH-Cannabinol, Urine Positive (A) NEG      Opiates, Urine Negative NEG      Barbiturates, Urine Negative NEG Comprehensive Metabolic Panel w/ Reflex to MG    Collection Time: 12/20/22  4:34 AM   Result Value Ref Range    Sodium 144 (H) 133 - 143 mmol/L    Potassium 3.9 3.5 - 5.1 mmol/L    Chloride 114 (H) 101 - 110 mmol/L    CO2 24 21 - 32 mmol/L    Anion Gap 6 2 - 11 mmol/L    Glucose 94 65 - 100 mg/dL    BUN 19 8 - 23 MG/DL    Creatinine 0.90 0.8 - 1.5 MG/DL    Est, Glom Filt Rate >60 >60 ml/min/1.73m2    Calcium 8.3 8.3 - 10.4 MG/DL    Total Bilirubin 0.8 0.2 - 1.1 MG/DL    ALT 25 12 - 65 U/L    AST 18 15 - 37 U/L    Alk Phosphatase 99 50 - 136 U/L    Total Protein 6.3 6.3 - 8.2 g/dL    Albumin 3.0 (L) 3.2 - 4.6 g/dL    Globulin 3.3 2.8 - 4.5 g/dL    Albumin/Globulin Ratio 0.9 0.4 - 1.6     CBC with Auto Differential    Collection Time: 12/20/22  4:34 AM   Result Value Ref Range    WBC 7.0 4.3 - 11.1 K/uL    RBC 4.77 4.23 - 5.6 M/uL    Hemoglobin 13.4 (L) 13.6 - 17.2 g/dL    Hematocrit 42.1 41.1 - 50.3 %    MCV 88.3 82 - 102 FL    MCH 28.1 26.1 - 32.9 PG    MCHC 31.8 31.4 - 35.0 g/dL    RDW 15.7 (H) 11.9 - 14.6 %    Platelets 536 017 - 470 K/uL    MPV 9.9 9.4 - 12.3 FL    nRBC 0.00 0.0 - 0.2 K/uL    Differential Type AUTOMATED      Seg Neutrophils 62 43 - 78 %    Lymphocytes 23 13 - 44 %    Monocytes 12 4.0 - 12.0 %    Eosinophils % 2 0.5 - 7.8 %    Basophils 1 0.0 - 2.0 %    Immature Granulocytes 0 0.0 - 5.0 %    Segs Absolute 4.3 1.7 - 8.2 K/UL    Absolute Lymph # 1.6 0.5 - 4.6 K/UL    Absolute Mono # 0.9 0.1 - 1.3 K/UL    Absolute Eos # 0.2 0.0 - 0.8 K/UL    Basophils Absolute 0.1 0.0 - 0.2 K/UL    Absolute Immature Granulocyte 0.0 0.0 - 0.5 K/UL   Arterial Blood Gas, POC    Collection Time: 12/21/22 12:01 AM   Result Value Ref Range    pH, Arterial, POC 7.41 7.35 - 7.45      pCO2, Arterial, POC 38.1 35 - 45 MMHG    pO2, Arterial, POC 85 75 - 100 MMHG    HCO3, Mixed 24.1 22 - 26 MMOL/L    SO2c, Arterial, POC 96.5 95 - 98 %    BASE DEFICIT (POC) 0.4 mmol/L    POC Eddie's Test Positive      Site RIGHT RADIAL Specimen type: ARTERIAL      Performed by: [email or web address]     FIO2 3     POCT Glucose    Collection Time: 12/21/22  1:00 AM   Result Value Ref Range    POC Glucose 102 (H) 65 - 100 mg/dL    Performed by:  Og    Comprehensive Metabolic Panel    Collection Time: 12/21/22  1:07 AM   Result Value Ref Range    Sodium 141 133 - 143 mmol/L    Potassium 4.0 3.5 - 5.1 mmol/L    Chloride 110 101 - 110 mmol/L    CO2 27 21 - 32 mmol/L    Anion Gap 4 2 - 11 mmol/L    Glucose 103 (H) 65 - 100 mg/dL    BUN 16 8 - 23 MG/DL    Creatinine 1.30 0.8 - 1.5 MG/DL    Est, Glom Filt Rate >60 >60 ml/min/1.73m2    Calcium 8.6 8.3 - 10.4 MG/DL    Total Bilirubin 0.7 0.2 - 1.1 MG/DL    ALT 26 12 - 65 U/L    AST 17 15 - 37 U/L    Alk Phosphatase 94 50 - 136 U/L    Total Protein 6.3 6.3 - 8.2 g/dL    Albumin 2.9 (L) 3.2 - 4.6 g/dL    Globulin 3.4 2.8 - 4.5 g/dL    Albumin/Globulin Ratio 0.9 0.4 - 1.6     Magnesium    Collection Time: 12/21/22  1:07 AM   Result Value Ref Range    Magnesium 2.1 1.8 - 2.4 mg/dL   CK    Collection Time: 12/21/22  1:07 AM   Result Value Ref Range    Total CK 66 21 - 215 U/L   Arterial Blood Gas, POC    Collection Time: 12/21/22  1:57 AM   Result Value Ref Range    pH, Arterial, POC 7.41 7.35 - 7.45      pCO2, Arterial, POC 40.2 35 - 45 MMHG    pO2, Arterial, POC 82 75 - 100 MMHG    HCO3, Mixed 25.4 22 - 26 MMOL/L    SO2c, Arterial, POC 96.0 95 - 98 %    Base Excess 0.7 mmol/L    POC Eddie's Test Positive      Site RIGHT RADIAL      Specimen type: ARTERIAL      Performed by: [email or web address]     FIO2 3     CBC with Auto Differential    Collection Time: 12/21/22  7:38 AM   Result Value Ref Range    WBC 9.0 4.3 - 11.1 K/uL    RBC 4.62 4.23 - 5.6 M/uL    Hemoglobin 13.2 (L) 13.6 - 17.2 g/dL    Hematocrit 40.7 (L) 41.1 - 50.3 %    MCV 88.1 82 - 102 FL    MCH 28.6 26.1 - 32.9 PG    MCHC 32.4 31.4 - 35.0 g/dL    RDW 15.2 (H) 11.9 - 14.6 %    Platelets 437 560 - 767 K/uL    MPV 10.0 9.4 - 12.3 FL    nRBC 0.00 0.0 - 0.2 K/uL    Differential Type AUTOMATED      Seg Neutrophils 67 43 - 78 %    Lymphocytes 21 13 - 44 %    Monocytes 9 4.0 - 12.0 %    Eosinophils % 2 0.5 - 7.8 %    Basophils 1 0.0 - 2.0 %    Immature Granulocytes 0 0.0 - 5.0 %    Segs Absolute 6.1 1.7 - 8.2 K/UL    Absolute Lymph # 1.9 0.5 - 4.6 K/UL    Absolute Mono # 0.8 0.1 - 1.3 K/UL    Absolute Eos # 0.2 0.0 - 0.8 K/UL    Basophils Absolute 0.1 0.0 - 0.2 K/UL    Absolute Immature Granulocyte 0.0 0.0 - 0.5 K/UL   Basic Metabolic Panel w/ Reflex to MG    Collection Time: 12/21/22  7:38 AM   Result Value Ref Range    Sodium 140 133 - 143 mmol/L    Potassium 3.8 3.5 - 5.1 mmol/L    Chloride 111 (H) 101 - 110 mmol/L    CO2 25 21 - 32 mmol/L    Anion Gap 4 2 - 11 mmol/L    Glucose 87 65 - 100 mg/dL    BUN 13 8 - 23 MG/DL    Creatinine 1.10 0.8 - 1.5 MG/DL    Est, Glom Filt Rate >60 >60 ml/min/1.73m2    Calcium 8.7 8.3 - 10.4 MG/DL       I have personally reviewed imaging studies showing: Other Studies:  CT CERVICAL SPINE WO CONTRAST   Final Result      1. No acute fracture or dislocation in the cervical spine. 2. Anterior surgical fusion. Degenerative changes. CT HEAD WO CONTRAST   Final Result      1. No CT evidence of acute intracranial abnormality. 2. No significant change compared to prior exam.      CT Head W/O Contrast   Final Result   White matter findings compatible with mild chronic small vessel   ischemic disease. XR CHEST PORTABLE   Final Result   The lungs are clear. Heart is normal in size. Status post TAVR. Implantable cardiac device and leads are unchanged in position. Spinal   stimulator leads overlie the lower thoracic spine. No pneumothorax. No pleural   effusions.              Current Meds:  Current Facility-Administered Medications   Medication Dose Route Frequency    LORazepam (ATIVAN) injection 2 mg  2 mg IntraVENous Once    oxyCODONE (ROXICODONE) immediate release tablet 5 mg  5 mg Oral Q4H PRN    levETIRAcetam (KEPPRA) tablet 500 mg  500 mg Oral BID    cyclobenzaprine (FLEXERIL) tablet 10 mg  10 mg Oral Nightly PRN    LORazepam (ATIVAN) injection 1 mg  1 mg IntraVENous Q6H PRN    sodium chloride flush 0.9 % injection 5-40 mL  5-40 mL IntraVENous 2 times per day    sodium chloride flush 0.9 % injection 5-40 mL  5-40 mL IntraVENous PRN    0.9 % sodium chloride infusion   IntraVENous PRN    polyethylene glycol (GLYCOLAX) packet 17 g  17 g Oral Daily PRN    apixaban (ELIQUIS) tablet 5 mg  5 mg Oral BID    aspirin chewable tablet 81 mg  81 mg Oral Daily    atorvastatin (LIPITOR) tablet 80 mg  80 mg Oral Daily    ferrous sulfate (IRON 325) tablet 325 mg  325 mg Oral BID    mometasone-formoterol (DULERA) 200-5 MCG/ACT inhaler 2 puff  2 puff Inhalation BID    ipratropium-albuterol (DUONEB) nebulizer solution 3 mL  3 mL Inhalation Q4H PRN    pantoprazole (PROTONIX) tablet 40 mg  40 mg Oral QAM AC    tiotropium (SPIRIVA RESPIMAT) 2.5 MCG/ACT inhaler 2 puff  2 puff Inhalation Daily       Signed:  Rosa Maria Garnica MD    Part of this note may have been written by using a voice dictation software. The note has been proof read but may still contain some grammatical/other typographical errors.

## 2022-12-21 NOTE — PROGRESS NOTES
Neurology Daily Progress Note     Assessment:     72-year-old man with a history of seizures who presents after a seizure-like episode. In addition, he had a recent stroke with residual weakness on the right, now worsened after a seizure-like event. CT scan is reassuring. EEG is normal. His examination reveals functional weakness on the right which may be superimposed on underlying pathology or may be primarily functional.  After seeing the patient, he had multiple seizure-like events overnight and was placed in the ICU. It is unclear if these episodes are epileptic. Plan:     Repeat EEG pending    Continue Keppra. I am not convinced these events are epileptic. Please call me to bedside if another event occurs. Further recommendation to follow     Addendum: Patient would like to go to rehab to regain his strength. Recommend PT/OT make recs on this    Subjective: Interval history:    The patient had multiple seizure-like events overnight. He was put in the ICU and given additional levetiracetam as well as fosphenytoin. Today, he is awake, alert, and agitated. He reports pain. History:    Corina Martinez is a 79 y.o. male who is being seen for seizure-like activity.     Past Medical History:   Diagnosis Date    Aortic stenosis     Aortic valve replacement 7/2018    CAD (coronary artery disease)     PCI in 2014, 2015 had MI and then stents    Cancer ([address]     SCC removed face     CHF (congestive heart failure) ([address]     Chronic renal disease, stage III ([address]     sees neph and does not have actual diagnosis at this time    COPD (chronic obstructive pulmonary disease) ([address]     fibrosis in lungs    Dyslipidemia     Heart failure ([address]     CHF per patient    HTN (hypertension)     med controlled    Hyperlipidemia     Ischemic cardiomyopathy     Pacemaker     only pacemaker    Paroxysmal atrial fibrillation ([address]     pradaxa for this    Pulmonary fibrosis ([address]     Seizure disorder ([address]     lyme disease - small lesion frontal part of brain - no maintenance meds - last seizure 2 months ago, due to fall - before that it was nine years    Systolic heart failure [facility])      Past Surgical History:   Procedure Laterality Date    ABLATION OF DYSRHYTHMIC FOCUS      AORTIC VALVE REPLACEMENT  07/2018    BACK SURGERY      discectomy    CARDIAC CATHETERIZATION      PCI 2014, 2015    CARDIAC PROCEDURE N/A 7/11/2022    LEFT HEART CATH / CORONARY ANGIOGRAPHY performed by Madelin Perea MD at [address] [place] [address] CATH LAB    CERVICAL FUSION      C3-4 fusion    EGD  6/17/2022         HIP ARTHROPLASTY Left     SPINAL CORD STIMULATOR SURGERY      lower back     UPPER GASTROINTESTINAL ENDOSCOPY N/A 6/17/2022    EGD ESOPHAGOGASTRODUODENOSCOPY/ PT HAS PACEMAKER performed by Sachi Alvarez MD at [facility] ENDOSCOPY      Family History   Problem Relation Age of Onset    Heart Disease Sister     Heart Disease Mother      Social History     Tobacco Use    Smoking status: Every Day     Packs/day: 1.50     Years: 56.00     Pack years: 84.00     Types: Cigarettes    Smokeless tobacco: Never    Tobacco comments:     Quit smoking: cutting back to 4 Cigarettes a day   Substance Use Topics    Alcohol use: Not Currently      Current Facility-Administered Medications   Medication Dose Route Frequency Provider Last Rate Last Admin    LORazepam (ATIVAN) injection 2 mg  2 mg IntraVENous Once Breana Mckeon MD        fosphenytoin (CEREBYX) 100 mg PE in sodium chloride (PF) 0.9 % 10 mL injection  100 mg PE IntraVENous Q8H Scottie Stack MD        levETIRAcetam (KEPPRA) tablet 750 mg  750 mg Oral BID Scottie Stack MD        oxyCODONE (ROXICODONE) immediate release tablet 5 mg  5 mg Oral Q4H PRN Hans Mckeon MD        cyclobenzaprine (FLEXERIL) tablet 10 mg  10 mg Oral Nightly PRN Jose Enrique Huertas MD   10 mg at 12/20/22 0340    LORazepam (ATIVAN) injection 1 mg  1 mg IntraVENous Q6H PRN Melody Alejo DO        sodium chloride flush 0.9 % injection 5-40 mL  5-40 mL IntraVENous 2 times per day Milbert Elena, DO   10 mL at 12/20/22 2110    sodium chloride flush 0.9 % injection 5-40 mL  5-40 mL IntraVENous PRN Milbert Elena, DO        0.9 % sodium chloride infusion   IntraVENous PRN Milbert Elena, DO        polyethylene glycol (GLYCOLAX) packet 17 g  17 g Oral Daily PRN Milbert Elena, DO        acetaminophen (TYLENOL) tablet 650 mg  650 mg Oral Q6H PRN Milbert Elena, DO        Or    acetaminophen (TYLENOL) suppository 650 mg  650 mg Rectal Q6H PRN Milbert Elena, DO        apixaban Zerita Hussar) tablet 5 mg  5 mg Oral BID Milbert Elena, DO   5 mg at 12/20/22 2110    aspirin chewable tablet 81 mg  81 mg Oral Daily Milbert Elena, DO   81 mg at 12/20/22 0841    atorvastatin (LIPITOR) tablet 80 mg  80 mg Oral Daily Milbert Elena, DO   80 mg at 12/20/22 8863    ferrous sulfate (IRON 325) tablet 325 mg  325 mg Oral BID Milbert Elena, DO   325 mg at 12/20/22 2110    mometasone-formoterol (DULERA) 200-5 MCG/ACT inhaler 2 puff  2 puff Inhalation BID Milbert Elena, DO   2 puff at 12/20/22 2037    ipratropium-albuterol (DUONEB) nebulizer solution 3 mL  3 mL Inhalation Q4H PRN Milbert Elena, DO        pantoprazole (PROTONIX) tablet 40 mg  40 mg Oral QAM AC Milbert Elena, DO   40 mg at 12/20/22 0551    tiotropium (SPIRIVA RESPIMAT) 2.5 MCG/ACT inhaler 2 puff  2 puff Inhalation Daily Milbert Elena, DO   2 puff at 12/20/22 7524        Allergies   Allergen Reactions    Carbamazepine Anaphylaxis    Lacosamide Nausea Only    Lorazepam Other (See Comments)     Violent behavior    Nitroglycerin Other (See Comments)     hypotension       Review of systems negative with exception of pertinent positives and negatives noted above.        Objective:     Vitals:    12/21/22 0203 12/21/22 0227 12/21/22 0444 12/21/22 0715   BP: 111/82 124/78 136/78 125/89   Pulse: 70 71 70 71   Resp: 16 20 20 19   Temp:       TempSrc:       SpO2: 97% 96% 96%    Weight: Height:              Current Facility-Administered Medications:     LORazepam (ATIVAN) injection 2 mg, 2 mg, IntraVENous, Once, Anu Ballard MD    fosphenytoin (CEREBYX) 100 mg PE in sodium chloride (PF) 0.9 % 10 mL injection, 100 mg PE, IntraVENous, Q8H, Scottie Stack MD    levETIRAcetam (KEPPRA) tablet 750 mg, 750 mg, Oral, BID, Scottie Stack MD    oxyCODONE (ROXICODONE) immediate release tablet 5 mg, 5 mg, Oral, Q4H PRN, Dm Auguste MD    cyclobenzaprine (FLEXERIL) tablet 10 mg, 10 mg, Oral, Nightly PRN, Iwona Amin MD, 10 mg at 12/20/22 0340    LORazepam (ATIVAN) injection 1 mg, 1 mg, IntraVENous, Q6H PRN, Radford Calk, DO    sodium chloride flush 0.9 % injection 5-40 mL, 5-40 mL, IntraVENous, 2 times per day, Radford Calk, DO, 10 mL at 12/20/22 2110    sodium chloride flush 0.9 % injection 5-40 mL, 5-40 mL, IntraVENous, PRN, Radford Calk, DO    0.9 % sodium chloride infusion, , IntraVENous, PRN, Radford Calk, DO    polyethylene glycol (GLYCOLAX) packet 17 g, 17 g, Oral, Daily PRN, Radford Calk, DO    acetaminophen (TYLENOL) tablet 650 mg, 650 mg, Oral, Q6H PRN **OR** acetaminophen (TYLENOL) suppository 650 mg, 650 mg, Rectal, Q6H PRN, Radford Calk, DO    apixaban (ELIQUIS) tablet 5 mg, 5 mg, Oral, BID, Radford Calk, DO, 5 mg at 12/20/22 2110    aspirin chewable tablet 81 mg, 81 mg, Oral, Daily, Radford Calk, DO, 81 mg at 12/20/22 0841    atorvastatin (LIPITOR) tablet 80 mg, 80 mg, Oral, Daily, Radford Calk, DO, 80 mg at 12/20/22 8086    ferrous sulfate (IRON 325) tablet 325 mg, 325 mg, Oral, BID, Radford Calk, DO, 325 mg at 12/20/22 2110    mometasone-formoterol (DULERA) 200-5 MCG/ACT inhaler 2 puff, 2 puff, Inhalation, BID, Radford Calk, DO, 2 puff at 12/20/22 2037    ipratropium-albuterol (DUONEB) nebulizer solution 3 mL, 3 mL, Inhalation, Q4H PRN, Radford Calk, DO    pantoprazole (PROTONIX) tablet 40 mg, 40 mg, Oral, QAM AC, Radford Calk, DO, 40 mg at 12/20/22 0551    tiotropium (SPIRIVA RESPIMAT) 2.5 MCG/ACT inhaler 2 puff, 2 puff, Inhalation, Daily, Henrique Veranch, DO, 2 puff at 12/20/22 1560    Recent Results (from the past 12 hour(s))   Arterial Blood Gas, POC    Collection Time: 12/21/22 12:01 AM   Result Value Ref Range    pH, Arterial, POC 7.41 7.35 - 7.45      pCO2, Arterial, POC 38.1 35 - 45 MMHG    pO2, Arterial, POC 85 75 - 100 MMHG    HCO3, Mixed 24.1 22 - 26 MMOL/L    SO2c, Arterial, POC 96.5 95 - 98 %    BASE DEFICIT (POC) 0.4 mmol/L    POC Eddie's Test Positive      Site RIGHT RADIAL      Specimen type: ARTERIAL      Performed by: Penny Cole     POCT Glucose    Collection Time: 12/21/22  1:00 AM   Result Value Ref Range    POC Glucose 102 (H) 65 - 100 mg/dL    Performed by:  Og    Comprehensive Metabolic Panel    Collection Time: 12/21/22  1:07 AM   Result Value Ref Range    Sodium 141 133 - 143 mmol/L    Potassium 4.0 3.5 - 5.1 mmol/L    Chloride 110 101 - 110 mmol/L    CO2 27 21 - 32 mmol/L    Anion Gap 4 2 - 11 mmol/L    Glucose 103 (H) 65 - 100 mg/dL    BUN 16 8 - 23 MG/DL    Creatinine 1.30 0.8 - 1.5 MG/DL    Est, Glom Filt Rate >60 >60 ml/min/1.73m2    Calcium 8.6 8.3 - 10.4 MG/DL    Total Bilirubin 0.7 0.2 - 1.1 MG/DL    ALT 26 12 - 65 U/L    AST 17 15 - 37 U/L    Alk Phosphatase 94 50 - 136 U/L    Total Protein 6.3 6.3 - 8.2 g/dL    Albumin 2.9 (L) 3.2 - 4.6 g/dL    Globulin 3.4 2.8 - 4.5 g/dL    Albumin/Globulin Ratio 0.9 0.4 - 1.6     Magnesium    Collection Time: 12/21/22  1:07 AM   Result Value Ref Range    Magnesium 2.1 1.8 - 2.4 mg/dL   CK    Collection Time: 12/21/22  1:07 AM   Result Value Ref Range    Total CK 66 21 - 215 U/L   Arterial Blood Gas, POC    Collection Time: 12/21/22  1:57 AM   Result Value Ref Range    pH, Arterial, POC 7.41 7.35 - 7.45      pCO2, Arterial, POC 40.2 35 - 45 MMHG    pO2, Arterial, POC 82 75 - 100 MMHG    HCO3, Mixed 25.4 22 - 26 MMOL/L    SO2c, Arterial, POC 96.0 95 - 98 %    Base Excess 0.7 mmol/L    POC Eddie's Test Positive      Site RIGHT RADIAL      Specimen type: ARTERIAL      Performed by: Penny     FIO2 3     CBC with Auto Differential    Collection Time: 12/21/22  7:38 AM   Result Value Ref Range    WBC 9.0 4.3 - 11.1 K/uL    RBC 4.62 4.23 - 5.6 M/uL    Hemoglobin 13.2 (L) 13.6 - 17.2 g/dL    Hematocrit 40.7 (L) 41.1 - 50.3 %    MCV 88.1 82 - 102 FL    MCH 28.6 26.1 - 32.9 PG    MCHC 32.4 31.4 - 35.0 g/dL    RDW 15.2 (H) 11.9 - 14.6 %    Platelets 278 785 - 052 K/uL    MPV 10.0 9.4 - 12.3 FL    nRBC 0.00 0.0 - 0.2 K/uL    Differential Type AUTOMATED      Seg Neutrophils 67 43 - 78 %    Lymphocytes 21 13 - 44 %    Monocytes 9 4.0 - 12.0 %    Eosinophils % 2 0.5 - 7.8 %    Basophils 1 0.0 - 2.0 %    Immature Granulocytes 0 0.0 - 5.0 %    Segs Absolute 6.1 1.7 - 8.2 K/UL    Absolute Lymph # 1.9 0.5 - 4.6 K/UL    Absolute Mono # 0.8 0.1 - 1.3 K/UL    Absolute Eos # 0.2 0.0 - 0.8 K/UL    Basophils Absolute 0.1 0.0 - 0.2 K/UL    Absolute Immature Granulocyte 0.0 0.0 - 0.5 K/UL   Basic Metabolic Panel w/ Reflex to MG    Collection Time: 12/21/22  7:38 AM   Result Value Ref Range    Sodium 140 133 - 143 mmol/L    Potassium 3.8 3.5 - 5.1 mmol/L    Chloride 111 (H) 101 - 110 mmol/L    CO2 25 21 - 32 mmol/L    Anion Gap 4 2 - 11 mmol/L    Glucose 87 65 - 100 mg/dL    BUN 13 8 - 23 MG/DL    Creatinine 1.10 0.8 - 1.5 MG/DL    Est, Glom Filt Rate >60 >60 ml/min/1.73m2    Calcium 8.7 8.3 - 10.4 MG/DL       Most recent Echo  Results for orders placed during the hospital encounter of 10/11/22    Transthoracic echocardiogram (TTE) complete with contrast, bubble, strain, and 3D PRN    Interpretation Summary    Left Ventricle: Normal left ventricular systolic function with a visually estimated EF of 55 - 60%. Left ventricle size is normal. Normal wall thickness. Apical Septal motion is consistent with pacing. Normal wall motion.  Diastolic function indeterminate in the setting of atrial fibrillation. Average E/e' ratio is 31.04. Aortic Valve: Not well visualized. Appropriately positioned transcatheter bioprosthetic valve that is well-seated. No regurgitation. Unable to assess stenosis. Elevated prosthetic gradient. AV mean gradient is 30 mmHg. AV peak gradient is 47 mmHg. Mitral Valve: Mildly calcified leaflet, at the anterior and posterior leaflets- tips. ? Rheumatic etiology Mild annular calcification of the mitral valve. Moderate stenosis noted. MV mean gradient is 9 mmHg\at a heart rate of 70 bpm.    Left Atrium: Left atrium is moderately dilated. Interatrial Septum: Agitated saline study was negative with and without provocation. Contrast used: Definity.    -No obvious cardiac source for emboli noted. -Underlying atrial fibrillation s/p AV ale ablation and permanent pacemaker placement-ventricular paced rhythm.  -Negative bubble study for intracardiac shunt.  -Bioprosthetic aortic valve is not well visualized although gradients are elevated. Elevated mitral valve gradients were also noted-consider GODFREY if clinically indicated. Physical Exam:  General - Well developed, well nourished, in no apparent distress. Pleasant and conversant. Mildly anxious   HEENT - Normocephalic, atraumatic. Conjunctiva, tympanic membranes, and oropharynx are clear. Neck - Supple without masses, no bruits   Cardiovascular - Regular rate and rhythm. Normal S1, S2 without murmurs, rubs, or gallops. Lungs - Clear to auscultation. Abdomen - Soft, nontender with normal bowel sounds. Extremities - Peripheral pulses intact. No edema and no rashes. Neurological examination - Comprehension, attention , memory and reasoning are intact. Language and speech are normal. On cranial nerve examination pupils are equal round and reactive to light. Fundoscopic examination is normal. Visual acuity is adequate. Visual fields are full to finger confrontation.  Extraocular motility is normal. Face is symmetric and sensation is intact to light touch. Hearing is intact to finger rustle bilaterally. Motor examination - There is normal muscle tone and bulk. Power is full throughout on the left. In the RUE and RLE he has 1-2/5 strength with a positive Johnston sign and reverse Johnston sign. Muscle stretch reflexes are normoactive and there are no pathological reflexes present. Sensation is intact to light touch, pinprick, vibration and proprioception in all extremities. Cerebellar examination is normal. Gait and stance not assessed due to fall risk. Signed By: Artemio Cruz DO     December 21, 2022      I spent 38 minutes today with the patient, which included chart review, documentation, discussing care with other providers, and greater than 50% of time was spent in direct face-to-face contact, obtaining history, exam, coordinating care, and counseling the patient on medical condition.

## 2022-12-21 NOTE — INTERDISCIPLINARY ROUNDS
Multi-D Rounds/Checklist (leapfrog):  Lines: can any be removed?: None       DVT Prophylaxis: Ordered  Vent: N/A  Nutrition Ordered/appropriate: Ordered  Can antibiotics or other drugs be stopped? Is Nozin performed: Yes/End Date set  Consults needed:  neuro  A: Is pain control adequate? (has PRNs? Stop drip?) Yes  B: Sedation break and SBT? N/A  C: Is sedation choice appropriate? N/A  D: Delirium/CAM-ICU? No  E: Mobility goals/appropriateness? Yes  F: Family update and plan? wife is primary contact and is being updated daily by primary attending and nursing staff.     Slava Armenta, APRN - CNP

## 2022-12-21 NOTE — PROGRESS NOTES
Physician Progress Note      PATIENTCreed Vi  [facility] #:                  [id number]  :                       1955  ADMIT DATE:       2022 4:11 PM  [address] [place] DATE:  RESPONDING  PROVIDER #:        Radha San MD          QUERY TEXT:    Patient admitted with unresponsiveness, possible seizure, noted to have   paroxysmal atrial fibrillation and is maintained on eliquis. If possible,   please document in progress notes and discharge summary if you are evaluating   and/or treating any of the following: The medical record reflects the following:  Risk Factors:  79 y.o. male who presented to the ED for cc unresponsiveness. Hx of dCHF EF 55%, CKD stage III, HLD, CAD s/p PCI and TAVR, pacemaker, CVA,   severe spinal osteoarthritis / degenerative disease with chronic b/l LE   weakness, s/p upper back surgery with titanium spinal cage, s/p neuro-spinal   stimulator, nocturnal hypoxia wearing 2L NC at night, a fib on Eliquis, and   seizures no longer on anticonvulsant medications. Clinical Indicators: Paroxysmal atrial fibrillation  Treatment: Eliquis    Thank you,  Bernice Moncada RN, BSN, CDI  [email or web address]  . Options provided:  -- Secondary hypercoagulable state in a patient with atrial fibrillation  -- Other - I will add my own diagnosis  -- Disagree - Not applicable / Not valid  -- Disagree - Clinically unable to determine / Unknown  -- Refer to Clinical Documentation Reviewer    PROVIDER RESPONSE TEXT:    This patient has secondary hypercoagulable state in a patient with atrial   fibrillation.     Query created by: Dominic Rosario on 2022 9:39 AM      Electronically signed by:  Radha San MD 2022 2:46 PM

## 2022-12-22 LAB
ANION GAP SERPL CALC-SCNC: 5 MMOL/L (ref 2–11)
BASOPHILS # BLD: 0.1 K/UL (ref 0–0.2)
BASOPHILS NFR BLD: 1 % (ref 0–2)
BUN SERPL-MCNC: 11 MG/DL (ref 8–23)
CALCIUM SERPL-MCNC: 8.3 MG/DL (ref 8.3–10.4)
CHLORIDE SERPL-SCNC: 108 MMOL/L (ref 101–110)
CO2 SERPL-SCNC: 23 MMOL/L (ref 21–32)
CREAT SERPL-MCNC: 1.2 MG/DL (ref 0.8–1.5)
DIFFERENTIAL METHOD BLD: ABNORMAL
EOSINOPHIL # BLD: 0.3 K/UL (ref 0–0.8)
EOSINOPHIL NFR BLD: 3 % (ref 0.5–7.8)
ERYTHROCYTE [DISTWIDTH] IN BLOOD BY AUTOMATED COUNT: 15.2 % (ref 11.9–14.6)
GLUCOSE SERPL-MCNC: 137 MG/DL (ref 65–100)
HCT VFR BLD AUTO: 42.3 % (ref 41.1–50.3)
HGB BLD-MCNC: 13.7 G/DL (ref 13.6–17.2)
IMM GRANULOCYTES # BLD AUTO: 0 K/UL (ref 0–0.5)
IMM GRANULOCYTES NFR BLD AUTO: 1 % (ref 0–5)
LYMPHOCYTES # BLD: 2.2 K/UL (ref 0.5–4.6)
LYMPHOCYTES NFR BLD: 25 % (ref 13–44)
MCH RBC QN AUTO: 28.5 PG (ref 26.1–32.9)
MCHC RBC AUTO-ENTMCNC: 32.4 G/DL (ref 31.4–35)
MCV RBC AUTO: 88.1 FL (ref 82–102)
MM INDURATION, POC: 3 MM (ref 0–5)
MONOCYTES # BLD: 0.9 K/UL (ref 0.1–1.3)
MONOCYTES NFR BLD: 10 % (ref 4–12)
NEUTS SEG # BLD: 5.3 K/UL (ref 1.7–8.2)
NEUTS SEG NFR BLD: 60 % (ref 43–78)
NRBC # BLD: 0 K/UL (ref 0–0.2)
PLATELET # BLD AUTO: 174 K/UL (ref 150–450)
PMV BLD AUTO: 10.2 FL (ref 9.4–12.3)
POTASSIUM SERPL-SCNC: 4 MMOL/L (ref 3.5–5.1)
PPD, POC: POSITIVE
RBC # BLD AUTO: 4.8 M/UL (ref 4.23–5.6)
SODIUM SERPL-SCNC: 136 MMOL/L (ref 133–143)
WBC # BLD AUTO: 8.8 K/UL (ref 4.3–11.1)

## 2022-12-22 PROCEDURE — 6370000000 HC RX 637 (ALT 250 FOR IP): Performed by: INTERNAL MEDICINE

## 2022-12-22 PROCEDURE — 6370000000 HC RX 637 (ALT 250 FOR IP): Performed by: FAMILY MEDICINE

## 2022-12-22 PROCEDURE — 2580000003 HC RX 258: Performed by: FAMILY MEDICINE

## 2022-12-22 PROCEDURE — 94761 N-INVAS EAR/PLS OXIMETRY MLT: CPT

## 2022-12-22 PROCEDURE — 6370000000 HC RX 637 (ALT 250 FOR IP): Performed by: PSYCHIATRY & NEUROLOGY

## 2022-12-22 PROCEDURE — 36415 COLL VENOUS BLD VENIPUNCTURE: CPT

## 2022-12-22 PROCEDURE — 85025 COMPLETE CBC W/AUTO DIFF WBC: CPT

## 2022-12-22 PROCEDURE — 80048 BASIC METABOLIC PNL TOTAL CA: CPT

## 2022-12-22 PROCEDURE — 94760 N-INVAS EAR/PLS OXIMETRY 1: CPT

## 2022-12-22 PROCEDURE — 1100000000 HC RM PRIVATE

## 2022-12-22 PROCEDURE — 97530 THERAPEUTIC ACTIVITIES: CPT

## 2022-12-22 PROCEDURE — 94640 AIRWAY INHALATION TREATMENT: CPT

## 2022-12-22 RX ADMIN — ATORVASTATIN CALCIUM 80 MG: 80 TABLET, FILM COATED ORAL at 08:37

## 2022-12-22 RX ADMIN — FERROUS SULFATE TAB 325 MG (65 MG ELEMENTAL FE) 325 MG: 325 (65 FE) TAB at 08:37

## 2022-12-22 RX ADMIN — DIAZEPAM 5 MG: 2 TABLET ORAL at 20:48

## 2022-12-22 RX ADMIN — SODIUM CHLORIDE, PRESERVATIVE FREE 5 ML: 5 INJECTION INTRAVENOUS at 11:48

## 2022-12-22 RX ADMIN — ASPIRIN 81 MG: 81 TABLET, CHEWABLE ORAL at 08:37

## 2022-12-22 RX ADMIN — LEVETIRACETAM 500 MG: 500 TABLET, FILM COATED ORAL at 08:37

## 2022-12-22 RX ADMIN — PANTOPRAZOLE SODIUM 40 MG: 40 TABLET, DELAYED RELEASE ORAL at 05:47

## 2022-12-22 RX ADMIN — METOPROLOL SUCCINATE 100 MG: 50 TABLET, EXTENDED RELEASE ORAL at 17:47

## 2022-12-22 RX ADMIN — MOMETASONE FUROATE AND FORMOTEROL FUMARATE DIHYDRATE 2 PUFF: 200; 5 AEROSOL RESPIRATORY (INHALATION) at 20:48

## 2022-12-22 RX ADMIN — APIXABAN 5 MG: 5 TABLET, FILM COATED ORAL at 20:48

## 2022-12-22 RX ADMIN — APIXABAN 5 MG: 5 TABLET, FILM COATED ORAL at 08:37

## 2022-12-22 RX ADMIN — OXYCODONE 5 MG: 5 TABLET ORAL at 20:48

## 2022-12-22 RX ADMIN — LEVETIRACETAM 500 MG: 500 TABLET, FILM COATED ORAL at 20:48

## 2022-12-22 RX ADMIN — MOMETASONE FUROATE AND FORMOTEROL FUMARATE DIHYDRATE 2 PUFF: 200; 5 AEROSOL RESPIRATORY (INHALATION) at 07:52

## 2022-12-22 RX ADMIN — TIOTROPIUM BROMIDE INHALATION SPRAY 2 PUFF: 3.12 SPRAY, METERED RESPIRATORY (INHALATION) at 07:52

## 2022-12-22 RX ADMIN — SODIUM CHLORIDE, PRESERVATIVE FREE 5 ML: 5 INJECTION INTRAVENOUS at 20:50

## 2022-12-22 RX ADMIN — METOPROLOL SUCCINATE 100 MG: 50 TABLET, EXTENDED RELEASE ORAL at 05:47

## 2022-12-22 RX ADMIN — FERROUS SULFATE TAB 325 MG (65 MG ELEMENTAL FE) 325 MG: 325 (65 FE) TAB at 20:48

## 2022-12-22 ASSESSMENT — PAIN DESCRIPTION - FREQUENCY: FREQUENCY: CONTINUOUS

## 2022-12-22 ASSESSMENT — PAIN SCALES - GENERAL
PAINLEVEL_OUTOF10: 0
PAINLEVEL_OUTOF10: 0
PAINLEVEL_OUTOF10: 3

## 2022-12-22 ASSESSMENT — PAIN DESCRIPTION - LOCATION: LOCATION: HEAD

## 2022-12-22 ASSESSMENT — PAIN DESCRIPTION - ORIENTATION: ORIENTATION: ANTERIOR

## 2022-12-22 ASSESSMENT — PAIN DESCRIPTION - ONSET: ONSET: ON-GOING

## 2022-12-22 ASSESSMENT — PAIN DESCRIPTION - DESCRIPTORS: DESCRIPTORS: ACHING

## 2022-12-22 ASSESSMENT — PAIN DESCRIPTION - PAIN TYPE: TYPE: ACUTE PAIN

## 2022-12-22 ASSESSMENT — PAIN - FUNCTIONAL ASSESSMENT: PAIN_FUNCTIONAL_ASSESSMENT: ACTIVITIES ARE NOT PREVENTED

## 2022-12-22 NOTE — PROGRESS NOTES
Hospitalist Progress Note   Admit Date:  2022  4:11 PM   Name:  Nila Cohn   Age:  79 y.o. Sex:  male  :  1955   MRN:  [id number]   Room:  [facility]/    Presenting Complaint: Seizures     Reason(s) for Admission: Transient alteration of awareness [R40.4]  Unresponsiveness [R41.89]     Hospital Course:   Patient is a 79 y.o. male who presented to the ED for cc unresponsiveness found by family laying in the floor. Patient unable to give me history and no family in the room. EMS noted seizure like activity. Hx of dCHF EF 55%, CKD stage III, HLD, CAD s/p PCI and TAVR, pacemaker, CVA, severe spinal osteoarthritis / degenerative disease with chronic b/l LE weakness,  s/p upper back surgery with titanium spinal cage, s/p neuro-spinal stimulator, nocturnal hypoxia wearing 2L NC at night, a fib on Eliquis, and seizures no longer on anticonvulsant medications. BP low at times in ER. Labs unremarkable. Lactic acid normal.       CT head with no acute findings  Chest x ray with no obvious pneumonia     UA and UDS pending. Subjective & 24hr Events (22): Pt seen and examined, sitter at bedside. No complaints at this time. Did ask if he could go home for [place] while awaiting rehab placement. Assessment & Plan:   Unresponsiveness episodes:   Neurology has re-evaluated and feels this recent episode might not be a true \"seizure\". EEG x2 were found to be normal.  It should be noted that patient left [facility] on 22 after becoming frustrated with his \"seizure\" w/u there.     - Per neuro - do NOT give Ativan unless activity lasts >5 min  Cont Keppra  Cont PT/OT  Will refer to psychiatry upon discharge vs tele consult while in house    HTN: On metoprolol    Pafib:   Rate controlled, will continue Eliquis    HLD: Cont statin    GERD: Cont PPI    Obesity:  complicates care, lifestyle modifications encouraged    Chronic pain: has spinal stimulator but states it has not been helping. PRN Oxy added yesterday. Anticipated discharge needs:      PT/OT recommend inpatient rehab    Diet:  ADULT DIET; Easy to Chew; Low Fat/Low Chol/High Fiber/2 gm Na  DVT PPx: Eliquis  Code status: Full Code    Hospital Problems:  Principal Problem:    Unresponsiveness  Active Problems:    Nocturnal hypoxia    Cerebrovascular accident (CVA) ([facility] [address]    Hyperlipidemia    Atrial fibrillation ([facility] [address]    Pulmonary fibrosis (HCC)    S/P TAVR (transcatheter aortic valve replacement)    COPD (chronic obstructive pulmonary disease) (HCC)    Chronic renal disease, stage III ([facility] [address]  Resolved Problems:    * No resolved hospital problems.  *      Objective:   Patient Vitals for the past 24 hrs:   Temp Pulse Resp BP SpO2   12/22/22 1141 98.2 °F (36.8 °C) 71 18 95/66 97 %   12/22/22 0753 -- 75 18 -- 94 %   12/22/22 0749 98.2 °F (36.8 °C) 73 18 111/79 95 %   12/22/22 0316 97.9 °F (36.6 °C) 72 18 133/88 96 %   12/21/22 2307 97.9 °F (36.6 °C) 74 18 (!) 123/95 93 %   12/21/22 2245 -- 71 30 -- --   12/21/22 2234 -- 80 27 134/76 98 %   12/21/22 2218 -- 69 21 (!) 142/91 95 %   12/21/22 2203 -- 69 23 (!) 148/90 95 %   12/21/22 2151 -- 69 23 -- 97 %   12/21/22 2148 -- 69 19 (!) 140/79 --   12/21/22 2146 -- -- -- -- 92 %   12/21/22 2133 -- 69 12 (!) 156/96 94 %   12/21/22 2118 -- 69 26 (!) 149/95 92 %   12/21/22 2103 -- 69 21 (!) 143/82 92 %   12/21/22 2048 -- 69 20 132/71 93 %   12/21/22 2036 -- 70 20 137/68 94 %   12/21/22 2018 -- 69 11 138/75 93 %   12/21/22 2003 -- 69 28 (!) 147/77 96 %   12/21/22 1948 -- 72 19 (!) 147/75 100 %   12/21/22 1939 -- 69 28 134/65 100 %   12/21/22 1931 -- 74 22 -- 96 %   12/21/22 1903 98.8 °F (37.1 °C) 69 24 108/65 95 %   12/21/22 1700 -- 69 22 135/79 95 %   12/21/22 1600 -- 72 17 (!) 140/70 93 %   12/21/22 1500 98.2 °F (36.8 °C) 69 19 136/63 96 %   12/21/22 1400 -- 78 23 (!) 140/73 95 %         Oxygen Therapy  SpO2: 97 %  Pulse via Oximetry: 75 beats per minute  Pulse Oximeter Device Mode: Intermittent  O2 Device: None (Room air)  O2 Flow Rate (L/min): 2 L/min  Blood Gas  Performed?: Yes  Eddie's Test #1: Collateral flow confirmed  Site #1: Right Radial  Site Prepped #1: Yes  Number of Attempts #1: 1  Pressure Held #1: Yes  Complications #1: None  Post-procedure #1: Standard  Specimen Status #1: Point of care  How Tolerated?: Tolerated well    Estimated body mass index is 31 kg/m² as calculated from the following:    Height as of this encounter: 6' 1\" (1.854 m). Weight as of this encounter: 235 lb (106.6 kg). Intake/Output Summary (Last 24 hours) at 12/22/2022 1319  Last data filed at 12/22/2022 0913  Gross per 24 hour   Intake 1040 ml   Output 1100 ml   Net -60 ml           Physical Exam:     Blood pressure 95/66, pulse 71, temperature 98.2 °F (36.8 °C), temperature source Oral, resp. rate 18, height 6' 1\" (1.854 m), weight 235 lb (106.6 kg), SpO2 97 %. General:    Well nourished. No acute distress  Head:  Normocephalic, atraumatic  Eyes:  Sclerae appear normal.  Pupils equally round. ENT:  Nares appear normal, no drainage. Moist oral mucosa  Neck:  No restricted ROM. Trachea midline   CV:   RRR. No m/r/g. No jugular venous distension. Lungs:   CTAB. No wheezing, rhonchi, or rales. Symmetric expansion. Abdomen: Bowel sounds present. Soft, nontender, nondistended. Extremities: No cyanosis or clubbing. No edema  Skin:     No rashes and normal coloration. Warm and dry. Neuro:  CN 2-12 grossly intact,  A&Ox3  Psych:  Normal mood and affect.       I have personally reviewed labs and tests showing:  Recent Labs:  Recent Results (from the past 48 hour(s))   Arterial Blood Gas, POC    Collection Time: 12/21/22 12:01 AM   Result Value Ref Range    pH, Arterial, POC 7.41 7.35 - 7.45      pCO2, Arterial, POC 38.1 35 - 45 MMHG    pO2, Arterial, POC 85 75 - 100 MMHG    HCO3, Mixed 24.1 22 - 26 MMOL/L    SO2c, Arterial, POC 96.5 95 - 98 %    BASE DEFICIT (POC) 0.4 mmol/L    POC Eddie's Test Positive      Site RIGHT RADIAL      Specimen type: ARTERIAL      Performed by: [employer]     FIO2 3     POCT Glucose    Collection Time: 12/21/22  1:00 AM   Result Value Ref Range    POC Glucose 102 (H) 65 - 100 mg/dL    Performed by:  Og    Comprehensive Metabolic Panel    Collection Time: 12/21/22  1:07 AM   Result Value Ref Range    Sodium 141 133 - 143 mmol/L    Potassium 4.0 3.5 - 5.1 mmol/L    Chloride 110 101 - 110 mmol/L    CO2 27 21 - 32 mmol/L    Anion Gap 4 2 - 11 mmol/L    Glucose 103 (H) 65 - 100 mg/dL    BUN 16 8 - 23 MG/DL    Creatinine 1.30 0.8 - 1.5 MG/DL    Est, Glom Filt Rate >60 >60 ml/min/1.73m2    Calcium 8.6 8.3 - 10.4 MG/DL    Total Bilirubin 0.7 0.2 - 1.1 MG/DL    ALT 26 12 - 65 U/L    AST 17 15 - 37 U/L    Alk Phosphatase 94 50 - 136 U/L    Total Protein 6.3 6.3 - 8.2 g/dL    Albumin 2.9 (L) 3.2 - 4.6 g/dL    Globulin 3.4 2.8 - 4.5 g/dL    Albumin/Globulin Ratio 0.9 0.4 - 1.6     Magnesium    Collection Time: 12/21/22  1:07 AM   Result Value Ref Range    Magnesium 2.1 1.8 - 2.4 mg/dL   CK    Collection Time: 12/21/22  1:07 AM   Result Value Ref Range    Total CK 66 21 - 215 U/L   Arterial Blood Gas, POC    Collection Time: 12/21/22  1:57 AM   Result Value Ref Range    pH, Arterial, POC 7.41 7.35 - 7.45      pCO2, Arterial, POC 40.2 35 - 45 MMHG    pO2, Arterial, POC 82 75 - 100 MMHG    HCO3, Mixed 25.4 22 - 26 MMOL/L    SO2c, Arterial, POC 96.0 95 - 98 %    Base Excess 0.7 mmol/L    POC Eddie's Test Positive      Site RIGHT RADIAL      Specimen type: ARTERIAL      Performed by: [employer]     FIO2 3     CBC with Auto Differential    Collection Time: 12/21/22  7:38 AM   Result Value Ref Range    WBC 9.0 4.3 - 11.1 K/uL    RBC 4.62 4.23 - 5.6 M/uL    Hemoglobin 13.2 (L) 13.6 - 17.2 g/dL    Hematocrit 40.7 (L) 41.1 - 50.3 %    MCV 88.1 82 - 102 FL    MCH 28.6 26.1 - 32.9 PG    MCHC 32.4 31.4 - 35.0 g/dL    RDW 15.2 (H) 11.9 - 14.6 %    Platelets 857 042 - 441 mmol/L    Potassium 4.0 3.5 - 5.1 mmol/L    Chloride 108 101 - 110 mmol/L    CO2 23 21 - 32 mmol/L    Anion Gap 5 2 - 11 mmol/L    Glucose 137 (H) 65 - 100 mg/dL    BUN 11 8 - 23 MG/DL    Creatinine 1.20 0.8 - 1.5 MG/DL    Est, Glom Filt Rate >60 >60 ml/min/1.73m2    Calcium 8.3 8.3 - 10.4 MG/DL   PLEASE READ & DOCUMENT PPD TEST IN 48 HRS    Collection Time: 12/22/22  5:51 AM   Result Value Ref Range    PPD, (POC) Positive Negative    mm Induration 3 0 - 5 mm       I have personally reviewed imaging studies showing: Other Studies:  XR CHEST PORTABLE   Final Result   Chronic interstitial changes suspected in the lungs. This is   nonspecific. This would be unlikely to represent tuberculosis however   tuberculosis would not be excluded by this x-ray. CT CERVICAL SPINE WO CONTRAST   Final Result      1. No acute fracture or dislocation in the cervical spine. 2. Anterior surgical fusion. Degenerative changes. CT HEAD WO CONTRAST   Final Result      1. No CT evidence of acute intracranial abnormality. 2. No significant change compared to prior exam.      CT Head W/O Contrast   Final Result   White matter findings compatible with mild chronic small vessel   ischemic disease. XR CHEST PORTABLE   Final Result   The lungs are clear. Heart is normal in size. Status post TAVR. Implantable cardiac device and leads are unchanged in position. Spinal   stimulator leads overlie the lower thoracic spine. No pneumothorax. No pleural   effusions.              Current Meds:  Current Facility-Administered Medications   Medication Dose Route Frequency    LORazepam (ATIVAN) injection 2 mg  2 mg IntraVENous Once    oxyCODONE (ROXICODONE) immediate release tablet 5 mg  5 mg Oral Q4H PRN    levETIRAcetam (KEPPRA) tablet 500 mg  500 mg Oral BID    metoprolol succinate (TOPROL XL) extended release tablet 100 mg  100 mg Oral Q12H    cyclobenzaprine (FLEXERIL) tablet 10 mg  10 mg Oral Nightly PRN    albuterol sulfate HFA (PROVENTIL;VENTOLIN;PROAIR) 108 (90 Base) MCG/ACT inhaler 2 puff  2 puff Inhalation Q4H PRN    diazePAM (VALIUM) tablet 5 mg  5 mg Oral Nightly PRN    LORazepam (ATIVAN) injection 1 mg  1 mg IntraVENous Q6H PRN    sodium chloride flush 0.9 % injection 5-40 mL  5-40 mL IntraVENous 2 times per day    sodium chloride flush 0.9 % injection 5-40 mL  5-40 mL IntraVENous PRN    0.9 % sodium chloride infusion   IntraVENous PRN    polyethylene glycol (GLYCOLAX) packet 17 g  17 g Oral Daily PRN    apixaban (ELIQUIS) tablet 5 mg  5 mg Oral BID    aspirin chewable tablet 81 mg  81 mg Oral Daily    atorvastatin (LIPITOR) tablet 80 mg  80 mg Oral Daily    ferrous sulfate (IRON 325) tablet 325 mg  325 mg Oral BID    mometasone-formoterol (DULERA) 200-5 MCG/ACT inhaler 2 puff  2 puff Inhalation BID    ipratropium-albuterol (DUONEB) nebulizer solution 3 mL  3 mL Inhalation Q4H PRN    pantoprazole (PROTONIX) tablet 40 mg  40 mg Oral QAM AC    tiotropium (SPIRIVA RESPIMAT) 2.5 MCG/ACT inhaler 2 puff  2 puff Inhalation Daily       Signed:  Zuleyma Montano PA-C

## 2022-12-22 NOTE — PROGRESS NOTES
ACUTE PHYSICAL THERAPY GOALS:   (Developed with and agreed upon by patient and/or caregiver. )  LTG:  (1.)Mr. Chen will move from supine to sit and sit to supine , scoot up and down, and roll side to side in bed with STAND BY ASSIST within 7 treatment day(s). (2.)Mr. Chen will transfer from bed to chair and chair to bed with MINIMAL ASSIST using the least restrictive device within 7 treatment day(s). GOAL MET 2022  (3.)Mr. Chen will ambulate with MINIMAL ASSIST for 50 feet with the least restrictive device within 7 treatment day(s). GOAL MET 2022  (4.)Mr. Chen will participate in therapeutic activity/exercises x 25 minutes for increased activity tolerance within 7 treatment days. (5.)Mr. Chen will maintain static/dynamic sitting x 15 minutes with INDEPENDENCE for improved balance within 7 treatment days. New goals added:  (6.)Mr. Chen will perform bed to chair, chair to bed transfer with SBA within 7 treatment days. (7.)Mr. Chen will ambulate with SBA for 200' with LRD within 7 treatment days. PHYSICAL THERAPY: Daily Note PM   (Link to Caseload Tracking: PT Visit Days : 2  Time In/Out PT Charge Capture  Rehab Caseload Tracker  Orders    Cara Pickering is a 79 y.o. male   PRIMARY DIAGNOSIS: Unresponsiveness  Transient alteration of awareness [R40.4]  Unresponsiveness [R41.89]       Inpatient: Payor: Arlene Ibarra / Plan: Chloé Nino COMPLETE / Product Type: *No Product type* /     ASSESSMENT:     REHAB RECOMMENDATIONS:   Recommendation to date pending progress:  [address] [place] [address] with Interim     Equipment:    None     ASSESSMENT:  Mr. Sofia Arreaga is supine on arrival, sitter present. Pt reports feeling better today, A & O x 3, agreeable to OOB activity. Pt with R LE weakness form prior CVA, at baseline uses [employer] crutches.  Pt required MOD A for R LE bed mobility (reports has adaptive equipment at home for assist). Pt MIN A transfers, ambulation with MIN A and use of RW for 100', R LE ramón at times, able to catch self with ambulation. Pt reports possible need for brace on R knee with mobility. Pt overall doing well with activity, has met 2 goals set on initial evaluation. PT to cont to follow for acute care needs.        SUBJECTIVE:   Mr. Elidia Sevilla states, \"I am doing much better\"     Social/Functional Lives With: Alone  Home Equipment: Crutches ([address] [employer] crutch)  OBJECTIVE:     PAIN: Sunny Patience / O2: PRECAUTION / Riki Cleary / Brad Roles:   Pre Treatment:0/10         Post Treatment: 0/10 Vitals        Oxygen  O2 Therapy: Room air None    RESTRICTIONS/PRECAUTIONS:  Restrictions/Precautions  Restrictions/Precautions: Fall Risk  Restrictions/Precautions: Fall Risk     MOBILITY: I Mod I S SBA CGA Min Mod Max Total  NT x2 Comments:   Bed Mobility    Rolling [] [] [] [] [] [] [] [] [] [x] []    Supine to Sit [] [] [] [] [] [x] [x] [] [] [] [] Assist R LE due to CVA   Scooting [] [] [] [] [x] [] [] [] [] [] []    Sit to Supine [] [] [] [] [] [x] [] [] [] [] [] For R LE   Transfers    Sit to Stand [] [] [] [] [] [x] [] [] [] [] []    Bed to Chair [] [] [] [] [] [] [] [] [] [x] []    Stand to Sit [] [] [] [] [] [x] [] [] [] [] []     [] [] [] [] [] [] [] [] [] [] []    I=Independent, Mod I=Modified Independent, S=Supervision, SBA=Standby Assistance, CGA=Contact Guard Assistance,   Min=Minimal Assistance, Mod=Moderate Assistance, Max=Maximal Assistance, Total=Total Assistance, NT=Not Tested    BALANCE: Good Fair+ Fair Fair- Poor NT Comments   Sitting Static [x] [] [] [] [] []    Sitting Dynamic [] [x] [] [] [] []              Standing Static [] [] [x] [] [] []    Standing Dynamic [] [] [x] [] [] []      GAIT: I Mod I S SBA CGA Min Mod Max Total  NT x2 Comments:   Level of Assistance [] [] [] [] [] [x] [] [] [] [] []    Distance 100  feet    DME Rolling Walker    Gait Quality Decreased melissa , Decreased step clearance, Decreased step length, and Steppage    Weightbearing Status      Stairs      I=Independent, Mod I=Modified Independent, S=Supervision, SBA=Standby Assistance, CGA=Contact Guard Assistance,   Min=Minimal Assistance, Mod=Moderate Assistance, Max=Maximal Assistance, Total=Total Assistance, NT=Not Tested    PLAN:   FREQUENCY AND DURATION: 3 times/week for duration of hospital stay or until stated goals are met, whichever comes first.    TREATMENT:   TREATMENT:   Therapeutic Activity (23 Minutes): Therapeutic activity included Supine to Sit, Sit to Supine, Scooting, Transfer Training, Ambulation on level ground, Sitting balance , and Standing balance to improve functional Activity tolerance, Balance, Coordination, Mobility, and Strength.     TREATMENT GRID:  N/A    AFTER TREATMENT PRECAUTIONS: Bed, Bed/Chair Locked, Call light within reach, Needs within reach, and RN notified    INTERDISCIPLINARY COLLABORATION:  RN/ PCT and PT/ PTA    EDUCATION: Education Given To: Patient  Education Provided: Transfer Training  Education Method: Verbal  Barriers to Learning: None  Education Outcome: Verbalized understanding    TIME IN/OUT:  Time In: 1501  Time Out: [address]  Minutes: Terell [id number], PT

## 2022-12-22 NOTE — PROGRESS NOTES
TRANSFER - OUT REPORT:    Verbal report given to Marilee Penn RN (name) on Author Jed  being transferred to 7th floor (unit) for routine progression of patient care       Report consisted of patients Situation, Background, Assessment and   Recommendations(SBAR). Information from the following report(s) Nurse Handoff Report, Adult Overview, Intake/Output, MAR, Recent Results, Cardiac Rhythm v paced, and Alarm Parameters was reviewed with the receiving nurse. Opportunity for questions and clarification was provided. Reviewed pt's ptsd symptoms and recent orders with oncoming nurse. Patient transported with:   Monitor  Patient's medications from home  Registered Nurse    Lines: L ac piv Help Text      This SmartLink retrieves the last documented value for LDA assessment data, retrieved by either LDA type or by LDA group ID. This SmartLink should be used in a SmartText or SmartPhrase. If one is not available, please contact your .

## 2022-12-22 NOTE — PROGRESS NOTES
Neurology Daily Progress Note     Assessment:     49-year-old man with a history of seizures who presents after a seizure-like episode. In addition, he had a recent stroke with residual weakness on the right, now worsened after a seizure-like event. CT scan is reassuring. His examination reveals functional weakness on the right which may be superimposed on underlying pathology or may be primarily functional.  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 Plan:       Continue Keppra. I am not convinced these events are epileptic. Do not give Ativan for these events unless duration is greater than 5 minutes     PT/OT. Patient is interested in going to rehab     Patient would benefit from psychology referral and CBT as an outpatient     Patient states that he has a neurologist. If not he can follow up with Osmel Lin NP in our neurology clinic. Subjective: Interval history:    Patient reports he is doing well. Right sided strength is improving. He reports some short seizure-like events overnight. Doing better now. History:    Baldemar Bill is a 79 y.o. male who is being seen for seizure-like activity.     Past Medical History:   Diagnosis Date    Aortic stenosis     Aortic valve replacement 7/2018    CAD (coronary artery disease)     PCI in 2014, 2015 had MI and then stents    Cancer ([address]     SCC removed face     CHF (congestive heart failure) ([address]     Chronic renal disease, stage III ([address]     sees neph and does not have actual diagnosis at this time    COPD (chronic obstructive pulmonary disease) ([address]     fibrosis in lungs    Dyslipidemia     Heart failure ([address]     CHF per patient    HTN (hypertension)     med controlled    Hyperlipidemia     Ischemic cardiomyopathy     Pacemaker     only pacemaker    Paroxysmal atrial fibrillation ([address]     pradaxa for this    Pulmonary fibrosis ([address]     Seizure disorder ([address]     lyme disease - small lesion frontal part of brain - no maintenance meds - last seizure 2 months ago, due to fall - before that it was nine years    Systolic heart failure [facility])      Past Surgical History:   Procedure Laterality Date    ABLATION OF DYSRHYTHMIC FOCUS      AORTIC VALVE REPLACEMENT  07/2018    BACK SURGERY      discectomy    CARDIAC CATHETERIZATION      PCI 2014, 2015    CARDIAC PROCEDURE N/A 7/11/2022    LEFT HEART CATH / CORONARY ANGIOGRAPHY performed by Nani Aquino MD at [address] [place] [address] CATH LAB    CERVICAL FUSION      C3-4 fusion    EGD  6/17/2022         HIP ARTHROPLASTY Left     SPINAL CORD STIMULATOR SURGERY      lower back     UPPER GASTROINTESTINAL ENDOSCOPY N/A 6/17/2022    EGD ESOPHAGOGASTRODUODENOSCOPY/ PT HAS PACEMAKER performed by Shawn Fabian MD at [facility] ENDOSCOPY      Family History   Problem Relation Age of Onset    Heart Disease Sister     Heart Disease Mother      Social History     Tobacco Use    Smoking status: Every Day     Packs/day: 1.50     Years: 56.00     Pack years: 84.00     Types: Cigarettes    Smokeless tobacco: Never    Tobacco comments:     Quit smoking: cutting back to 4 Cigarettes a day   Substance Use Topics    Alcohol use: Not Currently      Current Facility-Administered Medications   Medication Dose Route Frequency Provider Last Rate Last Admin    LORazepam (ATIVAN) injection 2 mg  2 mg IntraVENous Once Scottie Stack MD        oxyCODONE (ROXICODONE) immediate release tablet 5 mg  5 mg Oral Q4H PRN Lucia Mina MD   5 mg at 12/21/22 1736    levETIRAcetam (KEPPRA) tablet 500 mg  500 mg Oral BID Jossy , DO   500 mg at 12/22/22 0567    metoprolol succinate (TOPROL XL) extended release tablet 100 mg  100 mg Oral Q12H Lucia Mina MD   100 mg at 12/22/22 0547    cyclobenzaprine (FLEXERIL) tablet 10 mg  10 mg Oral Nightly PRN Lucia Mina MD        albuterol sulfate HFA (PROVENTIL;VENTOLIN;PROAIR) 108 (90 Base) MCG/ACT inhaler 2 puff  2 puff Inhalation Q4H PRN Ute Aldana Sky Richards MD        diazePAM (VALIUM) tablet 5 mg  5 mg Oral Nightly PRN Dorian Cody MD   5 mg at 12/21/22 2329    LORazepam (ATIVAN) injection 1 mg  1 mg IntraVENous Q6H PRN Amy Hatter, DO        sodium chloride flush 0.9 % injection 5-40 mL  5-40 mL IntraVENous 2 times per day [place] Hatter, DO   10 mL at 12/21/22 2208    sodium chloride flush 0.9 % injection 5-40 mL  5-40 mL IntraVENous PRN Amy Hatter, DO        0.9 % sodium chloride infusion   IntraVENous PRN Amy Hatter, DO        polyethylene glycol (GLYCOLAX) packet 17 g  17 g Oral Daily PRN Amy Hatter, DO        apixaban Raymundo Chicho) tablet 5 mg  5 mg Oral BID Amy Hatter, DO   5 mg at 12/22/22 4868    aspirin chewable tablet 81 mg  81 mg Oral Daily Amy Hatter, DO   81 mg at 12/22/22 0837    atorvastatin (LIPITOR) tablet 80 mg  80 mg Oral Daily Amy Hatter, DO   80 mg at 12/22/22 7148    ferrous sulfate (IRON 325) tablet 325 mg  325 mg Oral BID [place] Hatter, DO   325 mg at 12/22/22 5071    mometasone-formoterol (DULERA) 200-5 MCG/ACT inhaler 2 puff  2 puff Inhalation BID Amy Hatter, DO   2 puff at 12/22/22 0752    ipratropium-albuterol (DUONEB) nebulizer solution 3 mL  3 mL Inhalation Q4H PRN Amy Hatter, DO        pantoprazole (PROTONIX) tablet 40 mg  40 mg Oral QAM AC Amy Hatter, DO   40 mg at 12/22/22 0547    tiotropium (SPIRIVA RESPIMAT) 2.5 MCG/ACT inhaler 2 puff  2 puff Inhalation Daily Amy Hatter, DO   2 puff at 12/22/22 7939        Allergies   Allergen Reactions    Carbamazepine Anaphylaxis    Lacosamide Nausea Only    Lorazepam Other (See Comments)     Violent behavior    Nitroglycerin Other (See Comments)     hypotension       Review of systems negative with exception of pertinent positives and negatives noted above.        Objective:     Vitals:    12/21/22 2307 12/22/22 0316 12/22/22 0749 12/22/22 0753   BP: (!) 123/95 133/88 111/79    Pulse: 74 72 73 75   Resp: 18 18 18 18 Temp: 97.9 °F (36.6 °C) 97.9 °F (36.6 °C) 98.2 °F (36.8 °C)    TempSrc: Oral Oral Oral    SpO2: 93% 96% 95% 94%   Weight:       Height:              Current Facility-Administered Medications:     LORazepam (ATIVAN) injection 2 mg, 2 mg, IntraVENous, Once, Scottie Stack MD    oxyCODONE (ROXICODONE) immediate release tablet 5 mg, 5 mg, Oral, Q4H PRN, Stefano Arreguin MD, 5 mg at 12/21/22 1736    levETIRAcetam (KEPPRA) tablet 500 mg, 500 mg, Oral, BID, Bennye Sample, DO, 500 mg at 12/22/22 0598    metoprolol succinate (TOPROL XL) extended release tablet 100 mg, 100 mg, Oral, Q12H, Stefano Arreguin MD, 100 mg at 12/22/22 0547    cyclobenzaprine (FLEXERIL) tablet 10 mg, 10 mg, Oral, Nightly PRN, Stefano Arreguin MD    albuterol sulfate HFA (PROVENTIL;VENTOLIN;PROAIR) 108 (90 Base) MCG/ACT inhaler 2 puff, 2 puff, Inhalation, Q4H PRN, Stefano Arreguin MD    diazePAM (VALIUM) tablet 5 mg, 5 mg, Oral, Nightly PRN, Stefano Arreguin MD, 5 mg at 12/21/22 2329    LORazepam (ATIVAN) injection 1 mg, 1 mg, IntraVENous, Q6H PRN, Elenora India, DO    sodium chloride flush 0.9 % injection 5-40 mL, 5-40 mL, IntraVENous, 2 times per day, Elenora India, DO, 10 mL at 12/21/22 2208    sodium chloride flush 0.9 % injection 5-40 mL, 5-40 mL, IntraVENous, PRN, Elenora India, DO    0.9 % sodium chloride infusion, , IntraVENous, PRN, Elenora India, DO    polyethylene glycol (GLYCOLAX) packet 17 g, 17 g, Oral, Daily PRN, Elenora India, DO    apixaban (ELIQUIS) tablet 5 mg, 5 mg, Oral, BID, Elenora India, DO, 5 mg at 12/22/22 0633    aspirin chewable tablet 81 mg, 81 mg, Oral, Daily, Elenora India, DO, 81 mg at 12/22/22 0837    atorvastatin (LIPITOR) tablet 80 mg, 80 mg, Oral, Daily, Elenora India, DO, 80 mg at 12/22/22 3091    ferrous sulfate (IRON 325) tablet 325 mg, 325 mg, Oral, BID, Elenora India, DO, 325 mg at 12/22/22 0837    mometasone-formoterol (DULERA) 200-5 MCG/ACT inhaler 2 puff, 2 puff, Inhalation, BID, [place] Calk, DO, 2 puff at 12/22/22 0069    ipratropium-albuterol (DUONEB) nebulizer solution 3 mL, 3 mL, Inhalation, Q4H PRN, [place] Calk, DO    pantoprazole (PROTONIX) tablet 40 mg, 40 mg, Oral, QAM AC, [place] Calk, DO, 40 mg at 12/22/22 0547    tiotropium (SPIRIVA RESPIMAT) 2.5 MCG/ACT inhaler 2 puff, 2 puff, Inhalation, Daily, [place] Calk, DO, 2 puff at 12/22/22 3469    Recent Results (from the past 12 hour(s))   CBC with Auto Differential    Collection Time: 12/22/22  4:34 AM   Result Value Ref Range    WBC 8.8 4.3 - 11.1 K/uL    RBC 4.80 4.23 - 5.6 M/uL    Hemoglobin 13.7 13.6 - 17.2 g/dL    Hematocrit 42.3 41.1 - 50.3 %    MCV 88.1 82 - 102 FL    MCH 28.5 26.1 - 32.9 PG    MCHC 32.4 31.4 - 35.0 g/dL    RDW 15.2 (H) 11.9 - 14.6 %    Platelets 367 436 - 285 K/uL    MPV 10.2 9.4 - 12.3 FL    nRBC 0.00 0.0 - 0.2 K/uL    Differential Type AUTOMATED      Seg Neutrophils 60 43 - 78 %    Lymphocytes 25 13 - 44 %    Monocytes 10 4.0 - 12.0 %    Eosinophils % 3 0.5 - 7.8 %    Basophils 1 0.0 - 2.0 %    Immature Granulocytes 1 0.0 - 5.0 %    Segs Absolute 5.3 1.7 - 8.2 K/UL    Absolute Lymph # 2.2 0.5 - 4.6 K/UL    Absolute Mono # 0.9 0.1 - 1.3 K/UL    Absolute Eos # 0.3 0.0 - 0.8 K/UL    Basophils Absolute 0.1 0.0 - 0.2 K/UL    Absolute Immature Granulocyte 0.0 0.0 - 0.5 K/UL   Basic Metabolic Panel w/ Reflex to MG    Collection Time: 12/22/22  4:34 AM   Result Value Ref Range    Sodium 136 133 - 143 mmol/L    Potassium 4.0 3.5 - 5.1 mmol/L    Chloride 108 101 - 110 mmol/L    CO2 23 21 - 32 mmol/L    Anion Gap 5 2 - 11 mmol/L    Glucose 137 (H) 65 - 100 mg/dL    BUN 11 8 - 23 MG/DL    Creatinine 1.20 0.8 - 1.5 MG/DL    Est, Glom Filt Rate >60 >60 ml/min/1.73m2    Calcium 8.3 8.3 - 10.4 MG/DL   PLEASE READ & DOCUMENT PPD TEST IN 48 HRS    Collection Time: 12/22/22  5:51 AM   Result Value Ref Range    PPD, (POC) Positive Negative    mm Induration 3 0 - 5 mm       Most recent Echo  Results for orders placed during the hospital encounter of 10/11/22    Transthoracic echocardiogram (TTE) complete with contrast, bubble, strain, and 3D PRN    Interpretation Summary    Left Ventricle: Normal left ventricular systolic function with a visually estimated EF of 55 - 60%. Left ventricle size is normal. Normal wall thickness. Apical Septal motion is consistent with pacing. Normal wall motion. Diastolic function indeterminate in the setting of atrial fibrillation. Average E/e' ratio is 31.04. Aortic Valve: Not well visualized. Appropriately positioned transcatheter bioprosthetic valve that is well-seated. No regurgitation. Unable to assess stenosis. Elevated prosthetic gradient. AV mean gradient is 30 mmHg. AV peak gradient is 47 mmHg. Mitral Valve: Mildly calcified leaflet, at the anterior and posterior leaflets- tips. ? Rheumatic etiology Mild annular calcification of the mitral valve. Moderate stenosis noted. MV mean gradient is 9 mmHg\at a heart rate of 70 bpm.    Left Atrium: Left atrium is moderately dilated. Interatrial Septum: Agitated saline study was negative with and without provocation. Contrast used: Definity.    -No obvious cardiac source for emboli noted. -Underlying atrial fibrillation s/p AV ale ablation and permanent pacemaker placement-ventricular paced rhythm.  -Negative bubble study for intracardiac shunt.  -Bioprosthetic aortic valve is not well visualized although gradients are elevated. Elevated mitral valve gradients were also noted-consider GODFREY if clinically indicated. Physical Exam:  General - Well developed, well nourished, in no apparent distress. Pleasant and conversant. HEENT - Normocephalic, atraumatic. Conjunctiva, tympanic membranes, and oropharynx are clear. Neck - Supple without masses, no bruits   Abdomen - Soft, nontender with normal bowel sounds. Extremities - Peripheral pulses intact. No edema and no rashes.       Neurological examination - Comprehension, attention , memory and reasoning are intact. Language and speech are normal. On cranial nerve examination pupils are equal round and reactive to light. Visual acuity is adequate. Visual fields are full to finger confrontation. Extraocular motility is normal. Face is symmetric and sensation is intact to light touch. Hearing is intact to finger rustle bilaterally. Motor examination - There is normal muscle tone and bulk. Power is full throughout on the left. In the RUE he has 4-/5 strength and RLE he has 1-2/5 strength with a positive Johnston sign and reverse Johnston sign. Muscle stretch reflexes are normoactive and there are no pathological reflexes present. Sensation is intact to light touch, pinprick, vibration and proprioception in all extremities. Cerebellar examination is normal. Gait and stance not assessed due to fall risk. Signed By: GEORGE Ríos NP     December 22, 2022      I spent 25 minutes today with the patient, which included chart review, documentation, discussing care with other providers, and greater than 50% of time was spent in direct face-to-face contact, obtaining history, exam, coordinating care, and counseling the patient on medical condition.

## 2022-12-22 NOTE — CARE COORDINATION
[place] notification of admission completed via their online portal.  Confirmation # [id number].

## 2022-12-22 NOTE — PROGRESS NOTES
Patient with another episode of seizure-like activity, lethargic and confused following. VSS. Spoke with Dr Dejah Xavier, new orders to hold ativan unless activity lasts longer than 5min.

## 2022-12-23 ENCOUNTER — TELEPHONE (OUTPATIENT)
Dept: NEUROLOGY | Age: 67
End: 2022-12-23

## 2022-12-23 VITALS
HEIGHT: 73 IN | DIASTOLIC BLOOD PRESSURE: 85 MMHG | SYSTOLIC BLOOD PRESSURE: 137 MMHG | RESPIRATION RATE: 16 BRPM | TEMPERATURE: 97.7 F | HEART RATE: 72 BPM | WEIGHT: 235 LBS | OXYGEN SATURATION: 95 % | BODY MASS INDEX: 31.14 KG/M2

## 2022-12-23 PROBLEM — G47.34 NOCTURNAL HYPOXIA: Status: RESOLVED | Noted: 2022-07-28 | Resolved: 2022-12-23

## 2022-12-23 PROBLEM — R41.89 UNRESPONSIVENESS: Status: RESOLVED | Noted: 2022-12-19 | Resolved: 2022-12-23

## 2022-12-23 LAB
ANION GAP SERPL CALC-SCNC: 5 MMOL/L (ref 2–11)
BASOPHILS # BLD: 0.1 K/UL (ref 0–0.2)
BASOPHILS NFR BLD: 1 % (ref 0–2)
BUN SERPL-MCNC: 12 MG/DL (ref 8–23)
CALCIUM SERPL-MCNC: 8.6 MG/DL (ref 8.3–10.4)
CHLORIDE SERPL-SCNC: 109 MMOL/L (ref 101–110)
CO2 SERPL-SCNC: 26 MMOL/L (ref 21–32)
CREAT SERPL-MCNC: 1.2 MG/DL (ref 0.8–1.5)
DIFFERENTIAL METHOD BLD: ABNORMAL
EOSINOPHIL # BLD: 0.3 K/UL (ref 0–0.8)
EOSINOPHIL NFR BLD: 4 % (ref 0.5–7.8)
ERYTHROCYTE [DISTWIDTH] IN BLOOD BY AUTOMATED COUNT: 14.8 % (ref 11.9–14.6)
GLUCOSE SERPL-MCNC: 89 MG/DL (ref 65–100)
HCT VFR BLD AUTO: 45.3 % (ref 41.1–50.3)
HGB BLD-MCNC: 14.6 G/DL (ref 13.6–17.2)
IMM GRANULOCYTES # BLD AUTO: 0 K/UL (ref 0–0.5)
IMM GRANULOCYTES NFR BLD AUTO: 0 % (ref 0–5)
LYMPHOCYTES # BLD: 2.3 K/UL (ref 0.5–4.6)
LYMPHOCYTES NFR BLD: 27 % (ref 13–44)
MCH RBC QN AUTO: 28.6 PG (ref 26.1–32.9)
MCHC RBC AUTO-ENTMCNC: 32.2 G/DL (ref 31.4–35)
MCV RBC AUTO: 88.8 FL (ref 82–102)
MONOCYTES # BLD: 1.1 K/UL (ref 0.1–1.3)
MONOCYTES NFR BLD: 14 % (ref 4–12)
NEUTS SEG # BLD: 4.6 K/UL (ref 1.7–8.2)
NEUTS SEG NFR BLD: 54 % (ref 43–78)
NRBC # BLD: 0 K/UL (ref 0–0.2)
PLATELET # BLD AUTO: 204 K/UL (ref 150–450)
PMV BLD AUTO: 10.5 FL (ref 9.4–12.3)
POTASSIUM SERPL-SCNC: 3.8 MMOL/L (ref 3.5–5.1)
RBC # BLD AUTO: 5.1 M/UL (ref 4.23–5.6)
SODIUM SERPL-SCNC: 140 MMOL/L (ref 133–143)
WBC # BLD AUTO: 8.4 K/UL (ref 4.3–11.1)

## 2022-12-23 PROCEDURE — 85025 COMPLETE CBC W/AUTO DIFF WBC: CPT

## 2022-12-23 PROCEDURE — 6370000000 HC RX 637 (ALT 250 FOR IP): Performed by: FAMILY MEDICINE

## 2022-12-23 PROCEDURE — 94640 AIRWAY INHALATION TREATMENT: CPT

## 2022-12-23 PROCEDURE — 36415 COLL VENOUS BLD VENIPUNCTURE: CPT

## 2022-12-23 PROCEDURE — 2580000003 HC RX 258: Performed by: FAMILY MEDICINE

## 2022-12-23 PROCEDURE — 6370000000 HC RX 637 (ALT 250 FOR IP): Performed by: PSYCHIATRY & NEUROLOGY

## 2022-12-23 PROCEDURE — 80048 BASIC METABOLIC PNL TOTAL CA: CPT

## 2022-12-23 PROCEDURE — 94760 N-INVAS EAR/PLS OXIMETRY 1: CPT

## 2022-12-23 PROCEDURE — 6370000000 HC RX 637 (ALT 250 FOR IP): Performed by: INTERNAL MEDICINE

## 2022-12-23 RX ORDER — LEVETIRACETAM 500 MG/1
500 TABLET ORAL 2 TIMES DAILY
Qty: 60 TABLET | Refills: 1 | Status: SHIPPED | OUTPATIENT
Start: 2022-12-23

## 2022-12-23 RX ADMIN — APIXABAN 5 MG: 5 TABLET, FILM COATED ORAL at 09:32

## 2022-12-23 RX ADMIN — SODIUM CHLORIDE, PRESERVATIVE FREE 5 ML: 5 INJECTION INTRAVENOUS at 09:32

## 2022-12-23 RX ADMIN — ASPIRIN 81 MG: 81 TABLET, CHEWABLE ORAL at 09:32

## 2022-12-23 RX ADMIN — LEVETIRACETAM 500 MG: 500 TABLET, FILM COATED ORAL at 09:32

## 2022-12-23 RX ADMIN — METOPROLOL SUCCINATE 100 MG: 50 TABLET, EXTENDED RELEASE ORAL at 05:13

## 2022-12-23 RX ADMIN — FERROUS SULFATE TAB 325 MG (65 MG ELEMENTAL FE) 325 MG: 325 (65 FE) TAB at 09:32

## 2022-12-23 RX ADMIN — PANTOPRAZOLE SODIUM 40 MG: 40 TABLET, DELAYED RELEASE ORAL at 05:13

## 2022-12-23 RX ADMIN — TIOTROPIUM BROMIDE INHALATION SPRAY 2 PUFF: 3.12 SPRAY, METERED RESPIRATORY (INHALATION) at 07:23

## 2022-12-23 RX ADMIN — ATORVASTATIN CALCIUM 80 MG: 80 TABLET, FILM COATED ORAL at 09:32

## 2022-12-23 RX ADMIN — MOMETASONE FUROATE AND FORMOTEROL FUMARATE DIHYDRATE 2 PUFF: 200; 5 AEROSOL RESPIRATORY (INHALATION) at 07:23

## 2022-12-23 NOTE — CARE COORDINATION
Pt was medically cleared for dc to home today. YESSY order sent to [facility]. Transport home provided by [employer]. No other dc needs or concerns identified or reported. 12/23/22 1510   Service Assessment   Patient Orientation Alert and Oriented   Cognition Alert   History Provided By Patient;Medical Record   Primary Caregiver Other (Comment)  (CLTC aide)   Support Systems Family Members; /;Home Care Staff; Other (Comment)  (CLTC aide)   [address] is: Legal Next of Christina Whitfield   PCP Verified by CM Yes   Last Visit to PCP Within last 3 months   Prior Functional Level Assistance with the following:;Mobility;Housework; Shopping; Bathing;Dressing   Current Functional Level Assistance with the following:;Bathing;Dressing;Housework; Shopping;Mobility   Can patient return to prior living arrangement Yes   Ability to make needs known: Good   Family able to assist with home care needs: No   Would you like for me to discuss the discharge plan with any other family members/significant others, and if so, who? No   Financial Resources Medicare;Medicaid; [place])   Social/Functional History   Lives With Alone   Type of Home Senior housing apartment   Home Layout One level   [address];Personal care attendant   ADL Assistance Needs assistance   [address] Needs assistance   [address] Needs assistance   Transfer Assistance Independent   Active  No   Patient's [address] transportation when needed. Mode of Transportation Bus   Occupation Retired   Discharge Planning   Type of [address] [place] Prior To Admission [address]; Other (Comment)  (CLTC)   Potential Assistance Needed Home Care   DME Ordered?  No   Potential Assistance Purchasing Medications No   Type of Home Care Services PT;Nursing Services   Patient expects to be discharged to: Apartment   One/Two Story Residence One story   History of falls? 1   Services At/After Discharge   Transition of Care Consult (CM Consult) Home Health   Internal Home Health No  ([facility])   Reason Outside Agency Chosen Patient already serviced by other home [address] Discharge Home Health;PT;Nursing services;Transport; In ambulance   [address] Provided? No   Mode of Transport at Discharge [address] Time of Discharge 1315   Confirm Follow Up Transport Other (see comment)  (TC aide or public transportation)   Condition of Participation: Discharge Planning   The Plan for Transition of Care is related to the following treatment goals: Resume home health services to support pt's care and recovery after dc   The Patient and/or Patient Representative was provided with a Choice of Provider? Patient   The Patient and/Or Patient Representative agree with the Discharge Plan? Yes   Freedom of Choice list was provided with basic dialogue that supports the patient's individualized plan of care/goals, treatment preferences, and shares the quality data associated with the providers?   No  (pt current with [facility] and wished to remain with this agency)

## 2022-12-23 NOTE — TELEPHONE ENCOUNTER
TCM NEEDED   HOSP FU Cerebrovascular accident (CVA) due to stenosis of small artery ([address]  DISCHARGED 12-23-22

## 2022-12-23 NOTE — DISCHARGE SUMMARY
Hospitalist Discharge Summary   Admit Date:  2022  4:11 PM   DC Note date: 2022  Name:  Ammon George   Age:  79 y.o. Sex:  male  :  1955   MRN:  [id number]   Room:  [facility]  PCP:  Surya Casillas MD    Presenting Complaint: Seizures     Initial Admission Diagnosis: Transient alteration of awareness [R40.4]  Unresponsiveness [R41.89]     Problem List for this Hospitalization (present on admission):    Principal Problem (Resolved): Unresponsiveness  Active Problems:    Cerebrovascular accident (CVA) ([facility]    Hyperlipidemia    Atrial fibrillation ([facility]    Pulmonary fibrosis (HCC)    S/P TAVR (transcatheter aortic valve replacement)    COPD (chronic obstructive pulmonary disease) ([facility])    Chronic renal disease, stage III ([facility]  Resolved Problems:    Nocturnal hypoxia      Hospital Course:  Mr. Edy Zeng is a 80 y/o WM with a h/o seizure d/o not on AEDs, GERD, COPD, atrial fibrillation, chronic pain and HLD who was admitted to our service on  after being found unresponsive by family at home. Initial work up unremarkable. Neurology was consulted with concerns for seizure like activity. He was started on Keppra 500mg BID though it was felt this likely represents functional episodes rather than epileptiform pathology. On the night of  he developed what was thought to be status epilepticus and was transferred to the ICU. He improved and was able to avoid intubation. EEG was unremarkable. He initially wanted to go to short-term rehab but later changed his mind and wants to go home with home health. [facility] snot have a Neurologist, PCP has been working on it, will refer to see Chinedu Velásquez in follow up for seizures. Psychiatry referral was also recommended. Will con't with Keppra BID for now and have him follow up with PCP and Neurology. Already has Cardiology and Pulm for his other issues. He is medically stable for discharge home today.     Disposition: Home with   Diet: ADULT DIET; Easy to Chew; Low Fat/Low Chol/High Fiber/2 gm Na  Code Status: Full Code    Follow Ups:   Follow-up Information     Cee Gaspar MD Follow up in 2 week(s). Specialty: Internal Medicine  Why: Hospital follow up -- seizures  Contact information:  [address] [id number]  [phone]             GEORGE Ballesteros. Call. Specialty: Neurology  Why: Hospital follow up -- seizures  Contact information:  Anitha Madrid  [address] [facility]  [phone]                       Time spent in patient discharge and coordination 35 minutes. Follow up labs/diagnostics (ultimately defer to outpatient provider):  N/A    Plan was discussed with patient, RN, CM. All questions answered. Patient was stable at time of discharge. Instructions given to call a physician or return if any concerns.     Current Discharge Medication List        START taking these medications    Details   levETIRAcetam (KEPPRA) 500 MG tablet Take 1 tablet by mouth 2 times daily  Qty: 60 tablet, Refills: 1           CONTINUE these medications which have NOT CHANGED    Details   cyclobenzaprine (FLEXERIL) 10 MG tablet Take 10 mg by mouth nightly as needed      apixaban (ELIQUIS) 5 MG TABS tablet Take 1 tablet by mouth 2 times daily  Qty: 60 tablet, Refills: 0      atorvastatin (LIPITOR) 80 MG tablet Take 80 mg by mouth daily      tiotropium (SPIRIVA RESPIMAT) 2.5 MCG/ACT AERS inhaler Inhale 2 puffs into the lungs daily      ferrous sulfate (IRON 325) 325 (65 Fe) MG tablet Take 325 mg by mouth 2 times daily      ondansetron (ZOFRAN-ODT) 8 MG TBDP disintegrating tablet Take 1 tablet by mouth every 8 hours as needed for Nausea  Qty: 30 tablet, Refills: 0      Cholecalciferol (VITAMIN D3) 50 MCG (2000 UT) CAPS TAKE 1 CAPSULE BY MOUTH EVERY DAY  Qty: 90 capsule, Refills: 1    Associated Diagnoses: Vitamin D deficiency, unspecified      omeprazole (PRILOSEC) 20 MG delayed release capsule Take 1 capsule by mouth in the morning and at bedtime  Qty: 30 capsule, Refills: 3      albuterol sulfate  (90 Base) MCG/ACT inhaler Inhale 2 puffs into the lungs every 4 hours as needed      aspirin 81 MG chewable tablet Take 81 mg by mouth daily      fluticasone-salmeterol (ADVAIR) 250-50 MCG/ACT AEPB diskus inhaler Inhale 1 puff into the lungs every 12 hours      ipratropium-albuterol (DUONEB) 0.5-2.5 (3) MG/3ML SOLN nebulizer solution Inhale 3 mLs into the lungs every 4 hours as needed       metoprolol succinate (TOPROL XL) 100 MG extended release tablet Take 100 mg by mouth every 12 hours           STOP taking these medications       predniSONE (DELTASONE) 20 MG tablet Comments:   Reason for Stopping:               Procedures done this admission:  * No surgery found *    Consults this admission:  IP CONSULT TO NEUROLOGY  IP CONSULT TO NEUROLOGY  IP CONSULT TO NEUROLOGY    Echocardiogram results:  10/11/22    TRANSTHORACIC ECHOCARDIOGRAM (TTE) COMPLETE (CONTRAST/BUBBLE/3D PRN) 10/12/2022 12:20 PM, 10/12/2022 12:00 AM (Final)    Interpretation Summary    Left Ventricle: Normal left ventricular systolic function with a visually estimated EF of 55 - 60%. Left ventricle size is normal. Normal wall thickness. Apical Septal motion is consistent with pacing. Normal wall motion. Diastolic function indeterminate in the setting of atrial fibrillation. Average E/e' ratio is 31.04. Aortic Valve: Not well visualized. Appropriately positioned transcatheter bioprosthetic valve that is well-seated. No regurgitation. Unable to assess stenosis. Elevated prosthetic gradient. AV mean gradient is 30 mmHg. AV peak gradient is 47 mmHg. Mitral Valve: Mildly calcified leaflet, at the anterior and posterior leaflets- tips. ? Rheumatic etiology Mild annular calcification of the mitral valve. Moderate stenosis noted. MV mean gradient is 9 mmHg\at a heart rate of 70 bpm.    Left Atrium: Left atrium is moderately dilated.     Interatrial Septum: Agitated saline study was negative with and without provocation. Contrast used: Definity.    -No obvious cardiac source for emboli noted. -Underlying atrial fibrillation s/p AV ale ablation and permanent pacemaker placement-ventricular paced rhythm.  -Negative bubble study for intracardiac shunt.  -Bioprosthetic aortic valve is not well visualized although gradients are elevated. Elevated mitral valve gradients were also noted-consider GODFREY if clinically indicated. Signed by: Perry Gonzalez MD on 10/12/2022 12:20 PM, Signed by: Unknown Provider Result on 10/12/2022 12:00 AM      Diagnostic Imaging/Tests:   CT HEAD WO CONTRAST    Result Date: 12/21/2022  1. No CT evidence of acute intracranial abnormality. 2. No significant change compared to prior exam.    CT Head W/O Contrast    Result Date: 12/19/2022  White matter findings compatible with mild chronic small vessel ischemic disease. CT CERVICAL SPINE WO CONTRAST    Result Date: 12/21/2022  1. No acute fracture or dislocation in the cervical spine. 2. Anterior surgical fusion. Degenerative changes. XR CHEST PORTABLE    Result Date: 12/21/2022  Chronic interstitial changes suspected in the lungs. This is nonspecific. This would be unlikely to represent tuberculosis however tuberculosis would not be excluded by this x-ray. XR CHEST PORTABLE    Result Date: 12/19/2022  The lungs are clear. Heart is normal in size. Status post TAVR. Implantable cardiac device and leads are unchanged in position. Spinal stimulator leads overlie the lower thoracic spine. No pneumothorax. No pleural effusions.         Labs: Results:       BMP, Mg, Phos Recent Labs     12/21/22  0107 12/21/22  0738 12/22/22  0434 12/23/22  0449    140 136 140   K 4.0 3.8 4.0 3.8    111* 108 109   CO2 27 25 23 26   ANIONGAP 4 4 5 5   BUN 16 13 11 12   CREATININE 1.30 1.10 1.20 1.20   LABGLOM >60 >60 >60 >60   CALCIUM 8.6 8.7 8.3 8.6   GLUCOSE 103* 87 137* 89   MG 2.1  -- --   --       CBC Recent Labs     12/21/22  0738 12/22/22  0434 12/23/22  0449   WBC 9.0 8.8 8.4   RBC 4.62 4.80 5.10   HGB 13.2* 13.7 14.6   HCT 40.7* 42.3 45.3   MCV 88.1 88.1 88.8   MCH 28.6 28.5 28.6   MCHC 32.4 32.4 32.2   RDW 15.2* 15.2* 14.8*    174 204   MPV 10.0 10.2 10.5   NRBC 0.00 0.00 0.00   SEGS 67 60 54   LYMPHOPCT 21 25 27   EOSRELPCT 2 3 4   MONOPCT 9 10 14*   BASOPCT 1 1 1   IMMGRAN 0 1 0   SEGSABS 6.1 5.3 4.6   LYMPHSABS 1.9 2.2 2.3   EOSABS 0.2 0.3 0.3   MONOSABS 0.8 0.9 1.1   BASOSABS 0.1 0.1 0.1   ABSIMMGRAN 0.0 0.0 0.0      LFT Recent Labs     12/21/22  0107   BILITOT 0.7   ALKPHOS 94   AST 17   ALT 26   PROT 6.3   LABALBU 2.9*   GLOB 3.4      Cardiac  Lab Results   Component Value Date/Time    NTPROBNP 592 07/11/2022 02:24 PM    NTPROBNP 413 06/21/2022 09:21 PM    NTPROBNP 978 06/06/2022 04:06 AM    TROPHS 13.5 12/19/2022 07:58 PM    TROPHS 14.3 10/11/2022 07:58 PM    TROPHS 29.0 07/11/2022 02:24 PM      Coags Lab Results   Component Value Date/Time    PROTIME 13.2 12/19/2022 07:58 PM    PROTIME 11.4 10/11/2022 07:58 PM    PROTIME 13.4 02/14/2022 09:05 AM    INR 1.0 12/19/2022 07:58 PM    INR 0.8 10/11/2022 07:58 PM    INR 1.0 02/14/2022 09:05 AM      A1c Lab Results   Component Value Date/Time    LABA1C 5.9 10/12/2022 05:49 AM    LABA1C 5.8 09/22/2022 10:35 AM     10/12/2022 05:49 AM     09/22/2022 10:35 AM      Lipids Lab Results   Component Value Date/Time    CHOL 176 10/12/2022 05:49 AM    LDLCALC 122 10/12/2022 05:49 AM    LABVLDL 14 10/12/2022 05:49 AM    HDL 40 10/12/2022 05:49 AM    CHOLHDLRATIO 4.4 10/12/2022 05:49 AM    TRIG 70 10/12/2022 05:49 AM      Thyroid  Lab Results   Component Value Date/Time    BBQ0ZHC 1.640 12/19/2022 07:58 PM        Most Recent UA Lab Results   Component Value Date/Time    COLORU YELLOW/STRAW 12/20/2022 01:35 AM    APPEARANCE CLEAR 12/20/2022 01:35 AM    SPECGRAV 1.022 12/20/2022 01:35 AM    LABPH 5.0 12/20/2022 01:35 AM    PROTEINU Negative 12/20/2022 01:35 AM    GLUCOSEU Negative 12/20/2022 01:35 AM    KETUA Negative 12/20/2022 01:35 AM    BILIRUBINUR Negative 12/20/2022 01:35 AM    BLOODU Negative 12/20/2022 01:35 AM    UROBILINOGEN 0.2 12/20/2022 01:35 AM    NITRU Negative 12/20/2022 01:35 AM    LEUKOCYTESUR Negative 12/20/2022 01:35 AM        No results for input(s): CULTURE in the last 720 hours.     All Labs from Last 24 Hrs:  Recent Results (from the past 24 hour(s))   CBC with Auto Differential    Collection Time: 12/23/22  4:49 AM   Result Value Ref Range    WBC 8.4 4.3 - 11.1 K/uL    RBC 5.10 4.23 - 5.6 M/uL    Hemoglobin 14.6 13.6 - 17.2 g/dL    Hematocrit 45.3 41.1 - 50.3 %    MCV 88.8 82 - 102 FL    MCH 28.6 26.1 - 32.9 PG    MCHC 32.2 31.4 - 35.0 g/dL    RDW 14.8 (H) 11.9 - 14.6 %    Platelets 679 927 - 966 K/uL    MPV 10.5 9.4 - 12.3 FL    nRBC 0.00 0.0 - 0.2 K/uL    Differential Type AUTOMATED      Seg Neutrophils 54 43 - 78 %    Lymphocytes 27 13 - 44 %    Monocytes 14 (H) 4.0 - 12.0 %    Eosinophils % 4 0.5 - 7.8 %    Basophils 1 0.0 - 2.0 %    Immature Granulocytes 0 0.0 - 5.0 %    Segs Absolute 4.6 1.7 - 8.2 K/UL    Absolute Lymph # 2.3 0.5 - 4.6 K/UL    Absolute Mono # 1.1 0.1 - 1.3 K/UL    Absolute Eos # 0.3 0.0 - 0.8 K/UL    Basophils Absolute 0.1 0.0 - 0.2 K/UL    Absolute Immature Granulocyte 0.0 0.0 - 0.5 K/UL   Basic Metabolic Panel w/ Reflex to MG    Collection Time: 12/23/22  4:49 AM   Result Value Ref Range    Sodium 140 133 - 143 mmol/L    Potassium 3.8 3.5 - 5.1 mmol/L    Chloride 109 101 - 110 mmol/L    CO2 26 21 - 32 mmol/L    Anion Gap 5 2 - 11 mmol/L    Glucose 89 65 - 100 mg/dL    BUN 12 8 - 23 MG/DL    Creatinine 1.20 0.8 - 1.5 MG/DL    Est, Glom Filt Rate >60 >60 ml/min/1.73m2    Calcium 8.6 8.3 - 10.4 MG/DL       Allergies   Allergen Reactions    Carbamazepine Anaphylaxis    Lacosamide Nausea Only    Lorazepam Other (See Comments)     Violent behavior    Nitroglycerin Other (See Comments)     hypotension Immunization History   Administered Date(s) Administered    COVID-19, PFIZER PURPLE top, DILUTE for use, (age 15 y+), 30mcg/0.3mL 03/15/2021, 04/12/2021    Influenza Virus Vaccine 10/01/2010, 10/07/2011, 01/01/2012, 01/03/2014, 11/29/2016, 11/30/2017, 12/06/2018, 09/20/2019, 10/01/2021    Influenza, Noemi Sen (age 72 y+), High Dose, 0.7mL 10/01/2021    PPD Test 12/04/2021, 12/20/2022    Pneumococcal Vaccine 01/01/2008    Rotavirus Vaccine 01/01/2008       Recent Vital Data:  Patient Vitals for the past 24 hrs:   Temp Pulse Resp BP SpO2   12/23/22 0831 97.7 °F (36.5 °C) 78 16 109/79 92 %   12/23/22 0727 -- 71 15 -- 96 %   12/23/22 0256 97.3 °F (36.3 °C) 72 17 127/83 94 %   12/22/22 2324 97.7 °F (36.5 °C) 70 19 108/78 92 %   12/22/22 2048 -- 70 16 -- 94 %   12/22/22 2013 97.5 °F (36.4 °C) 78 19 115/76 91 %   12/22/22 1527 98.2 °F (36.8 °C) 70 18 118/80 94 %   12/22/22 1141 98.2 °F (36.8 °C) 71 18 95/66 97 %       Oxygen Therapy  SpO2: 92 %  Pulse via Oximetry: 75 beats per minute  Pulse Oximeter Device Mode: Intermittent  O2 Device: None (Room air)  O2 Flow Rate (L/min): 2 L/min  Blood Gas  Performed?: Yes  Eddie's Test #1: Collateral flow confirmed  Site #1: Right Radial  Site Prepped #1: Yes  Number of Attempts #1: 1  Pressure Held #1: Yes  Complications #1: None  Post-procedure #1: Standard  Specimen Status #1: Point of care  How Tolerated?: Tolerated well    Estimated body mass index is 31 kg/m² as calculated from the following:    Height as of this encounter: 6' 1\" (1.854 m). Weight as of this encounter: 235 lb (106.6 kg). Intake/Output Summary (Last 24 hours) at 12/23/2022 0847  Last data filed at 12/23/2022 0256  Gross per 24 hour   Intake 1200 ml   Output 2075 ml   Net -875 ml         Physical Exam:  General:    Well nourished. No overt distress. Obese. Appears older than stated age. Head:  Normocephalic, atraumatic  Eyes:  Sclerae appear normal.  Pupils equally round.     HENT:  Nares appear normal, no drainage. Moist mucous membranes  Neck:  No restricted ROM. Trachea midline  CV:   RRR. No m/r/g. No JVD  Lungs:   CTAB. No wheezing, rhonchi, or rales. Respirations even, unlabored  Abdomen:   Soft, nontender, nondistended. Extremities: Warm and dry. No cyanosis or clubbing. No edema. Skin:     No rashes. Normal coloration  Neuro:  CN II-XII grossly intact. Psych:  Normal mood and affect. Signed:  Karrie Barker MD    Part of this note may have been written by using a voice dictation software. The note has been proof read but may still contain some grammatical/other typographical errors.

## 2022-12-23 NOTE — PLAN OF CARE
Problem: Discharge Planning  Goal: Discharge to home or other facility with appropriate resources  Outcome: Adequate for Discharge     Problem: Skin/Tissue Integrity  Goal: Absence of new skin breakdown  Description: 1. Monitor for areas of redness and/or skin breakdown  2. Assess vascular access sites hourly  3. Every 4-6 hours minimum:  Change oxygen saturation probe site  4. Every 4-6 hours:  If on nasal continuous positive airway pressure, respiratory therapy assess nares and determine need for appliance change or resting period.   Outcome: Adequate for Discharge     Problem: Safety - Adult  Goal: Free from fall injury  Outcome: Adequate for Discharge     Problem: ABCDS Injury Assessment  Goal: Absence of physical injury  Outcome: Adequate for Discharge     Problem: Pain  Goal: Verbalizes/displays adequate comfort level or baseline comfort level  Outcome: Adequate for Discharge     Problem: Chronic Conditions and Co-morbidities  Goal: Patient's chronic conditions and co-morbidity symptoms are monitored and maintained or improved  Outcome: Adequate for Discharge

## 2022-12-27 ENCOUNTER — CARE COORDINATION (OUTPATIENT)
Dept: CARE COORDINATION | Facility: CLINIC | Age: 67
End: 2022-12-27

## 2022-12-27 NOTE — CARE COORDINATION
Transitions of Care Initial Call    Was this an external facility discharge? No Discharge Facility: [facility]    Challenges to be reviewed by the provider   Additional needs identified to be addressed with provider: No  none             Method of communication with provider : phone  Patient had JOSEPH completed by PCP office 12/27. CTN to follow up on any outstanding needs/concerns next week.  Patient with PCP VV 1/4, Neurology 1/6

## 2022-12-27 NOTE — TELEPHONE ENCOUNTER
Care Transitions Initial Follow Up Call    Outreach made within 2 business days of discharge: Yes    Patient: Madhuri Montenegro Patient : 1955   MRN: [id number]  Reason for Admission: CVA  Discharge Date: 22       Spoke with: Enid Nyhan    Discharge department/facility: [address] [place] [address] Interactive Patient Contact:  Was patient able to fill all prescriptions: Yes  Was patient instructed to bring all medications to the follow-up visit: Yes  Is patient taking all medications as directed in the discharge summary?  Yes  Does patient understand their discharge instructions: Yes  Does patient have questions or concerns that need addressed prior to 7-14 day follow up office visit: no    Scheduled appointment with PCP within 7-14 days    Follow Up  Future Appointments   Date Time Provider Luigi Tinsley   2023 12:00 PM Aris Peñaloza MD [facility] AM[facility]   2023  2:00 PM GEORGE Hope [facility] AMB   2023 11:15 AM José Miguel Yee MD [facility] AM[facility]   3/17/2023  9:50 AM Kristin Hutchins MD PPS [facility] AMB       Matti Bardales [facility]

## 2022-12-30 ENCOUNTER — TELEPHONE (OUTPATIENT)
Dept: PULMONOLOGY | Age: 67
End: 2022-12-30

## 2022-12-30 ENCOUNTER — TELEPHONE (OUTPATIENT)
Dept: INTERNAL MEDICINE CLINIC | Facility: CLINIC | Age: 67
End: 2022-12-30

## 2022-12-30 DIAGNOSIS — G89.29 CHRONIC LOW BACK PAIN WITHOUT SCIATICA, UNSPECIFIED BACK PAIN LATERALITY: Primary | ICD-10-CM

## 2022-12-30 DIAGNOSIS — M54.50 CHRONIC LOW BACK PAIN WITHOUT SCIATICA, UNSPECIFIED BACK PAIN LATERALITY: Primary | ICD-10-CM

## 2022-12-30 RX ORDER — CYCLOBENZAPRINE HCL 10 MG
10 TABLET ORAL NIGHTLY PRN
Qty: 30 TABLET | Refills: 0 | Status: CANCELLED | OUTPATIENT
Start: 2022-12-30

## 2022-12-30 RX ORDER — CYCLOBENZAPRINE HCL 10 MG
10 TABLET ORAL NIGHTLY PRN
Qty: 30 TABLET | Refills: 0 | Status: SHIPPED | OUTPATIENT
Start: 2022-12-30 | End: 2023-01-29

## 2022-12-30 NOTE — TELEPHONE ENCOUNTER
Adolfo Asher  nurse with [facility] home health called the patient has had a cough for four or five days and is using his nebulizer for or five times a day. He is not physically in any distress she just wanted to let someone know.  Her phone number is [phone]

## 2022-12-30 NOTE — TELEPHONE ENCOUNTER
LMTCO again . Per Dr Annalisa Yeboah ok to refill her Flexeril ,ask about ideas re: her sore, and to ask pt to call her pulmonologist Dr Franklyn Jacob

## 2022-12-30 NOTE — TELEPHONE ENCOUNTER
Viveca Deacon called me back , she said she has not seen pt herself the past few months. Miki Whatley said pt is using his nebulizer 4- 5 times a day , slight SOB but is not in distress, open area on buttock is small and she will continue with applying zinc oxide . Lamar Leone know Dr Maribel Waldrop is not in the office today but per Dr Alida Vasquez a refill on his flexeril will be sent in today and I gave Jesu Ochoa Click. Miki Whatley said she rosalind speck to pt and let him know flexeril will be sent in.  Pt has a telemd kae set  already with Dr Maribel Waldrop for 14/23

## 2022-12-30 NOTE — TELEPHONE ENCOUNTER
Patient was seen by home health this morning and they have several concerns: The patient believes his neurostimulator is not working anymore as his pain being severe over the last several days. The patient is also out of his flexeril. Please send to [facility] on [address] in [place]  The patients BP was 160/90 after taking his morning medication  Patient has a small spot on his left hip/buttock. The nurse put a Zinc Ox barrier cream on it as she believes it is a pressure sore and would like to know if there is anything that needs to be done. The patient has been coughing a lot, with white foamy stuff. The nurse listened to the patients lungs and could hear wheezing. The patient uses his nebulizer and inhaler multiple times a day as well. Please call the home health nurse back to advise on what is needing to be done.  [phone]

## 2023-01-03 ENCOUNTER — TELEMEDICINE (OUTPATIENT)
Dept: PULMONOLOGY | Age: 68
End: 2023-01-03
Payer: MEDICARE

## 2023-01-03 DIAGNOSIS — J44.1 COPD EXACERBATION (HCC): Primary | ICD-10-CM

## 2023-01-03 DIAGNOSIS — R09.02 HYPOXEMIA: ICD-10-CM

## 2023-01-03 PROCEDURE — 1111F DSCHRG MED/CURRENT MED MERGE: CPT | Performed by: INTERNAL MEDICINE

## 2023-01-03 PROCEDURE — 3017F COLORECTAL CA SCREEN DOC REV: CPT | Performed by: INTERNAL MEDICINE

## 2023-01-03 PROCEDURE — 1123F ACP DISCUSS/DSCN MKR DOCD: CPT | Performed by: INTERNAL MEDICINE

## 2023-01-03 PROCEDURE — 99214 OFFICE O/P EST MOD 30 MIN: CPT | Performed by: INTERNAL MEDICINE

## 2023-01-03 PROCEDURE — G8427 DOCREV CUR MEDS BY ELIG CLIN: HCPCS | Performed by: INTERNAL MEDICINE

## 2023-01-03 RX ORDER — HYDROCODONE BITARTRATE AND HOMATROPINE METHYLBROMIDE ORAL SOLUTION 5; 1.5 MG/5ML; MG/5ML
5 LIQUID ORAL EVERY 6 HOURS PRN
Qty: 120 ML | Refills: 0 | Status: SHIPPED | OUTPATIENT
Start: 2023-01-03 | End: 2023-01-06

## 2023-01-03 RX ORDER — PREDNISONE 20 MG/1
TABLET ORAL
Qty: 14 TABLET | Refills: 0 | Status: SHIPPED | OUTPATIENT
Start: 2023-01-03

## 2023-01-03 RX ORDER — DOXYCYCLINE HYCLATE 100 MG
100 TABLET ORAL 2 TIMES DAILY
Qty: 10 TABLET | Refills: 0 | Status: SHIPPED | OUTPATIENT
Start: 2023-01-03 | End: 2023-01-08

## 2023-01-03 ASSESSMENT — ENCOUNTER SYMPTOMS
WHEEZING: 1
BACK PAIN: 1
SHORTNESS OF BREATH: 1

## 2023-01-03 ASSESSMENT — PATIENT HEALTH QUESTIONNAIRE - PHQ9
SUM OF ALL RESPONSES TO PHQ QUESTIONS 1-9: 0
2. FEELING DOWN, DEPRESSED OR HOPELESS: 0
1. LITTLE INTEREST OR PLEASURE IN DOING THINGS: 0
SUM OF ALL RESPONSES TO PHQ QUESTIONS 1-9: 0
SUM OF ALL RESPONSES TO PHQ9 QUESTIONS 1 & 2: 0

## 2023-01-03 NOTE — PROGRESS NOTES
[address] [facility]. [address], [address] [facility]  [phone]      Patient Name:  Anni Storey  YOB: 1955      Office Visit 1/3/2023  The patient is seen by synchronous (real-time) audio-video technology on TELEPHONE VISIT. Consent:  The patient/healthcare decision maker is aware that this patient-initiated Telehealth encounter is a billable service, with coverage as determined by his insurance carrier. The patient is aware that he/she may receive a bill and has provided verbal consent to proceed: Yes     I was at the office while conducting this visit. CHIEF COMPLAINT:    Chief Complaint   Patient presents with    COPD         HISTORY OF PRESENT ILLNESS:       Patient is 79years old white male who called today as work in visit. He has history of COPD and hypoxemia and smoking. Today he called reporting he has been more short of breath and coughing for the last 2 weeks. He has also increased wheezing. He has been coughing thick foamy sputum. He also has a lot of pain from all this cough he has been doing. He is normally only on oxygen at night however he will notice his oxygen has been dropping when he lays down to 80s. He is not walking as he recently had stroke and he has been in the hospital and now he is at home doing physical therapy at home. He quit smoking when he was in the hospital for his stroke. He continues to Spiriva and Advair and albuterol nebulizer. He also noticed he has been having fevers last time it was 2 days ago when he had 101.       Past Medical History:   Diagnosis Date    Aortic stenosis     Aortic valve replacement 7/2018    CAD (coronary artery disease)     PCI in 2014, 2015 had MI and then stents    Cancer ([address]     SCC removed face     CHF (congestive heart failure) ([address]     Chronic renal disease, stage III ([address]     sees neph and does not have actual diagnosis at this time    COPD (chronic obstructive pulmonary disease) ([address]     fibrosis in lungs    Dyslipidemia     Heart failure (HCC)     CHF per patient    HTN (hypertension)     med controlled    Hyperlipidemia     Ischemic cardiomyopathy     Pacemaker     only pacemaker    Paroxysmal atrial fibrillation (HCC)     pradaxa for this    Pulmonary fibrosis ([facility])     Seizure disorder ([facility])     lyme disease - small lesion frontal part of brain - no maintenance meds - last seizure 2 months ago, due to fall - before that it was nine years    Systolic heart failure [facility])          Patient Active Problem List   Diagnosis    Leg injury    Hyperlipidemia    Atrial fibrillation ([facility]    CAD in native artery    Hypertension    Pulmonary fibrosis (HCC)    Longstanding persistent atrial fibrillation ([facility])    Shortness of breath at rest    CAP (community acquired pneumonia)    PAD (peripheral artery disease) ([address]    Iron deficiency anemia    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[facility])    COPD with acute lower respiratory infection ([address]    Heart failure with mid-range ejection fraction ([facility])    S/P TAVR (transcatheter aortic valve replacement)    Septic shock with acute organ dysfunction due to pneumococcus ([address]    Acute on chronic heart failure with reduced ejection fraction and diastolic dysfunction ([facility])    Acute on chronic HFrEF (heart failure with reduced ejection fraction) ([facility])    Presence of cardiac resynchronization therapy pacemaker (CRT-P)    COPD (chronic obstructive pulmonary disease) ([address]    Chronic dyspnea    Establishing care with new doctor, encounter for    Chronic midline low back pain    Hx of fracture of femur    Productive cough    Chronic renal disease, stage III ([address]    Cigarette nicotine dependence with nicotine-induced disorder    Right sided weakness    Cerebrovascular accident (CVA) ([address]    Acute CVA (cerebrovascular accident) [facility])           Past Surgical History:   Procedure Laterality Date    ABLATION OF DYSRHYTHMIC FOCUS      AORTIC VALVE REPLACEMENT  07/2018    BACK SURGERY discectomy    CARDIAC CATHETERIZATION      PCI ,     CARDIAC PROCEDURE N/A 2022    LEFT HEART CATH / CORONARY ANGIOGRAPHY performed by Sasha Adam MD at [address] [place] [address] CATH LAB    CERVICAL FUSION      C3-4 fusion    EGD  2022         HIP ARTHROPLASTY Left     SPINAL CORD STIMULATOR SURGERY      lower back     UPPER GASTROINTESTINAL ENDOSCOPY N/A 2022    EGD ESOPHAGOGASTRODUODENOSCOPY/ PT HAS PACEMAKER performed by Fritz Sky MD at [facility] ENDOSCOPY             Social History     Socioeconomic History    Marital status: Legally      Spouse name: Not on file    Number of children: Not on file    Years of education: Not on file    Highest education level: Not on file   Occupational History    Not on file   Tobacco Use    Smoking status: Former     Packs/day: 1.50     Years: 56.00     Pack years: 84.00     Types: Cigarettes     Quit date: 2022     Years since quittin.3    Smokeless tobacco: Never    Tobacco comments:     Stopped 2022   Vaping Use    Vaping Use: Never used   Substance and Sexual Activity    Alcohol use: Not Currently     Comment: about 1-2 x per year    Drug use: Yes     Types: Marijuana [employer])     Comment: cannabis used: two weeks ago as of 6/15/2022 edibles     Sexual activity: Not on file   Other Topics Concern    Not on file   Social History Narrative    Not on file     Social Determinants of Health     Financial Resource Strain: Not on file   Food Insecurity: Not on file   Transportation Needs: Not on file   Physical Activity: Not on file   Stress: Not on file   Social Connections: Not on file   Intimate Partner Violence: Not on file   Housing Stability: Not on file         Family History   Problem Relation Age of Onset    Heart Disease Mother     Heart Disease Sister          Allergies   Allergen Reactions    Carbamazepine Anaphylaxis    Lacosamide Nausea Only    Lorazepam Other (See Comments)     Violent behavior    Nitroglycerin Other (See Comments) hypotension         Current Outpatient Medications   Medication Sig    apixaban (ELIQUIS) 5 MG TABS tablet Take 5 mg by mouth 2 times daily    predniSONE (DELTASONE) 20 MG tablet Take 2 pills for 4 days then 1 pill for 3 days then 1/2 pill for  3 days then stop    doxycycline hyclate (VIBRA-TABS) 100 MG tablet Take 1 tablet by mouth 2 times daily for 5 days    HYDROcodone homatropine (HYCODAN) 5-1.5 MG/5ML solution Take 5 mLs by mouth every 6 hours as needed (cough) for up to 3 days.  Max Daily Amount: 20 mLs    cyclobenzaprine (FLEXERIL) 10 MG tablet Take 1 tablet by mouth nightly as needed for Muscle spasms    levETIRAcetam (KEPPRA) 500 MG tablet Take 1 tablet by mouth 2 times daily    atorvastatin (LIPITOR) 80 MG tablet Take 80 mg by mouth daily    tiotropium (SPIRIVA RESPIMAT) 2.5 MCG/ACT AERS inhaler Inhale 2 puffs into the lungs daily    ferrous sulfate (IRON 325) 325 (65 Fe) MG tablet Take 325 mg by mouth 2 times daily    Cholecalciferol (VITAMIN D3) 50 MCG (2000 UT) CAPS TAKE 1 CAPSULE BY MOUTH EVERY DAY    omeprazole (PRILOSEC) 20 MG delayed release capsule Take 1 capsule by mouth in the morning and at bedtime (Patient taking differently: Take 20 mg by mouth Daily)    albuterol sulfate  (90 Base) MCG/ACT inhaler Inhale 2 puffs into the lungs every 4 hours as needed    aspirin 81 MG chewable tablet Take 81 mg by mouth daily    fluticasone-salmeterol (ADVAIR) 250-50 MCG/ACT AEPB diskus inhaler Inhale 1 puff into the lungs every 12 hours    ipratropium-albuterol (DUONEB) 0.5-2.5 (3) MG/3ML SOLN nebulizer solution Inhale 3 mLs into the lungs every 4 hours as needed     metoprolol succinate (TOPROL XL) 100 MG extended release tablet Take 100 mg by mouth every 12 hours    apixaban (ELIQUIS) 5 MG TABS tablet Take 1 tablet by mouth 2 times daily    ondansetron (ZOFRAN-ODT) 8 MG TBDP disintegrating tablet Take 1 tablet by mouth every 8 hours as needed for Nausea (Patient not taking: Reported on 1/3/2023) No current facility-administered medications for this visit. REVIEW OF SYSTEMS:  Review of Systems   Constitutional:  Positive for fatigue. HENT:  Positive for postnasal drip. Respiratory:  Positive for shortness of breath and wheezing. Musculoskeletal:  Positive for back pain and gait problem. Neurological:  Positive for numbness. All other systems reviewed and are negative. PHYSICAL EXAM:    There were no vitals filed for this visit. GENERAL APPEARANCE:   The patient is normal weight and in no respiratory distress. HEENT:   PERRL. Conjunctivae unremarkable. Nasal mucosa is without epistaxis, exudate, or polyps. Gums and dentition are unremarkable. There is no oropharyngeal narrowing. TMs are clear. NECK/LYMPHATIC:   Symmetrical with no elevation of jugular venous pulsation. Trachea midline. No thyroid enlargement. No cervical adenopathy. LUNGS:   Normal respiratory effort with symmetrical lung expansion. Breath sounds clear. HEART:   There is a regular rate and rhythm. No murmur, rub, or gallop. There is no edema in the lower extremities. ABDOMEN:   Soft and non-tender. No hepatosplenomegaly. Bowel sounds are normal.     NEURO:   The patient is alert and oriented to person, place, and time. Memory appears intact and mood is normal.  No gross sensorimotor deficits are present. DIAGNOSTIC TESTS:   PCXR: No valid procedures specified. CXR PA and lateral:  No results found for this or any previous visit. Screening chest CT: No results found for this or any previous visit. CT of chest without contrast:   No results found for this or any previous visit. CT of chest with contrast:  No valid procedures specified. PET/CT: No valid procedures specified.      Spirometry:    Office Spirometry Results Latest Ref Rng & Units 9/1/2022   DLCO %PRED % 43   TLC %PRED % 76           ASSESSMENT:  (Medical Decision Making)     Diagnosis Orders   1. COPD exacerbation (HCC)  HYDROcodone homatropine (HYCODAN) 5-1.5 MG/5ML solution      2. Hypoxemia             PLAN:  -ABX and Prednisone taper   - continue spiriva/Advair  - follow up in 3 months , call if not better     No orders of the defined types were placed in this encounter.      Orders Placed This Encounter   Medications    predniSONE (DELTASONE) 20 MG tablet     Sig: Take 2 pills for 4 days then 1 pill for 3 days then 1/2 pill for  3 days then stop     Dispense:  14 tablet     Refill:  0    doxycycline hyclate (VIBRA-TABS) 100 MG tablet     Sig: Take 1 tablet by mouth 2 times daily for 5 days     Dispense:  10 tablet     Refill:  0    HYDROcodone homatropine (HYCODAN) 5-1.5 MG/5ML solution     Sig: Take 5 mLs by mouth every 6 hours as needed (cough) for up to 3 days. Max Daily Amount: 20 mLs     Dispense:  120 mL     Refill:  0     Reduce doses taken as pain becomes manageable                 Dede Aguilar MD  Electronically signed    Dictated using voice recognition software.  Proof read but unrecognized errors may exist.

## 2023-01-03 NOTE — TELEPHONE ENCOUNTER
Discharged 12/23/2022 post admission for unresponsiveness     LOV 9/22/2022 Dr Aguilar-COPD and hypoxemia, ILD HS O2 at 2 lpm, current smoker    Spiriva  Advair  Nebulizer    I have spoken with patient.  He reports that HH hears noises when she listens to him breath.  He reports white secretions. He reports increased wheezing. He reports SpO2 94-96%. He reports that he has some pain with breathing x 2 weeks. He reports cough is causing chest to be sore. He reports fever about 4 days ago. He reports no COVID exposure. He reports he  has done 2 x COVID tests both negative. He is taking Spiriva and Advair.  He reports he is using Albuterol more than he should. He reports that he has stopped smoking. He is agreeable to VV appointment today with Dr Aguilar.  He voices appreciation for call to check on  him.

## 2023-01-04 ENCOUNTER — TELEMEDICINE (OUTPATIENT)
Dept: INTERNAL MEDICINE CLINIC | Facility: CLINIC | Age: 68
End: 2023-01-04
Payer: MEDICARE

## 2023-01-04 DIAGNOSIS — R41.3 MEMORY LOSS: ICD-10-CM

## 2023-01-04 DIAGNOSIS — I10 HYPERTENSION, UNSPECIFIED TYPE: ICD-10-CM

## 2023-01-04 DIAGNOSIS — R73.9 HIGH BLOOD SUGAR: ICD-10-CM

## 2023-01-04 DIAGNOSIS — N18.30 STAGE 3 CHRONIC KIDNEY DISEASE, UNSPECIFIED WHETHER STAGE 3A OR 3B CKD (HCC): ICD-10-CM

## 2023-01-04 DIAGNOSIS — Z95.0 PRESENCE OF CARDIAC RESYNCHRONIZATION THERAPY PACEMAKER (CRT-P): ICD-10-CM

## 2023-01-04 DIAGNOSIS — I48.91 ATRIAL FIBRILLATION, UNSPECIFIED TYPE (HCC): ICD-10-CM

## 2023-01-04 DIAGNOSIS — D50.8 OTHER IRON DEFICIENCY ANEMIA: ICD-10-CM

## 2023-01-04 DIAGNOSIS — I63.9 CEREBROVASCULAR ACCIDENT (CVA), UNSPECIFIED MECHANISM (HCC): Primary | ICD-10-CM

## 2023-01-04 DIAGNOSIS — E78.00 HIGH CHOLESTEROL: ICD-10-CM

## 2023-01-04 DIAGNOSIS — R56.9 CONVULSIONS, UNSPECIFIED CONVULSION TYPE (HCC): ICD-10-CM

## 2023-01-04 DIAGNOSIS — I73.9 PAD (PERIPHERAL ARTERY DISEASE) (HCC): ICD-10-CM

## 2023-01-04 DIAGNOSIS — I50.22 HEART FAILURE WITH MID-RANGE EJECTION FRACTION (HCC): ICD-10-CM

## 2023-01-04 DIAGNOSIS — E78.5 HYPERLIPIDEMIA, UNSPECIFIED HYPERLIPIDEMIA TYPE: ICD-10-CM

## 2023-01-04 PROCEDURE — 1036F TOBACCO NON-USER: CPT | Performed by: INTERNAL MEDICINE

## 2023-01-04 PROCEDURE — 99214 OFFICE O/P EST MOD 30 MIN: CPT | Performed by: INTERNAL MEDICINE

## 2023-01-04 PROCEDURE — G8427 DOCREV CUR MEDS BY ELIG CLIN: HCPCS | Performed by: INTERNAL MEDICINE

## 2023-01-04 PROCEDURE — G8417 CALC BMI ABV UP PARAM F/U: HCPCS | Performed by: INTERNAL MEDICINE

## 2023-01-04 PROCEDURE — 1111F DSCHRG MED/CURRENT MED MERGE: CPT | Performed by: INTERNAL MEDICINE

## 2023-01-04 PROCEDURE — G8484 FLU IMMUNIZE NO ADMIN: HCPCS | Performed by: INTERNAL MEDICINE

## 2023-01-04 PROCEDURE — 3017F COLORECTAL CA SCREEN DOC REV: CPT | Performed by: INTERNAL MEDICINE

## 2023-01-04 PROCEDURE — 1123F ACP DISCUSS/DSCN MKR DOCD: CPT | Performed by: INTERNAL MEDICINE

## 2023-01-04 ASSESSMENT — ENCOUNTER SYMPTOMS: COUGH: 1

## 2023-01-04 NOTE — PROGRESS NOTES
FOLLOW UP VISIT    Subjective:    Ashley Chen (: 1955) is a 79 y.o., male,   No chief complaint on file. HPI:  Very nice patient in for follow up. He was In the hospital for stroke. He has not seen the Neurologist as of yet. He states he is doing well with PT it is going slow and swallow has slight improved. He is doing home PT and states it is going well. He does have a cough that started with wheezing he is using his inhaler more. He did see Pulmonary yesterday and was given meds and abx and steroids    Cough  This is a new problem. The current episode started in the past 7 days. The problem has been gradually worsening. The cough is Productive of sputum. His past medical history is significant for asthma.        The following portions of the patient's history were reviewed and updated as appropriate:      Past Medical History:   Diagnosis Date    Aortic stenosis     Aortic valve replacement 2018    CAD (coronary artery disease)     PCI in ,  had MI and then stents    Cancer ([address]     SCC removed face     CHF (congestive heart failure) ([address]     Chronic renal disease, stage III ([address]     sees neph and does not have actual diagnosis at this time    COPD (chronic obstructive pulmonary disease) ([address]     fibrosis in lungs    Dyslipidemia     Heart failure ([address]     CHF per patient    HTN (hypertension)     med controlled    Hyperlipidemia     Ischemic cardiomyopathy     Pacemaker     only pacemaker    Paroxysmal atrial fibrillation ([address]     pradaxa for this    Pulmonary fibrosis ([address]     Seizure disorder ([address]     lyme disease - small lesion frontal part of brain - no maintenance meds - last seizure 2 months ago, due to fall - before that it was nine years    Systolic heart failure [facility])        Past Surgical History:   Procedure Laterality Date    ABLATION OF DYSRHYTHMIC FOCUS      AORTIC VALVE REPLACEMENT  2018    BACK SURGERY      discectomy    CARDIAC CATHETERIZATION PCI ,     CARDIAC PROCEDURE N/A 2022    LEFT HEART CATH / CORONARY ANGIOGRAPHY performed by Tayler Cornelius MD at [facility] CATH LAB    CERVICAL FUSION      C3-4 fusion    EGD  2022         HIP ARTHROPLASTY Left     SPINAL CORD STIMULATOR SURGERY      lower back     UPPER GASTROINTESTINAL ENDOSCOPY N/A 2022    EGD ESOPHAGOGASTRODUODENOSCOPY/ PT HAS PACEMAKER performed by Michelle Siddiqui MD at [facility] ENDOSCOPY       Family History   Problem Relation Age of Onset    Heart Disease Mother     Heart Disease Sister        Social History     Socioeconomic History    Marital status: Legally      Spouse name: Not on file    Number of children: Not on file    Years of education: Not on file    Highest education level: Not on file   Occupational History    Not on file   Tobacco Use    Smoking status: Former     Packs/day: 1.50     Years: 56.00     Pack years: 84.00     Types: Cigarettes     Quit date: 2022     Years since quittin.3    Smokeless tobacco: Never    Tobacco comments:     Stopped 2022   Vaping Use    Vaping Use: Never used   Substance and Sexual Activity    Alcohol use: Not Currently     Comment: about 1-2 x per year    Drug use: Yes     Types: Marijuana [employer])     Comment: cannabis used: two weeks ago as of 6/15/2022 edibles     Sexual activity: Not on file   Other Topics Concern    Not on file   Social History Narrative    Not on file     Social Determinants of Health     Financial Resource Strain: Not on file   Food Insecurity: Not on file   Transportation Needs: Not on file   Physical Activity: Not on file   Stress: Not on file   Social Connections: Not on file   Intimate Partner Violence: Not on file   Housing Stability: Not on file       Current Outpatient Medications   Medication Sig Dispense Refill    apixaban (ELIQUIS) 5 MG TABS tablet Take 5 mg by mouth 2 times daily      predniSONE (DELTASONE) 20 MG tablet Take 2 pills for 4 days then 1 pill for 3 days then 1/2 pill for  3 days then stop 14 tablet 0    doxycycline hyclate (VIBRA-TABS) 100 MG tablet Take 1 tablet by mouth 2 times daily for 5 days 10 tablet 0    HYDROcodone homatropine (HYCODAN) 5-1.5 MG/5ML solution Take 5 mLs by mouth every 6 hours as needed (cough) for up to 3 days. Max Daily Amount: 20 mLs 120 mL 0    cyclobenzaprine (FLEXERIL) 10 MG tablet Take 1 tablet by mouth nightly as needed for Muscle spasms 30 tablet 0    levETIRAcetam (KEPPRA) 500 MG tablet Take 1 tablet by mouth 2 times daily 60 tablet 1    apixaban (ELIQUIS) 5 MG TABS tablet Take 1 tablet by mouth 2 times daily 60 tablet 0    atorvastatin (LIPITOR) 80 MG tablet Take 80 mg by mouth daily      tiotropium (SPIRIVA RESPIMAT) 2.5 MCG/ACT AERS inhaler Inhale 2 puffs into the lungs daily      ferrous sulfate (IRON 325) 325 (65 Fe) MG tablet Take 325 mg by mouth 2 times daily      ondansetron (ZOFRAN-ODT) 8 MG TBDP disintegrating tablet Take 1 tablet by mouth every 8 hours as needed for Nausea (Patient not taking: Reported on 1/3/2023) 30 tablet 0    Cholecalciferol (VITAMIN D3) 50 MCG (2000 UT) CAPS TAKE 1 CAPSULE BY MOUTH EVERY DAY 90 capsule 1    omeprazole (PRILOSEC) 20 MG delayed release capsule Take 1 capsule by mouth in the morning and at bedtime (Patient taking differently: Take 20 mg by mouth Daily) 30 capsule 3    albuterol sulfate  (90 Base) MCG/ACT inhaler Inhale 2 puffs into the lungs every 4 hours as needed      aspirin 81 MG chewable tablet Take 81 mg by mouth daily      fluticasone-salmeterol (ADVAIR) 250-50 MCG/ACT AEPB diskus inhaler Inhale 1 puff into the lungs every 12 hours      ipratropium-albuterol (DUONEB) 0.5-2.5 (3) MG/3ML SOLN nebulizer solution Inhale 3 mLs into the lungs every 4 hours as needed       metoprolol succinate (TOPROL XL) 100 MG extended release tablet Take 100 mg by mouth every 12 hours       No current facility-administered medications for this visit.        Allergies as of 01/04/2023 - Fully Reviewed 01/03/2023   Allergen Reaction Noted    Carbamazepine Anaphylaxis 11/07/2021    Lacosamide Nausea Only 09/24/2013    Lorazepam Other (See Comments) 11/15/2021    Nitroglycerin Other (See Comments) 12/19/2021       Review of Systems   Respiratory:  Positive for cough. Objective: There were no vitals taken for this visit. Physical Exam    No results found for this visit on 01/04/23. Assessent & Plan    1. Cerebrovascular accident (CVA), unspecified mechanism ([address]- is going to Neurology tomorrow having some memory lapses and getting lost   2. Heart failure with mid-range ejection fraction ([address]  3. Longstanding persistent atrial fibrillation ([address]  4. Hypertension, unspecified type  5. Acute bronchitis, unspecified organism- on meds-   6. Memory loss- is going to Neuro? Dementia from CVA? Will check B12 and      Neuro apt tomorrow   2 weeks follow up for BP and labs and review Neurology   Saw Pulmonary on meds reviewed notes  Home Health Nurse did BP manually 142/88    Ashley Chen was evaluated through a synchronous (real-time) audio-video encounter, and/or her healthcare decision maker, is aware that it is a billable service, which includes applicable co-pays, with coverage as determined by her insurance carrier. She provided verbal consent to proceed and patient identification was verified. This visit was conducted pursuant to the emergency declaration under the [address] [place] [address] and the [employer] Act. A caregiver was present when appropriate. Ability to conduct physical exam was limited. The patient was located at home in a state where the provider was licensed to provide care. The patient and/or patient representative voiced understanding and agreement with the current diagnoses, recommendations, and possible side effects. No follow-up provider specified.       Tory Rivera MD

## 2023-01-06 ENCOUNTER — OFFICE VISIT (OUTPATIENT)
Dept: NEUROLOGY | Age: 68
End: 2023-01-06

## 2023-01-06 VITALS — BODY MASS INDEX: 30.65 KG/M2 | WEIGHT: 232.3 LBS

## 2023-01-06 DIAGNOSIS — F06.31 DEPRESSION AS LATE EFFECT OF CEREBROVASCULAR ACCIDENT (CVA): ICD-10-CM

## 2023-01-06 DIAGNOSIS — E78.5 HYPERLIPIDEMIA LDL GOAL <70: ICD-10-CM

## 2023-01-06 DIAGNOSIS — R41.3 MEMORY DIFFICULTIES: ICD-10-CM

## 2023-01-06 DIAGNOSIS — R53.1 RIGHT SIDED WEAKNESS: ICD-10-CM

## 2023-01-06 DIAGNOSIS — I69.398 DEPRESSION AS LATE EFFECT OF CEREBROVASCULAR ACCIDENT (CVA): ICD-10-CM

## 2023-01-06 DIAGNOSIS — Z71.6 TOBACCO ABUSE COUNSELING: ICD-10-CM

## 2023-01-06 DIAGNOSIS — Z86.73 HISTORY OF STROKE: ICD-10-CM

## 2023-01-06 DIAGNOSIS — Z09 HOSPITAL DISCHARGE FOLLOW-UP: Primary | ICD-10-CM

## 2023-01-06 DIAGNOSIS — R29.818 SUSPECTED SLEEP APNEA: ICD-10-CM

## 2023-01-06 DIAGNOSIS — I48.20 CHRONIC ATRIAL FIBRILLATION (HCC): ICD-10-CM

## 2023-01-06 DIAGNOSIS — I10 HTN, GOAL BELOW 130/80: ICD-10-CM

## 2023-01-06 DIAGNOSIS — F44.5 PSYCHOGENIC NONEPILEPTIC SEIZURE: ICD-10-CM

## 2023-01-06 PROBLEM — M50.30 DEGENERATION OF CERVICAL INTERVERTEBRAL DISC: Status: ACTIVE | Noted: 2023-01-06

## 2023-01-06 PROBLEM — E03.9 HYPOTHYROIDISM: Status: ACTIVE | Noted: 2019-10-29

## 2023-01-06 PROBLEM — E11.9 DIABETES MELLITUS (HCC): Status: ACTIVE | Noted: 2022-10-22

## 2023-01-06 PROBLEM — I35.1 NONRHEUMATIC AORTIC VALVE INSUFFICIENCY: Status: ACTIVE | Noted: 2018-07-10

## 2023-01-06 PROBLEM — J30.9 ALLERGIC RHINITIS: Status: ACTIVE | Noted: 2023-01-06

## 2023-01-06 PROBLEM — M51.37 DEGENERATION OF LUMBOSACRAL INTERVERTEBRAL DISC: Status: ACTIVE | Noted: 2023-01-06

## 2023-01-06 PROBLEM — M47.812 CERVICAL SPONDYLOSIS WITHOUT MYELOPATHY: Status: ACTIVE | Noted: 2019-08-28

## 2023-01-06 PROBLEM — S13.9XXA NECK SPRAIN: Status: ACTIVE | Noted: 2019-08-28

## 2023-01-06 PROBLEM — R09.89 LABILE BLOOD PRESSURE: Status: ACTIVE | Noted: 2019-11-05

## 2023-01-06 PROBLEM — S83.419A SPRAIN OF MEDIAL COLLATERAL LIGAMENT OF KNEE: Status: ACTIVE | Noted: 2023-01-06

## 2023-01-06 PROBLEM — L98.9 DISORDER OF SKIN OF TRUNK: Status: ACTIVE | Noted: 2019-08-28

## 2023-01-06 PROBLEM — L89.159 PRESSURE ULCER OF SACRAL REGION: Status: ACTIVE | Noted: 2018-12-06

## 2023-01-06 PROBLEM — M19.90 ARTHRITIS: Status: ACTIVE | Noted: 2023-01-06

## 2023-01-06 PROBLEM — I34.1 MITRAL VALVE PROLAPSE: Status: ACTIVE | Noted: 2018-05-29

## 2023-01-06 PROBLEM — R41.82 AMS (ALTERED MENTAL STATUS): Status: ACTIVE | Noted: 2022-10-22

## 2023-01-06 PROBLEM — G89.29 CHRONIC PAIN OF BOTH SHOULDERS: Status: ACTIVE | Noted: 2022-08-11

## 2023-01-06 PROBLEM — M47.817 LUMBOSACRAL SPONDYLOSIS WITHOUT MYELOPATHY: Status: ACTIVE | Noted: 2019-08-28

## 2023-01-06 PROBLEM — M54.17 LUMBOSACRAL NEURITIS: Status: ACTIVE | Noted: 2023-01-06

## 2023-01-06 PROBLEM — M25.512 CHRONIC PAIN OF BOTH SHOULDERS: Status: ACTIVE | Noted: 2022-08-11

## 2023-01-06 PROBLEM — S20.219A CONTUSION OF RIB: Status: ACTIVE | Noted: 2018-06-12
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
## 2023-01-06 PROBLEM — C44.92 SQUAMOUS CELL SKIN CANCER, MULTIPLE SITES: Status: ACTIVE | Noted: 2023-01-06

## 2023-01-06 PROBLEM — A69.20 LYME DISEASE: Status: ACTIVE | Noted: 2023-01-06

## 2023-01-06 PROBLEM — M25.511 CHRONIC PAIN OF BOTH SHOULDERS: Status: ACTIVE | Noted: 2022-08-11

## 2023-01-06 PROBLEM — I35.8 AORTIC VALVE SCLEROSIS: Status: ACTIVE | Noted: 2017-11-30

## 2023-01-06 PROBLEM — R00.2 PALPITATIONS: Status: ACTIVE | Noted: 2019-05-13

## 2023-01-06 PROBLEM — L89.309 PRESSURE ULCER OF BUTTOCK: Status: ACTIVE | Noted: 2018-06-12

## 2023-01-06 PROBLEM — I50.33 ACUTE ON CHRONIC DIASTOLIC CONGESTIVE HEART FAILURE (HCC): Status: ACTIVE | Noted: 2018-07-10

## 2023-01-06 RX ORDER — ESCITALOPRAM OXALATE 10 MG/1
10 TABLET ORAL DAILY
Qty: 90 TABLET | Refills: 1 | Status: SHIPPED | OUTPATIENT
Start: 2023-01-06

## 2023-01-06 NOTE — PROGRESS NOTES
[facility]  [address] [facility]  [address] [facility], [address] [facility]      Chief Complaint   Patient presents with    Follow-Up from Hospital     Seizure like activity, hx of stroke    Mika Storey is a 79 y.o. male who presents on for hospital for seizure like activity, hx of stroke. PMH significant for seizures, aortic stenosis, COPD, HTN, HLD, ischemic cardiomyopathy s/p PPM, pAF, pulmonary fibrosis,lyme disease, squamous cell carcinoma who presented to ED on 12/21/2022 after a witnessed seizure-like episode. Hx of recent stroke in Oct. 2022 and was at rehab facility where patient was found down on the floor after a fall, is not clear when the fall occurred patient was initially unresponsive for staff and EMS was summoned as he was being placed onto the EMS stretcher he had a spell which appeared to be a tonic-clonic seizure for about 15 to 20 seconds and then once in the back of the ambulance had a second 1 lasting 20 to 30 seconds at that point EMS administered 5 mg of Versed IM. He was brought to ED for further evaluation. He was started on Keppra 500mg BID though it was felt this likely represents functional episodes, rather than epileptiform pathology. On the night of 12/20 he developed what was thought to be status epilepticus and was transferred to the ICU. Hospital workup: CT of head negative for acute intracranial abnormality. Unable to have MRI due to spinal stimulator. Two routine EEGs, both negative for seizure. He was started on Keppra 500mg BID though it was felt this likely represents functional episodes, rather than epileptiform pathology. He was evaluated by PT/OT/ST with recommendations for STR, however patient elected [facility] and therapies. He was discharged home on Keppra 500 mg twice daily. Psychiatry referral was also recommended as outpatient. Interval history:    He is here today by himself. He is currently electric wheelchair.  He lives with his girlfriend. Continues to endorse right sided weakness and sensory changes, memory difficulties, and lower extremity weakness (chronic) right greater than left. He is currently participating in [facility] PT/OT/ST. He is stated he is able to ambulate short distance with moderate assistance. Stated he has difficulty with short term recall. His girlfriend helps with medication management. He also utilizes reminders and GPS on his phone. He is currently taking ASA 81 mg daily, Eliquis 5 mg twice daily and atrovastatin for secondary stroke prevention. Denies GI distress, myalgias, or bleeding complications. Endorses feelings of frustration and overwhelmed since his recent hospitalizations. Denies SI. He currently wears supplemental oxygen while he sleeps due to nocturnal hypoxemia, followed by Pulmonary with plans for upcoming sleep study. He stated he quit smoking in Sept. 2022, endorses social drinking on a occasion with beer, and occasional THC use. Denies seizure activity. Currently on Keppra 500 mg twice daily. Prior LT EEG completed at [facility] captured 3 episodes suggestive for psychogenic non-epileptic spells.      Past Medical History:   Diagnosis Date    Aortic stenosis     Aortic valve replacement 7/2018    CAD (coronary artery disease)     PCI in 2014, 2015 had MI and then stents    Cancer ([address]     SCC removed face     CHF (congestive heart failure) ([address]     Chronic renal disease, stage III ([address]     sees neph and does not have actual diagnosis at this time    COPD (chronic obstructive pulmonary disease) ([address]     fibrosis in lungs    Dyslipidemia     Heart failure ([address]     CHF per patient    HTN (hypertension)     med controlled    Hyperlipidemia     Ischemic cardiomyopathy     Pacemaker     only pacemaker    Paroxysmal atrial fibrillation ([address]     pradaxa for this    Pulmonary fibrosis ([address]     Seizure disorder ([address]     lyme disease - small lesion frontal part of brain - no maintenance meds - last seizure 2 months ago, due to fall - before that it was nine years    Systolic heart failure [facility])        Past Surgical History:   Procedure Laterality Date    ABLATION OF DYSRHYTHMIC FOCUS      AORTIC VALVE REPLACEMENT  2018    BACK SURGERY      discectomy    CARDIAC CATHETERIZATION      PCI ,     CARDIAC PROCEDURE N/A 2022    LEFT HEART CATH / CORONARY ANGIOGRAPHY performed by Derian Alvarado MD at [address] [place] [address] CATH LAB    CERVICAL FUSION      C3-4 fusion    EGD  2022         HIP ARTHROPLASTY Left     SPINAL CORD STIMULATOR SURGERY      lower back     UPPER GASTROINTESTINAL ENDOSCOPY N/A 2022    EGD ESOPHAGOGASTRODUODENOSCOPY/ PT HAS PACEMAKER performed by Anatoliy Rodriguez MD at [facility] ENDOSCOPY       Family History   Problem Relation Age of Onset    Heart Disease Mother     Heart Disease Sister        Social History     Socioeconomic History    Marital status: Legally    Tobacco Use    Smoking status: Former     Packs/day: 1.50     Years: 56.00     Pack years: 84.00     Types: Cigarettes     Quit date: 2022     Years since quittin.3    Smokeless tobacco: Never    Tobacco comments:     Stopped 2022   Vaping Use    Vaping Use: Never used   Substance and Sexual Activity    Alcohol use: Not Currently     Comment: about 1-2 x per year    Drug use: Yes     Types: Marijuana [facility])     Comment: cannabis used: two weeks ago as of 6/15/2022 edibles          Current Outpatient Medications:     apixaban (ELIQUIS) 5 MG TABS tablet, Take 5 mg by mouth 2 times daily, Disp: , Rfl:     predniSONE (DELTASONE) 20 MG tablet, Take 2 pills for 4 days then 1 pill for 3 days then 1/2 pill for  3 days then stop, Disp: 14 tablet, Rfl: 0    doxycycline hyclate (VIBRA-TABS) 100 MG tablet, Take 1 tablet by mouth 2 times daily for 5 days, Disp: 10 tablet, Rfl: 0    HYDROcodone homatropine (HYCODAN) 5-1.5 MG/5ML solution, Take 5 mLs by mouth every 6 hours as needed (cough) for up to 3 days.  Max Daily Amount: 20 mLs, Disp: 120 mL, Rfl: 0    cyclobenzaprine (FLEXERIL) 10 MG tablet, Take 1 tablet by mouth nightly as needed for Muscle spasms, Disp: 30 tablet, Rfl: 0    levETIRAcetam (KEPPRA) 500 MG tablet, Take 1 tablet by mouth 2 times daily, Disp: 60 tablet, Rfl: 1    apixaban (ELIQUIS) 5 MG TABS tablet, Take 1 tablet by mouth 2 times daily, Disp: 60 tablet, Rfl: 0    atorvastatin (LIPITOR) 80 MG tablet, Take 80 mg by mouth daily, Disp: , Rfl:     tiotropium (SPIRIVA RESPIMAT) 2.5 MCG/ACT AERS inhaler, Inhale 2 puffs into the lungs daily, Disp: , Rfl:     ferrous sulfate (IRON 325) 325 (65 Fe) MG tablet, Take 325 mg by mouth 2 times daily, Disp: , Rfl:     ondansetron (ZOFRAN-ODT) 8 MG TBDP disintegrating tablet, Take 1 tablet by mouth every 8 hours as needed for Nausea (Patient not taking: Reported on 1/3/2023), Disp: 30 tablet, Rfl: 0    Cholecalciferol (VITAMIN D3) 50 MCG (2000 UT) CAPS, TAKE 1 CAPSULE BY MOUTH EVERY DAY, Disp: 90 capsule, Rfl: 1    omeprazole (PRILOSEC) 20 MG delayed release capsule, Take 1 capsule by mouth in the morning and at bedtime (Patient taking differently: Take 20 mg by mouth Daily), Disp: 30 capsule, Rfl: 3    albuterol sulfate  (90 Base) MCG/ACT inhaler, Inhale 2 puffs into the lungs every 4 hours as needed, Disp: , Rfl:     aspirin 81 MG chewable tablet, Take 81 mg by mouth daily, Disp: , Rfl:     fluticasone-salmeterol (ADVAIR) 250-50 MCG/ACT AEPB diskus inhaler, Inhale 1 puff into the lungs every 12 hours, Disp: , Rfl:     ipratropium-albuterol (DUONEB) 0.5-2.5 (3) MG/3ML SOLN nebulizer solution, Inhale 3 mLs into the lungs every 4 hours as needed , Disp: , Rfl:     metoprolol succinate (TOPROL XL) 100 MG extended release tablet, Take 100 mg by mouth every 12 hours, Disp: , Rfl:     Allergies   Allergen Reactions    Carbamazepine Anaphylaxis    Lacosamide Nausea Only    Lorazepam Other (See Comments)     Violent behavior    Nitroglycerin Other (See Comments) hypotension       REVIEW OF SYSTEMS:  CONSTITUTIONAL: No weight loss, fever, chills, weakness or fatigue. HEENT: Eyes: No visual loss, blurred vision, double vision or yellow sclerae. Ears, Nose, Throat: No hearing loss, sneezing, congestion, runny nose or sore throat. SKIN: No rash or itching. CARDIOVASCULAR: No chest pain, chest pressure or chest discomfort. No palpitations or edema. RESPIRATORY: No shortness of breath, cough or sputum. GASTROINTESTINAL: No anorexia, nausea, vomiting or diarrhea. No abdominal pain or blood. GENITOURINARY: no burning with urination. NEUROLOGICAL: right sided weakness, sensory changes No headache, dizziness, syncope, paralysis, ataxia, numbness or tingling in the extremities. No change in bowel or bladder control. MUSCULOSKELETAL: chronic back pain No muscle, back pain, joint pain or stiffness. HEMATOLOGIC: No anemia, bleeding or bruising. LYMPHATICS: No enlarged nodes. No history of splenectomy. PSYCHIATRIC: depression . ENDOCRINOLOGIC: No reports of sweating, cold or heat intolerance. No polyuria or polydipsia. ALLERGIES: No history of asthma, hives, eczema or rhinitis. Physical Examination  Wt 232 lb 4.8 oz (105.4 kg)   BMI 30.65 kg/m²     General - Well developed, well nourished, in no apparent distress. Pleasant and conversant. HEENT - Normocephalic, atraumatic. Conjunctiva, tympanic membranes, and oropharynx are clear. Neck - Supple without masses, no bruits   Abdomen - Soft, nontender with normal bowel sounds. Extremities - Peripheral pulses intact. No edema and no rashes. Neurological examination - Comprehension, attention , memory and reasoning are intact. Language and speech are normal. On cranial nerve examination pupils are equal round and reactive to light. Visual acuity is adequate. Visual fields are full to finger confrontation. Extraocular motility is normal. Face is symmetric and sensation is intact to light touch.  Hearing is intact to finger rustle bilaterally. Motor examination - There is normal muscle tone and bulk. Power is full throughout on the left. In the RUE he has 4-/5 strength and RLE he has 1-2/5 strength with a positive Johnston sign and reverse Johnston sign. Muscle stretch reflexes are normoactive and there are no pathological reflexes present. Sensation is intact to light touch, pinprick, vibration and proprioception in all extremities. Cerebellar examination is normal. Gait and stance not assessed due to fall risk. Most recent CTA  - I personally reviewed this image   CT Result (most recent):  CT HEAD WO CONTRAST 12/21/2022    Narrative  Noncontrast CT of the brain. COMPARISON: December 19, 2022    INDICATION: Patient found on floor    TECHNIQUE: Contiguous axial images were obtained from the skull base through the  vertex without IV contrast. Radiation dose reduction techniques were used for  this study:  Our CT scanners use one or all of the following: Automated exposure  control, adjustment of the mA and/or kVp according to patient's size, iterative  reconstruction. FINDINGS:    There is no acute intracranial hemorrhage or evidence for acute territorial  infarction. There is no mass effect, midline shift or hydrocephalus. There is no  extra-axial fluid collection. The cerebellum and brainstem are grossly  unremarkable. Included globes appear intact. The paranasal sinuses and the mastoid air cells are aerated. There is no skull fracture. Impression  1. No CT evidence of acute intracranial abnormality. 2. No significant change compared to prior exam.      LT EEG Impression from [facility] 12/17/2022: Abnormal video EEG due to:   1. 3 push button events with back arching and pelvic thrusting that was without definitive epileptiform correlate,   2. Left temporal slow waves versus artifact. Clinical Interpretation:   The above noted findings are most suggestive of psychogenic nonepileptic spells.  A focal region of mild nonspecific cerebral dysfunction in the left temporal region may also be present versus artifact given significant sweat and electrode artifacts throughout. No definitive epileptiform activity or seizures were recorded. EEG impression on 12/20/2022: This EEG is normal in the awake and sleep states. No interictal epileptiform discharges or lateralizing features were seen. '    EEG impression on 12/21/2022: This EEG is normal in the awake state. No interictal epileptiform discharges or lateralizing features were seen. Lab Results   Component Value Date    CHOL 176 10/12/2022    CHOL 222 (H) 09/22/2022     Lab Results   Component Value Date    TRIG 70 10/12/2022    TRIG 143 09/22/2022     Lab Results   Component Value Date    HDL 40 10/12/2022    HDL 38 (L) 09/22/2022     Lab Results   Component Value Date    LDLCALC 122 (H) 10/12/2022    LDLCALC 155.4 (H) 09/22/2022     Lab Results   Component Value Date    LABVLDL 14 10/12/2022    LABVLDL 28.6 (H) 09/22/2022     Lab Results   Component Value Date    CHOLHDLRATIO 4.4 10/12/2022    CHOLHDLRATIO 5.8 09/22/2022     Hemoglobin A1C   Date Value Ref Range Status   10/12/2022 5.9 (H) 4.8 - 5.6 % Final         Ashley was seen today for follow-up from hospital, other and sleep apnea. Diagnoses and all orders for this visit:    Hospital discharge follow-up  -     ND DISCHARGE MEDS RECONCILED W/ CURRENT OUTPATIENT MED LIST    Psychogenic nonepileptic seizure ([address]  Exam findings and prior EEGs would be suggestive of functional component. 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 Recommend cognitive behavioral therapy. Discontinue Keppra. History of stroke  His symptoms localize to the posterior limb of the internal capsule on the left. Strokes in this area are typically caused by small vessel ischemic disease.    Unable to have MRI due to spinal stimulator, therefore we will treat this as stroke. Continue secondary stroke prevention ASA, Eliquis and atorvastatin   Goal SBP <130/80  Goal LDL<70  Goal A1C <7.0  Continue PT/OT/ST  Discussed Mediterranean diet and increasing cardiovascular exercise to goal of 30 minutes daily. Depression screening completed. Reviewed BE FAST and when to call 911.     -     escitalopram (LEXAPRO) 10 MG tablet; Take 1 tablet by mouth daily    Chronic atrial fibrillation (HCC)  Stable. PPM, Eliquis 5 mg twice daily and rated controlled with BB  Right sided weakness  Secondary to stroke  Memory difficulties  Multifactorial etiology given recent stroke and suspected sleep apnea. Sleep study pending. Will re-evaluate at follow up. If no improvement, will consider trial of donepezil. Depression as late effect of cerebrovascular accident (CVA)  Will start on escitalopram 10 mg daily. Medication and side effects discussed. He is to call office if he develops new or worsening symptoms or SI.   -     escitalopram (LEXAPRO) 10 MG tablet; Take 1 tablet by mouth daily    HTN, goal below 130/80    Hyperlipidemia LDL goal <70    Suspected sleep apnea  Sleep study scheduled for Jan. 23, 2023. Currently wears 2 L supplemental oxygen via mask nightly. Tobacco abuse counseling  Discussed importance of continued abstinence as this can increase risk for stroke and cardiovascular events. Follow up in 3 months or sooner if needed    I spent greater than 50% of the 60 total minutes of today's visit in coordination of care and patient/family education and counseling regarding the above patient concerns, reviewing the patient's medical record, my assessment and recommendations.               Jo Victoria, [address] [place] [address]  11 [facility], [address] [place] [address]  UBBEV:[phone]

## 2023-01-10 ENCOUNTER — TELEPHONE (OUTPATIENT)
Dept: NEUROLOGY | Age: 68
End: 2023-01-10

## 2023-01-10 ENCOUNTER — TELEPHONE (OUTPATIENT)
Dept: INTERNAL MEDICINE CLINIC | Facility: CLINIC | Age: 68
End: 2023-01-10

## 2023-01-10 NOTE — TELEPHONE ENCOUNTER
Jude Stauffer from [employer] called to let us know pt was placed on Lexapro and it flagged level 3 interactions with the following medications:    -advair  -Aspirin  -eloquis  -metorolol  -omeprazole  -cyclobenzaprine    Her call back number is [phone]    This medication was prescribed by his neurologist Chinedu Velásquez.

## 2023-01-10 NOTE — TELEPHONE ENCOUNTER
Inter reactions with Lexapro and advair,eliquis,asprin,metoprolol,omeprazo,cycolbenzaprine,    INTRUM [address] AND SHE HAS TO JUST NOTIFY

## 2023-01-11 NOTE — TELEPHONE ENCOUNTER
Patient was notified to contact Dr Cristina Dominguez for follow up regarding lexapro medication interactions

## 2023-01-24 ENCOUNTER — TELEPHONE (OUTPATIENT)
Dept: PULMONOLOGY | Age: 68
End: 2023-01-24

## 2023-01-24 ENCOUNTER — TELEPHONE (OUTPATIENT)
Dept: INTERNAL MEDICINE CLINIC | Facility: CLINIC | Age: 68
End: 2023-01-24

## 2023-01-24 NOTE — TELEPHONE ENCOUNTER
She is checking to see what the patient 02 L is suppose t be on?     Also asking for humidifier for his 02

## 2023-01-24 NOTE — TELEPHONE ENCOUNTER
Home health nurse calling to report that the Patient fell off the bed this morning and then woke up on ground. He states he does not remember how he fell, but now his right shoulder hurts. The patient does not want to go to the ER to have it checked. The patient also has limited mobility and damage on his right side due to a stroke, therefore, the nurse is unable to tell how injured he may be. For any questions, please call [facility].

## 2023-01-25 NOTE — TELEPHONE ENCOUNTER
Last seen: 1/3/23 virtual  Hx: COPD, CAD, CHF, HTN, pulm fibrosis    Last visit provided abx & steroid taper for increased coughing & sob, has [facility] services from recent hospital d/c w/ CVA. Call from [facility] RN to find out what literflow O2 ordered & if humidity can be added to O2. Per call 12/30/22: Discharged 12/23/2022 post admission for unresponsiveness, LOV 9/22/2022 Dr Aguilar-COPD and hypoxemia, ILD HS O2 at 2 lpm, current smoker    Contacted [facility] staff [facility]ce, left vmail that last order are for 2L @ HS, Unable to determine from chart notes which DME provides O2, gave verbal order for humidity but if she can determine DME we can send order.

## 2023-01-25 NOTE — TELEPHONE ENCOUNTER
FYI:  Patient was called, message was given that he should go to the ER, patient decline and wanting to wait until his Aid comes in the morning, I ask if he can call the ambulance, still declined. Patient said that he dont know how he fell all he know that he was found by his neighbor. and that he is ok with a bruises.

## 2023-01-30 ENCOUNTER — OFFICE VISIT (OUTPATIENT)
Dept: INTERNAL MEDICINE CLINIC | Facility: CLINIC | Age: 68
End: 2023-01-30
Payer: MEDICARE

## 2023-01-30 VITALS
HEIGHT: 73 IN | SYSTOLIC BLOOD PRESSURE: 130 MMHG | BODY MASS INDEX: 31.28 KG/M2 | OXYGEN SATURATION: 97 % | DIASTOLIC BLOOD PRESSURE: 83 MMHG | HEART RATE: 70 BPM | WEIGHT: 236 LBS

## 2023-01-30 DIAGNOSIS — E11.65 TYPE 2 DIABETES MELLITUS WITH HYPERGLYCEMIA, WITHOUT LONG-TERM CURRENT USE OF INSULIN (HCC): ICD-10-CM

## 2023-01-30 DIAGNOSIS — E78.00 HIGH CHOLESTEROL: ICD-10-CM

## 2023-01-30 DIAGNOSIS — I10 HYPERTENSION, UNSPECIFIED TYPE: ICD-10-CM

## 2023-01-30 DIAGNOSIS — I48.11 LONGSTANDING PERSISTENT ATRIAL FIBRILLATION (HCC): ICD-10-CM

## 2023-01-30 DIAGNOSIS — R41.3 MEMORY LOSS: ICD-10-CM

## 2023-01-30 DIAGNOSIS — G89.29 OTHER CHRONIC PAIN: ICD-10-CM

## 2023-01-30 DIAGNOSIS — M54.9 BACK PAIN, UNSPECIFIED BACK LOCATION, UNSPECIFIED BACK PAIN LATERALITY, UNSPECIFIED CHRONICITY: Primary | ICD-10-CM

## 2023-01-30 DIAGNOSIS — I25.10 CAD IN NATIVE ARTERY: ICD-10-CM

## 2023-01-30 LAB
BASOPHILS # BLD: 0.1 K/UL (ref 0–0.2)
BASOPHILS NFR BLD: 1 % (ref 0–2)
DIFFERENTIAL METHOD BLD: NORMAL
EOSINOPHIL # BLD: 0.4 K/UL (ref 0–0.8)
EOSINOPHIL NFR BLD: 5 % (ref 0.5–7.8)
ERYTHROCYTE [DISTWIDTH] IN BLOOD BY AUTOMATED COUNT: 14.1 % (ref 11.9–14.6)
HCT VFR BLD AUTO: 43.3 % (ref 41.1–50.3)
HGB BLD-MCNC: 13.7 G/DL (ref 13.6–17.2)
IMM GRANULOCYTES # BLD AUTO: 0 K/UL (ref 0–0.5)
IMM GRANULOCYTES NFR BLD AUTO: 0 % (ref 0–5)
LYMPHOCYTES # BLD: 2.1 K/UL (ref 0.5–4.6)
LYMPHOCYTES NFR BLD: 25 % (ref 13–44)
MCH RBC QN AUTO: 29.4 PG (ref 26.1–32.9)
MCHC RBC AUTO-ENTMCNC: 31.6 G/DL (ref 31.4–35)
MCV RBC AUTO: 92.9 FL (ref 82–102)
MONOCYTES # BLD: 1 K/UL (ref 0.1–1.3)
MONOCYTES NFR BLD: 12 % (ref 4–12)
NEUTS SEG # BLD: 4.7 K/UL (ref 1.7–8.2)
NEUTS SEG NFR BLD: 57 % (ref 43–78)
NRBC # BLD: 0 K/UL (ref 0–0.2)
PLATELET # BLD AUTO: 220 K/UL (ref 150–450)
PMV BLD AUTO: 10.6 FL (ref 9.4–12.3)
RBC # BLD AUTO: 4.66 M/UL (ref 4.23–5.6)
WBC # BLD AUTO: 8.3 K/UL (ref 4.3–11.1)

## 2023-01-30 PROCEDURE — 2022F DILAT RTA XM EVC RTNOPTHY: CPT | Performed by: INTERNAL MEDICINE

## 2023-01-30 PROCEDURE — 3017F COLORECTAL CA SCREEN DOC REV: CPT | Performed by: INTERNAL MEDICINE

## 2023-01-30 PROCEDURE — 3079F DIAST BP 80-89 MM HG: CPT | Performed by: INTERNAL MEDICINE

## 2023-01-30 PROCEDURE — 99215 OFFICE O/P EST HI 40 MIN: CPT | Performed by: INTERNAL MEDICINE

## 2023-01-30 PROCEDURE — 1123F ACP DISCUSS/DSCN MKR DOCD: CPT | Performed by: INTERNAL MEDICINE

## 2023-01-30 PROCEDURE — G8427 DOCREV CUR MEDS BY ELIG CLIN: HCPCS | Performed by: INTERNAL MEDICINE

## 2023-01-30 PROCEDURE — 1036F TOBACCO NON-USER: CPT | Performed by: INTERNAL MEDICINE

## 2023-01-30 PROCEDURE — 3075F SYST BP GE 130 - 139MM HG: CPT | Performed by: INTERNAL MEDICINE

## 2023-01-30 PROCEDURE — 3046F HEMOGLOBIN A1C LEVEL >9.0%: CPT | Performed by: INTERNAL MEDICINE

## 2023-01-30 PROCEDURE — G8417 CALC BMI ABV UP PARAM F/U: HCPCS | Performed by: INTERNAL MEDICINE

## 2023-01-30 PROCEDURE — G8484 FLU IMMUNIZE NO ADMIN: HCPCS | Performed by: INTERNAL MEDICINE

## 2023-01-30 RX ORDER — METOPROLOL SUCCINATE 100 MG/1
100 TABLET, EXTENDED RELEASE ORAL EVERY 12 HOURS
Qty: 90 TABLET | Refills: 0 | Status: SHIPPED | OUTPATIENT
Start: 2023-01-30

## 2023-01-30 RX ORDER — CYCLOBENZAPRINE HCL 10 MG
10 TABLET ORAL 3 TIMES DAILY PRN
Qty: 30 TABLET | Refills: 0 | Status: SHIPPED | OUTPATIENT
Start: 2023-01-30

## 2023-01-30 RX ORDER — CYCLOBENZAPRINE HCL 10 MG
10 TABLET ORAL 3 TIMES DAILY PRN
COMMUNITY
End: 2023-01-30 | Stop reason: SDUPTHER

## 2023-01-30 SDOH — ECONOMIC STABILITY: FOOD INSECURITY: WITHIN THE PAST 12 MONTHS, THE FOOD YOU BOUGHT JUST DIDN'T LAST AND YOU DIDN'T HAVE MONEY TO GET MORE.: NEVER TRUE

## 2023-01-30 SDOH — ECONOMIC STABILITY: FOOD INSECURITY: WITHIN THE PAST 12 MONTHS, YOU WORRIED THAT YOUR FOOD WOULD RUN OUT BEFORE YOU GOT MONEY TO BUY MORE.: NEVER TRUE

## 2023-01-30 ASSESSMENT — PATIENT HEALTH QUESTIONNAIRE - PHQ9
SUM OF ALL RESPONSES TO PHQ QUESTIONS 1-9: 0
4. FEELING TIRED OR HAVING LITTLE ENERGY: 0
7. TROUBLE CONCENTRATING ON THINGS, SUCH AS READING THE NEWSPAPER OR WATCHING TELEVISION: 0
SUM OF ALL RESPONSES TO PHQ QUESTIONS 1-9: 0
5. POOR APPETITE OR OVEREATING: 0
9. THOUGHTS THAT YOU WOULD BE BETTER OFF DEAD, OR OF HURTING YOURSELF: 0
1. LITTLE INTEREST OR PLEASURE IN DOING THINGS: 0
2. FEELING DOWN, DEPRESSED OR HOPELESS: 0
10. IF YOU CHECKED OFF ANY PROBLEMS, HOW DIFFICULT HAVE THESE PROBLEMS MADE IT FOR YOU TO DO YOUR WORK, TAKE CARE OF THINGS AT HOME, OR GET ALONG WITH OTHER PEOPLE: 0
SUM OF ALL RESPONSES TO PHQ QUESTIONS 1-9: 0
6. FEELING BAD ABOUT YOURSELF - OR THAT YOU ARE A FAILURE OR HAVE LET YOURSELF OR YOUR FAMILY DOWN: 0
3. TROUBLE FALLING OR STAYING ASLEEP: 0
SUM OF ALL RESPONSES TO PHQ9 QUESTIONS 1 & 2: 0
SUM OF ALL RESPONSES TO PHQ QUESTIONS 1-9: 0
8. MOVING OR SPEAKING SO SLOWLY THAT OTHER PEOPLE COULD HAVE NOTICED. OR THE OPPOSITE, BEING SO FIGETY OR RESTLESS THAT YOU HAVE BEEN MOVING AROUND A LOT MORE THAN USUAL: 0

## 2023-01-30 ASSESSMENT — ENCOUNTER SYMPTOMS
RESPIRATORY NEGATIVE: 1
COUGH: 0
GASTROINTESTINAL NEGATIVE: 1

## 2023-01-30 ASSESSMENT — SOCIAL DETERMINANTS OF HEALTH (SDOH): HOW HARD IS IT FOR YOU TO PAY FOR THE VERY BASICS LIKE FOOD, HOUSING, MEDICAL CARE, AND HEATING?: NOT HARD AT ALL

## 2023-01-30 NOTE — PROGRESS NOTES
FOLLOW UP VISIT    Subjective:    Ashley Chen (: 1955) is a 79 y.o., male,   Chief Complaint   Patient presents with    Follow-up    Hypertension         HPI:  Very nice patient in for follow up. 1. CVA- saw Neuro a month ago- had seizure meds d/c   2. COPD- does go to Pulmonary on Spiriva and adviar has stopped smoking  3. CAD and PAF - does see Cardiology on eliquis  4. High blood pressure on toporol  5. High cholesterol on meds  6. High blood sugar- no recent labs  7. Chronic Pain- is doing PT and is on flexeril it is more painful at times    Hypertension    Cough  This is a new problem. The current episode started in the past 7 days. The problem has been gradually worsening. The cough is Productive of sputum. His past medical history is significant for asthma.        The following portions of the patient's history were reviewed and updated as appropriate:      Past Medical History:   Diagnosis Date    Aortic stenosis     Aortic valve replacement 2018    CAD (coronary artery disease)     PCI in ,  had MI and then stents    Cancer ([address]     SCC removed face     CHF (congestive heart failure) ([address]     Chronic renal disease, stage III ([address]     sees neph and does not have actual diagnosis at this time    COPD (chronic obstructive pulmonary disease) ([address]     fibrosis in lungs    Dyslipidemia     Heart failure ([address]     CHF per patient    HTN (hypertension)     med controlled    Hyperlipidemia     Ischemic cardiomyopathy     Pacemaker     only pacemaker    Paroxysmal atrial fibrillation ([address]     pradaxa for this    Pulmonary fibrosis ([address]     Seizure disorder ([address]     lyme disease - small lesion frontal part of brain - no maintenance meds - last seizure 2 months ago, due to fall - before that it was nine years    Systolic heart failure [facility])        Past Surgical History:   Procedure Laterality Date    ABLATION OF DYSRHYTHMIC FOCUS      AORTIC VALVE REPLACEMENT  2018    BACK SURGERY discectomy    CARDIAC CATHETERIZATION      PCI ,     CARDIAC PROCEDURE N/A 2022    LEFT HEART CATH / CORONARY ANGIOGRAPHY performed by Etelvina Gallegos MD at [address] [place] [address] CATH LAB    CERVICAL FUSION      C3-4 fusion    EGD  2022         HIP ARTHROPLASTY Left     SPINAL CORD STIMULATOR SURGERY      lower back     UPPER GASTROINTESTINAL ENDOSCOPY N/A 2022    EGD ESOPHAGOGASTRODUODENOSCOPY/ PT HAS PACEMAKER performed by Marin Mock MD at [facility] ENDOSCOPY       Family History   Problem Relation Age of Onset    Heart Disease Mother     Heart Disease Sister        Social History     Socioeconomic History    Marital status: Legally      Spouse name: Not on file    Number of children: Not on file    Years of education: Not on file    Highest education level: Not on file   Occupational History    Not on file   Tobacco Use    Smoking status: Former     Packs/day: 1.50     Years: 56.00     Pack years: 84.00     Types: Cigarettes     Quit date: 2022     Years since quittin.4    Smokeless tobacco: Never    Tobacco comments:     Stopped 2022   Vaping Use    Vaping Use: Never used   Substance and Sexual Activity    Alcohol use: Not Currently     Comment: about 1-2 x per year    Drug use: Yes     Types: Marijuana Myra Cotton)     Comment: cannabis used: two weeks ago as of 6/15/2022 edibles     Sexual activity: Not on file   Other Topics Concern    Not on file   Social History Narrative    Not on file     Social Determinants of Health     Financial Resource Strain: Low Risk     Difficulty of Paying Living Expenses: Not hard at all   Food Insecurity: No Food Insecurity    Worried About Running Out of Food in the Last Year: Never true    Ran Out of Food in the Last Year: Never true   Transportation Needs: Not on file   Physical Activity: Not on file   Stress: Not on file   Social Connections: Not on file   Intimate Partner Violence: Not on file   Housing Stability: Not on file       Current Outpatient Medications   Medication Sig Dispense Refill    cyclobenzaprine (FLEXERIL) 10 MG tablet Take 1 tablet by mouth 3 times daily as needed for Muscle spasms 30 tablet 0    metoprolol succinate (TOPROL XL) 100 MG extended release tablet Take 1 tablet by mouth in the morning and 1 tablet in the evening. 90 tablet 0    escitalopram (LEXAPRO) 10 MG tablet Take 1 tablet by mouth daily 90 tablet 1    apixaban (ELIQUIS) 5 MG TABS tablet Take 5 mg by mouth 2 times daily      predniSONE (DELTASONE) 20 MG tablet Take 2 pills for 4 days then 1 pill for 3 days then 1/2 pill for  3 days then stop 14 tablet 0    atorvastatin (LIPITOR) 80 MG tablet Take 80 mg by mouth daily      tiotropium (SPIRIVA RESPIMAT) 2.5 MCG/ACT AERS inhaler Inhale 2 puffs into the lungs daily      ferrous sulfate (IRON 325) 325 (65 Fe) MG tablet Take 325 mg by mouth 2 times daily      ondansetron (ZOFRAN-ODT) 8 MG TBDP disintegrating tablet Take 1 tablet by mouth every 8 hours as needed for Nausea 30 tablet 0    Cholecalciferol (VITAMIN D3) 50 MCG (2000 UT) CAPS TAKE 1 CAPSULE BY MOUTH EVERY DAY 90 capsule 1    omeprazole (PRILOSEC) 20 MG delayed release capsule Take 1 capsule by mouth in the morning and at bedtime (Patient taking differently: Take 20 mg by mouth Daily) 30 capsule 3    albuterol sulfate  (90 Base) MCG/ACT inhaler Inhale 2 puffs into the lungs every 4 hours as needed      aspirin 81 MG chewable tablet Take 81 mg by mouth daily      fluticasone-salmeterol (ADVAIR) 250-50 MCG/ACT AEPB diskus inhaler Inhale 1 puff into the lungs every 12 hours      ipratropium-albuterol (DUONEB) 0.5-2.5 (3) MG/3ML SOLN nebulizer solution Inhale 3 mLs into the lungs every 4 hours as needed       apixaban (ELIQUIS) 5 MG TABS tablet Take 1 tablet by mouth 2 times daily 60 tablet 0     No current facility-administered medications for this visit.        Allergies as of 01/30/2023 - Fully Reviewed 01/30/2023   Allergen Reaction Noted    Carbamazepine Anaphylaxis 11/07/2021    Lacosamide Nausea Only 09/24/2013    Lorazepam Other (See Comments) 11/15/2021    Nitroglycerin Other (See Comments) 12/19/2021       Review of Systems   Constitutional: Negative. Respiratory: Negative. Negative for cough. Cardiovascular: Negative. Gastrointestinal: Negative. Musculoskeletal: Negative. Objective:    Blood pressure 130/83, pulse 70, height 6' 1\" (1.854 m), weight 236 lb (107 kg), SpO2 97 %. Physical Exam  Vitals and nursing note reviewed. Constitutional:       Appearance: Normal appearance. Cardiovascular:      Rate and Rhythm: Normal rate and regular rhythm. Pulses: Normal pulses. Heart sounds: Normal heart sounds. Pulmonary:      Effort: Pulmonary effort is normal.      Breath sounds: Normal breath sounds. Musculoskeletal:      Cervical back: Normal range of motion and neck supple. Neurological:      Mental Status: He is alert. No results found for this visit on 01/30/23. Assessent & Plan    1. Cerebrovascular accident (CVA), unspecified mechanism ([facility]- is going to Neurology tomorrow having some memory lapses and getting lost - does see Neuro doing PT/OT  2. Heart failure with mid-range ejection fraction [facility])- goes to Cardiology   3. Longstanding persistent atrial fibrillation (HCC) on Eliquis  4. Hypertension, unspecified type on meds  5. COPD - has Pulmonary on meds  6. Chronic Pain- go to Pain Mgmt  7. High blood sugar- check labs  8. High cholesterol- check labs  9 GERD on PPI   Is getting repeat sleep study   Check labs        The patient and/or patient representative voiced understanding and agreement with the current diagnoses, recommendations, and possible side effects. No follow-up provider specified.       Glnen Roblero MD

## 2023-01-31 ENCOUNTER — TELEPHONE (OUTPATIENT)
Dept: PULMONOLOGY | Age: 68
End: 2023-01-31

## 2023-01-31 LAB
ALBUMIN SERPL-MCNC: 3.2 G/DL (ref 3.2–4.6)
ALBUMIN/GLOB SERPL: 0.9 (ref 0.4–1.6)
ALP SERPL-CCNC: 110 U/L (ref 50–136)
ALT SERPL-CCNC: 26 U/L (ref 12–65)
ANION GAP SERPL CALC-SCNC: 7 MMOL/L (ref 2–11)
AST SERPL-CCNC: 15 U/L (ref 15–37)
BILIRUB SERPL-MCNC: 0.4 MG/DL (ref 0.2–1.1)
BUN SERPL-MCNC: 18 MG/DL (ref 8–23)
CALCIUM SERPL-MCNC: 9.1 MG/DL (ref 8.3–10.4)
CHLORIDE SERPL-SCNC: 107 MMOL/L (ref 101–110)
CHOLEST SERPL-MCNC: 174 MG/DL
CO2 SERPL-SCNC: 23 MMOL/L (ref 21–32)
CREAT SERPL-MCNC: 1 MG/DL (ref 0.8–1.5)
EST. AVERAGE GLUCOSE BLD GHB EST-MCNC: 120 MG/DL
GLOBULIN SER CALC-MCNC: 3.5 G/DL (ref 2.8–4.5)
GLUCOSE SERPL-MCNC: 96 MG/DL (ref 65–100)
HBA1C MFR BLD: 5.8 % (ref 4.8–5.6)
HDLC SERPL-MCNC: 38 MG/DL (ref 40–60)
HDLC SERPL: 4.6
LDLC SERPL CALC-MCNC: 111.4 MG/DL
POTASSIUM SERPL-SCNC: 4.4 MMOL/L (ref 3.5–5.1)
PROT SERPL-MCNC: 6.7 G/DL (ref 6.3–8.2)
SODIUM SERPL-SCNC: 137 MMOL/L (ref 133–143)
TRIGL SERPL-MCNC: 123 MG/DL (ref 35–150)
VIT B12 SERPL-MCNC: 490 PG/ML (ref 193–986)
VLDLC SERPL CALC-MCNC: 24.6 MG/DL (ref 6–23)

## 2023-01-31 NOTE — TELEPHONE ENCOUNTER
Patient needs order for humidifier for his 02.   Uses [employer]/also needs face masks and tubing for 02

## 2023-02-01 NOTE — TELEPHONE ENCOUNTER
Contacted [employer] and confirmed that patient is on service with their company; they can fill need for tubing and humidity w/out order, but will check with patient about mask needs. When assessing @ patient home will advise if needs are for product that needs order will advise to have sooner appt in our office, scheduled f/u currently 3/17/23. Advised HH RN of status update & if orders are needed will call back for office appt.

## 2023-02-07 ENCOUNTER — APPOINTMENT (OUTPATIENT)
Dept: GENERAL RADIOLOGY | Age: 68
End: 2023-02-07
Payer: MEDICARE

## 2023-02-07 ENCOUNTER — HOSPITAL ENCOUNTER (INPATIENT)
Age: 68
LOS: 3 days | Discharge: HOME | End: 2023-02-10
Attending: EMERGENCY MEDICINE | Admitting: FAMILY MEDICINE
Payer: MEDICARE

## 2023-02-07 ENCOUNTER — TELEPHONE (OUTPATIENT)
Dept: INTERNAL MEDICINE CLINIC | Facility: CLINIC | Age: 68
End: 2023-02-07

## 2023-02-07 DIAGNOSIS — Z95.0 PACEMAKER: ICD-10-CM

## 2023-02-07 DIAGNOSIS — I48.11 LONGSTANDING PERSISTENT ATRIAL FIBRILLATION (HCC): ICD-10-CM

## 2023-02-07 DIAGNOSIS — J44.1 COPD EXACERBATION (HCC): Primary | ICD-10-CM

## 2023-02-07 DIAGNOSIS — I50.43 ACUTE ON CHRONIC HEART FAILURE WITH REDUCED EJECTION FRACTION AND DIASTOLIC DYSFUNCTION (HCC): ICD-10-CM

## 2023-02-07 LAB
ALBUMIN SERPL-MCNC: 3.7 G/DL (ref 3.2–4.6)
ALBUMIN/GLOB SERPL: 0.9 (ref 0.4–1.6)
ALP SERPL-CCNC: 128 U/L (ref 50–136)
ALT SERPL-CCNC: 32 U/L (ref 12–65)
ANION GAP SERPL CALC-SCNC: 5 MMOL/L (ref 2–11)
ARTERIAL PATENCY WRIST A: POSITIVE
AST SERPL-CCNC: 22 U/L (ref 15–37)
BASE EXCESS BLDV CALC-SCNC: 1.2 MMOL/L
BASOPHILS # BLD: 0.1 K/UL (ref 0–0.2)
BASOPHILS NFR BLD: 1 % (ref 0–2)
BDY SITE: ABNORMAL
BILIRUB SERPL-MCNC: 0.5 MG/DL (ref 0.2–1.1)
BUN SERPL-MCNC: 9 MG/DL (ref 8–23)
CALCIUM SERPL-MCNC: 9.4 MG/DL (ref 8.3–10.4)
CHLORIDE SERPL-SCNC: 107 MMOL/L (ref 101–110)
CO2 SERPL-SCNC: 25 MMOL/L (ref 21–32)
CREAT SERPL-MCNC: 1.1 MG/DL (ref 0.8–1.5)
DIFFERENTIAL METHOD BLD: ABNORMAL
EKG ATRIAL RATE: 120 BPM
EKG DIAGNOSIS: NORMAL
EKG P AXIS: 0 DEGREES
EKG P-R INTERVAL: 68 MS
EKG Q-T INTERVAL: 480 MS
EKG QRS DURATION: 181 MS
EKG QTC CALCULATION (BAZETT): 526 MS
EKG R AXIS: -80 DEGREES
EKG T AXIS: 102 DEGREES
EKG VENTRICULAR RATE: 72 BPM
EOSINOPHIL # BLD: 0.2 K/UL (ref 0–0.8)
EOSINOPHIL NFR BLD: 3 % (ref 0.5–7.8)
ERYTHROCYTE [DISTWIDTH] IN BLOOD BY AUTOMATED COUNT: 14 % (ref 11.9–14.6)
GAS FLOW.O2 O2 DELIVERY SYS: ABNORMAL
GLOBULIN SER CALC-MCNC: 4.2 G/DL (ref 2.8–4.5)
GLUCOSE SERPL-MCNC: 95 MG/DL (ref 65–100)
HCO3 BLDV-SCNC: 26.9 MMOL/L (ref 23–28)
HCT VFR BLD AUTO: 46.9 % (ref 41.1–50.3)
HGB BLD-MCNC: 15.1 G/DL (ref 13.6–17.2)
IMM GRANULOCYTES # BLD AUTO: 0 K/UL (ref 0–0.5)
IMM GRANULOCYTES NFR BLD AUTO: 0 % (ref 0–5)
LACTATE SERPL-SCNC: 1.2 MMOL/L (ref 0.4–2)
LACTATE SERPL-SCNC: 1.6 MMOL/L (ref 0.4–2)
LYMPHOCYTES # BLD: 2.2 K/UL (ref 0.5–4.6)
LYMPHOCYTES NFR BLD: 27 % (ref 13–44)
MCH RBC QN AUTO: 29 PG (ref 26.1–32.9)
MCHC RBC AUTO-ENTMCNC: 32.2 G/DL (ref 31.4–35)
MCV RBC AUTO: 90 FL (ref 82–102)
MONOCYTES # BLD: 0.8 K/UL (ref 0.1–1.3)
MONOCYTES NFR BLD: 10 % (ref 4–12)
NEUTS SEG # BLD: 4.8 K/UL (ref 1.7–8.2)
NEUTS SEG NFR BLD: 59 % (ref 43–78)
NRBC # BLD: 0 K/UL (ref 0–0.2)
O2/TOTAL GAS SETTING VFR VENT: 21 %
PCO2 BLDV: 45.3 MMHG (ref 41–51)
PH BLDV: 7.38 (ref 7.32–7.42)
PLATELET # BLD AUTO: 215 K/UL (ref 150–450)
PMV BLD AUTO: 9.2 FL (ref 9.4–12.3)
PO2 BLDV: 32 MMHG
POTASSIUM SERPL-SCNC: 4.2 MMOL/L (ref 3.5–5.1)
PROT SERPL-MCNC: 7.9 G/DL (ref 6.3–8.2)
RBC # BLD AUTO: 5.21 M/UL (ref 4.23–5.6)
RESPIRATORY RATE, POC: 20 (ref 5–40)
SAO2 % BLDV: 59.7 % (ref 65–88)
SARS-COV-2 RDRP RESP QL NAA+PROBE: NOT DETECTED
SERVICE CMNT-IMP: ABNORMAL
SODIUM SERPL-SCNC: 137 MMOL/L (ref 133–143)
SOURCE: NORMAL
SPECIMEN TYPE: ABNORMAL
TROPONIN I SERPL HS-MCNC: 10.7 PG/ML (ref 0–14)
WBC # BLD AUTO: 8.1 K/UL (ref 4.3–11.1)

## 2023-02-07 PROCEDURE — 6360000002 HC RX W HCPCS: Performed by: FAMILY MEDICINE

## 2023-02-07 PROCEDURE — 6370000000 HC RX 637 (ALT 250 FOR IP): Performed by: FAMILY MEDICINE

## 2023-02-07 PROCEDURE — 87635 SARS-COV-2 COVID-19 AMP PRB: CPT

## 2023-02-07 PROCEDURE — 85025 COMPLETE CBC W/AUTO DIFF WBC: CPT

## 2023-02-07 PROCEDURE — 94640 AIRWAY INHALATION TREATMENT: CPT

## 2023-02-07 PROCEDURE — 96368 THER/DIAG CONCURRENT INF: CPT

## 2023-02-07 PROCEDURE — 83605 ASSAY OF LACTIC ACID: CPT

## 2023-02-07 PROCEDURE — 36600 WITHDRAWAL OF ARTERIAL BLOOD: CPT

## 2023-02-07 PROCEDURE — 84484 ASSAY OF TROPONIN QUANT: CPT

## 2023-02-07 PROCEDURE — 71045 X-RAY EXAM CHEST 1 VIEW: CPT

## 2023-02-07 PROCEDURE — 96365 THER/PROPH/DIAG IV INF INIT: CPT

## 2023-02-07 PROCEDURE — 2580000003 HC RX 258: Performed by: EMERGENCY MEDICINE

## 2023-02-07 PROCEDURE — 6370000000 HC RX 637 (ALT 250 FOR IP): Performed by: EMERGENCY MEDICINE

## 2023-02-07 PROCEDURE — 96375 TX/PRO/DX INJ NEW DRUG ADDON: CPT

## 2023-02-07 PROCEDURE — 99285 EMERGENCY DEPT VISIT HI MDM: CPT

## 2023-02-07 PROCEDURE — 80053 COMPREHEN METABOLIC PANEL: CPT

## 2023-02-07 PROCEDURE — 6360000002 HC RX W HCPCS: Performed by: EMERGENCY MEDICINE

## 2023-02-07 PROCEDURE — 93005 ELECTROCARDIOGRAM TRACING: CPT | Performed by: EMERGENCY MEDICINE

## 2023-02-07 PROCEDURE — 82803 BLOOD GASES ANY COMBINATION: CPT

## 2023-02-07 PROCEDURE — 1100000000 HC RM PRIVATE

## 2023-02-07 RX ORDER — IPRATROPIUM BROMIDE AND ALBUTEROL SULFATE 2.5; .5 MG/3ML; MG/3ML
1 SOLUTION RESPIRATORY (INHALATION)
Status: COMPLETED | OUTPATIENT
Start: 2023-02-07 | End: 2023-02-07

## 2023-02-07 RX ORDER — DEXAMETHASONE SODIUM PHOSPHATE 10 MG/ML
10 INJECTION INTRAMUSCULAR; INTRAVENOUS ONCE
Status: COMPLETED | OUTPATIENT
Start: 2023-02-07 | End: 2023-02-07

## 2023-02-07 RX ORDER — AZITHROMYCIN 250 MG/1
500 TABLET, FILM COATED ORAL DAILY
Status: DISCONTINUED | OUTPATIENT
Start: 2023-02-07 | End: 2023-02-07

## 2023-02-07 RX ORDER — SODIUM CHLORIDE 0.9 % (FLUSH) 0.9 %
5-40 SYRINGE (ML) INJECTION EVERY 12 HOURS SCHEDULED
Status: DISCONTINUED | OUTPATIENT
Start: 2023-02-07 | End: 2023-02-10 | Stop reason: HOSPADM

## 2023-02-07 RX ORDER — MAGNESIUM SULFATE 1 G/100ML
1000 INJECTION INTRAVENOUS ONCE
Status: COMPLETED | OUTPATIENT
Start: 2023-02-07 | End: 2023-02-07

## 2023-02-07 RX ORDER — IPRATROPIUM BROMIDE AND ALBUTEROL SULFATE 2.5; .5 MG/3ML; MG/3ML
1 SOLUTION RESPIRATORY (INHALATION)
Status: DISCONTINUED | OUTPATIENT
Start: 2023-02-07 | End: 2023-02-09

## 2023-02-07 RX ORDER — SODIUM CHLORIDE 0.9 % (FLUSH) 0.9 %
5-40 SYRINGE (ML) INJECTION PRN
Status: DISCONTINUED | OUTPATIENT
Start: 2023-02-07 | End: 2023-02-10 | Stop reason: HOSPADM

## 2023-02-07 RX ORDER — FERROUS SULFATE 325(65) MG
325 TABLET ORAL 2 TIMES DAILY
Status: DISCONTINUED | OUTPATIENT
Start: 2023-02-07 | End: 2023-02-10 | Stop reason: HOSPADM

## 2023-02-07 RX ORDER — PREDNISONE 20 MG/1
40 TABLET ORAL DAILY
Status: DISCONTINUED | OUTPATIENT
Start: 2023-02-10 | End: 2023-02-10 | Stop reason: HOSPADM

## 2023-02-07 RX ORDER — ONDANSETRON 4 MG/1
4 TABLET, ORALLY DISINTEGRATING ORAL EVERY 8 HOURS PRN
Status: DISCONTINUED | OUTPATIENT
Start: 2023-02-07 | End: 2023-02-10 | Stop reason: HOSPADM

## 2023-02-07 RX ORDER — ONDANSETRON 2 MG/ML
4 INJECTION INTRAMUSCULAR; INTRAVENOUS EVERY 6 HOURS PRN
Status: DISCONTINUED | OUTPATIENT
Start: 2023-02-07 | End: 2023-02-10 | Stop reason: HOSPADM

## 2023-02-07 RX ORDER — 0.9 % SODIUM CHLORIDE 0.9 %
500 INTRAVENOUS SOLUTION INTRAVENOUS ONCE
Status: COMPLETED | OUTPATIENT
Start: 2023-02-07 | End: 2023-02-07

## 2023-02-07 RX ORDER — METHYLPREDNISOLONE SODIUM SUCCINATE 40 MG/ML
40 INJECTION, POWDER, LYOPHILIZED, FOR SOLUTION INTRAMUSCULAR; INTRAVENOUS EVERY 6 HOURS
Status: DISPENSED | OUTPATIENT
Start: 2023-02-07 | End: 2023-02-09

## 2023-02-07 RX ORDER — ACETAMINOPHEN 325 MG/1
650 TABLET ORAL EVERY 6 HOURS PRN
Status: DISCONTINUED | OUTPATIENT
Start: 2023-02-07 | End: 2023-02-10 | Stop reason: HOSPADM

## 2023-02-07 RX ORDER — ESCITALOPRAM OXALATE 10 MG/1
10 TABLET ORAL DAILY
Status: DISCONTINUED | OUTPATIENT
Start: 2023-02-07 | End: 2023-02-10 | Stop reason: HOSPADM

## 2023-02-07 RX ORDER — ASPIRIN 81 MG/1
81 TABLET, CHEWABLE ORAL DAILY
Status: DISCONTINUED | OUTPATIENT
Start: 2023-02-07 | End: 2023-02-10 | Stop reason: HOSPADM

## 2023-02-07 RX ORDER — CYCLOBENZAPRINE HCL 10 MG
10 TABLET ORAL 3 TIMES DAILY PRN
Status: DISCONTINUED | OUTPATIENT
Start: 2023-02-07 | End: 2023-02-10 | Stop reason: HOSPADM

## 2023-02-07 RX ORDER — POLYETHYLENE GLYCOL 3350 17 G/17G
17 POWDER, FOR SOLUTION ORAL DAILY PRN
Status: DISCONTINUED | OUTPATIENT
Start: 2023-02-07 | End: 2023-02-10 | Stop reason: HOSPADM

## 2023-02-07 RX ORDER — PANTOPRAZOLE SODIUM 40 MG/1
40 TABLET, DELAYED RELEASE ORAL
Status: DISCONTINUED | OUTPATIENT
Start: 2023-02-08 | End: 2023-02-10 | Stop reason: HOSPADM

## 2023-02-07 RX ORDER — ACETAMINOPHEN 650 MG/1
650 SUPPOSITORY RECTAL EVERY 6 HOURS PRN
Status: DISCONTINUED | OUTPATIENT
Start: 2023-02-07 | End: 2023-02-10 | Stop reason: HOSPADM

## 2023-02-07 RX ORDER — ATORVASTATIN CALCIUM 40 MG/1
80 TABLET, FILM COATED ORAL DAILY
Status: DISCONTINUED | OUTPATIENT
Start: 2023-02-07 | End: 2023-02-10 | Stop reason: HOSPADM

## 2023-02-07 RX ORDER — DOXYCYCLINE HYCLATE 100 MG/1
100 CAPSULE ORAL EVERY 12 HOURS SCHEDULED
Status: DISCONTINUED | OUTPATIENT
Start: 2023-02-07 | End: 2023-02-10 | Stop reason: HOSPADM

## 2023-02-07 RX ORDER — SODIUM CHLORIDE 9 MG/ML
INJECTION, SOLUTION INTRAVENOUS PRN
Status: DISCONTINUED | OUTPATIENT
Start: 2023-02-07 | End: 2023-02-10 | Stop reason: HOSPADM

## 2023-02-07 RX ORDER — METOPROLOL SUCCINATE 50 MG/1
100 TABLET, EXTENDED RELEASE ORAL EVERY 12 HOURS
Status: DISCONTINUED | OUTPATIENT
Start: 2023-02-07 | End: 2023-02-10 | Stop reason: HOSPADM

## 2023-02-07 RX ADMIN — MOMETASONE FUROATE AND FORMOTEROL FUMARATE DIHYDRATE 2 PUFF: 200; 5 AEROSOL RESPIRATORY (INHALATION) at 19:53

## 2023-02-07 RX ADMIN — ASPIRIN 81 MG 81 MG: 81 TABLET ORAL at 17:21

## 2023-02-07 RX ADMIN — TIOTROPIUM BROMIDE INHALATION SPRAY 2 PUFF: 3.12 SPRAY, METERED RESPIRATORY (INHALATION) at 17:51

## 2023-02-07 RX ADMIN — IPRATROPIUM BROMIDE AND ALBUTEROL SULFATE 1 AMPULE: .5; 3 SOLUTION RESPIRATORY (INHALATION) at 13:00

## 2023-02-07 RX ADMIN — DOXYCYCLINE HYCLATE 100 MG: 100 CAPSULE ORAL at 21:43

## 2023-02-07 RX ADMIN — SODIUM CHLORIDE 500 ML: 9 INJECTION, SOLUTION INTRAVENOUS at 13:31

## 2023-02-07 RX ADMIN — IPRATROPIUM BROMIDE AND ALBUTEROL SULFATE 1 AMPULE: .5; 3 SOLUTION RESPIRATORY (INHALATION) at 19:53

## 2023-02-07 RX ADMIN — AZITHROMYCIN MONOHYDRATE 500 MG: 500 INJECTION, POWDER, LYOPHILIZED, FOR SOLUTION INTRAVENOUS at 13:40

## 2023-02-07 RX ADMIN — METOPROLOL SUCCINATE 100 MG: 100 TABLET, EXTENDED RELEASE ORAL at 17:29

## 2023-02-07 RX ADMIN — METHYLPREDNISOLONE SODIUM SUCCINATE 40 MG: 40 INJECTION, POWDER, FOR SOLUTION INTRAMUSCULAR; INTRAVENOUS at 17:30

## 2023-02-07 RX ADMIN — FERROUS SULFATE TAB 325 MG (65 MG ELEMENTAL FE) 325 MG: 325 (65 FE) TAB at 21:43

## 2023-02-07 RX ADMIN — CEFTRIAXONE 1000 MG: 1 INJECTION, POWDER, FOR SOLUTION INTRAMUSCULAR; INTRAVENOUS at 13:29

## 2023-02-07 RX ADMIN — APIXABAN 5 MG: 5 TABLET, FILM COATED ORAL at 21:43

## 2023-02-07 RX ADMIN — DEXAMETHASONE SODIUM PHOSPHATE 10 MG: 10 INJECTION, SOLUTION INTRAMUSCULAR; INTRAVENOUS at 13:30

## 2023-02-07 RX ADMIN — ATORVASTATIN CALCIUM 80 MG: 80 TABLET, FILM COATED ORAL at 17:21

## 2023-02-07 RX ADMIN — MAGNESIUM SULFATE HEPTAHYDRATE 1000 MG: 1 INJECTION, SOLUTION INTRAVENOUS at 13:33

## 2023-02-07 RX ADMIN — IPRATROPIUM BROMIDE AND ALBUTEROL SULFATE 1 AMPULE: .5; 3 SOLUTION RESPIRATORY (INHALATION) at 14:41

## 2023-02-07 ASSESSMENT — ENCOUNTER SYMPTOMS
EYES NEGATIVE: 1
WHEEZING: 1
SHORTNESS OF BREATH: 1
GASTROINTESTINAL NEGATIVE: 1

## 2023-02-07 ASSESSMENT — LIFESTYLE VARIABLES
HOW MANY STANDARD DRINKS CONTAINING ALCOHOL DO YOU HAVE ON A TYPICAL DAY: PATIENT DOES NOT DRINK
HOW OFTEN DO YOU HAVE A DRINK CONTAINING ALCOHOL: NEVER

## 2023-02-07 ASSESSMENT — PAIN - FUNCTIONAL ASSESSMENT: PAIN_FUNCTIONAL_ASSESSMENT: NONE - DENIES PAIN

## 2023-02-07 NOTE — TELEPHONE ENCOUNTER
Ariadne from [facility] called about Ashley Chen   She states his respirations are 24 per min with wheezing. He has done duo neb  treatments x2, rescue inhaler x2, 98.5 temp  BP is 160/100 hr 72  O2-97    This has been going on for 2 days   I advised Ariadne to tell Ashley Chen to go to the ER.

## 2023-02-07 NOTE — H&P
Hospitalist History and Physical   Admit Date:  2023 12:14 PM   Name:  Jose Sanchez   Age:  79 y.o. Sex:  male  :  1955   MRN:  [id number]   Room:  ER/    Presenting Complaint: Shortness of Breath     Reason(s) for Admission: COPD exacerbation ([facility] [J44.1]     History of Present Illness:   Jose Sanchez is a 79 y.o. male with medical history of hypertension, hyperlipidemia, CAD, COPD and former smoker presented to ED with worsening shortness of breath. Patient reports he was sick for couple of days with cough, congestion, shortness of breath and wheezing. Today home health came at home and found patient with active wheezings and advised to go to the ER. Denies chest pain, palpitations, nausea, vomiting or abdominal pain. He used to smoke 2 packs a day and has quit 4 months ago. On arrival, patient found in respiratory distress. Lung extremely tight and without much air movement. Patient received DuoNeb, Decadron and magnesium without much improvement. Lab unremarkable. Chest x-ray without acute pathology. Hospitalist consulted for admission. Assessment & Plan:     COPD exacerbation:  Patient was in acute respiratory distress on arrival, received DuoNeb, Decadron and magnesium without much improvement  Start patient on DuoNeb every 4 hours while awake  Start patient on Dulera twice daily and continue Spiriva  Start patient on Solu-Medrol following 40 mg IV every 6 hours  Azithromycin 500 mg p.o. for 5 days  Supportive care  Supplemental oxygen as needed, not hypoxic on room air at this time    Hypertension/hyperlipidemia:  CAD/dCHF:  Paroxysmal A-fib:  Does not look fluid overloaded  Continue aspirin, statin, apixaban, metoprolol    Mood disorder:  Continue escitalopram    Former smoker:  Quit 4 months ago  Noted      Anticipated discharge needs: Likely home on discharge    Diet: ADULT DIET;  Regular  VTE ppx: Eliquis  Code status: Full Code    Hospital Problems:  Principal Problem:    COPD exacerbation (HCC)  Active Problems:    Acute CVA (cerebrovascular accident) ([address]    Acute on chronic diastolic congestive heart failure (HCC)    Atrial fibrillation (HCC)    CAD in native artery    Hypertension    COPD (chronic obstructive pulmonary disease) ([address]  Resolved Problems:    * No resolved hospital problems.  *       Past History:     Past Medical History:   Diagnosis Date    Aortic stenosis     Aortic valve replacement 7/2018    CAD (coronary artery disease)     PCI in 2014, 2015 had MI and then stents    Cancer ([address]     SCC removed face     CHF (congestive heart failure) ([address]     Chronic renal disease, stage III ([address]     sees neph and does not have actual diagnosis at this time    COPD (chronic obstructive pulmonary disease) ([address]     fibrosis in lungs    Dyslipidemia     Heart failure ([address]     CHF per patient    HTN (hypertension)     med controlled    Hyperlipidemia     Ischemic cardiomyopathy     Pacemaker     only pacemaker    Paroxysmal atrial fibrillation ([address]     pradaxa for this    Pulmonary fibrosis ([address]     Seizure disorder ([address]     lyme disease - small lesion frontal part of brain - no maintenance meds - last seizure 2 months ago, due to fall - before that it was nine years    Systolic heart failure [facility])        Past Surgical History:   Procedure Laterality Date    ABLATION OF DYSRHYTHMIC FOCUS      AORTIC VALVE REPLACEMENT  07/2018    BACK SURGERY      discectomy    CARDIAC CATHETERIZATION      PCI 2014, 2015    CARDIAC PROCEDURE N/A 7/11/2022    LEFT HEART CATH / CORONARY ANGIOGRAPHY performed by Mis Taylor MD at [facility] CATH LAB    CERVICAL FUSION      C3-4 fusion    EGD  6/17/2022         HIP ARTHROPLASTY Left     SPINAL CORD STIMULATOR SURGERY      lower back     UPPER GASTROINTESTINAL ENDOSCOPY N/A 6/17/2022    EGD ESOPHAGOGASTRODUODENOSCOPY/ PT HAS PACEMAKER performed by Micaela Silveira MD at [address] [place] [address] History Tobacco Use    Smoking status: Former     Packs/day: 1.50     Years: 56.00     Pack years: 84.00     Types: Cigarettes     Quit date: 2022     Years since quittin.4    Smokeless tobacco: Never    Tobacco comments:     Stopped 2022   Substance Use Topics    Alcohol use: Not Currently     Comment: about 1-2 x per year      Social History     Substance and Sexual Activity   Drug Use Yes    Types: Marijuana ([place])    Comment: cannabis used: two weeks ago as of 6/15/2022 edibles        Family History   Problem Relation Age of Onset    Heart Disease Mother     Heart Disease Sister         Immunization History   Administered Date(s) Administered    COVID-19, PFIZER PURPLE top, DILUTE for use, (age 15 y+), 30mcg/0.3mL 03/15/2021, 2021    Influenza Virus Vaccine 10/01/2010, 10/07/2011, 2012, 2014, 2016, 2017, 2018, 2019, 10/01/2021    Influenza, FLUZONE (age 72 y+), High Dose, 0.7mL 10/01/2021    PPD Test 2021, 2022    Pneumococcal Vaccine 2008    Rotavirus Vaccine 2008     Allergies   Allergen Reactions    Carbamazepine Anaphylaxis    Lacosamide Nausea Only    Lorazepam Other (See Comments)     Violent behavior    Nitroglycerin Other (See Comments)     hypotension     Prior to Admit Medications:  Current Outpatient Medications   Medication Instructions    albuterol sulfate  (90 Base) MCG/ACT inhaler 2 puffs, Inhalation, EVERY 4 HOURS PRN    apixaban (ELIQUIS) 5 mg, Oral, 2 TIMES DAILY    apixaban (ELIQUIS) 5 mg, Oral, 2 TIMES DAILY    aspirin 81 mg, Oral, DAILY    atorvastatin (LIPITOR) 80 mg, Oral, DAILY    Cholecalciferol (VITAMIN D3) 50 MCG (2000 UT) CAPS TAKE 1 CAPSULE BY MOUTH EVERY DAY    cyclobenzaprine (FLEXERIL) 10 mg, Oral, 3 TIMES DAILY PRN    escitalopram (LEXAPRO) 10 mg, Oral, DAILY    ferrous sulfate (IRON 325) 325 mg, Oral, 2 TIMES DAILY    fluticasone-salmeterol (ADVAIR) 250-50 MCG/ACT AEPB diskus inhaler 1 puff, Inhalation, EVERY 12 HOURS    ipratropium-albuterol (DUONEB) 0.5-2.5 (3) MG/3ML SOLN nebulizer solution 3 mLs, Inhalation, EVERY 4 HOURS PRN    metoprolol succinate (TOPROL XL) 100 mg, Oral, EVERY 12 HOURS    omeprazole (PRILOSEC) 20 mg, Oral, 2 times daily    ondansetron (ZOFRAN-ODT) 8 mg, Oral, EVERY 8 HOURS PRN    predniSONE (DELTASONE) 20 MG tablet Take 2 pills for 4 days then 1 pill for 3 days then 1/2 pill for  3 days then stop    tiotropium (SPIRIVA RESPIMAT) 2.5 MCG/ACT AERS inhaler 2 puffs, Inhalation, DAILY         Objective:   Patient Vitals for the past 24 hrs:   Temp Pulse Resp BP SpO2   02/07/23 1443 -- 70 15 -- 98 %   02/07/23 1424 -- 71 19 (!) 151/126 97 %   02/07/23 1334 -- 72 13 (!) 160/90 95 %   02/07/23 1311 -- 70 19 -- 97 %   02/07/23 1310 -- 70 20 -- 97 %   02/07/23 1244 -- 70 23 (!) 159/88 96 %   02/07/23 1222 97.8 °F (36.6 °C) 70 20 (!) 158/91 98 %       Oxygen Therapy  SpO2: 98 %  Pulse Oximeter Device Mode: Continuous  O2 Device: None (Room air)  Blood Gas  Performed?: Yes  Eddie's Test #1: Other (Comment)  Site #1: Vein    Estimated body mass index is 31.4 kg/m² as calculated from the following:    Height as of this encounter: 6' 1\" (1.854 m). Weight as of this encounter: 238 lb (108 kg). No intake or output data in the 24 hours ending 02/07/23 1647      Physical Exam:    Blood pressure (!) 151/126, pulse 70, temperature 97.8 °F (36.6 °C), temperature source Oral, resp. rate 15, height 6' 1\" (1.854 m), weight 238 lb (108 kg), SpO2 98 %. General:    Well nourished. Head:  Normocephalic, atraumatic  Eyes:  Sclerae appear normal.  Pupils equally round. ENT:  Nares appear normal.  Moist oral mucosa  Neck:  No restricted ROM. Trachea midline   CV:   RRR. No m/r/g. No jugular venous distension. Lungs:   Decreased air movement, expiratory wheezings  Abdomen:   Soft, nontender, nondistended. Extremities: No cyanosis or clubbing. No edema  Skin:     No rashes and normal coloration. Warm and dry. Neuro:  CN II-XII grossly intact. Sensation intact. Psych:  Normal mood and affect.       I have personally reviewed labs and tests:  Recent Labs:  Recent Results (from the past 24 hour(s))   EKG 12 Lead    Collection Time: 02/07/23 12:34 PM   Result Value Ref Range    Ventricular Rate 72 BPM    Atrial Rate 120 BPM    P-R Interval 68 ms    QRS Duration 181 ms    Q-T Interval 480 ms    QTc Calculation (Bazett) 526 ms    P Axis 0 degrees    R Axis -80 degrees    T Axis 102 degrees    Diagnosis Electronic ventricular pacemaker    COVID-19, Rapid    Collection Time: 02/07/23 12:47 PM    Specimen: Nasopharyngeal   Result Value Ref Range    Source NASAL      SARS-CoV-2, Rapid Not detected NOTD     CBC with Auto Differential    Collection Time: 02/07/23 12:47 PM   Result Value Ref Range    WBC 8.1 4.3 - 11.1 K/uL    RBC 5.21 4.23 - 5.6 M/uL    Hemoglobin 15.1 13.6 - 17.2 g/dL    Hematocrit 46.9 41.1 - 50.3 %    MCV 90.0 82 - 102 FL    MCH 29.0 26.1 - 32.9 PG    MCHC 32.2 31.4 - 35.0 g/dL    RDW 14.0 11.9 - 14.6 %    Platelets 139 598 - 424 K/uL    MPV 9.2 (L) 9.4 - 12.3 FL    nRBC 0.00 0.0 - 0.2 K/uL    Differential Type AUTOMATED      Seg Neutrophils 59 43 - 78 %    Lymphocytes 27 13 - 44 %    Monocytes 10 4.0 - 12.0 %    Eosinophils % 3 0.5 - 7.8 %    Basophils 1 0.0 - 2.0 %    Immature Granulocytes 0 0.0 - 5.0 %    Segs Absolute 4.8 1.7 - 8.2 K/UL    Absolute Lymph # 2.2 0.5 - 4.6 K/UL    Absolute Mono # 0.8 0.1 - 1.3 K/UL    Absolute Eos # 0.2 0.0 - 0.8 K/UL    Basophils Absolute 0.1 0.0 - 0.2 K/UL    Absolute Immature Granulocyte 0.0 0.0 - 0.5 K/UL   CMP    Collection Time: 02/07/23 12:47 PM   Result Value Ref Range    Sodium 137 133 - 143 mmol/L    Potassium 4.2 3.5 - 5.1 mmol/L    Chloride 107 101 - 110 mmol/L    CO2 25 21 - 32 mmol/L    Anion Gap 5 2 - 11 mmol/L    Glucose 95 65 - 100 mg/dL    BUN 9 8 - 23 MG/DL    Creatinine 1.10 0.8 - 1.5 MG/DL    Est, Glom Filt Rate >60 >60 ml/min/1.73m2 Calcium 9.4 8.3 - 10.4 MG/DL    Total Bilirubin 0.5 0.2 - 1.1 MG/DL    ALT 32 12 - 65 U/L    AST 22 15 - 37 U/L    Alk Phosphatase 128 50 - 136 U/L    Total Protein 7.9 6.3 - 8.2 g/dL    Albumin 3.7 3.2 - 4.6 g/dL    Globulin 4.2 2.8 - 4.5 g/dL    Albumin/Globulin Ratio 0.9 0.4 - 1.6     Lactate, Sepsis    Collection Time: 02/07/23 12:47 PM   Result Value Ref Range    Lactic Acid, Sepsis 1.2 0.4 - 2.0 MMOL/L   Troponin    Collection Time: 02/07/23 12:47 PM   Result Value Ref Range    Troponin, High Sensitivity 10.7 0 - 14 pg/mL   Venous Blood Gas, POC    Collection Time: 02/07/23  1:08 PM   Result Value Ref Range    DEVICE ROOM AIR      FIO2 21 %    PH, VENOUS (POC) 7.38 7.32 - 7.42      PCO2, Haines, POC 45.3 41 - 51 MMHG    PO2, VENOUS (POC) 32 mmHg    HCO3, Venous 26.9 23 - 28 MMOL/L    SO2, VENOUS (POC) 59.7 (L) 65 - 88 %    BASE EXCESS, VENOUS (POC) 1.2 mmol/L    POC Eddie's Test Positive      Respiratory Rate 20      Site LEFT BRACHIAL      Specimen type: VENOUS BLOOD      Performed by: Maxwell Arias, Sepsis    Collection Time: 02/07/23  3:23 PM   Result Value Ref Range    Lactic Acid, Sepsis 1.6 0.4 - 2.0 MMOL/L       I have personally reviewed imaging studies:  XR CHEST PORTABLE    Result Date: 2/7/2023  CHEST X-RAY, 1 VIEW INDICATION: short of breath COMPARISON: 12/21/2022 TECHNIQUE: Single AP view of the chest was obtained. FINDINGS: Lungs and pleural spaces: Normal. No pneumothorax or pleural effusion. Cardiomediastinal silhouette: Enlarged but stable. Cardiac pacemaker/ICD. Aortic valve prosthesis. Osseous structures: Normal.     No radiographic evidence of acute cardiopulmonary disease. Echocardiogram:  10/11/22    TRANSTHORACIC ECHOCARDIOGRAM (TTE) COMPLETE (CONTRAST/BUBBLE/3D PRN) 10/12/2022 12:20 PM, 10/12/2022 12:00 AM (Final)    Interpretation Summary    Left Ventricle: Normal left ventricular systolic function with a visually estimated EF of 55 - 60%.  Left ventricle size is normal. Normal wall thickness. Apical Septal motion is consistent with pacing. Normal wall motion. Diastolic function indeterminate in the setting of atrial fibrillation. Average E/e' ratio is 31.04. Aortic Valve: Not well visualized. Appropriately positioned transcatheter bioprosthetic valve that is well-seated. No regurgitation. Unable to assess stenosis. Elevated prosthetic gradient. AV mean gradient is 30 mmHg. AV peak gradient is 47 mmHg. Mitral Valve: Mildly calcified leaflet, at the anterior and posterior leaflets- tips. ? Rheumatic etiology Mild annular calcification of the mitral valve. Moderate stenosis noted. MV mean gradient is 9 mmHg\at a heart rate of 70 bpm.    Left Atrium: Left atrium is moderately dilated. Interatrial Septum: Agitated saline study was negative with and without provocation. Contrast used: Definity.    -No obvious cardiac source for emboli noted. -Underlying atrial fibrillation s/p AV ale ablation and permanent pacemaker placement-ventricular paced rhythm.  -Negative bubble study for intracardiac shunt.  -Bioprosthetic aortic valve is not well visualized although gradients are elevated. Elevated mitral valve gradients were also noted-consider GODFREY if clinically indicated. Signed by:  Susy Farmer MD on 10/12/2022 12:20 PM, Signed by: Unknown Provider Result on 10/12/2022 12:00 AM        Orders Placed This Encounter   Medications    0.9 % sodium chloride bolus    magnesium sulfate 1000 mg in dextrose 5% 100 mL IVPB    cefTRIAXone (ROCEPHIN) 1,000 mg in sodium chloride 0.9 % 50 mL IVPB (mini-bag)     Order Specific Question:   Antimicrobial Indications     Answer:   COPD Exacerbation    ipratropium-albuterol (DUONEB) nebulizer solution 1 ampule     Order Specific Question:   Initiate RT Bronchodilator Protocol     Answer:   Yes - ED Protocol    azithromycin (ZITHROMAX) 500 mg in sodium chloride 0.9 % 250 mL IVPB ([id number])     Order Specific Question: Antimicrobial Indications     Answer:   Pneumonia (CAP)    dexamethasone (DECADRON) injection 10 mg    ipratropium-albuterol (DUONEB) nebulizer solution 1 ampule     Order Specific Question:   Initiate RT Bronchodilator Protocol     Answer:   Yes - ED Protocol    apixaban (ELIQUIS) tablet 5 mg     Order Specific Question:   Indication of Use     Answer:   A Fib/A Flutter    aspirin chewable tablet 81 mg    atorvastatin (LIPITOR) tablet 80 mg    cyclobenzaprine (FLEXERIL) tablet 10 mg    escitalopram (LEXAPRO) tablet 10 mg    ferrous sulfate (IRON 325) tablet 325 mg    mometasone-formoterol (DULERA) 200-5 MCG/ACT inhaler 2 puff    metoprolol succinate (TOPROL XL) extended release tablet 100 mg    pantoprazole (PROTONIX) tablet 40 mg    tiotropium (SPIRIVA RESPIMAT) 2.5 MCG/ACT inhaler 2 puff    sodium chloride flush 0.9 % injection 5-40 mL    sodium chloride flush 0.9 % injection 5-40 mL    0.9 % sodium chloride infusion    OR Linked Order Group     ondansetron (ZOFRAN-ODT) disintegrating tablet 4 mg     ondansetron (ZOFRAN) injection 4 mg    polyethylene glycol (GLYCOLAX) packet 17 g    OR Linked Order Group     acetaminophen (TYLENOL) tablet 650 mg     acetaminophen (TYLENOL) suppository 650 mg    ipratropium-albuterol (DUONEB) nebulizer solution 1 ampule     Order Specific Question:   Initiate RT Bronchodilator Protocol     Answer:   Yes - Inpatient Protocol    FOLLOWED BY Linked Order Group     methylPREDNISolone sodium (SOLU-MEDROL) injection 40 mg     predniSONE (DELTASONE) tablet 40 mg    azithromycin (ZITHROMAX) tablet 500 mg     Order Specific Question:   Antimicrobial Indications     Answer:   COPD Exacerbation     Order Specific Question:   COPD exacerbation duration of therapy     Answer:   5 days         Signed:  Bernard Bonilla MD    Part of this note may have been written by using a voice dictation software.   The note has been proof read but may still contain some grammatical/other typographical errors.

## 2023-02-07 NOTE — ED PROVIDER NOTES
Emergency Department Provider Note                   PCP:                Liliya Mina MD               Age: 79 y.o. Sex: male       ICD-10-CM    1. COPD exacerbation ([address]  J44.1           DISPOSITION Decision To Admit 02/07/2023 03:16:48 PM        Medical Decision Making          Patient arrived respiratory distress. His lungs are extremely tight not moving a lot of air. The air that is moving is wheezing. Sounds like a COPD exacerbation. Did DuoNeb, Rocephin, azithromycin, Decadron. He had some improvement ordered a second 1 still continued to have significant tightness and wheezing. Ordered some magnesium. Patient continues to have wheezing. His CBC and CHEM did not reveal any acute abnormalities. Actiq acid was normal.  Chest x-ray did not show any pneumonia. However due to the patient still having significant work of breathing and being very tight and wheezing patient will be admitted for COPD exacerbation. Patient agrees with plan. Spoke with hospitalist who agrees with admission. EKG as interpreted by me:  Ventricular paced rate of 72. No other acute ST-T abnormalities    Amount and/or Complexity of Data Reviewed  Labs: ordered. Radiology: ordered. ECG/medicine tests: ordered. Risk  Prescription drug management. Decision regarding hospitalization.                                 Orders Placed This Encounter   Procedures    COVID-19, Rapid    XR CHEST PORTABLE    CBC with Auto Differential    CMP    Lactate, Sepsis    Troponin    Blood Gas, Venous    Venous Blood Gas, POC    EKG 12 Lead        Medications   0.9 % sodium chloride bolus (500 mLs IntraVENous New Bag 2/7/23 1331)   magnesium sulfate 1000 mg in dextrose 5% 100 mL IVPB (1,000 mg IntraVENous New Bag 2/7/23 1333)   cefTRIAXone (ROCEPHIN) 1,000 mg in sodium chloride 0.9 % 50 mL IVPB (mini-bag) (0 mg IntraVENous Stopped 2/7/23 1334)   ipratropium-albuterol (DUONEB) nebulizer solution 1 ampule (1 ampule Inhalation Given 2/7/23 1300)   azithromycin (ZITHROMAX) 500 mg in sodium chloride 0.9 % 250 mL IVPB ([id number]) (500 mg IntraVENous New Bag 2/7/23 1340)   dexamethasone (DECADRON) injection 10 mg (10 mg IntraVENous Given 2/7/23 1330)   ipratropium-albuterol (DUONEB) nebulizer solution 1 ampule (1 ampule Inhalation Given 2/7/23 1441)       New Prescriptions    No medications on file        Severa Sato is a 79 y.o. male who presents to the Emergency Department with chief complaint of    Chief Complaint   Patient presents with    Shortness of Breath      30-year-old male with past medical history of COPD and cardiac stents presents with worsens shortness of breath. Patient feels like this is COPD. He has had a productive cough. He has a significant history of pneumonias triggering COPD. Denies any chest pain. EMS gave breathing treatment that helped some but not significantly. Patient has home oxygen as needed. Denies any lower extremity swelling. Does have a history of CHF as well. Review of Systems   HENT: Negative. Eyes: Negative. Respiratory:  Positive for shortness of breath and wheezing. Cardiovascular: Negative. Gastrointestinal: Negative. Genitourinary: Negative. Musculoskeletal: Negative. Skin: Negative. Neurological: Negative. Psychiatric/Behavioral: Negative.        Past Medical History:   Diagnosis Date    Aortic stenosis     Aortic valve replacement 7/2018    CAD (coronary artery disease)     PCI in 2014, 2015 had MI and then stents    Cancer ([address]     SCC removed face     CHF (congestive heart failure) ([address]     Chronic renal disease, stage III ([address]     sees neph and does not have actual diagnosis at this time    COPD (chronic obstructive pulmonary disease) ([address]     fibrosis in lungs    Dyslipidemia     Heart failure ([address]     CHF per patient    HTN (hypertension)     med controlled    Hyperlipidemia     Ischemic cardiomyopathy     Pacemaker     only pacemaker    Paroxysmal atrial fibrillation (HCC)     pradaxa for this    Pulmonary fibrosis (HCC)     Seizure disorder ([facility]     lyme disease - small lesion frontal part of brain - no maintenance meds - last seizure 2 months ago, due to fall - before that it was nine years    Systolic heart failure [facility])         Past Surgical History:   Procedure Laterality Date    ABLATION OF DYSRHYTHMIC FOCUS      AORTIC VALVE REPLACEMENT  2018    BACK SURGERY      discectomy    CARDIAC CATHETERIZATION      PCI ,     CARDIAC PROCEDURE N/A 2022    LEFT HEART CATH / CORONARY ANGIOGRAPHY performed by Carlos Villeda MD at [address] [place] [address] CATH LAB    CERVICAL FUSION      C3-4 fusion    EGD  2022         HIP ARTHROPLASTY Left     SPINAL CORD STIMULATOR SURGERY      lower back     UPPER GASTROINTESTINAL ENDOSCOPY N/A 2022    EGD ESOPHAGOGASTRODUODENOSCOPY/ PT HAS PACEMAKER performed by Candelaira Garza MD at [facility] ENDOSCOPY        Family History   Problem Relation Age of Onset    Heart Disease Mother     Heart Disease Sister         Social History     Socioeconomic History    Marital status: Legally    Tobacco Use    Smoking status: Former     Packs/day: 1.50     Years: 56.00     Pack years: 84.00     Types: Cigarettes     Quit date: 2022     Years since quittin.4    Smokeless tobacco: Never    Tobacco comments:     Stopped 2022   Vaping Use    Vaping Use: Never used   Substance and Sexual Activity    Alcohol use: Not Currently     Comment: about 1-2 x per year    Drug use: Yes     Types: Marijuana Lujean Magdiel)     Comment: cannabis used: two weeks ago as of 6/15/2022 edibles      Social Determinants of Health     Financial Resource Strain: Low Risk     Difficulty of Paying Living Expenses: Not hard at all   Food Insecurity: No Food Insecurity    Worried About Running Out of Food in the Last Year: Never true    Ran Out of Food in the Last Year: Never true         Carbamazepine, Lacosamide, Lorazepam, and Nitroglycerin Previous Medications    ALBUTEROL SULFATE  (90 BASE) MCG/ACT INHALER    Inhale 2 puffs into the lungs every 4 hours as needed    APIXABAN (ELIQUIS) 5 MG TABS TABLET    Take 1 tablet by mouth 2 times daily    APIXABAN (ELIQUIS) 5 MG TABS TABLET    Take 5 mg by mouth 2 times daily    ASPIRIN 81 MG CHEWABLE TABLET    Take 81 mg by mouth daily    ATORVASTATIN (LIPITOR) 80 MG TABLET    Take 80 mg by mouth daily    CHOLECALCIFEROL (VITAMIN D3) 50 MCG (2000 UT) CAPS    TAKE 1 CAPSULE BY MOUTH EVERY DAY    CYCLOBENZAPRINE (FLEXERIL) 10 MG TABLET    Take 1 tablet by mouth 3 times daily as needed for Muscle spasms    ESCITALOPRAM (LEXAPRO) 10 MG TABLET    Take 1 tablet by mouth daily    FERROUS SULFATE (IRON 325) 325 (65 FE) MG TABLET    Take 325 mg by mouth 2 times daily    FLUTICASONE-SALMETEROL (ADVAIR) 250-50 MCG/ACT AEPB DISKUS INHALER    Inhale 1 puff into the lungs every 12 hours    IPRATROPIUM-ALBUTEROL (DUONEB) 0.5-2.5 (3) MG/3ML SOLN NEBULIZER SOLUTION    Inhale 3 mLs into the lungs every 4 hours as needed     METOPROLOL SUCCINATE (TOPROL XL) 100 MG EXTENDED RELEASE TABLET    Take 1 tablet by mouth in the morning and 1 tablet in the evening. OMEPRAZOLE (PRILOSEC) 20 MG DELAYED RELEASE CAPSULE    Take 1 capsule by mouth in the morning and at bedtime    ONDANSETRON (ZOFRAN-ODT) 8 MG TBDP DISINTEGRATING TABLET    Take 1 tablet by mouth every 8 hours as needed for Nausea    PREDNISONE (DELTASONE) 20 MG TABLET    Take 2 pills for 4 days then 1 pill for 3 days then 1/2 pill for  3 days then stop    TIOTROPIUM (SPIRIVA RESPIMAT) 2.5 MCG/ACT AERS INHALER    Inhale 2 puffs into the lungs daily        Vitals signs and nursing note reviewed.    Patient Vitals for the past 4 hrs:   Temp Pulse Resp BP SpO2   02/07/23 1443 -- 70 15 -- 98 %   02/07/23 1424 -- 71 19 (!) 151/126 97 %   02/07/23 1334 -- 72 13 (!) 160/90 95 %   02/07/23 1311 -- 70 19 -- 97 %   02/07/23 1310 -- 70 20 -- 97 %   02/07/23 1244 -- 70 23 (!) 159/88 96 %   02/07/23 1222 97.8 °F (36.6 °C) 70 20 (!) 158/91 98 %          Physical Exam  Constitutional:       General: He is not in acute distress. Appearance: Normal appearance. He is not ill-appearing. HENT:      Head: Normocephalic and atraumatic. Nose: No rhinorrhea. Mouth/Throat:      Mouth: Mucous membranes are moist.   Eyes:      Extraocular Movements: Extraocular movements intact. Cardiovascular:      Rate and Rhythm: Normal rate and regular rhythm. Pulmonary:      Effort: Pulmonary effort is normal.      Breath sounds: Decreased breath sounds and wheezing present. Abdominal:      General: Abdomen is flat. Bowel sounds are normal. There is no distension. Palpations: Abdomen is soft. Tenderness: There is no abdominal tenderness. Musculoskeletal:         General: Normal range of motion. Cervical back: Normal range of motion. Skin:     General: Skin is warm and dry. Neurological:      General: No focal deficit present. Mental Status: He is alert. Psychiatric:         Mood and Affect: Mood normal.        Procedures    Results for orders placed or performed during the hospital encounter of 02/07/23   COVID-19, Rapid    Specimen: Nasopharyngeal   Result Value Ref Range    Source NASAL      SARS-CoV-2, Rapid Not detected NOTD     XR CHEST PORTABLE    Narrative    CHEST X-RAY, 1 VIEW    INDICATION: short of breath    COMPARISON: 12/21/2022    TECHNIQUE: Single AP view of the chest was obtained. FINDINGS:     Lungs and pleural spaces: Normal. No pneumothorax or pleural effusion. Cardiomediastinal silhouette: Enlarged but stable. Cardiac pacemaker/ICD. Aortic  valve prosthesis. Osseous structures: Normal.        Impression    No radiographic evidence of acute cardiopulmonary disease.      CBC with Auto Differential   Result Value Ref Range    WBC 8.1 4.3 - 11.1 K/uL    RBC 5.21 4.23 - 5.6 M/uL    Hemoglobin 15.1 13.6 - 17.2 g/dL    Hematocrit 46.9 41.1 - 50.3 %    MCV 90.0 82 - 102 FL    MCH 29.0 26.1 - 32.9 PG    MCHC 32.2 31.4 - 35.0 g/dL    RDW 14.0 11.9 - 14.6 %    Platelets 620 036 - 114 K/uL    MPV 9.2 (L) 9.4 - 12.3 FL    nRBC 0.00 0.0 - 0.2 K/uL    Differential Type AUTOMATED      Seg Neutrophils 59 43 - 78 %    Lymphocytes 27 13 - 44 %    Monocytes 10 4.0 - 12.0 %    Eosinophils % 3 0.5 - 7.8 %    Basophils 1 0.0 - 2.0 %    Immature Granulocytes 0 0.0 - 5.0 %    Segs Absolute 4.8 1.7 - 8.2 K/UL    Absolute Lymph # 2.2 0.5 - 4.6 K/UL    Absolute Mono # 0.8 0.1 - 1.3 K/UL    Absolute Eos # 0.2 0.0 - 0.8 K/UL    Basophils Absolute 0.1 0.0 - 0.2 K/UL    Absolute Immature Granulocyte 0.0 0.0 - 0.5 K/UL   CMP   Result Value Ref Range    Sodium 137 133 - 143 mmol/L    Potassium 4.2 3.5 - 5.1 mmol/L    Chloride 107 101 - 110 mmol/L    CO2 25 21 - 32 mmol/L    Anion Gap 5 2 - 11 mmol/L    Glucose 95 65 - 100 mg/dL    BUN 9 8 - 23 MG/DL    Creatinine 1.10 0.8 - 1.5 MG/DL    Est, Glom Filt Rate >60 >60 ml/min/1.73m2    Calcium 9.4 8.3 - 10.4 MG/DL    Total Bilirubin 0.5 0.2 - 1.1 MG/DL    ALT 32 12 - 65 U/L    AST 22 15 - 37 U/L    Alk Phosphatase 128 50 - 136 U/L    Total Protein 7.9 6.3 - 8.2 g/dL    Albumin 3.7 3.2 - 4.6 g/dL    Globulin 4.2 2.8 - 4.5 g/dL    Albumin/Globulin Ratio 0.9 0.4 - 1.6     Lactate, Sepsis   Result Value Ref Range    Lactic Acid, Sepsis 1.2 0.4 - 2.0 MMOL/L   Troponin   Result Value Ref Range    Troponin, High Sensitivity 10.7 0 - 14 pg/mL   Venous Blood Gas, POC   Result Value Ref Range    DEVICE ROOM AIR      FIO2 21 %    PH, VENOUS (POC) 7.38 7.32 - 7.42      PCO2, Monica, POC 45.3 41 - 51 MMHG    PO2, VENOUS (POC) 32 mmHg    HCO3, Venous 26.9 23 - 28 MMOL/L    SO2, VENOUS (POC) 59.7 (L) 65 - 88 %    BASE EXCESS, VENOUS (POC) 1.2 mmol/L    POC Eddie's Test Positive      Respiratory Rate 20      Site LEFT BRACHIAL      Specimen type: VENOUS BLOOD      Performed by: Maxwell    EKG 12 Lead   Result Value Ref Range    Ventricular Rate 72 BPM    Atrial Rate 120 BPM    P-R Interval 68 ms    QRS Duration 181 ms    Q-T Interval 480 ms    QTc Calculation (Bazett) 526 ms    P Axis 0 degrees    R Axis -80 degrees    T Axis 102 degrees    Diagnosis Sinus rhythm         XR CHEST PORTABLE   Final Result   No radiographic evidence of acute cardiopulmonary disease. Voice dictation software was used during the making of this note. This software is not perfect and grammatical and other typographical errors may be present. This note has not been completely proofread for errors.      Kenna Guerra MD  02/07/23 6375

## 2023-02-07 NOTE — ED TRIAGE NOTES
Pt arrives from home via EMS. Hx COPD, Pneumonia. Pt complaining of ShOB and cough for the past few days that hasn't improved. Pt received breathing treatment at home and albuterol w/ no improvement. EMS administered albuterol en route. Provider at bedside.

## 2023-02-08 PROBLEM — Z86.73 HISTORY OF CVA (CEREBROVASCULAR ACCIDENT): Chronic | Status: ACTIVE | Noted: 2023-02-08

## 2023-02-08 PROBLEM — D68.69 SECONDARY HYPERCOAGULABLE STATE (HCC): Chronic | Status: ACTIVE | Noted: 2023-02-08

## 2023-02-08 LAB
ANION GAP SERPL CALC-SCNC: 6 MMOL/L (ref 2–11)
BASOPHILS # BLD: 0 K/UL (ref 0–0.2)
BASOPHILS NFR BLD: 0 % (ref 0–2)
BUN SERPL-MCNC: 12 MG/DL (ref 8–23)
CALCIUM SERPL-MCNC: 9.1 MG/DL (ref 8.3–10.4)
CHLORIDE SERPL-SCNC: 109 MMOL/L (ref 101–110)
CO2 SERPL-SCNC: 24 MMOL/L (ref 21–32)
CREAT SERPL-MCNC: 1.2 MG/DL (ref 0.8–1.5)
DIFFERENTIAL METHOD BLD: ABNORMAL
EOSINOPHIL # BLD: 0 K/UL (ref 0–0.8)
EOSINOPHIL NFR BLD: 0 % (ref 0.5–7.8)
ERYTHROCYTE [DISTWIDTH] IN BLOOD BY AUTOMATED COUNT: 13.7 % (ref 11.9–14.6)
GLUCOSE SERPL-MCNC: 168 MG/DL (ref 65–100)
HCT VFR BLD AUTO: 38.4 % (ref 41.1–50.3)
HGB BLD-MCNC: 12.6 G/DL (ref 13.6–17.2)
IMM GRANULOCYTES # BLD AUTO: 0 K/UL (ref 0–0.5)
IMM GRANULOCYTES NFR BLD AUTO: 0 % (ref 0–5)
LYMPHOCYTES # BLD: 0.9 K/UL (ref 0.5–4.6)
LYMPHOCYTES NFR BLD: 9 % (ref 13–44)
MCH RBC QN AUTO: 29 PG (ref 26.1–32.9)
MCHC RBC AUTO-ENTMCNC: 32.8 G/DL (ref 31.4–35)
MCV RBC AUTO: 88.5 FL (ref 82–102)
MONOCYTES # BLD: 0.1 K/UL (ref 0.1–1.3)
MONOCYTES NFR BLD: 1 % (ref 4–12)
NEUTS SEG # BLD: 9 K/UL (ref 1.7–8.2)
NEUTS SEG NFR BLD: 90 % (ref 43–78)
NRBC # BLD: 0 K/UL (ref 0–0.2)
PLATELET # BLD AUTO: 198 K/UL (ref 150–450)
PMV BLD AUTO: 9.7 FL (ref 9.4–12.3)
POTASSIUM SERPL-SCNC: 4 MMOL/L (ref 3.5–5.1)
RBC # BLD AUTO: 4.34 M/UL (ref 4.23–5.6)
SODIUM SERPL-SCNC: 139 MMOL/L (ref 133–143)
WBC # BLD AUTO: 10.1 K/UL (ref 4.3–11.1)

## 2023-02-08 PROCEDURE — 6370000000 HC RX 637 (ALT 250 FOR IP): Performed by: FAMILY MEDICINE

## 2023-02-08 PROCEDURE — 94640 AIRWAY INHALATION TREATMENT: CPT

## 2023-02-08 PROCEDURE — 6360000002 HC RX W HCPCS: Performed by: FAMILY MEDICINE

## 2023-02-08 PROCEDURE — 97112 NEUROMUSCULAR REEDUCATION: CPT

## 2023-02-08 PROCEDURE — 94760 N-INVAS EAR/PLS OXIMETRY 1: CPT

## 2023-02-08 PROCEDURE — 97530 THERAPEUTIC ACTIVITIES: CPT

## 2023-02-08 PROCEDURE — 2580000003 HC RX 258: Performed by: FAMILY MEDICINE

## 2023-02-08 PROCEDURE — 80048 BASIC METABOLIC PNL TOTAL CA: CPT

## 2023-02-08 PROCEDURE — 1100000000 HC RM PRIVATE

## 2023-02-08 PROCEDURE — 97161 PT EVAL LOW COMPLEX 20 MIN: CPT

## 2023-02-08 PROCEDURE — 85025 COMPLETE CBC W/AUTO DIFF WBC: CPT

## 2023-02-08 PROCEDURE — 94664 DEMO&/EVAL PT USE INHALER: CPT

## 2023-02-08 PROCEDURE — 97165 OT EVAL LOW COMPLEX 30 MIN: CPT

## 2023-02-08 RX ADMIN — MOMETASONE FUROATE AND FORMOTEROL FUMARATE DIHYDRATE 2 PUFF: 200; 5 AEROSOL RESPIRATORY (INHALATION) at 20:11

## 2023-02-08 RX ADMIN — DOXYCYCLINE HYCLATE 100 MG: 100 CAPSULE ORAL at 09:50

## 2023-02-08 RX ADMIN — METHYLPREDNISOLONE SODIUM SUCCINATE 40 MG: 40 INJECTION, POWDER, FOR SOLUTION INTRAMUSCULAR; INTRAVENOUS at 16:44

## 2023-02-08 RX ADMIN — PANTOPRAZOLE SODIUM 40 MG: 40 TABLET, DELAYED RELEASE ORAL at 06:13

## 2023-02-08 RX ADMIN — FERROUS SULFATE TAB 325 MG (65 MG ELEMENTAL FE) 325 MG: 325 (65 FE) TAB at 22:12

## 2023-02-08 RX ADMIN — SODIUM CHLORIDE, PRESERVATIVE FREE 10 ML: 5 INJECTION INTRAVENOUS at 22:13

## 2023-02-08 RX ADMIN — ATORVASTATIN CALCIUM 80 MG: 80 TABLET, FILM COATED ORAL at 09:49

## 2023-02-08 RX ADMIN — METHYLPREDNISOLONE SODIUM SUCCINATE 40 MG: 40 INJECTION, POWDER, FOR SOLUTION INTRAMUSCULAR; INTRAVENOUS at 22:12

## 2023-02-08 RX ADMIN — MOMETASONE FUROATE AND FORMOTEROL FUMARATE DIHYDRATE 2 PUFF: 200; 5 AEROSOL RESPIRATORY (INHALATION) at 07:16

## 2023-02-08 RX ADMIN — METHYLPREDNISOLONE SODIUM SUCCINATE 40 MG: 40 INJECTION, POWDER, FOR SOLUTION INTRAMUSCULAR; INTRAVENOUS at 11:11

## 2023-02-08 RX ADMIN — APIXABAN 5 MG: 5 TABLET, FILM COATED ORAL at 22:12

## 2023-02-08 RX ADMIN — FERROUS SULFATE TAB 325 MG (65 MG ELEMENTAL FE) 325 MG: 325 (65 FE) TAB at 09:50

## 2023-02-08 RX ADMIN — APIXABAN 5 MG: 5 TABLET, FILM COATED ORAL at 09:50

## 2023-02-08 RX ADMIN — ASPIRIN 81 MG 81 MG: 81 TABLET ORAL at 09:49

## 2023-02-08 RX ADMIN — IPRATROPIUM BROMIDE AND ALBUTEROL SULFATE 1 AMPULE: .5; 3 SOLUTION RESPIRATORY (INHALATION) at 07:16

## 2023-02-08 RX ADMIN — DOXYCYCLINE HYCLATE 100 MG: 100 CAPSULE ORAL at 22:12

## 2023-02-08 RX ADMIN — METHYLPREDNISOLONE SODIUM SUCCINATE 40 MG: 40 INJECTION, POWDER, FOR SOLUTION INTRAMUSCULAR; INTRAVENOUS at 02:06

## 2023-02-08 RX ADMIN — SODIUM CHLORIDE, PRESERVATIVE FREE 10 ML: 5 INJECTION INTRAVENOUS at 09:51

## 2023-02-08 RX ADMIN — IPRATROPIUM BROMIDE AND ALBUTEROL SULFATE 1 AMPULE: .5; 3 SOLUTION RESPIRATORY (INHALATION) at 15:41

## 2023-02-08 RX ADMIN — IPRATROPIUM BROMIDE AND ALBUTEROL SULFATE 1 AMPULE: .5; 3 SOLUTION RESPIRATORY (INHALATION) at 12:54

## 2023-02-08 RX ADMIN — TIOTROPIUM BROMIDE INHALATION SPRAY 2 PUFF: 3.12 SPRAY, METERED RESPIRATORY (INHALATION) at 07:16

## 2023-02-08 RX ADMIN — IPRATROPIUM BROMIDE AND ALBUTEROL SULFATE 1 AMPULE: .5; 3 SOLUTION RESPIRATORY (INHALATION) at 20:12

## 2023-02-08 RX ADMIN — METOPROLOL SUCCINATE 100 MG: 100 TABLET, EXTENDED RELEASE ORAL at 16:44

## 2023-02-08 NOTE — PROGRESS NOTES
TRANSFER - OUT REPORT:    Verbal report given to Kriss on Samanta Yossi  being transferred to  for routine progression of patient care       Report consisted of patient's Situation, Background, Assessment and   Recommendations(SBAR). Information from the following report(s) Nurse Handoff Report was reviewed with the receiving nurse. [place] Assessment: Presents to emergency department  because of falls (Syncope, seizure, or loss of consciousness): No, Age > 79: No, Altered Mental Status, Intoxication with alcohol or substance confusion (Disorientation, impaired judgment, poor safety awaremess, or inability to follow instructions): No, Impaired Mobility: Ambulates or transfers with assistive devices or assistance; Unable to ambulate or transer.: Yes  Lines:   Peripheral IV Left; Anterior Cephalic (Active)        Opportunity for questions and clarification was provided.

## 2023-02-08 NOTE — PROGRESS NOTES
ACUTE PHYSICAL THERAPY GOALS:   (Developed with and agreed upon by patient and/or caregiver.)    (1.) Cameron Vital  will move from supine to sit and sit to supine  with INDEPENDENCE within 7 treatment day(s). (2.) Cameron Vital will transfer from bed to chair and chair to bed with MODIFIED INDEPENDENCE using the least restrictive device within 7 treatment day(s). (3.) Ashley Chen will perform seated static and dynamic balance activities x 20 minutes with SUPERVISION to improve safety within 7 treatment day(s). (4.) Ashley Chen will perform bilateral lower extremity exercises x 20 min for HEP with INDEPENDENCE to improve strength, endurance, and functional mobility within 7 treatment day(s). PHYSICAL THERAPY Initial Assessment, Daily Note, and AM  (Link to Caseload Tracking: PT Visit Days : 1  Acknowledge Orders  Time In/Out  PT Charge Capture  Rehab Caseload Tracker    Cameron Vital is a 79 y.o. male   PRIMARY DIAGNOSIS: COPD exacerbation ([facility]  COPD exacerbation ([facility] [J44.1]       Reason for Referral: Generalized Muscle Weakness (M62.81)  Other abnormalities of gait and mobility (R26.89)  History of falling (Z91.81)  Inpatient: Payor: [address] [place] [address] Ave / Plan: Alayna Slim / Product Type: *No Product type* /     ASSESSMENT:     REHAB RECOMMENDATIONS:   Recommendation to date pending progress:  Setting:  Home Health Therapy    Equipment:    To Be Determined     ASSESSMENT:  Mr. Abigail Brand  is a 79year old M who presents with a COPD exacerbation. At baseline, pt uses a power wheelchair for mobility- has been using it for ~20 years. He uses a slide board and transfers independently. His apartment is accessible and he has a hospital bed. Has an aide for 4 hours/day to help with bathing, IADL's. Reports he has all necessary equipment.  He was able to ambulate short distances with forearm crutches in the past but had a CVA with R sided deficits about 4 months ago and has had difficulty with this since (had one recent fall while attempting). Pt also reports multiple recent syncope episodes, reports his MD and all [facility] providers are aware. This date pt performs mobility including bed mobility with Raji for R LE assist only. He normally uses a leg  at home. He was able to laterally scoot in bed ~5 ft with SBA. Pt is noted to be SOB with mobility but otherwise tolerated well. Pt is a highly motivated individual and has a good home set up and routine. Recommend continuing [facility] PT services at d/c. Pt presents as functioning below his baseline, with deficits in mobility including transfers, gait, balance, and activity tolerance. Pt will benefit from skilled therapy services to address stated deficits to promote return to highest level of function, independence, and safety. Will continue to follow.      [employer] [facility] 6 Clicks Basic Mobility Inpatient Short Form  AM-PAC Basic Mobility - Inpatient   How much help is needed turning from your back to your side while in a flat bed without using bedrails?: A Little  How much help is needed moving from lying on your back to sitting on the side of a flat bed without using bedrails?: A Little  How much help is needed moving to and from a bed to a chair?: A Little  How much help is needed standing up from a chair using your arms?: Total  How much help is needed walking in hospital room?: Total  How much help is needed climbing 3-5 steps with a railing?: Total  AM-PAC Inpatient Mobility Raw Score : 12  AM-PAC Inpatient T-Scale Score : 35.33  Mobility Inpatient CMS 0-100% Score: 68.66  Mobility Inpatient CMS G-Code Modifier : CL    SUBJECTIVE:   Mr. Luther Gilbert states, \"I have a daily exercise routine\"     Social/Functional Lives With: Alone  Type of Home: Apartment  Home Layout: One level  Home Equipment: Hospital bed, Leg , Wheelchair-electric  Receives Help From: Home health  ADL Assistance: Needs assistance  Ambulation Assistance: Non-ambulatory  Transfer Assistance: Independent    OBJECTIVE:     PAIN: VITALS / O2: PRECAUTION / [place] / DRAINS:   Pre Treatment:   Pain Assessment: None - Denies Pain      Post Treatment: 0 Vitals        Oxygen      None    RESTRICTIONS/PRECAUTIONS:                    GROSS EVALUATION: B LE Intact Impaired (Comments):   AROM [] Generally decreased, R worse than L   PROM []    Strength []  L LE grossly 4/5, R LE    Balance [] Posture: Fair  Sitting - Static: Good  Sitting - Dynamic: Fair, +   Posture [] N/A   Sensation []  Absent light touch R LE, diminished L LE   Coordination []      Tone []     Edema []    Activity Tolerance [] Patient limited by fatigue    []      COGNITION/  PERCEPTION: Intact Impaired (Comments):   Orientation [x]     Vision [x]     Hearing [x]     Cognition  [x]       MOBILITY: I Mod I S SBA CGA Min Mod Max Total  NT x2 Comments:   Bed Mobility    Rolling [] [] [] [] [x] [] [] [] [] [] []    Supine to Sit [] [] [] [] [] [x] [] [] [] [] [] Assist for R LE   Scooting [] [] [] [x] [] [] [] [] [] [] []    Sit to Supine [] [] [] [] [] [x] [] [] [] [] [] Assist for R LE   Transfers    Sit to Stand [] [] [] [] [] [] [] [] [] [x] []    Bed to Chair [] [] [] [] [] [] [] [] [] [x] [] No recliner chairs available   Stand to Sit [] [] [] [] [] [] [] [] [] [x] []     [] [] [] [] [] [] [] [] [] [] []    I=Independent, Mod I=Modified Independent, S=Supervision, SBA=Standby Assistance, CGA=Contact Guard Assistance,   Min=Minimal Assistance, Mod=Moderate Assistance, Max=Maximal Assistance, Total=Total Assistance, NT=Not Tested    GAIT: I Mod I S SBA CGA Min Mod Max Total  NT x2 Comments:   Level of Assistance [] [] [] [] [] [] [] [] [] [x] []    Distance   N/a    DME N/A    Gait Quality N/A    Weightbearing Status      Stairs      I=Independent, Mod I=Modified Independent, S=Supervision, SBA=Standby Assistance, CGA=Contact Guard Assistance,   Min=Minimal Assistance, Mod=Moderate Assistance, Max=Maximal Assistance, Total=Total Assistance, NT=Not Tested    PLAN:   FREQUENCY AND DURATION: 3 times/week for duration of hospital stay or until stated goals are met, whichever comes first.    THERAPY PROGNOSIS: Good    PROBLEM LIST:   (Skilled intervention is medically necessary to address:)  Decreased ADL/Functional Activities  Decreased Activity Tolerance  Decreased AROM/PROM  Decreased Balance  Decreased Strength  Decreased Transfer Abilities INTERVENTIONS PLANNED:   (Benefits and precautions of physical therapy have been discussed with the patient.)  Self Care Training  Therapeutic Activity  Therapeutic Exercise/HEP  Neuromuscular Re-education  Education       TREATMENT:   EVALUATION: LOW COMPLEXITY: (Untimed Charge)    TREATMENT:   Co-Treatment PT/OT necessary due to patient's decreased overall endurance/tolerance levels, as well as need for high level skilled assistance to complete functional transfers/mobility and functional tasks  Therapeutic Activity (20 Minutes): Therapeutic activity included Rolling, Supine to Sit, Sit to Supine, Scooting, Lateral Scooting, and Sitting balance  to improve functional Activity tolerance, Balance, Mobility, and Strength. TREATMENT GRID:  N/A    AFTER TREATMENT PRECAUTIONS: Bed, Bed/Chair Locked, Call light within reach, Needs within reach, and RN notified    INTERDISCIPLINARY COLLABORATION:  RN/ PCT and OT/ TREVIZO    EDUCATION: Education Given To: Patient  Education Provided: Role of Therapy;Plan of Care; Fall Prevention Strategies;Transfer Training;Equipment  Education Method: Verbal  Barriers to Learning: None  Education Outcome: Verbalized understanding    TIME IN/OUT:  Time In: 1130  Time Out: [address]  Minutes: Adri ROTHMAN PT

## 2023-02-08 NOTE — ED NOTES
TRANSFER - OUT REPORT:    Verbal report given to RN on Irven Omar  being transferred to [facility] for routine progression of patient care       Report consisted of patient's Situation, Background, Assessment and   Recommendations(SBAR). Information from the following report(s) Nurse Handoff Report and ED SBAR was reviewed with the receiving nurse. [place] Assessment: Presents to emergency department  because of falls (Syncope, seizure, or loss of consciousness): No, Age > 79: No, Altered Mental Status, Intoxication with alcohol or substance confusion (Disorientation, impaired judgment, poor safety awaremess, or inability to follow instructions): No, Impaired Mobility: Ambulates or transfers with assistive devices or assistance; Unable to ambulate or transer.: Yes  Lines:   Peripheral IV 02/07/23 Proximal;Right; Anterior Forearm (Active)        Opportunity for questions and clarification was provided.       Patient transported with:  Cephas Leventhal, RN  02/07/23 0040

## 2023-02-08 NOTE — PROGRESS NOTES
Hospitalist Progress Note   Admit Date:  2023 12:14 PM   Name:  Shawn Roper   Age:  79 y.o. Sex:  male  :  1955   MRN:  [id number]   Room:  [place] [address]    Presenting Complaint: Shortness of Breath     Reason(s) for Admission: COPD exacerbation [facility]) [J44.1]     Hospital Course:   Patient is a 80 y/o male with medical history of hypertension, hyperlipidemia, CAD, diastolic CHF, paroxysmal atrial fibrillation on Apixaban, COPD, former smoker (quit 4 months ago), CVA, s/p TAVR, CKD III, wheelchair bound at baseline who presented to ED with cc worsening SOB associated with recent cough, congestion, SOB, wheezing. [facility] visit  identified active wheezing and advised ED presentation. Patient found in respiratory distress on arrival to ED, received DuoNeb, Decadron and Magnesium without much improvement. No evidence of hypoxia. Labs unremarkable. Rapid Covid negative. Chest x-ray without acute pathology. Hospitalist consulted for admission given respiratory distress. Subjective & 24hr Events (23): Patient alert, oriented, no acute distress. He is conversational. Breathing feels to be improving. No respiratory distress. He reports mold issues with his current housing but that is actively being worked on (alternative housing). Denies chest pain.     Assessment & Plan:     COPD with acute exacerbation  -Acute respiratory distress on arrival, received DuoNeb, Decadron and magnesium without much improvement, no evidence of hypoxia   -Cont Duonebs Q4H while awake  -Cont Solumedrol 40 mg Q6H, no dosage changes today, still with expiratory wheezing throughout all lobes, hope to wean tomorrow  -Cont Dulera and Spiriva  -Cont Doxycycline  -Respiratory status stable on RA    Hypertension / hyperlipidemia / CAD / dCHF  Paroxysmal A-fib / Secondary hypercoagulable state / Chronic anticoagulation   -Continue aspirin, statin, apixaban, metoprolol  -No acute exacerbation of CHF     Mood disorder  -Continue escitalopram     Former smoker  -Quit 4 months ago, congratulate and encourage continued cessation    Hx of CVA  -Cont statin, asa, apixaban    Discharge planning: home 24-48 hours    Diet:  ADULT DIET; Regular  DVT PPx: apixaban   Code status: Full Code    Hospital Problems:  Principal Problem:    COPD exacerbation ([facility]  Active Problems:    Acute on chronic diastolic congestive heart failure (HCC)    Secondary hypercoagulable state ([facility]    History of CVA (cerebrovascular accident)    Atrial fibrillation ([facility]    CAD in native artery    Hypertension    COPD (chronic obstructive pulmonary disease) ([facility].)  Resolved Problems:    * No resolved hospital problems. *      Objective:   Patient Vitals for the past 24 hrs:   Temp Pulse Resp BP SpO2   02/08/23 1204 98.6 °F (37 °C) 70 20 (!) 151/79 95 %   02/08/23 0737 97.9 °F (36.6 °C) 70 -- 127/83 95 %   02/08/23 0720 -- 75 18 -- 93 %   02/08/23 0412 97.9 °F (36.6 °C) 70 22 (!) 105/55 93 %   02/07/23 2335 97.7 °F (36.5 °C) 70 19 110/67 94 %   02/07/23 2112 98.2 °F (36.8 °C) 73 22 124/66 96 %   02/07/23 1900 -- 70 17 (!) 152/70 --   02/07/23 1830 -- 71 22 134/88 97 %   02/07/23 1755 -- 74 19 -- 96 %   02/07/23 1729 -- 86 -- (!) 142/88 --   02/07/23 1443 -- 70 15 -- 98 %   02/07/23 1424 -- 71 19 (!) 151/126 97 %       Oxygen Therapy  SpO2: 95 %  Pulse via Oximetry: 71 beats per minute  Pulse Oximeter Device Mode: Continuous  O2 Device: None (Room air)  Blood Gas  Performed?: Yes  Eddie's Test #1: Other (Comment)  Site #1: Vein    Estimated body mass index is 31.41 kg/m² as calculated from the following:    Height as of this encounter: 6' 1\" (1.854 m). Weight as of this encounter: 238 lb 1.6 oz (108 kg).     Intake/Output Summary (Last 24 hours) at 2/8/2023 1353  Last data filed at 2/8/2023 1336  Gross per 24 hour   Intake --   Output 475 ml   Net -475 ml         Physical Exam:     Blood pressure (!) 151/79, pulse 70, temperature 98.6 °F (37 °C), temperature source Oral, resp. rate 20, height 6' 1\" (1.854 m), weight 238 lb 1.6 oz (108 kg), SpO2 95 %. General:    Well nourished. Alert. No acute distress  Head:  Normocephalic, atraumatic  Eyes:  Sclerae appear normal.  Pupils equally round. ENT:  Nares appear normal.  Moist oral mucosa  Neck:  No restricted ROM. Trachea midline   CV:   RRR. No m/r/g. No jugular venous distension. Lungs:   Wheezing throughout all lobes anterior, no rhonchi, respirations are even and unlabored  Abdomen:   Soft, nontender, nondistended. Extremities: No cyanosis or clubbing. No edema  Skin:     No rashes and normal coloration. Warm and dry. Neuro:  A&O x 3. Residual L-sided deficits. Moves all extremities. Psych:  Normal mood and affect.       I have personally reviewed labs and tests:  Recent Labs:  Recent Results (from the past 48 hour(s))   EKG 12 Lead    Collection Time: 02/07/23 12:34 PM   Result Value Ref Range    Ventricular Rate 72 BPM    Atrial Rate 120 BPM    P-R Interval 68 ms    QRS Duration 181 ms    Q-T Interval 480 ms    QTc Calculation (Bazett) 526 ms    P Axis 0 degrees    R Axis -80 degrees    T Axis 102 degrees    Diagnosis Electronic ventricular pacemaker    COVID-19, Rapid    Collection Time: 02/07/23 12:47 PM    Specimen: Nasopharyngeal   Result Value Ref Range    Source NASAL      SARS-CoV-2, Rapid Not detected NOTD     CBC with Auto Differential    Collection Time: 02/07/23 12:47 PM   Result Value Ref Range    WBC 8.1 4.3 - 11.1 K/uL    RBC 5.21 4.23 - 5.6 M/uL    Hemoglobin 15.1 13.6 - 17.2 g/dL    Hematocrit 46.9 41.1 - 50.3 %    MCV 90.0 82 - 102 FL    MCH 29.0 26.1 - 32.9 PG    MCHC 32.2 31.4 - 35.0 g/dL    RDW 14.0 11.9 - 14.6 %    Platelets 020 139 - 235 K/uL    MPV 9.2 (L) 9.4 - 12.3 FL    nRBC 0.00 0.0 - 0.2 K/uL    Differential Type AUTOMATED      Seg Neutrophils 59 43 - 78 %    Lymphocytes 27 13 - 44 %    Monocytes 10 4.0 - 12.0 %    Eosinophils % 3 0.5 - 7.8 %    Basophils 1 0.0 - 2.0 %    Immature Granulocytes 0 0.0 - 5.0 %    Segs Absolute 4.8 1.7 - 8.2 K/UL    Absolute Lymph # 2.2 0.5 - 4.6 K/UL    Absolute Mono # 0.8 0.1 - 1.3 K/UL    Absolute Eos # 0.2 0.0 - 0.8 K/UL    Basophils Absolute 0.1 0.0 - 0.2 K/UL    Absolute Immature Granulocyte 0.0 0.0 - 0.5 K/UL   CMP    Collection Time: 02/07/23 12:47 PM   Result Value Ref Range    Sodium 137 133 - 143 mmol/L    Potassium 4.2 3.5 - 5.1 mmol/L    Chloride 107 101 - 110 mmol/L    CO2 25 21 - 32 mmol/L    Anion Gap 5 2 - 11 mmol/L    Glucose 95 65 - 100 mg/dL    BUN 9 8 - 23 MG/DL    Creatinine 1.10 0.8 - 1.5 MG/DL    Est, Glom Filt Rate >60 >60 ml/min/1.73m2    Calcium 9.4 8.3 - 10.4 MG/DL    Total Bilirubin 0.5 0.2 - 1.1 MG/DL    ALT 32 12 - 65 U/L    AST 22 15 - 37 U/L    Alk Phosphatase 128 50 - 136 U/L    Total Protein 7.9 6.3 - 8.2 g/dL    Albumin 3.7 3.2 - 4.6 g/dL    Globulin 4.2 2.8 - 4.5 g/dL    Albumin/Globulin Ratio 0.9 0.4 - 1.6     Lactate, Sepsis    Collection Time: 02/07/23 12:47 PM   Result Value Ref Range    Lactic Acid, Sepsis 1.2 0.4 - 2.0 MMOL/L   Troponin    Collection Time: 02/07/23 12:47 PM   Result Value Ref Range    Troponin, High Sensitivity 10.7 0 - 14 pg/mL   Venous Blood Gas, POC    Collection Time: 02/07/23  1:08 PM   Result Value Ref Range    DEVICE ROOM AIR      FIO2 21 %    PH, VENOUS (POC) 7.38 7.32 - 7.42      PCO2, [place], POC 45.3 41 - 51 MMHG    PO2, VENOUS (POC) 32 mmHg    HCO3, Venous 26.9 23 - 28 MMOL/L    SO2, VENOUS (POC) 59.7 (L) 65 - 88 %    BASE EXCESS, VENOUS (POC) 1.2 mmol/L    POC Eddie's Test Positive      Respiratory Rate 20      Site LEFT BRACHIAL      Specimen type: VENOUS BLOOD      Performed by: Maxwell    Lactate, Sepsis    Collection Time: 02/07/23  3:23 PM   Result Value Ref Range    Lactic Acid, Sepsis 1.6 0.4 - 2.0 MMOL/L   Basic Metabolic Panel w/ Reflex to MG    Collection Time: 02/08/23  2:50 AM   Result Value Ref Range    Sodium 139 133 - 143 mmol/L    Potassium 4.0 3.5 - 5.1 mmol/L    Chloride 109 101 - 110 mmol/L    CO2 24 21 - 32 mmol/L    Anion Gap 6 2 - 11 mmol/L    Glucose 168 (H) 65 - 100 mg/dL    BUN 12 8 - 23 MG/DL    Creatinine 1.20 0.8 - 1.5 MG/DL    Est, Glom Filt Rate >60 >60 ml/min/1.73m2    Calcium 9.1 8.3 - 10.4 MG/DL   CBC with Auto Differential    Collection Time: 02/08/23  2:50 AM   Result Value Ref Range    WBC 10.1 4.3 - 11.1 K/uL    RBC 4.34 4.23 - 5.6 M/uL    Hemoglobin 12.6 (L) 13.6 - 17.2 g/dL    Hematocrit 38.4 (L) 41.1 - 50.3 %    MCV 88.5 82 - 102 FL    MCH 29.0 26.1 - 32.9 PG    MCHC 32.8 31.4 - 35.0 g/dL    RDW 13.7 11.9 - 14.6 %    Platelets 415 801 - 107 K/uL    MPV 9.7 9.4 - 12.3 FL    nRBC 0.00 0.0 - 0.2 K/uL    Differential Type AUTOMATED      Seg Neutrophils 90 (H) 43 - 78 %    Lymphocytes 9 (L) 13 - 44 %    Monocytes 1 (L) 4.0 - 12.0 %    Eosinophils % 0 (L) 0.5 - 7.8 %    Basophils 0 0.0 - 2.0 %    Immature Granulocytes 0 0.0 - 5.0 %    Segs Absolute 9.0 (H) 1.7 - 8.2 K/UL    Absolute Lymph # 0.9 0.5 - 4.6 K/UL    Absolute Mono # 0.1 0.1 - 1.3 K/UL    Absolute Eos # 0.0 0.0 - 0.8 K/UL    Basophils Absolute 0.0 0.0 - 0.2 K/UL    Absolute Immature Granulocyte 0.0 0.0 - 0.5 K/UL       I have personally reviewed imaging studies:  Other Studies:  XR CHEST PORTABLE   Final Result   No radiographic evidence of acute cardiopulmonary disease.              Current Meds:  Current Facility-Administered Medications   Medication Dose Route Frequency    apixaban (ELIQUIS) tablet 5 mg  5 mg Oral BID    aspirin chewable tablet 81 mg  81 mg Oral Daily    atorvastatin (LIPITOR) tablet 80 mg  80 mg Oral Daily    cyclobenzaprine (FLEXERIL) tablet 10 mg  10 mg Oral TID PRN    escitalopram (LEXAPRO) tablet 10 mg  10 mg Oral Daily    ferrous sulfate (IRON 325) tablet 325 mg  325 mg Oral BID    mometasone-formoterol (DULERA) 200-5 MCG/ACT inhaler 2 puff  2 puff Inhalation BID    metoprolol succinate (TOPROL XL) extended release tablet 100 mg  100 mg Oral Q12H pantoprazole (PROTONIX) tablet 40 mg  40 mg Oral QAM AC    tiotropium (SPIRIVA RESPIMAT) 2.5 MCG/ACT inhaler 2 puff  2 puff Inhalation Daily    sodium chloride flush 0.9 % injection 5-40 mL  5-40 mL IntraVENous 2 times per day    sodium chloride flush 0.9 % injection 5-40 mL  5-40 mL IntraVENous PRN    0.9 % sodium chloride infusion   IntraVENous PRN    ondansetron (ZOFRAN-ODT) disintegrating tablet 4 mg  4 mg Oral Q8H PRN    Or    ondansetron (ZOFRAN) injection 4 mg  4 mg IntraVENous Q6H PRN    polyethylene glycol (GLYCOLAX) packet 17 g  17 g Oral Daily PRN    acetaminophen (TYLENOL) tablet 650 mg  650 mg Oral Q6H PRN    Or    acetaminophen (TYLENOL) suppository 650 mg  650 mg Rectal Q6H PRN    ipratropium-albuterol (DUONEB) nebulizer solution 1 ampule  1 ampule Inhalation Q4H WA    methylPREDNISolone sodium (SOLU-MEDROL) injection 40 mg  40 mg IntraVENous Q6H    Followed by    Rocky Bonner ON 2/10/2023] predniSONE (DELTASONE) tablet 40 mg  40 mg Oral Daily    doxycycline hyclate (VIBRAMYCIN) capsule 100 mg  100 mg Oral 2 times per day     Signed: Ayad Vallejo AGAP-BC

## 2023-02-08 NOTE — CARE COORDINATION
CM attempted to meet with patient. CNA in with patient giving him a bath. CM will attempt at another date/time.

## 2023-02-09 LAB
BASOPHILS # BLD: 0 K/UL (ref 0–0.2)
BASOPHILS NFR BLD: 0 % (ref 0–2)
DIFFERENTIAL METHOD BLD: ABNORMAL
EOSINOPHIL # BLD: 0 K/UL (ref 0–0.8)
EOSINOPHIL NFR BLD: 0 % (ref 0.5–7.8)
ERYTHROCYTE [DISTWIDTH] IN BLOOD BY AUTOMATED COUNT: 14.1 % (ref 11.9–14.6)
HCT VFR BLD AUTO: 38.2 % (ref 41.1–50.3)
HGB BLD-MCNC: 12.6 G/DL (ref 13.6–17.2)
IMM GRANULOCYTES # BLD AUTO: 0.2 K/UL (ref 0–0.5)
IMM GRANULOCYTES NFR BLD AUTO: 1 % (ref 0–5)
LYMPHOCYTES # BLD: 1 K/UL (ref 0.5–4.6)
LYMPHOCYTES NFR BLD: 5 % (ref 13–44)
MCH RBC QN AUTO: 29.4 PG (ref 26.1–32.9)
MCHC RBC AUTO-ENTMCNC: 33 G/DL (ref 31.4–35)
MCV RBC AUTO: 89.3 FL (ref 82–102)
MONOCYTES # BLD: 0.8 K/UL (ref 0.1–1.3)
MONOCYTES NFR BLD: 4 % (ref 4–12)
NEUTS SEG # BLD: 20.3 K/UL (ref 1.7–8.2)
NEUTS SEG NFR BLD: 90 % (ref 43–78)
NRBC # BLD: 0 K/UL (ref 0–0.2)
PLATELET # BLD AUTO: 240 K/UL (ref 150–450)
PMV BLD AUTO: 10.6 FL (ref 9.4–12.3)
RBC # BLD AUTO: 4.28 M/UL (ref 4.23–5.6)
WBC # BLD AUTO: 22.3 K/UL (ref 4.3–11.1)

## 2023-02-09 PROCEDURE — 94760 N-INVAS EAR/PLS OXIMETRY 1: CPT

## 2023-02-09 PROCEDURE — 6370000000 HC RX 637 (ALT 250 FOR IP): Performed by: FAMILY MEDICINE

## 2023-02-09 PROCEDURE — 36415 COLL VENOUS BLD VENIPUNCTURE: CPT

## 2023-02-09 PROCEDURE — 94761 N-INVAS EAR/PLS OXIMETRY MLT: CPT

## 2023-02-09 PROCEDURE — 1100000000 HC RM PRIVATE

## 2023-02-09 PROCEDURE — 2580000003 HC RX 258: Performed by: FAMILY MEDICINE

## 2023-02-09 PROCEDURE — 85025 COMPLETE CBC W/AUTO DIFF WBC: CPT

## 2023-02-09 PROCEDURE — 6360000002 HC RX W HCPCS: Performed by: FAMILY MEDICINE

## 2023-02-09 PROCEDURE — 94640 AIRWAY INHALATION TREATMENT: CPT

## 2023-02-09 RX ORDER — IPRATROPIUM BROMIDE AND ALBUTEROL SULFATE 2.5; .5 MG/3ML; MG/3ML
1 SOLUTION RESPIRATORY (INHALATION) EVERY 4 HOURS PRN
Status: DISCONTINUED | OUTPATIENT
Start: 2023-02-09 | End: 2023-02-10 | Stop reason: HOSPADM

## 2023-02-09 RX ADMIN — ASPIRIN 81 MG 81 MG: 81 TABLET ORAL at 08:52

## 2023-02-09 RX ADMIN — APIXABAN 5 MG: 5 TABLET, FILM COATED ORAL at 22:44

## 2023-02-09 RX ADMIN — FERROUS SULFATE TAB 325 MG (65 MG ELEMENTAL FE) 325 MG: 325 (65 FE) TAB at 22:44

## 2023-02-09 RX ADMIN — SODIUM CHLORIDE, PRESERVATIVE FREE 10 ML: 5 INJECTION INTRAVENOUS at 22:44

## 2023-02-09 RX ADMIN — DOXYCYCLINE HYCLATE 100 MG: 100 CAPSULE ORAL at 08:52

## 2023-02-09 RX ADMIN — MOMETASONE FUROATE AND FORMOTEROL FUMARATE DIHYDRATE 2 PUFF: 200; 5 AEROSOL RESPIRATORY (INHALATION) at 08:07

## 2023-02-09 RX ADMIN — TIOTROPIUM BROMIDE INHALATION SPRAY 2 PUFF: 3.12 SPRAY, METERED RESPIRATORY (INHALATION) at 08:07

## 2023-02-09 RX ADMIN — METHYLPREDNISOLONE SODIUM SUCCINATE 40 MG: 40 INJECTION, POWDER, FOR SOLUTION INTRAMUSCULAR; INTRAVENOUS at 04:49

## 2023-02-09 RX ADMIN — IPRATROPIUM BROMIDE AND ALBUTEROL SULFATE 1 AMPULE: .5; 3 SOLUTION RESPIRATORY (INHALATION) at 08:07

## 2023-02-09 RX ADMIN — APIXABAN 5 MG: 5 TABLET, FILM COATED ORAL at 08:52

## 2023-02-09 RX ADMIN — METOPROLOL SUCCINATE 100 MG: 100 TABLET, EXTENDED RELEASE ORAL at 04:49

## 2023-02-09 RX ADMIN — METOPROLOL SUCCINATE 100 MG: 100 TABLET, EXTENDED RELEASE ORAL at 18:08

## 2023-02-09 RX ADMIN — ATORVASTATIN CALCIUM 80 MG: 80 TABLET, FILM COATED ORAL at 08:52

## 2023-02-09 RX ADMIN — SODIUM CHLORIDE, PRESERVATIVE FREE 10 ML: 5 INJECTION INTRAVENOUS at 08:53

## 2023-02-09 RX ADMIN — FERROUS SULFATE TAB 325 MG (65 MG ELEMENTAL FE) 325 MG: 325 (65 FE) TAB at 08:52

## 2023-02-09 RX ADMIN — MOMETASONE FUROATE AND FORMOTEROL FUMARATE DIHYDRATE 2 PUFF: 200; 5 AEROSOL RESPIRATORY (INHALATION) at 19:46

## 2023-02-09 RX ADMIN — CYCLOBENZAPRINE 10 MG: 10 TABLET, FILM COATED ORAL at 04:49

## 2023-02-09 RX ADMIN — DOXYCYCLINE HYCLATE 100 MG: 100 CAPSULE ORAL at 22:44

## 2023-02-09 RX ADMIN — PANTOPRAZOLE SODIUM 40 MG: 40 TABLET, DELAYED RELEASE ORAL at 05:57

## 2023-02-09 NOTE — PLAN OF CARE
Problem: Respiratory - Adult  Goal: Achieves optimal ventilation and oxygenation  Outcome: Progressing  Flowsheets (Taken 2/9/2023 2549)  Achieves optimal ventilation and oxygenation:   Assess for changes in respiratory status   Assess for changes in mentation and behavior   Position to facilitate oxygenation and minimize respiratory effort   Respiratory therapy support as indicated   Assess and instruct to report shortness of breath or any respiratory difficulty   Encourage broncho-pulmonary hygiene including cough, deep breathe, incentive spirometry

## 2023-02-09 NOTE — CARE COORDINATION
RNCM met with patient in room 821 to discuss discharge planning. Patient lives alone in a 4th floor apartment with an elevator for entry. Patient has a CLTC aide that assists with ADLs 7days a week for 4 hours per day. Patient uses nocturnal oxygen provided by [employer]. Patient is current with [facility]. Patient uses a quad cane for ambulation, but has a wheelchair if needed. Patient uses public transportation or CLTC aide for transportation to appointments. Demographics and PCP verified. CM following. 02/09/23 1128   Service Assessment   Patient Orientation Alert and Oriented   Cognition Alert   History Provided By Patient   Primary Caregiver Self  (Patient also has CLTC aide)   Support Systems /;Home Care Staff   Patient's Healthcare Decision Maker is: Legal Next of Karla 69   PCP Verified by CM Yes   Last Visit to PCP Within last 3 months   Prior Functional Level Assistance with the following:;Bathing;Dressing; Mobility;Housework   Current Functional Level Independent in ADLs/IADLs; Bathing;Dressing; Mobility;Housework   Can patient return to prior living arrangement Yes   Ability to make needs known: Good   Family able to assist with home care needs: Other (comment)  (CLTC aide)   Would you like for me to discuss the discharge plan with any other family members/significant others, and if so, who? No   Financial Resources Medicaid; Medicare   Community Resources ECF/Home Care   Social/Functional History   Lives With Alone   Type of Home Apartment   Home Layout One level   Home Access Elevator   Home Equipment Leg ; Wheelchair-manual;Wheelchair-electric;Cane, quad   Receives Help From Personal care attendant; Other (comment)  (Home health)   ADL Assistance Independent   Active  No   Patient's [facility] transportation/ CLTC aide   Mode of Transportation Bus   Occupation Retired   Discharge Planning   Type of [facility] Alone   Current Services Prior To Admission Durable Medical Equipment;Home Care;Oxygen Therapy   Current DME Prior to [employer]; Wheelchair;Hospital Bed;Oxygen Therapy (Comment)  (nocturnal oxygen provided by [employer])   Potential Assistance Needed Home Care   DME Ordered? No   Type of Home Care Services Nursing Services; Aide Services;OT;PT   Patient expects to be discharged to: Apartment   One/Two Story Residence One story   History of falls?  1

## 2023-02-09 NOTE — PROGRESS NOTES
Hospitalist Progress Note   Admit Date:  2023 12:14 PM   Name:  Dariana Cabrera   Age:  79 y.o. Sex:  male  :  1955   MRN:  [id number]   Room:  821/    Presenting Complaint: Shortness of Breath     Reason(s) for Admission: COPD exacerbation [facility]) [J44.1]     Hospital Course:   Patient is a 78 y/o male with a PMH of hypertension, hyperlipidemia, CAD, diastolic HF, paroxysmal atrial fibrillation on apixaban, COPD, former smoker (quit 4 months ago), CVA, s/p TAVR, CKD III, wheelchair bound at baseline who presented to the ER d/t worsening SOB associated with recent cough, congestion, SOB, wheezing. [facility] visit  identified active wheezing and advised ER presentation. Patient found in respiratory distress on arrival to the ER, received DuoNeb, Decadron and Magnesium without much improvement. No evidence of hypoxia. Labs unremarkable. Rapid Covid negative. CXR without acute pathology. Hospitalist consulted for admission given respiratory distress. Subjective & 24hr Events (23): No AEO. Afebrile. Patient said he feels that he is improving. Wean to PO steroid tomorrow and hopeful discharge home. The patient said he hopes to be out of his present living situation soon (mold in apartment).     Assessment & Plan:     COPD with acute exacerbation  -Acute respiratory distress on arrival, received DuoNeb, Decadron and magnesium without much improvement, no evidence of hypoxia   -Duo-Neb Q4H PRN  -Wean to PO steroid on Feb/10  -Cont Dulera and Spiriva  -Cont doxycycline     Leukocytosis  -2/2 steroid initiation    HTN / HLD / CAD / dHF  Paroxysmal A-fib / Secondary hypercoagulable state / Chronic anticoagulation   -Continue aspirin, statin, apixaban, metoprolol  -No acute exacerbation of HF     Mood disorder  -Continue escitalopram     Former smoker  -Quit 4 months ago     Hx of CVA  -Cont statin, asa, apixaban      Anticipated discharge needs: Pending      Diet:  ADULT DIET; Regular  DVT PPx: apixaban  Code status: Full Code    Hospital Problems:  Principal Problem:    COPD exacerbation ([facility]  Active Problems:    Acute on chronic diastolic congestive heart failure (HCC)    Secondary hypercoagulable state ([facility]    History of CVA (cerebrovascular accident)    Atrial fibrillation ([facility]    CAD in native artery    Hypertension    COPD (chronic obstructive pulmonary disease) ([place] [address].)  Resolved Problems:    * No resolved hospital problems. *      Objective:   Patient Vitals for the past 24 hrs:   Temp Pulse Resp BP SpO2   02/09/23 1208 98.8 °F (37.1 °C) 73 18 (!) 153/90 --   02/09/23 0807 -- -- 17 -- 98 %   02/09/23 0711 98.1 °F (36.7 °C) 78 20 126/74 96 %   02/09/23 0319 97.9 °F (36.6 °C) 73 18 135/72 90 %   02/08/23 2157 98.2 °F (36.8 °C) 75 22 128/78 92 %   02/08/23 2013 -- 74 16 -- 95 %   02/08/23 1923 98.1 °F (36.7 °C) 70 18 (!) 152/70 92 %   02/08/23 1520 97.9 °F (36.6 °C) 72 16 132/69 94 %       Oxygen Therapy  SpO2: 98 %  Pulse via Oximetry: 71 beats per minute  Pulse Oximeter Device Mode: Continuous  O2 Device: None (Room air)  Blood Gas  Performed?: Yes  Eddie's Test #1: Other (Comment)  Site #1: Vein    Estimated body mass index is 31.41 kg/m² as calculated from the following:    Height as of this encounter: 6' 1\" (1.854 m). Weight as of this encounter: 238 lb 1.6 oz (108 kg). Intake/Output Summary (Last 24 hours) at 2/9/2023 1332  Last data filed at 2/9/2023 1208  Gross per 24 hour   Intake 420 ml   Output 2175 ml   Net -1755 ml         Physical Exam:   Blood pressure (!) 153/90, pulse 73, temperature 98.8 °F (37.1 °C), temperature source Oral, resp. rate 18, height 6' 1\" (1.854 m), weight 238 lb 1.6 oz (108 kg), SpO2 98 %. General:    NAD   Head:  Normocephalic, atraumatic  Eyes:  Sclerae appear normal.  Pupils equally round. ENT:  Nares appear normal.  Moist oral mucosa  Neck:  No restricted ROM. Trachea midline   CV:   RRR. No m/r/g. Lungs:   Symmetric expansion. Unlabored at rest.  Abdomen:   Soft, nontender, nondistended. Extremities: No cyanosis or clubbing. Skin:     No rashes and normal coloration. Warm and dry. Neuro:  CN II-XII grossly intact. Psych:  Normal mood and affect.       I have personally reviewed labs and tests:  Recent Labs:  Recent Results (from the past 48 hour(s))   Lactate, Sepsis    Collection Time: 02/07/23  3:23 PM   Result Value Ref Range    Lactic Acid, Sepsis 1.6 0.4 - 2.0 MMOL/L   Basic Metabolic Panel w/ Reflex to MG    Collection Time: 02/08/23  2:50 AM   Result Value Ref Range    Sodium 139 133 - 143 mmol/L    Potassium 4.0 3.5 - 5.1 mmol/L    Chloride 109 101 - 110 mmol/L    CO2 24 21 - 32 mmol/L    Anion Gap 6 2 - 11 mmol/L    Glucose 168 (H) 65 - 100 mg/dL    BUN 12 8 - 23 MG/DL    Creatinine 1.20 0.8 - 1.5 MG/DL    Est, Glom Filt Rate >60 >60 ml/min/1.73m2    Calcium 9.1 8.3 - 10.4 MG/DL   CBC with Auto Differential    Collection Time: 02/08/23  2:50 AM   Result Value Ref Range    WBC 10.1 4.3 - 11.1 K/uL    RBC 4.34 4.23 - 5.6 M/uL    Hemoglobin 12.6 (L) 13.6 - 17.2 g/dL    Hematocrit 38.4 (L) 41.1 - 50.3 %    MCV 88.5 82 - 102 FL    MCH 29.0 26.1 - 32.9 PG    MCHC 32.8 31.4 - 35.0 g/dL    RDW 13.7 11.9 - 14.6 %    Platelets 635 537 - 912 K/uL    MPV 9.7 9.4 - 12.3 FL    nRBC 0.00 0.0 - 0.2 K/uL    Differential Type AUTOMATED      Seg Neutrophils 90 (H) 43 - 78 %    Lymphocytes 9 (L) 13 - 44 %    Monocytes 1 (L) 4.0 - 12.0 %    Eosinophils % 0 (L) 0.5 - 7.8 %    Basophils 0 0.0 - 2.0 %    Immature Granulocytes 0 0.0 - 5.0 %    Segs Absolute 9.0 (H) 1.7 - 8.2 K/UL    Absolute Lymph # 0.9 0.5 - 4.6 K/UL    Absolute Mono # 0.1 0.1 - 1.3 K/UL    Absolute Eos # 0.0 0.0 - 0.8 K/UL    Basophils Absolute 0.0 0.0 - 0.2 K/UL    Absolute Immature Granulocyte 0.0 0.0 - 0.5 K/UL   CBC with Auto Differential    Collection Time: 02/09/23  4:13 AM   Result Value Ref Range    WBC 22.3 (H) 4.3 - 11.1 K/uL    RBC 4.28 4.23 - 5.6 M/uL Hemoglobin 12.6 (L) 13.6 - 17.2 g/dL    Hematocrit 38.2 (L) 41.1 - 50.3 %    MCV 89.3 82 - 102 FL    MCH 29.4 26.1 - 32.9 PG    MCHC 33.0 31.4 - 35.0 g/dL    RDW 14.1 11.9 - 14.6 %    Platelets 397 416 - 928 K/uL    MPV 10.6 9.4 - 12.3 FL    nRBC 0.00 0.0 - 0.2 K/uL    Differential Type AUTOMATED      Seg Neutrophils 90 (H) 43 - 78 %    Lymphocytes 5 (L) 13 - 44 %    Monocytes 4 4.0 - 12.0 %    Eosinophils % 0 (L) 0.5 - 7.8 %    Basophils 0 0.0 - 2.0 %    Immature Granulocytes 1 0.0 - 5.0 %    Segs Absolute 20.3 (H) 1.7 - 8.2 K/UL    Absolute Lymph # 1.0 0.5 - 4.6 K/UL    Absolute Mono # 0.8 0.1 - 1.3 K/UL    Absolute Eos # 0.0 0.0 - 0.8 K/UL    Basophils Absolute 0.0 0.0 - 0.2 K/UL    Absolute Immature Granulocyte 0.2 0.0 - 0.5 K/UL       I have personally reviewed imaging studies:  Other Studies:  XR CHEST PORTABLE   Final Result   No radiographic evidence of acute cardiopulmonary disease.              Current Meds:  Current Facility-Administered Medications   Medication Dose Route Frequency    apixaban (ELIQUIS) tablet 5 mg  5 mg Oral BID    aspirin chewable tablet 81 mg  81 mg Oral Daily    atorvastatin (LIPITOR) tablet 80 mg  80 mg Oral Daily    cyclobenzaprine (FLEXERIL) tablet 10 mg  10 mg Oral TID PRN    escitalopram (LEXAPRO) tablet 10 mg  10 mg Oral Daily    ferrous sulfate (IRON 325) tablet 325 mg  325 mg Oral BID    mometasone-formoterol (DULERA) 200-5 MCG/ACT inhaler 2 puff  2 puff Inhalation BID    metoprolol succinate (TOPROL XL) extended release tablet 100 mg  100 mg Oral Q12H    pantoprazole (PROTONIX) tablet 40 mg  40 mg Oral QAM AC    tiotropium (SPIRIVA RESPIMAT) 2.5 MCG/ACT inhaler 2 puff  2 puff Inhalation Daily    sodium chloride flush 0.9 % injection 5-40 mL  5-40 mL IntraVENous 2 times per day    sodium chloride flush 0.9 % injection 5-40 mL  5-40 mL IntraVENous PRN    0.9 % sodium chloride infusion   IntraVENous PRN    ondansetron (ZOFRAN-ODT) disintegrating tablet 4 mg  4 mg Oral Q8H PRN Or    ondansetron (ZOFRAN) injection 4 mg  4 mg IntraVENous Q6H PRN    polyethylene glycol (GLYCOLAX) packet 17 g  17 g Oral Daily PRN    acetaminophen (TYLENOL) tablet 650 mg  650 mg Oral Q6H PRN    Or    acetaminophen (TYLENOL) suppository 650 mg  650 mg Rectal Q6H PRN    ipratropium-albuterol (DUONEB) nebulizer solution 1 ampule  1 ampule Inhalation Q4H WA    methylPREDNISolone sodium (SOLU-MEDROL) injection 40 mg  40 mg IntraVENous Q6H    Followed by    Kenny Meadows ON 2/10/2023] predniSONE (DELTASONE) tablet 40 mg  40 mg Oral Daily    doxycycline hyclate (VIBRAMYCIN) capsule 100 mg  100 mg Oral 2 times per day       Signed:  GEORGE Frankel - CNP    Part of this note may have been written by using a voice dictation software. The note has been proof read but may still contain some grammatical/other typographical errors.

## 2023-02-09 NOTE — PROGRESS NOTES
Pt has arrived to the floor. Pt is alert and oriented times 4. Pt is on room air with even and unlabored respirations. Pt has no complaints of pain or discomfort at this time.

## 2023-02-10 VITALS
DIASTOLIC BLOOD PRESSURE: 97 MMHG | HEIGHT: 73 IN | HEART RATE: 70 BPM | RESPIRATION RATE: 20 BRPM | SYSTOLIC BLOOD PRESSURE: 155 MMHG | OXYGEN SATURATION: 97 % | WEIGHT: 238.1 LBS | BODY MASS INDEX: 31.56 KG/M2 | TEMPERATURE: 98.2 F

## 2023-02-10 PROCEDURE — 2580000003 HC RX 258: Performed by: FAMILY MEDICINE

## 2023-02-10 PROCEDURE — 94640 AIRWAY INHALATION TREATMENT: CPT

## 2023-02-10 PROCEDURE — 6370000000 HC RX 637 (ALT 250 FOR IP): Performed by: FAMILY MEDICINE

## 2023-02-10 RX ORDER — DOXYCYCLINE HYCLATE 100 MG/1
100 CAPSULE ORAL EVERY 12 HOURS SCHEDULED
Qty: 5 CAPSULE | Refills: 0 | Status: SHIPPED | OUTPATIENT
Start: 2023-02-10 | End: 2023-02-13

## 2023-02-10 RX ORDER — PREDNISONE 10 MG/1
TABLET ORAL
Qty: 14 TABLET | Refills: 0 | Status: SHIPPED | OUTPATIENT
Start: 2023-02-10 | End: 2023-02-16

## 2023-02-10 RX ADMIN — SODIUM CHLORIDE, PRESERVATIVE FREE 5 ML: 5 INJECTION INTRAVENOUS at 08:40

## 2023-02-10 RX ADMIN — ESCITALOPRAM OXALATE 10 MG: 10 TABLET, FILM COATED ORAL at 08:39

## 2023-02-10 RX ADMIN — APIXABAN 5 MG: 5 TABLET, FILM COATED ORAL at 08:39

## 2023-02-10 RX ADMIN — METOPROLOL SUCCINATE 100 MG: 100 TABLET, EXTENDED RELEASE ORAL at 06:27

## 2023-02-10 RX ADMIN — PANTOPRAZOLE SODIUM 40 MG: 40 TABLET, DELAYED RELEASE ORAL at 06:27

## 2023-02-10 RX ADMIN — PREDNISONE 40 MG: 20 TABLET ORAL at 08:39

## 2023-02-10 RX ADMIN — ASPIRIN 81 MG 81 MG: 81 TABLET ORAL at 08:39

## 2023-02-10 RX ADMIN — DOXYCYCLINE HYCLATE 100 MG: 100 CAPSULE ORAL at 08:39

## 2023-02-10 RX ADMIN — FERROUS SULFATE TAB 325 MG (65 MG ELEMENTAL FE) 325 MG: 325 (65 FE) TAB at 08:39

## 2023-02-10 RX ADMIN — ATORVASTATIN CALCIUM 80 MG: 80 TABLET, FILM COATED ORAL at 08:39

## 2023-02-10 RX ADMIN — TIOTROPIUM BROMIDE INHALATION SPRAY 2 PUFF: 3.12 SPRAY, METERED RESPIRATORY (INHALATION) at 08:10

## 2023-02-10 RX ADMIN — MOMETASONE FUROATE AND FORMOTEROL FUMARATE DIHYDRATE 2 PUFF: 200; 5 AEROSOL RESPIRATORY (INHALATION) at 08:09

## 2023-02-10 NOTE — DISCHARGE SUMMARY
Hospitalist Discharge Summary   Admit Date:  2023 12:14 PM   DC Note date: 2/10/2023  Name:  Ruy Biswas   Age:  79 y.o. Sex:  male  :  1955   MRN:  [id number]   Room:  [facility]  PCP:  William Pastor MD    Presenting Complaint: Shortness of Breath     Initial Admission Diagnosis: COPD exacerbation ([address] [J44.1]     Problem List for this Hospitalization (present on admission):    Principal Problem:    COPD exacerbation ([address]  Active Problems:    Acute on chronic diastolic congestive heart failure ([address]    Secondary hypercoagulable state ([address]    History of CVA (cerebrovascular accident)    Atrial fibrillation ([address]    CAD in native artery    Hypertension    COPD (chronic obstructive pulmonary disease) ([address]  Resolved Problems:    * No resolved hospital problems. [facility] Course:  Mr. Marnie Munson is a 78 y/o male with a PMH of hypertension, hyperlipidemia, CAD, diastolic HF, paroxysmal atrial fibrillation on apixaban, COPD, former smoker (quit 4 months ago), CVA, s/p TAVR, CKD III, wheelchair bound at baseline who presented to the ER d/t worsening SOB associated with recent cough, congestion, SOB, wheezing. [facility] visit  identified active wheezing and advised ER presentation. Patient found in respiratory distress on arrival to the ER, received DuoNeb, Decadron and Magnesium without much improvement. No evidence of hypoxia. Labs unremarkable. Rapid Covid negative. CXR without acute pathology. Hospitalist consulted for admission given respiratory distress. He improved w/ steroid and abx. He attributes some of his respiratory issues to the mold in his living quarters. He is actively working on moving out. He stated that he will follow up with Dr. Tae Patrick, Pulmonologist on  and that he has an upcoming polysomnography as well. Since he feels much better we will discharge him to home on Feb/10 w/ Rx for short steroid taper and abx.   He has been very compliant while inpatient and he said he will be keeping his follow up appointments. HH may be resumed. A&P  COPD with acute exacerbation  -Acute respiratory distress on arrival, received DuoNeb, Decadron and magnesium without much improvement, no evidence of hypoxia   -Nebulized treatments  -Steroid taper  -Complete doxycycline course  -Follow up with your Pulmonologist on Feb/23     Leukocytosis  -2/2 steroid initiation     HTN / HLD / CAD / dHF  Paroxysmal A-fib / Secondary hypercoagulable state / Chronic anticoagulation   -Continue aspirin, statin, apixaban, metoprolol  -No acute exacerbation of HF     Mood disorder  -Continue escitalopram     Former smoker  -Quit 4 months ago     Hx of CVA  -Cont statin, asa, apixaban      Disposition: Home  Diet: ADULT DIET; Regular  Code Status: Full Code    Follow Ups:   Follow-up Information     Marjorie Rawls MD Follow up on 2/23/2023. Specialty: Pulmonary Disease  Why: patient thinks his follow up with Dr. Emmanuel Alejandre is 2/23; confirm  Contact information:  [address]  [facility] [address] [facility] [address] [facility]             Yariel Bates MD Follow up in 3 week(s). Specialty: Internal Medicine  Why: As needed  Contact information:  [address]  [facility] [address] [place] [address]                       Time spent in patient discharge and coordination 35 minutes. Plan was discussed with the patient. All questions answered. Patient was stable at time of discharge. Instructions given to call a physician or return if any concerns. Current Discharge Medication List        START taking these medications    Details   doxycycline hyclate (VIBRAMYCIN) 100 MG capsule Take 1 capsule by mouth every 12 hours for 5 doses  Qty: 5 capsule, Refills: 0           CONTINUE these medications which have CHANGED    Details   predniSONE (DELTASONE) 10 MG tablet Take 4 tablets by mouth daily for 2 days, THEN 2 tablets daily for 2 days, THEN 1 tablet daily for 2 days.   Qty: 14 tablet, Refills: 0 CONTINUE these medications which have NOT CHANGED    Details   cyclobenzaprine (FLEXERIL) 10 MG tablet Take 1 tablet by mouth 3 times daily as needed for Muscle spasms  Qty: 30 tablet, Refills: 0    Associated Diagnoses: Back pain, unspecified back location, unspecified back pain laterality, unspecified chronicity      metoprolol succinate (TOPROL XL) 100 MG extended release tablet Take 1 tablet by mouth in the morning and 1 tablet in the evening.   Qty: 90 tablet, Refills: 0    Associated Diagnoses: Hypertension, unspecified type      apixaban (ELIQUIS) 5 MG TABS tablet Take 1 tablet by mouth 2 times daily  Qty: 60 tablet, Refills: 0      atorvastatin (LIPITOR) 80 MG tablet Take 80 mg by mouth at bedtime      tiotropium (SPIRIVA RESPIMAT) 2.5 MCG/ACT AERS inhaler Inhale 2 puffs into the lungs daily      ferrous sulfate (IRON 325) 325 (65 Fe) MG tablet Take 325 mg by mouth 2 times daily      ondansetron (ZOFRAN-ODT) 8 MG TBDP disintegrating tablet Take 1 tablet by mouth every 8 hours as needed for Nausea  Qty: 30 tablet, Refills: 0      Cholecalciferol (VITAMIN D3) 50 MCG (2000 UT) CAPS TAKE 1 CAPSULE BY MOUTH EVERY DAY  Qty: 90 capsule, Refills: 1    Associated Diagnoses: Vitamin D deficiency, unspecified      omeprazole (PRILOSEC) 20 MG delayed release capsule Take 1 capsule by mouth in the morning and at bedtime  Qty: 30 capsule, Refills: 3      albuterol sulfate  (90 Base) MCG/ACT inhaler Inhale 2 puffs into the lungs every 4 hours as needed      aspirin 81 MG chewable tablet Take 81 mg by mouth daily      fluticasone-salmeterol (ADVAIR) 250-50 MCG/ACT AEPB diskus inhaler Inhale 1 puff into the lungs every 12 hours      ipratropium-albuterol (DUONEB) 0.5-2.5 (3) MG/3ML SOLN nebulizer solution Inhale 3 mLs into the lungs every 4 hours as needed            STOP taking these medications       escitalopram (LEXAPRO) 10 MG tablet Comments:   Reason for Stopping:         apixaban (ELIQUIS) 5 MG TABS tablet Comments:   Reason for Stopping:               Some medications may have been reported old/obsolete and marked \"stop taking\" by the system; in reality pt was already off these meds; defer to outpatient or prescribing providers. Procedures done this admission:  * No surgery found *    Consults this admission:  None    Echocardiogram results:  10/11/22    TRANSTHORACIC ECHOCARDIOGRAM (TTE) COMPLETE (CONTRAST/BUBBLE/3D PRN) 10/12/2022 12:20 PM, 10/12/2022 12:00 AM (Final)    Interpretation Summary    Left Ventricle: Normal left ventricular systolic function with a visually estimated EF of 55 - 60%. Left ventricle size is normal. Normal wall thickness. Apical Septal motion is consistent with pacing. Normal wall motion. Diastolic function indeterminate in the setting of atrial fibrillation. Average E/e' ratio is 31.04. Aortic Valve: Not well visualized. Appropriately positioned transcatheter bioprosthetic valve that is well-seated. No regurgitation. Unable to assess stenosis. Elevated prosthetic gradient. AV mean gradient is 30 mmHg. AV peak gradient is 47 mmHg. Mitral Valve: Mildly calcified leaflet, at the anterior and posterior leaflets- tips. ? Rheumatic etiology Mild annular calcification of the mitral valve. Moderate stenosis noted. MV mean gradient is 9 mmHg\at a heart rate of 70 bpm.    Left Atrium: Left atrium is moderately dilated. Interatrial Septum: Agitated saline study was negative with and without provocation. Contrast used: Definity.    -No obvious cardiac source for emboli noted. -Underlying atrial fibrillation s/p AV ale ablation and permanent pacemaker placement-ventricular paced rhythm.  -Negative bubble study for intracardiac shunt.  -Bioprosthetic aortic valve is not well visualized although gradients are elevated. Elevated mitral valve gradients were also noted-consider GODFREY if clinically indicated. Signed by:  Guille Carrizales MD on 10/12/2022 12:20 PM, Signed by: Unknown Provider Result on 10/12/2022 12:00 AM      Diagnostic Imaging/Tests:   XR CHEST PORTABLE    Result Date: 2/7/2023  No radiographic evidence of acute cardiopulmonary disease.         Labs: Results:       BMP, Mg, Phos Recent Labs     02/07/23  1247 02/08/23  0250    139   K 4.2 4.0    109   CO2 25 24   ANIONGAP 5 6   BUN 9 12   CREATININE 1.10 1.20   LABGLOM >60 >60   CALCIUM 9.4 9.1   GLUCOSE 95 168*      CBC Recent Labs     02/07/23  1247 02/08/23  0250 02/09/23  0413   WBC 8.1 10.1 22.3*   RBC 5.21 4.34 4.28   HGB 15.1 12.6* 12.6*   HCT 46.9 38.4* 38.2*   MCV 90.0 88.5 89.3   MCH 29.0 29.0 29.4   MCHC 32.2 32.8 33.0   RDW 14.0 13.7 14.1    198 240   MPV 9.2* 9.7 10.6   NRBC 0.00 0.00 0.00   SEGS 59 90* 90*   LYMPHOPCT 27 9* 5*   EOSRELPCT 3 0* 0*   MONOPCT 10 1* 4   BASOPCT 1 0 0   IMMGRAN 0 0 1   SEGSABS 4.8 9.0* 20.3*   LYMPHSABS 2.2 0.9 1.0   EOSABS 0.2 0.0 0.0   MONOSABS 0.8 0.1 0.8   BASOSABS 0.1 0.0 0.0   ABSIMMGRAN 0.0 0.0 0.2      LFT Recent Labs     02/07/23  1247   BILITOT 0.5   ALKPHOS 128   AST 22   ALT 32   PROT 7.9   LABALBU 3.7   GLOB 4.2      Cardiac  Lab Results   Component Value Date/Time    NTPROBNP 592 07/11/2022 02:24 PM    NTPROBNP 413 06/21/2022 09:21 PM    NTPROBNP 978 06/06/2022 04:06 AM    TROPHS 10.7 02/07/2023 12:47 PM    TROPHS 13.5 12/19/2022 07:58 PM    TROPHS 14.3 10/11/2022 07:58 PM      Coags Lab Results   Component Value Date/Time    PROTIME 13.2 12/19/2022 07:58 PM    PROTIME 11.4 10/11/2022 07:58 PM    PROTIME 13.4 02/14/2022 09:05 AM    INR 1.0 12/19/2022 07:58 PM    INR 0.8 10/11/2022 07:58 PM    INR 1.0 02/14/2022 09:05 AM      A1c Lab Results   Component Value Date/Time    LABA1C 5.8 01/30/2023 01:36 PM    LABA1C 5.9 10/12/2022 05:49 AM    LABA1C 5.8 09/22/2022 10:35 AM     01/30/2023 01:36 PM     10/12/2022 05:49 AM     09/22/2022 10:35 AM      Lipids Lab Results   Component Value Date/Time    CHOL 174 01/30/2023 01:36 PM LDLCALC 111.4 01/30/2023 01:36 PM    LABVLDL 24.6 01/30/2023 01:36 PM    HDL 38 01/30/2023 01:36 PM    CHOLHDLRATIO 4.6 01/30/2023 01:36 PM    TRIG 123 01/30/2023 01:36 PM      Thyroid  Lab Results   Component Value Date/Time    FHY8JGG 1.640 12/19/2022 07:58 PM        Most Recent UA Lab Results   Component Value Date/Time    COLORU YELLOW/STRAW 12/20/2022 01:35 AM    APPEARANCE CLEAR 12/20/2022 01:35 AM    SPECGRAV 1.022 12/20/2022 01:35 AM    LABPH 5.0 12/20/2022 01:35 AM    PROTEINU Negative 12/20/2022 01:35 AM    GLUCOSEU Negative 12/20/2022 01:35 AM    KETUA Negative 12/20/2022 01:35 AM    BILIRUBINUR Negative 12/20/2022 01:35 AM    BLOODU Negative 12/20/2022 01:35 AM    UROBILINOGEN 0.2 12/20/2022 01:35 AM    NITRU Negative 12/20/2022 01:35 AM    LEUKOCYTESUR Negative 12/20/2022 01:35 AM        No results for input(s): CULTURE in the last 720 hours. All Labs from Last 24 Hrs:  No results found for this or any previous visit (from the past 24 hour(s)).     Allergies   Allergen Reactions    Carbamazepine Anaphylaxis    Lacosamide Nausea Only    Lorazepam Other (See Comments)     Violent behavior    Nitroglycerin Other (See Comments)     hypotension     Immunization History   Administered Date(s) Administered    COVID-19, PFIZER PURPLE top, DILUTE for use, (age 15 y+), 30mcg/0.3mL 03/15/2021, 04/12/2021    Influenza Virus Vaccine 10/01/2010, 10/07/2011, 01/01/2012, 01/03/2014, 11/29/2016, 11/30/2017, 12/06/2018, 09/20/2019, 10/01/2021    Influenza, FLUZONE (age 72 y+), High Dose, 0.7mL 10/01/2021    PPD Test 12/04/2021, 12/20/2022    Pneumococcal Vaccine 01/01/2008    Rotavirus Vaccine 01/01/2008       Recent Vital Data:  Patient Vitals for the past 24 hrs:   Temp Pulse Resp BP SpO2   02/10/23 0627 -- 74 -- (!) 151/83 --   02/10/23 0439 97.9 °F (36.6 °C) 71 19 (!) 143/87 95 %   02/09/23 2321 98 °F (36.7 °C) 71 21 130/72 96 %   02/09/23 1948 -- 75 20 -- 95 %   02/09/23 1923 98.1 °F (36.7 °C) 79 25 132/76 95 % 02/09/23 1527 98.2 °F (36.8 °C) 75 28 (!) 146/78 94 %   02/09/23 1208 98.8 °F (37.1 °C) 73 18 (!) 153/90 --   02/09/23 0807 -- -- 17 -- 98 %       Oxygen Therapy  SpO2: 95 %  Pulse via Oximetry: 71 beats per minute  Pulse Oximeter Device Mode: Continuous  O2 Device: None (Room air)  Blood Gas  Performed?: Yes  Eddei's Test #1: Other (Comment)  Site #1: Vein    Estimated body mass index is 31.41 kg/m² as calculated from the following:    Height as of this encounter: 6' 1\" (1.854 m). Weight as of this encounter: 238 lb 1.6 oz (108 kg). Intake/Output Summary (Last 24 hours) at 2/10/2023 0741  Last data filed at 2/10/2023 0456  Gross per 24 hour   Intake 430 ml   Output 1800 ml   Net -1370 ml         Physical Exam:  General:    No overt distress  Head:  Normocephalic, atraumatic  Eyes:  Sclerae appear normal.  Pupils equally round. HENT:  Nares appear normal, no drainage. Moist mucous membranes  Neck:  No restricted ROM. Trachea midline  CV:   RRR. No m/r/g. Lungs:   Symmetric expansion. Respirations even, unlabored on room air. Abdomen:   Soft, nontender, nondistended. Extremities: Warm and dry. No cyanosis or clubbing. Skin:     No rashes. Normal coloration  Neuro:  CN II-XII grossly intact. Psych:  Normal mood and affect. Signed:  GEORGE Anthony CNP    Part of this note may have been written by using a voice dictation software. The note has been proof read but may still contain some grammatical/other typographical errors.

## 2023-02-10 NOTE — CARE COORDINATION
Patient with discharge orders for today. PT/OT recommending home health. Referral made to [facility] to resume services. Patient requesting ride home. Roundtrip to be provided. No other needs made known to CM. Patient has met all treatment goals and milestones for discharge. CM following until patient is discharged. 02/10/23 0909   Service Assessment   Patient Orientation Alert and [facility] Discharge   Transition of Care Consult (CM Consult) Discharge [facility]90 Discharge Home Health;Transport   [place] Resource Information Provided? No   Mode of Transport at Discharge Other (see comment)  (Round trip)   Confirm Follow Up Transport Other (see comment)  (Round trip)   Condition of Participation: Discharge Planning   The Plan for Transition of Care is related to the following treatment goals: Return to baseline with [facility] aide and home health services   The Patient and/or Patient Representative was provided with a Choice of Provider? Patient   The Patient and/Or Patient Representative agree with the Discharge Plan? Yes   Freedom of Choice list was provided with basic dialogue that supports the patient's individualized plan of care/goals, treatment preferences, and shares the quality data associated with the providers?   Yes

## 2023-02-10 NOTE — PROGRESS NOTES
Patient resting in bed on room air. Respirations present and unlabored. A&Ox4. No needs at this time. Call bell within reach and encouraged to call with needs.

## 2023-02-13 ENCOUNTER — CARE COORDINATION (OUTPATIENT)
Dept: CARE COORDINATION | Facility: CLINIC | Age: 68
End: 2023-02-13

## 2023-02-13 ENCOUNTER — TELEPHONE (OUTPATIENT)
Dept: INTERNAL MEDICINE CLINIC | Facility: CLINIC | Age: 68
End: 2023-02-13

## 2023-02-13 ENCOUNTER — OFFICE VISIT (OUTPATIENT)
Dept: INTERNAL MEDICINE CLINIC | Facility: CLINIC | Age: 68
End: 2023-02-13

## 2023-02-13 VITALS
OXYGEN SATURATION: 97 % | SYSTOLIC BLOOD PRESSURE: 132 MMHG | DIASTOLIC BLOOD PRESSURE: 86 MMHG | BODY MASS INDEX: 31.54 KG/M2 | HEIGHT: 73 IN | WEIGHT: 238 LBS | RESPIRATION RATE: 16 BRPM

## 2023-02-13 DIAGNOSIS — Z09 HOSPITAL DISCHARGE FOLLOW-UP: Primary | ICD-10-CM

## 2023-02-13 DIAGNOSIS — M54.9 BACK PAIN, UNSPECIFIED BACK LOCATION, UNSPECIFIED BACK PAIN LATERALITY, UNSPECIFIED CHRONICITY: ICD-10-CM

## 2023-02-13 RX ORDER — CYCLOBENZAPRINE HCL 10 MG
10 TABLET ORAL 3 TIMES DAILY PRN
Qty: 30 TABLET | Refills: 0 | Status: SHIPPED | OUTPATIENT
Start: 2023-02-13

## 2023-02-13 SDOH — ECONOMIC STABILITY: INCOME INSECURITY: HOW HARD IS IT FOR YOU TO PAY FOR THE VERY BASICS LIKE FOOD, HOUSING, MEDICAL CARE, AND HEATING?: SOMEWHAT HARD

## 2023-02-13 SDOH — ECONOMIC STABILITY: FOOD INSECURITY: WITHIN THE PAST 12 MONTHS, THE FOOD YOU BOUGHT JUST DIDN'T LAST AND YOU DIDN'T HAVE MONEY TO GET MORE.: NEVER TRUE

## 2023-02-13 SDOH — ECONOMIC STABILITY: HOUSING INSECURITY
IN THE LAST 12 MONTHS, WAS THERE A TIME WHEN YOU DID NOT HAVE A STEADY PLACE TO SLEEP OR SLEPT IN A SHELTER (INCLUDING NOW)?: NO

## 2023-02-13 SDOH — ECONOMIC STABILITY: FOOD INSECURITY: WITHIN THE PAST 12 MONTHS, YOU WORRIED THAT YOUR FOOD WOULD RUN OUT BEFORE YOU GOT MONEY TO BUY MORE.: NEVER TRUE

## 2023-02-13 ASSESSMENT — PATIENT HEALTH QUESTIONNAIRE - PHQ9
SUM OF ALL RESPONSES TO PHQ QUESTIONS 1-9: 0
6. FEELING BAD ABOUT YOURSELF - OR THAT YOU ARE A FAILURE OR HAVE LET YOURSELF OR YOUR FAMILY DOWN: 0
9. THOUGHTS THAT YOU WOULD BE BETTER OFF DEAD, OR OF HURTING YOURSELF: 0
8. MOVING OR SPEAKING SO SLOWLY THAT OTHER PEOPLE COULD HAVE NOTICED. OR THE OPPOSITE, BEING SO FIGETY OR RESTLESS THAT YOU HAVE BEEN MOVING AROUND A LOT MORE THAN USUAL: 0
7. TROUBLE CONCENTRATING ON THINGS, SUCH AS READING THE NEWSPAPER OR WATCHING TELEVISION: 0
10. IF YOU CHECKED OFF ANY PROBLEMS, HOW DIFFICULT HAVE THESE PROBLEMS MADE IT FOR YOU TO DO YOUR WORK, TAKE CARE OF THINGS AT HOME, OR GET ALONG WITH OTHER PEOPLE: 0
SUM OF ALL RESPONSES TO PHQ QUESTIONS 1-9: 0
2. FEELING DOWN, DEPRESSED OR HOPELESS: 0
1. LITTLE INTEREST OR PLEASURE IN DOING THINGS: 0
SUM OF ALL RESPONSES TO PHQ9 QUESTIONS 1 & 2: 0
5. POOR APPETITE OR OVEREATING: 0
3. TROUBLE FALLING OR STAYING ASLEEP: 0
4. FEELING TIRED OR HAVING LITTLE ENERGY: 0

## 2023-02-13 ASSESSMENT — COPD QUESTIONNAIRES: COPD: 1

## 2023-02-13 NOTE — TELEPHONE ENCOUNTER
Per DR Arline Hamilton with Home Health [phone] given verbal order to resume OT,PT,speach and nursing care for pt.

## 2023-02-13 NOTE — PROGRESS NOTES
FOLLOW UP VISIT    Subjective:    Ashley Chen (: 1955) is a 79 y.o., male,   Chief Complaint   Patient presents with    Follow-Up from Hospital    COPD       HPI:  Date of Admission: 23  Date of Discharge : 2/10/23    79year old male with a hx of COPD went to the ER for sob and wheezing. He quit smoking 4 months ago. He had worse sob and breathing. Home health had called our office on 23 and we advised him to go to ER where he was in respiratory distress and wheezing. He got duonebs, magnesium and decadron. COVID was negative and CXR was clear. He was admitted for COPD exacerbation. He felt better with steroid and antibiotics. He does had an apt with Dr. Sarita Melendez on the  of this month and is getting a sleep study he was d/c on steroids and duonebs. He had elevated wbc due to steroids.   He is to resume home health               COPD        The following portions of the patient's history were reviewed and updated as appropriate:      Past Medical History:   Diagnosis Date    Aortic stenosis     Aortic valve replacement 2018    CAD (coronary artery disease)     PCI in ,  had MI and then stents    Cancer ([address]     SCC removed face     CHF (congestive heart failure) ([address]     Chronic renal disease, stage III ([address]     sees neph and does not have actual diagnosis at this time    COPD (chronic obstructive pulmonary disease) ([address]     fibrosis in lungs    Dyslipidemia     Heart failure ([address]     CHF per patient    HTN (hypertension)     med controlled    Hyperlipidemia     Ischemic cardiomyopathy     Pacemaker     only pacemaker    Paroxysmal atrial fibrillation ([address]     pradaxa for this    Pulmonary fibrosis ([address]     Seizure disorder ([address]     lyme disease - small lesion frontal part of brain - no maintenance meds - last seizure 2 months ago, due to fall - before that it was nine years    Systolic heart failure [facility])        Past Surgical History:   Procedure Laterality Date ABLATION OF DYSRHYTHMIC FOCUS      AORTIC VALVE REPLACEMENT  2018    BACK SURGERY      discectomy    CARDIAC CATHETERIZATION      PCI , 2015    CARDIAC PROCEDURE N/A 2022    LEFT HEART CATH / CORONARY ANGIOGRAPHY performed by Maira Mathews MD at [address] [place] [address] CATH LAB    CERVICAL FUSION      C3-4 fusion    EGD  2022         HIP ARTHROPLASTY Left     SPINAL CORD STIMULATOR SURGERY      lower back     UPPER GASTROINTESTINAL ENDOSCOPY N/A 2022    EGD ESOPHAGOGASTRODUODENOSCOPY/ PT HAS PACEMAKER performed by Slava Benítez MD at [facility] ENDOSCOPY       Family History   Problem Relation Age of Onset    Heart Disease Mother     Heart Disease Sister        Social History     Socioeconomic History    Marital status: Legally      Spouse name: Not on file    Number of children: Not on file    Years of education: Not on file    Highest education level: Not on file   Occupational History    Not on file   Tobacco Use    Smoking status: Former     Packs/day: 1.50     Years: 56.00     Pack years: 84.00     Types: Cigarettes     Quit date: 2022     Years since quittin.4    Smokeless tobacco: Never    Tobacco comments:     Stopped 2022   Vaping Use    Vaping Use: Never used   Substance and Sexual Activity    Alcohol use: Not Currently     Comment: about 1-2 x per year    Drug use: Yes     Types: Marijuana [place])     Comment: cannabis used: two weeks ago as of 6/15/2022 edibles     Sexual activity: Not on file   Other Topics Concern    Not on file   Social History Narrative    Not on file     Social Determinants of Health     Financial Resource Strain: Medium Risk    Difficulty of Paying Living Expenses: Somewhat hard   Food Insecurity: No Food Insecurity    Worried About Running Out of Food in the Last Year: Never true    [address] in the Last Year: Never true   Transportation Needs: Unknown    Lack of Transportation (Medical): Not on file    Lack of Transportation (Non-Medical):  No Physical Activity: Not on file   Stress: Not on file   Social Connections: Not on file   Intimate Partner Violence: Not on file   Housing Stability: Unknown    Unable to Pay for Housing in the Last Year: Not on file    Number of Places Lived in the Last Year: Not on file    Unstable Housing in the Last Year: No       Current Outpatient Medications   Medication Sig Dispense Refill    cyclobenzaprine (FLEXERIL) 10 MG tablet Take 1 tablet by mouth 3 times daily as needed for Muscle spasms 30 tablet 0    doxycycline hyclate (VIBRAMYCIN) 100 MG capsule Take 1 capsule by mouth every 12 hours for 5 doses 5 capsule 0    predniSONE (DELTASONE) 10 MG tablet Take 4 tablets by mouth daily for 2 days, THEN 2 tablets daily for 2 days, THEN 1 tablet daily for 2 days. 14 tablet 0    metoprolol succinate (TOPROL XL) 100 MG extended release tablet Take 1 tablet by mouth in the morning and 1 tablet in the evening.  90 tablet 0    atorvastatin (LIPITOR) 80 MG tablet Take 80 mg by mouth at bedtime      tiotropium (SPIRIVA RESPIMAT) 2.5 MCG/ACT AERS inhaler Inhale 2 puffs into the lungs daily      ferrous sulfate (IRON 325) 325 (65 Fe) MG tablet Take 325 mg by mouth 2 times daily      ondansetron (ZOFRAN-ODT) 8 MG TBDP disintegrating tablet Take 1 tablet by mouth every 8 hours as needed for Nausea 30 tablet 0    Cholecalciferol (VITAMIN D3) 50 MCG (2000 UT) CAPS TAKE 1 CAPSULE BY MOUTH EVERY DAY 90 capsule 1    omeprazole (PRILOSEC) 20 MG delayed release capsule Take 1 capsule by mouth in the morning and at bedtime (Patient taking differently: Take 20 mg by mouth Daily Takes in the morning) 30 capsule 3    albuterol sulfate  (90 Base) MCG/ACT inhaler Inhale 2 puffs into the lungs every 4 hours as needed      aspirin 81 MG chewable tablet Take 81 mg by mouth daily      fluticasone-salmeterol (ADVAIR) 250-50 MCG/ACT AEPB diskus inhaler Inhale 1 puff into the lungs every 12 hours      ipratropium-albuterol (DUONEB) 0.5-2.5 (3) MG/3ML SOLN nebulizer solution Inhale 3 mLs into the lungs every 4 hours as needed       apixaban (ELIQUIS) 5 MG TABS tablet Take 1 tablet by mouth 2 times daily 60 tablet 0     No current facility-administered medications for this visit. Allergies as of 02/13/2023 - Fully Reviewed 02/13/2023   Allergen Reaction Noted    Carbamazepine Anaphylaxis 11/07/2021    Lacosamide Nausea Only 09/24/2013    Lorazepam Other (See Comments) 11/15/2021    Nitroglycerin Other (See Comments) 12/19/2021       Review of Systems    Objective:    Blood pressure 132/86, resp. rate 16, height 6' 1\" (1.854 m), weight 238 lb (108 kg), SpO2 97 %. Physical Exam    No results found for this visit on 02/13/23. Assessent & Plan    1. Back pain, unspecified back location, unspecified back pain laterality, unspecified chronicity  -     cyclobenzaprine (FLEXERIL) 10 MG tablet; Take 1 tablet by mouth 3 times daily as needed for Muscle spasms, Disp-30 tablet, R-0Normal      Diagnosis Orders   1. Hospital discharge follow-up  External Referral to [facility] CURRENT OUTPATIENT MED LIST      2. Back pain, unspecified back location, unspecified back pain laterality, unspecified chronicity  cyclobenzaprine (FLEXERIL) 10 MG tablet           Hospital discharge  He is seeing Pulmonary on the 23   Feels improved  CXR normal   On O2 at night   Not wheezing  Will refer to [address] if worse  Please see HPI for full details  Date of Admission 2/7/23  Date of Discharge 2/10/23    Velia Swanson was evaluated through a synchronous (real-time) audio-video encounter, and/or her healthcare decision maker, is aware that it is a billable service, which includes applicable co-pays, with coverage as determined by her insurance carrier. She provided verbal consent to proceed and patient identification was verified.  This visit was conducted pursuant to the emergency declaration under the [address] and the National Emergencies Act, [address] [facility] waiver authority and the [employer] and [employer] Act. A caregiver was present when appropriate. Ability to conduct physical exam was limited. The patient was located at home in a state where the provider was licensed to provide care. The patient and/or patient representative voiced understanding and agreement with the current diagnoses, recommendations, and possible side effects. No follow-up provider specified.       Shaniqua Russell MD

## 2023-02-13 NOTE — TELEPHONE ENCOUNTER
----- Message from Robby Richards sent at 2/10/2023  4:34 PM EST -----  Subject: Message to Provider    QUESTIONS  Information for Provider? [address] CALLED AND PT NEEDS A   HOME HEALTH CARE ORDER ,[facility] THIS AND PT NEEDS DR Narvaez Figures TO   CALL WITH VERBAL TO [address] CALL [phone]  ---------------------------------------------------------------------------  --------------  Nay SHEIKH  [phone]; OK to leave message on voicemail  ---------------------------------------------------------------------------  --------------  SCRIPT ANSWERS  Relationship to Patient? Third Party  Third Party Type? Home Health Care? Representative Name?  Cheryle Salk

## 2023-02-13 NOTE — TELEPHONE ENCOUNTER
----- Message from Vic Patiño sent at 2/10/2023  4:34 PM EST -----  Subject: Message to Provider    QUESTIONS  Information for Provider? [address] CALLED AND PT NEEDS A   HOME HEALTH CARE ORDER ,[facility] ORDER THIS AND PT NEEDS DR More Bell TO   CALL WITH VERBAL TO [address] CALL [phone]  ---------------------------------------------------------------------------  --------------  [facility] INFO  [phone]; OK to leave message on voicemail  ---------------------------------------------------------------------------  --------------  SCRIPT ANSWERS  Relationship to Patient? Third Party  Third Party Type? Home Health Care? Representative Name?  Rudy Duran

## 2023-02-13 NOTE — CARE COORDINATION
[facility] Care Transitions Initial Follow Up Call    Call within 2 business days of discharge: Yes    Patient Current Location:  Home: [address] [place] [address]  [address]  [address]    Primary Care Virtual Visit  contacted the patient by telephone to perform post hospital discharge assessment. Patient: Mercedes Mederos Patient : 1955   MRN: [id number]  Reason for Admission: COPD  Discharge Date: 2/10/23 RARS: Readmission Risk Score: 18.1      Last Discharge [address]       Date Complaint Diagnosis Description Type Department Provider    23 Shortness of Breath COPD exacerbation [facility]) ED to Hosp-Admission (Discharged) (ADMITTED) TEJA Lee MD; NIMO Joseph. Was this an external facility discharge? No Discharge Facility: [facility]    Challenges to be reviewed by the provider   Additional needs identified to be addressed with provider: yes  home health care-Patient saw PCP through , [facility] placed               Method of communication with provider:  Virtual Visit with Patient  . CTN opened outreach for [place] call. Patient has completed Virtual visit with PCP today.  orders placed. CTN to follow up next week for any outstanding needs that may present. Care Transitions 24 Hour Call    Care Transitions Interventions         Discussed follow-up appointments. If no appointment was previously scheduled, appointment scheduling offered: Yes. Is follow up appointment scheduled within 7 days of discharge? Yes.     Follow Up  Future Appointments   Date Time Provider Luigi Tinsley   2023 12:00 PM Kristie Simmons MD [facility] AMB   3/17/2023  9:50 AM Sukhwinder Baldwin MD [facility] AMB   2023 10:40 AM GEORGE Kennedy ND [facility] AMB   2023  8:10 AM Sukhwinder Baldwin MD [facility] AMB   2023 11:00 AM Kristie Simmons MD [facility] AMB       Darrick Vuong RN

## 2023-02-15 ENCOUNTER — TELEPHONE (OUTPATIENT)
Dept: INTERNAL MEDICINE CLINIC | Facility: CLINIC | Age: 68
End: 2023-02-15

## 2023-02-15 NOTE — TELEPHONE ENCOUNTER
Call from [facility] with [address] seeking clarification regarding eliquis, indicating hospital discharge instructions are to continue and to stop

## 2023-02-15 NOTE — TELEPHONE ENCOUNTER
Home health nurse, Ariadne, notified to contact d/c physician for eliquis clarification. Nurse verbalized understanding.

## 2023-02-20 ENCOUNTER — CARE COORDINATION (OUTPATIENT)
Dept: CARE COORDINATION | Facility: CLINIC | Age: 68
End: 2023-02-20

## 2023-02-20 NOTE — CARE COORDINATION
[facility] Care Transitions Follow Up Call    Patient Current Location:  Home: [address] [facility]    Care Transition Nurse contacted the patient by telephone to follow up after admission on . Verified name and  with patient as identifiers. Patient: Erin Moya  Patient : 1955   MRN: [id number]  Reason for Admission: COPD  Discharge Date: 2/10/23 RARS: Readmission Risk Score: 18.1      Needs to be reviewed by the provider   Additional needs identified to be addressed with provider: No  none             Method of communication with provider: none. CTN contacted patient after recent OV with PCP for JOSEPH . Patient indicated he was not feeling as well today due to drywall work and aerosol spraying being completed at his apartment. Patient is currently sitting outside to avoid dust. Patient has completed steroid taper along with anbx. HH SOC has been completed and patient expects to have visit with nurse in the coming days to assess any changes. Patient does not have any particular questions at this time and was given contact information for any that may arise. Addressed changes since last contact:   CTN followed up after recent JOSEPH visit from PCP after d/c. Patient completed anbx and steroid taper and feels his breathing has not been as good due to construction related work around his building and drywall dust. Patient was expecting [facility] for assessment and was also encouraged to reach to pulmonary for any worsening in symptoms. Patient currently not in any distress but was not happy about the work being performed without any warning at his residence   Discussed follow-up appointments. If no appointment was previously scheduled, appointment scheduling offered: Yes. Is follow up appointment scheduled within 7 days of discharge? Yes.     Follow Up  Future Appointments   Date Time Provider Luigi Tinsley   3/17/2023  9:50 AM Hung Salter MD [facility] AMB   2023 10:40 AM GEORGE MeyresND G[facility] AMB   4/11/2023  8:10 AM Anderw Hagen MD [facility] AMB   5/8/2023 11:00 AM [facility]     [facility] follow up appointment(s): na    Care Transition Nurse reviewed discharge instructions, medical action plan, and red flags with patient and discussed any barriers to care and/or understanding of plan of care after discharge. Discussed appropriate site of care based on symptoms and resources available to patient including: PCP  Specialist  Home health  CTN . The patient agrees to contact the PCP office for questions related to their healthcare. Advance Care Planning:   reviewed and current. Patients top risk factors for readmission: medical condition-COPD, resp fail, CHF  Interventions to address risk factors: Scheduled appointment with PCP-completed 2/13 and Obtained and reviewed discharge summary and/or continuity of care documents    Offered patient enrollment in the Remote Patient Monitoring (RPM) program for in-home monitoring: NA.     Care Transitions Subsequent and Final Call    Schedule Follow Up Appointment with PCP: Completed  Subsequent and Final Calls  Care Transitions Interventions  Disease Specific Clinic: Completed      Other Interventions:             Care Transition Nurse provided contact information for future needs. Plan for follow-up call in 7-10 days based on severity of symptoms and risk factors.   Plan for next call: symptom management-assess for new or worsening symptoms after hh visit and current construction work completion    Frank Torres RN

## 2023-02-22 ENCOUNTER — TELEPHONE (OUTPATIENT)
Dept: INTERNAL MEDICINE CLINIC | Facility: CLINIC | Age: 68
End: 2023-02-22

## 2023-02-22 ENCOUNTER — HOSPITAL ENCOUNTER (EMERGENCY)
Age: 68
Discharge: HOME | End: 2023-02-22
Attending: EMERGENCY MEDICINE
Payer: MEDICARE

## 2023-02-22 ENCOUNTER — APPOINTMENT (OUTPATIENT)
Dept: GENERAL RADIOLOGY | Age: 68
End: 2023-02-22
Payer: MEDICARE

## 2023-02-22 VITALS
HEART RATE: 72 BPM | RESPIRATION RATE: 22 BRPM | SYSTOLIC BLOOD PRESSURE: 172 MMHG | DIASTOLIC BLOOD PRESSURE: 90 MMHG | OXYGEN SATURATION: 96 % | TEMPERATURE: 98.7 F

## 2023-02-22 DIAGNOSIS — E87.70 HYPERVOLEMIA, UNSPECIFIED HYPERVOLEMIA TYPE: ICD-10-CM

## 2023-02-22 DIAGNOSIS — J44.1 COPD EXACERBATION (HCC): Primary | ICD-10-CM

## 2023-02-22 LAB
ALBUMIN SERPL-MCNC: 3 G/DL (ref 3.2–4.6)
ALBUMIN/GLOB SERPL: 0.8 (ref 0.4–1.6)
ALP SERPL-CCNC: 89 U/L (ref 50–136)
ALT SERPL-CCNC: 29 U/L (ref 12–65)
ANION GAP SERPL CALC-SCNC: 5 MMOL/L (ref 2–11)
ARTERIAL PATENCY WRIST A: POSITIVE
AST SERPL-CCNC: 30 U/L (ref 15–37)
BASE EXCESS BLD CALC-SCNC: 0.2 MMOL/L
BASOPHILS # BLD: 0 K/UL (ref 0–0.2)
BASOPHILS NFR BLD: 1 % (ref 0–2)
BDY SITE: NORMAL
BILIRUB SERPL-MCNC: 0.4 MG/DL (ref 0.2–1.1)
BUN SERPL-MCNC: 12 MG/DL (ref 8–23)
CALCIUM SERPL-MCNC: 8.5 MG/DL (ref 8.3–10.4)
CHLORIDE SERPL-SCNC: 107 MMOL/L (ref 101–110)
CO2 BLD-SCNC: 23 MMOL/L (ref 13–23)
CO2 SERPL-SCNC: 24 MMOL/L (ref 21–32)
CREAT SERPL-MCNC: 1 MG/DL (ref 0.8–1.5)
DIFFERENTIAL METHOD BLD: ABNORMAL
EOSINOPHIL # BLD: 0 K/UL (ref 0–0.8)
EOSINOPHIL NFR BLD: 1 % (ref 0.5–7.8)
ERYTHROCYTE [DISTWIDTH] IN BLOOD BY AUTOMATED COUNT: 14.6 % (ref 11.9–14.6)
GAS FLOW.O2 O2 DELIVERY SYS: NORMAL
GLOBULIN SER CALC-MCNC: 3.9 G/DL (ref 2.8–4.5)
GLUCOSE SERPL-MCNC: 99 MG/DL (ref 65–100)
HCO3 BLD-SCNC: 23.9 MMOL/L (ref 22–26)
HCT VFR BLD AUTO: 39.2 % (ref 41.1–50.3)
HGB BLD-MCNC: 12.8 G/DL (ref 13.6–17.2)
IMM GRANULOCYTES # BLD AUTO: 0 K/UL (ref 0–0.5)
IMM GRANULOCYTES NFR BLD AUTO: 0 % (ref 0–5)
LYMPHOCYTES # BLD: 0.8 K/UL (ref 0.5–4.6)
LYMPHOCYTES NFR BLD: 24 % (ref 13–44)
MAGNESIUM SERPL-MCNC: 1.9 MG/DL (ref 1.8–2.4)
MCH RBC QN AUTO: 29.2 PG (ref 26.1–32.9)
MCHC RBC AUTO-ENTMCNC: 32.7 G/DL (ref 31.4–35)
MCV RBC AUTO: 89.5 FL (ref 82–102)
MONOCYTES # BLD: 0.7 K/UL (ref 0.1–1.3)
MONOCYTES NFR BLD: 20 % (ref 4–12)
NEUTS SEG # BLD: 2 K/UL (ref 1.7–8.2)
NEUTS SEG NFR BLD: 54 % (ref 43–78)
NRBC # BLD: 0 K/UL (ref 0–0.2)
NT PRO BNP: 550 PG/ML (ref 5–125)
PCO2 BLD: 35.2 MMHG (ref 35–45)
PH BLD: 7.44 (ref 7.35–7.45)
PLATELET # BLD AUTO: 145 K/UL (ref 150–450)
PMV BLD AUTO: 9.5 FL (ref 9.4–12.3)
PO2 BLD: 75 MMHG (ref 75–100)
POTASSIUM SERPL-SCNC: 3.7 MMOL/L (ref 3.5–5.1)
PROT SERPL-MCNC: 6.9 G/DL (ref 6.3–8.2)
RBC # BLD AUTO: 4.38 M/UL (ref 4.23–5.6)
SAO2 % BLD: 95.5 % (ref 95–98)
SARS-COV-2 RDRP RESP QL NAA+PROBE: NOT DETECTED
SERVICE CMNT-IMP: NORMAL
SODIUM SERPL-SCNC: 136 MMOL/L (ref 133–143)
SOURCE: NORMAL
SPECIMEN TYPE: NORMAL
WBC # BLD AUTO: 3.6 K/UL (ref 4.3–11.1)

## 2023-02-22 PROCEDURE — 85025 COMPLETE CBC W/AUTO DIFF WBC: CPT

## 2023-02-22 PROCEDURE — 71045 X-RAY EXAM CHEST 1 VIEW: CPT

## 2023-02-22 PROCEDURE — 87635 SARS-COV-2 COVID-19 AMP PRB: CPT

## 2023-02-22 PROCEDURE — 83880 ASSAY OF NATRIURETIC PEPTIDE: CPT

## 2023-02-22 PROCEDURE — 6370000000 HC RX 637 (ALT 250 FOR IP): Performed by: EMERGENCY MEDICINE

## 2023-02-22 PROCEDURE — 36600 WITHDRAWAL OF ARTERIAL BLOOD: CPT

## 2023-02-22 PROCEDURE — 6360000002 HC RX W HCPCS: Performed by: EMERGENCY MEDICINE

## 2023-02-22 PROCEDURE — 93005 ELECTROCARDIOGRAM TRACING: CPT | Performed by: EMERGENCY MEDICINE

## 2023-02-22 PROCEDURE — 94640 AIRWAY INHALATION TREATMENT: CPT

## 2023-02-22 PROCEDURE — 83735 ASSAY OF MAGNESIUM: CPT

## 2023-02-22 PROCEDURE — 96374 THER/PROPH/DIAG INJ IV PUSH: CPT

## 2023-02-22 PROCEDURE — 99285 EMERGENCY DEPT VISIT HI MDM: CPT

## 2023-02-22 PROCEDURE — 80053 COMPREHEN METABOLIC PANEL: CPT

## 2023-02-22 PROCEDURE — 82803 BLOOD GASES ANY COMBINATION: CPT

## 2023-02-22 RX ORDER — FUROSEMIDE 10 MG/ML
40 INJECTION INTRAMUSCULAR; INTRAVENOUS ONCE
Status: COMPLETED | OUTPATIENT
Start: 2023-02-22 | End: 2023-02-22

## 2023-02-22 RX ORDER — DOXYCYCLINE HYCLATE 100 MG
100 TABLET ORAL 2 TIMES DAILY
Qty: 14 TABLET | Refills: 0 | Status: SHIPPED | OUTPATIENT
Start: 2023-02-22 | End: 2023-03-01

## 2023-02-22 RX ORDER — IPRATROPIUM BROMIDE AND ALBUTEROL SULFATE 2.5; .5 MG/3ML; MG/3ML
1 SOLUTION RESPIRATORY (INHALATION)
Status: COMPLETED | OUTPATIENT
Start: 2023-02-22 | End: 2023-02-22

## 2023-02-22 RX ORDER — BENZONATATE 100 MG/1
100 CAPSULE ORAL 3 TIMES DAILY PRN
Qty: 21 CAPSULE | Refills: 0 | Status: SHIPPED | OUTPATIENT
Start: 2023-02-22 | End: 2023-03-04

## 2023-02-22 RX ORDER — ALBUTEROL SULFATE 90 UG/1
2 AEROSOL, METERED RESPIRATORY (INHALATION) EVERY 4 HOURS PRN
Qty: 18 G | Refills: 3 | Status: SHIPPED | OUTPATIENT
Start: 2023-02-22

## 2023-02-22 RX ORDER — PREDNISONE 20 MG/1
40 TABLET ORAL DAILY
Qty: 8 TABLET | Refills: 0 | Status: SHIPPED | OUTPATIENT
Start: 2023-02-22 | End: 2023-02-26

## 2023-02-22 RX ADMIN — IPRATROPIUM BROMIDE AND ALBUTEROL SULFATE 1 AMPULE: 2.5; .5 SOLUTION RESPIRATORY (INHALATION) at 12:10

## 2023-02-22 RX ADMIN — FUROSEMIDE 40 MG: 10 INJECTION, SOLUTION INTRAMUSCULAR; INTRAVENOUS at 14:12

## 2023-02-22 RX ADMIN — NITROGLYCERIN 0.5 INCH: 20 OINTMENT TOPICAL at 13:41

## 2023-02-22 ASSESSMENT — ENCOUNTER SYMPTOMS
VOMITING: 0
WHEEZING: 1
VOICE CHANGE: 0
COUGH: 1
SHORTNESS OF BREATH: 1
ABDOMINAL PAIN: 0
CONSTIPATION: 0
BACK PAIN: 0
DIARRHEA: 0
SORE THROAT: 0
COLOR CHANGE: 0
TROUBLE SWALLOWING: 0

## 2023-02-22 NOTE — TELEPHONE ENCOUNTER
Call from [facility] with [address] advising that she is sending patient to [facility] for respiratory distress

## 2023-02-22 NOTE — DISCHARGE INSTRUCTIONS
Schedule close follow-up primary care physician. Turn to ED if symptoms worsen or progress in any way. Schedule follow-up with [facility] cardiology.

## 2023-02-22 NOTE — ED NOTES
[address] [place] [address] called to transport patient back to residence; ETA 1 hour.      Jodi Carias [facility]  02/22/23 1701

## 2023-02-22 NOTE — ED PROVIDER NOTES
Emergency Department Provider Note                   PCP:                aMcrina Holman MD               Age: 79 y.o. Sex: male       ICD-10-CM    1. [facility]  J44.1           DISPOSITION          Medical Decision Making  70-year-old male with history of CVA, tobacco use, COPD on 2 to 3 L O2 via nasal cannula, CHF presents with complaint of worsening shortness of breath, wheezing since last night. Patient states that his apartment at [facility] was recently painted. States that he believes that the paint is affecting his ability to breathe properly causing him to wheeze. Patient placed on nonrebreather in route. DuoNeb, Solumedrol, Lasix, Nitropaste ordered. Paced rhythm noted. Chest x-ray with no pneumothorax. No pleural effusions. No infiltrate. proBNP elevated at 550. No leukocytosis. On reassessment, patient states symptoms have completely resolved. Discussed admission with patient. Patient declines. Patient states he will be discharged home. Discharge home with albuterol inhaler, prednisone taper, Tessalon Perles. Patient instructed to take Lasix as directed. Instructed on need for close follow-up primary care physician, [facility] cardiology. Patient given strict return precautions. Amount and/or Complexity of Data Reviewed  External Data Reviewed: radiology and notes. Details: GODFREY on 10/11/2022:    Left Ventricle: Normal left ventricular systolic function with a visually estimated EF of 55 - 60%. Left ventricle size is normal. Normal wall thickness. Apical Septal motion is consistent with pacing. Normal wall motion. Diastolic function indeterminate in the setting of atrial fibrillation. Average E/e' ratio is 31.04. Aortic Valve: Not well visualized. Appropriately positioned transcatheter bioprosthetic valve that is well-seated. No regurgitation. Unable to assess stenosis. Elevated prosthetic gradient. AV mean gradient is 30 mmHg. AV peak gradient is 47 mmHg. Mitral Valve: Mildly calcified leaflet, at the anterior and posterior leaflets- tips. ? Rheumatic etiology Mild annular calcification of the mitral valve. Moderate stenosis noted. MV mean gradient is 9 mmHg\at a heart rate of 70 bpm.    Left Atrium: Left atrium is moderately dilated. Interatrial Septum: Agitated saline study was negative with and without provocation. Contrast used: Definity. Labs: ordered. Decision-making details documented in ED Course. Radiology: ordered. ECG/medicine tests: ordered and independent interpretation performed. Risk  Prescription drug management. Complexity of Problem: 1 or more chronic illness with exacerbation. (4)  Complexity of Problem: 1 or more chronic illnesses with severe exacerbation. (5)    I have conducted an independent ordering and review of Labs. I have conducted an independent ordering and review of EKG. I have conducted an independent ordering and review of X-rays. Considerations: Shared decision making was utilized in the care of this patient. ED Course as of 02/22/23 1543   Wed Feb 22, 2023   1245 Portable  Chest X-ray IMPRESSION:  The lungs are clear. Heart is normal in size. Implantable cardiac  device left chest and leads appear unchanged in position. Spinal stimulator lead  overlies the mid thoracic spine, stable in position. No pneumothorax. No  pleural effusions.  [DF]   2651 SARS-CoV-2, Rapid: Not detected [DF]      ED Course User Index  [DF] Byron Cao MD        Orders Placed This Encounter   Procedures    COVID-19, Rapid    XR CHEST PORTABLE    CMP    CBC with Auto Differential    Magnesium    Blood Gas, Arterial    Brain Natriuretic Peptide    Arterial Blood Gas, POC    EKG 12 Lead        Medications   ipratropium-albuterol (DUONEB) nebulizer solution 1 ampule (1 ampule Inhalation Given 2/22/23 1210)   nitroglycerin (NITRO-BID) 2 % ointment 0.5 inch (0.5 inches Topical Given 2/22/23 1341)   furosemide (LASIX) injection 40 mg (40 mg IntraVENous Given 2/22/23 1412)       New Prescriptions    No medications on file        Volodymyr Mcallister is a 79 y.o. male who presents to the Emergency Department with chief complaint of SOB, wheezing. 71-year-old male with history of CVA, tobacco use, COPD on 2 to 3 L O2 via nasal cannula, CHF presents with complaint of worsening shortness of breath, wheezing since last night. Patient states that his apartment at [facility] was recently painted. States that he believes that the paint is affecting his ability to breathe properly causing him to wheeze. Patient states that he had recent stroke around 2 months ago with residual right-sided weakness. Denies chest pain, abdominal pain, nausea, vomiting, fever, chills, mopped assist.  Rate symptoms as constant, worsening. States he is attempted home nebulizers and inhalers without improvement of symptoms. The history is provided by the patient. No  was used. Review of Systems   Constitutional:  Positive for fatigue. Negative for chills, diaphoresis and fever. HENT:  Negative for congestion, sore throat, trouble swallowing and voice change. Respiratory:  Positive for cough, shortness of breath and wheezing. Cardiovascular:  Positive for leg swelling. Negative for chest pain. Gastrointestinal:  Negative for abdominal pain, constipation, diarrhea and vomiting. Genitourinary:  Negative for dysuria and flank pain. Musculoskeletal:  Negative for back pain and myalgias. Skin:  Negative for color change and rash. Neurological:  Negative for dizziness, syncope, weakness, light-headedness and headaches. Psychiatric/Behavioral:  Negative for confusion.       Past Medical History:   Diagnosis Date    Aortic stenosis     Aortic valve replacement 7/2018    CAD (coronary artery disease)     PCI in 2014, 2015 had MI and then stents    Cancer ([facility]     SCC removed face     CHF (congestive heart failure) ([facility]     Chronic renal disease, stage III (HCC)     sees neph and does not have actual diagnosis at this time    COPD (chronic obstructive pulmonary disease) (HCC)     fibrosis in lungs    Dyslipidemia     Heart failure (HCC)     CHF per patient    HTN (hypertension)     med controlled    Hyperlipidemia     Ischemic cardiomyopathy     Pacemaker     only pacemaker    Paroxysmal atrial fibrillation ([facility]     pradaxa for this    Pulmonary fibrosis (HCC)     Seizure disorder ([facility]     lyme disease - small lesion frontal part of brain - no maintenance meds - last seizure 2 months ago, due to fall - before that it was nine years    Systolic heart failure [facility])         Past Surgical History:   Procedure Laterality Date    ABLATION OF DYSRHYTHMIC FOCUS      AORTIC VALVE REPLACEMENT  2018    BACK SURGERY      discectomy    CARDIAC CATHETERIZATION      PCI ,     CARDIAC PROCEDURE N/A 2022    LEFT HEART CATH / CORONARY ANGIOGRAPHY performed by Syd Madden MD at [address] [place] [address] CATH LAB    CERVICAL FUSION      C3-4 fusion    EGD  2022         HIP ARTHROPLASTY Left     SPINAL CORD STIMULATOR SURGERY      lower back     UPPER GASTROINTESTINAL ENDOSCOPY N/A 2022    EGD ESOPHAGOGASTRODUODENOSCOPY/ PT HAS PACEMAKER performed by Janessa Barrios MD at [facility] ENDOSCOPY        Family History   Problem Relation Age of Onset    Heart Disease Mother     Heart Disease Sister         Social History     Socioeconomic History    Marital status: Legally    Tobacco Use    Smoking status: Former     Packs/day: 1.50     Years: 56.00     Pack years: 84.00     Types: Cigarettes     Quit date: 2022     Years since quittin.4    Smokeless tobacco: Never    Tobacco comments:     Stopped 2022   Vaping Use    Vaping Use: Never used   Substance and Sexual Activity    Alcohol use: Not Currently     Comment: about 1-2 x per year    Drug use: Yes     Types: Marijuana [place]     Comment: cannabis used: two weeks ago as of 6/15/2022 [facility]      Social Determinants of Health     Financial Resource Strain: Medium Risk    Difficulty of Paying Living Expenses: Somewhat hard   Food Insecurity: No Food Insecurity    Worried About Running Out of Food in the Last Year: Never true    Ran Out of Food in the Last Year: Never true   Transportation Needs: Unknown    Lack of Transportation (Non-Medical): No   Housing Stability: Unknown    Unstable Housing in the Last Year: No         Carbamazepine, Lacosamide, Lorazepam, and Nitroglycerin     Previous Medications    ALBUTEROL SULFATE  (90 BASE) MCG/ACT INHALER    Inhale 2 puffs into the lungs every 4 hours as needed    APIXABAN (ELIQUIS) 5 MG TABS TABLET    Take 1 tablet by mouth 2 times daily    ASPIRIN 81 MG CHEWABLE TABLET    Take 81 mg by mouth daily    ATORVASTATIN (LIPITOR) 80 MG TABLET    Take 80 mg by mouth at bedtime    CHOLECALCIFEROL (VITAMIN D3) 50 MCG (2000 UT) CAPS    TAKE 1 CAPSULE BY MOUTH EVERY DAY    CYCLOBENZAPRINE (FLEXERIL) 10 MG TABLET    Take 1 tablet by mouth 3 times daily as needed for Muscle spasms    FERROUS SULFATE (IRON 325) 325 (65 FE) MG TABLET    Take 325 mg by mouth 2 times daily    FLUTICASONE-SALMETEROL (ADVAIR) 250-50 MCG/ACT AEPB DISKUS INHALER    Inhale 1 puff into the lungs every 12 hours    IPRATROPIUM-ALBUTEROL (DUONEB) 0.5-2.5 (3) MG/3ML SOLN NEBULIZER SOLUTION    Inhale 3 mLs into the lungs every 4 hours as needed     METOPROLOL SUCCINATE (TOPROL XL) 100 MG EXTENDED RELEASE TABLET    Take 1 tablet by mouth in the morning and 1 tablet in the evening. OMEPRAZOLE (PRILOSEC) 20 MG DELAYED RELEASE CAPSULE    Take 1 capsule by mouth in the morning and at bedtime    ONDANSETRON (ZOFRAN-ODT) 8 MG TBDP DISINTEGRATING TABLET    Take 1 tablet by mouth every 8 hours as needed for Nausea    TIOTROPIUM (SPIRIVA RESPIMAT) 2.5 MCG/ACT AERS INHALER    Inhale 2 puffs into the lungs daily        Vitals signs and nursing note reviewed.    Patient Vitals for the past 4 hrs:   Temp Pulse Resp BP SpO2   02/22/23 1341 -- 70 -- (!) 146/85 --   02/22/23 1216 -- 72 22 -- 97 %   02/22/23 1150 98.7 °F (37.1 °C) 70 30 (!) 179/103 99 %          Physical Exam  Vitals and nursing note reviewed. Constitutional:       Appearance: Normal appearance. He is not ill-appearing. HENT:      Head: Normocephalic. Nose: Nose normal.      Mouth/Throat:      Mouth: Mucous membranes are moist.   Eyes:      Extraocular Movements: Extraocular movements intact. Pupils: Pupils are equal, round, and reactive to light. Cardiovascular:      Rate and Rhythm: Normal rate. Pulses: Normal pulses. Heart sounds: Normal heart sounds. Pulmonary:      Breath sounds: Wheezing and rales present. Comments: Tachypnea. Diffuse wheezes noted throughout. Abdominal:      Palpations: Abdomen is soft. Tenderness: There is no abdominal tenderness. There is no guarding or rebound. Musculoskeletal:         General: No tenderness or deformity. Normal range of motion. Right lower leg: Edema present. Left lower leg: Edema present. Neurological:      General: No focal deficit present. Mental Status: He is alert and oriented to person, place, and time. Cranial Nerves: No cranial nerve deficit. Sensory: No sensory deficit. Comments: Residual right-sided deficits. No new focal deficits noted.         EKG 12 Lead    Date/Time: 2/22/2023 12:23 PM  Performed by: Yanni Ramirez MD  Authorized by: Yanni Ramirez MD     ECG reviewed by ED Physician in the absence of a cardiologist: yes    Rate:     ECG rate:  70    ECG rate assessment: normal    Rhythm:     Rhythm: paced    Pacing:     Capture:  Complete  ST segments:     ST segments:  Non-specific  T waves:     T waves: normal      Results for orders placed or performed during the hospital encounter of 02/22/23   COVID-19, Rapid    Specimen: Nasopharyngeal   Result Value Ref Range    Source NASAL      SARS-CoV-2, Rapid Not detected NOTD     XR CHEST PORTABLE    Narrative    XR CHEST PORTABLE 2/22/2023 12:29 PM    HISTORY: Shortness of breath.    COMPARISON: Chest x-ray 02/07/2023.    A portable AP view of the chest was obtained.      Impression    The lungs are clear.  Heart is normal in size. Implantable cardiac  device left chest and leads appear unchanged in position. Spinal stimulator lead  overlies the mid thoracic spine, stable in position.  No pneumothorax.  No  pleural effusions.     CMP   Result Value Ref Range    Sodium 136 133 - 143 mmol/L    Potassium 3.7 3.5 - 5.1 mmol/L    Chloride 107 101 - 110 mmol/L    CO2 24 21 - 32 mmol/L    Anion Gap 5 2 - 11 mmol/L    Glucose 99 65 - 100 mg/dL    BUN 12 8 - 23 MG/DL    Creatinine 1.00 0.8 - 1.5 MG/DL    Est, Glom Filt Rate >60 >60 ml/min/1.73m2    Calcium 8.5 8.3 - 10.4 MG/DL    Total Bilirubin 0.4 0.2 - 1.1 MG/DL    ALT 29 12 - 65 U/L    AST 30 15 - 37 U/L    Alk Phosphatase 89 50 - 136 U/L    Total Protein 6.9 6.3 - 8.2 g/dL    Albumin 3.0 (L) 3.2 - 4.6 g/dL    Globulin 3.9 2.8 - 4.5 g/dL    Albumin/Globulin Ratio 0.8 0.4 - 1.6     CBC with Auto Differential   Result Value Ref Range    WBC 3.6 (L) 4.3 - 11.1 K/uL    RBC 4.38 4.23 - 5.6 M/uL    Hemoglobin 12.8 (L) 13.6 - 17.2 g/dL    Hematocrit 39.2 (L) 41.1 - 50.3 %    MCV 89.5 82 - 102 FL    MCH 29.2 26.1 - 32.9 PG    MCHC 32.7 31.4 - 35.0 g/dL    RDW 14.6 11.9 - 14.6 %    Platelets 145 (L) 150 - 450 K/uL    MPV 9.5 9.4 - 12.3 FL    nRBC 0.00 0.0 - 0.2 K/uL    Differential Type AUTOMATED      Seg Neutrophils 54 43 - 78 %    Lymphocytes 24 13 - 44 %    Monocytes 20 (H) 4.0 - 12.0 %    Eosinophils % 1 0.5 - 7.8 %    Basophils 1 0.0 - 2.0 %    Immature Granulocytes 0 0.0 - 5.0 %    Segs Absolute 2.0 1.7 - 8.2 K/UL    Absolute Lymph # 0.8 0.5 - 4.6 K/UL    Absolute Mono # 0.7 0.1 - 1.3 K/UL    Absolute Eos # 0.0 0.0 - 0.8 K/UL    Basophils Absolute 0.0 0.0 - 0.2 K/UL    Absolute Immature  Granulocyte 0.0 0.0 - 0.5 K/UL   Magnesium   Result Value Ref Range    Magnesium 1.9 1.8 - 2.4 mg/dL   Brain Natriuretic Peptide   Result Value Ref Range    NT Pro- (H) 5 - 125 PG/ML   Arterial Blood Gas, POC   Result Value Ref Range    DEVICE ROOM AIR      pH, Arterial, POC 7.44 7.35 - 7.45      pCO2, Arterial, POC 35.2 35 - 45 MMHG    pO2, Arterial, POC 75 75 - 100 MMHG    HCO3, Mixed 23.9 22 - 26 MMOL/L    SO2c, Arterial, POC 95.5 95 - 98 %    Base Excess 0.2 mmol/L    POC Eddie's Test Positive      Site RIGHT RADIAL      Specimen type: ARTERIAL      Performed by: La     POC TCO2 23 13 - 23 MMOL/L        XR CHEST PORTABLE   Final Result   The lungs are clear. Heart is normal in size. Implantable cardiac   device left chest and leads appear unchanged in position. Spinal stimulator lead   overlies the mid thoracic spine, stable in position. No pneumothorax. No   pleural effusions. Voice dictation software was used during the making of this note. This software is not perfect and grammatical and other typographical errors may be present. This note has not been completely proofread for errors.      Paulino Salter., MD  02/22/23 6912

## 2023-02-22 NOTE — ED TRIAGE NOTES
Per EMS: hx of COPD- respiratory distress. - home- SOB since yesterday. Expiratory wheezing l/r diminished bilaterally lower lung sound. (Painted the apartment), recent stroke 2months ago ( right sided weakness), pacemaker. Currently in PT/OT.

## 2023-02-22 NOTE — ED NOTES
I have reviewed discharge instructions with the patient. The patient verbalized understanding. Patient left ED via Discharge Method: stretcher to Home with  transport    Opportunity for questions and clarification provided. Patient given 4 scripts. To continue your aftercare when you leave the hospital, you may receive an automated call from our care team to check in on how you are doing. This is a free service and part of our promise to provide the best care and service to meet your aftercare needs.  If you have questions, or wish to unsubscribe from this service please call [phone]. Thank you for Choosing our [facility] Emergency Department.         Zheng Hernandez RN  02/22/23 9328

## 2023-02-27 ENCOUNTER — CARE COORDINATION (OUTPATIENT)
Dept: CARE COORDINATION | Facility: CLINIC | Age: 68
End: 2023-02-27

## 2023-02-27 NOTE — CARE COORDINATION
Patient has graduated from the Care Transitions program on 2/27. Patient/family has the ability to self-manage at this time. Patient has no further care management needs, no referral to the [facility] team for further management. Patient has [facility] and able to manage care at this time. Patient had his apartment sprayed with :\"Kilz\" aerosol to cover mold/mildew and also had drywall dusting in apartment prior to recent ED visit. Patient had [facility] visit and evaluated in ED for wheezing. Patient indicted he feels improved since ED visit and cough has been very productive and able to expectorate. Patient has Care Transition Nurse's contact information for any further questions, concerns, or needs.   Patients upcoming visits:    Future Appointments   Date Time Provider Luigi Alvina   3/17/2023  9:50 AM Janina Aparicio MD PPS [facility] AMB   4/6/2023 10:40 AM GEORGE Garcia BSND [facility] AMB   4/11/2023  8:10 AM Janina Aparicio MD PPS [facility] AMB   5/8/2023 11:00 AM Phyllis Paige MD MLMI [facility] AMB

## 2023-03-08 DIAGNOSIS — I10 HYPERTENSION, UNSPECIFIED TYPE: ICD-10-CM

## 2023-03-08 RX ORDER — METOPROLOL SUCCINATE 100 MG/1
TABLET, EXTENDED RELEASE ORAL
Qty: 90 TABLET | Refills: 0 | Status: SHIPPED | OUTPATIENT
Start: 2023-03-08

## 2023-03-13 ENCOUNTER — OFFICE VISIT (OUTPATIENT)
Dept: PULMONOLOGY | Age: 68
End: 2023-03-13
Payer: OTHER GOVERNMENT

## 2023-03-13 VITALS
DIASTOLIC BLOOD PRESSURE: 90 MMHG | OXYGEN SATURATION: 98 % | RESPIRATION RATE: 16 BRPM | HEART RATE: 70 BPM | TEMPERATURE: 96.9 F | SYSTOLIC BLOOD PRESSURE: 142 MMHG

## 2023-03-13 DIAGNOSIS — J44.9 CHRONIC OBSTRUCTIVE PULMONARY DISEASE, UNSPECIFIED COPD TYPE (HCC): Primary | ICD-10-CM

## 2023-03-13 DIAGNOSIS — J84.9 ILD (INTERSTITIAL LUNG DISEASE) (HCC): ICD-10-CM

## 2023-03-13 DIAGNOSIS — R09.02 HYPOXEMIA: ICD-10-CM

## 2023-03-13 DIAGNOSIS — R06.83 SNORING: ICD-10-CM

## 2023-03-13 PROCEDURE — 3077F SYST BP >= 140 MM HG: CPT | Performed by: INTERNAL MEDICINE

## 2023-03-13 PROCEDURE — 99214 OFFICE O/P EST MOD 30 MIN: CPT | Performed by: INTERNAL MEDICINE

## 2023-03-13 PROCEDURE — 3080F DIAST BP >= 90 MM HG: CPT | Performed by: INTERNAL MEDICINE

## 2023-03-13 PROCEDURE — 1123F ACP DISCUSS/DSCN MKR DOCD: CPT | Performed by: INTERNAL MEDICINE

## 2023-03-13 ASSESSMENT — ENCOUNTER SYMPTOMS
WHEEZING: 1
BACK PAIN: 1
SHORTNESS OF BREATH: 1

## 2023-03-13 NOTE — PROGRESS NOTES
[address] Aiden Peterson. [address], [address] [facility]  [phone]      Patient Name:  Denton Castaneda  YOB: 1955      Office Visit 3/13/2023    CHIEF COMPLAINT:    Chief Complaint   Patient presents with    Follow-up    COPD         HISTORY OF PRESENT ILLNESS:       Patient is years old white male who is here today for follow-up of COPD and hypoxemia. Patient is in wheelchair and has been for a while. He recently had another stroke and then had to go to rehab and his right side was completely flaccid however he is improving and he is working with physical therapy. Since last visit he actually quit smoking because of his stroke and being in the rehab too long. He reports that his breathing has improved because of that. He denies any wheezing. He does have occasional cough with clear sputum production. He does use Spiriva and Advair. He also has albuterol nebulizer which he uses on a daily basis and he says it helps him. Patient also supposed to have home sleep study however because of his stroke and being in the hospital it had to be rescheduled which was not done yet. Currently he is using 2 L of oxygen at night.       Past Medical History:   Diagnosis Date    Aortic stenosis     Aortic valve replacement 7/2018    CAD (coronary artery disease)     PCI in 2014, 2015 had MI and then stents    Cancer ([address]     SCC removed face     CHF (congestive heart failure) ([address]     Chronic renal disease, stage III ([address]     sees neph and does not have actual diagnosis at this time    COPD (chronic obstructive pulmonary disease) ([address]     fibrosis in lungs    Dyslipidemia     Heart failure ([address]     CHF per patient    HTN (hypertension)     med controlled    Hyperlipidemia     Ischemic cardiomyopathy     Pacemaker     only pacemaker    Paroxysmal atrial fibrillation ([address]     pradaxa for this    Pulmonary fibrosis ([address]     Seizure disorder ([address]     lyme disease - small lesion frontal part of brain - no maintenance meds - last seizure 2 months ago, due to fall - before that it was nine years    Systolic heart failure [facility])          Patient Active Problem List   Diagnosis    Leg injury    Hyperlipidemia    Atrial fibrillation ([address]    CAD in native artery    Hypertension    Pulmonary fibrosis (HCC)    Longstanding persistent atrial fibrillation (HCC)    Shortness of breath at rest    CAP (community acquired pneumonia)    PAD (peripheral artery disease) ([address]    Iron deficiency anemia    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HCC)    COPD with acute lower respiratory infection ([address]    Heart failure with mid-range ejection fraction (HCC)    S/P TAVR (transcatheter aortic valve replacement)    Septic shock with acute organ dysfunction due to pneumococcus ([address]    Acute on chronic heart failure with reduced ejection fraction and diastolic dysfunction (HCC)    Acute on chronic HFrEF (heart failure with reduced ejection fraction) ([address]    Presence of cardiac resynchronization therapy pacemaker (CRT-P)    COPD (chronic obstructive pulmonary disease) ([address]    Chronic dyspnea    Establishing care with new doctor, encounter for    Chronic midline low back pain    Hx of fracture of femur    Productive cough    Chronic renal disease, stage III ([address]    Cigarette nicotine dependence with nicotine-induced disorder    Right sided weakness    Cerebrovascular accident (CVA) ([address]    Convulsions, unspecified convulsion type ([address]    Accidental fall    Acute on chronic diastolic congestive heart failure (HCC)    Allergic rhinitis    AMS (altered mental status)    Aortic valve sclerosis    Arthritis    Chronic non-specific white matter lesions on MRI    Chronic pain of both shoulders    Contusion of rib    Cervical spondylosis without myelopathy    Degeneration of cervical intervertebral disc    Degeneration of lumbosacral intervertebral disc    Lumbosacral spondylosis without myelopathy    Diabetes mellitus (HCC)    Disorder of skin of trunk    Generalized convulsive epilepsy with intractable epilepsy [address]    Hypothyroidism    Labile blood pressure    Lumbosacral neuritis    Lyme disease    Mitral valve prolapse    Murmur    Neck sprain    Nonrheumatic aortic valve insufficiency    Non-ST elevated myocardial infarction (HCC)    Palpitations    Persistent insomnia    Pressure ulcer of buttock    Pressure ulcer of sacral region    Spasm of muscle    Sprain of medial collateral ligament of knee    Squamous cell skin cancer, multiple sites    Status post coronary artery stent placement    Unstable angina (HCC)    COPD exacerbation (HCC)    Secondary hypercoagulable state ([facility]    History of CVA (cerebrovascular accident)           Past Surgical History:   Procedure Laterality Date    ABLATION OF DYSRHYTHMIC FOCUS      AORTIC VALVE REPLACEMENT  2018    BACK SURGERY      discectomy    CARDIAC CATHETERIZATION      PCI ,     CARDIAC PROCEDURE N/A 2022    LEFT HEART CATH / CORONARY ANGIOGRAPHY performed by María Elena Vera MD at [address] [place] [address] CATH LAB    CERVICAL FUSION      C3-4 fusion    EGD  2022         HIP ARTHROPLASTY Left     SPINAL CORD STIMULATOR SURGERY      lower back     UPPER GASTROINTESTINAL ENDOSCOPY N/A 2022    EGD ESOPHAGOGASTRODUODENOSCOPY/ PT HAS PACEMAKER performed by Allison Jackson MD at [facility] ENDOSCOPY             Social History     Socioeconomic History    Marital status: Legally      Spouse name: Not on file    Number of children: Not on file    Years of education: Not on file    Highest education level: Not on file   Occupational History    Not on file   Tobacco Use    Smoking status: Former     Packs/day: 1.50     Years: 56.00     Pack years: 84.00     Types: Cigarettes     Quit date: 2022     Years since quittin.5    Smokeless tobacco: Never    Tobacco comments:     Stopped 2022   Vaping Use    Vaping Use: Never used   Substance and Sexual Activity    Alcohol use: Not Currently Comment: about 1-2 x per year    Drug use: Yes     Types: Marijuana Daril Serge)     Comment: cannabis used: two weeks ago as of 6/15/2022 edibles     Sexual activity: Not on file   Other Topics Concern    Not on file   Social History Narrative    Not on file     Social Determinants of Health     Financial Resource Strain: Medium Risk    Difficulty of Paying Living Expenses: Somewhat hard   Food Insecurity: No Food Insecurity    Worried About Running Out of Food in the Last Year: Never true    Ran Out of Food in the Last Year: Never true   Transportation Needs: Unknown    Lack of Transportation (Medical): Not on file    Lack of Transportation (Non-Medical):  No   Physical Activity: Not on file   Stress: Not on file   Social Connections: Not on file   Intimate Partner Violence: Not on file   Housing Stability: Unknown    Unable to Pay for Housing in the Last Year: Not on file    Number of Places Lived in the Last Year: Not on file    Unstable Housing in the Last Year: No         Family History   Problem Relation Age of Onset    Heart Disease Mother     Heart Disease Sister          Allergies   Allergen Reactions    Carbamazepine Anaphylaxis    Lacosamide Nausea Only    Lorazepam Other (See Comments)     Violent behavior    Nitroglycerin Other (See Comments)     hypotension         Current Outpatient Medications   Medication Sig    OXYGEN Inhale into the lungs 2 liters qhs    metoprolol succinate (TOPROL XL) 100 MG extended release tablet TAKE 1 TABLET BY MOUTH IN THE MORNING AND IN THE EVENING    albuterol sulfate HFA (PROVENTIL HFA) 108 (90 Base) MCG/ACT inhaler Inhale 2 puffs into the lungs every 4 hours as needed for Wheezing    cyclobenzaprine (FLEXERIL) 10 MG tablet Take 1 tablet by mouth 3 times daily as needed for Muscle spasms    apixaban (ELIQUIS) 5 MG TABS tablet Take 1 tablet by mouth 2 times daily    atorvastatin (LIPITOR) 80 MG tablet Take 80 mg by mouth at bedtime    tiotropium (SPIRIVA RESPIMAT) 2.5 MCG/ACT AERS inhaler Inhale 2 puffs into the lungs daily    ferrous sulfate (IRON 325) 325 (65 Fe) MG tablet Take 325 mg by mouth 2 times daily    ondansetron (ZOFRAN-ODT) 8 MG TBDP disintegrating tablet Take 1 tablet by mouth every 8 hours as needed for Nausea    Cholecalciferol (VITAMIN D3) 50 MCG (2000 UT) CAPS TAKE 1 CAPSULE BY MOUTH EVERY DAY    omeprazole (PRILOSEC) 20 MG delayed release capsule Take 1 capsule by mouth in the morning and at bedtime (Patient taking differently: Take 20 mg by mouth Daily Takes in the morning)    aspirin 81 MG chewable tablet Take 81 mg by mouth daily    fluticasone-salmeterol (ADVAIR) 250-50 MCG/ACT AEPB diskus inhaler Inhale 1 puff into the lungs every 12 hours    ipratropium-albuterol (DUONEB) 0.5-2.5 (3) MG/3ML SOLN nebulizer solution Inhale 3 mLs into the lungs every 4 hours as needed      No current facility-administered medications for this visit. REVIEW OF SYSTEMS:  Review of Systems   Constitutional:  Positive for fatigue. HENT:  Positive for postnasal drip. Respiratory:  Positive for shortness of breath and wheezing. Musculoskeletal:  Positive for back pain and gait problem. Neurological:  Positive for numbness. All other systems reviewed and are negative. PHYSICAL EXAM:    Vitals:    03/13/23 0955   BP: (!) 142/90   Pulse: 70   Resp: 16   Temp: 96.9 °F (36.1 °C)   SpO2: 98%        GENERAL APPEARANCE:   The patient is normal weight and in no respiratory distress. HEENT:   PERRL. Conjunctivae unremarkable. Nasal mucosa is without epistaxis, exudate, or polyps. Gums and dentition are unremarkable. There is no oropharyngeal narrowing. TMs are clear. NECK/LYMPHATIC:   Symmetrical with no elevation of jugular venous pulsation. Trachea midline. No thyroid enlargement. No cervical adenopathy. LUNGS:   Normal respiratory effort with symmetrical lung expansion. Breath sounds clear. HEART:   There is a regular rate and rhythm.   No murmur, rub, or gallop. There is no edema in the lower extremities. ABDOMEN:   Soft and non-tender. No hepatosplenomegaly. Bowel sounds are normal.     NEURO:   The patient is alert and oriented to person, place, and time. Memory appears intact and mood is normal.  No gross sensorimotor deficits are present. DIAGNOSTIC TESTS:   PCXR: No valid procedures specified. CXR PA and lateral:  No results found for this or any previous visit. Screening chest CT: No results found for this or any previous visit. CT of chest without contrast:   No results found for this or any previous visit. CT of chest with contrast:  No valid procedures specified. PET/CT: No valid procedures specified. Spirometry:    Office Spirometry Results Latest Ref Rng & Units 9/1/2022   DLCO %PRED % 43   TLC %PRED % 76           ASSESSMENT:  (Medical Decision Making)      Diagnosis Orders   1. Nocturnal hypoxia    Stable on 2L but he does have symptoms o sleep apnea and need a sleep study , scheduled while he was in the hospital and he is being rescheduled      2. Chronic obstructive pulmonary disease, unspecified COPD type ([address]  he had complete pulmonary function test which showed both obstruction and restriction with airway trapping , stable on  Advair and Spiriva      3. ILD (interstitial lung disease) ([address]   Some scarring on HRCT which  I have reviewed personally and discussed with the patient        4. Snoring  patient does have symptoms snoring choking and daytime hyper somnolence and his sleep study had to be rescheduled because he was in the hospital and missed his sleep study      5.  Smoking greater than 30 pack years   -Patient recently quit and I did congratulate him  Will need yearly follow up screening in 6/2023             PLAN:      - continue spiriva/Advair  - home sleep study he will shedule  -will need yearly screening, had CT in 6/2022  - follow up in 6 months     No orders of the defined types were placed in this encounter. No orders of the defined types were placed in this encounter. Pardeep Penn MD  Electronically signed    Dictated using voice recognition software.   Proof read but unrecognized errors may exist.

## 2023-03-14 DIAGNOSIS — M54.9 BACK PAIN, UNSPECIFIED BACK LOCATION, UNSPECIFIED BACK PAIN LATERALITY, UNSPECIFIED CHRONICITY: ICD-10-CM

## 2023-03-14 NOTE — TELEPHONE ENCOUNTER
Ashley Chen need refill for cyclobenzaprine. Please send to [facility] [place].  Thank you

## 2023-03-15 RX ORDER — CYCLOBENZAPRINE HCL 10 MG
10 TABLET ORAL 3 TIMES DAILY PRN
Qty: 30 TABLET | Refills: 0 | Status: SHIPPED | OUTPATIENT
Start: 2023-03-15

## 2023-03-16 ENCOUNTER — HOSPITAL ENCOUNTER (EMERGENCY)
Age: 68
Discharge: HOME | End: 2023-03-16
Attending: EMERGENCY MEDICINE
Payer: OTHER GOVERNMENT

## 2023-03-16 ENCOUNTER — APPOINTMENT (OUTPATIENT)
Dept: GENERAL RADIOLOGY | Age: 68
End: 2023-03-16
Payer: OTHER GOVERNMENT

## 2023-03-16 VITALS
TEMPERATURE: 97.9 F | DIASTOLIC BLOOD PRESSURE: 96 MMHG | WEIGHT: 234 LBS | HEIGHT: 73 IN | OXYGEN SATURATION: 98 % | HEART RATE: 73 BPM | BODY MASS INDEX: 31.01 KG/M2 | RESPIRATION RATE: 16 BRPM | SYSTOLIC BLOOD PRESSURE: 141 MMHG

## 2023-03-16 DIAGNOSIS — S46.912A STRAIN OF LEFT SHOULDER, INITIAL ENCOUNTER: Primary | ICD-10-CM

## 2023-03-16 PROCEDURE — 6370000000 HC RX 637 (ALT 250 FOR IP): Performed by: EMERGENCY MEDICINE

## 2023-03-16 PROCEDURE — 99284 EMERGENCY DEPT VISIT MOD MDM: CPT

## 2023-03-16 PROCEDURE — 6360000002 HC RX W HCPCS: Performed by: EMERGENCY MEDICINE

## 2023-03-16 PROCEDURE — 96372 THER/PROPH/DIAG INJ SC/IM: CPT

## 2023-03-16 PROCEDURE — 73030 X-RAY EXAM OF SHOULDER: CPT

## 2023-03-16 RX ORDER — MORPHINE SULFATE 4 MG/ML
6 INJECTION, SOLUTION INTRAMUSCULAR; INTRAVENOUS
Status: COMPLETED | OUTPATIENT
Start: 2023-03-16 | End: 2023-03-16

## 2023-03-16 RX ORDER — ONDANSETRON 4 MG/1
8 TABLET, ORALLY DISINTEGRATING ORAL
Status: COMPLETED | OUTPATIENT
Start: 2023-03-16 | End: 2023-03-16

## 2023-03-16 RX ORDER — PREDNISONE 10 MG/1
40 TABLET ORAL ONCE
Status: COMPLETED | OUTPATIENT
Start: 2023-03-16 | End: 2023-03-16

## 2023-03-16 RX ORDER — PREDNISONE 20 MG/1
40 TABLET ORAL DAILY
Qty: 10 TABLET | Refills: 0 | Status: SHIPPED | OUTPATIENT
Start: 2023-03-16 | End: 2023-03-21

## 2023-03-16 RX ORDER — HYDROCODONE BITARTRATE AND ACETAMINOPHEN 5; 325 MG/1; MG/1
1 TABLET ORAL EVERY 8 HOURS PRN
Qty: 10 TABLET | Refills: 0 | Status: SHIPPED | OUTPATIENT
Start: 2023-03-16 | End: 2023-03-19

## 2023-03-16 RX ADMIN — PREDNISONE 40 MG: 10 TABLET ORAL at 10:58

## 2023-03-16 RX ADMIN — ONDANSETRON 8 MG: 4 TABLET, ORALLY DISINTEGRATING ORAL at 10:59

## 2023-03-16 RX ADMIN — MORPHINE SULFATE 6 MG: 4 INJECTION, SOLUTION INTRAMUSCULAR; INTRAVENOUS at 10:59

## 2023-03-16 ASSESSMENT — LIFESTYLE VARIABLES
HOW OFTEN DO YOU HAVE A DRINK CONTAINING ALCOHOL: NEVER
HOW MANY STANDARD DRINKS CONTAINING ALCOHOL DO YOU HAVE ON A TYPICAL DAY: PATIENT DOES NOT DRINK

## 2023-03-16 ASSESSMENT — PAIN SCALES - GENERAL
PAINLEVEL_OUTOF10: 8
PAINLEVEL_OUTOF10: 7

## 2023-03-16 ASSESSMENT — PAIN DESCRIPTION - LOCATION
LOCATION: SHOULDER
LOCATION: SHOULDER

## 2023-03-16 ASSESSMENT — PAIN DESCRIPTION - ORIENTATION
ORIENTATION: LEFT
ORIENTATION: LEFT

## 2023-03-16 ASSESSMENT — PAIN DESCRIPTION - FREQUENCY: FREQUENCY: CONTINUOUS

## 2023-03-16 ASSESSMENT — PAIN DESCRIPTION - DESCRIPTORS: DESCRIPTORS: ACHING

## 2023-03-16 ASSESSMENT — PAIN - FUNCTIONAL ASSESSMENT: PAIN_FUNCTIONAL_ASSESSMENT: 0-10

## 2023-03-16 NOTE — ED NOTES
I have reviewed discharge instructions with the patient.  The patient verbalized understanding.    Patient left ED via Discharge Method: stretcher to Home with Justin.    Opportunity for questions and clarification provided.       Patient given 2 scripts.         To continue your aftercare when you leave the hospital, you may receive an automated call from our care team to check in on how you are doing.  This is a free service and part of our promise to provide the best care and service to meet your aftercare needs.” If you have questions, or wish to unsubscribe from this service please call [phone].  Thank you for Choosing our [facility] Emergency Department.        Jocelyn Perez RN  03/16/23 0940

## 2023-03-16 NOTE — ED NOTES
Patient arrives via EMS from home with left shoulder injury approx 5 days ago. Previous stroke in October, and was using a pole over bed to slide in, and felt a significant amount of pain in shoulder. [id number]. Pt received albuterol tx in route after c/o apartment complex spraying for bugs and was nervous due to hx of COPD.      Alvaro Saul RN  03/16/23 1037

## 2023-03-16 NOTE — ED PROVIDER NOTES
Emergency Department Provider Note                   PCP:                Dominic Liriano MD               Age: 79 y.o. Sex: male     DISPOSITION       No diagnosis found. MEDICAL DECISION MAKING  Complexity of Problems Addressed:  New problem requiring work-up    Data Reviewed and Analyzed:  Category 1:     I ordered each unique test.  I reviewed the results of each unique test.        Category 2:   I independently ordered and reviewed the X-rays. No acute abnormality    Category 3: Discussion of management or test interpretation. 19-year-old white male history of COPD, hypertension, chronic back pain and previous CVA with persistent right-sided weakness presents with left shoulder pain which began 5 days ago while he was pulling himself into bed with his left arm. Today his home health nurse noted his blood pressure elevated 180/92 and recommended he be transported here for evaluation. He does have chronic shortness of breath and wheezing from COPD. He feels like this has been aggravated by his apartment complex sprain paint and several of the rooms. He was given a nebulizer treatment in route to the hospital.  At this time he is not complaining of shortness of breath he is only complaining of pain to his left shoulder. Physical exam  Well-nourished white male awake alert and in no distress except for some pain in right shoulder. HEENT exam normocephalic atraumatic cardiovascular regular rate and rhythm no murmurs. Lungs have bilateral expiratory wheezes. No accessory muscle use. Abdomen obese soft nontender. Extremities trace symmetric edema. ED course  Patient was treated with IM morphine, oral Zofran and prednisone. X-ray left shoulder shows no acute abnormality. Suspect strain or possible rotator cuff injury. For now we will treat with prednisone and limited supply of hydrocodone.        Risk of Complications and/or Morbidity of Patient Management:  Prescription drug management nory Parr is a 79 y.o. male who presents to the Emergency Department with chief complaint of    Chief Complaint   Patient presents with    Shoulder Pain      HPI     Review of Systems    Vitals signs and nursing note reviewed.    Patient Vitals for the past 4 hrs:   Temp Pulse BP SpO2   03/16/23 1030 97.9 °F (36.6 °C) 70 (!) 160/89 99 %          Physical Exam     Procedures     Orders Placed This Encounter   Procedures    XR SHOULDER LEFT (MIN 2 VIEWS)        Medications   morphine sulfate (PF) injection 6 mg (has no administration in time range)   ondansetron (ZOFRAN-ODT) disintegrating tablet 8 mg (has no administration in time range)   predniSONE (DELTASONE) tablet 40 mg (has no administration in time range)       New Prescriptions    No medications on file        Past Medical History:   Diagnosis Date    Aortic stenosis     Aortic valve replacement 7/2018    CAD (coronary artery disease)     PCI in 2014, 2015 had MI and then stents    Cancer ([address]     SCC removed face     CHF (congestive heart failure) ([address]     Chronic renal disease, stage III ([address]     sees neph and does not have actual diagnosis at this time    COPD (chronic obstructive pulmonary disease) ([address]     fibrosis in lungs    Dyslipidemia     Heart failure ([address]     CHF per patient    HTN (hypertension)     med controlled    Hyperlipidemia     Ischemic cardiomyopathy     Pacemaker     only pacemaker    Paroxysmal atrial fibrillation ([address]     pradaxa for this    Pulmonary fibrosis ([address]     Seizure disorder ([address]     lyme disease - small lesion frontal part of brain - no maintenance meds - last seizure 2 months ago, due to fall - before that it was nine years    Systolic heart failure [facility])         Past Surgical History:   Procedure Laterality Date    ABLATION OF DYSRHYTHMIC FOCUS      AORTIC VALVE REPLACEMENT  07/2018    BACK SURGERY      discectomy    CARDIAC CATHETERIZATION      PCI 2014, 2015    CARDIAC PROCEDURE N/A 2022    LEFT HEART CATH / CORONARY ANGIOGRAPHY performed by Evans Watters MD at [facility] CATH LAB    CERVICAL FUSION      C3-4 fusion    EGD  2022         HIP ARTHROPLASTY Left     SPINAL CORD STIMULATOR SURGERY      lower back     UPPER GASTROINTESTINAL ENDOSCOPY N/A 2022    EGD ESOPHAGOGASTRODUODENOSCOPY/ PT HAS PACEMAKER performed by Aline Chacon MD at [facility] ENDOSCOPY        Family History   Problem Relation Age of Onset    Heart Disease Mother     Heart Disease Sister         Social History     Socioeconomic History    Marital status: Legally    Tobacco Use    Smoking status: Former     Packs/day: 1.50     Years: 56.00     Pack years: 84.00     Types: Cigarettes     Quit date: 2022     Years since quittin.5    Smokeless tobacco: Never    Tobacco comments:     Stopped 2022   Vaping Use    Vaping Use: Never used   Substance and Sexual Activity    Alcohol use: Not Currently     Comment: about 1-2 x per year    Drug use: Yes     Types: Marijuana Sanders Hotter)     Comment: cannabis used: two weeks ago as of 6/15/2022 edibles      Social Determinants of Health     Financial Resource Strain: Medium Risk    Difficulty of Paying Living Expenses: Somewhat hard   Food Insecurity: No Food Insecurity    Worried About Running Out of Food in the Last Year: Never true    Ran Out of Food in the Last Year: Never true   Transportation Needs: Unknown    Lack of Transportation (Non-Medical): No   Housing Stability: Unknown    Unstable Housing in the Last Year: No        Allergies: Carbamazepine, Lacosamide, Lorazepam, and Nitroglycerin    Previous Medications    ALBUTEROL SULFATE HFA (PROVENTIL HFA) 108 (90 BASE) MCG/ACT INHALER    Inhale 2 puffs into the lungs every 4 hours as needed for Wheezing    APIXABAN (ELIQUIS) 5 MG TABS TABLET    Take 1 tablet by mouth 2 times daily    ASPIRIN 81 MG CHEWABLE TABLET    Take 81 mg by mouth daily    ATORVASTATIN (LIPITOR) 80 MG TABLET    Take 80 mg by mouth at bedtime    CHOLECALCIFEROL (VITAMIN D3) 50 MCG (2000 UT) CAPS    TAKE 1 CAPSULE BY MOUTH EVERY DAY    CYCLOBENZAPRINE (FLEXERIL) 10 MG TABLET    Take 1 tablet by mouth 3 times daily as needed for Muscle spasms    FERROUS SULFATE (IRON 325) 325 (65 FE) MG TABLET    Take 325 mg by mouth 2 times daily    FLUTICASONE-SALMETEROL (ADVAIR) 250-50 MCG/ACT AEPB DISKUS INHALER    Inhale 1 puff into the lungs every 12 hours    IPRATROPIUM-ALBUTEROL (DUONEB) 0.5-2.5 (3) MG/3ML SOLN NEBULIZER SOLUTION    Inhale 3 mLs into the lungs every 4 hours as needed     METOPROLOL SUCCINATE (TOPROL XL) 100 MG EXTENDED RELEASE TABLET    TAKE 1 TABLET BY MOUTH IN THE MORNING AND IN THE EVENING    OMEPRAZOLE (PRILOSEC) 20 MG DELAYED RELEASE CAPSULE    Take 1 capsule by mouth in the morning and at bedtime    ONDANSETRON (ZOFRAN-ODT) 8 MG TBDP DISINTEGRATING TABLET    Take 1 tablet by mouth every 8 hours as needed for Nausea    OXYGEN    Inhale into the lungs 2 liters qhs    TIOTROPIUM (SPIRIVA RESPIMAT) 2.5 MCG/ACT AERS INHALER    Inhale 2 puffs into the lungs daily        No results found for any visits on 03/16/23. XR SHOULDER LEFT (MIN 2 VIEWS)    (Results Pending)                     Voice dictation software was used during the making of this note. This software is not perfect and grammatical and other typographical errors may be present. This note has not been completely proofread for errors.      Alanis Hansen MD  03/16/23 5491

## 2023-03-17 ENCOUNTER — TELEPHONE (OUTPATIENT)
Dept: INTERNAL MEDICINE CLINIC | Facility: CLINIC | Age: 68
End: 2023-03-17

## 2023-03-24 ENCOUNTER — HOSPITAL ENCOUNTER (OUTPATIENT)
Dept: SLEEP CENTER | Age: 68
Discharge: HOME | End: 2023-03-27
Payer: MEDICARE

## 2023-03-24 PROCEDURE — 95806 SLEEP STUDY UNATT&RESP EFFT: CPT

## 2023-03-28 ENCOUNTER — HOSPITAL ENCOUNTER (OUTPATIENT)
Dept: GENERAL RADIOLOGY | Age: 68
Discharge: HOME | End: 2023-03-31
Payer: MEDICARE

## 2023-03-28 DIAGNOSIS — M54.2 NECK PAIN: ICD-10-CM

## 2023-03-28 PROCEDURE — 72040 X-RAY EXAM NECK SPINE 2-3 VW: CPT

## 2023-04-03 PROCEDURE — 93296 REM INTERROG EVL PM/IDS: CPT | Performed by: INTERNAL MEDICINE

## 2023-04-03 PROCEDURE — 93294 REM INTERROG EVL PM/LDLS PM: CPT | Performed by: INTERNAL MEDICINE

## 2023-04-04 ENCOUNTER — OFFICE VISIT (OUTPATIENT)
Dept: INTERNAL MEDICINE CLINIC | Facility: CLINIC | Age: 68
End: 2023-04-04
Payer: MEDICARE

## 2023-04-04 VITALS
BODY MASS INDEX: 32.46 KG/M2 | DIASTOLIC BLOOD PRESSURE: 80 MMHG | HEART RATE: 76 BPM | OXYGEN SATURATION: 96 % | WEIGHT: 246 LBS | SYSTOLIC BLOOD PRESSURE: 150 MMHG

## 2023-04-04 DIAGNOSIS — M25.512 ACUTE PAIN OF LEFT SHOULDER: Primary | ICD-10-CM

## 2023-04-04 PROCEDURE — 99213 OFFICE O/P EST LOW 20 MIN: CPT | Performed by: INTERNAL MEDICINE

## 2023-04-04 PROCEDURE — 1036F TOBACCO NON-USER: CPT | Performed by: INTERNAL MEDICINE

## 2023-04-04 PROCEDURE — 3077F SYST BP >= 140 MM HG: CPT | Performed by: INTERNAL MEDICINE

## 2023-04-04 PROCEDURE — 3017F COLORECTAL CA SCREEN DOC REV: CPT | Performed by: INTERNAL MEDICINE

## 2023-04-04 PROCEDURE — G8427 DOCREV CUR MEDS BY ELIG CLIN: HCPCS | Performed by: INTERNAL MEDICINE

## 2023-04-04 PROCEDURE — 1123F ACP DISCUSS/DSCN MKR DOCD: CPT | Performed by: INTERNAL MEDICINE

## 2023-04-04 PROCEDURE — G8417 CALC BMI ABV UP PARAM F/U: HCPCS | Performed by: INTERNAL MEDICINE

## 2023-04-04 PROCEDURE — 3079F DIAST BP 80-89 MM HG: CPT | Performed by: INTERNAL MEDICINE

## 2023-04-04 RX ORDER — OMEPRAZOLE 20 MG/1
20 CAPSULE, DELAYED RELEASE ORAL DAILY
Qty: 30 CAPSULE | Refills: 0 | Status: CANCELLED | OUTPATIENT
Start: 2023-04-04

## 2023-04-04 RX ORDER — HYDROCODONE BITARTRATE AND ACETAMINOPHEN 5; 325 MG/1; MG/1
TABLET ORAL
COMMUNITY
Start: 2023-03-22

## 2023-04-04 RX ORDER — FUROSEMIDE 40 MG/1
40 TABLET ORAL 2 TIMES DAILY PRN
COMMUNITY

## 2023-04-04 RX ORDER — CYCLOBENZAPRINE HCL 10 MG
10 TABLET ORAL 3 TIMES DAILY PRN
Qty: 30 TABLET | Refills: 0 | Status: CANCELLED | OUTPATIENT
Start: 2023-04-04

## 2023-04-04 ASSESSMENT — ENCOUNTER SYMPTOMS
COUGH: 0
SHORTNESS OF BREATH: 0
ABDOMINAL PAIN: 0

## 2023-04-04 NOTE — PROGRESS NOTES
only pacemaker    Paroxysmal atrial fibrillation (HCC)     pradaxa for this    Pulmonary fibrosis (HCC)     Seizure disorder ([facility] [address]     lyme disease - small lesion frontal part of brain - no maintenance meds - last seizure 2 months ago, due to fall - before that it was nine years    Systolic heart failure [facility])      Past Surgical History:   Procedure Laterality Date    ABLATION OF DYSRHYTHMIC FOCUS      AORTIC VALVE REPLACEMENT  2018    BACK SURGERY      discectomy    CARDIAC CATHETERIZATION      PCI ,     CARDIAC PROCEDURE N/A 2022    LEFT HEART CATH / CORONARY ANGIOGRAPHY performed by Kanika Mcclelland MD at [address] [place] [address] CATH LAB    CERVICAL FUSION      C3-4 fusion    EGD  2022         HIP ARTHROPLASTY Left     SPINAL CORD STIMULATOR SURGERY      lower back     UPPER GASTROINTESTINAL ENDOSCOPY N/A 2022    EGD ESOPHAGOGASTRODUODENOSCOPY/ PT HAS PACEMAKER performed by Venita Navarro MD at [facility] ENDOSCOPY     Family History   Problem Relation Age of Onset    Heart Disease Mother     Heart Disease Sister      Social History     Tobacco Use    Smoking status: Former     Packs/day: 1.50     Years: 56.00     Pack years: 84.00     Types: Cigarettes     Quit date: 2022     Years since quittin.5    Smokeless tobacco: Never    Tobacco comments:     Stopped 2022   Substance Use Topics    Alcohol use: Not Currently     Comment: about 1-2 x per year         Review of Systems   Constitutional:  Negative for fever. Respiratory:  Negative for cough and shortness of breath. Cardiovascular:  Negative for chest pain and leg swelling. Gastrointestinal:  Negative for abdominal pain. Psychiatric/Behavioral:  Negative for behavioral problems and confusion. OBJECTIVE:  BP (!) 150/80 (Site: Left Upper Arm, Position: Sitting)   Pulse 76   Wt 246 lb (111.6 kg) Comment: Pt reported  SpO2 96%   BMI 32.46 kg/m²      Physical Exam  Vitals and nursing note reviewed.    Constitutional:

## 2023-04-06 ENCOUNTER — OFFICE VISIT (OUTPATIENT)
Dept: NEUROLOGY | Age: 68
End: 2023-04-06

## 2023-04-06 VITALS
OXYGEN SATURATION: 96 % | HEIGHT: 73 IN | WEIGHT: 242 LBS | BODY MASS INDEX: 32.07 KG/M2 | HEART RATE: 70 BPM | SYSTOLIC BLOOD PRESSURE: 141 MMHG | DIASTOLIC BLOOD PRESSURE: 94 MMHG

## 2023-04-06 DIAGNOSIS — F44.5 PSYCHOGENIC NONEPILEPTIC SEIZURE: ICD-10-CM

## 2023-04-06 DIAGNOSIS — M25.512 ACUTE PAIN OF LEFT SHOULDER: ICD-10-CM

## 2023-04-06 DIAGNOSIS — I48.20 CHRONIC ATRIAL FIBRILLATION (HCC): ICD-10-CM

## 2023-04-06 DIAGNOSIS — Z71.6 TOBACCO ABUSE COUNSELING: ICD-10-CM

## 2023-04-06 DIAGNOSIS — G47.33 OSA (OBSTRUCTIVE SLEEP APNEA): ICD-10-CM

## 2023-04-06 DIAGNOSIS — Z86.73 HISTORY OF STROKE: Primary | ICD-10-CM

## 2023-04-06 ASSESSMENT — PATIENT HEALTH QUESTIONNAIRE - PHQ9
2. FEELING DOWN, DEPRESSED OR HOPELESS: 0
SUM OF ALL RESPONSES TO PHQ9 QUESTIONS 1 & 2: 0
SUM OF ALL RESPONSES TO PHQ QUESTIONS 1-9: 0
1. LITTLE INTEREST OR PLEASURE IN DOING THINGS: 0

## 2023-04-06 NOTE — PROGRESS NOTES
[facility]  [address]  [address], [address] [facility]      Chief Complaint   Patient presents with    Liberty Baron is a 79 y.o. male who presents on for hospital for stroke. PMH significant for seizures, aortic stenosis, COPD, HTN, HLD, ischemic cardiomyopathy s/p PPM, pAF, pulmonary fibrosis,lyme disease, squamous cell carcinoma who presented to ED on 12/21/2022 after a witnessed seizure-like episode. Hx of recent stroke in Oct. 2022 and was at rehab facility where patient was found down on the floor after a fall, is not clear when the fall occurred patient was initially unresponsive for staff and EMS was summoned as he was being placed onto the EMS stretcher he had a spell which appeared to be a tonic-clonic seizure for about 15 to 20 seconds and then once in the back of the ambulance had a second 1 lasting 20 to 30 seconds at that point EMS administered 5 mg of Versed IM. He was brought to ED for further evaluation. He was started on Keppra 500mg BID though it was felt this likely represents functional episodes, rather than epileptiform pathology. On the night of 12/20 he developed what was thought to be status epilepticus and was transferred to the ICU. Hospital workup: CT of head negative for acute intracranial abnormality. Unable to have MRI due to spinal stimulator. Two routine EEGs, both negative for seizure. He was started on Keppra 500mg BID though it was felt this likely represents functional episodes, rather than epileptiform pathology. He was evaluated by PT/OT/ST with recommendations for STR, however patient elected [facility] and therapies. He was discharged home on Keppra 500 mg twice daily. Psychiatry referral was also recommended as outpatient. Interval history:    He is here today by himself. He is currently electric wheelchair. He lives with his girlfriend.  Continues to endorse right sided weakness and sensory changes, and lower

## 2023-04-20 ENCOUNTER — TELEPHONE (OUTPATIENT)
Dept: INTERNAL MEDICINE CLINIC | Facility: CLINIC | Age: 68
End: 2023-04-20

## 2023-04-22 DIAGNOSIS — I10 HYPERTENSION, UNSPECIFIED TYPE: ICD-10-CM

## 2023-04-24 ENCOUNTER — OFFICE VISIT (OUTPATIENT)
Dept: ORTHOPEDIC SURGERY | Age: 68
End: 2023-04-24
Payer: MEDICARE

## 2023-04-24 DIAGNOSIS — M75.02 ADHESIVE CAPSULITIS OF LEFT SHOULDER: Primary | ICD-10-CM

## 2023-04-24 PROCEDURE — 99204 OFFICE O/P NEW MOD 45 MIN: CPT | Performed by: PHYSICIAN ASSISTANT

## 2023-04-24 PROCEDURE — G8427 DOCREV CUR MEDS BY ELIG CLIN: HCPCS | Performed by: PHYSICIAN ASSISTANT

## 2023-04-24 PROCEDURE — G8417 CALC BMI ABV UP PARAM F/U: HCPCS | Performed by: PHYSICIAN ASSISTANT

## 2023-04-24 PROCEDURE — 1036F TOBACCO NON-USER: CPT | Performed by: PHYSICIAN ASSISTANT

## 2023-04-24 PROCEDURE — 1123F ACP DISCUSS/DSCN MKR DOCD: CPT | Performed by: PHYSICIAN ASSISTANT

## 2023-04-24 PROCEDURE — 3017F COLORECTAL CA SCREEN DOC REV: CPT | Performed by: PHYSICIAN ASSISTANT

## 2023-04-28 RX ORDER — METOPROLOL SUCCINATE 100 MG/1
TABLET, EXTENDED RELEASE ORAL
Qty: 90 TABLET | Refills: 0 | OUTPATIENT
Start: 2023-04-28

## 2023-04-29 NOTE — PROGRESS NOTES
KALA.     X-ray impression: Left shoulder appears relatively normal.      Assessment:     ICD-10-CM    1. Adhesive capsulitis of left shoulder  M75.02           Plan:  I had a long discussion with the patient regarding the natural history of the problem, as well as treatment options. Treatment:   We discussed the natural history of frozen shoulder and a handout was provided and also attached to the wrap-up. There are also frozen shoulder exercises for home exercise plan provided. We reviewed stretching protocols again in the office. He has been stretching for 10 seconds at 1 walk-in stretch for more than 20 to 30 seconds stretches. Continue with therapy 3 times a week. Also discussed continued stretching on his own at home in regards to his hand function. Reviewed tabletop stick drills, anterior drills, table slides. Recommend therapy to evaluate and treat current complaints and pathology. A home exercise program was also discussed with the patient. Discussed with the patient injection vs ultimately surgical intervention. The patient has deferred injection at this time but will call if they decide to proceed. Return if symptoms worsen or fail to improve.      Radha Quiroz  04/28/23

## 2023-05-17 DIAGNOSIS — I50.43 ACUTE ON CHRONIC HEART FAILURE WITH REDUCED EJECTION FRACTION AND DIASTOLIC DYSFUNCTION (HCC): ICD-10-CM

## 2023-05-17 DIAGNOSIS — Z95.0 PACEMAKER: ICD-10-CM

## 2023-05-17 DIAGNOSIS — I48.11 LONGSTANDING PERSISTENT ATRIAL FIBRILLATION (HCC): ICD-10-CM

## 2023-05-20 DIAGNOSIS — J44.9 CHRONIC OBSTRUCTIVE PULMONARY DISEASE, UNSPECIFIED (HCC): ICD-10-CM

## 2023-05-23 RX ORDER — FLUTICASONE PROPIONATE AND SALMETEROL 250; 50 UG/1; UG/1
1 POWDER RESPIRATORY (INHALATION) EVERY 12 HOURS
Qty: 1 EACH | Refills: 11 | Status: SHIPPED | OUTPATIENT
Start: 2023-05-23

## 2023-07-06 PROCEDURE — 93296 REM INTERROG EVL PM/IDS: CPT | Performed by: INTERNAL MEDICINE

## 2023-07-06 PROCEDURE — 93294 REM INTERROG EVL PM/LDLS PM: CPT | Performed by: INTERNAL MEDICINE

## 2023-09-12 ENCOUNTER — TELEPHONE (OUTPATIENT)
Dept: PULMONOLOGY | Age: 68
End: 2023-09-12

## 2023-09-13 ENCOUNTER — TELEPHONE (OUTPATIENT)
Dept: PULMONOLOGY | Age: 68
End: 2023-09-13

## 2024-06-07 ENCOUNTER — TELEPHONE (OUTPATIENT)
Dept: PRIMARY CARE CLINIC | Facility: CLINIC | Age: 69
End: 2024-06-07

## (undated) DEVICE — CATHETER DIAG 6FR L110CM INTRO 6FR BLLN DIA9MM 1CC PULM ART

## (undated) DEVICE — AMPLATZ EXTRA STIFF WIRE GUIDE: Brand: AMPLATZ

## (undated) DEVICE — GUIDEWIRE VASC L260CM DIA0.035IN TIP L3MM STD EXCHG PTFE J

## (undated) DEVICE — SUTURE STRATAFIX SPRL SZ 3-0 L9IN ABSRB VLT FS L26MM 3/8 SXPD2B419

## (undated) DEVICE — AIRLIFE™ OXYGEN TUBING 7 FEET (2.1 M) CRUSH RESISTANT OXYGEN TUBING, VINYL TIPPED: Brand: AIRLIFE™

## (undated) DEVICE — INTRODUCER SHTH 5FR CANN L11CM GWIRE 0.038IN STD KINK

## (undated) DEVICE — SINGLE PORT MANIFOLD: Brand: NEPTUNE 2

## (undated) DEVICE — PERCUTANEOUS ENTRY THINWALL NEEDLE  ONE-PART: Brand: COOK

## (undated) DEVICE — SLING ARM ENV PD DLX LG BLU --

## (undated) DEVICE — CATH DIAG F6 TL 3DRC 100CM -- INFINITI - ORDER AS EA

## (undated) DEVICE — NEEDLE SYR 18GA L1.5IN RED PLAS HUB S STL BLNT FILL W/O

## (undated) DEVICE — CATHETER DIAG AD 5FR L110CM 145DEG COR GRY HYDRPHLC NYL

## (undated) DEVICE — GAUZE,SPONGE,4"X4",12PLY,WOVEN,NS,LF: Brand: MEDLINE

## (undated) DEVICE — LUBE JELLY FOIL PACK 1.4 OZ: Brand: MEDLINE INDUSTRIES, INC.

## (undated) DEVICE — INTRO PEELWY HEMVLV 6F 13CM -- SHRT PRELUDE SNAP

## (undated) DEVICE — YANKAUER,BULB TIP,W/O VENT,RIGID,STERILE: Brand: MEDLINE

## (undated) DEVICE — DERMABOND SKIN ADH 0.7ML -- DERMABOND ADVANCED 12/BX

## (undated) DEVICE — GDWIRE WHISPER HITORQ EDS CSJ -- ACUITY SOLD BY BX ONLY 4648

## (undated) DEVICE — SYRINGE MED 3ML CLR PLAS STD N CTRL LUERLOCK TIP DISP

## (undated) DEVICE — KENDALL RADIOLUCENT FOAM MONITORING ELECTRODE RECTANGULAR SHAPE: Brand: KENDALL

## (undated) DEVICE — PINNACLE TIF INTRODUCER SHEATH: Brand: PINNACLE

## (undated) DEVICE — CATHETER ABLAT 8FR L115CM 1-6-2MM SPC TIP 3.5MM FJ CRV

## (undated) DEVICE — FORCEPS BX L240CM JAW DIA2.8MM L CAP W/ NDL MIC MESH TOOTH

## (undated) DEVICE — GUIDEWIRE VASC L260CM DIA0.035IN RAD 3MM J TIP L7CM PTFE

## (undated) DEVICE — CATHETER ANGIO JL4 0.038 IN AD 6 FRX100 CM STD SUPER TORQUE

## (undated) DEVICE — BLOCK BITE AD 60FR W/ VELC STRP ADDRESSES MOST PT AND

## (undated) DEVICE — STOPCOCK TRNSDUC 500PSI 3 W ROT M LUER LT BLU OFF HNDL R

## (undated) DEVICE — MICROPUNCTURE INTRODUCER SET SILHOUETTE TRANSITIONLESS WITH NITINOL WIRE GUIDE: Brand: MICROPUNCTURE

## (undated) DEVICE — BOWL UTIL 16OZ STRL --

## (undated) DEVICE — SYSTEM SURG HEMSTAT PWD 1 GM POLYSACCHARIDE HEMOSPHERES

## (undated) DEVICE — PLASMABLADE X PS210-030S-LIGHT 3.0SL: Brand: PLASMABLADE™ X

## (undated) DEVICE — GUIDE WIRE WITH HYDROPHILIC COATING: Brand: ACUITY WHISPER VIEW™

## (undated) DEVICE — SUTURE ABSORBABLE MONOFILAMENT 4-0 CV15 6 IN PUR V-LOC 90 VLOCM1203

## (undated) DEVICE — SYSTEM CLOSURE 6-12 FR VEN VASC VASCADE MVP

## (undated) DEVICE — GLIDESHEATH SLENDER STAINLESS STEEL KIT: Brand: GLIDESHEATH SLENDER

## (undated) DEVICE — SYRINGE, LUER SLIP, STERILE, 60ML: Brand: MEDLINE

## (undated) DEVICE — GUIDE NDL ST W/ 14 X 147CM TELESCOPICALLY FLD CIV FLX CVR

## (undated) DEVICE — CANNULA NSL ORAL AD FOR CAPNOFLEX CO2 O2 AIRLFE

## (undated) DEVICE — RADIFOCUS OPTITORQUE ANGIOGRAPHIC CATHETER: Brand: OPTITORQUE

## (undated) DEVICE — CATHETER VASC 6 FR DIAG PGTL W/ SIDE H COR 110 CM LEN W/OUT

## (undated) DEVICE — KIT ANGIO CNTRST ADMIN W O BWL WORLEY

## (undated) DEVICE — TUBE SET IRR PUMP THERMALCOOL -- SMARTABLATE

## (undated) DEVICE — THE TORRENT IRRIGATION TUBING IS INTENDED TO PROVIDE IRRIGATION VIA IRRIGATION FLUIDS, SUCH AS STERILE WATER, DURING GASTROINTESTINAL ENDOSCOPIC PROCEDURES WHEN USED IN CONJUNCTION WITH AN IRRIGATION PUMP OR ELECTROSURGICAL UNIT.: Brand: TORRENT

## (undated) DEVICE — 18G NG KIT WITH 96IN PROBE COVER (10 PK): Brand: SITE-RITE

## (undated) DEVICE — INTRODUCER SHTH 6FR L11CM 0.038IN STD SIDEPRT EXTN 3 W

## (undated) DEVICE — SUT ETHBND 0 18IN MO6 MP GRN --

## (undated) DEVICE — HI-TORQUE VERSACORE MODIFIED J GUIDE WIRE SYSTEM 145 CM: Brand: HI-TORQUE VERSACORE

## (undated) DEVICE — GUIDE COR SNUS L40CM DIA9FR 0.035IN STD CRV ADV UNIQUE

## (undated) DEVICE — Z DUPLICATE USE 2275497 DRSG POSTOP PRMSL AG 3.5X6IN

## (undated) DEVICE — DEVICE COMPR LNG 27 CM VASC BND

## (undated) DEVICE — CONTAINER FORMALIN PREFILLED 10% NBF 60ML

## (undated) DEVICE — CONNECTOR TBNG OD5-7MM O2 END DISP

## (undated) DEVICE — PATCH CARTO 3 EXT REF --

## (undated) DEVICE — SYRINGE MED 10ML LUERLOCK TIP W/O SFTY DISP